# Patient Record
Sex: FEMALE | Race: WHITE | Employment: OTHER | URBAN - METROPOLITAN AREA
[De-identification: names, ages, dates, MRNs, and addresses within clinical notes are randomized per-mention and may not be internally consistent; named-entity substitution may affect disease eponyms.]

---

## 2018-03-13 ENCOUNTER — HOSPITAL ENCOUNTER (OUTPATIENT)
Dept: GENERAL RADIOLOGY | Age: 53
Discharge: HOME | End: 2018-03-15
Payer: COMMERCIAL

## 2018-03-13 ENCOUNTER — HOSPITAL ENCOUNTER (OUTPATIENT)
Age: 53
Discharge: HOME | End: 2018-03-13
Payer: COMMERCIAL

## 2018-03-13 ENCOUNTER — HOSPITAL ENCOUNTER (OUTPATIENT)
Age: 53
Discharge: HOME | End: 2018-03-15
Payer: COMMERCIAL

## 2018-03-13 DIAGNOSIS — E78.2 MIXED HYPERLIPIDEMIA: ICD-10-CM

## 2018-03-13 DIAGNOSIS — E03.9 ACQUIRED HYPOTHYROIDISM: ICD-10-CM

## 2018-03-13 DIAGNOSIS — R05.9 COUGH: ICD-10-CM

## 2018-03-13 DIAGNOSIS — J02.9 PHARYNGITIS, UNSPECIFIED ETIOLOGY: ICD-10-CM

## 2018-03-13 LAB
ALBUMIN SERPL-MCNC: 4 G/DL (ref 3.5–5.2)
ALP BLD-CCNC: 65 U/L (ref 35–104)
ALT SERPL-CCNC: 13 U/L (ref 0–32)
ANION GAP SERPL CALCULATED.3IONS-SCNC: 13 MMOL/L (ref 7–16)
AST SERPL-CCNC: 11 U/L (ref 0–31)
BASOPHILS ABSOLUTE: 0.03 E9/L (ref 0–0.2)
BASOPHILS RELATIVE PERCENT: 0.4 % (ref 0–2)
BILIRUB SERPL-MCNC: 0.3 MG/DL (ref 0–1.2)
BUN BLDV-MCNC: 11 MG/DL (ref 6–20)
CALCIUM SERPL-MCNC: 9.4 MG/DL (ref 8.6–10.2)
CHLORIDE BLD-SCNC: 101 MMOL/L (ref 98–107)
CHOLESTEROL, TOTAL: 271 MG/DL (ref 0–199)
CO2: 27 MMOL/L (ref 22–29)
CREAT SERPL-MCNC: 0.9 MG/DL (ref 0.5–1)
EOSINOPHILS ABSOLUTE: 0.13 E9/L (ref 0.05–0.5)
EOSINOPHILS RELATIVE PERCENT: 1.7 % (ref 0–6)
GFR AFRICAN AMERICAN: >60
GFR NON-AFRICAN AMERICAN: >60 ML/MIN/1.73
GLUCOSE BLD-MCNC: 117 MG/DL (ref 74–109)
HCT VFR BLD CALC: 36.9 % (ref 34–48)
HDLC SERPL-MCNC: 37 MG/DL
HEMOGLOBIN: 12.2 G/DL (ref 11.5–15.5)
IMMATURE GRANULOCYTES #: 0.04 E9/L
IMMATURE GRANULOCYTES %: 0.5 % (ref 0–5)
LDL CHOLESTEROL CALCULATED: 187 MG/DL (ref 0–99)
LYMPHOCYTES ABSOLUTE: 2.36 E9/L (ref 1.5–4)
LYMPHOCYTES RELATIVE PERCENT: 30.7 % (ref 20–42)
MCH RBC QN AUTO: 30.7 PG (ref 26–35)
MCHC RBC AUTO-ENTMCNC: 33.1 % (ref 32–34.5)
MCV RBC AUTO: 92.7 FL (ref 80–99.9)
MONOCYTES ABSOLUTE: 0.33 E9/L (ref 0.1–0.95)
MONOCYTES RELATIVE PERCENT: 4.3 % (ref 2–12)
NEUTROPHILS ABSOLUTE: 4.8 E9/L (ref 1.8–7.3)
NEUTROPHILS RELATIVE PERCENT: 62.4 % (ref 43–80)
PDW BLD-RTO: 13 FL (ref 11.5–15)
PLATELET # BLD: 305 E9/L (ref 130–450)
PMV BLD AUTO: 10.9 FL (ref 7–12)
POTASSIUM SERPL-SCNC: 3.8 MMOL/L (ref 3.5–5)
RBC # BLD: 3.98 E12/L (ref 3.5–5.5)
SODIUM BLD-SCNC: 141 MMOL/L (ref 132–146)
T4 FREE: 0.66 NG/DL (ref 0.93–1.7)
TOTAL PROTEIN: 7.3 G/DL (ref 6.4–8.3)
TRIGL SERPL-MCNC: 233 MG/DL (ref 0–149)
TSH SERPL DL<=0.05 MIU/L-ACNC: 7.76 UIU/ML (ref 0.27–4.2)
VLDLC SERPL CALC-MCNC: 47 MG/DL
WBC # BLD: 7.7 E9/L (ref 4.5–11.5)

## 2018-03-13 PROCEDURE — 36415 COLL VENOUS BLD VENIPUNCTURE: CPT

## 2018-03-13 PROCEDURE — 80053 COMPREHEN METABOLIC PANEL: CPT

## 2018-03-13 PROCEDURE — 84443 ASSAY THYROID STIM HORMONE: CPT

## 2018-03-13 PROCEDURE — 80061 LIPID PANEL: CPT

## 2018-03-13 PROCEDURE — 71046 X-RAY EXAM CHEST 2 VIEWS: CPT

## 2018-03-13 PROCEDURE — 84439 ASSAY OF FREE THYROXINE: CPT

## 2018-03-13 PROCEDURE — 85025 COMPLETE CBC W/AUTO DIFF WBC: CPT

## 2018-03-16 ENCOUNTER — OFFICE VISIT (OUTPATIENT)
Dept: INTERNAL MEDICINE CLINIC | Age: 53
End: 2018-03-16
Payer: COMMERCIAL

## 2018-03-16 VITALS
TEMPERATURE: 98 F | DIASTOLIC BLOOD PRESSURE: 67 MMHG | OXYGEN SATURATION: 100 % | HEART RATE: 62 BPM | SYSTOLIC BLOOD PRESSURE: 136 MMHG

## 2018-03-16 DIAGNOSIS — R73.01 IMPAIRED FASTING GLUCOSE: ICD-10-CM

## 2018-03-16 DIAGNOSIS — G93.31 POST VIRAL SYNDROME: ICD-10-CM

## 2018-03-16 DIAGNOSIS — E78.2 MIXED HYPERLIPIDEMIA: Primary | ICD-10-CM

## 2018-03-16 DIAGNOSIS — E03.9 ACQUIRED HYPOTHYROIDISM: ICD-10-CM

## 2018-03-16 DIAGNOSIS — I10 ESSENTIAL HYPERTENSION: ICD-10-CM

## 2018-03-16 DIAGNOSIS — N39.46 MIXED STRESS AND URGE URINARY INCONTINENCE: ICD-10-CM

## 2018-03-16 PROCEDURE — G8427 DOCREV CUR MEDS BY ELIG CLIN: HCPCS | Performed by: FAMILY MEDICINE

## 2018-03-16 PROCEDURE — 99212 OFFICE O/P EST SF 10 MIN: CPT | Performed by: FAMILY MEDICINE

## 2018-03-16 PROCEDURE — 3017F COLORECTAL CA SCREEN DOC REV: CPT | Performed by: FAMILY MEDICINE

## 2018-03-16 PROCEDURE — 3014F SCREEN MAMMO DOC REV: CPT | Performed by: FAMILY MEDICINE

## 2018-03-16 PROCEDURE — G8417 CALC BMI ABV UP PARAM F/U: HCPCS | Performed by: FAMILY MEDICINE

## 2018-03-16 PROCEDURE — 99213 OFFICE O/P EST LOW 20 MIN: CPT | Performed by: FAMILY MEDICINE

## 2018-03-16 PROCEDURE — 1036F TOBACCO NON-USER: CPT | Performed by: FAMILY MEDICINE

## 2018-03-16 PROCEDURE — G8484 FLU IMMUNIZE NO ADMIN: HCPCS | Performed by: FAMILY MEDICINE

## 2018-03-16 RX ORDER — LEVOTHYROXINE SODIUM 137 UG/1
137 TABLET ORAL DAILY
Qty: 42 TABLET | Refills: 0 | Status: SHIPPED | OUTPATIENT
Start: 2018-03-16 | End: 2018-04-27 | Stop reason: SDUPTHER

## 2018-03-16 RX ORDER — OXYBUTYNIN CHLORIDE 5 MG/1
5 TABLET, EXTENDED RELEASE ORAL DAILY
Qty: 30 TABLET | Refills: 1 | Status: SHIPPED | OUTPATIENT
Start: 2018-03-16 | End: 2018-04-27 | Stop reason: SDUPTHER

## 2018-03-16 ASSESSMENT — ENCOUNTER SYMPTOMS
NAUSEA: 0
DOUBLE VISION: 0
SHORTNESS OF BREATH: 0
BLURRED VISION: 0
VOMITING: 0
DIARRHEA: 0
BLOOD IN STOOL: 0
COUGH: 1
WHEEZING: 0
SORE THROAT: 0
CONSTIPATION: 0

## 2018-03-16 NOTE — PROGRESS NOTES
Musculoskeletal: Positive for joint pain. Negative for falls and myalgias. Skin: Positive for itching (difuse). Negative for rash. Neurological: Negative for dizziness, sensory change, focal weakness, weakness and headaches. Endo/Heme/Allergies: Negative for polydipsia. Does not bruise/bleed easily. Objective:  Vitals:    03/16/18 1357   BP: 136/67   Pulse: 62   Temp: 98 °F (36.7 °C)   SpO2: 100%     Physical Exam   Constitutional: She is oriented to person, place, and time and well-developed, well-nourished, and in no distress. No distress. HENT:   Head: Normocephalic and atraumatic. Mouth/Throat: No oropharyngeal exudate. Halitosis    Eyes: Conjunctivae and EOM are normal. Pupils are equal, round, and reactive to light. No scleral icterus. Neck: Normal range of motion. Neck supple. Carotid bruit is not present. No thyromegaly present. Cardiovascular: Normal rate, regular rhythm and intact distal pulses. Murmur heard. Systolic murmur is present with a grade of 2/6   Pulmonary/Chest: Effort normal and breath sounds normal. She has no wheezes. She has no rales. Abdominal: Soft. Bowel sounds are normal. She exhibits no distension. There is no tenderness. There is no guarding. Musculoskeletal: She exhibits no edema. Lymphadenopathy:     She has no cervical adenopathy. Neurological: She is alert and oriented to person, place, and time. No cranial nerve deficit. Gait abnormal.   Reflex Scores:       Bicep reflexes are 1+ on the right side and 1+ on the left side. Brachioradialis reflexes are 1+ on the right side and 1+ on the left side. Speech slow however understand and communicates appropriately. Skin: Skin is warm and dry. She is not diaphoretic. No erythema. Psychiatric: Mood, memory, affect and judgment normal.   Nursing note and vitals reviewed. Osteopathic exam:  Patient evaluated in the seated position. Normal thoracic kyphosis and lumbar lordosis.   No acute

## 2018-04-24 ENCOUNTER — HOSPITAL ENCOUNTER (OUTPATIENT)
Age: 53
Discharge: HOME | End: 2018-04-24
Payer: COMMERCIAL

## 2018-04-24 DIAGNOSIS — E78.2 MIXED HYPERLIPIDEMIA: ICD-10-CM

## 2018-04-24 DIAGNOSIS — I10 ESSENTIAL HYPERTENSION: ICD-10-CM

## 2018-04-24 DIAGNOSIS — R73.01 IMPAIRED FASTING GLUCOSE: ICD-10-CM

## 2018-04-24 DIAGNOSIS — E03.9 ACQUIRED HYPOTHYROIDISM: ICD-10-CM

## 2018-04-24 LAB
HBA1C MFR BLD: 5.7 % (ref 4.8–5.9)
T4 FREE: 0.56 NG/DL (ref 0.93–1.7)
TSH SERPL DL<=0.05 MIU/L-ACNC: 10.74 UIU/ML (ref 0.27–4.2)

## 2018-04-24 PROCEDURE — 83036 HEMOGLOBIN GLYCOSYLATED A1C: CPT

## 2018-04-24 PROCEDURE — 36415 COLL VENOUS BLD VENIPUNCTURE: CPT

## 2018-04-24 PROCEDURE — 84439 ASSAY OF FREE THYROXINE: CPT

## 2018-04-24 PROCEDURE — 84443 ASSAY THYROID STIM HORMONE: CPT

## 2018-04-27 ENCOUNTER — TELEPHONE (OUTPATIENT)
Dept: INTERNAL MEDICINE CLINIC | Age: 53
End: 2018-04-27

## 2018-04-27 ENCOUNTER — OFFICE VISIT (OUTPATIENT)
Dept: INTERNAL MEDICINE CLINIC | Age: 53
End: 2018-04-27
Payer: COMMERCIAL

## 2018-04-27 VITALS
WEIGHT: 152 LBS | HEIGHT: 59 IN | HEART RATE: 53 BPM | RESPIRATION RATE: 16 BRPM | DIASTOLIC BLOOD PRESSURE: 69 MMHG | SYSTOLIC BLOOD PRESSURE: 132 MMHG | TEMPERATURE: 98 F | BODY MASS INDEX: 30.64 KG/M2

## 2018-04-27 DIAGNOSIS — M25.572 ACUTE LEFT ANKLE PAIN: Primary | ICD-10-CM

## 2018-04-27 DIAGNOSIS — E03.9 ACQUIRED HYPOTHYROIDISM: ICD-10-CM

## 2018-04-27 DIAGNOSIS — R19.6 HALITOSIS: ICD-10-CM

## 2018-04-27 DIAGNOSIS — N39.46 MIXED STRESS AND URGE URINARY INCONTINENCE: ICD-10-CM

## 2018-04-27 DIAGNOSIS — H53.9 VISION CHANGES: Primary | ICD-10-CM

## 2018-04-27 PROCEDURE — 3017F COLORECTAL CA SCREEN DOC REV: CPT | Performed by: FAMILY MEDICINE

## 2018-04-27 PROCEDURE — 99212 OFFICE O/P EST SF 10 MIN: CPT | Performed by: FAMILY MEDICINE

## 2018-04-27 PROCEDURE — 1036F TOBACCO NON-USER: CPT | Performed by: FAMILY MEDICINE

## 2018-04-27 PROCEDURE — 99213 OFFICE O/P EST LOW 20 MIN: CPT | Performed by: FAMILY MEDICINE

## 2018-04-27 PROCEDURE — G8417 CALC BMI ABV UP PARAM F/U: HCPCS | Performed by: FAMILY MEDICINE

## 2018-04-27 PROCEDURE — G8427 DOCREV CUR MEDS BY ELIG CLIN: HCPCS | Performed by: FAMILY MEDICINE

## 2018-04-27 RX ORDER — LEVOTHYROXINE SODIUM 137 UG/1
137 TABLET ORAL DAILY
Qty: 42 TABLET | Refills: 0 | Status: SHIPPED | OUTPATIENT
Start: 2018-04-27 | End: 2018-06-08 | Stop reason: SDUPTHER

## 2018-04-27 RX ORDER — OXYBUTYNIN CHLORIDE 5 MG/1
5 TABLET, EXTENDED RELEASE ORAL DAILY
Qty: 30 TABLET | Refills: 3 | Status: SHIPPED | OUTPATIENT
Start: 2018-04-27 | End: 2018-06-08 | Stop reason: SDUPTHER

## 2018-04-27 ASSESSMENT — ENCOUNTER SYMPTOMS
SHORTNESS OF BREATH: 0
NAUSEA: 0
DOUBLE VISION: 0
DIARRHEA: 0
BLOOD IN STOOL: 0
CONSTIPATION: 0
BLURRED VISION: 0
COUGH: 0
WHEEZING: 0
SORE THROAT: 0
VOMITING: 0

## 2018-05-30 ENCOUNTER — HOSPITAL ENCOUNTER (OUTPATIENT)
Age: 53
Discharge: HOME | End: 2018-05-30
Payer: COMMERCIAL

## 2018-05-30 DIAGNOSIS — E03.9 ACQUIRED HYPOTHYROIDISM: ICD-10-CM

## 2018-05-30 LAB
T4 FREE: 0.59 NG/DL (ref 0.93–1.7)
TSH SERPL DL<=0.05 MIU/L-ACNC: 12.38 UIU/ML (ref 0.27–4.2)

## 2018-05-30 PROCEDURE — 36415 COLL VENOUS BLD VENIPUNCTURE: CPT

## 2018-05-30 PROCEDURE — 84439 ASSAY OF FREE THYROXINE: CPT

## 2018-05-30 PROCEDURE — 84443 ASSAY THYROID STIM HORMONE: CPT

## 2018-06-08 ENCOUNTER — OFFICE VISIT (OUTPATIENT)
Dept: INTERNAL MEDICINE CLINIC | Age: 53
End: 2018-06-08
Payer: COMMERCIAL

## 2018-06-08 VITALS
WEIGHT: 154.6 LBS | DIASTOLIC BLOOD PRESSURE: 72 MMHG | HEART RATE: 62 BPM | OXYGEN SATURATION: 98 % | HEIGHT: 59 IN | BODY MASS INDEX: 31.17 KG/M2 | SYSTOLIC BLOOD PRESSURE: 123 MMHG

## 2018-06-08 DIAGNOSIS — J30.2 CHRONIC SEASONAL ALLERGIC RHINITIS, UNSPECIFIED TRIGGER: ICD-10-CM

## 2018-06-08 DIAGNOSIS — N39.46 MIXED STRESS AND URGE URINARY INCONTINENCE: ICD-10-CM

## 2018-06-08 DIAGNOSIS — Z23 NEED FOR TETANUS BOOSTER: Primary | ICD-10-CM

## 2018-06-08 DIAGNOSIS — K21.9 GASTROESOPHAGEAL REFLUX DISEASE, ESOPHAGITIS PRESENCE NOT SPECIFIED: ICD-10-CM

## 2018-06-08 DIAGNOSIS — E78.5 DYSLIPIDEMIA: ICD-10-CM

## 2018-06-08 DIAGNOSIS — E03.9 ACQUIRED HYPOTHYROIDISM: ICD-10-CM

## 2018-06-08 PROCEDURE — G8417 CALC BMI ABV UP PARAM F/U: HCPCS | Performed by: FAMILY MEDICINE

## 2018-06-08 PROCEDURE — G8427 DOCREV CUR MEDS BY ELIG CLIN: HCPCS | Performed by: FAMILY MEDICINE

## 2018-06-08 PROCEDURE — 99213 OFFICE O/P EST LOW 20 MIN: CPT | Performed by: FAMILY MEDICINE

## 2018-06-08 PROCEDURE — G0008 ADMIN INFLUENZA VIRUS VAC: HCPCS | Performed by: FAMILY MEDICINE

## 2018-06-08 PROCEDURE — 90715 TDAP VACCINE 7 YRS/> IM: CPT

## 2018-06-08 PROCEDURE — 1036F TOBACCO NON-USER: CPT | Performed by: FAMILY MEDICINE

## 2018-06-08 PROCEDURE — 3017F COLORECTAL CA SCREEN DOC REV: CPT | Performed by: FAMILY MEDICINE

## 2018-06-08 RX ORDER — TRIAMCINOLONE ACETONIDE 1 MG/G
CREAM TOPICAL
Qty: 45 G | Refills: 1 | Status: CANCELLED | OUTPATIENT
Start: 2018-06-08

## 2018-06-08 RX ORDER — OMEPRAZOLE 20 MG/1
20 CAPSULE, DELAYED RELEASE ORAL DAILY
Qty: 30 CAPSULE | Refills: 2 | Status: SHIPPED | OUTPATIENT
Start: 2018-06-08 | End: 2019-01-14

## 2018-06-08 RX ORDER — LORATADINE 10 MG/1
10 TABLET ORAL DAILY PRN
Qty: 30 TABLET | Refills: 1 | Status: CANCELLED | OUTPATIENT
Start: 2018-06-08

## 2018-06-08 RX ORDER — FENOFIBRATE 145 MG/1
145 TABLET, COATED ORAL DAILY
Qty: 30 TABLET | Refills: 2 | Status: SHIPPED | OUTPATIENT
Start: 2018-06-08 | End: 2018-07-25 | Stop reason: ALTCHOICE

## 2018-06-08 RX ORDER — ONDANSETRON 4 MG/1
4 TABLET, FILM COATED ORAL EVERY 8 HOURS PRN
Qty: 20 TABLET | Refills: 0 | Status: CANCELLED | OUTPATIENT
Start: 2018-06-08

## 2018-06-08 RX ORDER — CETIRIZINE HYDROCHLORIDE 10 MG/1
TABLET ORAL
Qty: 30 TABLET | Refills: 3 | Status: SHIPPED | OUTPATIENT
Start: 2018-06-08 | End: 2018-09-12 | Stop reason: SDUPTHER

## 2018-06-08 RX ORDER — FLUTICASONE PROPIONATE 50 MCG
2 SPRAY, SUSPENSION (ML) NASAL DAILY
Qty: 1 BOTTLE | Refills: 2 | Status: SHIPPED | OUTPATIENT
Start: 2018-06-08 | End: 2018-09-12 | Stop reason: SDUPTHER

## 2018-06-08 RX ORDER — GUAIFENESIN 600 MG/1
600 TABLET, EXTENDED RELEASE ORAL 2 TIMES DAILY
Qty: 20 TABLET | Refills: 0 | Status: CANCELLED | OUTPATIENT
Start: 2018-06-08

## 2018-06-08 RX ORDER — LEVOTHYROXINE SODIUM 137 UG/1
137 TABLET ORAL DAILY
Qty: 42 TABLET | Refills: 0 | Status: SHIPPED | OUTPATIENT
Start: 2018-06-08 | End: 2018-07-25 | Stop reason: SDUPTHER

## 2018-06-08 RX ORDER — OXYBUTYNIN CHLORIDE 5 MG/1
5 TABLET, EXTENDED RELEASE ORAL DAILY
Qty: 30 TABLET | Refills: 3 | Status: SHIPPED | OUTPATIENT
Start: 2018-06-08 | End: 2018-09-12 | Stop reason: SDUPTHER

## 2018-06-08 RX ORDER — CLOTRIMAZOLE AND BETAMETHASONE DIPROPIONATE 10; .64 MG/G; MG/G
CREAM TOPICAL
Qty: 30 G | Refills: 0 | Status: CANCELLED | OUTPATIENT
Start: 2018-06-08

## 2018-06-08 ASSESSMENT — ENCOUNTER SYMPTOMS
DOUBLE VISION: 0
WHEEZING: 0
COUGH: 0
BLURRED VISION: 0
VOMITING: 0
NAUSEA: 0
BLOOD IN STOOL: 0
SHORTNESS OF BREATH: 0
DIARRHEA: 0
CONSTIPATION: 0
SORE THROAT: 0

## 2018-07-19 ENCOUNTER — HOSPITAL ENCOUNTER (OUTPATIENT)
Age: 53
Discharge: HOME | End: 2018-07-19
Payer: COMMERCIAL

## 2018-07-19 LAB
ALBUMIN SERPL-MCNC: 4.2 G/DL (ref 3.5–5.2)
ALP BLD-CCNC: 66 U/L (ref 35–104)
ALT SERPL-CCNC: 14 U/L (ref 0–32)
ANION GAP SERPL CALCULATED.3IONS-SCNC: 10 MMOL/L (ref 7–16)
AST SERPL-CCNC: 14 U/L (ref 0–31)
BASOPHILS ABSOLUTE: 0.03 E9/L (ref 0–0.2)
BASOPHILS RELATIVE PERCENT: 0.4 % (ref 0–2)
BILIRUB SERPL-MCNC: 0.3 MG/DL (ref 0–1.2)
BUN BLDV-MCNC: 13 MG/DL (ref 6–20)
CALCIUM SERPL-MCNC: 9.8 MG/DL (ref 8.6–10.2)
CHLORIDE BLD-SCNC: 102 MMOL/L (ref 98–107)
CHOLESTEROL, TOTAL: 264 MG/DL (ref 0–199)
CO2: 28 MMOL/L (ref 22–29)
CREAT SERPL-MCNC: 0.9 MG/DL (ref 0.5–1)
EOSINOPHILS ABSOLUTE: 0.07 E9/L (ref 0.05–0.5)
EOSINOPHILS RELATIVE PERCENT: 0.9 % (ref 0–6)
GFR AFRICAN AMERICAN: >60
GFR NON-AFRICAN AMERICAN: >60 ML/MIN/1.73
GLUCOSE BLD-MCNC: 108 MG/DL (ref 74–109)
HCT VFR BLD CALC: 39.1 % (ref 34–48)
HDLC SERPL-MCNC: 41 MG/DL
HEMOGLOBIN: 12.8 G/DL (ref 11.5–15.5)
IMMATURE GRANULOCYTES #: 0.04 E9/L
IMMATURE GRANULOCYTES %: 0.5 % (ref 0–5)
LDL CHOLESTEROL CALCULATED: 192 MG/DL (ref 0–99)
LYMPHOCYTES ABSOLUTE: 2.03 E9/L (ref 1.5–4)
LYMPHOCYTES RELATIVE PERCENT: 26.7 % (ref 20–42)
MCH RBC QN AUTO: 30.4 PG (ref 26–35)
MCHC RBC AUTO-ENTMCNC: 32.7 % (ref 32–34.5)
MCV RBC AUTO: 92.9 FL (ref 80–99.9)
MONOCYTES ABSOLUTE: 0.3 E9/L (ref 0.1–0.95)
MONOCYTES RELATIVE PERCENT: 3.9 % (ref 2–12)
NEUTROPHILS ABSOLUTE: 5.14 E9/L (ref 1.8–7.3)
NEUTROPHILS RELATIVE PERCENT: 67.6 % (ref 43–80)
PDW BLD-RTO: 13.2 FL (ref 11.5–15)
PLATELET # BLD: 265 E9/L (ref 130–450)
PMV BLD AUTO: 11.2 FL (ref 7–12)
POTASSIUM SERPL-SCNC: 3.9 MMOL/L (ref 3.5–5)
RBC # BLD: 4.21 E12/L (ref 3.5–5.5)
SODIUM BLD-SCNC: 140 MMOL/L (ref 132–146)
T4 FREE: 0.5 NG/DL (ref 0.93–1.7)
TOTAL PROTEIN: 7.4 G/DL (ref 6.4–8.3)
TRIGL SERPL-MCNC: 157 MG/DL (ref 0–149)
TSH SERPL DL<=0.05 MIU/L-ACNC: 14.48 UIU/ML (ref 0.27–4.2)
VLDLC SERPL CALC-MCNC: 31 MG/DL
WBC # BLD: 7.6 E9/L (ref 4.5–11.5)

## 2018-07-19 PROCEDURE — 85025 COMPLETE CBC W/AUTO DIFF WBC: CPT

## 2018-07-19 PROCEDURE — 80053 COMPREHEN METABOLIC PANEL: CPT

## 2018-07-19 PROCEDURE — 80061 LIPID PANEL: CPT

## 2018-07-19 PROCEDURE — 84443 ASSAY THYROID STIM HORMONE: CPT

## 2018-07-19 PROCEDURE — 36415 COLL VENOUS BLD VENIPUNCTURE: CPT

## 2018-07-19 PROCEDURE — 84439 ASSAY OF FREE THYROXINE: CPT

## 2018-07-25 ENCOUNTER — OFFICE VISIT (OUTPATIENT)
Dept: INTERNAL MEDICINE CLINIC | Age: 53
End: 2018-07-25
Payer: COMMERCIAL

## 2018-07-25 VITALS
HEIGHT: 59 IN | HEART RATE: 58 BPM | OXYGEN SATURATION: 97 % | BODY MASS INDEX: 31.65 KG/M2 | SYSTOLIC BLOOD PRESSURE: 120 MMHG | WEIGHT: 157 LBS | TEMPERATURE: 98.5 F | DIASTOLIC BLOOD PRESSURE: 67 MMHG

## 2018-07-25 DIAGNOSIS — Z91.199 NONCOMPLIANCE: ICD-10-CM

## 2018-07-25 DIAGNOSIS — R60.9 PERIPHERAL EDEMA: ICD-10-CM

## 2018-07-25 DIAGNOSIS — E55.9 VITAMIN D DEFICIENCY: ICD-10-CM

## 2018-07-25 DIAGNOSIS — E03.9 ACQUIRED HYPOTHYROIDISM: ICD-10-CM

## 2018-07-25 DIAGNOSIS — E78.2 MIXED HYPERLIPIDEMIA: ICD-10-CM

## 2018-07-25 DIAGNOSIS — F43.21 FEELING GRIEF: Primary | ICD-10-CM

## 2018-07-25 PROCEDURE — 3017F COLORECTAL CA SCREEN DOC REV: CPT | Performed by: FAMILY MEDICINE

## 2018-07-25 PROCEDURE — 1036F TOBACCO NON-USER: CPT | Performed by: FAMILY MEDICINE

## 2018-07-25 PROCEDURE — 99213 OFFICE O/P EST LOW 20 MIN: CPT | Performed by: FAMILY MEDICINE

## 2018-07-25 PROCEDURE — G8417 CALC BMI ABV UP PARAM F/U: HCPCS | Performed by: FAMILY MEDICINE

## 2018-07-25 PROCEDURE — G8427 DOCREV CUR MEDS BY ELIG CLIN: HCPCS | Performed by: FAMILY MEDICINE

## 2018-07-25 PROCEDURE — 99212 OFFICE O/P EST SF 10 MIN: CPT | Performed by: FAMILY MEDICINE

## 2018-07-25 RX ORDER — ATORVASTATIN CALCIUM 40 MG/1
40 TABLET, FILM COATED ORAL DAILY
Qty: 30 TABLET | Refills: 3 | Status: SHIPPED | OUTPATIENT
Start: 2018-07-25 | End: 2018-09-12 | Stop reason: SDUPTHER

## 2018-07-25 RX ORDER — LEVOTHYROXINE SODIUM 137 UG/1
TABLET ORAL
Qty: 30 TABLET | Refills: 0 | Status: SHIPPED | OUTPATIENT
Start: 2018-07-25 | End: 2018-09-12 | Stop reason: SDUPTHER

## 2018-07-25 ASSESSMENT — ENCOUNTER SYMPTOMS
WHEEZING: 0
DIARRHEA: 0
ABDOMINAL PAIN: 0
CHEST TIGHTNESS: 0
BLOOD IN STOOL: 0
CONSTIPATION: 0
BACK PAIN: 0
EYE DISCHARGE: 0
TROUBLE SWALLOWING: 0
EYE PAIN: 0
NAUSEA: 0
SORE THROAT: 0
ABDOMINAL DISTENTION: 0
SHORTNESS OF BREATH: 0
PHOTOPHOBIA: 0
VOMITING: 0
RHINORRHEA: 0
CHOKING: 0
COUGH: 0

## 2018-07-25 NOTE — PROGRESS NOTES
Subjective:  45 y.o. female with a PMH of uncontrolled Hypothyroidism, Hyperlipidemia and other co-morbidities who presents in office today to review labs completed on 07/19/2018. Patient was previously under the care of another Resident PCP. Patient stated that her mother passed away on 07/20/2018. Patient is only aware of her mother having thyroid disease and not taking her medication regularly. Patient is tearful but consolable. Patient has a 21 yr old daughter with nonverbal Autism who lives in a group home. Patient relates that she has a brother who lives in the area for support. When offered grief counseling, she relates that she informed her Counselor at [facility]; next lorna't on 07/31/2018. Labs were reviewed during the visit. CMP, CBC and LFTs are WNL. Elevated Hyperlipidemia: Tchol: 264; LDL: 192; Triglycerides: 157   Current meds: Simvastatin & Fenofibrate    TSH trending up ~ 14.48   Current med: Levothyroxine 137 mcg     Patient admits to not taking medications as prescribed without clear explanation. This has been an ongoing topic discussed with the prior Resident. I compassionately reiterated the importance of taking her medications regularly. Patient voiced understanding and agreed to take medications as directed. Review of Systems   Constitutional: Positive for unexpected weight change (weight gain). Negative for activity change, appetite change, chills and fever. HENT: Negative for congestion, ear pain, hearing loss, rhinorrhea, sore throat and trouble swallowing. Eyes: Negative for photophobia, pain, discharge and visual disturbance. Respiratory: Negative for cough, choking, chest tightness, shortness of breath and wheezing. Cardiovascular: Negative for chest pain, palpitations and leg swelling. Gastrointestinal: Negative for abdominal distention, abdominal pain, blood in stool, constipation, diarrhea, nausea and vomiting.    Endocrine: Negative for cold intolerance, heat intolerance, polydipsia and polyuria. Genitourinary: Negative for decreased urine volume, dysuria, flank pain, frequency, hematuria and urgency. Musculoskeletal: Negative for arthralgias, back pain, joint swelling, neck pain and neck stiffness. Skin: Negative for pallor, rash and wound. Neurological: Negative for dizziness, tremors, weakness, light-headedness, numbness and headaches. Hematological: Does not bruise/bleed easily. Psychiatric/Behavioral: Positive for dysphoric mood. Negative for confusion and suicidal ideas. The patient is not nervous/anxious. Objective:  Vitals:    07/25/18 1303   BP: 120/67   Pulse: 58   Temp: 98.5 °F (36.9 °C)   SpO2: 97%       Physical Exam   Constitutional: She is oriented to person, place, and time. She appears well-developed and well-nourished. No distress. HENT:   Head: Normocephalic and atraumatic. Right Ear: External ear normal.   Left Ear: External ear normal.   Nose: Nose normal.   Mouth/Throat: Oropharynx is clear and moist.   Eyes: Conjunctivae and EOM are normal. Pupils are equal, round, and reactive to light. Neck: Normal range of motion. Neck supple. No thyromegaly present. Cardiovascular: Normal rate, regular rhythm and intact distal pulses. Exam reveals no gallop and no friction rub. Murmur heard. Systolic murmur is present with a grade of 2/6   Pulmonary/Chest: Effort normal and breath sounds normal. No respiratory distress. She has no wheezes. Abdominal: Soft. Bowel sounds are normal. She exhibits no distension and no mass. There is no hepatosplenomegaly. There is no tenderness. Musculoskeletal: Normal range of motion. She exhibits no edema or tenderness. Neurological: She is alert and oriented to person, place, and time. She has normal reflexes. Gait (ambulates with walker) abnormal.   Skin: Skin is warm and dry. No rash noted.    Psychiatric: Judgment and thought content normal. Her speech is delayed (responds appropriately). She exhibits a depressed mood (appropriate ). Vitals reviewed. Osteopathic exam:  Patient evaluated in the seated position. Normal thoracic kyphosis and lumbar lordosis. No acute tissue texture changes. No acute somatic dysfunction of the cervical, thoracic, or lumbar spine. Lab Results    Lab Results   Component Value Date    WBC 7.6 07/19/2018    HGB 12.8 07/19/2018    HCT 39.1 07/19/2018     07/19/2018    CHOL 264 (H) 07/19/2018    TRIG 157 (H) 07/19/2018    HDL 41 07/19/2018    ALT 14 07/19/2018    AST 14 07/19/2018     07/19/2018    K 3.9 07/19/2018     07/19/2018    CREATININE 0.9 07/19/2018    BUN 13 07/19/2018    CO2 28 07/19/2018    TSH 14.480 (H) 07/19/2018    LABA1C 5.7 04/24/2018     No results found for: VOL, APPEARANCE, COLORU, LABSPEC, LABPH, LEUKBLD, NITRU, GLUCOSEU, KETUA, UROBILINOGEN, KETUA, UROBILINOGEN, BILIRUBINUR, OCBU  No results found for: PSA, CEA, , [id number],   Assessment and Plan:  Guillermina Barragan was seen today for results, hypothyroidism, hyperlipidemia and health maintenance. Diagnoses and all orders for this visit:    Feeling grief        -     [facility]; lorna't 07/31/2018        -     Has family support    Acquired hypothyroidism  -     TSH without Reflex; Future  -     levothyroxine (SYNTHROID) 137 MCG tablet; Take 1 tablet by mouth every morning 30 minutes before eating. Mixed hyperlipidemia  -     atorvastatin (LIPITOR) 40 MG tablet; Take 1 tablet by mouth daily   -     Discontinue simvastatin and fenofibrate  -     CBC; Future    Peripheral edema  -     Comprehensive Metabolic Panel;  Future  -     Compression Stockings MISC; by Does not apply route Below knee  15 - 30 mm Hg    Vitamin D deficiency  -     Vitamin D 25 Hydrox, D2 & D3; Future    Noncompliance        -     Reiterated with compassion the importance of taking medication as prescribed and patient agreed to change her behavior     Return in about 7 weeks

## 2018-09-05 ENCOUNTER — HOSPITAL ENCOUNTER (OUTPATIENT)
Age: 53
Discharge: HOME | End: 2018-09-05
Payer: COMMERCIAL

## 2018-09-05 DIAGNOSIS — R60.9 PERIPHERAL EDEMA: ICD-10-CM

## 2018-09-05 DIAGNOSIS — E78.2 MIXED HYPERLIPIDEMIA: ICD-10-CM

## 2018-09-05 DIAGNOSIS — E78.5 DYSLIPIDEMIA: ICD-10-CM

## 2018-09-05 DIAGNOSIS — E03.9 ACQUIRED HYPOTHYROIDISM: ICD-10-CM

## 2018-09-05 DIAGNOSIS — E55.9 VITAMIN D DEFICIENCY: ICD-10-CM

## 2018-09-05 LAB
ALBUMIN SERPL-MCNC: 4.6 G/DL (ref 3.5–5.2)
ALP BLD-CCNC: 64 U/L (ref 35–104)
ALT SERPL-CCNC: 12 U/L (ref 0–32)
ANION GAP SERPL CALCULATED.3IONS-SCNC: 14 MMOL/L (ref 7–16)
AST SERPL-CCNC: 13 U/L (ref 0–31)
BILIRUB SERPL-MCNC: 0.2 MG/DL (ref 0–1.2)
BUN BLDV-MCNC: 12 MG/DL (ref 6–20)
CALCIUM SERPL-MCNC: 9.8 MG/DL (ref 8.6–10.2)
CHLORIDE BLD-SCNC: 103 MMOL/L (ref 98–107)
CO2: 28 MMOL/L (ref 22–29)
CREAT SERPL-MCNC: 1.2 MG/DL (ref 0.5–1)
GFR AFRICAN AMERICAN: 56
GFR NON-AFRICAN AMERICAN: 47 ML/MIN/1.73
GLUCOSE BLD-MCNC: 118 MG/DL (ref 74–109)
HCT VFR BLD CALC: 41.5 % (ref 34–48)
HEMOGLOBIN: 13.2 G/DL (ref 11.5–15.5)
MCH RBC QN AUTO: 30.3 PG (ref 26–35)
MCHC RBC AUTO-ENTMCNC: 31.8 % (ref 32–34.5)
MCV RBC AUTO: 95.2 FL (ref 80–99.9)
PDW BLD-RTO: 13.2 FL (ref 11.5–15)
PLATELET # BLD: 301 E9/L (ref 130–450)
PMV BLD AUTO: 11.1 FL (ref 7–12)
POTASSIUM SERPL-SCNC: 4 MMOL/L (ref 3.5–5)
RBC # BLD: 4.36 E12/L (ref 3.5–5.5)
SODIUM BLD-SCNC: 145 MMOL/L (ref 132–146)
TOTAL PROTEIN: 7.8 G/DL (ref 6.4–8.3)
TSH SERPL DL<=0.05 MIU/L-ACNC: 16.13 UIU/ML (ref 0.27–4.2)
VITAMIN D 25-HYDROXY: 10 NG/ML (ref 30–100)
WBC # BLD: 8.1 E9/L (ref 4.5–11.5)

## 2018-09-05 PROCEDURE — 36415 COLL VENOUS BLD VENIPUNCTURE: CPT

## 2018-09-05 PROCEDURE — 85027 COMPLETE CBC AUTOMATED: CPT

## 2018-09-05 PROCEDURE — 84443 ASSAY THYROID STIM HORMONE: CPT

## 2018-09-05 PROCEDURE — 82306 VITAMIN D 25 HYDROXY: CPT

## 2018-09-05 PROCEDURE — 80053 COMPREHEN METABOLIC PANEL: CPT

## 2018-09-12 ENCOUNTER — OFFICE VISIT (OUTPATIENT)
Dept: INTERNAL MEDICINE CLINIC | Age: 53
End: 2018-09-12
Payer: COMMERCIAL

## 2018-09-12 VITALS
HEIGHT: 59 IN | HEART RATE: 68 BPM | BODY MASS INDEX: 31.25 KG/M2 | SYSTOLIC BLOOD PRESSURE: 127 MMHG | TEMPERATURE: 98.6 F | OXYGEN SATURATION: 98 % | DIASTOLIC BLOOD PRESSURE: 74 MMHG | WEIGHT: 155 LBS

## 2018-09-12 DIAGNOSIS — F43.21 GRIEVING: ICD-10-CM

## 2018-09-12 DIAGNOSIS — Z91.199 NONCOMPLIANCE: ICD-10-CM

## 2018-09-12 DIAGNOSIS — J30.2 SEASONAL ALLERGIC RHINITIS, UNSPECIFIED TRIGGER: ICD-10-CM

## 2018-09-12 DIAGNOSIS — N39.46 MIXED STRESS AND URGE URINARY INCONTINENCE: ICD-10-CM

## 2018-09-12 DIAGNOSIS — E55.9 VITAMIN D DEFICIENCY: ICD-10-CM

## 2018-09-12 DIAGNOSIS — E03.9 ACQUIRED HYPOTHYROIDISM: Primary | ICD-10-CM

## 2018-09-12 DIAGNOSIS — E78.2 MIXED HYPERLIPIDEMIA: ICD-10-CM

## 2018-09-12 PROCEDURE — G8427 DOCREV CUR MEDS BY ELIG CLIN: HCPCS | Performed by: FAMILY MEDICINE

## 2018-09-12 PROCEDURE — 99212 OFFICE O/P EST SF 10 MIN: CPT | Performed by: FAMILY MEDICINE

## 2018-09-12 PROCEDURE — 3017F COLORECTAL CA SCREEN DOC REV: CPT | Performed by: FAMILY MEDICINE

## 2018-09-12 PROCEDURE — G8417 CALC BMI ABV UP PARAM F/U: HCPCS | Performed by: FAMILY MEDICINE

## 2018-09-12 PROCEDURE — 99213 OFFICE O/P EST LOW 20 MIN: CPT | Performed by: FAMILY MEDICINE

## 2018-09-12 PROCEDURE — 1036F TOBACCO NON-USER: CPT | Performed by: FAMILY MEDICINE

## 2018-09-12 RX ORDER — ACETAMINOPHEN 500 MG
500 TABLET ORAL EVERY 6 HOURS PRN
Qty: 120 TABLET | Refills: 1 | Status: CANCELLED | OUTPATIENT
Start: 2018-09-12

## 2018-09-12 RX ORDER — CLOTRIMAZOLE AND BETAMETHASONE DIPROPIONATE 10; .64 MG/G; MG/G
CREAM TOPICAL
Qty: 30 G | Refills: 0 | Status: CANCELLED | OUTPATIENT
Start: 2018-09-12

## 2018-09-12 RX ORDER — OXYBUTYNIN CHLORIDE 5 MG/1
5 TABLET, EXTENDED RELEASE ORAL DAILY
Qty: 30 TABLET | Refills: 3 | Status: SHIPPED | OUTPATIENT
Start: 2018-09-12 | End: 2018-09-14 | Stop reason: SDUPTHER

## 2018-09-12 RX ORDER — ATORVASTATIN CALCIUM 40 MG/1
40 TABLET, FILM COATED ORAL DAILY
Qty: 30 TABLET | Refills: 3 | Status: SHIPPED | OUTPATIENT
Start: 2018-09-12 | End: 2018-09-14 | Stop reason: SDUPTHER

## 2018-09-12 RX ORDER — FLUTICASONE PROPIONATE 50 MCG
2 SPRAY, SUSPENSION (ML) NASAL DAILY
Qty: 1 BOTTLE | Refills: 3 | Status: SHIPPED | OUTPATIENT
Start: 2018-09-12 | End: 2018-09-14 | Stop reason: SDUPTHER

## 2018-09-12 RX ORDER — LEVOTHYROXINE SODIUM 137 UG/1
TABLET ORAL
Qty: 30 TABLET | Refills: 0 | Status: SHIPPED | OUTPATIENT
Start: 2018-09-12 | End: 2018-09-14 | Stop reason: SDUPTHER

## 2018-09-12 RX ORDER — OMEPRAZOLE 20 MG/1
20 CAPSULE, DELAYED RELEASE ORAL DAILY
Qty: 30 CAPSULE | Refills: 2 | Status: CANCELLED | OUTPATIENT
Start: 2018-09-12

## 2018-09-12 RX ORDER — CETIRIZINE HYDROCHLORIDE 10 MG/1
TABLET ORAL
Qty: 30 TABLET | Refills: 3 | Status: SHIPPED | OUTPATIENT
Start: 2018-09-12 | End: 2018-09-14 | Stop reason: SDUPTHER

## 2018-09-12 RX ORDER — TRIAMCINOLONE ACETONIDE 1 MG/G
CREAM TOPICAL
Qty: 45 G | Refills: 1 | Status: CANCELLED | OUTPATIENT
Start: 2018-09-12

## 2018-09-12 RX ORDER — ERGOCALCIFEROL 1.25 MG/1
50000 CAPSULE ORAL WEEKLY
Qty: 12 CAPSULE | Refills: 0 | Status: SHIPPED | OUTPATIENT
Start: 2018-09-12 | End: 2018-09-14 | Stop reason: SDUPTHER

## 2018-09-12 ASSESSMENT — ENCOUNTER SYMPTOMS
TROUBLE SWALLOWING: 0
PHOTOPHOBIA: 0
BACK PAIN: 0
ABDOMINAL PAIN: 0
ABDOMINAL DISTENTION: 0
WHEEZING: 0
CONSTIPATION: 0
NAUSEA: 0
SORE THROAT: 0
RHINORRHEA: 0
VOMITING: 0
EYE DISCHARGE: 0
BLOOD IN STOOL: 0
CHOKING: 0
COUGH: 0
CHEST TIGHTNESS: 0
EYE PAIN: 0
DIARRHEA: 0
SHORTNESS OF BREATH: 0

## 2018-09-12 NOTE — PROGRESS NOTES
Subjective:  45 y.o. female who presents in office today for a 2 week f/u on uncontrolled hypothyroidism. She has not taken her synthroid (or any of her medications) at all since the last visit due to grief over the loss of her mother this year and forgetfulness. She has no financial obstacle to get her medications and is unmotivated to set alarms or reminders to take them as prescribed. She describes having chronic hair loss and overall fatigue. She explains feeling depressed over the loss of her mother since her death, sees a counselor every 2 weeks, and has good social support through her brother who lives in town. She has had thoughts of hurting herself without a plan, and explains that her counselor at [facility] counseling has talked her out of it. No thoughts of hurting anyone else. She was interested in starting Prozac, recommended by a friend who had success with it. Once I explained that Prozac has to be taken daily without fail, patient changed her mind. Patient declined offer to referral for , home health nurse, and Psychiatrist. Patient is agreeable to switching to a pharmacy that will send her medications in a pre-packed dispenser box. Review of Systems   Constitutional: Positive for fatigue. Negative for activity change, appetite change, chills, fever and unexpected weight change. HENT: Negative for congestion, ear pain, hearing loss, rhinorrhea, sore throat and trouble swallowing. Eyes: Negative for photophobia, pain, discharge and visual disturbance. Respiratory: Negative for cough, choking, chest tightness, shortness of breath and wheezing. Cardiovascular: Negative for chest pain, palpitations and leg swelling. Gastrointestinal: Negative for abdominal distention, abdominal pain, blood in stool, constipation, diarrhea, nausea and vomiting. Endocrine: Negative for cold intolerance, heat intolerance, polydipsia and polyuria.    Genitourinary: Negative for decreased urine volume, dysuria, flank pain, frequency, hematuria and urgency. Musculoskeletal: Negative for arthralgias, back pain, joint swelling, neck pain and neck stiffness. Skin: Negative for pallor, rash and wound. Neurological: Negative for dizziness, tremors, weakness, light-headedness, numbness and headaches. Hematological: Does not bruise/bleed easily. Psychiatric/Behavioral: Negative for confusion and suicidal ideas. The patient is not nervous/anxious. Objective:  Vitals:    09/12/18 1410   BP: 127/74   Pulse: 68   Temp: 98.6 °F (37 °C)   SpO2: 98%       Physical Exam   Constitutional: She is oriented to person, place, and time. She appears well-developed and well-nourished. No distress. HENT:   Head: Normocephalic and atraumatic. Right Ear: External ear normal.   Left Ear: External ear normal.   Nose: Nose normal.   Mouth/Throat: Oropharynx is clear and moist.   Eyes: Pupils are equal, round, and reactive to light. Conjunctivae and EOM are normal.   Neck: Normal range of motion. Neck supple. No thyromegaly present. Cardiovascular: Normal rate, regular rhythm and intact distal pulses. Exam reveals no gallop and no friction rub. Murmur heard. Systolic murmur is present with a grade of 2/6   Pulmonary/Chest: Effort normal and breath sounds normal. No respiratory distress. She has no wheezes. Abdominal: Soft. Bowel sounds are normal. She exhibits no distension and no mass. There is no hepatosplenomegaly. There is no tenderness. Musculoskeletal: Normal range of motion. She exhibits no edema or tenderness. Neurological: She is alert and oriented to person, place, and time. She has normal reflexes. Gait (ambulates with walker) abnormal.   Skin: Skin is warm and dry. No rash noted. Psychiatric: Judgment and thought content normal. She exhibits a depressed mood. Vitals reviewed. Osteopathic exam:  Patient evaluated in the seated position. Normal thoracic kyphosis and lumbar lordosis.   No acute tissue texture changes. No acute somatic dysfunction of the cervical, thoracic, or lumbar spine. Lab Results    Lab Results   Component Value Date    WBC 8.1 09/05/2018    HGB 13.2 09/05/2018    HCT 41.5 09/05/2018     09/05/2018    CHOL 264 (H) 07/19/2018    TRIG 157 (H) 07/19/2018    HDL 41 07/19/2018    ALT 12 09/05/2018    AST 13 09/05/2018     09/05/2018    K 4.0 09/05/2018     09/05/2018    CREATININE 1.2 (H) 09/05/2018    BUN 12 09/05/2018    CO2 28 09/05/2018    TSH 16.130 (H) 09/05/2018    LABA1C 5.7 04/24/2018     No results found for: VOL, APPEARANCE, COLORU, LABSPEC, LABPH, LEUKBLD, NITRU, GLUCOSEU, KETUA, UROBILINOGEN, KETUA, UROBILINOGEN, BILIRUBINUR, OCBU  No results found for: PSA, CEA, , [id number],   Assessment and Plan:  Cassidy Mata was seen today for follow-up, hypothyroidism, health maintenance and results. Diagnoses and all orders for this visit:    Acquired hypothyroidism  -     levothyroxine (SYNTHROID) 137 MCG tablet; Take 1 tablet by mouth every morning 30 minutes before eating.  -     CBC; Future  -     Comprehensive Metabolic Panel; Future  -     TSH without Reflex; Future    Noncompliance        -    Patient declines referral to , home health nursing or psychiatry. Patient is agreeable to switching to a pharmacy that will send her medications in a pre-packed dispenser box. Mixed hyperlipidemia  -     atorvastatin (LIPITOR) 40 MG tablet; Take 1 tablet by mouth daily  -     CBC; Future    Mixed stress and urge urinary incontinence  -     oxybutynin (DITROPAN-XL) 5 MG extended release tablet; Take 1 tablet by mouth daily    Seasonal allergic rhinitis, unspecified trigger  -     fluticasone (FLONASE) 50 MCG/ACT nasal spray; 2 sprays by Nasal route daily  -     cetirizine (ZYRTEC) 10 MG tablet; TAKE ONE TABLET BY MOUTH IN THE MORNING AS NEEDED FOR ITCH    Vitamin D deficiency  -     vitamin D (ERGOCALCIFEROL) 78728 units CAPS capsule;  Take 1

## 2018-09-13 ENCOUNTER — TELEPHONE (OUTPATIENT)
Dept: INTERNAL MEDICINE CLINIC | Age: 53
End: 2018-09-13

## 2018-09-14 DIAGNOSIS — E03.9 ACQUIRED HYPOTHYROIDISM: ICD-10-CM

## 2018-09-14 DIAGNOSIS — J30.2 SEASONAL ALLERGIC RHINITIS, UNSPECIFIED TRIGGER: ICD-10-CM

## 2018-09-14 DIAGNOSIS — N39.46 MIXED STRESS AND URGE URINARY INCONTINENCE: ICD-10-CM

## 2018-09-14 DIAGNOSIS — E78.2 MIXED HYPERLIPIDEMIA: ICD-10-CM

## 2018-09-14 DIAGNOSIS — E55.9 VITAMIN D DEFICIENCY: ICD-10-CM

## 2018-09-14 NOTE — TELEPHONE ENCOUNTER
[facility] and patient regarding medication refills. Patient was advised to call [facility] to get registered with them so they can fill rx's. Patient refused and stated she just wants her meds to go to [facility] like always.  Please send in

## 2018-09-19 NOTE — TELEPHONE ENCOUNTER
Patient called again very upset this time because her meds aren't at [facility].  She wants refills and doesn't want to switch pharmacies [address]

## 2018-09-27 RX ORDER — LEVOTHYROXINE SODIUM 137 UG/1
TABLET ORAL
Qty: 30 TABLET | Refills: 3 | Status: SHIPPED | OUTPATIENT
Start: 2018-09-27 | End: 2019-01-28 | Stop reason: SDUPTHER

## 2018-09-27 RX ORDER — ERGOCALCIFEROL 1.25 MG/1
50000 CAPSULE ORAL WEEKLY
Qty: 12 CAPSULE | Refills: 0 | Status: SHIPPED | OUTPATIENT
Start: 2018-09-27 | End: 2018-12-10 | Stop reason: SDUPTHER

## 2018-09-27 RX ORDER — CETIRIZINE HYDROCHLORIDE 10 MG/1
TABLET ORAL
Qty: 30 TABLET | Refills: 3 | Status: ON HOLD | OUTPATIENT
Start: 2018-09-27 | End: 2019-01-28 | Stop reason: HOSPADM

## 2018-09-27 RX ORDER — OXYBUTYNIN CHLORIDE 5 MG/1
5 TABLET, EXTENDED RELEASE ORAL DAILY
Qty: 30 TABLET | Refills: 3 | Status: SHIPPED | OUTPATIENT
Start: 2018-09-27 | End: 2019-03-19 | Stop reason: SDUPTHER

## 2018-09-27 RX ORDER — ATORVASTATIN CALCIUM 40 MG/1
40 TABLET, FILM COATED ORAL DAILY
Qty: 30 TABLET | Refills: 3 | Status: SHIPPED | OUTPATIENT
Start: 2018-09-27 | End: 2019-02-26 | Stop reason: SDUPTHER

## 2018-09-27 RX ORDER — FLUTICASONE PROPIONATE 50 MCG
2 SPRAY, SUSPENSION (ML) NASAL DAILY
Qty: 1 BOTTLE | Refills: 3 | Status: SHIPPED | OUTPATIENT
Start: 2018-09-27 | End: 2019-02-26 | Stop reason: SDUPTHER

## 2018-10-18 ENCOUNTER — HOSPITAL ENCOUNTER (OUTPATIENT)
Age: 53
Discharge: HOME | End: 2018-10-18
Payer: COMMERCIAL

## 2018-10-18 DIAGNOSIS — K21.9 GASTROESOPHAGEAL REFLUX DISEASE, ESOPHAGITIS PRESENCE NOT SPECIFIED: ICD-10-CM

## 2018-10-18 LAB
ALBUMIN SERPL-MCNC: 4.7 G/DL (ref 3.5–5.2)
ALP BLD-CCNC: 66 U/L (ref 35–104)
ALT SERPL-CCNC: 13 U/L (ref 0–32)
ANION GAP SERPL CALCULATED.3IONS-SCNC: 13 MMOL/L (ref 7–16)
AST SERPL-CCNC: 17 U/L (ref 0–31)
BASOPHILS ABSOLUTE: 0.04 E9/L (ref 0–0.2)
BASOPHILS RELATIVE PERCENT: 0.5 % (ref 0–2)
BILIRUB SERPL-MCNC: 0.4 MG/DL (ref 0–1.2)
BUN BLDV-MCNC: 17 MG/DL (ref 6–20)
CALCIUM SERPL-MCNC: 10.1 MG/DL (ref 8.6–10.2)
CHLORIDE BLD-SCNC: 101 MMOL/L (ref 98–107)
CO2: 29 MMOL/L (ref 22–29)
CREAT SERPL-MCNC: 1.4 MG/DL (ref 0.5–1)
EOSINOPHILS ABSOLUTE: 0.04 E9/L (ref 0.05–0.5)
EOSINOPHILS RELATIVE PERCENT: 0.5 % (ref 0–6)
GFR AFRICAN AMERICAN: 47
GFR NON-AFRICAN AMERICAN: 39 ML/MIN/1.73
GLUCOSE BLD-MCNC: 101 MG/DL (ref 74–109)
HCT VFR BLD CALC: 42.3 % (ref 34–48)
HEMOGLOBIN: 13.6 G/DL (ref 11.5–15.5)
IMMATURE GRANULOCYTES #: 0.04 E9/L
IMMATURE GRANULOCYTES %: 0.5 % (ref 0–5)
LYMPHOCYTES ABSOLUTE: 1.96 E9/L (ref 1.5–4)
LYMPHOCYTES RELATIVE PERCENT: 23.5 % (ref 20–42)
MCH RBC QN AUTO: 29.8 PG (ref 26–35)
MCHC RBC AUTO-ENTMCNC: 32.2 % (ref 32–34.5)
MCV RBC AUTO: 92.8 FL (ref 80–99.9)
MONOCYTES ABSOLUTE: 0.31 E9/L (ref 0.1–0.95)
MONOCYTES RELATIVE PERCENT: 3.7 % (ref 2–12)
NEUTROPHILS ABSOLUTE: 5.96 E9/L (ref 1.8–7.3)
NEUTROPHILS RELATIVE PERCENT: 71.3 % (ref 43–80)
PDW BLD-RTO: 12.5 FL (ref 11.5–15)
PLATELET # BLD: 323 E9/L (ref 130–450)
PMV BLD AUTO: 11.1 FL (ref 7–12)
POTASSIUM SERPL-SCNC: 4.1 MMOL/L (ref 3.5–5)
RBC # BLD: 4.56 E12/L (ref 3.5–5.5)
SODIUM BLD-SCNC: 143 MMOL/L (ref 132–146)
TOTAL PROTEIN: 8.3 G/DL (ref 6.4–8.3)
TSH SERPL DL<=0.05 MIU/L-ACNC: 17.08 UIU/ML (ref 0.27–4.2)
WBC # BLD: 8.4 E9/L (ref 4.5–11.5)

## 2018-10-18 PROCEDURE — 84443 ASSAY THYROID STIM HORMONE: CPT

## 2018-10-18 PROCEDURE — 85025 COMPLETE CBC W/AUTO DIFF WBC: CPT

## 2018-10-18 PROCEDURE — 36415 COLL VENOUS BLD VENIPUNCTURE: CPT

## 2018-10-18 PROCEDURE — 80053 COMPREHEN METABOLIC PANEL: CPT

## 2018-10-24 ENCOUNTER — OFFICE VISIT (OUTPATIENT)
Dept: INTERNAL MEDICINE CLINIC | Age: 53
End: 2018-10-24
Payer: COMMERCIAL

## 2018-10-24 VITALS
OXYGEN SATURATION: 99 % | TEMPERATURE: 98.4 F | RESPIRATION RATE: 18 BRPM | WEIGHT: 148.8 LBS | SYSTOLIC BLOOD PRESSURE: 137 MMHG | HEART RATE: 62 BPM | BODY MASS INDEX: 30 KG/M2 | HEIGHT: 59 IN | DIASTOLIC BLOOD PRESSURE: 75 MMHG

## 2018-10-24 DIAGNOSIS — J30.2 SEASONAL ALLERGIES: ICD-10-CM

## 2018-10-24 DIAGNOSIS — E03.9 HYPOTHYROIDISM, UNSPECIFIED TYPE: ICD-10-CM

## 2018-10-24 DIAGNOSIS — Z91.14 NON COMPLIANCE W MEDICATION REGIMEN: Primary | ICD-10-CM

## 2018-10-24 DIAGNOSIS — E78.2 MIXED HYPERLIPIDEMIA: ICD-10-CM

## 2018-10-24 DIAGNOSIS — I10 ESSENTIAL HYPERTENSION: ICD-10-CM

## 2018-10-24 DIAGNOSIS — N17.9 ACUTE KIDNEY INJURY (HCC): ICD-10-CM

## 2018-10-24 PROCEDURE — 1036F TOBACCO NON-USER: CPT | Performed by: FAMILY MEDICINE

## 2018-10-24 PROCEDURE — G8427 DOCREV CUR MEDS BY ELIG CLIN: HCPCS | Performed by: FAMILY MEDICINE

## 2018-10-24 PROCEDURE — 3017F COLORECTAL CA SCREEN DOC REV: CPT | Performed by: FAMILY MEDICINE

## 2018-10-24 PROCEDURE — 99212 OFFICE O/P EST SF 10 MIN: CPT | Performed by: FAMILY MEDICINE

## 2018-10-24 PROCEDURE — G8484 FLU IMMUNIZE NO ADMIN: HCPCS | Performed by: FAMILY MEDICINE

## 2018-10-24 PROCEDURE — 99213 OFFICE O/P EST LOW 20 MIN: CPT | Performed by: FAMILY MEDICINE

## 2018-10-24 PROCEDURE — G8417 CALC BMI ABV UP PARAM F/U: HCPCS | Performed by: FAMILY MEDICINE

## 2018-10-24 ASSESSMENT — ENCOUNTER SYMPTOMS
ABDOMINAL PAIN: 0
CHEST TIGHTNESS: 0
EYE PAIN: 0
ABDOMINAL DISTENTION: 0
TROUBLE SWALLOWING: 0
CHOKING: 0
BLOOD IN STOOL: 0
PHOTOPHOBIA: 0
RHINORRHEA: 1
CONSTIPATION: 0
SORE THROAT: 0
COUGH: 1
EYE DISCHARGE: 0
WHEEZING: 0
DIARRHEA: 0
VOMITING: 0
NAUSEA: 0
SHORTNESS OF BREATH: 0
BACK PAIN: 0

## 2018-10-24 ASSESSMENT — PATIENT HEALTH QUESTIONNAIRE - PHQ9
SUM OF ALL RESPONSES TO PHQ QUESTIONS 1-9: 4
SUM OF ALL RESPONSES TO PHQ QUESTIONS 1-9: 4
SUM OF ALL RESPONSES TO PHQ9 QUESTIONS 1 & 2: 4
2. FEELING DOWN, DEPRESSED OR HOPELESS: 3
1. LITTLE INTEREST OR PLEASURE IN DOING THINGS: 1

## 2018-11-14 ENCOUNTER — OFFICE VISIT (OUTPATIENT)
Dept: INTERNAL MEDICINE CLINIC | Age: 53
End: 2018-11-14
Payer: COMMERCIAL

## 2018-11-14 VITALS
HEART RATE: 58 BPM | WEIGHT: 151 LBS | OXYGEN SATURATION: 98 % | DIASTOLIC BLOOD PRESSURE: 86 MMHG | BODY MASS INDEX: 30.44 KG/M2 | HEIGHT: 59 IN | TEMPERATURE: 98.3 F | SYSTOLIC BLOOD PRESSURE: 158 MMHG

## 2018-11-14 DIAGNOSIS — E78.2 MIXED HYPERLIPIDEMIA: ICD-10-CM

## 2018-11-14 DIAGNOSIS — Z01.00 ENCOUNTER FOR VISION SCREENING: ICD-10-CM

## 2018-11-14 DIAGNOSIS — E55.9 VITAMIN D DEFICIENCY: ICD-10-CM

## 2018-11-14 DIAGNOSIS — I10 ESSENTIAL HYPERTENSION: ICD-10-CM

## 2018-11-14 DIAGNOSIS — Z79.899 ENCOUNTER FOR MEDICATION REVIEW: Primary | ICD-10-CM

## 2018-11-14 DIAGNOSIS — H53.9 VISION CHANGES: Primary | ICD-10-CM

## 2018-11-14 DIAGNOSIS — E03.9 ACQUIRED HYPOTHYROIDISM: ICD-10-CM

## 2018-11-14 DIAGNOSIS — H53.9 VISION CHANGES: ICD-10-CM

## 2018-11-14 DIAGNOSIS — Z11.4 ENCOUNTER FOR SCREENING FOR HIV: ICD-10-CM

## 2018-11-14 DIAGNOSIS — Z91.199 NONCOMPLIANCE: ICD-10-CM

## 2018-11-14 DIAGNOSIS — Z11.59 NEED FOR HEPATITIS C SCREENING TEST: ICD-10-CM

## 2018-11-14 PROCEDURE — G8484 FLU IMMUNIZE NO ADMIN: HCPCS | Performed by: FAMILY MEDICINE

## 2018-11-14 PROCEDURE — 99213 OFFICE O/P EST LOW 20 MIN: CPT | Performed by: FAMILY MEDICINE

## 2018-11-14 PROCEDURE — G8427 DOCREV CUR MEDS BY ELIG CLIN: HCPCS | Performed by: FAMILY MEDICINE

## 2018-11-14 PROCEDURE — G8417 CALC BMI ABV UP PARAM F/U: HCPCS | Performed by: FAMILY MEDICINE

## 2018-11-14 PROCEDURE — 1036F TOBACCO NON-USER: CPT | Performed by: FAMILY MEDICINE

## 2018-11-14 PROCEDURE — 3017F COLORECTAL CA SCREEN DOC REV: CPT | Performed by: FAMILY MEDICINE

## 2018-11-14 ASSESSMENT — ENCOUNTER SYMPTOMS
SHORTNESS OF BREATH: 0
COUGH: 0
NAUSEA: 0
BLOOD IN STOOL: 0
CONSTIPATION: 0
TROUBLE SWALLOWING: 0
EYE PAIN: 0
ABDOMINAL DISTENTION: 0
DIARRHEA: 0
VOMITING: 0
CHOKING: 0
EYE DISCHARGE: 0
BACK PAIN: 0
CHEST TIGHTNESS: 0
RHINORRHEA: 0
WHEEZING: 0
PHOTOPHOBIA: 0
ABDOMINAL PAIN: 0
SORE THROAT: 0

## 2018-11-14 NOTE — PATIENT INSTRUCTIONS
Patient Education        Hypothyroidism: Care Instructions  Your Care Instructions    You have hypothyroidism, which means that your body is not making enough thyroid hormone. This hormone helps your body use energy. If your thyroid level is low, you may feel tired, be constipated, have an increase in your blood pressure, or have dry skin or memory problems. You may also get cold easily, even when it is warm. Women with low thyroid levels may have heavy menstrual periods. A blood test to find your thyroid-stimulating hormone (TSH) level is used to check for hypothyroidism. A high TSH level may mean that you have low thyroid. When your body is not making enough thyroid hormone, TSH levels rise in an effort to make the body produce more. The treatment for hypothyroidism is to take thyroid hormone pills. You should start to feel better in 1 to 2 weeks. But it can take several months to see changes in the TSH level. You will need regular visits with your doctor to make sure you have the right dose of medicine. Most people need treatment for the rest of their lives. You will need to see your doctor regularly to have blood tests and to make sure you are doing well. Follow-up care is a key part of your treatment and safety. Be sure to make and go to all appointments, and call your doctor if you are having problems. It's also a good idea to know your test results and keep a list of the medicines you take. How can you care for yourself at home? · Take your thyroid hormone medicine exactly as prescribed. Call your doctor if you think you are having a problem with your medicine. Most people do not have side effects if they take the right amount of medicine regularly. ? Take the medicine 30 minutes before breakfast, and do not take it with calcium, vitamins, or iron. ? Do not take extra doses of your thyroid medicine. It will not help you get better any faster, and it may cause side effects.   ? If you forget to take a

## 2018-12-10 DIAGNOSIS — E55.9 VITAMIN D DEFICIENCY: ICD-10-CM

## 2018-12-12 RX ORDER — ERGOCALCIFEROL 1.25 MG/1
50000 CAPSULE ORAL WEEKLY
Qty: 4 CAPSULE | Refills: 0 | Status: SHIPPED | OUTPATIENT
Start: 2018-12-12 | End: 2019-01-28 | Stop reason: SDUPTHER

## 2018-12-21 ENCOUNTER — TELEPHONE (OUTPATIENT)
Dept: INTERNAL MEDICINE CLINIC | Age: 53
End: 2018-12-21

## 2019-01-02 ENCOUNTER — OFFICE VISIT (OUTPATIENT)
Dept: INTERNAL MEDICINE CLINIC | Age: 54
End: 2019-01-02
Payer: COMMERCIAL

## 2019-01-02 VITALS
OXYGEN SATURATION: 98 % | BODY MASS INDEX: 29.43 KG/M2 | DIASTOLIC BLOOD PRESSURE: 89 MMHG | WEIGHT: 146 LBS | HEART RATE: 70 BPM | HEIGHT: 59 IN | TEMPERATURE: 98.3 F | SYSTOLIC BLOOD PRESSURE: 137 MMHG

## 2019-01-02 DIAGNOSIS — K92.0 HEMATEMESIS WITH NAUSEA: Primary | ICD-10-CM

## 2019-01-02 PROCEDURE — 3017F COLORECTAL CA SCREEN DOC REV: CPT | Performed by: FAMILY MEDICINE

## 2019-01-02 PROCEDURE — 1036F TOBACCO NON-USER: CPT | Performed by: FAMILY MEDICINE

## 2019-01-02 PROCEDURE — G8417 CALC BMI ABV UP PARAM F/U: HCPCS | Performed by: FAMILY MEDICINE

## 2019-01-02 PROCEDURE — G8484 FLU IMMUNIZE NO ADMIN: HCPCS | Performed by: FAMILY MEDICINE

## 2019-01-02 PROCEDURE — G8427 DOCREV CUR MEDS BY ELIG CLIN: HCPCS | Performed by: FAMILY MEDICINE

## 2019-01-02 PROCEDURE — 99212 OFFICE O/P EST SF 10 MIN: CPT | Performed by: FAMILY MEDICINE

## 2019-01-02 PROCEDURE — 99213 OFFICE O/P EST LOW 20 MIN: CPT | Performed by: FAMILY MEDICINE

## 2019-01-02 ASSESSMENT — ENCOUNTER SYMPTOMS
CHOKING: 0
WHEEZING: 0
SHORTNESS OF BREATH: 0
TROUBLE SWALLOWING: 0
BACK PAIN: 0
DIARRHEA: 0
EYE DISCHARGE: 0
CONSTIPATION: 0
BLOOD IN STOOL: 0
ABDOMINAL DISTENTION: 0
EYE PAIN: 0
PHOTOPHOBIA: 0
CHEST TIGHTNESS: 0
RHINORRHEA: 0
COUGH: 0
ABDOMINAL PAIN: 0
SORE THROAT: 0

## 2019-01-04 ASSESSMENT — ENCOUNTER SYMPTOMS
VOMITING: 1
NAUSEA: 1

## 2019-01-14 ENCOUNTER — HOSPITAL ENCOUNTER (EMERGENCY)
Age: 54
Discharge: HOME | DRG: 469 | End: 2019-01-14
Attending: EMERGENCY MEDICINE
Payer: COMMERCIAL

## 2019-01-14 VITALS
HEART RATE: 70 BPM | WEIGHT: 146 LBS | TEMPERATURE: 98 F | SYSTOLIC BLOOD PRESSURE: 163 MMHG | OXYGEN SATURATION: 100 % | RESPIRATION RATE: 16 BRPM | BODY MASS INDEX: 29.49 KG/M2 | DIASTOLIC BLOOD PRESSURE: 93 MMHG

## 2019-01-14 DIAGNOSIS — K21.9 GASTROESOPHAGEAL REFLUX DISEASE, ESOPHAGITIS PRESENCE NOT SPECIFIED: ICD-10-CM

## 2019-01-14 DIAGNOSIS — R11.15 NON-INTRACTABLE CYCLICAL VOMITING WITH NAUSEA: Primary | ICD-10-CM

## 2019-01-14 PROCEDURE — 99283 EMERGENCY DEPT VISIT LOW MDM: CPT

## 2019-01-14 PROCEDURE — 6360000002 HC RX W HCPCS: Performed by: EMERGENCY MEDICINE

## 2019-01-14 RX ORDER — PROMETHAZINE HYDROCHLORIDE 12.5 MG/1
12.5 TABLET ORAL 3 TIMES DAILY PRN
Qty: 12 TABLET | Refills: 0 | Status: ON HOLD | OUTPATIENT
Start: 2019-01-14 | End: 2019-01-28 | Stop reason: HOSPADM

## 2019-01-14 RX ORDER — OMEPRAZOLE 20 MG/1
20 CAPSULE, DELAYED RELEASE ORAL
Qty: 30 CAPSULE | Refills: 0 | Status: SHIPPED | OUTPATIENT
Start: 2019-01-14 | End: 2019-04-09 | Stop reason: SDUPTHER

## 2019-01-14 RX ORDER — ONDANSETRON 4 MG/1
4 TABLET, ORALLY DISINTEGRATING ORAL ONCE
Status: COMPLETED | OUTPATIENT
Start: 2019-01-14 | End: 2019-01-14

## 2019-01-14 RX ORDER — SUCRALFATE 1 G/1
1 TABLET ORAL 3 TIMES DAILY
Qty: 21 TABLET | Refills: 0 | Status: ON HOLD | OUTPATIENT
Start: 2019-01-14 | End: 2019-01-28 | Stop reason: HOSPADM

## 2019-01-14 RX ADMIN — ONDANSETRON 4 MG: 4 TABLET, ORALLY DISINTEGRATING ORAL at 16:23

## 2019-01-17 ENCOUNTER — HOSPITAL ENCOUNTER (INPATIENT)
Age: 54
LOS: 11 days | Discharge: NURSING, REHAB OR LONG-TERM CARE | DRG: 469 | End: 2019-01-28
Attending: EMERGENCY MEDICINE | Admitting: INTERNAL MEDICINE
Payer: COMMERCIAL

## 2019-01-17 DIAGNOSIS — N20.0 KIDNEY STONE: ICD-10-CM

## 2019-01-17 DIAGNOSIS — N17.9 ACUTE RENAL FAILURE, UNSPECIFIED ACUTE RENAL FAILURE TYPE (HCC): Primary | ICD-10-CM

## 2019-01-17 PROBLEM — K92.0 HEMATEMESIS WITH NAUSEA: Status: ACTIVE | Noted: 2019-01-17

## 2019-01-17 LAB
ALBUMIN SERPL-MCNC: 5.2 G/DL (ref 3.5–5.2)
ALP BLD-CCNC: 52 U/L (ref 35–104)
ALT SERPL-CCNC: 7 U/L (ref 0–32)
ANION GAP SERPL CALCULATED.3IONS-SCNC: 31 MMOL/L (ref 7–16)
AST SERPL-CCNC: 9 U/L (ref 0–31)
BASOPHILS ABSOLUTE: 0.05 E9/L (ref 0–0.2)
BASOPHILS RELATIVE PERCENT: 0.5 % (ref 0–2)
BILIRUB SERPL-MCNC: 0.4 MG/DL (ref 0–1.2)
BUN BLDV-MCNC: 162 MG/DL (ref 6–20)
CALCIUM SERPL-MCNC: 9.9 MG/DL (ref 8.6–10.2)
CHLORIDE BLD-SCNC: 95 MMOL/L (ref 98–107)
CO2: 14 MMOL/L (ref 22–29)
CREAT SERPL-MCNC: 14 MG/DL (ref 0.5–1)
EOSINOPHILS ABSOLUTE: 0.03 E9/L (ref 0.05–0.5)
EOSINOPHILS RELATIVE PERCENT: 0.3 % (ref 0–6)
GFR AFRICAN AMERICAN: 3
GFR NON-AFRICAN AMERICAN: 3 ML/MIN/1.73
GLUCOSE BLD-MCNC: 128 MG/DL (ref 74–99)
HCT VFR BLD CALC: 35.5 % (ref 34–48)
HEMOGLOBIN: 11.6 G/DL (ref 11.5–15.5)
IMMATURE GRANULOCYTES #: 0.05 E9/L
IMMATURE GRANULOCYTES %: 0.5 % (ref 0–5)
LACTIC ACID: 1.6 MMOL/L (ref 0.5–2.2)
LIPASE: 69 U/L (ref 13–60)
LYMPHOCYTES ABSOLUTE: 0.87 E9/L (ref 1.5–4)
LYMPHOCYTES RELATIVE PERCENT: 8.6 % (ref 20–42)
MCH RBC QN AUTO: 29.3 PG (ref 26–35)
MCHC RBC AUTO-ENTMCNC: 32.7 % (ref 32–34.5)
MCV RBC AUTO: 89.6 FL (ref 80–99.9)
MONOCYTES ABSOLUTE: 0.46 E9/L (ref 0.1–0.95)
MONOCYTES RELATIVE PERCENT: 4.5 % (ref 2–12)
NEUTROPHILS ABSOLUTE: 8.69 E9/L (ref 1.8–7.3)
NEUTROPHILS RELATIVE PERCENT: 85.6 % (ref 43–80)
PDW BLD-RTO: 12.6 FL (ref 11.5–15)
PLATELET # BLD: 315 E9/L (ref 130–450)
PMV BLD AUTO: 10.9 FL (ref 7–12)
POTASSIUM REFLEX MAGNESIUM: 4.5 MMOL/L (ref 3.5–5)
RBC # BLD: 3.96 E12/L (ref 3.5–5.5)
SODIUM BLD-SCNC: 140 MMOL/L (ref 132–146)
TOTAL PROTEIN: 9 G/DL (ref 6.4–8.3)
WBC # BLD: 10.2 E9/L (ref 4.5–11.5)

## 2019-01-17 PROCEDURE — 80053 COMPREHEN METABOLIC PANEL: CPT

## 2019-01-17 PROCEDURE — 83605 ASSAY OF LACTIC ACID: CPT

## 2019-01-17 PROCEDURE — 6360000002 HC RX W HCPCS: Performed by: EMERGENCY MEDICINE

## 2019-01-17 PROCEDURE — 36415 COLL VENOUS BLD VENIPUNCTURE: CPT

## 2019-01-17 PROCEDURE — 85025 COMPLETE CBC W/AUTO DIFF WBC: CPT

## 2019-01-17 PROCEDURE — 2060000000 HC ICU INTERMEDIATE R&B

## 2019-01-17 PROCEDURE — 6360000002 HC RX W HCPCS: Performed by: INTERNAL MEDICINE

## 2019-01-17 PROCEDURE — 99285 EMERGENCY DEPT VISIT HI MDM: CPT

## 2019-01-17 PROCEDURE — 2580000003 HC RX 258: Performed by: EMERGENCY MEDICINE

## 2019-01-17 PROCEDURE — 83690 ASSAY OF LIPASE: CPT

## 2019-01-17 PROCEDURE — 96374 THER/PROPH/DIAG INJ IV PUSH: CPT

## 2019-01-17 RX ORDER — ONDANSETRON 2 MG/ML
4 INJECTION INTRAMUSCULAR; INTRAVENOUS EVERY 6 HOURS PRN
Status: DISCONTINUED | OUTPATIENT
Start: 2019-01-17 | End: 2019-01-18

## 2019-01-17 RX ORDER — SODIUM CHLORIDE 0.9 % (FLUSH) 0.9 %
10 SYRINGE (ML) INJECTION EVERY 12 HOURS SCHEDULED
Status: DISCONTINUED | OUTPATIENT
Start: 2019-01-17 | End: 2019-01-30 | Stop reason: HOSPADM

## 2019-01-17 RX ORDER — 0.9 % SODIUM CHLORIDE 0.9 %
500 INTRAVENOUS SOLUTION INTRAVENOUS ONCE
Status: COMPLETED | OUTPATIENT
Start: 2019-01-17 | End: 2019-01-17

## 2019-01-17 RX ORDER — ZIPRASIDONE MESYLATE 20 MG/ML
10 INJECTION, POWDER, LYOPHILIZED, FOR SOLUTION INTRAMUSCULAR ONCE
Status: DISCONTINUED | OUTPATIENT
Start: 2019-01-18 | End: 2019-01-30 | Stop reason: HOSPADM

## 2019-01-17 RX ORDER — LEVOTHYROXINE SODIUM 0.12 MG/1
125 TABLET ORAL DAILY
Status: DISCONTINUED | OUTPATIENT
Start: 2019-01-18 | End: 2019-01-30 | Stop reason: HOSPADM

## 2019-01-17 RX ORDER — LORAZEPAM 2 MG/ML
0.5 INJECTION INTRAMUSCULAR ONCE
Status: COMPLETED | OUTPATIENT
Start: 2019-01-17 | End: 2019-01-17

## 2019-01-17 RX ORDER — ATORVASTATIN CALCIUM 40 MG/1
40 TABLET, FILM COATED ORAL DAILY
Status: DISCONTINUED | OUTPATIENT
Start: 2019-01-18 | End: 2019-01-30 | Stop reason: HOSPADM

## 2019-01-17 RX ORDER — 0.9 % SODIUM CHLORIDE 0.9 %
1000 INTRAVENOUS SOLUTION INTRAVENOUS ONCE
Status: COMPLETED | OUTPATIENT
Start: 2019-01-17 | End: 2019-01-17

## 2019-01-17 RX ORDER — PANTOPRAZOLE SODIUM 40 MG/10ML
40 INJECTION, POWDER, LYOPHILIZED, FOR SOLUTION INTRAVENOUS DAILY
Status: DISCONTINUED | OUTPATIENT
Start: 2019-01-18 | End: 2019-01-18

## 2019-01-17 RX ORDER — 0.9 % SODIUM CHLORIDE 0.9 %
1000 INTRAVENOUS SOLUTION INTRAVENOUS ONCE
Status: DISCONTINUED | OUTPATIENT
Start: 2019-01-17 | End: 2019-01-19

## 2019-01-17 RX ORDER — HEPARIN SODIUM 5000 [USP'U]/ML
5000 INJECTION, SOLUTION INTRAVENOUS; SUBCUTANEOUS EVERY 8 HOURS SCHEDULED
Status: DISCONTINUED | OUTPATIENT
Start: 2019-01-17 | End: 2019-01-30 | Stop reason: HOSPADM

## 2019-01-17 RX ORDER — ONDANSETRON 2 MG/ML
4 INJECTION INTRAMUSCULAR; INTRAVENOUS ONCE
Status: COMPLETED | OUTPATIENT
Start: 2019-01-17 | End: 2019-01-17

## 2019-01-17 RX ORDER — FLUTICASONE PROPIONATE 50 MCG
2 SPRAY, SUSPENSION (ML) NASAL DAILY
Status: DISCONTINUED | OUTPATIENT
Start: 2019-01-18 | End: 2019-01-30 | Stop reason: HOSPADM

## 2019-01-17 RX ORDER — SODIUM CHLORIDE 0.9 % (FLUSH) 0.9 %
10 SYRINGE (ML) INJECTION PRN
Status: DISCONTINUED | OUTPATIENT
Start: 2019-01-17 | End: 2019-01-30 | Stop reason: HOSPADM

## 2019-01-17 RX ORDER — MORPHINE SULFATE 2 MG/ML
2 INJECTION, SOLUTION INTRAMUSCULAR; INTRAVENOUS EVERY 4 HOURS PRN
Status: DISCONTINUED | OUTPATIENT
Start: 2019-01-17 | End: 2019-01-18

## 2019-01-17 RX ORDER — OXYBUTYNIN CHLORIDE 5 MG/1
5 TABLET, EXTENDED RELEASE ORAL DAILY
Status: DISCONTINUED | OUTPATIENT
Start: 2019-01-18 | End: 2019-01-18

## 2019-01-17 RX ADMIN — SODIUM CHLORIDE 1000 ML: 900 INJECTION, SOLUTION INTRAVENOUS at 20:10

## 2019-01-17 RX ADMIN — LORAZEPAM 0.5 MG: 2 INJECTION INTRAMUSCULAR; INTRAVENOUS at 23:09

## 2019-01-17 RX ADMIN — SODIUM CHLORIDE 500 ML: 9 INJECTION, SOLUTION INTRAVENOUS at 18:08

## 2019-01-17 RX ADMIN — ONDANSETRON 4 MG: 2 INJECTION INTRAMUSCULAR; INTRAVENOUS at 18:08

## 2019-01-17 ASSESSMENT — ENCOUNTER SYMPTOMS
SORE THROAT: 0
NAUSEA: 1
CHEST TIGHTNESS: 0
BACK PAIN: 0
VOMITING: 1
COUGH: 0
SHORTNESS OF BREATH: 0
ABDOMINAL PAIN: 1
BLOOD IN STOOL: 0
DIARRHEA: 1

## 2019-01-17 ASSESSMENT — PAIN DESCRIPTION - ORIENTATION: ORIENTATION: LEFT;LOWER

## 2019-01-17 ASSESSMENT — PAIN DESCRIPTION - LOCATION: LOCATION: ABDOMEN

## 2019-01-17 ASSESSMENT — PAIN DESCRIPTION - DESCRIPTORS: DESCRIPTORS: CRAMPING

## 2019-01-17 ASSESSMENT — PAIN SCALES - GENERAL: PAINLEVEL_OUTOF10: 9

## 2019-01-18 ENCOUNTER — APPOINTMENT (OUTPATIENT)
Dept: CT IMAGING | Age: 54
DRG: 469 | End: 2019-01-18
Payer: COMMERCIAL

## 2019-01-18 ENCOUNTER — APPOINTMENT (OUTPATIENT)
Dept: GENERAL RADIOLOGY | Age: 54
DRG: 469 | End: 2019-01-18
Payer: COMMERCIAL

## 2019-01-18 ENCOUNTER — APPOINTMENT (OUTPATIENT)
Dept: ULTRASOUND IMAGING | Age: 54
DRG: 469 | End: 2019-01-18
Payer: COMMERCIAL

## 2019-01-18 PROBLEM — N17.9 ACUTE RENAL FAILURE (ARF) (HCC): Status: ACTIVE | Noted: 2019-01-18

## 2019-01-18 LAB
ALBUMIN SERPL-MCNC: 3.7 G/DL (ref 3.5–5.2)
ALBUMIN SERPL-MCNC: 4 G/DL (ref 3.5–5.2)
ALP BLD-CCNC: 39 U/L (ref 35–104)
ALP BLD-CCNC: 40 U/L (ref 35–104)
ALT SERPL-CCNC: 6 U/L (ref 0–32)
ALT SERPL-CCNC: 6 U/L (ref 0–32)
ANION GAP SERPL CALCULATED.3IONS-SCNC: 20 MMOL/L (ref 7–16)
ANION GAP SERPL CALCULATED.3IONS-SCNC: 22 MMOL/L (ref 7–16)
ANION GAP SERPL CALCULATED.3IONS-SCNC: 23 MMOL/L (ref 7–16)
ANION GAP SERPL CALCULATED.3IONS-SCNC: 27 MMOL/L (ref 7–16)
AST SERPL-CCNC: 14 U/L (ref 0–31)
AST SERPL-CCNC: 7 U/L (ref 0–31)
BACTERIA: ABNORMAL /HPF
BILIRUB SERPL-MCNC: 0.4 MG/DL (ref 0–1.2)
BILIRUB SERPL-MCNC: 0.4 MG/DL (ref 0–1.2)
BILIRUBIN URINE: NEGATIVE
BLOOD, URINE: ABNORMAL
BUN BLDV-MCNC: 118 MG/DL (ref 6–20)
BUN BLDV-MCNC: 130 MG/DL (ref 6–20)
BUN BLDV-MCNC: 150 MG/DL (ref 6–20)
BUN BLDV-MCNC: 154 MG/DL (ref 6–20)
CALCIUM SERPL-MCNC: 7.6 MG/DL (ref 8.6–10.2)
CALCIUM SERPL-MCNC: 8.2 MG/DL (ref 8.6–10.2)
CALCIUM SERPL-MCNC: 8.6 MG/DL (ref 8.6–10.2)
CALCIUM SERPL-MCNC: 8.9 MG/DL (ref 8.6–10.2)
CHLORIDE BLD-SCNC: 102 MMOL/L (ref 98–107)
CHLORIDE BLD-SCNC: 108 MMOL/L (ref 98–107)
CHLORIDE BLD-SCNC: 111 MMOL/L (ref 98–107)
CHLORIDE BLD-SCNC: 98 MMOL/L (ref 98–107)
CHLORIDE URINE RANDOM: 36 MMOL/L
CHOLESTEROL, TOTAL: 204 MG/DL (ref 0–199)
CLARITY: CLEAR
CO2: 12 MMOL/L (ref 22–29)
CO2: 15 MMOL/L (ref 22–29)
CO2: 17 MMOL/L (ref 22–29)
CO2: 8 MMOL/L (ref 22–29)
COLOR: YELLOW
CREAT SERPL-MCNC: 10 MG/DL (ref 0.5–1)
CREAT SERPL-MCNC: 10 MG/DL (ref 0.5–1)
CREAT SERPL-MCNC: 10.2 MG/DL (ref 0.5–1)
CREAT SERPL-MCNC: 12.4 MG/DL (ref 0.5–1)
CREATININE URINE: 76 MG/DL (ref 29–226)
EPITHELIAL CELLS, UA: ABNORMAL /HPF
GFR AFRICAN AMERICAN: 4
GFR AFRICAN AMERICAN: 5
GFR NON-AFRICAN AMERICAN: 3 ML/MIN/1.73
GFR NON-AFRICAN AMERICAN: 4 ML/MIN/1.73
GLUCOSE BLD-MCNC: 106 MG/DL (ref 74–99)
GLUCOSE BLD-MCNC: 148 MG/DL (ref 74–99)
GLUCOSE BLD-MCNC: 224 MG/DL (ref 74–99)
GLUCOSE BLD-MCNC: 99 MG/DL (ref 74–99)
GLUCOSE URINE: 100 MG/DL
HBA1C MFR BLD: 6.1 % (ref 4–5.6)
HCT VFR BLD CALC: 27 % (ref 34–48)
HDLC SERPL-MCNC: 31 MG/DL
HEMOGLOBIN: 9 G/DL (ref 11.5–15.5)
KETONES, URINE: NEGATIVE MG/DL
LDL CHOLESTEROL CALCULATED: 143 MG/DL (ref 0–99)
LEUKOCYTE ESTERASE, URINE: NEGATIVE
MAGNESIUM: 1.8 MG/DL (ref 1.6–2.6)
MCH RBC QN AUTO: 29.2 PG (ref 26–35)
MCHC RBC AUTO-ENTMCNC: 33.3 % (ref 32–34.5)
MCV RBC AUTO: 87.7 FL (ref 80–99.9)
NITRITE, URINE: NEGATIVE
OSMOLALITY URINE: 367 MOSM/KG (ref 300–900)
PDW BLD-RTO: 12.6 FL (ref 11.5–15)
PH UA: 5 (ref 5–9)
PHOSPHORUS: 9.1 MG/DL (ref 2.5–4.5)
PLATELET # BLD: 239 E9/L (ref 130–450)
PMV BLD AUTO: 10.7 FL (ref 7–12)
POTASSIUM SERPL-SCNC: 3.2 MMOL/L (ref 3.5–5)
POTASSIUM SERPL-SCNC: 3.5 MMOL/L (ref 3.5–5)
POTASSIUM SERPL-SCNC: 4.7 MMOL/L (ref 3.5–5)
POTASSIUM SERPL-SCNC: 7.6 MMOL/L (ref 3.5–5)
POTASSIUM, UR: 33.3 MMOL/L
PROTEIN UA: 100 MG/DL
RBC # BLD: 3.08 E12/L (ref 3.5–5.5)
RBC UA: ABNORMAL /HPF (ref 0–2)
SODIUM BLD-SCNC: 136 MMOL/L (ref 132–146)
SODIUM BLD-SCNC: 139 MMOL/L (ref 132–146)
SODIUM BLD-SCNC: 142 MMOL/L (ref 132–146)
SODIUM BLD-SCNC: 146 MMOL/L (ref 132–146)
SODIUM URINE: 44 MMOL/L
SODIUM URINE: 45 MMOL/L
SPECIFIC GRAVITY UA: 1.02 (ref 1–1.03)
T4 FREE: 0.3 NG/DL (ref 0.93–1.7)
TOTAL PROTEIN: 6.7 G/DL (ref 6.4–8.3)
TOTAL PROTEIN: 7.1 G/DL (ref 6.4–8.3)
TRIGL SERPL-MCNC: 150 MG/DL (ref 0–149)
TSH SERPL DL<=0.05 MIU/L-ACNC: 26.74 UIU/ML (ref 0.27–4.2)
UROBILINOGEN, URINE: 0.2 E.U./DL
VLDLC SERPL CALC-MCNC: 30 MG/DL
WBC # BLD: 8.5 E9/L (ref 4.5–11.5)
WBC UA: ABNORMAL /HPF (ref 0–5)

## 2019-01-18 PROCEDURE — 84133 ASSAY OF URINE POTASSIUM: CPT

## 2019-01-18 PROCEDURE — 84100 ASSAY OF PHOSPHORUS: CPT

## 2019-01-18 PROCEDURE — 84439 ASSAY OF FREE THYROXINE: CPT

## 2019-01-18 PROCEDURE — 81001 URINALYSIS AUTO W/SCOPE: CPT

## 2019-01-18 PROCEDURE — 84300 ASSAY OF URINE SODIUM: CPT

## 2019-01-18 PROCEDURE — 6370000000 HC RX 637 (ALT 250 FOR IP): Performed by: INTERNAL MEDICINE

## 2019-01-18 PROCEDURE — 80061 LIPID PANEL: CPT

## 2019-01-18 PROCEDURE — 74176 CT ABD & PELVIS W/O CONTRAST: CPT

## 2019-01-18 PROCEDURE — 80053 COMPREHEN METABOLIC PANEL: CPT

## 2019-01-18 PROCEDURE — 84443 ASSAY THYROID STIM HORMONE: CPT

## 2019-01-18 PROCEDURE — 82570 ASSAY OF URINE CREATININE: CPT

## 2019-01-18 PROCEDURE — 80048 BASIC METABOLIC PNL TOTAL CA: CPT

## 2019-01-18 PROCEDURE — 83735 ASSAY OF MAGNESIUM: CPT

## 2019-01-18 PROCEDURE — 2580000003 HC RX 258: Performed by: INTERNAL MEDICINE

## 2019-01-18 PROCEDURE — 71045 X-RAY EXAM CHEST 1 VIEW: CPT

## 2019-01-18 PROCEDURE — 2500000003 HC RX 250 WO HCPCS: Performed by: INTERNAL MEDICINE

## 2019-01-18 PROCEDURE — 85027 COMPLETE CBC AUTOMATED: CPT

## 2019-01-18 PROCEDURE — 83036 HEMOGLOBIN GLYCOSYLATED A1C: CPT

## 2019-01-18 PROCEDURE — 2580000003 HC RX 258: Performed by: FAMILY MEDICINE

## 2019-01-18 PROCEDURE — 2500000003 HC RX 250 WO HCPCS: Performed by: FAMILY MEDICINE

## 2019-01-18 PROCEDURE — 82436 ASSAY OF URINE CHLORIDE: CPT

## 2019-01-18 PROCEDURE — 6370000000 HC RX 637 (ALT 250 FOR IP): Performed by: FAMILY MEDICINE

## 2019-01-18 PROCEDURE — 6360000002 HC RX W HCPCS: Performed by: INTERNAL MEDICINE

## 2019-01-18 PROCEDURE — 2060000000 HC ICU INTERMEDIATE R&B

## 2019-01-18 PROCEDURE — 83935 ASSAY OF URINE OSMOLALITY: CPT

## 2019-01-18 PROCEDURE — 99221 1ST HOSP IP/OBS SF/LOW 40: CPT | Performed by: PSYCHIATRY & NEUROLOGY

## 2019-01-18 PROCEDURE — 87088 URINE BACTERIA CULTURE: CPT

## 2019-01-18 PROCEDURE — 6360000002 HC RX W HCPCS: Performed by: FAMILY MEDICINE

## 2019-01-18 PROCEDURE — 36415 COLL VENOUS BLD VENIPUNCTURE: CPT

## 2019-01-18 PROCEDURE — 76770 US EXAM ABDO BACK WALL COMP: CPT

## 2019-01-18 RX ORDER — RANITIDINE 150 MG/1
150 TABLET ORAL 2 TIMES DAILY
Status: DISCONTINUED | OUTPATIENT
Start: 2019-01-18 | End: 2019-01-19

## 2019-01-18 RX ORDER — 0.9 % SODIUM CHLORIDE 0.9 %
2000 INTRAVENOUS SOLUTION INTRAVENOUS ONCE
Status: COMPLETED | OUTPATIENT
Start: 2019-01-18 | End: 2019-01-18

## 2019-01-18 RX ORDER — LEVOTHYROXINE SODIUM ANHYDROUS 100 UG/5ML
50 INJECTION, POWDER, LYOPHILIZED, FOR SOLUTION INTRAVENOUS ONCE
Status: DISCONTINUED | OUTPATIENT
Start: 2019-01-23 | End: 2019-01-18

## 2019-01-18 RX ORDER — LORAZEPAM 2 MG/ML
0.5 INJECTION INTRAMUSCULAR EVERY 6 HOURS PRN
Status: DISCONTINUED | OUTPATIENT
Start: 2019-01-18 | End: 2019-01-30 | Stop reason: HOSPADM

## 2019-01-18 RX ORDER — ONDANSETRON 2 MG/ML
4 INJECTION INTRAMUSCULAR; INTRAVENOUS EVERY 6 HOURS PRN
Status: DISCONTINUED | OUTPATIENT
Start: 2019-01-18 | End: 2019-01-22

## 2019-01-18 RX ORDER — LEVOTHYROXINE SODIUM ANHYDROUS 100 UG/5ML
50 INJECTION, POWDER, LYOPHILIZED, FOR SOLUTION INTRAVENOUS ONCE
Status: COMPLETED | OUTPATIENT
Start: 2019-01-18 | End: 2019-01-18

## 2019-01-18 RX ORDER — SODIUM CHLORIDE 9 MG/ML
INJECTION, SOLUTION INTRAVENOUS CONTINUOUS
Status: DISCONTINUED | OUTPATIENT
Start: 2019-01-18 | End: 2019-01-18

## 2019-01-18 RX ADMIN — FLUTICASONE PROPIONATE 2 SPRAY: 50 SPRAY, METERED NASAL at 09:43

## 2019-01-18 RX ADMIN — SODIUM CHLORIDE: 9 INJECTION, SOLUTION INTRAVENOUS at 12:19

## 2019-01-18 RX ADMIN — SODIUM BICARBONATE: 84 INJECTION, SOLUTION INTRAVENOUS at 00:19

## 2019-01-18 RX ADMIN — LEVOTHYROXINE SODIUM ANHYDROUS 50 MCG: 100 INJECTION, POWDER, LYOPHILIZED, FOR SOLUTION INTRAVENOUS at 15:36

## 2019-01-18 RX ADMIN — LORAZEPAM 0.5 MG: 2 INJECTION INTRAMUSCULAR; INTRAVENOUS at 22:01

## 2019-01-18 RX ADMIN — ONDANSETRON 4 MG: 2 INJECTION INTRAMUSCULAR; INTRAVENOUS at 15:34

## 2019-01-18 RX ADMIN — CALCIUM ACETATE 1334 MG: 667 CAPSULE ORAL at 12:19

## 2019-01-18 RX ADMIN — SODIUM BICARBONATE: 84 INJECTION, SOLUTION INTRAVENOUS at 18:45

## 2019-01-18 RX ADMIN — CALCIUM ACETATE 1334 MG: 667 CAPSULE ORAL at 17:13

## 2019-01-18 RX ADMIN — RANITIDINE 150 MG: 150 TABLET ORAL at 22:06

## 2019-01-18 RX ADMIN — LEVOTHYROXINE SODIUM 125 MCG: 125 TABLET ORAL at 09:43

## 2019-01-18 RX ADMIN — ATORVASTATIN CALCIUM 40 MG: 40 TABLET, FILM COATED ORAL at 09:43

## 2019-01-18 RX ADMIN — Medication 10 ML: at 09:43

## 2019-01-18 RX ADMIN — HEPARIN SODIUM 5000 UNITS: 5000 INJECTION INTRAVENOUS; SUBCUTANEOUS at 15:36

## 2019-01-18 RX ADMIN — RANITIDINE 150 MG: 150 TABLET ORAL at 09:51

## 2019-01-18 RX ADMIN — Medication 10 ML: at 03:16

## 2019-01-18 RX ADMIN — HEPARIN SODIUM 5000 UNITS: 5000 INJECTION INTRAVENOUS; SUBCUTANEOUS at 22:36

## 2019-01-18 RX ADMIN — SODIUM CHLORIDE 2000 ML: 9 INJECTION, SOLUTION INTRAVENOUS at 09:12

## 2019-01-19 LAB
ALBUMIN SERPL-MCNC: 3.1 G/DL (ref 3.5–5.2)
ALP BLD-CCNC: 33 U/L (ref 35–104)
ALT SERPL-CCNC: 7 U/L (ref 0–32)
ANION GAP SERPL CALCULATED.3IONS-SCNC: 19 MMOL/L (ref 7–16)
ANION GAP SERPL CALCULATED.3IONS-SCNC: 21 MMOL/L (ref 7–16)
AST SERPL-CCNC: 14 U/L (ref 0–31)
BILIRUB SERPL-MCNC: 0.3 MG/DL (ref 0–1.2)
BUN BLDV-MCNC: 114 MG/DL (ref 6–20)
BUN BLDV-MCNC: 116 MG/DL (ref 6–20)
CALCIUM SERPL-MCNC: 7.3 MG/DL (ref 8.6–10.2)
CALCIUM SERPL-MCNC: 7.3 MG/DL (ref 8.6–10.2)
CHLORIDE BLD-SCNC: 100 MMOL/L (ref 98–107)
CHLORIDE BLD-SCNC: 96 MMOL/L (ref 98–107)
CO2: 16 MMOL/L (ref 22–29)
CO2: 20 MMOL/L (ref 22–29)
CREAT SERPL-MCNC: 10.5 MG/DL (ref 0.5–1)
CREAT SERPL-MCNC: 9.9 MG/DL (ref 0.5–1)
GFR AFRICAN AMERICAN: 5
GFR AFRICAN AMERICAN: 5
GFR NON-AFRICAN AMERICAN: 4 ML/MIN/1.73
GFR NON-AFRICAN AMERICAN: 4 ML/MIN/1.73
GLUCOSE BLD-MCNC: 101 MG/DL (ref 74–99)
GLUCOSE BLD-MCNC: 99 MG/DL (ref 74–99)
HCT VFR BLD CALC: 25.6 % (ref 34–48)
HEMOGLOBIN: 8.6 G/DL (ref 11.5–15.5)
MAGNESIUM: 1.4 MG/DL (ref 1.6–2.6)
MCH RBC QN AUTO: 29.6 PG (ref 26–35)
MCHC RBC AUTO-ENTMCNC: 33.6 % (ref 32–34.5)
MCV RBC AUTO: 88 FL (ref 80–99.9)
PDW BLD-RTO: 12.6 FL (ref 11.5–15)
PHOSPHORUS: 6.2 MG/DL (ref 2.5–4.5)
PLATELET # BLD: 213 E9/L (ref 130–450)
PMV BLD AUTO: 11 FL (ref 7–12)
POTASSIUM SERPL-SCNC: 3.5 MMOL/L (ref 3.5–5)
POTASSIUM SERPL-SCNC: 3.6 MMOL/L (ref 3.5–5)
RBC # BLD: 2.91 E12/L (ref 3.5–5.5)
SODIUM BLD-SCNC: 135 MMOL/L (ref 132–146)
SODIUM BLD-SCNC: 137 MMOL/L (ref 132–146)
TOTAL PROTEIN: 5.9 G/DL (ref 6.4–8.3)
WBC # BLD: 7.3 E9/L (ref 4.5–11.5)

## 2019-01-19 PROCEDURE — 2580000003 HC RX 258: Performed by: INTERNAL MEDICINE

## 2019-01-19 PROCEDURE — 6370000000 HC RX 637 (ALT 250 FOR IP): Performed by: FAMILY MEDICINE

## 2019-01-19 PROCEDURE — 6370000000 HC RX 637 (ALT 250 FOR IP): Performed by: INTERNAL MEDICINE

## 2019-01-19 PROCEDURE — 80053 COMPREHEN METABOLIC PANEL: CPT

## 2019-01-19 PROCEDURE — 6360000002 HC RX W HCPCS: Performed by: INTERNAL MEDICINE

## 2019-01-19 PROCEDURE — 84100 ASSAY OF PHOSPHORUS: CPT

## 2019-01-19 PROCEDURE — 6360000002 HC RX W HCPCS: Performed by: FAMILY MEDICINE

## 2019-01-19 PROCEDURE — 2500000003 HC RX 250 WO HCPCS: Performed by: INTERNAL MEDICINE

## 2019-01-19 PROCEDURE — 85027 COMPLETE CBC AUTOMATED: CPT

## 2019-01-19 PROCEDURE — 36415 COLL VENOUS BLD VENIPUNCTURE: CPT

## 2019-01-19 PROCEDURE — 80048 BASIC METABOLIC PNL TOTAL CA: CPT

## 2019-01-19 PROCEDURE — 2060000000 HC ICU INTERMEDIATE R&B

## 2019-01-19 PROCEDURE — 51798 US URINE CAPACITY MEASURE: CPT

## 2019-01-19 PROCEDURE — 83735 ASSAY OF MAGNESIUM: CPT

## 2019-01-19 RX ORDER — POTASSIUM CHLORIDE 20 MEQ/1
20 TABLET, EXTENDED RELEASE ORAL ONCE
Status: COMPLETED | OUTPATIENT
Start: 2019-01-19 | End: 2019-01-19

## 2019-01-19 RX ORDER — MAGNESIUM SULFATE 1 G/100ML
1 INJECTION INTRAVENOUS ONCE
Status: COMPLETED | OUTPATIENT
Start: 2019-01-19 | End: 2019-01-19

## 2019-01-19 RX ORDER — PANTOPRAZOLE SODIUM 40 MG/1
40 TABLET, DELAYED RELEASE ORAL
Status: DISCONTINUED | OUTPATIENT
Start: 2019-01-19 | End: 2019-01-30 | Stop reason: HOSPADM

## 2019-01-19 RX ORDER — LEVOTHYROXINE SODIUM 137 UG/1
TABLET ORAL
Qty: 30 TABLET | Refills: 0 | OUTPATIENT
Start: 2019-01-19

## 2019-01-19 RX ORDER — SODIUM BICARBONATE 650 MG/1
650 TABLET ORAL 4 TIMES DAILY
Status: DISCONTINUED | OUTPATIENT
Start: 2019-01-19 | End: 2019-01-22

## 2019-01-19 RX ADMIN — MAGNESIUM SULFATE HEPTAHYDRATE 1 G: 1 INJECTION, SOLUTION INTRAVENOUS at 12:49

## 2019-01-19 RX ADMIN — SODIUM BICARBONATE 650 MG TABLET 650 MG: at 21:48

## 2019-01-19 RX ADMIN — LORAZEPAM 0.5 MG: 2 INJECTION INTRAMUSCULAR; INTRAVENOUS at 09:13

## 2019-01-19 RX ADMIN — ATORVASTATIN CALCIUM 40 MG: 40 TABLET, FILM COATED ORAL at 09:12

## 2019-01-19 RX ADMIN — ONDANSETRON 4 MG: 2 INJECTION INTRAMUSCULAR; INTRAVENOUS at 20:21

## 2019-01-19 RX ADMIN — ONDANSETRON 4 MG: 2 INJECTION INTRAMUSCULAR; INTRAVENOUS at 06:57

## 2019-01-19 RX ADMIN — Medication 10 ML: at 05:00

## 2019-01-19 RX ADMIN — Medication 10 ML: at 15:35

## 2019-01-19 RX ADMIN — POTASSIUM CHLORIDE 20 MEQ: 20 TABLET, EXTENDED RELEASE ORAL at 01:07

## 2019-01-19 RX ADMIN — LEVOTHYROXINE SODIUM 125 MCG: 125 TABLET ORAL at 06:20

## 2019-01-19 RX ADMIN — SODIUM BICARBONATE 650 MG TABLET 650 MG: at 12:39

## 2019-01-19 RX ADMIN — FLUTICASONE PROPIONATE 2 SPRAY: 50 SPRAY, METERED NASAL at 18:58

## 2019-01-19 RX ADMIN — HEPARIN SODIUM 5000 UNITS: 5000 INJECTION INTRAVENOUS; SUBCUTANEOUS at 15:33

## 2019-01-19 RX ADMIN — CALCIUM ACETATE 1334 MG: 667 CAPSULE ORAL at 09:12

## 2019-01-19 RX ADMIN — SODIUM BICARBONATE: 84 INJECTION, SOLUTION INTRAVENOUS at 06:20

## 2019-01-19 RX ADMIN — POTASSIUM CHLORIDE: 2 INJECTION, SOLUTION, CONCENTRATE INTRAVENOUS at 15:34

## 2019-01-19 RX ADMIN — CALCIUM ACETATE 1334 MG: 667 CAPSULE ORAL at 12:39

## 2019-01-19 RX ADMIN — HEPARIN SODIUM 5000 UNITS: 5000 INJECTION INTRAVENOUS; SUBCUTANEOUS at 21:48

## 2019-01-19 RX ADMIN — RANITIDINE 150 MG: 150 TABLET ORAL at 09:14

## 2019-01-19 RX ADMIN — HEPARIN SODIUM 5000 UNITS: 5000 INJECTION INTRAVENOUS; SUBCUTANEOUS at 06:20

## 2019-01-19 ASSESSMENT — PAIN SCALES - GENERAL
PAINLEVEL_OUTOF10: 0

## 2019-01-20 LAB
ANION GAP SERPL CALCULATED.3IONS-SCNC: 17 MMOL/L (ref 7–16)
BUN BLDV-MCNC: 109 MG/DL (ref 6–20)
CALCIUM SERPL-MCNC: 7.2 MG/DL (ref 8.6–10.2)
CHLORIDE BLD-SCNC: 96 MMOL/L (ref 98–107)
CO2: 21 MMOL/L (ref 22–29)
CREAT SERPL-MCNC: 10.6 MG/DL (ref 0.5–1)
GFR AFRICAN AMERICAN: 5
GFR NON-AFRICAN AMERICAN: 4 ML/MIN/1.73
GLUCOSE BLD-MCNC: 90 MG/DL (ref 74–99)
POTASSIUM SERPL-SCNC: 3.8 MMOL/L (ref 3.5–5)
SODIUM BLD-SCNC: 134 MMOL/L (ref 132–146)

## 2019-01-20 PROCEDURE — 6360000002 HC RX W HCPCS: Performed by: FAMILY MEDICINE

## 2019-01-20 PROCEDURE — 2500000003 HC RX 250 WO HCPCS: Performed by: INTERNAL MEDICINE

## 2019-01-20 PROCEDURE — 6370000000 HC RX 637 (ALT 250 FOR IP): Performed by: INTERNAL MEDICINE

## 2019-01-20 PROCEDURE — 80048 BASIC METABOLIC PNL TOTAL CA: CPT

## 2019-01-20 PROCEDURE — 2060000000 HC ICU INTERMEDIATE R&B

## 2019-01-20 PROCEDURE — 2580000003 HC RX 258: Performed by: INTERNAL MEDICINE

## 2019-01-20 PROCEDURE — 6370000000 HC RX 637 (ALT 250 FOR IP): Performed by: FAMILY MEDICINE

## 2019-01-20 PROCEDURE — 36415 COLL VENOUS BLD VENIPUNCTURE: CPT

## 2019-01-20 PROCEDURE — 6360000002 HC RX W HCPCS: Performed by: INTERNAL MEDICINE

## 2019-01-20 RX ADMIN — CALCIUM ACETATE 1334 MG: 667 CAPSULE ORAL at 09:52

## 2019-01-20 RX ADMIN — ONDANSETRON 4 MG: 2 INJECTION INTRAMUSCULAR; INTRAVENOUS at 09:51

## 2019-01-20 RX ADMIN — SODIUM BICARBONATE 650 MG TABLET 650 MG: at 09:52

## 2019-01-20 RX ADMIN — SODIUM BICARBONATE: 84 INJECTION, SOLUTION INTRAVENOUS at 03:28

## 2019-01-20 RX ADMIN — ATORVASTATIN CALCIUM 40 MG: 40 TABLET, FILM COATED ORAL at 09:51

## 2019-01-20 RX ADMIN — SODIUM BICARBONATE: 84 INJECTION, SOLUTION INTRAVENOUS at 18:47

## 2019-01-20 RX ADMIN — Medication 10 ML: at 09:52

## 2019-01-20 RX ADMIN — FLUTICASONE PROPIONATE 2 SPRAY: 50 SPRAY, METERED NASAL at 09:51

## 2019-01-20 RX ADMIN — POTASSIUM CHLORIDE: 2 INJECTION, SOLUTION, CONCENTRATE INTRAVENOUS at 00:39

## 2019-01-20 ASSESSMENT — PAIN SCALES - GENERAL
PAINLEVEL_OUTOF10: 0

## 2019-01-21 ENCOUNTER — APPOINTMENT (OUTPATIENT)
Dept: GENERAL RADIOLOGY | Age: 54
DRG: 469 | End: 2019-01-21
Payer: COMMERCIAL

## 2019-01-21 ENCOUNTER — ANESTHESIA EVENT (OUTPATIENT)
Dept: OPERATING ROOM | Age: 54
DRG: 469 | End: 2019-01-21
Payer: COMMERCIAL

## 2019-01-21 ENCOUNTER — ANESTHESIA (OUTPATIENT)
Dept: OPERATING ROOM | Age: 54
DRG: 469 | End: 2019-01-21
Payer: COMMERCIAL

## 2019-01-21 VITALS
SYSTOLIC BLOOD PRESSURE: 135 MMHG | RESPIRATION RATE: 15 BRPM | DIASTOLIC BLOOD PRESSURE: 80 MMHG | OXYGEN SATURATION: 100 %

## 2019-01-21 LAB
ANION GAP SERPL CALCULATED.3IONS-SCNC: 18 MMOL/L (ref 7–16)
BUN BLDV-MCNC: 109 MG/DL (ref 6–20)
CALCIUM SERPL-MCNC: 6.9 MG/DL (ref 8.6–10.2)
CHLORIDE BLD-SCNC: 95 MMOL/L (ref 98–107)
CO2: 24 MMOL/L (ref 22–29)
CREAT SERPL-MCNC: 11.8 MG/DL (ref 0.5–1)
GFR AFRICAN AMERICAN: 4
GFR NON-AFRICAN AMERICAN: 3 ML/MIN/1.73
GLUCOSE BLD-MCNC: 86 MG/DL (ref 74–99)
HCT VFR BLD CALC: 26.8 % (ref 34–48)
HEMOGLOBIN: 8.9 G/DL (ref 11.5–15.5)
MAGNESIUM: 1.6 MG/DL (ref 1.6–2.6)
MCH RBC QN AUTO: 29.8 PG (ref 26–35)
MCHC RBC AUTO-ENTMCNC: 33.2 % (ref 32–34.5)
MCV RBC AUTO: 89.6 FL (ref 80–99.9)
PDW BLD-RTO: 12.6 FL (ref 11.5–15)
PHOSPHORUS: 6.1 MG/DL (ref 2.5–4.5)
PLATELET # BLD: 196 E9/L (ref 130–450)
PMV BLD AUTO: 10.9 FL (ref 7–12)
POTASSIUM SERPL-SCNC: 3.8 MMOL/L (ref 3.5–5)
RBC # BLD: 2.99 E12/L (ref 3.5–5.5)
SODIUM BLD-SCNC: 137 MMOL/L (ref 132–146)
URINE CULTURE, ROUTINE: NORMAL
WBC # BLD: 7.1 E9/L (ref 4.5–11.5)

## 2019-01-21 PROCEDURE — 0T788DZ DILATION OF BILATERAL URETERS WITH INTRALUMINAL DEVICE, VIA NATURAL OR ARTIFICIAL OPENING ENDOSCOPIC: ICD-10-PCS | Performed by: UROLOGY

## 2019-01-21 PROCEDURE — 3600000013 HC SURGERY LEVEL 3 ADDTL 15MIN: Performed by: UROLOGY

## 2019-01-21 PROCEDURE — 3600000003 HC SURGERY LEVEL 3 BASE: Performed by: UROLOGY

## 2019-01-21 PROCEDURE — 80048 BASIC METABOLIC PNL TOTAL CA: CPT

## 2019-01-21 PROCEDURE — 84100 ASSAY OF PHOSPHORUS: CPT

## 2019-01-21 PROCEDURE — C2617 STENT, NON-COR, TEM W/O DEL: HCPCS | Performed by: UROLOGY

## 2019-01-21 PROCEDURE — 2060000000 HC ICU INTERMEDIATE R&B

## 2019-01-21 PROCEDURE — 2580000003 HC RX 258: Performed by: NURSE ANESTHETIST, CERTIFIED REGISTERED

## 2019-01-21 PROCEDURE — 2709999900 HC NON-CHARGEABLE SUPPLY: Performed by: UROLOGY

## 2019-01-21 PROCEDURE — 3700000001 HC ADD 15 MINUTES (ANESTHESIA): Performed by: UROLOGY

## 2019-01-21 PROCEDURE — 7100000001 HC PACU RECOVERY - ADDTL 15 MIN: Performed by: UROLOGY

## 2019-01-21 PROCEDURE — 36415 COLL VENOUS BLD VENIPUNCTURE: CPT

## 2019-01-21 PROCEDURE — 83735 ASSAY OF MAGNESIUM: CPT

## 2019-01-21 PROCEDURE — 85027 COMPLETE CBC AUTOMATED: CPT

## 2019-01-21 PROCEDURE — 3700000000 HC ANESTHESIA ATTENDED CARE: Performed by: UROLOGY

## 2019-01-21 PROCEDURE — 6360000004 HC RX CONTRAST MEDICATION: Performed by: UROLOGY

## 2019-01-21 PROCEDURE — 74400 UROGRAPHY IV +-KUB TOMOG: CPT

## 2019-01-21 PROCEDURE — 2580000003 HC RX 258: Performed by: UROLOGY

## 2019-01-21 PROCEDURE — 6370000000 HC RX 637 (ALT 250 FOR IP): Performed by: ANESTHESIOLOGY

## 2019-01-21 PROCEDURE — 7100000000 HC PACU RECOVERY - FIRST 15 MIN: Performed by: UROLOGY

## 2019-01-21 PROCEDURE — 6360000002 HC RX W HCPCS: Performed by: UROLOGY

## 2019-01-21 PROCEDURE — 6360000002 HC RX W HCPCS: Performed by: NURSE ANESTHETIST, CERTIFIED REGISTERED

## 2019-01-21 PROCEDURE — C1769 GUIDE WIRE: HCPCS | Performed by: UROLOGY

## 2019-01-21 DEVICE — URETERAL STENT
Type: IMPLANTABLE DEVICE | Status: FUNCTIONAL
Brand: POLARIS™ ULTRA

## 2019-01-21 RX ORDER — SODIUM CHLORIDE, SODIUM LACTATE, POTASSIUM CHLORIDE, CALCIUM CHLORIDE 600; 310; 30; 20 MG/100ML; MG/100ML; MG/100ML; MG/100ML
INJECTION, SOLUTION INTRAVENOUS CONTINUOUS
Status: DISCONTINUED | OUTPATIENT
Start: 2019-01-21 | End: 2019-01-22

## 2019-01-21 RX ORDER — HYDRALAZINE HYDROCHLORIDE 20 MG/ML
5 INJECTION INTRAMUSCULAR; INTRAVENOUS EVERY 5 MIN PRN
Status: DISCONTINUED | OUTPATIENT
Start: 2019-01-21 | End: 2019-01-22

## 2019-01-21 RX ORDER — NIFEDIPINE 10 MG/1
30 CAPSULE ORAL ONCE
Status: COMPLETED | OUTPATIENT
Start: 2019-01-21 | End: 2019-01-21

## 2019-01-21 RX ORDER — PROPOFOL 10 MG/ML
INJECTION, EMULSION INTRAVENOUS CONTINUOUS PRN
Status: DISCONTINUED | OUTPATIENT
Start: 2019-01-21 | End: 2019-01-21 | Stop reason: SDUPTHER

## 2019-01-21 RX ORDER — SODIUM CHLORIDE 9 MG/ML
INJECTION, SOLUTION INTRAVENOUS CONTINUOUS PRN
Status: DISCONTINUED | OUTPATIENT
Start: 2019-01-21 | End: 2019-01-21 | Stop reason: SDUPTHER

## 2019-01-21 RX ORDER — HYDRALAZINE HYDROCHLORIDE 20 MG/ML
INJECTION INTRAMUSCULAR; INTRAVENOUS
Status: DISPENSED
Start: 2019-01-21 | End: 2019-01-22

## 2019-01-21 RX ORDER — FENTANYL CITRATE 50 UG/ML
INJECTION, SOLUTION INTRAMUSCULAR; INTRAVENOUS PRN
Status: DISCONTINUED | OUTPATIENT
Start: 2019-01-21 | End: 2019-01-21 | Stop reason: SDUPTHER

## 2019-01-21 RX ORDER — MIDAZOLAM HYDROCHLORIDE 1 MG/ML
INJECTION INTRAMUSCULAR; INTRAVENOUS PRN
Status: DISCONTINUED | OUTPATIENT
Start: 2019-01-21 | End: 2019-01-21 | Stop reason: SDUPTHER

## 2019-01-21 RX ADMIN — FLUTICASONE PROPIONATE 2 SPRAY: 50 SPRAY, METERED NASAL at 08:29

## 2019-01-21 RX ADMIN — SODIUM CHLORIDE: 9 INJECTION, SOLUTION INTRAVENOUS at 14:14

## 2019-01-21 RX ADMIN — FENTANYL CITRATE 50 MCG: 50 INJECTION, SOLUTION INTRAMUSCULAR; INTRAVENOUS at 14:16

## 2019-01-21 RX ADMIN — FENTANYL CITRATE 50 MCG: 50 INJECTION, SOLUTION INTRAMUSCULAR; INTRAVENOUS at 14:38

## 2019-01-21 RX ADMIN — NIFEDIPINE 30 MG: 10 CAPSULE, LIQUID FILLED ORAL at 16:38

## 2019-01-21 RX ADMIN — CEFAZOLIN SODIUM 500 MG: 500 INJECTION, POWDER, FOR SOLUTION INTRAMUSCULAR; INTRAVENOUS at 08:29

## 2019-01-21 RX ADMIN — PROPOFOL 80 MCG/KG/MIN: 10 INJECTION, EMULSION INTRAVENOUS at 14:16

## 2019-01-21 RX ADMIN — MIDAZOLAM 2 MG: 1 INJECTION INTRAMUSCULAR; INTRAVENOUS at 14:14

## 2019-01-21 ASSESSMENT — PULMONARY FUNCTION TESTS
PIF_VALUE: 1

## 2019-01-21 ASSESSMENT — PAIN SCALES - GENERAL
PAINLEVEL_OUTOF10: 0

## 2019-01-22 LAB
ANION GAP SERPL CALCULATED.3IONS-SCNC: 18 MMOL/L (ref 7–16)
ANION GAP SERPL CALCULATED.3IONS-SCNC: 19 MMOL/L (ref 7–16)
ANION GAP SERPL CALCULATED.3IONS-SCNC: 22 MMOL/L (ref 7–16)
BUN BLDV-MCNC: 83 MG/DL (ref 6–20)
BUN BLDV-MCNC: 86 MG/DL (ref 6–20)
BUN BLDV-MCNC: 97 MG/DL (ref 6–20)
CALCIUM SERPL-MCNC: 7.8 MG/DL (ref 8.6–10.2)
CALCIUM SERPL-MCNC: 8.1 MG/DL (ref 8.6–10.2)
CALCIUM SERPL-MCNC: 8.2 MG/DL (ref 8.6–10.2)
CHLORIDE BLD-SCNC: 97 MMOL/L (ref 98–107)
CHLORIDE BLD-SCNC: 99 MMOL/L (ref 98–107)
CHLORIDE BLD-SCNC: 99 MMOL/L (ref 98–107)
CO2: 25 MMOL/L (ref 22–29)
CO2: 26 MMOL/L (ref 22–29)
CO2: 27 MMOL/L (ref 22–29)
CREAT SERPL-MCNC: 7.9 MG/DL (ref 0.5–1)
CREAT SERPL-MCNC: 8.7 MG/DL (ref 0.5–1)
CREAT SERPL-MCNC: 9.9 MG/DL (ref 0.5–1)
GFR AFRICAN AMERICAN: 5
GFR AFRICAN AMERICAN: 6
GFR AFRICAN AMERICAN: 6
GFR NON-AFRICAN AMERICAN: 4 ML/MIN/1.73
GFR NON-AFRICAN AMERICAN: 5 ML/MIN/1.73
GFR NON-AFRICAN AMERICAN: 5 ML/MIN/1.73
GLUCOSE BLD-MCNC: 140 MG/DL (ref 74–99)
GLUCOSE BLD-MCNC: 92 MG/DL (ref 74–99)
GLUCOSE BLD-MCNC: 95 MG/DL (ref 74–99)
HCT VFR BLD CALC: 29.8 % (ref 34–48)
HEMOGLOBIN: 9.9 G/DL (ref 11.5–15.5)
MAGNESIUM: 1.6 MG/DL (ref 1.6–2.6)
MCH RBC QN AUTO: 29.9 PG (ref 26–35)
MCHC RBC AUTO-ENTMCNC: 33.2 % (ref 32–34.5)
MCV RBC AUTO: 90 FL (ref 80–99.9)
PDW BLD-RTO: 12.6 FL (ref 11.5–15)
PHOSPHORUS: 6.5 MG/DL (ref 2.5–4.5)
PLATELET # BLD: 218 E9/L (ref 130–450)
PMV BLD AUTO: 10.9 FL (ref 7–12)
POTASSIUM SERPL-SCNC: 3.1 MMOL/L (ref 3.5–5)
POTASSIUM SERPL-SCNC: 3.4 MMOL/L (ref 3.5–5)
POTASSIUM SERPL-SCNC: 3.7 MMOL/L (ref 3.5–5)
RBC # BLD: 3.31 E12/L (ref 3.5–5.5)
SODIUM BLD-SCNC: 142 MMOL/L (ref 132–146)
SODIUM BLD-SCNC: 144 MMOL/L (ref 132–146)
SODIUM BLD-SCNC: 146 MMOL/L (ref 132–146)
WBC # BLD: 7.1 E9/L (ref 4.5–11.5)

## 2019-01-22 PROCEDURE — 85027 COMPLETE CBC AUTOMATED: CPT

## 2019-01-22 PROCEDURE — 83735 ASSAY OF MAGNESIUM: CPT

## 2019-01-22 PROCEDURE — 6360000002 HC RX W HCPCS: Performed by: INTERNAL MEDICINE

## 2019-01-22 PROCEDURE — 80048 BASIC METABOLIC PNL TOTAL CA: CPT

## 2019-01-22 PROCEDURE — 84100 ASSAY OF PHOSPHORUS: CPT

## 2019-01-22 PROCEDURE — 2060000000 HC ICU INTERMEDIATE R&B

## 2019-01-22 PROCEDURE — 36415 COLL VENOUS BLD VENIPUNCTURE: CPT

## 2019-01-22 RX ORDER — PROCHLORPERAZINE 25 MG
25 SUPPOSITORY, RECTAL RECTAL EVERY 12 HOURS PRN
Status: DISCONTINUED | OUTPATIENT
Start: 2019-01-22 | End: 2019-01-30 | Stop reason: HOSPADM

## 2019-01-22 RX ORDER — SODIUM CHLORIDE, SODIUM LACTATE, POTASSIUM CHLORIDE, CALCIUM CHLORIDE 600; 310; 30; 20 MG/100ML; MG/100ML; MG/100ML; MG/100ML
INJECTION, SOLUTION INTRAVENOUS CONTINUOUS
Status: DISCONTINUED | OUTPATIENT
Start: 2019-01-22 | End: 2019-01-24

## 2019-01-22 RX ORDER — HYDRALAZINE HYDROCHLORIDE 20 MG/ML
5 INJECTION INTRAMUSCULAR; INTRAVENOUS EVERY 6 HOURS PRN
Status: DISCONTINUED | OUTPATIENT
Start: 2019-01-22 | End: 2019-01-30 | Stop reason: HOSPADM

## 2019-01-22 RX ORDER — POTASSIUM CHLORIDE 1.5 G/1.77G
20 POWDER, FOR SOLUTION ORAL DAILY
Status: DISCONTINUED | OUTPATIENT
Start: 2019-01-22 | End: 2019-01-24

## 2019-01-22 RX ORDER — ONDANSETRON 4 MG/1
4 TABLET, ORALLY DISINTEGRATING ORAL EVERY 8 HOURS PRN
Status: DISCONTINUED | OUTPATIENT
Start: 2019-01-22 | End: 2019-01-23

## 2019-01-22 RX ADMIN — HEPARIN SODIUM 5000 UNITS: 5000 INJECTION INTRAVENOUS; SUBCUTANEOUS at 13:55

## 2019-01-22 RX ADMIN — HEPARIN SODIUM 5000 UNITS: 5000 INJECTION INTRAVENOUS; SUBCUTANEOUS at 21:56

## 2019-01-22 RX ADMIN — FLUTICASONE PROPIONATE 2 SPRAY: 50 SPRAY, METERED NASAL at 08:22

## 2019-01-22 RX ADMIN — HEPARIN SODIUM 5000 UNITS: 5000 INJECTION INTRAVENOUS; SUBCUTANEOUS at 06:27

## 2019-01-22 ASSESSMENT — PAIN SCALES - GENERAL
PAINLEVEL_OUTOF10: 0
PAINLEVEL_OUTOF10: 0

## 2019-01-23 LAB
ANION GAP SERPL CALCULATED.3IONS-SCNC: 18 MMOL/L (ref 7–16)
ANION GAP SERPL CALCULATED.3IONS-SCNC: 21 MMOL/L (ref 7–16)
ANION GAP SERPL CALCULATED.3IONS-SCNC: 22 MMOL/L (ref 7–16)
ANION GAP SERPL CALCULATED.3IONS-SCNC: 23 MMOL/L (ref 7–16)
BUN BLDV-MCNC: 65 MG/DL (ref 6–20)
BUN BLDV-MCNC: 71 MG/DL (ref 6–20)
BUN BLDV-MCNC: 71 MG/DL (ref 6–20)
BUN BLDV-MCNC: 74 MG/DL (ref 6–20)
CALCIUM SERPL-MCNC: 8 MG/DL (ref 8.6–10.2)
CALCIUM SERPL-MCNC: 8.2 MG/DL (ref 8.6–10.2)
CALCIUM SERPL-MCNC: 8.2 MG/DL (ref 8.6–10.2)
CALCIUM SERPL-MCNC: 8.3 MG/DL (ref 8.6–10.2)
CHLORIDE BLD-SCNC: 100 MMOL/L (ref 98–107)
CHLORIDE BLD-SCNC: 97 MMOL/L (ref 98–107)
CHLORIDE BLD-SCNC: 99 MMOL/L (ref 98–107)
CHLORIDE BLD-SCNC: 99 MMOL/L (ref 98–107)
CO2: 21 MMOL/L (ref 22–29)
CO2: 23 MMOL/L (ref 22–29)
CO2: 25 MMOL/L (ref 22–29)
CO2: 25 MMOL/L (ref 22–29)
CREAT SERPL-MCNC: 5 MG/DL (ref 0.5–1)
CREAT SERPL-MCNC: 5.7 MG/DL (ref 0.5–1)
CREAT SERPL-MCNC: 6.4 MG/DL (ref 0.5–1)
CREAT SERPL-MCNC: 7.1 MG/DL (ref 0.5–1)
EKG ATRIAL RATE: 72 BPM
EKG P AXIS: 42 DEGREES
EKG P-R INTERVAL: 150 MS
EKG Q-T INTERVAL: 388 MS
EKG QRS DURATION: 78 MS
EKG QTC CALCULATION (BAZETT): 424 MS
EKG R AXIS: 29 DEGREES
EKG T AXIS: 28 DEGREES
EKG VENTRICULAR RATE: 72 BPM
GFR AFRICAN AMERICAN: 11
GFR AFRICAN AMERICAN: 7
GFR AFRICAN AMERICAN: 8
GFR AFRICAN AMERICAN: 9
GFR NON-AFRICAN AMERICAN: 6 ML/MIN/1.73
GFR NON-AFRICAN AMERICAN: 7 ML/MIN/1.73
GFR NON-AFRICAN AMERICAN: 8 ML/MIN/1.73
GFR NON-AFRICAN AMERICAN: 9 ML/MIN/1.73
GLUCOSE BLD-MCNC: 104 MG/DL (ref 74–99)
GLUCOSE BLD-MCNC: 129 MG/DL (ref 74–99)
GLUCOSE BLD-MCNC: 164 MG/DL (ref 74–99)
GLUCOSE BLD-MCNC: 98 MG/DL (ref 74–99)
HCT VFR BLD CALC: 33.7 % (ref 34–48)
HEMOGLOBIN: 10.6 G/DL (ref 11.5–15.5)
MAGNESIUM: 1.6 MG/DL (ref 1.6–2.6)
MCH RBC QN AUTO: 29.4 PG (ref 26–35)
MCHC RBC AUTO-ENTMCNC: 31.5 % (ref 32–34.5)
MCV RBC AUTO: 93.6 FL (ref 80–99.9)
METER GLUCOSE: 145 MG/DL (ref 74–99)
PDW BLD-RTO: 13 FL (ref 11.5–15)
PHOSPHORUS: 4.7 MG/DL (ref 2.5–4.5)
PLATELET # BLD: 259 E9/L (ref 130–450)
PMV BLD AUTO: 11.4 FL (ref 7–12)
POTASSIUM SERPL-SCNC: 3.1 MMOL/L (ref 3.5–5)
POTASSIUM SERPL-SCNC: 3.3 MMOL/L (ref 3.5–5)
POTASSIUM SERPL-SCNC: 3.9 MMOL/L (ref 3.5–5)
POTASSIUM SERPL-SCNC: 4.6 MMOL/L (ref 3.5–5)
RBC # BLD: 3.6 E12/L (ref 3.5–5.5)
SODIUM BLD-SCNC: 142 MMOL/L (ref 132–146)
SODIUM BLD-SCNC: 143 MMOL/L (ref 132–146)
SODIUM BLD-SCNC: 143 MMOL/L (ref 132–146)
SODIUM BLD-SCNC: 145 MMOL/L (ref 132–146)
WBC # BLD: 8.4 E9/L (ref 4.5–11.5)

## 2019-01-23 PROCEDURE — 2580000003 HC RX 258: Performed by: INTERNAL MEDICINE

## 2019-01-23 PROCEDURE — 6360000002 HC RX W HCPCS: Performed by: INTERNAL MEDICINE

## 2019-01-23 PROCEDURE — 6370000000 HC RX 637 (ALT 250 FOR IP): Performed by: FAMILY MEDICINE

## 2019-01-23 PROCEDURE — 80048 BASIC METABOLIC PNL TOTAL CA: CPT

## 2019-01-23 PROCEDURE — 84100 ASSAY OF PHOSPHORUS: CPT

## 2019-01-23 PROCEDURE — 6370000000 HC RX 637 (ALT 250 FOR IP): Performed by: INTERNAL MEDICINE

## 2019-01-23 PROCEDURE — 2500000003 HC RX 250 WO HCPCS: Performed by: INTERNAL MEDICINE

## 2019-01-23 PROCEDURE — 83735 ASSAY OF MAGNESIUM: CPT

## 2019-01-23 PROCEDURE — 36415 COLL VENOUS BLD VENIPUNCTURE: CPT

## 2019-01-23 PROCEDURE — 97530 THERAPEUTIC ACTIVITIES: CPT | Performed by: PHYSICAL THERAPIST

## 2019-01-23 PROCEDURE — 97161 PT EVAL LOW COMPLEX 20 MIN: CPT | Performed by: PHYSICAL THERAPIST

## 2019-01-23 PROCEDURE — 85027 COMPLETE CBC AUTOMATED: CPT

## 2019-01-23 PROCEDURE — 97165 OT EVAL LOW COMPLEX 30 MIN: CPT

## 2019-01-23 PROCEDURE — 2060000000 HC ICU INTERMEDIATE R&B

## 2019-01-23 PROCEDURE — 97530 THERAPEUTIC ACTIVITIES: CPT

## 2019-01-23 PROCEDURE — 82962 GLUCOSE BLOOD TEST: CPT

## 2019-01-23 RX ORDER — DOCUSATE SODIUM 100 MG/1
100 CAPSULE, LIQUID FILLED ORAL 2 TIMES DAILY
Status: DISCONTINUED | OUTPATIENT
Start: 2019-01-23 | End: 2019-01-29

## 2019-01-23 RX ORDER — SENNA PLUS 8.6 MG/1
1 TABLET ORAL NIGHTLY
Status: DISCONTINUED | OUTPATIENT
Start: 2019-01-23 | End: 2019-01-29

## 2019-01-23 RX ORDER — ACETAMINOPHEN 325 MG/1
650 TABLET ORAL EVERY 6 HOURS PRN
Status: DISCONTINUED | OUTPATIENT
Start: 2019-01-23 | End: 2019-01-30 | Stop reason: HOSPADM

## 2019-01-23 RX ORDER — ONDANSETRON 2 MG/ML
4 INJECTION INTRAMUSCULAR; INTRAVENOUS EVERY 6 HOURS PRN
Status: DISCONTINUED | OUTPATIENT
Start: 2019-01-23 | End: 2019-01-30 | Stop reason: HOSPADM

## 2019-01-23 RX ADMIN — HEPARIN SODIUM 5000 UNITS: 5000 INJECTION INTRAVENOUS; SUBCUTANEOUS at 13:09

## 2019-01-23 RX ADMIN — ONDANSETRON 4 MG: 2 INJECTION INTRAMUSCULAR; INTRAVENOUS at 16:53

## 2019-01-23 RX ADMIN — ONDANSETRON 4 MG: 2 INJECTION INTRAMUSCULAR; INTRAVENOUS at 11:33

## 2019-01-23 RX ADMIN — HEPARIN SODIUM 5000 UNITS: 5000 INJECTION INTRAVENOUS; SUBCUTANEOUS at 21:53

## 2019-01-23 RX ADMIN — DOCUSATE SODIUM 100 MG: 100 CAPSULE, LIQUID FILLED ORAL at 13:09

## 2019-01-23 RX ADMIN — HEPARIN SODIUM 5000 UNITS: 5000 INJECTION INTRAVENOUS; SUBCUTANEOUS at 05:45

## 2019-01-23 RX ADMIN — ACETAMINOPHEN 650 MG: 325 TABLET, FILM COATED ORAL at 16:53

## 2019-01-23 RX ADMIN — SODIUM BICARBONATE: 84 INJECTION, SOLUTION INTRAVENOUS at 10:33

## 2019-01-23 RX ADMIN — LEVOTHYROXINE SODIUM 125 MCG: 125 TABLET ORAL at 05:45

## 2019-01-23 RX ADMIN — ONDANSETRON 4 MG: 2 INJECTION INTRAMUSCULAR; INTRAVENOUS at 23:59

## 2019-01-23 RX ADMIN — Medication 10 ML: at 21:53

## 2019-01-23 ASSESSMENT — PAIN SCALES - GENERAL
PAINLEVEL_OUTOF10: 0

## 2019-01-24 ENCOUNTER — APPOINTMENT (OUTPATIENT)
Dept: ULTRASOUND IMAGING | Age: 54
DRG: 469 | End: 2019-01-24
Payer: COMMERCIAL

## 2019-01-24 LAB
ALBUMIN SERPL-MCNC: 3.7 G/DL (ref 3.5–5.2)
ALP BLD-CCNC: 31 U/L (ref 35–104)
ALT SERPL-CCNC: 6 U/L (ref 0–32)
ANION GAP SERPL CALCULATED.3IONS-SCNC: 16 MMOL/L (ref 7–16)
ANION GAP SERPL CALCULATED.3IONS-SCNC: 17 MMOL/L (ref 7–16)
ANION GAP SERPL CALCULATED.3IONS-SCNC: 18 MMOL/L (ref 7–16)
ANION GAP SERPL CALCULATED.3IONS-SCNC: 19 MMOL/L (ref 7–16)
ANION GAP SERPL CALCULATED.3IONS-SCNC: 19 MMOL/L (ref 7–16)
ANION GAP SERPL CALCULATED.3IONS-SCNC: 20 MMOL/L (ref 7–16)
AST SERPL-CCNC: 18 U/L (ref 0–31)
BILIRUB SERPL-MCNC: 0.3 MG/DL (ref 0–1.2)
BUN BLDV-MCNC: 41 MG/DL (ref 6–20)
BUN BLDV-MCNC: 50 MG/DL (ref 6–20)
BUN BLDV-MCNC: 53 MG/DL (ref 6–20)
BUN BLDV-MCNC: 56 MG/DL (ref 6–20)
CALCIUM SERPL-MCNC: 7 MG/DL (ref 8.6–10.2)
CALCIUM SERPL-MCNC: 7.2 MG/DL (ref 8.6–10.2)
CALCIUM SERPL-MCNC: 7.2 MG/DL (ref 8.6–10.2)
CALCIUM SERPL-MCNC: 7.3 MG/DL (ref 8.6–10.2)
CALCIUM SERPL-MCNC: 7.3 MG/DL (ref 8.6–10.2)
CALCIUM SERPL-MCNC: 7.6 MG/DL (ref 8.6–10.2)
CHLORIDE BLD-SCNC: 94 MMOL/L (ref 98–107)
CHLORIDE BLD-SCNC: 96 MMOL/L (ref 98–107)
CHLORIDE BLD-SCNC: 97 MMOL/L (ref 98–107)
CHLORIDE BLD-SCNC: 97 MMOL/L (ref 98–107)
CHLORIDE BLD-SCNC: 98 MMOL/L (ref 98–107)
CHLORIDE BLD-SCNC: 99 MMOL/L (ref 98–107)
CO2: 21 MMOL/L (ref 22–29)
CO2: 23 MMOL/L (ref 22–29)
CO2: 24 MMOL/L (ref 22–29)
CO2: 24 MMOL/L (ref 22–29)
CO2: 25 MMOL/L (ref 22–29)
CO2: 29 MMOL/L (ref 22–29)
CREAT SERPL-MCNC: 3.8 MG/DL (ref 0.5–1)
CREAT SERPL-MCNC: 3.9 MG/DL (ref 0.5–1)
CREAT SERPL-MCNC: 4.5 MG/DL (ref 0.5–1)
CREAT SERPL-MCNC: 4.6 MG/DL (ref 0.5–1)
GFR AFRICAN AMERICAN: 12
GFR AFRICAN AMERICAN: 12
GFR AFRICAN AMERICAN: 14
GFR AFRICAN AMERICAN: 15
GFR NON-AFRICAN AMERICAN: 10 ML/MIN/1.73
GFR NON-AFRICAN AMERICAN: 10 ML/MIN/1.73
GFR NON-AFRICAN AMERICAN: 12 ML/MIN/1.73
GLUCOSE BLD-MCNC: 101 MG/DL (ref 74–99)
GLUCOSE BLD-MCNC: 121 MG/DL (ref 74–99)
GLUCOSE BLD-MCNC: 128 MG/DL (ref 74–99)
GLUCOSE BLD-MCNC: 84 MG/DL (ref 74–99)
GLUCOSE BLD-MCNC: 85 MG/DL (ref 74–99)
GLUCOSE BLD-MCNC: 94 MG/DL (ref 74–99)
HCT VFR BLD CALC: 29.5 % (ref 34–48)
HEMOGLOBIN: 9.7 G/DL (ref 11.5–15.5)
MAGNESIUM: 1.1 MG/DL (ref 1.6–2.6)
MAGNESIUM: 1.4 MG/DL (ref 1.6–2.6)
MCH RBC QN AUTO: 29.8 PG (ref 26–35)
MCHC RBC AUTO-ENTMCNC: 32.9 % (ref 32–34.5)
MCV RBC AUTO: 90.8 FL (ref 80–99.9)
PDW BLD-RTO: 12.9 FL (ref 11.5–15)
PHOSPHORUS: 2.9 MG/DL (ref 2.5–4.5)
PLATELET # BLD: 173 E9/L (ref 130–450)
PMV BLD AUTO: 12.1 FL (ref 7–12)
POTASSIUM SERPL-SCNC: 2.8 MMOL/L (ref 3.5–5)
POTASSIUM SERPL-SCNC: 2.9 MMOL/L (ref 3.5–5)
POTASSIUM SERPL-SCNC: 3.2 MMOL/L (ref 3.5–5)
POTASSIUM SERPL-SCNC: 3.3 MMOL/L (ref 3.5–5)
POTASSIUM SERPL-SCNC: 3.6 MMOL/L (ref 3.5–5)
POTASSIUM SERPL-SCNC: 4.2 MMOL/L (ref 3.5–5)
POTASSIUM SERPL-SCNC: 5 MMOL/L (ref 3.5–5)
RBC # BLD: 3.25 E12/L (ref 3.5–5.5)
SODIUM BLD-SCNC: 139 MMOL/L (ref 132–146)
SODIUM BLD-SCNC: 140 MMOL/L (ref 132–146)
SODIUM BLD-SCNC: 140 MMOL/L (ref 132–146)
TOTAL PROTEIN: 6.9 G/DL (ref 6.4–8.3)
WBC # BLD: 6.9 E9/L (ref 4.5–11.5)

## 2019-01-24 PROCEDURE — 36415 COLL VENOUS BLD VENIPUNCTURE: CPT

## 2019-01-24 PROCEDURE — 92610 EVALUATE SWALLOWING FUNCTION: CPT | Performed by: SPEECH-LANGUAGE PATHOLOGIST

## 2019-01-24 PROCEDURE — 80048 BASIC METABOLIC PNL TOTAL CA: CPT

## 2019-01-24 PROCEDURE — 92523 SPEECH SOUND LANG COMPREHEN: CPT | Performed by: SPEECH-LANGUAGE PATHOLOGIST

## 2019-01-24 PROCEDURE — 6360000002 HC RX W HCPCS: Performed by: INTERNAL MEDICINE

## 2019-01-24 PROCEDURE — 84132 ASSAY OF SERUM POTASSIUM: CPT

## 2019-01-24 PROCEDURE — 6370000000 HC RX 637 (ALT 250 FOR IP): Performed by: FAMILY MEDICINE

## 2019-01-24 PROCEDURE — 97116 GAIT TRAINING THERAPY: CPT

## 2019-01-24 PROCEDURE — 76705 ECHO EXAM OF ABDOMEN: CPT

## 2019-01-24 PROCEDURE — 80053 COMPREHEN METABOLIC PANEL: CPT

## 2019-01-24 PROCEDURE — 97530 THERAPEUTIC ACTIVITIES: CPT

## 2019-01-24 PROCEDURE — 6370000000 HC RX 637 (ALT 250 FOR IP): Performed by: INTERNAL MEDICINE

## 2019-01-24 PROCEDURE — 1200000000 HC SEMI PRIVATE

## 2019-01-24 PROCEDURE — 85027 COMPLETE CBC AUTOMATED: CPT

## 2019-01-24 PROCEDURE — 2580000003 HC RX 258: Performed by: INTERNAL MEDICINE

## 2019-01-24 PROCEDURE — 6360000002 HC RX W HCPCS: Performed by: FAMILY MEDICINE

## 2019-01-24 PROCEDURE — 84100 ASSAY OF PHOSPHORUS: CPT

## 2019-01-24 PROCEDURE — 2500000003 HC RX 250 WO HCPCS: Performed by: INTERNAL MEDICINE

## 2019-01-24 PROCEDURE — 83735 ASSAY OF MAGNESIUM: CPT

## 2019-01-24 RX ORDER — POTASSIUM CHLORIDE 20 MEQ/1
40 TABLET, EXTENDED RELEASE ORAL ONCE
Status: DISCONTINUED | OUTPATIENT
Start: 2019-01-24 | End: 2019-01-24

## 2019-01-24 RX ORDER — POTASSIUM CHLORIDE 1.5 G/1.77G
40 POWDER, FOR SOLUTION ORAL ONCE
Status: DISCONTINUED | OUTPATIENT
Start: 2019-01-24 | End: 2019-01-25

## 2019-01-24 RX ORDER — SODIUM CHLORIDE AND POTASSIUM CHLORIDE .9; .15 G/100ML; G/100ML
SOLUTION INTRAVENOUS CONTINUOUS
Status: DISCONTINUED | OUTPATIENT
Start: 2019-01-24 | End: 2019-01-25

## 2019-01-24 RX ORDER — POTASSIUM CHLORIDE 20MEQ/15ML
50 LIQUID (ML) ORAL ONCE
Status: DISCONTINUED | OUTPATIENT
Start: 2019-01-24 | End: 2019-01-24

## 2019-01-24 RX ORDER — MAGNESIUM SULFATE 1 G/100ML
1 INJECTION INTRAVENOUS ONCE
Status: COMPLETED | OUTPATIENT
Start: 2019-01-24 | End: 2019-01-24

## 2019-01-24 RX ORDER — POTASSIUM CHLORIDE 1.5 G/1.77G
20 POWDER, FOR SOLUTION ORAL 3 TIMES DAILY
Status: DISCONTINUED | OUTPATIENT
Start: 2019-01-24 | End: 2019-01-25

## 2019-01-24 RX ADMIN — HEPARIN SODIUM 5000 UNITS: 5000 INJECTION INTRAVENOUS; SUBCUTANEOUS at 14:05

## 2019-01-24 RX ADMIN — POTASSIUM CHLORIDE AND SODIUM CHLORIDE: 900; 150 INJECTION, SOLUTION INTRAVENOUS at 10:11

## 2019-01-24 RX ADMIN — SENNA 8.6 MG: 8.6 TABLET, COATED ORAL at 20:55

## 2019-01-24 RX ADMIN — MAGNESIUM SULFATE HEPTAHYDRATE 1 G: 1 INJECTION, SOLUTION INTRAVENOUS at 09:10

## 2019-01-24 RX ADMIN — SODIUM BICARBONATE: 84 INJECTION, SOLUTION INTRAVENOUS at 00:23

## 2019-01-24 RX ADMIN — ATORVASTATIN CALCIUM 40 MG: 40 TABLET, FILM COATED ORAL at 08:11

## 2019-01-24 RX ADMIN — DOCUSATE SODIUM 100 MG: 100 CAPSULE, LIQUID FILLED ORAL at 08:11

## 2019-01-24 RX ADMIN — HEPARIN SODIUM 5000 UNITS: 5000 INJECTION INTRAVENOUS; SUBCUTANEOUS at 20:55

## 2019-01-24 RX ADMIN — PANTOPRAZOLE SODIUM 40 MG: 40 TABLET, DELAYED RELEASE ORAL at 06:04

## 2019-01-24 RX ADMIN — LEVOTHYROXINE SODIUM 125 MCG: 125 TABLET ORAL at 06:04

## 2019-01-24 RX ADMIN — FLUTICASONE PROPIONATE 2 SPRAY: 50 SPRAY, METERED NASAL at 08:14

## 2019-01-24 RX ADMIN — ONDANSETRON 4 MG: 2 INJECTION INTRAMUSCULAR; INTRAVENOUS at 14:05

## 2019-01-24 RX ADMIN — DOCUSATE SODIUM 100 MG: 100 CAPSULE, LIQUID FILLED ORAL at 20:55

## 2019-01-24 RX ADMIN — POTASSIUM CHLORIDE AND SODIUM CHLORIDE: 900; 150 INJECTION, SOLUTION INTRAVENOUS at 14:04

## 2019-01-24 RX ADMIN — HEPARIN SODIUM 5000 UNITS: 5000 INJECTION INTRAVENOUS; SUBCUTANEOUS at 06:05

## 2019-01-24 ASSESSMENT — PAIN SCALES - GENERAL
PAINLEVEL_OUTOF10: 0

## 2019-01-25 LAB
ALBUMIN SERPL-MCNC: 3.3 G/DL (ref 3.5–5.2)
ALP BLD-CCNC: 29 U/L (ref 35–104)
ALT SERPL-CCNC: 5 U/L (ref 0–32)
ANION GAP SERPL CALCULATED.3IONS-SCNC: 14 MMOL/L (ref 7–16)
ANION GAP SERPL CALCULATED.3IONS-SCNC: 15 MMOL/L (ref 7–16)
ANION GAP SERPL CALCULATED.3IONS-SCNC: 17 MMOL/L (ref 7–16)
AST SERPL-CCNC: 12 U/L (ref 0–31)
BILIRUB SERPL-MCNC: 0.2 MG/DL (ref 0–1.2)
BUN BLDV-MCNC: 33 MG/DL (ref 6–20)
BUN BLDV-MCNC: 34 MG/DL (ref 6–20)
BUN BLDV-MCNC: 38 MG/DL (ref 6–20)
CALCIUM SERPL-MCNC: 6.9 MG/DL (ref 8.6–10.2)
CALCIUM SERPL-MCNC: 6.9 MG/DL (ref 8.6–10.2)
CALCIUM SERPL-MCNC: 7.2 MG/DL (ref 8.6–10.2)
CHLORIDE BLD-SCNC: 101 MMOL/L (ref 98–107)
CHLORIDE BLD-SCNC: 104 MMOL/L (ref 98–107)
CHLORIDE BLD-SCNC: 105 MMOL/L (ref 98–107)
CO2: 17 MMOL/L (ref 22–29)
CO2: 21 MMOL/L (ref 22–29)
CO2: 24 MMOL/L (ref 22–29)
CREAT SERPL-MCNC: 3 MG/DL (ref 0.5–1)
CREAT SERPL-MCNC: 3.1 MG/DL (ref 0.5–1)
CREAT SERPL-MCNC: 3.5 MG/DL (ref 0.5–1)
GFR AFRICAN AMERICAN: 16
GFR AFRICAN AMERICAN: 19
GFR AFRICAN AMERICAN: 20
GFR NON-AFRICAN AMERICAN: 14 ML/MIN/1.73
GFR NON-AFRICAN AMERICAN: 16 ML/MIN/1.73
GFR NON-AFRICAN AMERICAN: 16 ML/MIN/1.73
GLUCOSE BLD-MCNC: 147 MG/DL (ref 74–99)
GLUCOSE BLD-MCNC: 89 MG/DL (ref 74–99)
GLUCOSE BLD-MCNC: 97 MG/DL (ref 74–99)
HCT VFR BLD CALC: 26.6 % (ref 34–48)
HEMOGLOBIN: 8.3 G/DL (ref 11.5–15.5)
MAGNESIUM: 1.2 MG/DL (ref 1.6–2.6)
MCH RBC QN AUTO: 29 PG (ref 26–35)
MCHC RBC AUTO-ENTMCNC: 31.2 % (ref 32–34.5)
MCV RBC AUTO: 93 FL (ref 80–99.9)
PDW BLD-RTO: 13.2 FL (ref 11.5–15)
PHOSPHORUS: 2.5 MG/DL (ref 2.5–4.5)
PLATELET # BLD: 208 E9/L (ref 130–450)
PMV BLD AUTO: 11.4 FL (ref 7–12)
POTASSIUM SERPL-SCNC: 3.3 MMOL/L (ref 3.5–5)
POTASSIUM SERPL-SCNC: 3.4 MMOL/L (ref 3.5–5)
POTASSIUM SERPL-SCNC: 3.7 MMOL/L (ref 3.5–5)
RBC # BLD: 2.86 E12/L (ref 3.5–5.5)
SODIUM BLD-SCNC: 139 MMOL/L (ref 132–146)
SODIUM BLD-SCNC: 139 MMOL/L (ref 132–146)
SODIUM BLD-SCNC: 140 MMOL/L (ref 132–146)
TOTAL PROTEIN: 6.1 G/DL (ref 6.4–8.3)
WBC # BLD: 6.3 E9/L (ref 4.5–11.5)

## 2019-01-25 PROCEDURE — 6370000000 HC RX 637 (ALT 250 FOR IP): Performed by: INTERNAL MEDICINE

## 2019-01-25 PROCEDURE — 6360000002 HC RX W HCPCS: Performed by: INTERNAL MEDICINE

## 2019-01-25 PROCEDURE — 83735 ASSAY OF MAGNESIUM: CPT

## 2019-01-25 PROCEDURE — 6360000002 HC RX W HCPCS: Performed by: FAMILY MEDICINE

## 2019-01-25 PROCEDURE — 2580000003 HC RX 258

## 2019-01-25 PROCEDURE — 84100 ASSAY OF PHOSPHORUS: CPT

## 2019-01-25 PROCEDURE — 85027 COMPLETE CBC AUTOMATED: CPT

## 2019-01-25 PROCEDURE — 97530 THERAPEUTIC ACTIVITIES: CPT

## 2019-01-25 PROCEDURE — 6370000000 HC RX 637 (ALT 250 FOR IP): Performed by: FAMILY MEDICINE

## 2019-01-25 PROCEDURE — 92526 ORAL FUNCTION THERAPY: CPT | Performed by: SPEECH-LANGUAGE PATHOLOGIST

## 2019-01-25 PROCEDURE — 92507 TX SP LANG VOICE COMM INDIV: CPT | Performed by: SPEECH-LANGUAGE PATHOLOGIST

## 2019-01-25 PROCEDURE — 36415 COLL VENOUS BLD VENIPUNCTURE: CPT

## 2019-01-25 PROCEDURE — 88112 CYTOPATH CELL ENHANCE TECH: CPT

## 2019-01-25 PROCEDURE — 6370000000 HC RX 637 (ALT 250 FOR IP)

## 2019-01-25 PROCEDURE — 2580000003 HC RX 258: Performed by: INTERNAL MEDICINE

## 2019-01-25 PROCEDURE — 2500000003 HC RX 250 WO HCPCS

## 2019-01-25 PROCEDURE — 97110 THERAPEUTIC EXERCISES: CPT

## 2019-01-25 PROCEDURE — 80053 COMPREHEN METABOLIC PANEL: CPT

## 2019-01-25 PROCEDURE — 6360000002 HC RX W HCPCS

## 2019-01-25 PROCEDURE — 80048 BASIC METABOLIC PNL TOTAL CA: CPT

## 2019-01-25 PROCEDURE — 1200000000 HC SEMI PRIVATE

## 2019-01-25 RX ORDER — POTASSIUM CHLORIDE 7.45 MG/ML
10 INJECTION INTRAVENOUS
Status: COMPLETED | OUTPATIENT
Start: 2019-01-25 | End: 2019-01-25

## 2019-01-25 RX ORDER — POTASSIUM CHLORIDE 1.5 G/1.77G
20 POWDER, FOR SOLUTION ORAL 3 TIMES DAILY
Status: DISCONTINUED | OUTPATIENT
Start: 2019-01-26 | End: 2019-01-30 | Stop reason: HOSPADM

## 2019-01-25 RX ORDER — MAGNESIUM SULFATE IN WATER 40 MG/ML
2 INJECTION, SOLUTION INTRAVENOUS ONCE
Status: COMPLETED | OUTPATIENT
Start: 2019-01-25 | End: 2019-01-25

## 2019-01-25 RX ADMIN — HEPARIN SODIUM 5000 UNITS: 5000 INJECTION INTRAVENOUS; SUBCUTANEOUS at 13:26

## 2019-01-25 RX ADMIN — POTASSIUM CHLORIDE AND SODIUM CHLORIDE: 900; 150 INJECTION, SOLUTION INTRAVENOUS at 10:59

## 2019-01-25 RX ADMIN — DOCUSATE SODIUM 100 MG: 100 CAPSULE, LIQUID FILLED ORAL at 09:24

## 2019-01-25 RX ADMIN — FLUTICASONE PROPIONATE 2 SPRAY: 50 SPRAY, METERED NASAL at 09:23

## 2019-01-25 RX ADMIN — ATORVASTATIN CALCIUM 40 MG: 40 TABLET, FILM COATED ORAL at 09:23

## 2019-01-25 RX ADMIN — POTASSIUM CHLORIDE AND SODIUM CHLORIDE 100 ML/HR: 900; 150 INJECTION, SOLUTION INTRAVENOUS at 00:29

## 2019-01-25 RX ADMIN — CALCIUM GLUCONATE 1 G: 98 INJECTION, SOLUTION INTRAVENOUS at 17:23

## 2019-01-25 RX ADMIN — LEVOTHYROXINE SODIUM 125 MCG: 125 TABLET ORAL at 06:24

## 2019-01-25 RX ADMIN — Medication 400 MG: at 21:58

## 2019-01-25 RX ADMIN — HEPARIN SODIUM 5000 UNITS: 5000 INJECTION INTRAVENOUS; SUBCUTANEOUS at 06:26

## 2019-01-25 RX ADMIN — POTASSIUM CHLORIDE 10 MEQ: 7.46 INJECTION, SOLUTION INTRAVENOUS at 16:27

## 2019-01-25 RX ADMIN — HEPARIN SODIUM 5000 UNITS: 5000 INJECTION INTRAVENOUS; SUBCUTANEOUS at 21:58

## 2019-01-25 RX ADMIN — PANTOPRAZOLE SODIUM 40 MG: 40 TABLET, DELAYED RELEASE ORAL at 06:25

## 2019-01-25 RX ADMIN — POTASSIUM CHLORIDE 10 MEQ: 7.46 INJECTION, SOLUTION INTRAVENOUS at 18:27

## 2019-01-25 RX ADMIN — POTASSIUM CHLORIDE: 2 INJECTION, SOLUTION, CONCENTRATE INTRAVENOUS at 21:58

## 2019-01-25 RX ADMIN — MAGNESIUM SULFATE HEPTAHYDRATE 2 G: 40 INJECTION, SOLUTION INTRAVENOUS at 21:57

## 2019-01-25 ASSESSMENT — ENCOUNTER SYMPTOMS
CONSTIPATION: 0
ABDOMINAL PAIN: 0
COUGH: 0
NAUSEA: 1
EYE DISCHARGE: 0
DIARRHEA: 0
WHEEZING: 0
VOMITING: 0
SORE THROAT: 0
SINUS PRESSURE: 0
SHORTNESS OF BREATH: 0
EYE PAIN: 0
BACK PAIN: 1

## 2019-01-25 ASSESSMENT — PAIN SCALES - GENERAL
PAINLEVEL_OUTOF10: 0

## 2019-01-25 ASSESSMENT — PAIN - FUNCTIONAL ASSESSMENT: PAIN_FUNCTIONAL_ASSESSMENT: ACTIVITIES ARE NOT PREVENTED

## 2019-01-26 LAB
ALBUMIN SERPL-MCNC: 3.4 G/DL (ref 3.5–5.2)
ALP BLD-CCNC: 29 U/L (ref 35–104)
ALT SERPL-CCNC: 6 U/L (ref 0–32)
ANION GAP SERPL CALCULATED.3IONS-SCNC: 13 MMOL/L (ref 7–16)
ANION GAP SERPL CALCULATED.3IONS-SCNC: 13 MMOL/L (ref 7–16)
ANION GAP SERPL CALCULATED.3IONS-SCNC: 16 MMOL/L (ref 7–16)
ANION GAP SERPL CALCULATED.3IONS-SCNC: 18 MMOL/L (ref 7–16)
AST SERPL-CCNC: 11 U/L (ref 0–31)
BILIRUB SERPL-MCNC: 0.2 MG/DL (ref 0–1.2)
BUN BLDV-MCNC: 25 MG/DL (ref 6–20)
BUN BLDV-MCNC: 30 MG/DL (ref 6–20)
BUN BLDV-MCNC: 32 MG/DL (ref 6–20)
BUN BLDV-MCNC: 37 MG/DL (ref 6–20)
CALCIUM SERPL-MCNC: 7.3 MG/DL (ref 8.6–10.2)
CALCIUM SERPL-MCNC: 7.4 MG/DL (ref 8.6–10.2)
CALCIUM SERPL-MCNC: 7.4 MG/DL (ref 8.6–10.2)
CALCIUM SERPL-MCNC: 8 MG/DL (ref 8.6–10.2)
CHLORIDE BLD-SCNC: 104 MMOL/L (ref 98–107)
CHLORIDE BLD-SCNC: 110 MMOL/L (ref 98–107)
CO2: 16 MMOL/L (ref 22–29)
CO2: 21 MMOL/L (ref 22–29)
CO2: 22 MMOL/L (ref 22–29)
CO2: 22 MMOL/L (ref 22–29)
CREAT SERPL-MCNC: 2.5 MG/DL (ref 0.5–1)
CREAT SERPL-MCNC: 2.5 MG/DL (ref 0.5–1)
CREAT SERPL-MCNC: 2.8 MG/DL (ref 0.5–1)
CREAT SERPL-MCNC: 2.8 MG/DL (ref 0.5–1)
GFR AFRICAN AMERICAN: 21
GFR AFRICAN AMERICAN: 21
GFR AFRICAN AMERICAN: 24
GFR AFRICAN AMERICAN: 24
GFR NON-AFRICAN AMERICAN: 18 ML/MIN/1.73
GFR NON-AFRICAN AMERICAN: 18 ML/MIN/1.73
GFR NON-AFRICAN AMERICAN: 20 ML/MIN/1.73
GFR NON-AFRICAN AMERICAN: 20 ML/MIN/1.73
GLUCOSE BLD-MCNC: 123 MG/DL (ref 74–99)
GLUCOSE BLD-MCNC: 130 MG/DL (ref 74–99)
GLUCOSE BLD-MCNC: 132 MG/DL (ref 74–99)
GLUCOSE BLD-MCNC: 142 MG/DL (ref 74–99)
HCT VFR BLD CALC: 26.6 % (ref 34–48)
HEMOGLOBIN: 8.4 G/DL (ref 11.5–15.5)
MAGNESIUM: 1.9 MG/DL (ref 1.6–2.6)
MCH RBC QN AUTO: 29.2 PG (ref 26–35)
MCHC RBC AUTO-ENTMCNC: 31.6 % (ref 32–34.5)
MCV RBC AUTO: 92.4 FL (ref 80–99.9)
PDW BLD-RTO: 13.2 FL (ref 11.5–15)
PHOSPHORUS: 1.9 MG/DL (ref 2.5–4.5)
PLATELET # BLD: 211 E9/L (ref 130–450)
PMV BLD AUTO: 11.6 FL (ref 7–12)
POTASSIUM SERPL-SCNC: 3.5 MMOL/L (ref 3.5–5)
POTASSIUM SERPL-SCNC: 3.6 MMOL/L (ref 3.5–5)
POTASSIUM SERPL-SCNC: 3.7 MMOL/L (ref 3.5–5)
POTASSIUM SERPL-SCNC: 6.3 MMOL/L (ref 3.5–5)
RBC # BLD: 2.88 E12/L (ref 3.5–5.5)
SODIUM BLD-SCNC: 139 MMOL/L (ref 132–146)
SODIUM BLD-SCNC: 139 MMOL/L (ref 132–146)
SODIUM BLD-SCNC: 141 MMOL/L (ref 132–146)
SODIUM BLD-SCNC: 144 MMOL/L (ref 132–146)
TOTAL PROTEIN: 6.2 G/DL (ref 6.4–8.3)
WBC # BLD: 7.5 E9/L (ref 4.5–11.5)

## 2019-01-26 PROCEDURE — 6370000000 HC RX 637 (ALT 250 FOR IP): Performed by: INTERNAL MEDICINE

## 2019-01-26 PROCEDURE — 6360000002 HC RX W HCPCS: Performed by: INTERNAL MEDICINE

## 2019-01-26 PROCEDURE — 6370000000 HC RX 637 (ALT 250 FOR IP): Performed by: FAMILY MEDICINE

## 2019-01-26 PROCEDURE — 80053 COMPREHEN METABOLIC PANEL: CPT

## 2019-01-26 PROCEDURE — 2500000003 HC RX 250 WO HCPCS

## 2019-01-26 PROCEDURE — 84100 ASSAY OF PHOSPHORUS: CPT

## 2019-01-26 PROCEDURE — 80048 BASIC METABOLIC PNL TOTAL CA: CPT

## 2019-01-26 PROCEDURE — 1200000000 HC SEMI PRIVATE

## 2019-01-26 PROCEDURE — 6360000002 HC RX W HCPCS

## 2019-01-26 PROCEDURE — 6370000000 HC RX 637 (ALT 250 FOR IP)

## 2019-01-26 PROCEDURE — 2580000003 HC RX 258

## 2019-01-26 PROCEDURE — 36415 COLL VENOUS BLD VENIPUNCTURE: CPT

## 2019-01-26 PROCEDURE — 83735 ASSAY OF MAGNESIUM: CPT

## 2019-01-26 PROCEDURE — 85027 COMPLETE CBC AUTOMATED: CPT

## 2019-01-26 RX ADMIN — FLUTICASONE PROPIONATE 2 SPRAY: 50 SPRAY, METERED NASAL at 09:21

## 2019-01-26 RX ADMIN — DOCUSATE SODIUM 100 MG: 100 CAPSULE, LIQUID FILLED ORAL at 21:36

## 2019-01-26 RX ADMIN — POTASSIUM CHLORIDE: 2 INJECTION, SOLUTION, CONCENTRATE INTRAVENOUS at 09:21

## 2019-01-26 RX ADMIN — DOCUSATE SODIUM 100 MG: 100 CAPSULE, LIQUID FILLED ORAL at 09:15

## 2019-01-26 RX ADMIN — HEPARIN SODIUM 5000 UNITS: 5000 INJECTION INTRAVENOUS; SUBCUTANEOUS at 06:01

## 2019-01-26 RX ADMIN — SENNA 8.6 MG: 8.6 TABLET, COATED ORAL at 21:36

## 2019-01-26 RX ADMIN — ATORVASTATIN CALCIUM 40 MG: 40 TABLET, FILM COATED ORAL at 09:15

## 2019-01-26 RX ADMIN — PANTOPRAZOLE SODIUM 40 MG: 40 TABLET, DELAYED RELEASE ORAL at 06:05

## 2019-01-26 RX ADMIN — HEPARIN SODIUM 5000 UNITS: 5000 INJECTION INTRAVENOUS; SUBCUTANEOUS at 14:03

## 2019-01-26 RX ADMIN — Medication 400 MG: at 09:15

## 2019-01-26 RX ADMIN — LEVOTHYROXINE SODIUM 125 MCG: 125 TABLET ORAL at 06:05

## 2019-01-26 RX ADMIN — Medication 400 MG: at 21:36

## 2019-01-26 RX ADMIN — HEPARIN SODIUM 5000 UNITS: 5000 INJECTION INTRAVENOUS; SUBCUTANEOUS at 21:36

## 2019-01-26 ASSESSMENT — ENCOUNTER SYMPTOMS
ABDOMINAL PAIN: 0
EYE PAIN: 0
SHORTNESS OF BREATH: 0
COUGH: 0
BACK PAIN: 1
SINUS PRESSURE: 0
WHEEZING: 0
SORE THROAT: 0
EYE DISCHARGE: 0
DIARRHEA: 0
NAUSEA: 1
VOMITING: 0
CONSTIPATION: 0

## 2019-01-26 ASSESSMENT — PAIN SCALES - GENERAL
PAINLEVEL_OUTOF10: 0

## 2019-01-27 LAB
ANION GAP SERPL CALCULATED.3IONS-SCNC: 13 MMOL/L (ref 7–16)
ANION GAP SERPL CALCULATED.3IONS-SCNC: 14 MMOL/L (ref 7–16)
ANION GAP SERPL CALCULATED.3IONS-SCNC: 15 MMOL/L (ref 7–16)
ANION GAP SERPL CALCULATED.3IONS-SCNC: 16 MMOL/L (ref 7–16)
BUN BLDV-MCNC: 23 MG/DL (ref 6–20)
BUN BLDV-MCNC: 25 MG/DL (ref 6–20)
BUN BLDV-MCNC: 26 MG/DL (ref 6–20)
BUN BLDV-MCNC: 27 MG/DL (ref 6–20)
CALCIUM SERPL-MCNC: 7.5 MG/DL (ref 8.6–10.2)
CALCIUM SERPL-MCNC: 7.6 MG/DL (ref 8.6–10.2)
CALCIUM SERPL-MCNC: 7.6 MG/DL (ref 8.6–10.2)
CALCIUM SERPL-MCNC: 7.7 MG/DL (ref 8.6–10.2)
CHLORIDE BLD-SCNC: 102 MMOL/L (ref 98–107)
CHLORIDE BLD-SCNC: 99 MMOL/L (ref 98–107)
CO2: 20 MMOL/L (ref 22–29)
CO2: 21 MMOL/L (ref 22–29)
CO2: 22 MMOL/L (ref 22–29)
CO2: 23 MMOL/L (ref 22–29)
CREAT SERPL-MCNC: 2.3 MG/DL (ref 0.5–1)
CREAT SERPL-MCNC: 2.4 MG/DL (ref 0.5–1)
CREAT SERPL-MCNC: 2.4 MG/DL (ref 0.5–1)
CREAT SERPL-MCNC: 2.5 MG/DL (ref 0.5–1)
GFR AFRICAN AMERICAN: 24
GFR AFRICAN AMERICAN: 25
GFR AFRICAN AMERICAN: 25
GFR AFRICAN AMERICAN: 27
GFR NON-AFRICAN AMERICAN: 20 ML/MIN/1.73
GFR NON-AFRICAN AMERICAN: 21 ML/MIN/1.73
GFR NON-AFRICAN AMERICAN: 21 ML/MIN/1.73
GFR NON-AFRICAN AMERICAN: 22 ML/MIN/1.73
GLUCOSE BLD-MCNC: 113 MG/DL (ref 74–99)
GLUCOSE BLD-MCNC: 132 MG/DL (ref 74–99)
GLUCOSE BLD-MCNC: 162 MG/DL (ref 74–99)
GLUCOSE BLD-MCNC: 195 MG/DL (ref 74–99)
HCT VFR BLD CALC: 25.2 % (ref 34–48)
HEMOGLOBIN: 8 G/DL (ref 11.5–15.5)
MAGNESIUM: 1.4 MG/DL (ref 1.6–2.6)
MCH RBC QN AUTO: 29.6 PG (ref 26–35)
MCHC RBC AUTO-ENTMCNC: 31.7 % (ref 32–34.5)
MCV RBC AUTO: 93.3 FL (ref 80–99.9)
PDW BLD-RTO: 13.4 FL (ref 11.5–15)
PHOSPHORUS: 2.1 MG/DL (ref 2.5–4.5)
PLATELET # BLD: 233 E9/L (ref 130–450)
PMV BLD AUTO: 11.5 FL (ref 7–12)
POTASSIUM SERPL-SCNC: 3.5 MMOL/L (ref 3.5–5)
POTASSIUM SERPL-SCNC: 3.7 MMOL/L (ref 3.5–5)
POTASSIUM SERPL-SCNC: 3.9 MMOL/L (ref 3.5–5)
POTASSIUM SERPL-SCNC: 4 MMOL/L (ref 3.5–5)
RBC # BLD: 2.7 E12/L (ref 3.5–5.5)
SODIUM BLD-SCNC: 135 MMOL/L (ref 132–146)
SODIUM BLD-SCNC: 138 MMOL/L (ref 132–146)
WBC # BLD: 9 E9/L (ref 4.5–11.5)

## 2019-01-27 PROCEDURE — 2580000003 HC RX 258

## 2019-01-27 PROCEDURE — 1200000000 HC SEMI PRIVATE

## 2019-01-27 PROCEDURE — 97530 THERAPEUTIC ACTIVITIES: CPT

## 2019-01-27 PROCEDURE — 6360000002 HC RX W HCPCS: Performed by: INTERNAL MEDICINE

## 2019-01-27 PROCEDURE — 97116 GAIT TRAINING THERAPY: CPT

## 2019-01-27 PROCEDURE — 84100 ASSAY OF PHOSPHORUS: CPT

## 2019-01-27 PROCEDURE — 2500000003 HC RX 250 WO HCPCS

## 2019-01-27 PROCEDURE — 80048 BASIC METABOLIC PNL TOTAL CA: CPT

## 2019-01-27 PROCEDURE — 6370000000 HC RX 637 (ALT 250 FOR IP): Performed by: INTERNAL MEDICINE

## 2019-01-27 PROCEDURE — 83735 ASSAY OF MAGNESIUM: CPT

## 2019-01-27 PROCEDURE — 6370000000 HC RX 637 (ALT 250 FOR IP)

## 2019-01-27 PROCEDURE — 85027 COMPLETE CBC AUTOMATED: CPT

## 2019-01-27 PROCEDURE — 36415 COLL VENOUS BLD VENIPUNCTURE: CPT

## 2019-01-27 PROCEDURE — 6360000002 HC RX W HCPCS

## 2019-01-27 PROCEDURE — 6370000000 HC RX 637 (ALT 250 FOR IP): Performed by: FAMILY MEDICINE

## 2019-01-27 RX ADMIN — DOCUSATE SODIUM 100 MG: 100 CAPSULE, LIQUID FILLED ORAL at 21:19

## 2019-01-27 RX ADMIN — HEPARIN SODIUM 5000 UNITS: 5000 INJECTION INTRAVENOUS; SUBCUTANEOUS at 06:30

## 2019-01-27 RX ADMIN — HEPARIN SODIUM 5000 UNITS: 5000 INJECTION INTRAVENOUS; SUBCUTANEOUS at 14:08

## 2019-01-27 RX ADMIN — SENNA 8.6 MG: 8.6 TABLET, COATED ORAL at 21:19

## 2019-01-27 RX ADMIN — Medication 400 MG: at 21:19

## 2019-01-27 RX ADMIN — HEPARIN SODIUM 5000 UNITS: 5000 INJECTION INTRAVENOUS; SUBCUTANEOUS at 21:19

## 2019-01-27 RX ADMIN — LEVOTHYROXINE SODIUM 125 MCG: 125 TABLET ORAL at 06:57

## 2019-01-27 RX ADMIN — PANTOPRAZOLE SODIUM 40 MG: 40 TABLET, DELAYED RELEASE ORAL at 06:57

## 2019-01-27 RX ADMIN — Medication 400 MG: at 09:42

## 2019-01-27 RX ADMIN — ATORVASTATIN CALCIUM 40 MG: 40 TABLET, FILM COATED ORAL at 09:42

## 2019-01-27 RX ADMIN — POTASSIUM CHLORIDE: 2 INJECTION, SOLUTION, CONCENTRATE INTRAVENOUS at 06:51

## 2019-01-27 RX ADMIN — FLUTICASONE PROPIONATE 2 SPRAY: 50 SPRAY, METERED NASAL at 09:42

## 2019-01-27 RX ADMIN — DOCUSATE SODIUM 100 MG: 100 CAPSULE, LIQUID FILLED ORAL at 09:42

## 2019-01-27 ASSESSMENT — PAIN SCALES - GENERAL
PAINLEVEL_OUTOF10: 0

## 2019-01-27 ASSESSMENT — ENCOUNTER SYMPTOMS
SORE THROAT: 0
WHEEZING: 0
DIARRHEA: 0
SINUS PRESSURE: 0
ABDOMINAL PAIN: 0
BACK PAIN: 1
VOMITING: 0
NAUSEA: 1
SHORTNESS OF BREATH: 0
EYE DISCHARGE: 0
COUGH: 0
EYE PAIN: 0
CONSTIPATION: 0

## 2019-01-28 DIAGNOSIS — E03.9 ACQUIRED HYPOTHYROIDISM: ICD-10-CM

## 2019-01-28 DIAGNOSIS — E55.9 VITAMIN D DEFICIENCY: ICD-10-CM

## 2019-01-28 LAB
ALBUMIN SERPL-MCNC: 3.4 G/DL (ref 3.5–5.2)
ALP BLD-CCNC: 31 U/L (ref 35–104)
ALT SERPL-CCNC: 16 U/L (ref 0–32)
ANION GAP SERPL CALCULATED.3IONS-SCNC: 13 MMOL/L (ref 7–16)
ANION GAP SERPL CALCULATED.3IONS-SCNC: 14 MMOL/L (ref 7–16)
ANION GAP SERPL CALCULATED.3IONS-SCNC: 15 MMOL/L (ref 7–16)
ANION GAP SERPL CALCULATED.3IONS-SCNC: 19 MMOL/L (ref 7–16)
AST SERPL-CCNC: 20 U/L (ref 0–31)
BILIRUB SERPL-MCNC: 0.2 MG/DL (ref 0–1.2)
BUN BLDV-MCNC: 23 MG/DL (ref 6–20)
BUN BLDV-MCNC: 26 MG/DL (ref 6–20)
BUN BLDV-MCNC: 27 MG/DL (ref 6–20)
BUN BLDV-MCNC: 30 MG/DL (ref 6–20)
CALCIUM SERPL-MCNC: 7.5 MG/DL (ref 8.6–10.2)
CALCIUM SERPL-MCNC: 7.5 MG/DL (ref 8.6–10.2)
CALCIUM SERPL-MCNC: 7.7 MG/DL (ref 8.6–10.2)
CALCIUM SERPL-MCNC: 7.9 MG/DL (ref 8.6–10.2)
CHLORIDE BLD-SCNC: 100 MMOL/L (ref 98–107)
CHLORIDE BLD-SCNC: 97 MMOL/L (ref 98–107)
CHLORIDE BLD-SCNC: 98 MMOL/L (ref 98–107)
CHLORIDE BLD-SCNC: 99 MMOL/L (ref 98–107)
CO2: 18 MMOL/L (ref 22–29)
CO2: 22 MMOL/L (ref 22–29)
CO2: 23 MMOL/L (ref 22–29)
CO2: 24 MMOL/L (ref 22–29)
CREAT SERPL-MCNC: 2.1 MG/DL (ref 0.5–1)
CREAT SERPL-MCNC: 2.3 MG/DL (ref 0.5–1)
GFR AFRICAN AMERICAN: 27
GFR AFRICAN AMERICAN: 30
GFR NON-AFRICAN AMERICAN: 22 ML/MIN/1.73
GFR NON-AFRICAN AMERICAN: 24 ML/MIN/1.73
GLUCOSE BLD-MCNC: 110 MG/DL (ref 74–99)
GLUCOSE BLD-MCNC: 126 MG/DL (ref 74–99)
GLUCOSE BLD-MCNC: 143 MG/DL (ref 74–99)
GLUCOSE BLD-MCNC: 202 MG/DL (ref 74–99)
HCT VFR BLD CALC: 26.1 % (ref 34–48)
HEMOGLOBIN: 8.1 G/DL (ref 11.5–15.5)
MAGNESIUM: 1.3 MG/DL (ref 1.6–2.6)
MCH RBC QN AUTO: 29.1 PG (ref 26–35)
MCHC RBC AUTO-ENTMCNC: 31 % (ref 32–34.5)
MCV RBC AUTO: 93.9 FL (ref 80–99.9)
PDW BLD-RTO: 13.3 FL (ref 11.5–15)
PHOSPHORUS: 2.1 MG/DL (ref 2.5–4.5)
PLATELET # BLD: 259 E9/L (ref 130–450)
PMV BLD AUTO: 11.5 FL (ref 7–12)
POTASSIUM SERPL-SCNC: 3.7 MMOL/L (ref 3.5–5)
POTASSIUM SERPL-SCNC: 4 MMOL/L (ref 3.5–5)
POTASSIUM SERPL-SCNC: 4 MMOL/L (ref 3.5–5)
POTASSIUM SERPL-SCNC: 6.4 MMOL/L (ref 3.5–5)
RBC # BLD: 2.78 E12/L (ref 3.5–5.5)
SODIUM BLD-SCNC: 134 MMOL/L (ref 132–146)
SODIUM BLD-SCNC: 135 MMOL/L (ref 132–146)
SODIUM BLD-SCNC: 136 MMOL/L (ref 132–146)
SODIUM BLD-SCNC: 137 MMOL/L (ref 132–146)
TOTAL PROTEIN: 6.6 G/DL (ref 6.4–8.3)
WBC # BLD: 10.1 E9/L (ref 4.5–11.5)

## 2019-01-28 PROCEDURE — 80048 BASIC METABOLIC PNL TOTAL CA: CPT

## 2019-01-28 PROCEDURE — 85027 COMPLETE CBC AUTOMATED: CPT

## 2019-01-28 PROCEDURE — 6370000000 HC RX 637 (ALT 250 FOR IP)

## 2019-01-28 PROCEDURE — 6370000000 HC RX 637 (ALT 250 FOR IP): Performed by: INTERNAL MEDICINE

## 2019-01-28 PROCEDURE — 84100 ASSAY OF PHOSPHORUS: CPT

## 2019-01-28 PROCEDURE — 80053 COMPREHEN METABOLIC PANEL: CPT

## 2019-01-28 PROCEDURE — 97116 GAIT TRAINING THERAPY: CPT

## 2019-01-28 PROCEDURE — 36415 COLL VENOUS BLD VENIPUNCTURE: CPT

## 2019-01-28 PROCEDURE — 1200000000 HC SEMI PRIVATE

## 2019-01-28 PROCEDURE — 6370000000 HC RX 637 (ALT 250 FOR IP): Performed by: FAMILY MEDICINE

## 2019-01-28 PROCEDURE — 2580000003 HC RX 258

## 2019-01-28 PROCEDURE — 6360000002 HC RX W HCPCS

## 2019-01-28 PROCEDURE — 97530 THERAPEUTIC ACTIVITIES: CPT

## 2019-01-28 PROCEDURE — 97535 SELF CARE MNGMENT TRAINING: CPT

## 2019-01-28 PROCEDURE — 6360000002 HC RX W HCPCS: Performed by: FAMILY MEDICINE

## 2019-01-28 PROCEDURE — 2500000003 HC RX 250 WO HCPCS

## 2019-01-28 PROCEDURE — 83735 ASSAY OF MAGNESIUM: CPT

## 2019-01-28 PROCEDURE — 6360000002 HC RX W HCPCS: Performed by: INTERNAL MEDICINE

## 2019-01-28 RX ORDER — PSEUDOEPHEDRINE HCL 30 MG
100 TABLET ORAL 2 TIMES DAILY
DISCHARGE
Start: 2019-01-28 | End: 2019-01-29 | Stop reason: HOSPADM

## 2019-01-28 RX ADMIN — HEPARIN SODIUM 5000 UNITS: 5000 INJECTION INTRAVENOUS; SUBCUTANEOUS at 06:43

## 2019-01-28 RX ADMIN — SENNA 8.6 MG: 8.6 TABLET, COATED ORAL at 20:04

## 2019-01-28 RX ADMIN — ATORVASTATIN CALCIUM 40 MG: 40 TABLET, FILM COATED ORAL at 09:25

## 2019-01-28 RX ADMIN — FLUTICASONE PROPIONATE 2 SPRAY: 50 SPRAY, METERED NASAL at 09:39

## 2019-01-28 RX ADMIN — LORAZEPAM 0.5 MG: 2 INJECTION INTRAMUSCULAR; INTRAVENOUS at 20:06

## 2019-01-28 RX ADMIN — Medication 400 MG: at 09:25

## 2019-01-28 RX ADMIN — PANTOPRAZOLE SODIUM 40 MG: 40 TABLET, DELAYED RELEASE ORAL at 06:44

## 2019-01-28 RX ADMIN — LEVOTHYROXINE SODIUM 125 MCG: 125 TABLET ORAL at 06:44

## 2019-01-28 RX ADMIN — POTASSIUM CHLORIDE: 2 INJECTION, SOLUTION, CONCENTRATE INTRAVENOUS at 06:44

## 2019-01-28 RX ADMIN — DOCUSATE SODIUM 100 MG: 100 CAPSULE, LIQUID FILLED ORAL at 20:05

## 2019-01-28 RX ADMIN — HEPARIN SODIUM 5000 UNITS: 5000 INJECTION INTRAVENOUS; SUBCUTANEOUS at 20:06

## 2019-01-28 ASSESSMENT — PAIN SCALES - GENERAL
PAINLEVEL_OUTOF10: 0

## 2019-01-29 ENCOUNTER — TELEPHONE (OUTPATIENT)
Dept: ADMINISTRATIVE | Age: 54
End: 2019-01-29

## 2019-01-29 VITALS
RESPIRATION RATE: 16 BRPM | HEIGHT: 59 IN | TEMPERATURE: 99.2 F | OXYGEN SATURATION: 98 % | WEIGHT: 145 LBS | BODY MASS INDEX: 29.23 KG/M2 | HEART RATE: 89 BPM | DIASTOLIC BLOOD PRESSURE: 70 MMHG | SYSTOLIC BLOOD PRESSURE: 122 MMHG

## 2019-01-29 LAB
ANION GAP SERPL CALCULATED.3IONS-SCNC: 17 MMOL/L (ref 7–16)
ANION GAP SERPL CALCULATED.3IONS-SCNC: 19 MMOL/L (ref 7–16)
BUN BLDV-MCNC: 27 MG/DL (ref 6–20)
BUN BLDV-MCNC: 29 MG/DL (ref 6–20)
CALCIUM SERPL-MCNC: 7.7 MG/DL (ref 8.6–10.2)
CALCIUM SERPL-MCNC: 8.4 MG/DL (ref 8.6–10.2)
CHLORIDE BLD-SCNC: 97 MMOL/L (ref 98–107)
CHLORIDE BLD-SCNC: 98 MMOL/L (ref 98–107)
CO2: 18 MMOL/L (ref 22–29)
CO2: 20 MMOL/L (ref 22–29)
CREAT SERPL-MCNC: 2.2 MG/DL (ref 0.5–1)
CREAT SERPL-MCNC: 2.3 MG/DL (ref 0.5–1)
GFR AFRICAN AMERICAN: 27
GFR AFRICAN AMERICAN: 28
GFR NON-AFRICAN AMERICAN: 22 ML/MIN/1.73
GFR NON-AFRICAN AMERICAN: 23 ML/MIN/1.73
GLUCOSE BLD-MCNC: 123 MG/DL (ref 74–99)
GLUCOSE BLD-MCNC: 147 MG/DL (ref 74–99)
HCT VFR BLD CALC: 28.6 % (ref 34–48)
HEMOGLOBIN: 9.2 G/DL (ref 11.5–15.5)
MAGNESIUM: 1.2 MG/DL (ref 1.6–2.6)
MCH RBC QN AUTO: 29.6 PG (ref 26–35)
MCHC RBC AUTO-ENTMCNC: 32.2 % (ref 32–34.5)
MCV RBC AUTO: 92 FL (ref 80–99.9)
PDW BLD-RTO: 13.4 FL (ref 11.5–15)
PHOSPHORUS: 2.4 MG/DL (ref 2.5–4.5)
PLATELET # BLD: 296 E9/L (ref 130–450)
PMV BLD AUTO: 12.3 FL (ref 7–12)
POTASSIUM SERPL-SCNC: 4.4 MMOL/L (ref 3.5–5)
POTASSIUM SERPL-SCNC: 4.6 MMOL/L (ref 3.5–5)
RBC # BLD: 3.11 E12/L (ref 3.5–5.5)
SODIUM BLD-SCNC: 134 MMOL/L (ref 132–146)
SODIUM BLD-SCNC: 135 MMOL/L (ref 132–146)
WBC # BLD: 11.8 E9/L (ref 4.5–11.5)

## 2019-01-29 PROCEDURE — 97116 GAIT TRAINING THERAPY: CPT

## 2019-01-29 PROCEDURE — 80048 BASIC METABOLIC PNL TOTAL CA: CPT

## 2019-01-29 PROCEDURE — 6370000000 HC RX 637 (ALT 250 FOR IP): Performed by: INTERNAL MEDICINE

## 2019-01-29 PROCEDURE — 6360000002 HC RX W HCPCS: Performed by: INTERNAL MEDICINE

## 2019-01-29 PROCEDURE — 84100 ASSAY OF PHOSPHORUS: CPT

## 2019-01-29 PROCEDURE — 6370000000 HC RX 637 (ALT 250 FOR IP)

## 2019-01-29 PROCEDURE — 85027 COMPLETE CBC AUTOMATED: CPT

## 2019-01-29 PROCEDURE — 83735 ASSAY OF MAGNESIUM: CPT

## 2019-01-29 PROCEDURE — 36415 COLL VENOUS BLD VENIPUNCTURE: CPT

## 2019-01-29 RX ORDER — LOPERAMIDE HYDROCHLORIDE 2 MG/1
2 CAPSULE ORAL 4 TIMES DAILY PRN
Status: DISCONTINUED | OUTPATIENT
Start: 2019-01-29 | End: 2019-01-30 | Stop reason: HOSPADM

## 2019-01-29 RX ORDER — SODIUM BICARBONATE 650 MG/1
650 TABLET ORAL 4 TIMES DAILY
Qty: 120 TABLET | Refills: 0 | DISCHARGE
Start: 2019-01-29 | End: 2019-02-28

## 2019-01-29 RX ORDER — SODIUM BICARBONATE 650 MG/1
650 TABLET ORAL 4 TIMES DAILY
Status: DISCONTINUED | OUTPATIENT
Start: 2019-01-29 | End: 2019-01-30 | Stop reason: HOSPADM

## 2019-01-29 RX ORDER — LOPERAMIDE HYDROCHLORIDE 2 MG/1
2 CAPSULE ORAL 4 TIMES DAILY PRN
DISCHARGE
Start: 2019-01-29 | End: 2019-02-08

## 2019-01-29 RX ADMIN — ATORVASTATIN CALCIUM 40 MG: 40 TABLET, FILM COATED ORAL at 10:17

## 2019-01-29 RX ADMIN — HEPARIN SODIUM 5000 UNITS: 5000 INJECTION INTRAVENOUS; SUBCUTANEOUS at 06:30

## 2019-01-29 RX ADMIN — SODIUM BICARBONATE 650 MG TABLET 650 MG: at 10:17

## 2019-01-29 RX ADMIN — HEPARIN SODIUM 5000 UNITS: 5000 INJECTION INTRAVENOUS; SUBCUTANEOUS at 14:12

## 2019-01-29 RX ADMIN — SODIUM BICARBONATE 650 MG TABLET 650 MG: at 14:16

## 2019-01-29 RX ADMIN — Medication 400 MG: at 10:17

## 2019-01-29 RX ADMIN — FLUTICASONE PROPIONATE 2 SPRAY: 50 SPRAY, METERED NASAL at 10:17

## 2019-01-29 RX ADMIN — SODIUM BICARBONATE 650 MG TABLET 650 MG: at 16:11

## 2019-01-29 RX ADMIN — LEVOTHYROXINE SODIUM 125 MCG: 125 TABLET ORAL at 06:30

## 2019-01-29 RX ADMIN — PANTOPRAZOLE SODIUM 40 MG: 40 TABLET, DELAYED RELEASE ORAL at 06:30

## 2019-01-29 NOTE — TELEPHONE ENCOUNTER
Pt missed appt 1/28/19 due to being hospitalized, now she will be going to [facility], they will call to reschedule appt.

## 2019-02-05 RX ORDER — ERGOCALCIFEROL 1.25 MG/1
50000 CAPSULE ORAL WEEKLY
Qty: 4 CAPSULE | Refills: 0 | Status: SHIPPED | OUTPATIENT
Start: 2019-02-05 | End: 2019-02-26 | Stop reason: SDUPTHER

## 2019-02-05 RX ORDER — LEVOTHYROXINE SODIUM 137 UG/1
TABLET ORAL
Qty: 30 TABLET | Refills: 0 | Status: SHIPPED | OUTPATIENT
Start: 2019-02-05 | End: 2019-03-19 | Stop reason: SDUPTHER

## 2019-02-21 DIAGNOSIS — J30.2 SEASONAL ALLERGIC RHINITIS, UNSPECIFIED TRIGGER: ICD-10-CM

## 2019-02-21 DIAGNOSIS — E03.9 ACQUIRED HYPOTHYROIDISM: ICD-10-CM

## 2019-02-21 DIAGNOSIS — E55.9 VITAMIN D DEFICIENCY: ICD-10-CM

## 2019-02-26 ENCOUNTER — OFFICE VISIT (OUTPATIENT)
Dept: FAMILY MEDICINE CLINIC | Age: 54
End: 2019-02-26
Payer: COMMERCIAL

## 2019-02-26 VITALS
OXYGEN SATURATION: 98 % | HEART RATE: 64 BPM | BODY MASS INDEX: 27.01 KG/M2 | WEIGHT: 134 LBS | HEIGHT: 59 IN | DIASTOLIC BLOOD PRESSURE: 64 MMHG | SYSTOLIC BLOOD PRESSURE: 112 MMHG

## 2019-02-26 DIAGNOSIS — E03.9 HYPOTHYROIDISM, UNSPECIFIED TYPE: ICD-10-CM

## 2019-02-26 DIAGNOSIS — Z23 NEED FOR INFLUENZA VACCINATION: ICD-10-CM

## 2019-02-26 DIAGNOSIS — Z96.0 S/P URETERAL STENT PLACEMENT: ICD-10-CM

## 2019-02-26 DIAGNOSIS — F41.9 ANXIETY: ICD-10-CM

## 2019-02-26 DIAGNOSIS — E78.2 MIXED HYPERLIPIDEMIA: ICD-10-CM

## 2019-02-26 DIAGNOSIS — F79 INTELLECTUAL DISABILITY: ICD-10-CM

## 2019-02-26 DIAGNOSIS — Z23 NEEDS FLU SHOT: ICD-10-CM

## 2019-02-26 DIAGNOSIS — Z23 NEED FOR PNEUMOCOCCAL VACCINATION: ICD-10-CM

## 2019-02-26 DIAGNOSIS — N17.9 ACUTE KIDNEY INJURY SUPERIMPOSED ON CHRONIC KIDNEY DISEASE (HCC): ICD-10-CM

## 2019-02-26 DIAGNOSIS — N13.9 OBSTRUCTIVE UROPATHY: ICD-10-CM

## 2019-02-26 DIAGNOSIS — Z00.00 PREVENTATIVE HEALTH CARE: ICD-10-CM

## 2019-02-26 DIAGNOSIS — Z76.89 ENCOUNTER TO ESTABLISH CARE: Primary | ICD-10-CM

## 2019-02-26 DIAGNOSIS — E55.9 VITAMIN D DEFICIENCY: ICD-10-CM

## 2019-02-26 DIAGNOSIS — N18.9 ACUTE KIDNEY INJURY SUPERIMPOSED ON CHRONIC KIDNEY DISEASE (HCC): ICD-10-CM

## 2019-02-26 DIAGNOSIS — J30.2 SEASONAL ALLERGIC RHINITIS, UNSPECIFIED TRIGGER: ICD-10-CM

## 2019-02-26 PROCEDURE — 90471 IMMUNIZATION ADMIN: CPT | Performed by: FAMILY MEDICINE

## 2019-02-26 PROCEDURE — 1036F TOBACCO NON-USER: CPT | Performed by: FAMILY MEDICINE

## 2019-02-26 PROCEDURE — G8417 CALC BMI ABV UP PARAM F/U: HCPCS | Performed by: FAMILY MEDICINE

## 2019-02-26 PROCEDURE — G8427 DOCREV CUR MEDS BY ELIG CLIN: HCPCS | Performed by: FAMILY MEDICINE

## 2019-02-26 PROCEDURE — G8482 FLU IMMUNIZE ORDER/ADMIN: HCPCS | Performed by: FAMILY MEDICINE

## 2019-02-26 PROCEDURE — 90472 IMMUNIZATION ADMIN EACH ADD: CPT | Performed by: FAMILY MEDICINE

## 2019-02-26 PROCEDURE — 90756 CCIIV4 VACC ABX FREE IM: CPT | Performed by: FAMILY MEDICINE

## 2019-02-26 PROCEDURE — 3017F COLORECTAL CA SCREEN DOC REV: CPT | Performed by: FAMILY MEDICINE

## 2019-02-26 PROCEDURE — 90732 PPSV23 VACC 2 YRS+ SUBQ/IM: CPT | Performed by: FAMILY MEDICINE

## 2019-02-26 PROCEDURE — 99214 OFFICE O/P EST MOD 30 MIN: CPT | Performed by: FAMILY MEDICINE

## 2019-02-26 RX ORDER — ERGOCALCIFEROL 1.25 MG/1
50000 CAPSULE ORAL WEEKLY
Qty: 3 CAPSULE | OUTPATIENT
Start: 2019-02-26

## 2019-02-26 RX ORDER — ERGOCALCIFEROL 1.25 MG/1
50000 CAPSULE ORAL WEEKLY
Qty: 4 CAPSULE | Refills: 1 | Status: SHIPPED | OUTPATIENT
Start: 2019-02-26 | End: 2019-03-19 | Stop reason: SDUPTHER

## 2019-02-26 RX ORDER — ATORVASTATIN CALCIUM 40 MG/1
40 TABLET, FILM COATED ORAL DAILY
Qty: 30 TABLET | Refills: 3 | Status: SHIPPED | OUTPATIENT
Start: 2019-02-26 | End: 2019-03-19 | Stop reason: SDUPTHER

## 2019-02-26 RX ORDER — FLUTICASONE PROPIONATE 50 MCG
2 SPRAY, SUSPENSION (ML) NASAL DAILY
Qty: 1 BOTTLE | Refills: 3 | Status: SHIPPED | OUTPATIENT
Start: 2019-02-26 | End: 2019-04-09 | Stop reason: SDUPTHER

## 2019-02-26 RX ORDER — LEVOTHYROXINE SODIUM 137 UG/1
TABLET ORAL
Qty: 21 TABLET | OUTPATIENT
Start: 2019-02-26

## 2019-02-26 RX ORDER — FLUTICASONE PROPIONATE 50 MCG
SPRAY, SUSPENSION (ML) NASAL
Qty: 16 G | Refills: 0 | OUTPATIENT
Start: 2019-02-26

## 2019-02-28 PROBLEM — F41.9 ANXIETY: Status: ACTIVE | Noted: 2019-02-28

## 2019-02-28 PROBLEM — N13.9 OBSTRUCTIVE UROPATHY: Status: ACTIVE | Noted: 2019-02-28

## 2019-02-28 PROBLEM — Z23 NEED FOR INFLUENZA VACCINATION: Status: RESOLVED | Noted: 2019-02-26 | Resolved: 2019-02-28

## 2019-02-28 PROBLEM — F79 INTELLECTUAL DISABILITY: Status: ACTIVE | Noted: 2019-02-28

## 2019-02-28 PROBLEM — K92.0 HEMATEMESIS WITH NAUSEA: Status: RESOLVED | Noted: 2019-01-17 | Resolved: 2019-02-28

## 2019-02-28 ASSESSMENT — ENCOUNTER SYMPTOMS
ABDOMINAL PAIN: 1
VOMITING: 0
NAUSEA: 0
SHORTNESS OF BREATH: 0
COUGH: 0

## 2019-03-19 DIAGNOSIS — E78.2 MIXED HYPERLIPIDEMIA: ICD-10-CM

## 2019-03-19 DIAGNOSIS — E03.9 ACQUIRED HYPOTHYROIDISM: ICD-10-CM

## 2019-03-19 DIAGNOSIS — N39.46 MIXED STRESS AND URGE URINARY INCONTINENCE: ICD-10-CM

## 2019-03-19 DIAGNOSIS — E55.9 VITAMIN D DEFICIENCY: ICD-10-CM

## 2019-03-19 RX ORDER — ERGOCALCIFEROL 1.25 MG/1
50000 CAPSULE ORAL WEEKLY
Qty: 4 CAPSULE | Refills: 5 | Status: SHIPPED | OUTPATIENT
Start: 2019-03-19 | End: 2019-04-09 | Stop reason: SDUPTHER

## 2019-03-19 RX ORDER — OXYBUTYNIN CHLORIDE 5 MG/1
5 TABLET, EXTENDED RELEASE ORAL DAILY
Qty: 30 TABLET | Refills: 0 | Status: SHIPPED | OUTPATIENT
Start: 2019-03-19 | End: 2019-04-09 | Stop reason: SDUPTHER

## 2019-03-19 RX ORDER — ATORVASTATIN CALCIUM 40 MG/1
40 TABLET, FILM COATED ORAL DAILY
Qty: 30 TABLET | Refills: 5 | Status: SHIPPED | OUTPATIENT
Start: 2019-03-19 | End: 2019-04-09 | Stop reason: SDUPTHER

## 2019-03-19 RX ORDER — LEVOTHYROXINE SODIUM 137 UG/1
TABLET ORAL
Qty: 30 TABLET | Refills: 0 | Status: SHIPPED | OUTPATIENT
Start: 2019-03-19 | End: 2019-04-10 | Stop reason: DRUGHIGH

## 2019-03-19 RX ORDER — LEVOTHYROXINE SODIUM 112 UG/1
TABLET ORAL
Refills: 0 | COMMUNITY
Start: 2019-02-15 | End: 2019-03-19

## 2019-03-19 RX ORDER — LEVOTHYROXINE SODIUM 0.03 MG/1
TABLET ORAL
Refills: 0 | COMMUNITY
Start: 2019-02-15 | End: 2019-03-19

## 2019-03-19 RX ORDER — PANTOPRAZOLE SODIUM 40 MG/1
TABLET, DELAYED RELEASE ORAL
Refills: 0 | COMMUNITY
Start: 2019-02-15 | End: 2019-04-09 | Stop reason: SDUPTHER

## 2019-04-09 ENCOUNTER — HOSPITAL ENCOUNTER (OUTPATIENT)
Age: 54
Discharge: HOME | End: 2019-04-11
Payer: COMMERCIAL

## 2019-04-09 ENCOUNTER — OFFICE VISIT (OUTPATIENT)
Dept: FAMILY MEDICINE CLINIC | Age: 54
End: 2019-04-09
Payer: COMMERCIAL

## 2019-04-09 VITALS
RESPIRATION RATE: 18 BRPM | TEMPERATURE: 98.6 F | OXYGEN SATURATION: 98 % | WEIGHT: 128 LBS | HEIGHT: 59 IN | DIASTOLIC BLOOD PRESSURE: 74 MMHG | BODY MASS INDEX: 25.8 KG/M2 | HEART RATE: 83 BPM | SYSTOLIC BLOOD PRESSURE: 128 MMHG

## 2019-04-09 DIAGNOSIS — E78.2 MIXED HYPERLIPIDEMIA: ICD-10-CM

## 2019-04-09 DIAGNOSIS — N39.46 MIXED STRESS AND URGE URINARY INCONTINENCE: ICD-10-CM

## 2019-04-09 DIAGNOSIS — Z11.4 ENCOUNTER FOR SCREENING FOR HIV: ICD-10-CM

## 2019-04-09 DIAGNOSIS — Z11.59 NEED FOR HEPATITIS C SCREENING TEST: ICD-10-CM

## 2019-04-09 DIAGNOSIS — E03.9 HYPOTHYROIDISM, UNSPECIFIED TYPE: ICD-10-CM

## 2019-04-09 DIAGNOSIS — E55.9 VITAMIN D DEFICIENCY: ICD-10-CM

## 2019-04-09 DIAGNOSIS — Z00.00 PREVENTATIVE HEALTH CARE: ICD-10-CM

## 2019-04-09 DIAGNOSIS — F79 INTELLECTUAL DISABILITY: ICD-10-CM

## 2019-04-09 DIAGNOSIS — J30.2 SEASONAL ALLERGIC RHINITIS, UNSPECIFIED TRIGGER: ICD-10-CM

## 2019-04-09 DIAGNOSIS — E03.9 ACQUIRED HYPOTHYROIDISM: Primary | ICD-10-CM

## 2019-04-09 DIAGNOSIS — N18.9 ACUTE KIDNEY INJURY SUPERIMPOSED ON CHRONIC KIDNEY DISEASE (HCC): ICD-10-CM

## 2019-04-09 DIAGNOSIS — N17.9 ACUTE KIDNEY INJURY SUPERIMPOSED ON CHRONIC KIDNEY DISEASE (HCC): ICD-10-CM

## 2019-04-09 DIAGNOSIS — Z91.199 NON-COMPLIANCE: ICD-10-CM

## 2019-04-09 LAB
FOLATE: 5.4 NG/ML (ref 4.8–24.2)
TSH SERPL DL<=0.05 MIU/L-ACNC: 0.02 UIU/ML (ref 0.27–4.2)
VITAMIN B-12: 243 PG/ML (ref 211–946)
VITAMIN D 25-HYDROXY: 16 NG/ML (ref 30–100)

## 2019-04-09 PROCEDURE — 82607 VITAMIN B-12: CPT

## 2019-04-09 PROCEDURE — 82746 ASSAY OF FOLIC ACID SERUM: CPT

## 2019-04-09 PROCEDURE — 86803 HEPATITIS C AB TEST: CPT

## 2019-04-09 PROCEDURE — 80074 ACUTE HEPATITIS PANEL: CPT

## 2019-04-09 PROCEDURE — 3017F COLORECTAL CA SCREEN DOC REV: CPT | Performed by: FAMILY MEDICINE

## 2019-04-09 PROCEDURE — G8417 CALC BMI ABV UP PARAM F/U: HCPCS | Performed by: FAMILY MEDICINE

## 2019-04-09 PROCEDURE — 36415 COLL VENOUS BLD VENIPUNCTURE: CPT

## 2019-04-09 PROCEDURE — 99214 OFFICE O/P EST MOD 30 MIN: CPT | Performed by: FAMILY MEDICINE

## 2019-04-09 PROCEDURE — 86703 HIV-1/HIV-2 1 RESULT ANTBDY: CPT

## 2019-04-09 PROCEDURE — 82306 VITAMIN D 25 HYDROXY: CPT

## 2019-04-09 PROCEDURE — 84443 ASSAY THYROID STIM HORMONE: CPT

## 2019-04-09 PROCEDURE — G8427 DOCREV CUR MEDS BY ELIG CLIN: HCPCS | Performed by: FAMILY MEDICINE

## 2019-04-09 PROCEDURE — 1036F TOBACCO NON-USER: CPT | Performed by: FAMILY MEDICINE

## 2019-04-09 RX ORDER — FLUTICASONE PROPIONATE 50 MCG
2 SPRAY, SUSPENSION (ML) NASAL DAILY
Qty: 1 BOTTLE | Refills: 3 | Status: SHIPPED | OUTPATIENT
Start: 2019-04-09 | End: 2019-09-13

## 2019-04-09 RX ORDER — LEVOTHYROXINE SODIUM 137 UG/1
TABLET ORAL
Qty: 30 TABLET | Refills: 0 | Status: CANCELLED | OUTPATIENT
Start: 2019-04-09

## 2019-04-09 RX ORDER — SODIUM BICARBONATE 650 MG/1
650 TABLET ORAL 4 TIMES DAILY
COMMUNITY
End: 2019-04-09 | Stop reason: SDUPTHER

## 2019-04-09 RX ORDER — ERGOCALCIFEROL 1.25 MG/1
50000 CAPSULE ORAL WEEKLY
Qty: 4 CAPSULE | Refills: 5 | Status: SHIPPED | OUTPATIENT
Start: 2019-04-09 | End: 2019-12-13 | Stop reason: ALTCHOICE

## 2019-04-09 RX ORDER — PANTOPRAZOLE SODIUM 40 MG/1
TABLET, DELAYED RELEASE ORAL
Qty: 30 TABLET | Refills: 3 | Status: SHIPPED | OUTPATIENT
Start: 2019-04-09 | End: 2019-12-13 | Stop reason: ALTCHOICE

## 2019-04-09 RX ORDER — SODIUM BICARBONATE 650 MG/1
650 TABLET ORAL 4 TIMES DAILY
Qty: 120 TABLET | Refills: 1 | Status: ON HOLD | OUTPATIENT
Start: 2019-04-09 | End: 2019-04-23 | Stop reason: HOSPADM

## 2019-04-09 RX ORDER — OXYBUTYNIN CHLORIDE 5 MG/1
5 TABLET, EXTENDED RELEASE ORAL DAILY
Qty: 30 TABLET | Refills: 3 | Status: SHIPPED | OUTPATIENT
Start: 2019-04-09

## 2019-04-09 RX ORDER — ATORVASTATIN CALCIUM 40 MG/1
40 TABLET, FILM COATED ORAL DAILY
Qty: 30 TABLET | Refills: 3 | Status: ON HOLD | OUTPATIENT
Start: 2019-04-09 | End: 2019-05-31

## 2019-04-09 RX ORDER — OMEPRAZOLE 20 MG/1
20 CAPSULE, DELAYED RELEASE ORAL
Qty: 30 CAPSULE | Refills: 3 | Status: SHIPPED | OUTPATIENT
Start: 2019-04-09 | End: 2019-04-10 | Stop reason: CLARIF

## 2019-04-09 ASSESSMENT — ENCOUNTER SYMPTOMS: NAUSEA: 1

## 2019-04-09 NOTE — PROGRESS NOTES
2019     Rosanna Pozo (:  1965) is a 52 y.o. female, with a:  Past Medical History:   Diagnosis Date    Anxiety     Depression     Hyperlipidemia     Hypothyroidism     Leg pain, bilateral     Noncompliance with medications     Noncompliance with treatment     Osteoarthritis        Here for evaluation of the following medical concerns:  Chief Complaint   Patient presents with    Medication Refill     Patient presents by herself. She continues to be a poor historian. Hypothyroidism  - last TSH 26.7; did not get repeat labs drawn  - admits to non-compliance since discharge from rehab facility weeks ago     HLD  - on statin     GERD   - on prilosec    JIAN on CKD - patient has not yet followed up with Nephrology or Urology. Patient is very unclear about upcoming appointments    Anxiety/depression/ intellectual disability  - follows with [facility]    The 10-year ASCVD risk score (Cinthia Byrne, et al., 2013) is: 3.5%    Values used to calculate the score:      Age: 52 years      Sex: Female      Is Non- : No      Diabetic: No      Tobacco smoker: No      Systolic Blood Pressure: 884 mmHg      Is BP treated: No      HDL Cholesterol: 31 mg/dL      Total Cholesterol: 204 mg/dL    Review of Systems   Constitutional: Negative for chills and fever. Gastrointestinal: Positive for nausea. Genitourinary: Positive for difficulty urinating and dysuria. Prior to Visit Medications    Medication Sig Taking?  Authorizing Provider   sodium bicarbonate 650 MG tablet Take 650 mg by mouth 4 times daily Yes Historical Provider, MD   atorvastatin (LIPITOR) 40 MG tablet TAKE 1 TABLET BY MOUTH DAILY Yes Celestine Trujillo DO   vitamin D (ERGOCALCIFEROL) 26767 units CAPS capsule TAKE 1 CAPSULE BY MOUTH ONCE A WEEK Yes Celestine Trujillo DO   oxybutynin (DITROPAN-XL) 5 MG extended release tablet Take 1 tablet by mouth daily Yes Celestine Trujillo DO   levothyroxine (SYNTHROID) 137 MCG tablet TAKE 1 TABLET BY MOUTH EVERY MORNING 30 MINUTES BEFORE EATING. Yes Celestine Trujillo, DO   pantoprazole (PROTONIX) 40 MG tablet TAKE ONE TABLET BY MOUTH DAILY Yes Historical Provider, MD   fluticasone (FLONASE) 50 MCG/ACT nasal spray 2 sprays by Nasal route daily Yes Celestine Trujillo, DO   magnesium oxide (MAG-OX) 400 (241.3 Mg) MG TABS tablet Take 1 tablet by mouth 2 times daily Yes Jessie Khan, DO   omeprazole (PRILOSEC) 20 MG delayed release capsule Take 1 capsule by mouth every morning (before breakfast) Yes Ebonie Garcia,    Compression Stockings MISC by Does not apply route Below knee  15 - 30 mm Hg Yes Papa Ross, DO   acetaminophen (APAP EXTRA STRENGTH) 500 MG tablet Take 1 tablet by mouth every 6 hours as needed for Pain Yes Darling Rivera, DO        Social History     Tobacco Use    Smoking status: Former Smoker     Packs/day: 1.00     Years: 5.00     Pack years: 5.00     Last attempt to quit: 1989     Years since quittin.2    Smokeless tobacco: Never Used   Substance Use Topics    Alcohol use: No        Past Surgical History:   Procedure Laterality Date    CYSTOSCOPY Left 2019    CYSTOSCOPY, BILATERAL RETROGRADE PYELOGRAMS, BILATERAL STENT INSERTION performed by Halley Chen MD at [address] [place] [address] Drive:    19 1401   BP: 128/74   Pulse: 83   Resp: 18   Temp: 98.6 °F (37 °C)   TempSrc: Oral   SpO2: 98%   Weight: 128 lb (58.1 kg)   Height: 4' 11\" (1.499 m)     Estimated body mass index is 25.85 kg/m² as calculated from the following:    Height as of this encounter: 4' 11\" (1.499 m). Weight as of this encounter: 128 lb (58.1 kg). Physical Exam   Constitutional: She appears well-developed and well-nourished. No distress. Poor hygiene   HENT:   Head: Normocephalic and atraumatic. Right Ear: External ear normal.   Left Ear: External ear normal.   Poor dentition   Eyes: Conjunctivae and EOM are normal. Right eye exhibits no discharge.  Left eye exhibits no discharge. Neck: Normal range of motion. No JVD present. No thyromegaly present. Cardiovascular: Normal rate and regular rhythm. Pulmonary/Chest: Effort normal and breath sounds normal. No respiratory distress. Abdominal: Soft. She exhibits no distension. There is no tenderness. Musculoskeletal: She exhibits no edema or deformity. Neurological: She is alert. Skin: Skin is warm and dry. Psychiatric: Her affect is labile. Her speech is tangential. Cognition and memory are impaired. She expresses inappropriate judgment. ASSESSMENT/PLAN:   Diagnosis Orders   1. Acquired hypothyroidism     2. Mixed stress and urge urinary incontinence  oxybutynin (DITROPAN-XL) 5 MG extended release tablet    Shower chair   3. Vitamin D deficiency  vitamin D (ERGOCALCIFEROL) 92077 units CAPS capsule   4. Seasonal allergic rhinitis, unspecified trigger  fluticasone (FLONASE) 50 MCG/ACT nasal spray   5. Mixed hyperlipidemia  atorvastatin (LIPITOR) 40 MG tablet   6. Intellectual disability  Greg Nett ()    Shower chair   7. Non-compliance  Greg Nett ()       Additional plan and future considerations:   As above. To have previously ordered labs drawn. To follow up with Nephrology and Urology - will have office staff call and verify appointments. SW consult. RTO in 2-3 months or sooner if needed.       Future Appointments   Date Time Provider Nick Ray   6/10/2019  2:00 PM DO Last [facility]         --DO Monica on 4/9/2019 at 2:18 PM

## 2019-04-10 ENCOUNTER — TELEPHONE (OUTPATIENT)
Dept: FAMILY MEDICINE CLINIC | Age: 54
End: 2019-04-10

## 2019-04-10 DIAGNOSIS — E03.9 ACQUIRED HYPOTHYROIDISM: ICD-10-CM

## 2019-04-10 LAB — HIV-1 AND HIV-2 ANTIBODIES: NORMAL

## 2019-04-10 RX ORDER — LEVOTHYROXINE SODIUM 0.1 MG/1
TABLET ORAL
Qty: 30 TABLET | Refills: 2 | Status: SHIPPED | OUTPATIENT
Start: 2019-04-10 | End: 2019-12-13 | Stop reason: ALTCHOICE

## 2019-04-10 NOTE — TELEPHONE ENCOUNTER
Pharmacist from [facility] called asking if pt is to be taking both the pantoprazole and omeprazole. I did see that her discharge summary from [facility] on 2.27.19 says to d/c the omeprazole. Please advise.

## 2019-04-10 NOTE — TELEPHONE ENCOUNTER
Patient should not be taking both, please discontinue omeprazole. Also please notify pharmacy that I have sent a decreased dose of synthroid based off labs she finally had done yesterday - patient has a long standing history of non-compliance - and it appears since she started taking the synthroid as prescribed it is too high of a dose.  Thanks

## 2019-04-12 PROBLEM — N39.46 MIXED STRESS AND URGE URINARY INCONTINENCE: Status: ACTIVE | Noted: 2019-04-12

## 2019-04-12 PROBLEM — E55.9 VITAMIN D DEFICIENCY: Status: ACTIVE | Noted: 2019-04-12

## 2019-04-12 PROBLEM — J30.2 SEASONAL ALLERGIC RHINITIS: Status: ACTIVE | Noted: 2019-04-12

## 2019-04-12 LAB
HAV IGM SER IA-ACNC: NORMAL
HEPATITIS B CORE IGM ANTIBODY: NORMAL
HEPATITIS B SURFACE ANTIGEN INTERPRETATION: NORMAL
HEPATITIS C ANTIBODY INTERPRETATION: NORMAL

## 2019-04-18 ENCOUNTER — HOSPITAL ENCOUNTER (INPATIENT)
Age: 54
LOS: 5 days | Discharge: NURSING, REHAB OR LONG-TERM CARE | DRG: 469 | End: 2019-04-23
Attending: EMERGENCY MEDICINE | Admitting: INTERNAL MEDICINE
Payer: COMMERCIAL

## 2019-04-18 ENCOUNTER — APPOINTMENT (OUTPATIENT)
Dept: CT IMAGING | Age: 54
DRG: 469 | End: 2019-04-18
Payer: COMMERCIAL

## 2019-04-18 DIAGNOSIS — N17.9 ACUTE RENAL FAILURE SUPERIMPOSED ON CHRONIC KIDNEY DISEASE, UNSPECIFIED CKD STAGE, UNSPECIFIED ACUTE RENAL FAILURE TYPE (HCC): Primary | ICD-10-CM

## 2019-04-18 DIAGNOSIS — N18.9 ACUTE RENAL FAILURE SUPERIMPOSED ON CHRONIC KIDNEY DISEASE, UNSPECIFIED CKD STAGE, UNSPECIFIED ACUTE RENAL FAILURE TYPE (HCC): Primary | ICD-10-CM

## 2019-04-18 DIAGNOSIS — N30.01 ACUTE CYSTITIS WITH HEMATURIA: ICD-10-CM

## 2019-04-18 DIAGNOSIS — E03.9 ACQUIRED HYPOTHYROIDISM: ICD-10-CM

## 2019-04-18 PROBLEM — N28.9 ACUTE ON CHRONIC RENAL INSUFFICIENCY: Status: ACTIVE | Noted: 2019-04-18

## 2019-04-18 LAB
ALBUMIN SERPL-MCNC: 3.3 G/DL (ref 3.5–5.2)
ALP BLD-CCNC: 63 U/L (ref 35–104)
ALT SERPL-CCNC: <5 U/L (ref 0–32)
ANION GAP SERPL CALCULATED.3IONS-SCNC: 15 MMOL/L (ref 7–16)
ANION GAP SERPL CALCULATED.3IONS-SCNC: 15 MMOL/L (ref 7–16)
AST SERPL-CCNC: 7 U/L (ref 0–31)
BACTERIA: ABNORMAL /HPF
BASOPHILS ABSOLUTE: 0.03 E9/L (ref 0–0.2)
BASOPHILS RELATIVE PERCENT: 0.2 % (ref 0–2)
BILIRUB SERPL-MCNC: 0.3 MG/DL (ref 0–1.2)
BILIRUBIN URINE: NEGATIVE
BLOOD, URINE: ABNORMAL
BUN BLDV-MCNC: 39 MG/DL (ref 6–20)
BUN BLDV-MCNC: 42 MG/DL (ref 6–20)
CALCIUM SERPL-MCNC: 10 MG/DL (ref 8.6–10.2)
CALCIUM SERPL-MCNC: 9.6 MG/DL (ref 8.6–10.2)
CHLORIDE BLD-SCNC: 105 MMOL/L (ref 98–107)
CHLORIDE BLD-SCNC: 109 MMOL/L (ref 98–107)
CLARITY: ABNORMAL
CO2: 18 MMOL/L (ref 22–29)
CO2: 22 MMOL/L (ref 22–29)
CO2: 23 MMOL/L (ref 22–29)
COLOR: YELLOW
CREAT SERPL-MCNC: 5.2 MG/DL (ref 0.5–1)
CREAT SERPL-MCNC: 5.8 MG/DL (ref 0.5–1)
CREATININE URINE: 89 MG/DL (ref 29–226)
EOSINOPHILS ABSOLUTE: 0.04 E9/L (ref 0.05–0.5)
EOSINOPHILS RELATIVE PERCENT: 0.3 % (ref 0–6)
GFR AFRICAN AMERICAN: 10
GFR AFRICAN AMERICAN: 10
GFR AFRICAN AMERICAN: 9
GFR NON-AFRICAN AMERICAN: 8 ML/MIN/1.73
GFR NON-AFRICAN AMERICAN: 8 ML/MIN/1.73
GFR NON-AFRICAN AMERICAN: 9 ML/MIN/1.73
GLUCOSE BLD-MCNC: 128 MG/DL (ref 74–99)
GLUCOSE BLD-MCNC: 131 MG/DL (ref 74–99)
GLUCOSE BLD-MCNC: 134 MG/DL (ref 74–99)
GLUCOSE URINE: NEGATIVE MG/DL
HCT VFR BLD CALC: 30 % (ref 34–48)
HEMOGLOBIN: 9.5 G/DL (ref 11.5–15.5)
IMMATURE GRANULOCYTES #: 0.07 E9/L
IMMATURE GRANULOCYTES %: 0.6 % (ref 0–5)
KETONES, URINE: NEGATIVE MG/DL
LACTIC ACID: 1.1 MMOL/L (ref 0.5–2.2)
LEUKOCYTE ESTERASE, URINE: ABNORMAL
LIPASE: 12 U/L (ref 13–60)
LYMPHOCYTES ABSOLUTE: 1.23 E9/L (ref 1.5–4)
LYMPHOCYTES RELATIVE PERCENT: 10 % (ref 20–42)
MCH RBC QN AUTO: 29.3 PG (ref 26–35)
MCHC RBC AUTO-ENTMCNC: 31.7 % (ref 32–34.5)
MCV RBC AUTO: 92.6 FL (ref 80–99.9)
MONOCYTES ABSOLUTE: 0.78 E9/L (ref 0.1–0.95)
MONOCYTES RELATIVE PERCENT: 6.3 % (ref 2–12)
NEUTROPHILS ABSOLUTE: 10.14 E9/L (ref 1.8–7.3)
NEUTROPHILS RELATIVE PERCENT: 82.6 % (ref 43–80)
NITRITE, URINE: NEGATIVE
OSMOLALITY URINE: 299 MOSM/KG (ref 300–900)
PDW BLD-RTO: 12.6 FL (ref 11.5–15)
PH UA: 6 (ref 5–9)
PLATELET # BLD: 317 E9/L (ref 130–450)
PMV BLD AUTO: 11.4 FL (ref 7–12)
POC ANION GAP: 11 MMOL/L (ref 7–16)
POC BUN: 34 MG/DL (ref 8–23)
POC CHLORIDE: 106 MMOL/L (ref 100–108)
POC CREATININE: 5.4 MG/DL (ref 0.5–1)
POC POTASSIUM: 3.7 MMOL/L (ref 3.5–5)
POC SODIUM: 140 MMOL/L (ref 132–146)
POTASSIUM SERPL-SCNC: 3.8 MMOL/L (ref 3.5–5)
POTASSIUM SERPL-SCNC: 4.1 MMOL/L (ref 3.5–5)
PROTEIN UA: 100 MG/DL
RBC # BLD: 3.24 E12/L (ref 3.5–5.5)
RBC UA: >20 /HPF (ref 0–2)
SODIUM BLD-SCNC: 142 MMOL/L (ref 132–146)
SODIUM BLD-SCNC: 142 MMOL/L (ref 132–146)
SODIUM URINE: 98 MMOL/L
SPECIFIC GRAVITY UA: 1.01 (ref 1–1.03)
TOTAL PROTEIN: 6.7 G/DL (ref 6.4–8.3)
UROBILINOGEN, URINE: 0.2 E.U./DL
WBC # BLD: 12.3 E9/L (ref 4.5–11.5)
WBC UA: >20 /HPF (ref 0–5)

## 2019-04-18 PROCEDURE — 80051 ELECTROLYTE PANEL: CPT

## 2019-04-18 PROCEDURE — 96376 TX/PRO/DX INJ SAME DRUG ADON: CPT

## 2019-04-18 PROCEDURE — 74176 CT ABD & PELVIS W/O CONTRAST: CPT

## 2019-04-18 PROCEDURE — 80053 COMPREHEN METABOLIC PANEL: CPT

## 2019-04-18 PROCEDURE — 85025 COMPLETE CBC W/AUTO DIFF WBC: CPT

## 2019-04-18 PROCEDURE — 87088 URINE BACTERIA CULTURE: CPT

## 2019-04-18 PROCEDURE — 36415 COLL VENOUS BLD VENIPUNCTURE: CPT

## 2019-04-18 PROCEDURE — 96374 THER/PROPH/DIAG INJ IV PUSH: CPT

## 2019-04-18 PROCEDURE — 82565 ASSAY OF CREATININE: CPT

## 2019-04-18 PROCEDURE — 83935 ASSAY OF URINE OSMOLALITY: CPT

## 2019-04-18 PROCEDURE — 83690 ASSAY OF LIPASE: CPT

## 2019-04-18 PROCEDURE — 96375 TX/PRO/DX INJ NEW DRUG ADDON: CPT

## 2019-04-18 PROCEDURE — 84520 ASSAY OF UREA NITROGEN: CPT

## 2019-04-18 PROCEDURE — 2580000003 HC RX 258: Performed by: PHYSICIAN ASSISTANT

## 2019-04-18 PROCEDURE — 82947 ASSAY GLUCOSE BLOOD QUANT: CPT

## 2019-04-18 PROCEDURE — 84300 ASSAY OF URINE SODIUM: CPT

## 2019-04-18 PROCEDURE — 83605 ASSAY OF LACTIC ACID: CPT

## 2019-04-18 PROCEDURE — 99285 EMERGENCY DEPT VISIT HI MDM: CPT

## 2019-04-18 PROCEDURE — 80048 BASIC METABOLIC PNL TOTAL CA: CPT

## 2019-04-18 PROCEDURE — 82570 ASSAY OF URINE CREATININE: CPT

## 2019-04-18 PROCEDURE — 81001 URINALYSIS AUTO W/SCOPE: CPT

## 2019-04-18 PROCEDURE — 2580000003 HC RX 258: Performed by: INTERNAL MEDICINE

## 2019-04-18 PROCEDURE — 1200000000 HC SEMI PRIVATE

## 2019-04-18 PROCEDURE — 6360000002 HC RX W HCPCS: Performed by: PHYSICIAN ASSISTANT

## 2019-04-18 RX ORDER — SODIUM CHLORIDE 0.9 % (FLUSH) 0.9 %
10 SYRINGE (ML) INJECTION EVERY 12 HOURS SCHEDULED
Status: DISCONTINUED | OUTPATIENT
Start: 2019-04-18 | End: 2019-04-23 | Stop reason: HOSPADM

## 2019-04-18 RX ORDER — SODIUM CHLORIDE 9 MG/ML
INJECTION, SOLUTION INTRAVENOUS CONTINUOUS
Status: DISCONTINUED | OUTPATIENT
Start: 2019-04-18 | End: 2019-04-20

## 2019-04-18 RX ORDER — SODIUM CHLORIDE 0.9 % (FLUSH) 0.9 %
10 SYRINGE (ML) INJECTION PRN
Status: DISCONTINUED | OUTPATIENT
Start: 2019-04-18 | End: 2019-04-23 | Stop reason: HOSPADM

## 2019-04-18 RX ORDER — ACETAMINOPHEN 325 MG/1
650 TABLET ORAL EVERY 4 HOURS PRN
Status: DISCONTINUED | OUTPATIENT
Start: 2019-04-18 | End: 2019-04-23 | Stop reason: HOSPADM

## 2019-04-18 RX ORDER — MORPHINE SULFATE 10 MG/ML
6 INJECTION, SOLUTION INTRAMUSCULAR; INTRAVENOUS ONCE
Status: COMPLETED | OUTPATIENT
Start: 2019-04-18 | End: 2019-04-18

## 2019-04-18 RX ORDER — 0.9 % SODIUM CHLORIDE 0.9 %
1000 INTRAVENOUS SOLUTION INTRAVENOUS ONCE
Status: COMPLETED | OUTPATIENT
Start: 2019-04-18 | End: 2019-04-18

## 2019-04-18 RX ORDER — ONDANSETRON 2 MG/ML
4 INJECTION INTRAMUSCULAR; INTRAVENOUS ONCE
Status: COMPLETED | OUTPATIENT
Start: 2019-04-18 | End: 2019-04-18

## 2019-04-18 RX ORDER — LEVOTHYROXINE SODIUM 0.1 MG/1
100 TABLET ORAL DAILY
Status: DISCONTINUED | OUTPATIENT
Start: 2019-04-19 | End: 2019-04-23 | Stop reason: HOSPADM

## 2019-04-18 RX ADMIN — SODIUM CHLORIDE: 9 INJECTION, SOLUTION INTRAVENOUS at 23:05

## 2019-04-18 RX ADMIN — MORPHINE SULFATE 6 MG: 10 INJECTION INTRAVENOUS at 11:34

## 2019-04-18 RX ADMIN — SODIUM CHLORIDE 1000 ML: 9 INJECTION, SOLUTION INTRAVENOUS at 11:33

## 2019-04-18 RX ADMIN — ONDANSETRON 4 MG: 2 INJECTION INTRAMUSCULAR; INTRAVENOUS at 11:34

## 2019-04-18 RX ADMIN — MORPHINE SULFATE 6 MG: 10 INJECTION INTRAVENOUS at 14:31

## 2019-04-18 RX ADMIN — WATER 1 G: 1 INJECTION INTRAMUSCULAR; INTRAVENOUS; SUBCUTANEOUS at 14:21

## 2019-04-18 ASSESSMENT — PAIN SCALES - GENERAL
PAINLEVEL_OUTOF10: 10

## 2019-04-18 ASSESSMENT — PAIN DESCRIPTION - LOCATION
LOCATION: ABDOMEN
LOCATION: FLANK
LOCATION: FLANK

## 2019-04-18 ASSESSMENT — PAIN DESCRIPTION - FREQUENCY
FREQUENCY: CONTINUOUS

## 2019-04-18 ASSESSMENT — PAIN DESCRIPTION - DESCRIPTORS
DESCRIPTORS: ACHING;DISCOMFORT;SORE;TENDER
DESCRIPTORS: SHARP;SHOOTING;STABBING
DESCRIPTORS: SHARP;STABBING;CONSTANT

## 2019-04-18 ASSESSMENT — PAIN - FUNCTIONAL ASSESSMENT
PAIN_FUNCTIONAL_ASSESSMENT: PREVENTS OR INTERFERES WITH MANY ACTIVE NOT PASSIVE ACTIVITIES
PAIN_FUNCTIONAL_ASSESSMENT: PREVENTS OR INTERFERES SOME ACTIVE ACTIVITIES AND ADLS

## 2019-04-18 ASSESSMENT — PAIN DESCRIPTION - ONSET
ONSET: GRADUAL
ONSET: ON-GOING
ONSET: ON-GOING

## 2019-04-18 ASSESSMENT — PAIN DESCRIPTION - PAIN TYPE
TYPE: ACUTE PAIN
TYPE: CHRONIC PAIN
TYPE: ACUTE PAIN

## 2019-04-18 ASSESSMENT — PAIN DESCRIPTION - DIRECTION: RADIATING_TOWARDS: ABDOMEN

## 2019-04-18 ASSESSMENT — PAIN DESCRIPTION - ORIENTATION
ORIENTATION: LEFT
ORIENTATION: RIGHT;LEFT

## 2019-04-18 ASSESSMENT — PAIN DESCRIPTION - PROGRESSION
CLINICAL_PROGRESSION: GRADUALLY WORSENING
CLINICAL_PROGRESSION: GRADUALLY WORSENING

## 2019-04-18 NOTE — CARE COORDINATION
Social Work/Discharge Planning    Pt presents to the ED with flank pain. Per pharmacy staff, pt reported that she has not been taking her medication as prescribed and lacks support of friends/family. SW met with pt in room to discuss transition of care and provide supportive counseling. Pt was alert/oriented stated that she lives with her boyfriend \"but would be better off without him\". Pt stated \"my boyfriend isn't supportive and he doesn't even look at me anymore\". SW listened and provided supportive counseling. She stated that she feels safe at home, he \"just doesn't help around the house like he should\". Pt needs assistance with all ADL's, does not have a caregiver. Pt admitted to not taking her medications because she \"hasn't picked them up from the pharmacy\". Stated that she uses a [employer] to ambulate. Pt stated that she was discharged from [facility] in the beginning of March and hasn't showered since because \"there's no one to help and no clean clothes\". Pt stated that she needs help with daily ADLs and IADLs. SW discussed [facility] on [address]. Pt stated that she has never received services from the waiver program and requested that a referral is made. SW completed referral form and faxed it to [facility]. SW discussed discharge plan with pt. Pt stated that she \"might be agreeable\" to rehab at discharge but is unsure at this time. Pt stated that her brothers Abhi Sheth and Chuck Neil are supportive and are coming to the hospital today. Pt would benefit from PT/OT evaluations to assist with discharge plan. Assigned SW to follow.     Electronically signed by EBONI Montiel LSW on 4/18/2019 at 12:59 PM

## 2019-04-18 NOTE — ED PROVIDER NOTES
[facility]    Department of Emergency Medicine   ED  Provider Note  Admit Date/RoomTime: 4/18/2019 10:03 AM  ED Room: 08/08  MRN: [id number]  Chief Complaint       Flank Pain (bilateral flank pain, hx of recent ureteral stent placement) and Hematuria    History of Present Illness   Source of history provided by:  patient. History/Exam Limitations: none. Brittney Hudson is a 52 y.o. old female who has a past medical history of:   Past Medical History:   Diagnosis Date    Anxiety     Depression     Hyperlipidemia     Hypothyroidism     Leg pain, bilateral     Noncompliance with medications     Noncompliance with treatment     Osteoarthritis       presents to the emergency department by ambulance where the patient received see Ambulance Run Sheet prior to arrival., for complaints of gradual onset, still present, constant aching, cramping, stabbing pain in the left flank without radiation which began 3 day(s) prior to arrival.   There has been similar episodes in the past from an unknown issue with her kidneys and she states that she recently had a procedure (aaron states end of February) as she handed me discharge paperwork about a ureteral stent. Since onset the symptoms have been persistent and worsening. The pain is associated with hematuria since March 16th. .  The pain is aggravated by movement and palpation on her abdomen and relieved by nothing. She confirms associated sweats \"I get hot when I push to pee. \" There has been NO chills, cloudy urine, constipation, diarrhea, dysuria, headache, urinary frequency, urinary incontinence, urinary urgency, vaginal discharge, vaginal itching or vomiting. ROS   Pertinent positives and negatives are stated within HPI, all other systems reviewed and are negative.     Past Surgical History:   Procedure Laterality Date    CYSTOSCOPY Left 1/21/2019    CYSTOSCOPY, BILATERAL RETROGRADE PYELOGRAMS, BILATERAL STENT INSERTION performed by Denise Montano 106 100 - 108 mmol/L    CO2 23 22 - 29 mmol/L    POC Anion Gap 11 7 - 16 mmol/L    POC Glucose 128 (H) 74 - 99 mg/dl    POC BUN 34 (H) 8 - 23 mg/dL    POC Creatinine 5.4 (H) 0.5 - 1.0 mg/dL    GFR Non-African American 8 >=60 mL/min/1.73    GFR  10      Imaging: All Radiology results interpreted by Radiologist unless otherwise noted. CT ABDOMEN PELVIS WO CONTRAST Additional Contrast? None   Final Result   1. Bilateral hydroureteronephrosis and distention of the urinary   bladder. In review with the fluoroscopy images obtained during the   retrograde urography performed on 1/21/2019, the degree of   hydronephrosis is unchanged. 2. Satisfactory position of the bilateral ureteral stents. 3. No ureteral calculus is noted. 4. Small hiatal hernia. ED Course / Medical Decision Making     Medications   0.9 % sodium chloride bolus (0 mLs Intravenous Stopped 4/18/19 1311)   ondansetron (ZOFRAN) injection 4 mg (4 mg Intravenous Given 4/18/19 1134)   morphine injection 6 mg (6 mg Intravenous Given 4/18/19 1134)   cefTRIAXone (ROCEPHIN) 1 g in sterile water 10 mL IV syringe (0 g Intravenous Stopped 4/18/19 1425)   morphine injection 6 mg (6 mg Intravenous Given 4/18/19 1431)        Re-examination:  4/18/19       Time: 1415    Patients symptoms were improving but her pain is returning. Results discussed. Time: 4055   Patient has been informed of her upcoming admission. She was resting comfortably when I entered the room. The nurse who previously catheterized the patient was unable to be found and I was unable to find out how much output they received. Consults:   IP CONSULT TO NEPHROLOGY  IP CONSULT TO SOCIAL WORK  IP CONSULT TO UROLOGY  IP CONSULT TO HOSPITALIST I spoke with Dr. Salome Kang who will see the patient after she is admitted. I spoke with Dr. Rohit Peralta who would like the patient admitted to med/surg.      Procedures:   none    MDM:   Patient presents emergency department for left flank pain.  patient was noted to have acute on chronic renal failure. CT was significant for bilateral hydronephrosis. We were unable to reach urology during the patient's emergency department stay. She will be admitted for further evaluation and treatment. Counseling: The emergency provider has spoken with the patient and discussed todays results, in addition to providing specific details for the plan of care and counseling regarding the diagnosis and prognosis. Questions are answered at this time and they are agreeable with the plan. Assessment      1. Acute renal failure superimposed on chronic kidney disease, unspecified CKD stage, unspecified acute renal failure type ([facility] [address]    2. Acute cystitis with hematuria       Plan   Discharge to home  Patient condition is good    New Medications     New Prescriptions    No medications on file     Electronically signed by Ellyn Neal PA-C   DD: 4/18/19  **This report was transcribed using voice recognition software. Every effort was made to ensure accuracy; however, inadvertent computerized transcription errors may be present.   END OF ED PROVIDER NOTE      Ellyn Neal PA-C  04/18/19 3217

## 2019-04-19 LAB
ALBUMIN SERPL-MCNC: 2.6 G/DL (ref 3.5–5.2)
ALP BLD-CCNC: 45 U/L (ref 35–104)
ALT SERPL-CCNC: <5 U/L (ref 0–32)
ANION GAP SERPL CALCULATED.3IONS-SCNC: 13 MMOL/L (ref 7–16)
ANION GAP SERPL CALCULATED.3IONS-SCNC: 13 MMOL/L (ref 7–16)
AST SERPL-CCNC: <5 U/L (ref 0–31)
BASOPHILS ABSOLUTE: 0.03 E9/L (ref 0–0.2)
BASOPHILS RELATIVE PERCENT: 0.3 % (ref 0–2)
BILIRUB SERPL-MCNC: <0.2 MG/DL (ref 0–1.2)
BUN BLDV-MCNC: 39 MG/DL (ref 6–20)
BUN BLDV-MCNC: 44 MG/DL (ref 6–20)
CALCIUM SERPL-MCNC: 8.2 MG/DL (ref 8.6–10.2)
CALCIUM SERPL-MCNC: 8.5 MG/DL (ref 8.6–10.2)
CHLORIDE BLD-SCNC: 107 MMOL/L (ref 98–107)
CHLORIDE BLD-SCNC: 111 MMOL/L (ref 98–107)
CHOLESTEROL, TOTAL: 177 MG/DL (ref 0–199)
CO2: 19 MMOL/L (ref 22–29)
CO2: 20 MMOL/L (ref 22–29)
CREAT SERPL-MCNC: 4.5 MG/DL (ref 0.5–1)
CREAT SERPL-MCNC: 5.3 MG/DL (ref 0.5–1)
EOSINOPHILS ABSOLUTE: 0.12 E9/L (ref 0.05–0.5)
EOSINOPHILS RELATIVE PERCENT: 1.1 % (ref 0–6)
FERRITIN: 301 NG/ML
FOLATE: 5.4 NG/ML (ref 4.8–24.2)
GFR AFRICAN AMERICAN: 10
GFR AFRICAN AMERICAN: 12
GFR NON-AFRICAN AMERICAN: 10 ML/MIN/1.73
GFR NON-AFRICAN AMERICAN: 8 ML/MIN/1.73
GLUCOSE BLD-MCNC: 105 MG/DL (ref 74–99)
GLUCOSE BLD-MCNC: 136 MG/DL (ref 74–99)
HBA1C MFR BLD: 5.1 % (ref 4–5.6)
HCT VFR BLD CALC: 26.8 % (ref 34–48)
HDLC SERPL-MCNC: 38 MG/DL
HEMOGLOBIN: 8.3 G/DL (ref 11.5–15.5)
IMMATURE GRANULOCYTES #: 0.05 E9/L
IMMATURE GRANULOCYTES %: 0.5 % (ref 0–5)
IMMATURE RETIC FRACT: 9.7 % (ref 3–15.9)
IRON SATURATION: 14 % (ref 15–50)
IRON: 24 MCG/DL (ref 37–145)
LDL CHOLESTEROL CALCULATED: 104 MG/DL (ref 0–99)
LYMPHOCYTES ABSOLUTE: 1.63 E9/L (ref 1.5–4)
LYMPHOCYTES RELATIVE PERCENT: 15.5 % (ref 20–42)
MAGNESIUM: 1.8 MG/DL (ref 1.6–2.6)
MCH RBC QN AUTO: 29.3 PG (ref 26–35)
MCHC RBC AUTO-ENTMCNC: 31 % (ref 32–34.5)
MCV RBC AUTO: 94.7 FL (ref 80–99.9)
MONOCYTES ABSOLUTE: 0.67 E9/L (ref 0.1–0.95)
MONOCYTES RELATIVE PERCENT: 6.4 % (ref 2–12)
NEUTROPHILS ABSOLUTE: 7.99 E9/L (ref 1.8–7.3)
NEUTROPHILS RELATIVE PERCENT: 76.2 % (ref 43–80)
PDW BLD-RTO: 12.7 FL (ref 11.5–15)
PHOSPHORUS: 5.6 MG/DL (ref 2.5–4.5)
PLATELET # BLD: 267 E9/L (ref 130–450)
PMV BLD AUTO: 11.4 FL (ref 7–12)
POTASSIUM SERPL-SCNC: 3.3 MMOL/L (ref 3.5–5)
POTASSIUM SERPL-SCNC: 3.5 MMOL/L (ref 3.5–5)
RBC # BLD: 2.83 E12/L (ref 3.5–5.5)
RETIC HGB EQUIVALENT: 29.5 PG (ref 28.2–36.6)
RETICULOCYTE ABSOLUTE COUNT: 0.03 E12/L
RETICULOCYTE COUNT PCT: 1 % (ref 0.4–1.9)
SODIUM BLD-SCNC: 140 MMOL/L (ref 132–146)
SODIUM BLD-SCNC: 143 MMOL/L (ref 132–146)
TOTAL IRON BINDING CAPACITY: 174 MCG/DL (ref 250–450)
TOTAL PROTEIN: 5.6 G/DL (ref 6.4–8.3)
TRANSFERRIN: 123 MG/DL (ref 200–360)
TRIGL SERPL-MCNC: 177 MG/DL (ref 0–149)
TSH SERPL DL<=0.05 MIU/L-ACNC: 0.02 UIU/ML (ref 0.27–4.2)
VITAMIN B-12: 301 PG/ML (ref 211–946)
VLDLC SERPL CALC-MCNC: 35 MG/DL
WBC # BLD: 10.5 E9/L (ref 4.5–11.5)

## 2019-04-19 PROCEDURE — 6370000000 HC RX 637 (ALT 250 FOR IP): Performed by: INTERNAL MEDICINE

## 2019-04-19 PROCEDURE — 2580000003 HC RX 258: Performed by: INTERNAL MEDICINE

## 2019-04-19 PROCEDURE — 36415 COLL VENOUS BLD VENIPUNCTURE: CPT

## 2019-04-19 PROCEDURE — 84443 ASSAY THYROID STIM HORMONE: CPT

## 2019-04-19 PROCEDURE — 82607 VITAMIN B-12: CPT

## 2019-04-19 PROCEDURE — 83550 IRON BINDING TEST: CPT

## 2019-04-19 PROCEDURE — 85045 AUTOMATED RETICULOCYTE COUNT: CPT

## 2019-04-19 PROCEDURE — 80053 COMPREHEN METABOLIC PANEL: CPT

## 2019-04-19 PROCEDURE — 84466 ASSAY OF TRANSFERRIN: CPT

## 2019-04-19 PROCEDURE — 83735 ASSAY OF MAGNESIUM: CPT

## 2019-04-19 PROCEDURE — 82746 ASSAY OF FOLIC ACID SERUM: CPT

## 2019-04-19 PROCEDURE — 83540 ASSAY OF IRON: CPT

## 2019-04-19 PROCEDURE — 80048 BASIC METABOLIC PNL TOTAL CA: CPT

## 2019-04-19 PROCEDURE — 83036 HEMOGLOBIN GLYCOSYLATED A1C: CPT

## 2019-04-19 PROCEDURE — 82728 ASSAY OF FERRITIN: CPT

## 2019-04-19 PROCEDURE — 85025 COMPLETE CBC W/AUTO DIFF WBC: CPT

## 2019-04-19 PROCEDURE — 84100 ASSAY OF PHOSPHORUS: CPT

## 2019-04-19 PROCEDURE — 1200000000 HC SEMI PRIVATE

## 2019-04-19 PROCEDURE — 80061 LIPID PANEL: CPT

## 2019-04-19 RX ADMIN — LEVOTHYROXINE SODIUM 100 MCG: 100 TABLET ORAL at 06:24

## 2019-04-19 RX ADMIN — SODIUM CHLORIDE: 9 INJECTION, SOLUTION INTRAVENOUS at 06:24

## 2019-04-19 RX ADMIN — SODIUM CHLORIDE: 9 INJECTION, SOLUTION INTRAVENOUS at 14:05

## 2019-04-19 ASSESSMENT — PAIN - FUNCTIONAL ASSESSMENT: PAIN_FUNCTIONAL_ASSESSMENT: ACTIVITIES ARE NOT PREVENTED

## 2019-04-19 ASSESSMENT — PAIN SCALES - GENERAL: PAINLEVEL_OUTOF10: 0

## 2019-04-19 NOTE — CARE COORDINATION
SS NOTE: SW met with pt today. She relates to being fearful to return home with no one there to assist her. Pt will consider SNF. She is requesting that this SW contact her brother Dipti Abel [phone] assist with making the choice of SNF. SW did try to contact Trinity Cuevas today and left him a detailed VM. SW will continue efforts to reach him. Francisco Javier Long. 4/19/2019.1:48 PM.
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
## 2019-04-19 NOTE — PLAN OF CARE
Problem: Pain:  Goal: Pain level will decrease  Description  Pain level will decrease  4/19/2019 1125 by Robby Lundy RN  Outcome: Met This Shift     Problem: Pain:  Goal: Control of acute pain  Description  Control of acute pain  4/19/2019 1125 by Robby Lundy RN  Outcome: Met This Shift     Problem: Falls - Risk of:  Goal: Will remain free from falls  Description  Will remain free from falls  4/19/2019 1125 by Robby Lundy RN  Outcome: Met This Shift     Problem: Falls - Risk of:  Goal: Absence of physical injury  Description  Absence of physical injury  4/19/2019 1125 by Robby Lundy RN  Outcome: Met This Shift     Problem: Risk for Impaired Skin Integrity  Goal: Tissue integrity - skin and mucous membranes  Description  Structural intactness and normal physiological function of skin and  mucous membranes.   4/19/2019 1125 by Robby Lundy RN  Outcome: Met This Shift     Problem: Urinary Elimination:  Goal: Complications related to the disease process, condition or treatment will be avoided or minimized  Description  Complications related to the disease process, condition or treatment will be avoided or minimized  4/19/2019 1125 by Robby Lundy RN  Outcome: Met This Shift     Problem: Urinary Elimination:  Goal: Signs and symptoms of infection will decrease  Description  Signs and symptoms of infection will decrease  4/19/2019 1125 by Robby Lundy RN  Outcome: Ongoing urine yellow and cloudy monitor urine attributes every shift     Problem: Urinary Elimination:  Goal: Ability to reestablish a normal urinary elimination pattern will improve - after catheter removal  Description  Ability to reestablish a normal urinary elimination pattern will improve  4/19/2019 1125 by Robby Lundy RN  Outcome: Not Met This Shift     Problem: Urinary Elimination:  Goal: Ability to reestablish a normal urinary elimination pattern will improve - after catheter removal  Description  Ability to reestablish a normal

## 2019-04-19 NOTE — PROGRESS NOTES
Nutrition Assessment    Type and Reason for Visit: Initial, Positive Nutrition Screen    Nutrition Recommendations: Continue current diet, Start ONS(Nepro 1 x daily)    Nutrition Assessment: pt is at risk for malnutrition AEB pt report of poor intake/appetite for several days d/t nausea and vomiting. Pt requested Ensure, discussed that Nepro would be more appropriate for her d/t her Phosphorus being elevated. Pt is amenable to Nepro 1 x daily to provide: 425 kcal and 19 gm protein    Malnutrition Assessment:  · Malnutrition Status: At risk for malnutrition  · Context: Acute illness or injury  · Findings of the 6 clinical characteristics of malnutrition (Minimum of 2 out of 6 clinical characteristics is required to make the diagnosis of moderate or severe Protein Calorie Malnutrition based on AND/ASPEN Guidelines):  1. Energy Intake-Less than or equal to 75% of estimated energy requirement(per pt), Greater than or equal to 5 days    2. Weight Loss-No significant weight loss, in 3 months  3. Fat Loss-No significant subcutaneous fat loss,    4. Muscle Loss-No significant muscle mass loss,    5. Fluid Accumulation-No significant fluid accumulation,    6.  Strength-Not measured    Nutrition Risk Level:  Moderate    Nutrient Needs:  · Estimated Daily Total Kcal: 2457-8040(22-23 kcal/kg)  · Estimated Daily Protein (g): 45-55(1.0-1.2 gm/kg IBW)  · Estimated Daily Total Fluid (ml/day): 4251-3101(1 mL/kcal)    Nutrition Diagnosis:   · Problem: Inadequate oral intake  · Etiology: related to Renal dysfunction     Signs and symptoms:  as evidenced by Patient report of, Diet history of poor intake    Objective Information:  · Nutrition-Focused Physical Findings: pt a/o x4, abd WDL, +BS, no edema, -1L I/O, hyperglycemia, Phos= 5.6H, renal labs elevated, pt admit w/ acute on chronic renal insufficiency   · Wound Type: None  · Current Nutrition Therapies:  · Oral Diet Orders: Renal   · Oral Diet intake: 51-75%  · Oral

## 2019-04-19 NOTE — CONSULTS
[facility] MD Lucia Navarro MD Celso Nancy, MD Aram Rosella, DO, [address] [place] [address] Phil GLEZ .  Consultation  Patient's Name: Lebron Nunez  12:52 PM  4/19/2019    Nephrologist: Niki Skinner MD    Reason for Consult:  Acute kidney injury /chronic kidney diseas e    Requesting Physician:  Loretta Escamilla DO    Chief Complaint:  L flank pain     History Obtained From:  Patient , records . History of Present Ilness:      51 y/o WF CKD , b/l hydronephrosis , presenting with cc of L flank pain . She was found to have an JIAN   Ct scan with b/l hydronephrosis and distended bladder   Did sustain an JIAN back in 1/2019 where cr was as high as 10 before settling down aroudn 2.1   Now she is presenting with cr of 5.8   A crane placed in ER . . UO ok . Cr down this am to 5.2   Poor historian in general report no diarrhea , she said she had vomiting on and off   She is on RA . BP stable     Past Medical History:   Diagnosis Date    Anxiety     Depression     Hyperlipidemia     Hypertension     Hypothyroidism     Kidney damage     Leg pain, bilateral     Noncompliance with medications     Noncompliance with treatment     Osteoarthritis        Past Surgical History:   Procedure Laterality Date    CYSTOSCOPY Left 1/21/2019    CYSTOSCOPY, BILATERAL RETROGRADE PYELOGRAMS, BILATERAL STENT INSERTION performed by Samira Fields MD at [address]       Family History   Problem Relation Age of Onset    Diabetes Brother     Thyroid Disease Mother     Other Daughter         Autism        reports that she quit smoking about 30 years ago. She has a 5.00 pack-year smoking history. She has never used smokeless tobacco. She reports that she does not drink alcohol or use drugs. Allergies:  Patient has no known allergies.     Current Medications:      sodium chloride flush 0.9 % injection 10 mL 2 times per day   sodium chloride flush 0.9 % injection 10 mL PRN   acetaminophen (TYLENOL) tablet 650 mg Q4H PRN   0.9 % sodium chloride infusion Continuous   levothyroxine (SYNTHROID) tablet 100 mcg Daily       Review of Systems:   Constitutional: no fevers , no chills , feels ok   Eyes: no eye pain , no itching , no drainage  Ears, nose, mouth, throat, and face: no ear ,nose pain , hearing is ok ,no nasal drainage   Respiratory: no sob ,no cough ,no wheezing . Cardiovascular: no chest pain , no palpitation ,no sob . Gastrointestinal: no nausea, vomiting , constipation , no abdominal pain . Genitourinary:no urinary retention , no burning , dysuria . No polyuria   Hematologic/lymphatic: no bleeding , no cougulation issues . Musculoskeletal:no joint pain , no swelling . Neurological: no headaches ,no weakness , no numbness . Endocrine: no thirst , no weight issues . Physical exam:   Vital signs /60   Pulse 65   Temp 98.6 °F (37 °C) (Oral)   Resp 16   Ht 4' 11\" (1.499 m)   Wt 128 lb (58.1 kg)   LMP 05/21/2015 (Approximate)   SpO2 92%   BMI 25.85 kg/m²   Gen : NAD , appropriate for stated age . Head : at , nc   Neck : supple , no thyromegaly noted . Eyes : EOMI , PERRLA   CV : RRR , No M/R/G . Lungs: CTAB , no wheezing , good flow heard b/l   Abd : soft , NT , BS + , No Organomegaly appreciated . Skin : soft, dry . Neuro : CN  II-XII grossly intact , no focal neurologic deficit . Psych : cooperative .      Data:   Labs:  CBC with Differential:  Lab Results   Component Value Date    WBC 10.5 04/19/2019    RBC 2.83 04/19/2019    HGB 8.3 04/19/2019    HCT 26.8 04/19/2019     04/19/2019    MCV 94.7 04/19/2019    MCH 29.3 04/19/2019    MCHC 31.0 04/19/2019    RDW 12.7 04/19/2019    SEGSPCT 66 10/23/2013    LYMPHOPCT 15.5 04/19/2019    MONOPCT 6.4 04/19/2019    BASOPCT 0.3 04/19/2019    MONOSABS 0.67 04/19/2019    LYMPHSABS 1.63 04/19/2019    EOSABS 0.12 04/19/2019    BASOSABS 0.03 04/19/2019     CMP:  Lab Results   Component Value Date     04/19/2019 K 3.5 04/19/2019    K 4.5 01/17/2019     04/19/2019    CO2 19 04/19/2019    BUN 44 04/19/2019    CREATININE 5.3 04/19/2019    GFRAA 10 04/19/2019    LABGLOM 8 04/19/2019    GLUCOSE 105 04/19/2019    GLUCOSE 97 12/20/2011    PROT 5.6 04/19/2019    LABALBU 2.6 04/19/2019    LABALBU 4.4 12/20/2011    CALCIUM 8.5 04/19/2019    BILITOT <0.2 04/19/2019    ALKPHOS 45 04/19/2019    AST <5 04/19/2019    ALT <5 04/19/2019     Ionized Calcium:  No results found for: IONCA  Magnesium:    Lab Results   Component Value Date    MG 1.8 04/19/2019     Phosphorus:    Lab Results   Component Value Date    PHOS 5.6 04/19/2019     U/A:  Lab Results   Component Value Date    COLORU Yellow 04/18/2019    PHUR 6.0 04/18/2019    WBCUA >20 04/18/2019    RBCUA >20 04/18/2019    BACTERIA MANY 04/18/2019    CLARITYU CLOUDY 04/18/2019    SPECGRAV 1.015 04/18/2019    LEUKOCYTESUR MODERATE 04/18/2019    UROBILINOGEN 0.2 04/18/2019    BILIRUBINUR Negative 04/18/2019    BLOODU LARGE 04/18/2019    GLUCOSEU Negative 04/18/2019     Microalbumen/Creatinine ratio:  No components found for: RUCREAT  Iron Saturation:  No components found for: PERCENTFE  TIBC:  No results found for: TIBC  FERRITIN:  No results found for: FERRITIN     Imaging:  CT ABDOMEN PELVIS WO CONTRAST Additional Contrast? None   Final Result   1. Bilateral hydroureteronephrosis and distention of the urinary   bladder. In review with the fluoroscopy images obtained during the   retrograde urography performed on 1/21/2019, the degree of   hydronephrosis is unchanged. 2. Satisfactory position of the bilateral ureteral stents. 3. No ureteral calculus is noted. 4. Small hiatal hernia. Assessment    Acute kidney injury   Chronic kidney disease with new baseline cr seems to be around 2.1-2.3 . Anaemia of chronic disease   Moderate to severe malnutrition   Sever deconditioning .        Acute kidney likely 2/2 obstructive with b/l hydronephrosis and distended bladder

## 2019-04-19 NOTE — PLAN OF CARE
Problem: Pain:  Goal: Pain level will decrease  Description  Pain level will decrease  4/19/2019 1636 by Karyle Brandt, RN  Outcome: Met This Shift     Problem: Pain:  Goal: Control of acute pain  Description  Control of acute pain  4/19/2019 1636 by Karyle Brandt, RN  Outcome: Met This Shift     Problem: Pain:  Goal: Control of chronic pain  Description  Control of chronic pain  4/19/2019 1636 by Karyle Brandt, RN  Outcome: Met This Shift     Problem: Falls - Risk of:  Goal: Will remain free from falls  Description  Will remain free from falls  4/19/2019 1636 by Karyle Brandt, RN  Outcome: Met This Shift     Problem: Falls - Risk of:  Goal: Absence of physical injury  Description  Absence of physical injury  4/19/2019 1636 by Karyle Brandt, RN  Outcome: Met This Shift     Problem: Risk for Impaired Skin Integrity  Goal: Tissue integrity - skin and mucous membranes  Description  Structural intactness and normal physiological function of skin and  mucous membranes.   4/19/2019 1636 by Karyle Brandt, RN  Outcome: Met This Shift     Problem: Urinary Elimination:  Goal: Signs and symptoms of infection will decrease  Description  Signs and symptoms of infection will decrease  4/19/2019 1636 by Karyle Brandt, RN  Outcome: Ongoing urine yellow and cloudy, encouraged 8 oz water x 8 a day, crane care given, urine yellow and cloudy     Problem: Urinary Elimination:  Goal: Ability to reestablish a normal urinary elimination pattern will improve - after catheter removal  Description  Ability to reestablish a normal urinary elimination pattern will improve  4/19/2019 1636 by Karyle Brandt, RN  Outcome: Not Met This Shift cath still in

## 2019-04-19 NOTE — PROGRESS NOTES
Internal Medicine Progress Note    Bia Ye. Skyla Wu., & [address] Skyla Wu., F.A.C.O.I. Renae Cazares D.O., VIVIAN.JASMIN.C.O.I. Primary Care Physician: Jessica Rizvi DO   Admitting Physician:  Brunilda Chaudhry DO  Admission date and time: 4/18/2019 10:03 AM    Room:  [address] [place] [address]  Admitting diagnosis: Acute on chronic renal insufficiency [N28.9, N18.9]    Patient Name: Vee Rivera  MRN: [id number]    Date of Service: 4/19/2019     Subjective:    Libra Marie is a 52 y. o.  female who was seen and examined today,4/19/2019, at the bedside. Admits to abdominal pain. States pain is better with crane catheter in. No family present during my examination. I reviewed the patient's past medical, surgical history and medication. I reviewed the  course of events since last visit. Review of System:  Constitutional:   Negative for fever and chills. HEENT:   Negative for ear pain, sore throat, rhinorrhea and sinus pressure. Negative for eye pain, discharge and redness. Psch:   Denies depression or anxiety. Cardiovascular:   Denies any chest pain, irregular heartbeats, or palpitations. Respiratory:   Denies shortness of breath, coughing, sputum production, hemoptysis, or wheezing. Gastrointestinal:   Denies nausea, vomiting, diarrhea, or constipation. Denies any abdominal pain. Extremities:   Denies any lower extremity swelling or edema. Neurology:    Denies any headache or focal neurological deficits. No weakness or paresthesia. Derm:   Denies any rashes, ulcers, or excoriations. Denies bruising. Genitourinary:    Denies any urgency, frequency, hematuria.   Voiding without difficulty with crane catheter  Musculoskeletal:  Negative for myalgias, back pain and arthralgias      Allergy:  No Known Allergies     Medication:  Scheduled Meds:   sodium chloride flush  10 mL Intravenous 2 times per day    levothyroxine  100 mcg Oral Daily     Continuous Infusions:   sodium chloride 125 mL/hr at 04/19/19 0624       Objective Data:  CBC:   Recent Labs     04/18/19  1112 04/19/19  0458   WBC 12.3* 10.5   HGB 9.5* 8.3*    267     BMP:    Recent Labs     04/18/19  1112 04/18/19  1121 04/18/19  2246 04/19/19  0458     --  142 143   K 4.1  --  3.8 3.5     --  109* 111*   CO2 22 23 18* 19*   BUN 39*  --  42* 44*   CREATININE 5.2* 5.4* 5.8* 5.3*   GLUCOSE 131*  --  134* 105*     CMP:    Lab Results   Component Value Date     04/19/2019    K 3.5 04/19/2019    K 4.5 01/17/2019     04/19/2019    CO2 19 04/19/2019    BUN 44 04/19/2019    CREATININE 5.3 04/19/2019    GFRAA 10 04/19/2019    LABGLOM 8 04/19/2019    GLUCOSE 105 04/19/2019    GLUCOSE 97 12/20/2011    PROT 5.6 04/19/2019    LABALBU 2.6 04/19/2019    LABALBU 4.4 12/20/2011    CALCIUM 8.5 04/19/2019    BILITOT <0.2 04/19/2019    ALKPHOS 45 04/19/2019    AST <5 04/19/2019    ALT <5 04/19/2019     Hepatic:   Recent Labs     04/18/19  1112 04/19/19  0458   AST 7 <5   ALT <5 <5   BILITOT 0.3 <0.2   ALKPHOS 63 45     Troponin: No results for input(s): TROPONINI in the last 72 hours. BNP: No results for input(s): BNP in the last 72 hours. Lipids:   Recent Labs     04/19/19  0458   CHOL 177   HDL 38     ABGs: No results found for: PHART, PO2ART, DBT7OOJ  INR: No results for input(s): INR, PROTIME in the last 72 hours. RAD: Ct Abdomen Pelvis Wo Contrast Additional Contrast? None    Result Date: 4/18/2019  Reading location:  200 HISTORY: 25-year-old female with bilateral flank pain, recent bilateral ureteral stent placement. TECHNIQUE: Serial axial images of the abdomen and pelvis were obtained without the use of IV or oral contrast. Reformatted sagittal and coronal images were obtained. Correlation is made with the patient's previous bilateral retrograde urography performed on 1/21/2019. One or more of the following dose reduction techniques were used: Automated dose exposure.  Adjustment of the mA and/or kV according to patient size Use of iterative reconstruction techniques FINDINGS: Images through the lung bases demonstrate the lung bases to be clear. The liver, spleen, pancreas and adrenal glands are unremarkable. Bilateral hydronephrosis is noted. The urinary bladder is also distended. There are bilateral ureteral stents seen in satisfactory position. No calculi are noted within the urinary bladder or course of the ureters. The gallbladder and stomach are normally distended. There is no large or small bowel obstruction. There is no pelvic mass, fluid collection or free air. The abdominal aorta is normal in caliber. There is no retroperitoneal lymphadenopathy. 1. Bilateral hydroureteronephrosis and distention of the urinary bladder. In review with the fluoroscopy images obtained during the retrograde urography performed on 1/21/2019, the degree of hydronephrosis is unchanged. 2. Satisfactory position of the bilateral ureteral stents. 3. No ureteral calculus is noted. 4. Small hiatal hernia. Physical Exam:  I/O this shift: In: 480 [P.O.:480]  Out: 950 [Urine:950]    Intake/Output Summary (Last 24 hours) at 4/19/2019 1350  Last data filed at 4/19/2019 1348  Gross per 24 hour   Intake 480 ml   Output 1900 ml   Net -1420 ml   I/O last 3 completed shifts:  In: -   Out: 950 [Urine:950]  Patient Vitals for the past 96 hrs (Last 3 readings):   Weight   04/18/19 1011 128 lb (58.1 kg)       Vital Signs:   Blood pressure 102/60, pulse 65, temperature 98.6 °F (37 °C), temperature source Oral, resp. rate 16, height 4' 11\" (1.499 m), weight 128 lb (58.1 kg), last menstrual period 05/21/2015, SpO2 92 %, not currently breastfeeding. General appearance:   Markus Bell is a 52 y. o.  female who is alert, responsive, oriented to person, place, and time. Psych:   Behavior is normal. Mood appears normal. Speech is not rapid and/or pressured. HEENT:   PERRLA. EOMI. Sclera clear.   Buccal mucosa moist.    Neck: Supple. Trachea midline. No thyromegaly. No JVD. No bruits. Heart:    Rhythm regular, rate controlled. No murmurs. Lungs:  Symmetrical. Clear to auscultation bilaterally. No wheezes. No rhonchi. No rales. Abdomen:   Soft. Non-distended. Bowel sounds positive. No organomegaly or masses. Mild pain on palpation in the suprapubic area. .    Extremities:    Peripheral pulses present. No peripheral edema. No ulcers. Neurologic:    Alert x 3. No focal deficit. Cranial nerves grossly intact. Skin:    No rashes or lesions    Wound Documentation:        Chronic Hospital Medical Problems:  Past Medical History:   Diagnosis Date    Anxiety     Depression     Hyperlipidemia     Hypertension     Hypothyroidism     Kidney damage     Leg pain, bilateral     Noncompliance with medications     Noncompliance with treatment     Osteoarthritis      Active Problems:    Acute on chronic renal insufficiency  Resolved Problems:    * No resolved hospital problems. *      Assessment:  1. Acute renal failure on CKD stage 4 Baseline creatinine 2.2-2.5 likely due to B/L hydronephrosis and likely neurogenic bladder. 2. Normocytic anemia  3. Recurrent azotemia  4. Hypothyroidism  5. Chronic mental disability  6. Hyperlipidemia    Plan:     Nephrology and urology have been consulted and evaluated the patient. Recommendations have been reviewed. Continue crane catheter. Continue IV fluids for gentle hydration. As per urology the patient may need to undergo stent exchange. Monitor urinary out put and intake closely. Pt/ot to evaluate and treat. Activity as tolerated. Patient's medications were reviewed/continued/adjusted. Labs as ordered. Please see orders for further plan of care. More than 50% of my  time was spent counseling and/or coordination of care with the patient and/or family with face to face contact.      Time was spent reviewing notes and laboratory data, instructing and counseling the patient regarding my findings and recommendations and reviewing and answer questions. Jc Stevens DO, F.A.C.O.I.   On 4/19/2019  1:50 PM

## 2019-04-19 NOTE — PLAN OF CARE
Problem: Inadequate oral food/beverage intake (NI-2.1)  Goal: Food and/or Nutrient Delivery  Description; Diet and ONS ordered  Individualized approach for food/nutrient provision.   Outcome: Met This Shift

## 2019-04-19 NOTE — PLAN OF CARE
Problem: Pain:  Goal: Pain level will decrease  Description  Pain level will decrease  Outcome: Met This Shift  Goal: Control of acute pain  Description  Control of acute pain  Outcome: Met This Shift  Goal: Control of chronic pain  Description  Control of chronic pain  Outcome: Met This Shift     Problem: Falls - Risk of:  Goal: Will remain free from falls  Description  Will remain free from falls  Outcome: Met This Shift  Goal: Absence of physical injury  Description  Absence of physical injury  Outcome: Met This Shift     Problem: Risk for Impaired Skin Integrity  Goal: Tissue integrity - skin and mucous membranes  Description  Structural intactness and normal physiological function of skin and  mucous membranes.   Outcome: Met This Shift     Problem: Urinary Elimination:  Goal: Complications related to the disease process, condition or treatment will be avoided or minimized  Description  Complications related to the disease process, condition or treatment will be avoided or minimized  Outcome: Met This Shift

## 2019-04-19 NOTE — CONSULTS
4/19/2019 11:38 AM  Service: Urology  Group: [facility] ([facility])    Karthik Oven  [id number]     Chief Complaint:  Flank pain and hematuria    History of Present Illness: The patient is a 52 y.o. female patient who presents with flank pain and hematuria. Bilateral stents inserted by Dr Anthony Sparks on 1/21/2019. Pt most likely with neurogenic bladder. Pt is a reformed smoker. She had azotemia resolved with hydration and stents. Plan was for ureteroscopy at some point for sea for ureteral calculi though most likely hydro from incomplete bladder emptying. The patient had not been eating well prior to admission. She notes that she's been straining to void. The Braden catheter didn't have been placed which was intended for long-term had been removed at some point    Past Medical History:   Diagnosis Date    Anxiety     Depression     Hyperlipidemia     Hypertension     Hypothyroidism     Kidney damage     Leg pain, bilateral     Noncompliance with medications     Noncompliance with treatment     Osteoarthritis          Past Surgical History:   Procedure Laterality Date    CYSTOSCOPY Left 1/21/2019    CYSTOSCOPY, BILATERAL RETROGRADE PYELOGRAMS, BILATERAL STENT INSERTION performed by Niall Beckwith MD at [address]       Medications Prior to Admission:    Medications Prior to Admission: levothyroxine (SYNTHROID) 100 MCG tablet, TAKE 1 TABLET BY MOUTH EVERY MORNING 30 MINUTES BEFORE EATING.   vitamin D (ERGOCALCIFEROL) 18345 units CAPS capsule, Take 1 capsule by mouth once a week  oxybutynin (DITROPAN-XL) 5 MG extended release tablet, Take 1 tablet by mouth daily  magnesium oxide (MAG-OX) 400 (241.3 Mg) MG TABS tablet, Take 1 tablet by mouth 2 times daily  fluticasone (FLONASE) 50 MCG/ACT nasal spray, 2 sprays by Nasal route daily  atorvastatin (LIPITOR) 40 MG tablet, Take 1 tablet by mouth daily  pantoprazole (PROTONIX) 40 MG tablet, TAKE ONE TABLET BY MOUTH DAILY  sodium bicarbonate 650 MG tablet, Take 1 tablet by mouth 4 times daily  acetaminophen (APAP EXTRA STRENGTH) 500 MG tablet, Take 1 tablet by mouth every 6 hours as needed for Pain  Compression Stockings MISC, by Does not apply route Below knee 15 - 30 mm Hg    Allergies:    Patient has no known allergies. Social History:    reports that she quit smoking about 30 years ago. She has a 5.00 pack-year smoking history. She has never used smokeless tobacco. She reports that she does not drink alcohol or use drugs. Family History:   Non-contributory to this Urological problem  family history includes Diabetes in her brother; Other in her daughter; Thyroid Disease in her mother. Review of Systems:  Respiratory: negative for cough and hemoptysis  Cardiovascular: negative for chest pain and dyspnea  Gastrointestinal: negative for abdominal pain, diarrhea, nausea and vomiting  Derm: negative for rash and skin lesion(s)  Neurological: negative for seizures and tremors/depression/hyperlipidemiaEndocrine: hypothyroidism/: As above in the HPI, otherwise negative  All other reviews are negative    Physical Exam:     Vitals:  /60   Pulse 65   Temp 98.6 °F (37 °C) (Oral)   Resp 16   Ht 4' 11\" (1.499 m)   Wt 128 lb (58.1 kg)   LMP 05/21/2015 (Approximate)   SpO2 92%   BMI 25.85 kg/m²     General:  Awake, alert, oriented X 3. Well developed, well nourished, well groomed. No apparent distress. HEENT:  Normocephalic, atraumatic. Pupils equal, round. No scleral icterus. No conjunctival injection. Normal lips, teeth, and gums. No nasal discharge. Neck:  Supple, no masses. Heart:  RRR  Lungs:  No audible wheezing. Respirations symmetric and non-labored. Abdomen:  soft, nontender, no masses, no organomegaly, no peritoneal signs  Extremities:  No clubbing, cyanosis, or edema  Skin:  Warm and dry, no open lesions or rashes  Neuro:  There are no motor or sensory deficits in the 4 quadrant extremities   Rectal: deferred  Genitalia: The catheter is well-positioned in draining clear urine    Labs:     Recent Labs     04/18/19  1112 04/19/19  0458   WBC 12.3* 10.5   RBC 3.24* 2.83*   HGB 9.5* 8.3*   HCT 30.0* 26.8*   MCV 92.6 94.7   MCH 29.3 29.3   MCHC 31.7* 31.0*   RDW 12.6 12.7    267   MPV 11.4 11.4         Recent Labs     04/18/19  1121 04/18/19  2246 04/19/19  0458   CREATININE 5.4* 5.8* 5.3*         Assessment:  Madi Dugan 52 y.o. female     Recurrent azotemia referable to incomplete bladder emptying  Continue the Braden catheter and the hydration  At some point we may want to exchange the stents but presently I think the problem is that of dehydration and urinary retention not malfunctioning stents  Plan:    As above    Electronically signed by Remberto Delong MD on 4/19/2019 at 11:38 AM

## 2019-04-19 NOTE — PROGRESS NOTES
Date:2019  Patient Name: Yariel Montyoa  MRN: [id number]  : 1965  ROOM #: [id number]    20 minutes spent encouraging pt to participate in therapy. Pt adamantly refused to sit edge of bed or participate in out of bed activites. Pt repeatedly stated \"NO! \" John Willard.,  presented to the room to encourage pt without success. Will attempt OT evaluation at a later time. Thank you.    Marcelle Aly OTR/L #[id number]

## 2019-04-19 NOTE — PROGRESS NOTES
Room #:   0712/0167-16  Date: 2019       Patient Name: Susy Lopez  : 1965      MRN: [id number]     Patient unavailable for physical therapy eval due to adamant refusal. 20 min spent encouraging patient to participate. AYO Jarvis came to room to encourage pt however pt repeatedly stating \"No!\"   Will attempt PT evaluation at a later time. Thank you.        Elizabeth Carty, PT

## 2019-04-20 LAB
ALBUMIN SERPL-MCNC: 2.5 G/DL (ref 3.5–5.2)
ALP BLD-CCNC: 53 U/L (ref 35–104)
ALT SERPL-CCNC: <5 U/L (ref 0–32)
ANION GAP SERPL CALCULATED.3IONS-SCNC: 10 MMOL/L (ref 7–16)
AST SERPL-CCNC: 5 U/L (ref 0–31)
BASOPHILS ABSOLUTE: 0.02 E9/L (ref 0–0.2)
BASOPHILS RELATIVE PERCENT: 0.2 % (ref 0–2)
BILIRUB SERPL-MCNC: <0.2 MG/DL (ref 0–1.2)
BUN BLDV-MCNC: 32 MG/DL (ref 6–20)
CALCIUM SERPL-MCNC: 8.3 MG/DL (ref 8.6–10.2)
CHLORIDE BLD-SCNC: 112 MMOL/L (ref 98–107)
CO2: 21 MMOL/L (ref 22–29)
CREAT SERPL-MCNC: 3.6 MG/DL (ref 0.5–1)
EOSINOPHILS ABSOLUTE: 0.31 E9/L (ref 0.05–0.5)
EOSINOPHILS RELATIVE PERCENT: 3 % (ref 0–6)
GFR AFRICAN AMERICAN: 16
GFR NON-AFRICAN AMERICAN: 13 ML/MIN/1.73
GLUCOSE BLD-MCNC: 100 MG/DL (ref 74–99)
HCT VFR BLD CALC: 28.2 % (ref 34–48)
HEMOGLOBIN: 8.8 G/DL (ref 11.5–15.5)
IMMATURE GRANULOCYTES #: 0.05 E9/L
IMMATURE GRANULOCYTES %: 0.5 % (ref 0–5)
LYMPHOCYTES ABSOLUTE: 1.93 E9/L (ref 1.5–4)
LYMPHOCYTES RELATIVE PERCENT: 18.7 % (ref 20–42)
MCH RBC QN AUTO: 29.2 PG (ref 26–35)
MCHC RBC AUTO-ENTMCNC: 31.2 % (ref 32–34.5)
MCV RBC AUTO: 93.7 FL (ref 80–99.9)
MONOCYTES ABSOLUTE: 0.57 E9/L (ref 0.1–0.95)
MONOCYTES RELATIVE PERCENT: 5.5 % (ref 2–12)
NEUTROPHILS ABSOLUTE: 7.44 E9/L (ref 1.8–7.3)
NEUTROPHILS RELATIVE PERCENT: 72.1 % (ref 43–80)
PDW BLD-RTO: 12.7 FL (ref 11.5–15)
PLATELET # BLD: 265 E9/L (ref 130–450)
PMV BLD AUTO: 11.4 FL (ref 7–12)
POTASSIUM SERPL-SCNC: 3.2 MMOL/L (ref 3.5–5)
RBC # BLD: 3.01 E12/L (ref 3.5–5.5)
SODIUM BLD-SCNC: 143 MMOL/L (ref 132–146)
TOTAL PROTEIN: 5.6 G/DL (ref 6.4–8.3)
URINE CULTURE, ROUTINE: NORMAL
WBC # BLD: 10.3 E9/L (ref 4.5–11.5)

## 2019-04-20 PROCEDURE — 6370000000 HC RX 637 (ALT 250 FOR IP): Performed by: INTERNAL MEDICINE

## 2019-04-20 PROCEDURE — 1200000000 HC SEMI PRIVATE

## 2019-04-20 PROCEDURE — 6360000002 HC RX W HCPCS: Performed by: INTERNAL MEDICINE

## 2019-04-20 PROCEDURE — 36415 COLL VENOUS BLD VENIPUNCTURE: CPT

## 2019-04-20 PROCEDURE — 85025 COMPLETE CBC W/AUTO DIFF WBC: CPT

## 2019-04-20 PROCEDURE — 80053 COMPREHEN METABOLIC PANEL: CPT

## 2019-04-20 PROCEDURE — 2580000003 HC RX 258: Performed by: INTERNAL MEDICINE

## 2019-04-20 RX ORDER — SODIUM CHLORIDE AND POTASSIUM CHLORIDE .9; .15 G/100ML; G/100ML
SOLUTION INTRAVENOUS
Status: DISPENSED
Start: 2019-04-20 | End: 2019-04-21

## 2019-04-20 RX ORDER — POTASSIUM BICARBONATE 25 MEQ/1
50 TABLET, EFFERVESCENT ORAL ONCE
Status: DISCONTINUED | OUTPATIENT
Start: 2019-04-20 | End: 2019-04-20 | Stop reason: CLARIF

## 2019-04-20 RX ORDER — ATROPA BELLADONNA AND OPIUM 16.2; 6 MG/1; MG/1
60 SUPPOSITORY RECTAL EVERY 8 HOURS PRN
Status: DISCONTINUED | OUTPATIENT
Start: 2019-04-20 | End: 2019-04-23 | Stop reason: HOSPADM

## 2019-04-20 RX ORDER — SODIUM CHLORIDE AND POTASSIUM CHLORIDE .9; .15 G/100ML; G/100ML
SOLUTION INTRAVENOUS CONTINUOUS
Status: DISCONTINUED | OUTPATIENT
Start: 2019-04-20 | End: 2019-04-20

## 2019-04-20 RX ORDER — POTASSIUM CHLORIDE AND SODIUM CHLORIDE 900; 300 MG/100ML; MG/100ML
INJECTION, SOLUTION INTRAVENOUS CONTINUOUS
Status: DISCONTINUED | OUTPATIENT
Start: 2019-04-20 | End: 2019-04-23 | Stop reason: HOSPADM

## 2019-04-20 RX ADMIN — LEVOTHYROXINE SODIUM 100 MCG: 100 TABLET ORAL at 06:22

## 2019-04-20 RX ADMIN — POTASSIUM CHLORIDE AND SODIUM CHLORIDE: 900; 300 INJECTION, SOLUTION INTRAVENOUS at 21:39

## 2019-04-20 RX ADMIN — POTASSIUM CHLORIDE AND SODIUM CHLORIDE: 900; 300 INJECTION, SOLUTION INTRAVENOUS at 14:21

## 2019-04-20 RX ADMIN — SODIUM CHLORIDE: 9 INJECTION, SOLUTION INTRAVENOUS at 06:22

## 2019-04-20 RX ADMIN — POTASSIUM CHLORIDE AND SODIUM CHLORIDE: 900; 150 INJECTION, SOLUTION INTRAVENOUS at 13:30

## 2019-04-20 ASSESSMENT — PAIN SCALES - GENERAL
PAINLEVEL_OUTOF10: 0

## 2019-04-20 NOTE — PROGRESS NOTES
 Alcohol use: No    Drug use: No    Sexual activity: None   Lifestyle    Physical activity:     Days per week: None     Minutes per session: None    Stress: None   Relationships    Social connections:     Talks on phone: None     Gets together: None     Attends Orthodoxy service: None     Active member of club or organization: None     Attends meetings of clubs or organizations: None     Relationship status: None    Intimate partner violence:     Fear of current or ex partner: None     Emotionally abused: None     Physically abused: None     Forced sexual activity: None   Other Topics Concern    None   Social History Narrative    None       Scheduled Meds:   sodium chloride flush  10 mL Intravenous 2 times per day    levothyroxine  100 mcg Oral Daily     Continuous Infusions:   sodium chloride 125 mL/hr at 04/20/19 0622     PRN Meds:.sodium chloride flush, acetaminophen    BP (!) 109/56   Pulse 61   Temp 99.3 °F (37.4 °C) (Oral)   Resp 14   Ht 4' 11\" (1.499 m)   Wt 140 lb 11.2 oz (63.8 kg)   LMP 05/21/2015 (Approximate)   SpO2 94%   BMI 28.42 kg/m²     Lab Results   Component Value Date    WBC 10.3 04/20/2019    HGB 8.8 (L) 04/20/2019    HCT 28.2 (L) 04/20/2019    MCV 93.7 04/20/2019     04/20/2019       Lab Results   Component Value Date    CREATININE 3.6 (H) 04/20/2019       No results found for: PSA    Recent Labs     04/18/19  1245   LABURIN <10,000 CFU/mL  Mixed gram positive organisms         PHYSICAL EXAMINATION:  Skin dry, without rashes  Respirations non-labored, intact  Abdomen soft, tender, non-distended  Alert and oriented x3  Braden draining clear urine      ASSESSMENT AND PLAN:    1. Neurogenic bladder with bilateral hydronephrosis and chronic indwelling bilateral ureteral stents. The patient was in urinary retention and a Braden catheter was inserted for large postvoid residual.  Since inserting this catheter her creatinine has improved to a level of 3.6 currently.   I discussed options with her and there is no simple solution to manage her bladder. She will either require a chronic Braden catheter, suprapubic catheter, further workup with urodynamics, or ileal conduit urinary diversion. I believe that the larger issue with her is the emotional aspect of dealing with her chronic medical conditions. She sees a counselor for this and is very emotional today. 2.  Abdominal pain of uncertain etiology. I'm not convinced that this is due to her bladder. However opium and belladonna alkaloid suppositories will be written for in the event that spasm of her bladder is causing this pain.     Zaira Naik M.D.  4/20/2019  9:37 AM

## 2019-04-20 NOTE — PROGRESS NOTES
PT made 3 different attempts to complete PT evaluation. Each time patient offered a different excuse to refuse. Will check back at next available opportunity.

## 2019-04-20 NOTE — PROGRESS NOTES
Nephrology Progress Note   Weekend coverage for [facility] MD Martin Valentino MD Hartley Meeter, DO, L[facility]fil[facility]   4/20/2019 1:11 PM  Subjective:   Admit Date: 4/18/2019  PCP: Wiley Samuel DO    Interval History: Complaining of slight groin discomfort no chest pain and no shortness of breath able to eat some on oxygen and has a Braden catheter placed for of urine tolerating IV fluids    Diet: DIET RENAL;  Dietary Nutrition Supplements: Renal Oral Supplement    Data:     Scheduled Meds:   sodium chloride flush  10 mL Intravenous 2 times per day    levothyroxine  100 mcg Oral Daily     Continuous Infusions:   sodium chloride 125 mL/hr at 04/20/19 0622     PRN Meds:opium-belladonna, sodium chloride flush, acetaminophen  I/O last 3 completed shifts: In: 2400 [P.O.:600; I.V.:1800]  Out: 3600 [Urine:3600]  No intake/output data recorded.     Intake/Output Summary (Last 24 hours) at 4/20/2019 1311  Last data filed at 4/20/2019 0644  Gross per 24 hour   Intake 2160 ml   Output 3600 ml   Net -1440 ml     CBC:   Recent Labs     04/18/19  1112 04/19/19  0458 04/20/19  0428   WBC 12.3* 10.5 10.3   HGB 9.5* 8.3* 8.8*    267 265     BMP:    Recent Labs     04/19/19  0458 04/19/19  1547 04/20/19  0428    140 143   K 3.5 3.3* 3.2*   * 107 112*   CO2 19* 20* 21*   BUN 44* 39* 32*   CREATININE 5.3* 4.5* 3.6*   GLUCOSE 105* 136* 100*     Hepatic:   Recent Labs     04/18/19  1112 04/19/19  0458 04/20/19  0428   AST 7 <5 5   ALT <5 <5 <5   BILITOT 0.3 <0.2 <0.2   ALKPHOS 63 45 53     Protein/ Albumin:    Lab Results   Component Value Date    LABALBU 2.5 (L) 04/20/2019      Imaging Results         Procedure Component Value Ref Range Date/Time     CT ABDOMEN PELVIS WO CONTRAST Additional Contrast? None [[id number]] Resulted: 04/18/19 1441     Order Status: Completed Updated: 04/18/19 1443     Narrative:       Reading location:  200    HISTORY: 68-year-old female with Good air movement  Heart: No S3, No rub  Abdomen: Lax, soft No tenderness  L. Extremities: No edema    Assessment & Plan:     Acute kidney injury on top of chronic kidney disease secondary to obstructive uropathy: Creatinine is improving. Patient has postobstructive diuresis. Continue IV fluids. Watch for hypotension. Continue to monitor electrolytes. Continue to monitor intake and output. Urology input noted and appreciated    Supplement hypokalemia, and potassium to IV fluids and check magnesium in a.m. Improving metabolic acidosis with normal anion reflecting acute kidney injury improvement     urine culture revealed mixed gram-positive organism < 10,000 CFU    Anemia: Particularly of chronic kidney disease. Transfuse as needed    This note was created using voice recognition software.     Electronically signed by Yoly Ray MD on 4/20/2019 at 1:11 PM

## 2019-04-20 NOTE — PROGRESS NOTES
This nurse went to give the patient her oral potassium supplement and she told me that it makes her vomit. I suggested a few ways to take it or drinks to dissolve it in and she said it still makes her sick and she doesn't want to take it. I explained to her that her new IV fluids have potassium too and she was agreeable to that.

## 2019-04-20 NOTE — PROGRESS NOTES
Internal Medicine Progress Note    Tami Romero. Tere Resendiz., & [address] Tere Resendiz., F.A.C.O.I. Wesley Gates D.O., F.A.C.O.I. Primary Care Physician: Gery Heimlich, DO   Admitting Physician:  Hector Tatum DO  Admission date and time: 4/18/2019 10:03 AM    Room:  [address] [place] [address]  Admitting diagnosis: Acute on chronic renal insufficiency [N28.9, N18.9]    Patient Name: Dominic Madrigal  MRN: [id number]    Date of Service: 4/20/2019     Subjective:    Gabriella Goins is a 52 y. o.  female who was seen and examined today,4/20/2019, at the bedside. States she continues to have intermittent low abdominal pain. States last evening she was nauseated. States she is able to eat despite nausea. Staff reports patient refusing to take oral potassium supplementation. No family present during my examination. I reviewed the patient's past medical, surgical history and medication. I reviewed the  course of events since last visit. Review of System:  Constitutional:   Negative for fever and chills. HEENT:   Negative for ear pain, sore throat, rhinorrhea and sinus pressure. Negative for eye pain, discharge and redness. Psch:   Denies depression or anxiety. Cardiovascular:   Denies any chest pain, irregular heartbeats, or palpitations. Respiratory:   Denies shortness of breath, coughing, sputum production, hemoptysis, or wheezing. Gastrointestinal:   Denies nausea, vomiting, diarrhea, or constipation. Admits to pain in the lower abdomen suprapubic area. Extremities:   Denies any lower extremity swelling or edema. Neurology:    Denies any headache or focal neurological deficits. No weakness or paresthesia. Derm:   Denies any rashes, ulcers, or excoriations. Denies bruising. Genitourinary:    Denies any urgency, frequency, hematuria.   Voiding without difficulty with crane catheter  Musculoskeletal:  Negative for myalgias, back pain and arthralgias      Allergy:  No Known Reformatted sagittal and coronal images were obtained. Correlation is made with the patient's previous bilateral retrograde urography performed on 1/21/2019. One or more of the following dose reduction techniques were used: Automated dose exposure. Adjustment of the mA and/or kV according to patient size Use of iterative reconstruction techniques FINDINGS: Images through the lung bases demonstrate the lung bases to be clear. The liver, spleen, pancreas and adrenal glands are unremarkable. Bilateral hydronephrosis is noted. The urinary bladder is also distended. There are bilateral ureteral stents seen in satisfactory position. No calculi are noted within the urinary bladder or course of the ureters. The gallbladder and stomach are normally distended. There is no large or small bowel obstruction. There is no pelvic mass, fluid collection or free air. The abdominal aorta is normal in caliber. There is no retroperitoneal lymphadenopathy. 1. Bilateral hydroureteronephrosis and distention of the urinary bladder. In review with the fluoroscopy images obtained during the retrograde urography performed on 1/21/2019, the degree of hydronephrosis is unchanged. 2. Satisfactory position of the bilateral ureteral stents. 3. No ureteral calculus is noted. 4. Small hiatal hernia. Physical Exam:  No intake/output data recorded. Intake/Output Summary (Last 24 hours) at 4/20/2019 1413  Last data filed at 4/20/2019 0644  Gross per 24 hour   Intake 1120 ml   Output 2650 ml   Net -1530 ml   I/O last 3 completed shifts: In: 2400 [P.O.:600; I.V.:1800]  Out: 3600 [Urine:3600]  Patient Vitals for the past 96 hrs (Last 3 readings):   Weight   04/19/19 1500 140 lb 11.2 oz (63.8 kg)   04/18/19 1011 128 lb (58.1 kg)       Vital Signs:   Blood pressure (!) 109/56, pulse 61, temperature 99.3 °F (37.4 °C), temperature source Oral, resp.  rate 14, height 4' 11\" (1.499 m), weight 140 lb 11.2 oz (63.8 kg), last menstrual period 05/21/2015, SpO2 94 %, not currently breastfeeding. General appearance:   Stalin Green is a 52 y. o.  female who is alert, responsive, oriented to person, place, and time. Psych:   Behavior is normal. Mood appears normal. Speech is not rapid and/or pressured. HEENT:   PERRLA. EOMI. Sclera clear. Buccal mucosa moist.    Neck:    Supple. Trachea midline. No thyromegaly. No JVD. No bruits. Heart:    Rhythm regular, rate controlled. No murmurs. Lungs:  Symmetrical. Clear to auscultation bilaterally. No wheezes. No rhonchi. No rales. Abdomen:   Soft. Non-distended. Bowel sounds positive. No organomegaly or masses. Mild pain on palpation in the suprapubic area. .    Extremities:    Peripheral pulses present. No peripheral edema. No ulcers. Neurologic:    Alert x 3. No focal deficit. Cranial nerves grossly intact. Skin:    No rashes or lesions    Wound Documentation:        Chronic Hospital Medical Problems:  Past Medical History:   Diagnosis Date    Anxiety     Depression     Hyperlipidemia     Hypertension     Hypothyroidism     Kidney damage     Leg pain, bilateral     Noncompliance with medications     Noncompliance with treatment     Osteoarthritis      Active Problems:    Acute on chronic renal insufficiency  Resolved Problems:    * No resolved hospital problems. *      Assessment:  1. Acute renal failure on CKD stage 4 Baseline creatinine 2.2-2.5 likely due to B/L hydronephrosis and likely neurogenic bladder. 2. Normocytic anemia  3. Recurrent azotemia  4. Hypothyroidism  5. Chronic mental disability  6. Hyperlipidemia    Plan:   Kidney function continues to improve. Creatinine down to 3.6 from 4.5 yesterday. We'll maintain Braden catheter for close monitoring of urinary output in light of acute kidney injury. Nephrology and urology following the patient and recommendations have been reviewed.   We'll adjust the potassium in the IV fluids as patient is refusing oral

## 2019-04-21 LAB
ALBUMIN SERPL-MCNC: 2.5 G/DL (ref 3.5–5.2)
ALP BLD-CCNC: 51 U/L (ref 35–104)
ALT SERPL-CCNC: 21 U/L (ref 0–32)
ANION GAP SERPL CALCULATED.3IONS-SCNC: 10 MMOL/L (ref 7–16)
AST SERPL-CCNC: 20 U/L (ref 0–31)
BASOPHILS ABSOLUTE: 0.02 E9/L (ref 0–0.2)
BASOPHILS RELATIVE PERCENT: 0.2 % (ref 0–2)
BILIRUB SERPL-MCNC: <0.2 MG/DL (ref 0–1.2)
BUN BLDV-MCNC: 19 MG/DL (ref 6–20)
CALCIUM SERPL-MCNC: 7.7 MG/DL (ref 8.6–10.2)
CHLORIDE BLD-SCNC: 111 MMOL/L (ref 98–107)
CO2: 20 MMOL/L (ref 22–29)
CREAT SERPL-MCNC: 2.2 MG/DL (ref 0.5–1)
EOSINOPHILS ABSOLUTE: 0.36 E9/L (ref 0.05–0.5)
EOSINOPHILS RELATIVE PERCENT: 3.3 % (ref 0–6)
GFR AFRICAN AMERICAN: 28
GFR NON-AFRICAN AMERICAN: 23 ML/MIN/1.73
GLUCOSE BLD-MCNC: 109 MG/DL (ref 74–99)
HCT VFR BLD CALC: 28.4 % (ref 34–48)
HEMOGLOBIN: 8.9 G/DL (ref 11.5–15.5)
IMMATURE GRANULOCYTES #: 0.07 E9/L
IMMATURE GRANULOCYTES %: 0.6 % (ref 0–5)
LYMPHOCYTES ABSOLUTE: 2.05 E9/L (ref 1.5–4)
LYMPHOCYTES RELATIVE PERCENT: 18.9 % (ref 20–42)
MAGNESIUM: 1.2 MG/DL (ref 1.6–2.6)
MCH RBC QN AUTO: 29.4 PG (ref 26–35)
MCHC RBC AUTO-ENTMCNC: 31.3 % (ref 32–34.5)
MCV RBC AUTO: 93.7 FL (ref 80–99.9)
MONOCYTES ABSOLUTE: 0.6 E9/L (ref 0.1–0.95)
MONOCYTES RELATIVE PERCENT: 5.5 % (ref 2–12)
NEUTROPHILS ABSOLUTE: 7.74 E9/L (ref 1.8–7.3)
NEUTROPHILS RELATIVE PERCENT: 71.5 % (ref 43–80)
PDW BLD-RTO: 12.4 FL (ref 11.5–15)
PHOSPHORUS: 2.4 MG/DL (ref 2.5–4.5)
PLATELET # BLD: 262 E9/L (ref 130–450)
PMV BLD AUTO: 11.4 FL (ref 7–12)
POTASSIUM SERPL-SCNC: 3.8 MMOL/L (ref 3.5–5)
RBC # BLD: 3.03 E12/L (ref 3.5–5.5)
SODIUM BLD-SCNC: 141 MMOL/L (ref 132–146)
TOTAL PROTEIN: 5.8 G/DL (ref 6.4–8.3)
WBC # BLD: 10.8 E9/L (ref 4.5–11.5)

## 2019-04-21 PROCEDURE — 2580000003 HC RX 258: Performed by: INTERNAL MEDICINE

## 2019-04-21 PROCEDURE — 1200000000 HC SEMI PRIVATE

## 2019-04-21 PROCEDURE — 83735 ASSAY OF MAGNESIUM: CPT

## 2019-04-21 PROCEDURE — 6370000000 HC RX 637 (ALT 250 FOR IP): Performed by: INTERNAL MEDICINE

## 2019-04-21 PROCEDURE — 85025 COMPLETE CBC W/AUTO DIFF WBC: CPT

## 2019-04-21 PROCEDURE — 97161 PT EVAL LOW COMPLEX 20 MIN: CPT | Performed by: PHYSICAL THERAPIST

## 2019-04-21 PROCEDURE — 84100 ASSAY OF PHOSPHORUS: CPT

## 2019-04-21 PROCEDURE — 6360000002 HC RX W HCPCS: Performed by: INTERNAL MEDICINE

## 2019-04-21 PROCEDURE — 80053 COMPREHEN METABOLIC PANEL: CPT

## 2019-04-21 PROCEDURE — 36415 COLL VENOUS BLD VENIPUNCTURE: CPT

## 2019-04-21 PROCEDURE — 97530 THERAPEUTIC ACTIVITIES: CPT | Performed by: PHYSICAL THERAPIST

## 2019-04-21 RX ORDER — ONDANSETRON 4 MG/1
4 TABLET, ORALLY DISINTEGRATING ORAL EVERY 8 HOURS PRN
Status: DISCONTINUED | OUTPATIENT
Start: 2019-04-21 | End: 2019-04-23 | Stop reason: HOSPADM

## 2019-04-21 RX ORDER — MAGNESIUM SULFATE IN WATER 40 MG/ML
2 INJECTION, SOLUTION INTRAVENOUS ONCE
Status: COMPLETED | OUTPATIENT
Start: 2019-04-21 | End: 2019-04-21

## 2019-04-21 RX ADMIN — Medication 10 ML: at 22:08

## 2019-04-21 RX ADMIN — LEVOTHYROXINE SODIUM 100 MCG: 100 TABLET ORAL at 06:39

## 2019-04-21 RX ADMIN — POTASSIUM CHLORIDE AND SODIUM CHLORIDE: 900; 300 INJECTION, SOLUTION INTRAVENOUS at 05:41

## 2019-04-21 RX ADMIN — ACETAMINOPHEN 650 MG: 325 TABLET ORAL at 08:05

## 2019-04-21 RX ADMIN — MAGNESIUM SULFATE HEPTAHYDRATE 2 G: 40 INJECTION, SOLUTION INTRAVENOUS at 13:02

## 2019-04-21 ASSESSMENT — PAIN DESCRIPTION - PAIN TYPE: TYPE: ACUTE PAIN

## 2019-04-21 ASSESSMENT — PAIN DESCRIPTION - DESCRIPTORS: DESCRIPTORS: SHARP;SHOOTING;STABBING

## 2019-04-21 ASSESSMENT — PAIN SCALES - GENERAL
PAINLEVEL_OUTOF10: 10
PAINLEVEL_OUTOF10: 0

## 2019-04-21 ASSESSMENT — ENCOUNTER SYMPTOMS
NAUSEA: 1
DIARRHEA: 0
CONSTIPATION: 0
CHEST TIGHTNESS: 0
SHORTNESS OF BREATH: 0
COUGH: 0
VOMITING: 0

## 2019-04-21 ASSESSMENT — PAIN DESCRIPTION - FREQUENCY: FREQUENCY: CONTINUOUS

## 2019-04-21 ASSESSMENT — PAIN DESCRIPTION - ORIENTATION: ORIENTATION: LEFT

## 2019-04-21 ASSESSMENT — PAIN DESCRIPTION - LOCATION: LOCATION: FLANK

## 2019-04-21 ASSESSMENT — PAIN DESCRIPTION - PROGRESSION: CLINICAL_PROGRESSION: NOT CHANGED

## 2019-04-21 ASSESSMENT — PAIN DESCRIPTION - ONSET: ONSET: ON-GOING

## 2019-04-21 ASSESSMENT — PAIN DESCRIPTION - DIRECTION: RADIATING_TOWARDS: ABDOMEN

## 2019-04-21 NOTE — PROGRESS NOTES
bilateral     Noncompliance with medications     Noncompliance with treatment     Osteoarthritis    ;      Procedure Laterality Date    CYSTOSCOPY Left 1/21/2019    CYSTOSCOPY, BILATERAL RETROGRADE PYELOGRAMS, BILATERAL STENT INSERTION performed by Bam Yen MD at [address]       The admitting diagnosis and active problem list as listed above have been reviewed prior to the initiation of this evaluation. Last time out of bed: prior to admit    Precautions: falls, alarm and behavior ,   Social history: Patient lives with boyfriend  in a basement apartment, 1 story, 8 steps to enter with rail, tub shower. Prior Level of Function: Patient ambulated with wheeled walker    Equipment owned: [employer],       Mentation: alert, cooperative, oriented x 3  and follows directions,      ROM: wfl    STRENGTH:  3+/5  PAIN: (measured on a visual analog scale with 0=no pain and 10=excruciating pain) no c/o. FUNCTIONAL ASSESSMENT   Bed Mobility- Supine to sit- Minimal assist          Scooting- Minimal assist       Sit to supine- Minimal assist      Transfers-Sit to stand- Minimal assist     Gait:  Patient ambulated 4 side steps to hob  using hand held assist with Minimal assist     Steps:  Not assessed      Balance: sit-good         stand fair       Edema: no  Endurance: fair       Treatment:  Therapist educated and facilitated patient on techniques to increase safety and independence with bed mobility, balance, functional transfers, and functional mobility. Sat EOB x 15 minutes to increase dynamic sitting balance and activity tolerance. Patient stood to look out window x 3 min, side stepped to hob, declined further ambulation or up to chair. returned to bed with breakfast tray. . Patient demonstrating fair - understanding of education/techniques, requiring additional training/education.   At end of session, patient in bed with  Alarm, call light and phone within reach,   all lines and tubes intact, nursing notified. Pt would benefit from continued skilled PT to increase functional independence and quality of life. Rehab Potential: good     Patients Goal: home      ASSESSMENT  Patient exhibits decreased strength, balance, coordination impairing functional mobility. GOALS to be met in 3 days. Bed mobility-  Independent        Transfers-Sit to stand-Supervision      Gait:  Patient to ambulate 50 feet using wheeled walker with Supervision      Steps: Patient to go up and down 8  step(s) using 1 rail(s) with  Minimal assist    Increase strength in affected mm groups by 1/3 grade  Increase balance to allow for improvement towards functional goals. Increase endurance to allow for improvement towards functional goals.         Jerica Pappas, PT

## 2019-04-21 NOTE — PROGRESS NOTES
[facility]  PROGRESS NOTE  Subjective:  Feels nauseated   Poor appetite  Denies vomiting   Denies fevers or chills       Review of Systems   Constitutional: Positive for activity change and appetite change. Negative for chills and fever. Respiratory: Negative for cough, chest tightness and shortness of breath. Cardiovascular: Negative for chest pain and palpitations. Gastrointestinal: Positive for nausea. Negative for constipation, diarrhea and vomiting. Genitourinary: Negative for flank pain and hematuria. Skin: Negative for rash and wound. Neurological: Negative for dizziness and light-headedness. Psychiatric/Behavioral: Positive for decreased concentration. The patient is nervous/anxious. Objective:        Vitals:    04/21/19 0758   BP: (!) 117/58   Pulse: 70   Resp: 18   Temp: 100.3 °F (37.9 °C)   SpO2: 98%       Allergies: Patient has no known allergies.     PAST MEDICAL HISTORY:   Past Medical History:   Diagnosis Date    Anxiety     Depression     Hyperlipidemia     Hypertension     Hypothyroidism     Kidney damage     Leg pain, bilateral     Noncompliance with medications     Noncompliance with treatment     Osteoarthritis        PAST SURGICAL HISTORY:   Past Surgical History:   Procedure Laterality Date    CYSTOSCOPY Left 1/21/2019    CYSTOSCOPY, BILATERAL RETROGRADE PYELOGRAMS, BILATERAL STENT INSERTION performed by Bruce Conde MD at [address] [place] [address]:    Family History   Problem Relation Age of Onset    Diabetes Brother     Thyroid Disease Mother     Other Daughter         Autism       PAST SOCIAL HISTORY:    Social History     Socioeconomic History    Marital status: Single     Spouse name: None    Number of children: 1    Years of education: None    Highest education level: None   Occupational History    Occupation: unemployed     Employer: not employed   Social Needs    Financial resource strain: None    Food insecurity:     Worry: None     Inability: None    Transportation needs:     Medical: None     Non-medical: None   Tobacco Use    Smoking status: Former Smoker     Packs/day: 1.00     Years: 5.00     Pack years: 5.00     Last attempt to quit: 1989     Years since quittin.3    Smokeless tobacco: Never Used   Substance and Sexual Activity    Alcohol use: No    Drug use: No    Sexual activity: None   Lifestyle    Physical activity:     Days per week: None     Minutes per session: None    Stress: None   Relationships    Social connections:     Talks on phone: None     Gets together: None     Attends Sabianism service: None     Active member of club or organization: None     Attends meetings of clubs or organizations: None     Relationship status: None    Intimate partner violence:     Fear of current or ex partner: None     Emotionally abused: None     Physically abused: None     Forced sexual activity: None   Other Topics Concern    None   Social History Narrative    None       IV:    0.9% NaCl with KCl 40 mEq 125 mL/hr at 19 0541       PRN: opium-belladonna, sodium chloride flush, acetaminophen    Scheduled:    potassium bicarb-citric acid  40 mEq Oral Once    sodium chloride flush  10 mL Intravenous 2 times per day    levothyroxine  100 mcg Oral Daily       Lab Results   Component Value Date     2019    K 3.8 2019    K 4.5 2019    BUN 19 2019    CREATININE 2.2 2019        Lab Results   Component Value Date    HGB 8.9 2019    HCT 28.4 2019       Physical Exam   Skin dry, without rashes  Respirations non-labored, intact  Abdomen soft, non-tender, non-distended. No flank pain. Active bowel sounds.   Alert and oriented x3  Braden draining clear, yellow  urine      Assessment and Plan:  Neurogenic Bladder   Bilateral hydronephrosis    Chronic bilateral ureteral stents  CKD, stage 3  History of medical noncompliance  History of renal calculi   Post Obstructive Diuresis Electrolyte imbalance   Put out 1950 ml of urine overnight  IVF continued  Electrolyte imbalance     T max 100.3 this morning   IVF continued at rate of 125/hr  Nephrology following   Urine culture shows no growth     She was to have repeat cysto, ureteroscopy, stone extraction following her recovery from JIAN  (peak creatinine 14) secondary to severe obstructive uropathy in January. Baseline creatinine 1.4 per nephrology 1/29/19. She was discharge to subacute rehab. She had not followed up with us. Creatinine this admission continues to improve. Crane catheter remains in place. She will need outpatient follow up for repeat cysto, ureteroscopy, possible stone extraction and stent exchange. She will likely need crane long term. We will plan for urodynamics to assess the need for this.      TOSHIA Lambert  4/21/2019  9:32 AM

## 2019-04-21 NOTE — PROGRESS NOTES
Nephrology Progress Note   Weekend coverage for [facility] MD Marianna Orona MD Adra Fudge, DO, AL[facility] PATIENT’S [facility]   4/21/2019 11:15 AM  Subjective:   Admit Date: 4/18/2019  PCP: Antonio Tsang DO    Interval History: Same as yesterday. Complaining of  The same slight groin discomfort, no chest pain and no shortness of breath, able to eat some,not on oxygen, tolerating IV fluids    Diet: DIET RENAL;  Dietary Nutrition Supplements: Renal Oral Supplement    Data:     Scheduled Meds:   potassium bicarb-citric acid  40 mEq Oral Once    sodium chloride flush  10 mL Intravenous 2 times per day    levothyroxine  100 mcg Oral Daily     Continuous Infusions:   0.9% NaCl with KCl 40 mEq 125 mL/hr at 04/21/19 0541     PRN Meds:opium-belladonna, sodium chloride flush, acetaminophen  I/O last 3 completed shifts:   In: 600 [P.O.:600]  Out: 3250 [Urine:3250]  I/O this shift:  In: 240 [P.O.:240]  Out: -     Intake/Output Summary (Last 24 hours) at 4/21/2019 1115  Last data filed at 4/21/2019 0858  Gross per 24 hour   Intake 660 ml   Output 2350 ml   Net -1690 ml     CBC:   Recent Labs     04/19/19  0458 04/20/19  0428 04/21/19  0411   WBC 10.5 10.3 10.8   HGB 8.3* 8.8* 8.9*    265 262     BMP:    Recent Labs     04/19/19  1547 04/20/19  0428 04/21/19  0411    143 141   K 3.3* 3.2* 3.8    112* 111*   CO2 20* 21* 20*   BUN 39* 32* 19   CREATININE 4.5* 3.6* 2.2*   GLUCOSE 136* 100* 109*     Hepatic:   Recent Labs     04/19/19  0458 04/20/19  0428 04/21/19  0411   AST <5 5 20   ALT <5 <5 21   BILITOT <0.2 <0.2 <0.2   ALKPHOS 45 53 51     Protein/ Albumin:    Lab Results   Component Value Date    LABALBU 2.5 (L) 04/21/2019      Imaging Results         Procedure Component Value Ref Range Date/Time     CT ABDOMEN PELVIS WO CONTRAST Additional Contrast? None [[id number]] Resulted: 04/18/19 1441     Order Status: Completed Updated: 04/18/19 1443     Narrative:       Reading location:  200    HISTORY: 49-year-old female with bilateral flank pain, recent  bilateral ureteral stent placement. TECHNIQUE:    Serial axial images of the abdomen and pelvis were obtained without  the use of IV or oral contrast. Reformatted sagittal and coronal  images were obtained. Correlation is made with the patient's previous  bilateral retrograde urography performed on 1/21/2019. One or more of the following dose reduction techniques were used: Automated dose exposure. Adjustment of the mA and/or kV according to patient size  Use of iterative reconstruction techniques    FINDINGS:    Images through the lung bases demonstrate the lung bases to be clear. The liver, spleen, pancreas and adrenal glands are unremarkable. Bilateral hydronephrosis is noted. The urinary bladder is also  distended. There are bilateral ureteral stents seen in satisfactory  position. No calculi are noted within the urinary bladder or course of  the ureters. The gallbladder and stomach are normally distended. There is no large  or small bowel obstruction. There is no pelvic mass, fluid collection or free air. The abdominal aorta is normal in caliber. There is no retroperitoneal  lymphadenopathy.     Impression:       1. Bilateral hydroureteronephrosis and distention of the urinary  bladder. In review with the fluoroscopy images obtained during the  retrograde urography performed on 1/21/2019, the degree of  hydronephrosis is unchanged. 2. Satisfactory position of the bilateral ureteral stents. 3. No ureteral calculus is noted.   4. Small hiatal hernia.     CT ABDOMEN PELVIS W WO CONTRAST Additional Contrast? None [[id number]]      Order Status: Canceled              Objective:     Vitals: BP (!) 117/58   Pulse 70   Temp 100.3 °F (37.9 °C)   Resp 18   Ht 4' 11\" (1.499 m)   Wt 140 lb 11.2 oz (63.8 kg)   LMP 05/21/2015 (Approximate)   SpO2 98%   BMI 28.42 kg/m²   General appearance: Awake, and alert and oriented not in distress not on oxygen  Lungs: Good air movement  Heart: No S3, No rub  Abdomen: Lax, soft No tenderness  L. Extremities: No edema    Assessment & Plan:     Acute kidney injury on top of chronic kidney disease secondary to obstructive uropathy: Creatinine is improving daily (5.2 ---> 2.2). Patient has postobstructive diuresis. Continue IV fluids. Watch for hypotension. Continue to monitor electrolytes. Continue to monitor intake and output. Urology input noted and appreciated    Supplement hypomagnesemia and hypophosphatemia    Hypocalcemia in the context of hypoalbuminemia. Check ionized calcium    Metabolic acidosis with normal anion reflecting acute kidney. May improve further with improving creatinine. If worse, we will add sodium bicarbonate supplementation    Anemia: Particularly of chronic kidney disease. Transfuse as needed    This note was created using voice recognition software.     Electronically signed by Richy Faustin MD on 4/21/2019 at 11:15 AM

## 2019-04-21 NOTE — PLAN OF CARE
Problem: Pain:  Goal: Pain level will decrease  Description  Pain level will decrease  4/21/2019 0047 by Francisco Ryan RN  Outcome: Met This Shift     Problem: Pain:  Goal: Control of acute pain  Description  Control of acute pain  4/21/2019 0047 by Francisco Ryan RN  Outcome: Met This Shift     Problem: Pain:  Goal: Control of chronic pain  Description  Control of chronic pain  4/21/2019 0047 by Francisco Ryan RN  Outcome: Met This Shift     Problem: Falls - Risk of:  Goal: Will remain free from falls  Description  Will remain free from falls  Outcome: Met This Shift     Problem: Falls - Risk of:  Goal: Absence of physical injury  Description  Absence of physical injury  Outcome: Met This Shift     Problem: Risk for Impaired Skin Integrity  Goal: Tissue integrity - skin and mucous membranes  Description  Structural intactness and normal physiological function of skin and  mucous membranes.   Outcome: Met This Shift     Problem: Urinary Elimination:  Goal: Signs and symptoms of infection will decrease  Description  Signs and symptoms of infection will decrease  Outcome: Met This Shift     Problem: Urinary Elimination:  Goal: Ability to reestablish a normal urinary elimination pattern will improve - after catheter removal  Description  Ability to reestablish a normal urinary elimination pattern will improve  Outcome: Met This Shift     Problem: Urinary Elimination:  Goal: Complications related to the disease process, condition or treatment will be avoided or minimized  Description  Complications related to the disease process, condition or treatment will be avoided or minimized  Outcome: Met This Shift     Problem: ABCDS Injury Assessment  Goal: Patient exhibits improvement of current disease  Outcome: Met This Shift

## 2019-04-21 NOTE — PROGRESS NOTES
Meds:   magnesium sulfate  2 g Intravenous Once    potassium & sodium phosphates  1 packet Oral BID    potassium bicarb-citric acid  40 mEq Oral Once    sodium chloride flush  10 mL Intravenous 2 times per day    levothyroxine  100 mcg Oral Daily     Continuous Infusions:   0.9% NaCl with KCl 40 mEq 125 mL/hr at 04/21/19 0541       Objective Data:  CBC:   Recent Labs     04/19/19  0458 04/20/19  0428 04/21/19  0411   WBC 10.5 10.3 10.8   HGB 8.3* 8.8* 8.9*    265 262     BMP:    Recent Labs     04/19/19  1547 04/20/19  0428 04/21/19  0411    143 141   K 3.3* 3.2* 3.8    112* 111*   CO2 20* 21* 20*   BUN 39* 32* 19   CREATININE 4.5* 3.6* 2.2*   GLUCOSE 136* 100* 109*     CMP:    Lab Results   Component Value Date     04/21/2019    K 3.8 04/21/2019    K 4.5 01/17/2019     04/21/2019    CO2 20 04/21/2019    BUN 19 04/21/2019    CREATININE 2.2 04/21/2019    GFRAA 28 04/21/2019    LABGLOM 23 04/21/2019    GLUCOSE 109 04/21/2019    GLUCOSE 97 12/20/2011    PROT 5.8 04/21/2019    LABALBU 2.5 04/21/2019    LABALBU 4.4 12/20/2011    CALCIUM 7.7 04/21/2019    BILITOT <0.2 04/21/2019    ALKPHOS 51 04/21/2019    AST 20 04/21/2019    ALT 21 04/21/2019     Hepatic:   Recent Labs     04/19/19  0458 04/20/19  0428 04/21/19  0411   AST <5 5 20   ALT <5 <5 21   BILITOT <0.2 <0.2 <0.2   ALKPHOS 45 53 51     Troponin: No results for input(s): TROPONINI in the last 72 hours. BNP: No results for input(s): BNP in the last 72 hours. Lipids:   Recent Labs     04/19/19 0458   CHOL 177   HDL 38     ABGs: No results found for: PHART, PO2ART, LHU2XPF  INR: No results for input(s): INR, PROTIME in the last 72 hours. RAD: Ct Abdomen Pelvis Wo Contrast Additional Contrast? None    Result Date: 4/18/2019  Reading location:  200 HISTORY: 61-year-old female with bilateral flank pain, recent bilateral ureteral stent placement.  TECHNIQUE: Serial axial images of the abdomen and pelvis were obtained without the 05/21/2015, SpO2 98 %, not currently breastfeeding. General appearance:   Frances Horowitz is a 52 y. o.  female who is alert, responsive, oriented to person, place, and time. Psych:   Behavior is normal. Mood appears normal. Speech is not rapid and/or pressured. HEENT:   PERRLA. EOMI. Sclera clear. Buccal mucosa moist.    Neck:    Supple. Trachea midline. No thyromegaly. No JVD. No bruits. Heart:    Rhythm regular, rate controlled. No murmurs. Lungs:  Symmetrical. Clear to auscultation bilaterally. No wheezes. No rhonchi. No rales. Abdomen:   Soft. Non-distended. Bowel sounds positive. No organomegaly or masses. Mild pain on palpation in the suprapubic area. .    Extremities:    Peripheral pulses present. No peripheral edema. No ulcers. Neurologic:    Alert x 3. No focal deficit. Cranial nerves grossly intact. Skin:    No rashes or lesions    Wound Documentation:        Chronic Hospital Medical Problems:  Past Medical History:   Diagnosis Date    Anxiety     Depression     Hyperlipidemia     Hypertension     Hypothyroidism     Kidney damage     Leg pain, bilateral     Noncompliance with medications     Noncompliance with treatment     Osteoarthritis      Active Problems:    Acute on chronic renal insufficiency  Resolved Problems:    * No resolved hospital problems. *      Assessment:  1. Acute renal failure on CKD stage 4 Baseline creatinine 2.2-2.5 likely due to B/L hydronephrosis and likely neurogenic bladder. 2. Normocytic anemia  3. Recurrent azotemia  4. Hypothyroidism  5. Chronic mental disability  6. Hyperlipidemia  7. Febrile illness    Plan:   Patient has been febrile. Urine culture revealed less than 10,000 mixed gram-positive organisms. Kidney function continues to improve. Creatinine down to 2.2 from 3.6 yesterday. We'll maintain Braden catheter for close monitoring of urinary output in light of acute kidney injury.  Nephrology and urology following the patient and recommendations have been reviewed. As per urology the patient may require chronic Braden catheter, suprapubic catheter for further workup with urodynamics or ileal conduit urinary diversion. We'll await their further recommendations. Potassium level WNL after adjusting potassium in IV fluids as [place] refused to take oral potassium supplementation. Monitor urinary out put and intake closely. Pt/ot to evaluate and treat. Activity as tolerated. Patient's medications were reviewed/continued/adjusted. Labs as ordered. Please see orders for further plan of care. More than 50% of my  time was spent counseling and/or coordination of care with the patient and/or family with face to face contact. Time was spent reviewing notes and laboratory data, instructing and counseling the patient  regarding my findings and recommendations and reviewing and answer questions. Jc Stevens DO, F.A.C.O.I.   On 4/21/2019  11:24 AM

## 2019-04-21 NOTE — PROGRESS NOTES
Date:2019  Patient Name: Kavitha Akbar  MRN: [id number]  : 1965  ROOM #: [id number]    Occupational Therapy order received, chart reviewed and evaluation attempted this date. Patient is unavailable for OT evaluation due to being up in bed eating breakfast.    Will attempt OT evaluation at a later time. Thank you.      Corbin Sarmiento OTR/NIA [id number]

## 2019-04-22 ENCOUNTER — APPOINTMENT (OUTPATIENT)
Dept: GENERAL RADIOLOGY | Age: 54
DRG: 469 | End: 2019-04-22
Payer: COMMERCIAL

## 2019-04-22 LAB
ALBUMIN SERPL-MCNC: 2.5 G/DL (ref 3.5–5.2)
ALP BLD-CCNC: 50 U/L (ref 35–104)
ALT SERPL-CCNC: 20 U/L (ref 0–32)
ANION GAP SERPL CALCULATED.3IONS-SCNC: 8 MMOL/L (ref 7–16)
AST SERPL-CCNC: 17 U/L (ref 0–31)
BASOPHILS ABSOLUTE: 0.04 E9/L (ref 0–0.2)
BASOPHILS RELATIVE PERCENT: 0.4 % (ref 0–2)
BILIRUB SERPL-MCNC: <0.2 MG/DL (ref 0–1.2)
BUN BLDV-MCNC: 16 MG/DL (ref 6–20)
CALCIUM IONIZED: 1.27 MMOL/L (ref 1.15–1.33)
CALCIUM SERPL-MCNC: 7.9 MG/DL (ref 8.6–10.2)
CHLORIDE BLD-SCNC: 112 MMOL/L (ref 98–107)
CO2: 20 MMOL/L (ref 22–29)
CREAT SERPL-MCNC: 1.5 MG/DL (ref 0.5–1)
EOSINOPHILS ABSOLUTE: 0.42 E9/L (ref 0.05–0.5)
EOSINOPHILS RELATIVE PERCENT: 4.1 % (ref 0–6)
FILM ARRAY ADENOVIRUS: NORMAL
FILM ARRAY BORDETELLA PERTUSSIS: NORMAL
FILM ARRAY CHLAMYDOPHILIA PNEUMONIAE: NORMAL
FILM ARRAY CORONAVIRUS 229E: NORMAL
FILM ARRAY CORONAVIRUS HKU1: NORMAL
FILM ARRAY CORONAVIRUS NL63: NORMAL
FILM ARRAY CORONAVIRUS OC43: NORMAL
FILM ARRAY INFLUENZA A VIRUS 09H1: NORMAL
FILM ARRAY INFLUENZA A VIRUS H1: NORMAL
FILM ARRAY INFLUENZA A VIRUS H3: NORMAL
FILM ARRAY INFLUENZA A VIRUS: NORMAL
FILM ARRAY INFLUENZA B: NORMAL
FILM ARRAY METAPNEUMOVIRUS: NORMAL
FILM ARRAY MYCOPLASMA PNEUMONIAE: NORMAL
FILM ARRAY PARAINFLUENZA VIRUS 1: NORMAL
FILM ARRAY PARAINFLUENZA VIRUS 2: NORMAL
FILM ARRAY PARAINFLUENZA VIRUS 3: NORMAL
FILM ARRAY PARAINFLUENZA VIRUS 4: NORMAL
FILM ARRAY RESPIRATORY SYNCITIAL VIRUS: NORMAL
FILM ARRAY RHINOVIRUS/ENTEROVIRUS: NORMAL
GFR AFRICAN AMERICAN: 44
GFR NON-AFRICAN AMERICAN: 36 ML/MIN/1.73
GLUCOSE BLD-MCNC: 95 MG/DL (ref 74–99)
HCT VFR BLD CALC: 27.6 % (ref 34–48)
HEMOGLOBIN: 8.4 G/DL (ref 11.5–15.5)
IMMATURE GRANULOCYTES #: 0.09 E9/L
IMMATURE GRANULOCYTES %: 0.9 % (ref 0–5)
LIPASE: 29 U/L (ref 13–60)
LYMPHOCYTES ABSOLUTE: 2.53 E9/L (ref 1.5–4)
LYMPHOCYTES RELATIVE PERCENT: 24.4 % (ref 20–42)
MAGNESIUM: 1.6 MG/DL (ref 1.6–2.6)
MCH RBC QN AUTO: 28.9 PG (ref 26–35)
MCHC RBC AUTO-ENTMCNC: 30.4 % (ref 32–34.5)
MCV RBC AUTO: 94.8 FL (ref 80–99.9)
MONOCYTES ABSOLUTE: 0.44 E9/L (ref 0.1–0.95)
MONOCYTES RELATIVE PERCENT: 4.2 % (ref 2–12)
NEUTROPHILS ABSOLUTE: 6.85 E9/L (ref 1.8–7.3)
NEUTROPHILS RELATIVE PERCENT: 66 % (ref 43–80)
PDW BLD-RTO: 12.5 FL (ref 11.5–15)
PHOSPHORUS: 2.6 MG/DL (ref 2.5–4.5)
PLATELET # BLD: 281 E9/L (ref 130–450)
PMV BLD AUTO: 11.1 FL (ref 7–12)
POTASSIUM SERPL-SCNC: 4.7 MMOL/L (ref 3.5–5)
PROCALCITONIN: 0.1 NG/ML (ref 0–0.08)
RBC # BLD: 2.91 E12/L (ref 3.5–5.5)
SODIUM BLD-SCNC: 140 MMOL/L (ref 132–146)
TOTAL PROTEIN: 5.7 G/DL (ref 6.4–8.3)
WBC # BLD: 10.4 E9/L (ref 4.5–11.5)

## 2019-04-22 PROCEDURE — 87486 CHLMYD PNEUM DNA AMP PROBE: CPT

## 2019-04-22 PROCEDURE — 6370000000 HC RX 637 (ALT 250 FOR IP): Performed by: INTERNAL MEDICINE

## 2019-04-22 PROCEDURE — 2580000003 HC RX 258: Performed by: INTERNAL MEDICINE

## 2019-04-22 PROCEDURE — 84145 PROCALCITONIN (PCT): CPT

## 2019-04-22 PROCEDURE — 87450 HC DIRECT STREP B ANTIGEN: CPT

## 2019-04-22 PROCEDURE — 85025 COMPLETE CBC W/AUTO DIFF WBC: CPT

## 2019-04-22 PROCEDURE — 87633 RESP VIRUS 12-25 TARGETS: CPT

## 2019-04-22 PROCEDURE — 97116 GAIT TRAINING THERAPY: CPT

## 2019-04-22 PROCEDURE — 36415 COLL VENOUS BLD VENIPUNCTURE: CPT

## 2019-04-22 PROCEDURE — 87798 DETECT AGENT NOS DNA AMP: CPT

## 2019-04-22 PROCEDURE — 80053 COMPREHEN METABOLIC PANEL: CPT

## 2019-04-22 PROCEDURE — 6360000002 HC RX W HCPCS: Performed by: INTERNAL MEDICINE

## 2019-04-22 PROCEDURE — 83735 ASSAY OF MAGNESIUM: CPT

## 2019-04-22 PROCEDURE — 97165 OT EVAL LOW COMPLEX 30 MIN: CPT

## 2019-04-22 PROCEDURE — 1200000000 HC SEMI PRIVATE

## 2019-04-22 PROCEDURE — 97110 THERAPEUTIC EXERCISES: CPT

## 2019-04-22 PROCEDURE — 87088 URINE BACTERIA CULTURE: CPT

## 2019-04-22 PROCEDURE — 97530 THERAPEUTIC ACTIVITIES: CPT

## 2019-04-22 PROCEDURE — 84100 ASSAY OF PHOSPHORUS: CPT

## 2019-04-22 PROCEDURE — 87581 M.PNEUMON DNA AMP PROBE: CPT

## 2019-04-22 PROCEDURE — 74018 RADEX ABDOMEN 1 VIEW: CPT

## 2019-04-22 PROCEDURE — 71045 X-RAY EXAM CHEST 1 VIEW: CPT

## 2019-04-22 PROCEDURE — 97535 SELF CARE MNGMENT TRAINING: CPT

## 2019-04-22 PROCEDURE — 82330 ASSAY OF CALCIUM: CPT

## 2019-04-22 PROCEDURE — 83690 ASSAY OF LIPASE: CPT

## 2019-04-22 RX ORDER — DOCUSATE SODIUM 100 MG/1
100 CAPSULE, LIQUID FILLED ORAL 2 TIMES DAILY
Status: DISCONTINUED | OUTPATIENT
Start: 2019-04-22 | End: 2019-04-23 | Stop reason: HOSPADM

## 2019-04-22 RX ORDER — AZITHROMYCIN 250 MG/1
500 TABLET, FILM COATED ORAL DAILY
Status: DISCONTINUED | OUTPATIENT
Start: 2019-04-22 | End: 2019-04-23 | Stop reason: HOSPADM

## 2019-04-22 RX ORDER — FERROUS SULFATE 325(65) MG
325 TABLET ORAL 2 TIMES DAILY WITH MEALS
Status: DISCONTINUED | OUTPATIENT
Start: 2019-04-22 | End: 2019-04-23 | Stop reason: HOSPADM

## 2019-04-22 RX ADMIN — POTASSIUM CHLORIDE AND SODIUM CHLORIDE: 900; 300 INJECTION, SOLUTION INTRAVENOUS at 00:39

## 2019-04-22 RX ADMIN — POTASSIUM CHLORIDE AND SODIUM CHLORIDE: 900; 300 INJECTION, SOLUTION INTRAVENOUS at 09:58

## 2019-04-22 RX ADMIN — ACETAMINOPHEN 650 MG: 325 TABLET ORAL at 01:30

## 2019-04-22 RX ADMIN — LEVOTHYROXINE SODIUM 100 MCG: 100 TABLET ORAL at 05:55

## 2019-04-22 RX ADMIN — FERROUS SULFATE TAB 325 MG (65 MG ELEMENTAL FE) 325 MG: 325 (65 FE) TAB at 09:33

## 2019-04-22 RX ADMIN — Medication 400 MG: at 17:17

## 2019-04-22 RX ADMIN — WATER 1 G: 1 INJECTION INTRAMUSCULAR; INTRAVENOUS; SUBCUTANEOUS at 09:33

## 2019-04-22 RX ADMIN — AZITHROMYCIN 500 MG: 250 TABLET, FILM COATED ORAL at 11:33

## 2019-04-22 RX ADMIN — FERROUS SULFATE TAB 325 MG (65 MG ELEMENTAL FE) 325 MG: 325 (65 FE) TAB at 17:18

## 2019-04-22 ASSESSMENT — PAIN SCALES - GENERAL
PAINLEVEL_OUTOF10: 3
PAINLEVEL_OUTOF10: 0
PAINLEVEL_OUTOF10: 0

## 2019-04-22 NOTE — PROGRESS NOTES
HPI:  Lefty Son seems relatively comfortable during my examination today. She admits to significant sweating with fever. We discussed the ongoing downward trend in her creatinine. She admits to suprapubic discomfort and cramping. No family members were present during my examination. Patient voiced no new complaints since hospital admission. Questions, answers, and tests reviewed. ROS:  Cardiovascular:   Denies any chest pain, irregular heartbeats, or palpitations. Respiratory:   Admits to mild shortness of breath. No significant coughing or sputum production. Gastrointestinal:   Admits to the suprapubic abdominal pain. She had previous nausea that seems to have resolved. No vomiting. She is eating breakfast during my examination. Extremities:   Denies any lower extremity swelling or edema. Neurology:    Denies any headache or focal neurological deficits. Admits to ongoing weakness and deconditioning. Derm:    Denies any rashes, ulcers, or excoriations. Denies bruising. Genitourinary:    Denies any urgency, frequency, hematuria. Voiding with the use of a Braden catheter. Physical Exam:    Vitals:    04/21/19 1957   BP: (!) 101/49   Pulse: 68   Resp: 19   Temp: 99.7 °F (37.6 °C)   SpO2: 97%       HEENT:  PERRLA. EOMI. Sclera clear. Buccal mucosa moist.    Neck:  Supple. Trachea midline. No thyromegaly. No JVD. No bruits. Heart:  Rhythm regular, rate controlled. No murmurs. Lungs:  Symmetrical. Clear to auscultation bilaterally. No wheezes. No rhonchi. No rales. Abdomen: Soft. Mildly tender to palpation diffusely with some localization to the lower quadrants. Extremities:  Peripheral pulses present. No peripheral edema. No ulcers. Neurologic:  Alert x 3. No focal deficit. Cranial nerves grossly intact. Mild mental retardation is identified. Skin:  No petechia. No hemorrhage. No wounds.     CBC with Differential:    Lab Results   Component Value Date    WBC 10.4 04/22/2019    RBC 2.91 04/22/2019    HGB 8.4 04/22/2019    HCT 27.6 04/22/2019     04/22/2019    MCV 94.8 04/22/2019    MCH 28.9 04/22/2019    MCHC 30.4 04/22/2019    RDW 12.5 04/22/2019    SEGSPCT 66 10/23/2013    LYMPHOPCT 24.4 04/22/2019    MONOPCT 4.2 04/22/2019    BASOPCT 0.4 04/22/2019    MONOSABS 0.44 04/22/2019    LYMPHSABS 2.53 04/22/2019    EOSABS 0.42 04/22/2019    BASOSABS 0.04 04/22/2019     CMP:    Lab Results   Component Value Date     04/22/2019    K 4.7 04/22/2019    K 4.5 01/17/2019     04/22/2019    CO2 20 04/22/2019    BUN 16 04/22/2019    CREATININE 1.5 04/22/2019    GFRAA 44 04/22/2019    LABGLOM 36 04/22/2019    GLUCOSE 95 04/22/2019    GLUCOSE 97 12/20/2011    PROT 5.7 04/22/2019    LABALBU 2.5 04/22/2019    LABALBU 4.4 12/20/2011    CALCIUM 7.9 04/22/2019    BILITOT <0.2 04/22/2019    ALKPHOS 50 04/22/2019    AST 17 04/22/2019    ALT 20 04/22/2019       Other Data:      Intake/Output Summary (Last 24 hours) at 4/22/2019 0853  Last data filed at 4/22/2019 0659  Gross per 24 hour   Intake 2460 ml   Output 3425 ml   Net -965 ml         Scheduled Medications:   azithromycin  500 mg Oral Daily    cefTRIAXone (ROCEPHIN) IV  1 g Intravenous Q24H    potassium & sodium phosphates  1 packet Oral BID    potassium bicarb-citric acid  40 mEq Oral Once    sodium chloride flush  10 mL Intravenous 2 times per day    levothyroxine  100 mcg Oral Daily         Infusion Medications:   0.9% NaCl with KCl 40 mEq 125 mL/hr at 04/22/19 0039       Assessment:   1. Acute on chronic kidney disease stage IV likely compatible with a neurogenic bladder with chronic b/l ureteral stents  2. Normocytic anemia of chronic disease  3. Hypothyroidism  4. Chronic mental disability  5. Hyperlipidemia    Plan:   The patient's creatinine has trended back to baseline. Braden catheter will remain in place upon discharge. Unfortunately she remains febrile at this point and cultures have been negative.  We will panculture the patient once again and institute empiric antibiotics. Chest x-ray will also be assessed. She will work with the therapy teams that she will ultimately require skilled nursing facility placement upon discharge. No family members were present during my examination. Continue current therapy. See orders for further plan of care.     Regina Soriano D.O.  8:53 AM  4/22/2019

## 2019-04-22 NOTE — PLAN OF CARE
Problem: Pain:  Goal: Pain level will decrease  Description  Pain level will decrease  4/22/2019 0108 by Kraig Archuleta RN  Outcome: Met This Shift  4/21/2019 1144 by Karla Lock RN  Outcome: Met This Shift  Goal: Control of acute pain  Description  Control of acute pain  4/22/2019 0108 by Kraig Archuleta RN  Outcome: Met This Shift  4/21/2019 1144 by Karla Lock RN  Outcome: Met This Shift  Goal: Control of chronic pain  Description  Control of chronic pain  Outcome: Met This Shift     Problem: Falls - Risk of:  Goal: Will remain free from falls  Description  Will remain free from falls  4/22/2019 0108 by Kraig Archuleta RN  Outcome: Met This Shift  4/21/2019 1144 by Karla Lock RN  Outcome: Met This Shift  Goal: Absence of physical injury  Description  Absence of physical injury  4/22/2019 0108 by Kraig Archuleta RN  Outcome: Met This Shift  4/21/2019 1144 by Karla Lock RN  Outcome: Met This Shift     Problem: Risk for Impaired Skin Integrity  Goal: Tissue integrity - skin and mucous membranes  Description  Structural intactness and normal physiological function of skin and  mucous membranes.   Outcome: Met This Shift     Problem: Urinary Elimination:  Goal: Signs and symptoms of infection will decrease  Description  Signs and symptoms of infection will decrease  Outcome: Met This Shift  Goal: Ability to reestablish a normal urinary elimination pattern will improve - after catheter removal  Description  Ability to reestablish a normal urinary elimination pattern will improve  Outcome: Met This Shift  Goal: Complications related to the disease process, condition or treatment will be avoided or minimized  Description  Complications related to the disease process, condition or treatment will be avoided or minimized  Outcome: Met This Shift     Problem: ABCDS Injury Assessment  Goal: Patient exhibits improvement of current disease  Outcome: Met This Shift
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
## 2019-04-22 NOTE — PROGRESS NOTES
[facility] MD Elliott Santacruz MD Marga Deforest, MD. Marco Negrete MD   Progress Note    4/22/2019    SUBJECTIVE:   On RA   Cr down to 1.5 . PROBLEM LIST:    Principal Problem:    Acute renal failure (ARF) (HCC)  Active Problems:    Acute on chronic renal insufficiency  Resolved Problems:    * No resolved hospital problems. *       DIET:    DIET RENAL;  Dietary Nutrition Supplements: Renal Oral Supplement       Allergies : Patient has no known allergies. Past Medical History:   Diagnosis Date    Anxiety     Depression     Hyperlipidemia     Hypertension     Hypothyroidism     Kidney damage     Leg pain, bilateral     Noncompliance with medications     Noncompliance with treatment     Osteoarthritis        Past Surgical History:   Procedure Laterality Date    CYSTOSCOPY Left 1/21/2019    CYSTOSCOPY, BILATERAL RETROGRADE PYELOGRAMS, BILATERAL STENT INSERTION performed by Josh Jennings MD at [address]       Family History   Problem Relation Age of Onset    Diabetes Brother     Thyroid Disease Mother     Other Daughter         Autism        reports that she quit smoking about 30 years ago. She has a 5.00 pack-year smoking history. She has never used smokeless tobacco. She reports that she does not drink alcohol or use drugs. Review of Systems:   Constitutional: no fevers , no chills , feels ok   Eyes: no eye pain , no itching , no drainage  Ears, nose, mouth, throat, and face: no ear ,nose pain , hearing is ok ,no nasal drainage   Respiratory: no sob ,no cough ,no wheezing . Cardiovascular: no chest pain , no palpitation ,no sob . Gastrointestinal: no nausea, vomiting , constipation , no abdominal pain . Genitourinary:no urinary retention , no burning , dysuria . No polyuria   Hematologic/lymphatic: no bleeding , no cougulation issues . Musculoskeletal:no joint pain , no swelling . Neurological: no headaches ,no weakness , no numbness . Endocrine: no thirst , no weight issues .      MEDS (scheduled):    azithromycin  500 mg Oral Daily    cefTRIAXone (ROCEPHIN) IV  1 g Intravenous Q24H    ferrous sulfate  325 mg Oral BID WC    docusate sodium  100 mg Oral BID    potassium & sodium phosphates  1 packet Oral BID    potassium bicarb-citric acid  40 mEq Oral Once    sodium chloride flush  10 mL Intravenous 2 times per day    levothyroxine  100 mcg Oral Daily       MEDS (infusions):   0.9% NaCl with KCl 40 mEq 125 mL/hr at 04/22/19 0958       MEDS (prn):  ondansetron, opium-belladonna, sodium chloride flush, acetaminophen    PHYSICAL EXAM:     Patient Vitals for the past 24 hrs:   BP Temp Temp src Pulse Resp SpO2   04/22/19 0800 100/60 98.4 °F (36.9 °C) Oral 59 18 96 %   04/21/19 1957 (!) 101/49 99.7 °F (37.6 °C) Oral 68 19 97 %   @      Intake/Output Summary (Last 24 hours) at 4/22/2019 1226  Last data filed at 4/22/2019 0659  Gross per 24 hour   Intake 2220 ml   Output 3425 ml   Net -1205 ml         Wt Readings from Last 3 Encounters:   04/19/19 140 lb 11.2 oz (63.8 kg)   04/09/19 128 lb (58.1 kg)   02/26/19 134 lb (60.8 kg)       Constitutional:  in no acute distress  Oral: mucus membranes moist  Neck: no JVD  Cardiovascular: S1, S2 regular rhythm, no murmur,or rub  Respiratory:  No crackles, no wheeze  Gastrointestinal:  Soft, nontender, nondistended, NABS  Ext: no edema, feet warm  Skin: dry, no rash  Neuro: awake, alert, interactive      DATA:    Recent Labs     04/20/19  0428 04/21/19  0411 04/22/19  0415   WBC 10.3 10.8 10.4   HGB 8.8* 8.9* 8.4*   HCT 28.2* 28.4* 27.6*   MCV 93.7 93.7 94.8    262 281     Recent Labs     04/20/19  0428 04/21/19  0411 04/22/19  0415    141 140   K 3.2* 3.8 4.7   * 111* 112*   CO2 21* 20* 20*   MG  --  1.2* 1.6   PHOS  --  2.4* 2.6   BUN 32* 19 16   CREATININE 3.6* 2.2* 1.5*   ALT <5 21 20   AST 5 20 17   BILITOT <0.2 <0.2 <0.2   ALKPHOS 53 51 50       No results found for: LABPROT    ASSESSMENT / RECOMMENDATIONS:      -Acute kidney injury : 2/2 obstructive with b/l hydronephrosis and distended bladder   Also a component of volume depletion is contributing as she reports poor po intake and few episodes of vomiting .     -Chronic kidney disease with new baseline cr seems to be around 2.1-2.3 .  -Anaemia of chronic disease   -Moderate to severe malnutrition   -Sever deconditioning .         Plan ; JIAN resolving with the crane in .  cr down to 1.5  Will need long term crane   High UO 2/2 post obstructive diuresis .    On Phosp and Magnesium replacement .                   Electronically signed by Mikayla Adam MD on 4/22/2019 at 12:26 PM

## 2019-04-22 NOTE — PROGRESS NOTES
Sequencing  Communication: intact   Visual perceptual skills: intact     Glasses: no   Edema: no     Sensation: intact   Hand Dominance:  Left     X  Right     Left Right Comment   Passive range of motion [facility]     Active range of motion [facility]     Muscle Grade 4/5 4/5    /pinch Strength Intact  Intact     [] Malnutrition indicators have been identified and nursing has been notified to ensure a dietitian consult is ordered. Function Assessment    Status  Goal Comment   Feeding:  Independent   Independent      Grooming: Moderate Assist  Independent      UE dressing: Moderate Assist  Independent  To doff and don hospital gown   LE dressing:  Maximal Assist  Independent     Bathing: Moderate Assist  Independent     Bed Mobility:     Moderate Assist  for supine to sit,   Moderate Assist  for scooting,  sit to supine: n/t as pt was seated EOB at end of evaluation with PT Independent     Functional Transfers: Moderate Assist x 2 for sit to stand transfer from EOB to wheeled walker and to and from bedside chair to wheeled walker Independent  Safety: fair    Functional Mobility: 4 feet + 50 feet x 2 with wheeled walker and  Minimal Assist, slow pace  Modified [place]       Pt/family participated in establishment of goals. Balance:   Sitting: good   Standing: fair with wheeled walker    Endurance: fair       Assessment of Current Deficits:   [x]Functional mobility  []ROM  [x]Strength   [x]Cognition   [x]Safety Awareness    [x]ADLs [x]IADLs   [x]Endurance  [x]Balance    []Vision  []Sensation     []Gross Motor Coordination       []Fine Motor Coordination     · Low Complexity  · History: Brief history including review of medical records relating to the problem  · Exam: 1-3 performance Deficits  · Assistance/Modification: No assistance or modifications required to perform tasks. No comorbities affecting occupational performance.     Patients Goal: return home   Treatment: OT eval, bed mobility, sitting balance at EOB for 2 minutes with supervision, transfer training with verbal cues for hand placement, static standing balance with wheeled walker, functional mobility with to bedside chair while bed linens were changed, pt up in bedside chair for gown change as pt is sweaty and running a fever, assisted in washing the patients back, transfer training with verbal cues for hand placement from bedside chair, ambulated in hallway with wheeled walker, verbal cues for walker sequence and safety, pt returned to room and was seated EOB with PT at end of eval. All needs within reach. Rehab Potential: good   Plan of care: Patient will be seen by OT 1-3 times a week for therapeutic activity, ADL re-training, bed mobility, functional transfers, functional mobility, safety and fall prevention, balance and endurance activities, instruction in energy conservation principles, and patient/family education. Patient and/or family understands diagnosis, prognosis, education and plan of care. Pt/family verbalized understanding.      Time Code treatment minutes: [address] OTR/L #[id number]

## 2019-04-22 NOTE — PLAN OF CARE
Problem: Pain:  Goal: Pain level will decrease  Description  Pain level will decrease  4/22/2019 1424 by Gabriella Babb RN  Outcome: Met This Shift  4/22/2019 0108 by Nereida Nichole RN  Outcome: Met This Shift  Goal: Control of acute pain  Description  Control of acute pain  4/22/2019 1424 by Gabriella Babb RN  Outcome: Met This Shift  4/22/2019 0108 by Nereida Nichole RN  Outcome: Met This Shift  Goal: Control of chronic pain  Description  Control of chronic pain  4/22/2019 1424 by Gabriella Babb RN  Outcome: Met This Shift  4/22/2019 0108 by Nereida Nichole RN  Outcome: Met This Shift     Problem: Falls - Risk of:  Goal: Will remain free from falls  Description  Will remain free from falls  4/22/2019 1424 by Gabriella Babb RN  Outcome: Met This Shift  4/22/2019 0108 by Nereida Nichole RN  Outcome: Met This Shift  Goal: Absence of physical injury  Description  Absence of physical injury  4/22/2019 1424 by Gabriella Babb RN  Outcome: Met This Shift  4/22/2019 0108 by Nereida Nichole RN  Outcome: Met This Shift     Problem: Risk for Impaired Skin Integrity  Goal: Tissue integrity - skin and mucous membranes  Description  Structural intactness and normal physiological function of skin and  mucous membranes.   4/22/2019 1424 by Gabriella Babb RN  Outcome: Met This Shift  4/22/2019 0108 by Nereida Nichole RN  Outcome: Met This Shift     Problem: Urinary Elimination:  Goal: Signs and symptoms of infection will decrease  Description  Signs and symptoms of infection will decrease  4/22/2019 1424 by Gabriella Babb RN  Outcome: Met This Shift  4/22/2019 0108 by Nereida Nichole RN  Outcome: Met This Shift  Goal: Ability to reestablish a normal urinary elimination pattern will improve - after catheter removal  Description  Ability to reestablish a normal urinary elimination pattern will improve  4/22/2019 1424 by Gabriella Babb RN  Outcome: Met This Shift  4/22/2019 0108 by Nereida Nichole RN  Outcome: Met This Shift  Goal: Complications related to the disease process, condition or treatment will be avoided or minimized  Description  Complications related to the disease process, condition or treatment will be avoided or minimized  4/22/2019 1424 by Jailene Arnold RN  Outcome: Met This Shift  4/22/2019 0108 by Suresh Alejo RN  Outcome: Met This Shift     Problem: ABCDS Injury Assessment  Goal: Patient exhibits improvement of current disease  4/22/2019 1424 by Jailene Arnold RN  Outcome: Met This Shift  4/22/2019 0108 by Suresh Alejo RN  Outcome: Met This Shift

## 2019-04-22 NOTE — CARE COORDINATION
SS Note/Discharge plan:  Notified by [facility] admissions liaison for [address] [place] [address] that she did meet with pt for onsite visit and pt was agreeable to placement at their facility, they have accepted pt medically and will submit for PRECERT today 4/57, pt's brother, Lesli Jade and nursing notified, ROMINA initiated for physicians signature, HENS exemption completed, referral cancelled with liaison for [facility] will follow to confirm transfer arrangements.  Electronically signed by AYO Marie on 4/22/2019 at 1:25 PM

## 2019-04-22 NOTE — PROGRESS NOTES
[facility]                                                            PROGRESS NOTE                                                                       2019          SUBJECTIVE:  Temp to `100.3  Creatinine 1.5 tday  Wbc 10,000       OBJECTIVE:    BP (!) 101/49   Pulse 68   Temp 99.7 °F (37.6 °C) (Oral)   Resp 19   Ht 4' 11\" (1.499 m)   Wt 140 lb 11.2 oz (63.8 kg)   LMP 2015 (Approximate)   SpO2 97%   BMI 28.42 kg/m²     PHYSICAL EXAMINATION:  Skin: dry, without rashes  Respirations: non-labored, intact  Abdomen: soft, non-tender, non-distended      Lab Results   Component Value Date    WBC 10.4 2019    HGB 8.4 (L) 2019    HCT 27.6 (L) 2019    MCV 94.8 2019     2019       Lab Results   Component Value Date    CREATININE 1.5 (H) 2019       No results found for: PSA    REVIEW OF SYSTEMS:    CONSTITUTIONAL: negative  HEENT: negative  HEMATOLOGIC: negative  ENDOCRINE: negative  RESPIRATORY: negative  CV: negative  GI: negative  NEURO: negative  ORTHOPEDICS: negative  PSYCHIATRIC: negative  : as above    PAST FAMILY HISTORY:    Family History   Problem Relation Age of Onset    Diabetes Brother     Thyroid Disease Mother     Other Daughter         Autism     PAST SOCIAL HISTORY:    Social History     Socioeconomic History    Marital status: Single     Spouse name: None    Number of children: 1    Years of education: None    Highest education level: None   Occupational History    Occupation: unemployed     Employer: not employed   Social Needs    Financial resource strain: None    Food insecurity:     Worry: None     Inability: None    Transportation needs:     Medical: None     Non-medical: None   Tobacco Use    Smoking status: Former Smoker     Packs/day: 1.00     Years: 5.00     Pack years: 5.00     Last attempt to quit: 1989     Years since quittin.3    Smokeless tobacco: Never Used   Substance and Sexual Activity    Alcohol use: No    Drug use: No    Sexual activity: None   Lifestyle    Physical activity:     Days per week: None     Minutes per session: None    Stress: None   Relationships    Social connections:     Talks on phone: None     Gets together: None     Attends Worship service: None     Active member of club or organization: None     Attends meetings of clubs or organizations: None     Relationship status: None    Intimate partner violence:     Fear of current or ex partner: None     Emotionally abused: None     Physically abused: None     Forced sexual activity: None   Other Topics Concern    None   Social History Narrative    None       Scheduled Meds:   azithromycin  500 mg Oral Daily    cefTRIAXone (ROCEPHIN) IV  1 g Intravenous Q24H    potassium & sodium phosphates  1 packet Oral BID    potassium bicarb-citric acid  40 mEq Oral Once    sodium chloride flush  10 mL Intravenous 2 times per day    levothyroxine  100 mcg Oral Daily     Continuous Infusions:   0.9% NaCl with KCl 40 mEq 125 mL/hr at 04/22/19 0039     PRN Meds:.ondansetron, opium-belladonna, sodium chloride flush, acetaminophen    ASSESSMENT:    Patient Active Problem List   Diagnosis    Hyperlipidemia    Noncompliance    Knee pain    Depression    Halitosis    Dysmenorrhea    Bradycardia    Acquired hypothyroidism    Mild intermittent asthma without complication    Acute renal failure (ARF) (HCC)    Anxiety    Intellectual disability    Obstructive uropathy    Mixed stress and urge urinary incontinence    Vitamin D deficiency    Seasonal allergic rhinitis    Acute on chronic renal insufficiency       PLAN:  Resolving azotemia with crane  Pt will need long term crane or s/p doubt if pt will be compliant with ICP    Naye Ortiz M.D.  4/22/2019  9:06 AM

## 2019-04-22 NOTE — CARE COORDINATION
SS Note/Discharge plan:  Notified by liaisons for both [facility] [employer] [facility] that pt was denied because of her age and being under 61yo, notified pt's brother, Nay Maria who prefers she be placed in [address] reviewed snf provider list, he declined [facility], request sw check with [address] [place] [address], referrals made to both admissions liaisons, faxed pt info and awaiting onsite visit and  acceptance,sw to follow. Electronically signed by AYO Ramirez on 4/22/2019 at 12:14 PM

## 2019-04-22 NOTE — PROGRESS NOTES
PHYSICAL THERAPY TREATMENT NOTE    4/22/2019  [id number]                      Mike Encarnacion   [id number]                              1965    Patient Active Problem List   Diagnosis    Hyperlipidemia    Noncompliance    Knee pain    Depression    Halitosis    Dysmenorrhea    Bradycardia    Acquired hypothyroidism    Mild intermittent asthma without complication    Acute renal failure (ARF) (HCC)    Anxiety    Intellectual disability    Obstructive uropathy    Mixed stress and urge urinary incontinence    Vitamin D deficiency    Seasonal allergic rhinitis    Acute on chronic renal insufficiency       Recommendation for discharge: subacute  Equipment prescriptions needed: to be determined     [facility]   How much difficulty turning over in bed?: A Little  How much difficulty sitting down on / standing up from a chair with arms?: A Lot  How much difficulty moving from lying on back to sitting on side of bed?: A Lot  How much help from another person moving to and from a bed to a chair?: A Little  How much help from another person needed to walk in hospital room?: A Little  How much help from another person for climbing 3-5 steps with a railing?: A Lot  AM-PAC Inpatient Mobility Raw Score : 15  AM-PAC Inpatient T-Scale Score : 39.45  Mobility Inpatient CMS 0-100% Score: 57.7  Mobility Inpatient CMS G-Code Modifier : CK      Precautions: falls, alarm, behavior    S: Patient cleared by nursing for treatment. Patient is agreeable to treatment. Pt c/o pain in back and hips. Pt was medicated for pain prior to treatment. Pain status: (measured on a visual analog scale with 0=no pain and 10=excruciating pain) 10/10.    O: Pt was instructed in and performed the following:   Bed Mobility- Supine to sit- Moderate assistance     Scooting- Moderate assistance    Sit to supine- Moderate assistance    Transfers-sit to stand- Minimal/Moderate assistance with cues needed for correct hand placement    Gait: Patient ambulated 2 feet and 50 feet x 2 using wheeled walker with Minimal assistance. Comments: V/C were needed for safety, upright posture, walker management, and obstacle negotiation. Steps: NA  Treatment: Pt was educated and facilitated on techniques to increase safety and independence with bed mobility, balance, functional transfers, and functional mobility. Pt transferred to side of bed and sat up x 2 minutes to increase dynamic sitting balance and activity tolerance. Pt stood and took steps to bedside chair. Pt sat in chair x 10 minutes and performed seated BLE ankle pumps, LAQ, hip abd/add, and marching x 10-15 reps. V/C were needed for correct technique. Pt stood from chair and ambulated in hallway. Pt returned to bedside. Comment: Call light left by patient. Pt was returned to bed at end of treatment, alarm was activated. A: Pt was able to progress ambulation distance with no LOB noted. Pt required increased time to complete all activities d/t pain and weakness. Pt was motivated to participate but needed cueing for safety and correct technique's. P: Continue with physical therapy       Saima Iqbal PTA    GOALS to be met in 3 days. Bed mobility- Independent                                         Transfers-Sit to stand-Supervision                 Gait: Patient to ambulate 50 feet using wheeled walker with Supervision                 Steps: Patient to go up and down 8 step(s) using 1 rail(s) with Minimal assist    Increase strength in affected mm groups by 1/3 grade  Increase balance to allow for improvement towards functional goals. Increase endurance to allow for improvement towards functional goals.

## 2019-04-22 NOTE — DISCHARGE INSTR - COC
Hyperlipidemia E78.5    Noncompliance Z91.19    Knee pain M25.569    Depression F32.9    Halitosis R19.6    Dysmenorrhea N94.6    Bradycardia R00.1    Acquired hypothyroidism E03.9    Mild intermittent asthma without complication W31.51    Acute renal failure (ARF) ([facility]) N17.9    Anxiety F41.9    Intellectual disability F79    Obstructive uropathy N13.9    Mixed stress and urge urinary incontinence N39.46    Vitamin D deficiency E55.9    Seasonal allergic rhinitis J30.2    Acute on chronic renal insufficiency N28.9, N18.9       Isolation/Infection:   Isolation          No Isolation            Nurse Assessment:  Last Vital Signs: /60   Pulse 59   Temp 98.4 °F (36.9 °C) (Oral)   Resp 18   Ht 4' 11\" (1.499 m)   Wt 140 lb 11.2 oz (63.8 kg)   LMP 05/21/2015 (Approximate)   SpO2 96%   BMI 28.42 kg/m²     Last documented pain score (0-10 scale): Pain Level: 0  Last Weight:   Wt Readings from Last 1 Encounters:   04/19/19 140 lb 11.2 oz (63.8 kg)     Mental Status:  oriented and alert    IV Access:  - None    Nursing Mobility/ADLs:  Walking   Assisted  Transfer  Assisted  Bathing  Assisted  Dressing  Assisted  Toileting  Assisted  Feeding  Independent  Med Admin  Assisted  Med Delivery   whole    Wound Care Documentation and Therapy:        Elimination:  Continence:   · Bowel: Yes  · Bladder: No  Urinary Catheter: Indication for Use of Catheter: Urology/Urologist seeing this patient or inserted indwelling catheter and Acute urinary retention/obstruction   Colostomy/Ileostomy/Ileal Conduit: No       Date of Last BM: ***    Intake/Output Summary (Last 24 hours) at 4/22/2019 1554  Last data filed at 4/22/2019 1446  Gross per 24 hour   Intake 3180 ml   Output 3500 ml   Net -320 ml     I/O last 3 completed shifts: In: 3180 [P.O.:780; I.V.:2400]  Out: 3500 [Urine:3500]    Safety Concerns:      At Risk for Falls    Impairments/Disabilities:      [facility] Impairments/Disabilities:[id number]}    Nutrition Therapy:  Current Nutrition Therapy:   - Oral Diet:  Renal    Routes of Feeding: Oral  Liquids: Thin Liquids  Daily Fluid Restriction: no  Last Modified Barium Swallow with Video (Video Swallowing Test): not done    Treatments at the Time of Hospital Discharge:   Respiratory Treatments: ***  Oxygen Therapy:  is not on home oxygen therapy. Ventilator:    - No ventilator support    Rehab Therapies: Physical Therapy and Occupational Therapy  Weight Bearing Status/Restrictions: No weight bearing restirctions  Other Medical Equipment (for information only, NOT a DME order):  {EQUIPMENT:[id number]}  Other Treatments: ***    Patient's personal belongings (please select all that are sent with patient):  None    RN SIGNATURE:  Electronically signed by Marcelle Summers RN on 4/23/19 at 9:28 AM    CASE MANAGEMENT/SOCIAL WORK SECTION    Inpatient Status Date: 04/18/2019    Readmission Risk Assessment Score:  Readmission Risk              Risk of Unplanned Readmission:        20           Discharging to Facility/ Agency   · Name: [facility]  · Address:  · Phone: [phone]  · Fax: [phone]    Dialysis Facility (if applicable)   · Name: NA  · Address:  · Dialysis Schedule:  · Phone:  · Fax:    / signature: Electronically signed by AYO Mann on 4/22/19 at 3:54 PM    PHYSICIAN SECTION    Prognosis: Good    Condition at Discharge: Stable    Rehab Potential (if transferring to Rehab): Fair    Recommended Labs or Other Treatments After Discharge: ***    Physician Certification: I certify the above information and transfer of Brittney Hudson  is necessary for the continuing treatment of the diagnosis listed and that she requires [facility] for less 30 days.      Update Admission H&P: No change in H&P    PHYSICIAN SIGNATURE:  Electronically signed by Ralph Araujo DO on 4/23/19 at 8:22 AM

## 2019-04-23 VITALS
DIASTOLIC BLOOD PRESSURE: 53 MMHG | TEMPERATURE: 98.4 F | SYSTOLIC BLOOD PRESSURE: 105 MMHG | WEIGHT: 140.7 LBS | BODY MASS INDEX: 28.36 KG/M2 | HEART RATE: 68 BPM | HEIGHT: 59 IN | RESPIRATION RATE: 16 BRPM | OXYGEN SATURATION: 99 %

## 2019-04-23 LAB
ALBUMIN SERPL-MCNC: 2.4 G/DL (ref 3.5–5.2)
ALP BLD-CCNC: 57 U/L (ref 35–104)
ALT SERPL-CCNC: 43 U/L (ref 0–32)
ANION GAP SERPL CALCULATED.3IONS-SCNC: 9 MMOL/L (ref 7–16)
AST SERPL-CCNC: 55 U/L (ref 0–31)
BASOPHILS ABSOLUTE: 0.03 E9/L (ref 0–0.2)
BASOPHILS RELATIVE PERCENT: 0.3 % (ref 0–2)
BILIRUB SERPL-MCNC: <0.2 MG/DL (ref 0–1.2)
BUN BLDV-MCNC: 18 MG/DL (ref 6–20)
CALCIUM SERPL-MCNC: 8.8 MG/DL (ref 8.6–10.2)
CHLORIDE BLD-SCNC: 104 MMOL/L (ref 98–107)
CO2: 22 MMOL/L (ref 22–29)
CREAT SERPL-MCNC: 1.4 MG/DL (ref 0.5–1)
EOSINOPHILS ABSOLUTE: 0.5 E9/L (ref 0.05–0.5)
EOSINOPHILS RELATIVE PERCENT: 4.7 % (ref 0–6)
GFR AFRICAN AMERICAN: 47
GFR NON-AFRICAN AMERICAN: 39 ML/MIN/1.73
GLUCOSE BLD-MCNC: 113 MG/DL (ref 74–99)
HCT VFR BLD CALC: 29 % (ref 34–48)
HEMOGLOBIN: 9 G/DL (ref 11.5–15.5)
IMMATURE GRANULOCYTES #: 0.1 E9/L
IMMATURE GRANULOCYTES %: 0.9 % (ref 0–5)
L. PNEUMOPHILA SEROGP 1 UR AG: NORMAL
LYMPHOCYTES ABSOLUTE: 2.94 E9/L (ref 1.5–4)
LYMPHOCYTES RELATIVE PERCENT: 27.9 % (ref 20–42)
MCH RBC QN AUTO: 28.9 PG (ref 26–35)
MCHC RBC AUTO-ENTMCNC: 31 % (ref 32–34.5)
MCV RBC AUTO: 93.2 FL (ref 80–99.9)
MONOCYTES ABSOLUTE: 0.46 E9/L (ref 0.1–0.95)
MONOCYTES RELATIVE PERCENT: 4.4 % (ref 2–12)
NEUTROPHILS ABSOLUTE: 6.52 E9/L (ref 1.8–7.3)
NEUTROPHILS RELATIVE PERCENT: 61.8 % (ref 43–80)
PDW BLD-RTO: 12.5 FL (ref 11.5–15)
PLATELET # BLD: 299 E9/L (ref 130–450)
PMV BLD AUTO: 11.2 FL (ref 7–12)
POTASSIUM SERPL-SCNC: 4.3 MMOL/L (ref 3.5–5)
RBC # BLD: 3.11 E12/L (ref 3.5–5.5)
SODIUM BLD-SCNC: 135 MMOL/L (ref 132–146)
STREP PNEUMONIAE ANTIGEN, URINE: NORMAL
TOTAL PROTEIN: 5.6 G/DL (ref 6.4–8.3)
WBC # BLD: 10.6 E9/L (ref 4.5–11.5)

## 2019-04-23 PROCEDURE — 6370000000 HC RX 637 (ALT 250 FOR IP): Performed by: INTERNAL MEDICINE

## 2019-04-23 PROCEDURE — 97530 THERAPEUTIC ACTIVITIES: CPT

## 2019-04-23 PROCEDURE — 85025 COMPLETE CBC W/AUTO DIFF WBC: CPT

## 2019-04-23 PROCEDURE — 36415 COLL VENOUS BLD VENIPUNCTURE: CPT

## 2019-04-23 PROCEDURE — 97110 THERAPEUTIC EXERCISES: CPT

## 2019-04-23 PROCEDURE — 80053 COMPREHEN METABOLIC PANEL: CPT

## 2019-04-23 RX ORDER — ONDANSETRON 4 MG/1
4 TABLET, ORALLY DISINTEGRATING ORAL EVERY 8 HOURS PRN
DISCHARGE
Start: 2019-04-23 | End: 2019-12-13 | Stop reason: ALTCHOICE

## 2019-04-23 RX ORDER — PSEUDOEPHEDRINE HCL 30 MG
100 TABLET ORAL 2 TIMES DAILY
Qty: 60 CAPSULE | Refills: 0 | Status: SHIPPED | OUTPATIENT
Start: 2019-04-23

## 2019-04-23 RX ORDER — CEFDINIR 300 MG/1
300 CAPSULE ORAL 2 TIMES DAILY
Qty: 10 CAPSULE | Refills: 0 | Status: SHIPPED | OUTPATIENT
Start: 2019-04-23 | End: 2019-04-28

## 2019-04-23 RX ORDER — FERROUS SULFATE 325(65) MG
325 TABLET ORAL 2 TIMES DAILY WITH MEALS
Qty: 30 TABLET | Refills: 3 | Status: SHIPPED | OUTPATIENT
Start: 2019-04-23

## 2019-04-23 RX ADMIN — FERROUS SULFATE TAB 325 MG (65 MG ELEMENTAL FE) 325 MG: 325 (65 FE) TAB at 08:19

## 2019-04-23 RX ADMIN — DOCUSATE SODIUM 100 MG: 100 CAPSULE, LIQUID FILLED ORAL at 08:19

## 2019-04-23 RX ADMIN — LEVOTHYROXINE SODIUM 100 MCG: 100 TABLET ORAL at 05:31

## 2019-04-23 RX ADMIN — Medication 400 MG: at 08:19

## 2019-04-23 RX ADMIN — AZITHROMYCIN 500 MG: 250 TABLET, FILM COATED ORAL at 08:19

## 2019-04-23 RX ADMIN — ACETAMINOPHEN 650 MG: 325 TABLET ORAL at 00:04

## 2019-04-23 ASSESSMENT — PAIN DESCRIPTION - ONSET: ONSET: ON-GOING

## 2019-04-23 ASSESSMENT — PAIN - FUNCTIONAL ASSESSMENT: PAIN_FUNCTIONAL_ASSESSMENT: ACTIVITIES ARE NOT PREVENTED

## 2019-04-23 ASSESSMENT — PAIN SCALES - GENERAL
PAINLEVEL_OUTOF10: 0
PAINLEVEL_OUTOF10: 10

## 2019-04-23 ASSESSMENT — PAIN DESCRIPTION - FREQUENCY: FREQUENCY: CONTINUOUS

## 2019-04-23 ASSESSMENT — PAIN DESCRIPTION - DESCRIPTORS: DESCRIPTORS: SHARP;SHOOTING;STABBING

## 2019-04-23 ASSESSMENT — PAIN DESCRIPTION - DIRECTION: RADIATING_TOWARDS: ABDOMEN

## 2019-04-23 ASSESSMENT — PAIN DESCRIPTION - PAIN TYPE: TYPE: ACUTE PAIN

## 2019-04-23 ASSESSMENT — PAIN DESCRIPTION - LOCATION: LOCATION: ABDOMEN;FOOT

## 2019-04-23 ASSESSMENT — PAIN DESCRIPTION - PROGRESSION: CLINICAL_PROGRESSION: NOT CHANGED

## 2019-04-23 ASSESSMENT — PAIN DESCRIPTION - ORIENTATION: ORIENTATION: LEFT

## 2019-04-23 NOTE — PROGRESS NOTES
[facility] Quality Flow/Interdisciplinary Rounds Progress Note        Quality Flow Rounds held on April 23, 2019    Disciplines Attending:  Bedside Nurse, ,  and Nursing Unit Leadership    Grayson Bliss was admitted on 4/18/2019 10:03 AM    Anticipated Discharge Date:  Expected Discharge Date: 04/21/19    Disposition:    Ramon Score:  Ramon Scale Score: 20    Readmission Risk              Risk of Unplanned Readmission:        18           Discussed patient goal for the day, patient clinical progression, and barriers to discharge.   The following Goal(s) of the Day/Commitment(s) have been identified:  DC pending precert to [facility]  April 23, 2019

## 2019-04-23 NOTE — PROGRESS NOTES
[facility] MD Kim Parra MD Freddrick Coke, MD. Mary Ann Muñoz MD   Progress Note    4/23/2019    SUBJECTIVE:   On RA   Cr down to 1.4 . PROBLEM LIST:    Principal Problem:    Acute renal failure (ARF) (HCC)  Active Problems:    Acute on chronic renal insufficiency  Resolved Problems:    * No resolved hospital problems. *       DIET:    DIET RENAL;  Dietary Nutrition Supplements: Renal Oral Supplement       Allergies : Patient has no known allergies. Past Medical History:   Diagnosis Date    Anxiety     Depression     Hyperlipidemia     Hypertension     Hypothyroidism     Kidney damage     Leg pain, bilateral     Noncompliance with medications     Noncompliance with treatment     Osteoarthritis        Past Surgical History:   Procedure Laterality Date    CYSTOSCOPY Left 1/21/2019    CYSTOSCOPY, BILATERAL RETROGRADE PYELOGRAMS, BILATERAL STENT INSERTION performed by Emery Larose MD at [facility]       Family History   Problem Relation Age of Onset    Diabetes Brother     Thyroid Disease Mother     Other Daughter         Autism        reports that she quit smoking about 30 years ago. She has a 5.00 pack-year smoking history. She has never used smokeless tobacco. She reports that she does not drink alcohol or use drugs. Review of Systems:   Constitutional: no fevers , no chills , feels ok   Eyes: no eye pain , no itching , no drainage  Ears, nose, mouth, throat, and face: no ear ,nose pain , hearing is ok ,no nasal drainage   Respiratory: no sob ,no cough ,no wheezing . Cardiovascular: no chest pain , no palpitation ,no sob . Gastrointestinal: no nausea, vomiting , constipation , no abdominal pain . Genitourinary:no urinary retention , no burning , dysuria . No polyuria   Hematologic/lymphatic: no bleeding , no cougulation issues . Musculoskeletal:no joint pain , no swelling . Neurological: no headaches ,no weakness , no numbness . Endocrine: no thirst , no weight issues .      MEDS (scheduled):    azithromycin  500 mg Oral Daily    cefTRIAXone (ROCEPHIN) IV  1 g Intravenous Q24H    ferrous sulfate  325 mg Oral BID WC    docusate sodium  100 mg Oral BID    magnesium oxide  400 mg Oral Daily    potassium & sodium phosphates  1 packet Oral BID    potassium bicarb-citric acid  40 mEq Oral Once    sodium chloride flush  10 mL Intravenous 2 times per day    levothyroxine  100 mcg Oral Daily       MEDS (infusions):   0.9% NaCl with KCl 40 mEq 125 mL/hr at 04/22/19 0958       MEDS (prn):  ondansetron, opium-belladonna, sodium chloride flush, acetaminophen    PHYSICAL EXAM:     Patient Vitals for the past 24 hrs:   BP Temp Temp src Pulse Resp SpO2   04/23/19 0800 (!) 105/53 98.4 °F (36.9 °C) -- 68 16 99 %   04/23/19 0034 -- 98.3 °F (36.8 °C) Oral -- -- --   04/22/19 2000 (!) 112/55 99.2 °F (37.3 °C) Oral 69 14 97 %   @      Intake/Output Summary (Last 24 hours) at 4/23/2019 1055  Last data filed at 4/23/2019 0835  Gross per 24 hour   Intake 1680 ml   Output 3025 ml   Net -1345 ml         Wt Readings from Last 3 Encounters:   04/19/19 140 lb 11.2 oz (63.8 kg)   04/09/19 128 lb (58.1 kg)   02/26/19 134 lb (60.8 kg)       Constitutional:  in no acute distress  Oral: mucus membranes moist  Neck: no JVD  Cardiovascular: S1, S2 regular rhythm, no murmur,or rub  Respiratory:  No crackles, no wheeze  Gastrointestinal:  Soft, nontender, nondistended, NABS  Ext: no edema, feet warm  Skin: dry, no rash  Neuro: awake, alert, interactive      DATA:    Recent Labs     04/21/19  0411 04/22/19  0415 04/23/19  0344   WBC 10.8 10.4 10.6   HGB 8.9* 8.4* 9.0*   HCT 28.4* 27.6* 29.0*   MCV 93.7 94.8 93.2    281 299     Recent Labs     04/21/19  0411 04/22/19  0415 04/23/19  0344    140 135   K 3.8 4.7 4.3   * 112* 104   CO2 20* 20* 22   MG 1.2* 1.6  --    PHOS 2.4* 2.6  --    BUN 19 16 18   CREATININE 2.2* 1.5* 1.4*   ALT 21 20 43*   AST 20 16 55*   BILITOT <0.2 <0.2 <0.2   ALKPHOS 51 50 57       No results found for: LABPROT    ASSESSMENT / RECOMMENDATIONS:      -Acute kidney injury : 2/2 obstructive with b/l hydronephrosis and distended bladder   Also a component of volume depletion is contributing as she reports poor po intake and few episodes of vomiting .     -Chronic kidney disease with new baseline cr seems to be around 2.1-2.3 .  -Anaemia of chronic disease   -Moderate to severe malnutrition   -Sever deconditioning .         Plan ;      JIAN resolving with the crane in .  cr down to 1.4  Will need long term crane   Keep an eye on BP   Bmp , phosp , mg in 3-5 days .                   Electronically signed by Leeann Guillory MD on 4/23/2019 at 10:55 AM

## 2019-04-23 NOTE — PROGRESS NOTES
Occupational Therapy  O.T. Bedside Treatment Note    Date:2019  Patient Name: Inge Verduzco  MRN: [id number]  : 1965  ROOM #: [id number]    Problem list / Diagnosis:   Patient Active Problem List   Diagnosis    Hyperlipidemia    Noncompliance    Knee pain    Depression    Halitosis    Dysmenorrhea    Bradycardia    Acquired hypothyroidism    Mild intermittent asthma without complication    Acute renal failure (ARF) (HCC)    Anxiety    Intellectual disability    Obstructive uropathy    Mixed stress and urge urinary incontinence    Vitamin D deficiency    Seasonal allergic rhinitis    Acute on chronic renal insufficiency     Past Medical History:   Past Medical History:   Diagnosis Date    Anxiety     Depression     Hyperlipidemia     Hypertension     Hypothyroidism     Kidney damage     Leg pain, bilateral     Noncompliance with medications     Noncompliance with treatment     Osteoarthritis      Onset/Medical history: medical history reviewed  Past medical history: reviewed    The admitting diagnosis and active problem list as listed above have been reviewed prior to treatment. Nursing cleared the patient for treatment. Patient is agreeable to treatment.     Discharge recommendations: [facility]  Equipment prescriptions needed: TBD at [facility] Daily Activity Inpatient      AM-PAC Daily Activity Inpatient   How much help for putting on and taking off regular lower body clothing?: A Lot  How much help for Bathing?: A Lot  How much help for Toileting?: A Lot  How much help for putting on and taking off regular upper body clothing?: A Lot  How much help for taking care of personal grooming?: A Lot  How much help for eating meals?: None  AM-[facility] Inpatient Daily Activity Raw Score: 14  AM-PAC Inpatient ADL T-Scale Score : 33.39  ADL Inpatient CMS 0-100% Score: 59.67  ADL Inpatient CMS G-Code Modifier : CK      Precautions: Falls, catheter, behavior    S:  - Pt cleared for treatment strength exercises, and over all endurance building.     P:  - Plan of care: Patient will be seen by OT 1-3x/wk for therapeutic activity, ADL re-training, bed mobility,functional transfers, functional mobility, safety and fall prevention, balance and endurance activities, instruction and training in energy conservation principles, and   patient and family education.   - Patient and/or family understands diagnosis, prognosis and plan of care: yes   - ADL retraining, bathroom safety, DME    Treatment minutes: Lewis ANTOINE [id number], [address]

## 2019-04-23 NOTE — PLAN OF CARE
Problem: Pain:  Goal: Pain level will decrease  Description  Pain level will decrease  4/23/2019 1241 by Trey Mason RN  Outcome: Met This Shift     Problem: Pain:  Goal: Control of acute pain  Description  Control of acute pain  4/23/2019 1241 by Trey Mason RN  Outcome: Met This Shift     Problem: Falls - Risk of:  Goal: Will remain free from falls  Description  Will remain free from falls  4/23/2019 1241 by Trey Mason RN  Outcome: Met This Shift     Problem: Falls - Risk of:  Goal: Absence of physical injury  Description  Absence of physical injury  4/23/2019 1241 by Trey Mason RN  Outcome: Met This Shift     Problem: Risk for Impaired Skin Integrity  Goal: Tissue integrity - skin and mucous membranes  Description  Structural intactness and normal physiological function of skin and  mucous membranes.   4/23/2019 1241 by Trey Mason RN  Outcome: Met This Shift     Problem: Urinary Elimination:  Goal: Signs and symptoms of infection will decrease  Description  Signs and symptoms of infection will decrease  Outcome: Met This Shift     Problem: Urinary Elimination:  Goal: Complications related to the disease process, condition or treatment will be avoided or minimized  Description  Complications related to the disease process, condition or treatment will be avoided or minimized  Outcome: Met This Shift     Problem: ABCDS Injury Assessment  Goal: Patient exhibits improvement of current disease  Outcome: Met This Shift

## 2019-04-23 NOTE — PLAN OF CARE
minimized  Description  Complications related to the disease process, condition or treatment will be avoided or minimized  4/22/2019 2141 by Josephine Han, RN  Outcome: Met This Shift     Problem: ABCDS Injury Assessment  Goal: Patient exhibits improvement of current disease  4/22/2019 2141 by Josephine Han RN  Outcome: Met This Shift

## 2019-04-23 NOTE — CARE COORDINATION
SS Note/Discharge plan:  Notified by [facility] admissions liaison for [address] [place] [address] that they received insurance precert and will send their w/c van to pick pt up at 2:15pm today, vm message left for pt's brother Eilene Gibbs regarding pt's transfer arrangements, pt and nursing informed. Electronically signed by AYO Galdamez on 4/23/2019 at 9:37 AM
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
## 2019-04-23 NOTE — PROGRESS NOTES
PHYSICAL THERAPY TREATMENT NOTE    4/23/2019  [id number]                      Vlad Jules   [id number]                              1965    Patient Active Problem List   Diagnosis    Hyperlipidemia    Noncompliance    Knee pain    Depression    Halitosis    Dysmenorrhea    Bradycardia    Acquired hypothyroidism    Mild intermittent asthma without complication    Acute renal failure (ARF) ([facility] [address]    Anxiety    Intellectual disability    Obstructive uropathy    Mixed stress and urge urinary incontinence    Vitamin D deficiency    Seasonal allergic rhinitis    Acute on chronic renal insufficiency       Recommendation for discharge: subacute  Equipment prescriptions needed: to be determined     AM-[facility] Mobility Inpatient   How much difficulty turning over in bed?: A Little  How much difficulty sitting down on / standing up from a chair with arms?: A Lot  How much difficulty moving from lying on back to sitting on side of bed?: A Lot  How much help from another person moving to and from a bed to a chair?: A Little  How much help from another person needed to walk in hospital room?: A Little  How much help from another person for climbing 3-5 steps with a railing?: A Lot  AM-PAC Inpatient Mobility Raw Score : 15  AM-PAC Inpatient T-Scale Score : 39.45  Mobility Inpatient CMS 0-100% Score: 57.7  Mobility Inpatient CMS G-Code Modifier : CK      Precautions: falls, alarm, behavior    S: Patient cleared by nursing for treatment. Patient is agreeable to treatment. Pt states she just returned from [facility] with OT. Agrees to sit at side of bed and ex only   Pain status: (measured on a visual analog scale with 0=no pain and 10=excruciating pain) 10/10.    O: Pt was instructed in and performed the following:   Bed Mobility- Supine to sit- min assist      Scooting- min assist     Sit to supine- min assist    Transfers-sit to stand- not assessed   Gait: not assessed   Steps: NA  Treatment: as above, pt performed ap, laq, hip flex, hip abd/add, bicep curls, x 15-20 reps   Comment: Call light left by patient. Pt was returned to bed at end of treatment, alarm was activated. Nursing present  A: Pt with limited activity as above, decreased strength and endurance limiting function. P: Continue with physical therapy       Brigid Jasmine, AARON    GOALS to be met in 3 days. Bed mobility- Independent                                         Transfers-Sit to stand-Supervision                 Gait: Patient to ambulate 50 feet using wheeled walker with Supervision                 Steps: Patient to go up and down 8 step(s) using 1 rail(s) with Minimal assist    Increase strength in affected mm groups by 1/3 grade  Increase balance to allow for improvement towards functional goals. Increase endurance to allow for improvement towards functional goals.

## 2019-04-23 NOTE — PLAN OF CARE
Problem: Pain:  Goal: Pain level will decrease  Description  Pain level will decrease  4/22/2019 2141 by Zulema Gutiérrez RN  Outcome: Met This Shift     Problem: Pain:  Goal: Control of acute pain  Description  Control of acute pain  4/22/2019 2141 by Zulema Gutiérrez RN  Outcome: Met This Shift     Problem: Pain:  Goal: Control of chronic pain  Description  Control of chronic pain  4/22/2019 2141 by Zulema Gutiérrez RN  Outcome: Met This Shift     Problem: Falls - Risk of:  Goal: Will remain free from falls  Description  Will remain free from falls  4/22/2019 2141 by Zulema Gutiérrez RN  Outcome: Met This Shift     Problem: Falls - Risk of:  Goal: Absence of physical injury  Description  Absence of physical injury  4/22/2019 2141 by Zulema Gutiérrez RN  Outcome: Met This Shift     Problem: Risk for Impaired Skin Integrity  Goal: Tissue integrity - skin and mucous membranes  Description  Structural intactness and normal physiological function of skin and  mucous membranes.   4/22/2019 2141 by Zulema Gutiérrez RN  Outcome: Met This Shift     Problem: Urinary Elimination:  Goal: Signs and symptoms of infection will decrease  Description  Signs and symptoms of infection will decrease  4/22/2019 2141 by Zulema Gutiérrez RN  Outcome: Met This Shift     Problem: Urinary Elimination:  Goal: Ability to reestablish a normal urinary elimination pattern will improve - after catheter removal  Description  Ability to reestablish a normal urinary elimination pattern will improve  4/22/2019 2141 by Zulema Gutiérrez RN  Outcome: Met This Shift     Problem: Urinary Elimination:  Goal: Complications related to the disease process, condition or treatment will be avoided or minimized  Description  Complications related to the disease process, condition or treatment will be avoided or minimized  4/22/2019 2141 by Zulema Gutiérrez RN  Outcome: Met This Shift     Problem: ABCDS Injury Assessment  Goal: Patient exhibits improvement of current disease  4/22/2019 2141 by Jose De Jesus Varela RN  Outcome: Met This Shift

## 2019-04-24 LAB — URINE CULTURE, ROUTINE: NORMAL

## 2019-05-06 ENCOUNTER — TELEPHONE (OUTPATIENT)
Dept: ADMINISTRATIVE | Age: 54
End: 2019-05-06

## 2019-05-08 ENCOUNTER — TELEPHONE (OUTPATIENT)
Dept: FAMILY MEDICINE CLINIC | Age: 54
End: 2019-05-08

## 2019-05-08 NOTE — TELEPHONE ENCOUNTER
Stalin Green left Rehab 2v days ago and [facility] has been trying to reach her. Calls are going unreturned for her phone and her brothers number.   They did eventully get a hold of the brother and was told that her dropped her off at home and it was no longer his problem

## 2019-05-14 ENCOUNTER — TELEPHONE (OUTPATIENT)
Dept: FAMILY MEDICINE CLINIC | Age: 54
End: 2019-05-14

## 2019-05-14 DIAGNOSIS — J30.2 SEASONAL ALLERGIC RHINITIS, UNSPECIFIED TRIGGER: ICD-10-CM

## 2019-05-14 DIAGNOSIS — N39.46 MIXED STRESS AND URGE URINARY INCONTINENCE: ICD-10-CM

## 2019-05-14 DIAGNOSIS — E03.9 ACQUIRED HYPOTHYROIDISM: ICD-10-CM

## 2019-05-14 RX ORDER — FLUTICASONE PROPIONATE 50 MCG
2 SPRAY, SUSPENSION (ML) NASAL DAILY
Qty: 1 BOTTLE | Refills: 3 | OUTPATIENT
Start: 2019-05-14

## 2019-05-14 RX ORDER — LEVOTHYROXINE SODIUM 0.1 MG/1
TABLET ORAL
Qty: 30 TABLET | Refills: 2 | OUTPATIENT
Start: 2019-05-14

## 2019-05-14 RX ORDER — OXYBUTYNIN CHLORIDE 5 MG/1
5 TABLET, EXTENDED RELEASE ORAL DAILY
Qty: 30 TABLET | Refills: 3 | OUTPATIENT
Start: 2019-05-14
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
## 2019-05-15 ENCOUNTER — OFFICE VISIT (OUTPATIENT)
Dept: FAMILY MEDICINE CLINIC | Age: 54
End: 2019-05-15
Payer: COMMERCIAL

## 2019-05-15 VITALS
WEIGHT: 140.65 LBS | DIASTOLIC BLOOD PRESSURE: 64 MMHG | BODY MASS INDEX: 28.36 KG/M2 | OXYGEN SATURATION: 99 % | HEART RATE: 61 BPM | HEIGHT: 59 IN | SYSTOLIC BLOOD PRESSURE: 124 MMHG

## 2019-05-15 DIAGNOSIS — N18.9 ACUTE ON CHRONIC RENAL INSUFFICIENCY: ICD-10-CM

## 2019-05-15 DIAGNOSIS — E03.9 ACQUIRED HYPOTHYROIDISM: ICD-10-CM

## 2019-05-15 DIAGNOSIS — Z09 HOSPITAL DISCHARGE FOLLOW-UP: Primary | ICD-10-CM

## 2019-05-15 DIAGNOSIS — Z91.199 NONCOMPLIANCE: ICD-10-CM

## 2019-05-15 DIAGNOSIS — F79 INTELLECTUAL DISABILITY: ICD-10-CM

## 2019-05-15 DIAGNOSIS — N13.9 OBSTRUCTIVE UROPATHY: ICD-10-CM

## 2019-05-15 DIAGNOSIS — N28.9 ACUTE ON CHRONIC RENAL INSUFFICIENCY: ICD-10-CM

## 2019-05-15 DIAGNOSIS — F32.A DEPRESSION, UNSPECIFIED DEPRESSION TYPE: ICD-10-CM

## 2019-05-15 PROCEDURE — 1036F TOBACCO NON-USER: CPT | Performed by: FAMILY MEDICINE

## 2019-05-15 PROCEDURE — 3017F COLORECTAL CA SCREEN DOC REV: CPT | Performed by: FAMILY MEDICINE

## 2019-05-15 PROCEDURE — 99214 OFFICE O/P EST MOD 30 MIN: CPT | Performed by: FAMILY MEDICINE

## 2019-05-15 PROCEDURE — 1111F DSCHRG MED/CURRENT MED MERGE: CPT | Performed by: FAMILY MEDICINE

## 2019-05-15 PROCEDURE — G8417 CALC BMI ABV UP PARAM F/U: HCPCS | Performed by: FAMILY MEDICINE

## 2019-05-15 PROCEDURE — G8427 DOCREV CUR MEDS BY ELIG CLIN: HCPCS | Performed by: FAMILY MEDICINE

## 2019-05-15 RX ORDER — CEFDINIR 300 MG/1
300 CAPSULE ORAL 2 TIMES DAILY
COMMUNITY
End: 2019-05-22 | Stop reason: ALTCHOICE

## 2019-05-15 NOTE — PROGRESS NOTES
5/15/2019     Mike Encarnacion (:  1965) is a 52 y.o. female, with a:  Past Medical History:   Diagnosis Date    Anxiety     Depression     Hyperlipidemia     Hypertension     Hypothyroidism     Intellectual disability     Leg pain, bilateral     Noncompliance with medications     Noncompliance with treatment     Osteoarthritis        Here for evaluation of the following medical concerns:  Chief Complaint   Patient presents with    Follow-Up from Hospital     pt seen at hospital for [address] has catheter in place was told to follow with urology; appt 19       Admitted to [facility] from  -  for Acute on chronic kidney disease stage IV likely compatible with a neurogenic bladder with chronic b/l ureteral stents    Per discharge summary below:    HOSPITAL COURSE:   Cassie Aguayo was evaluated by the nephrology and urology teams throughout the hospital stay. Braden catheter was placed and she had complete resolution of her renal failure and azotemia. Braden catheter will remain in place upon discharge. She was placed on empiric antibiotic therapy for an underlying urinary tract infection in the setting of chronic ureteral stents. Her symptomatology improved. She began tolerating a diet and worked with the therapy teams. Skilled nursing facility was recommended and the patient is acceptable for discharge there today. She will complete a course of antibiotics. She will follow-up with the urologic team as an outpatient for consideration of suprapubic catheter placement. Her brother has been updated accordingly and the patient is acceptable for discharge today    She is scheduled for follow up with Urology and Nephrology    She is unaccompanied again today    Hypothyroidism - long standing history of intermittent compliance. Last TSH noted to be 0.017. She continues to be a very difficult historian and is very unclear as to what doses of Synthroid she has been taking.     ID / Depression / Anxiety - she continues to follow with counseling. She currently denies any SI or HI. Review of Systems   Constitutional: Negative for chills and fever. Respiratory: Negative for cough and shortness of breath. Cardiovascular: Negative for chest pain. Gastrointestinal: Negative for constipation, diarrhea, nausea and vomiting. Psychiatric/Behavioral: Positive for agitation. Negative for self-injury and suicidal ideas. Prior to Visit Medications    Medication Sig Taking? Authorizing Provider   ferrous sulfate 325 (65 Fe) MG tablet Take 1 tablet by mouth 2 times daily (with meals) Yes Regina Soriano, DO   docusate sodium (COLACE, DULCOLAX) 100 MG CAPS Take 100 mg by mouth 2 times daily Yes Regina Soriano, DO   oxybutynin (DITROPAN-XL) 5 MG extended release tablet Take 1 tablet by mouth daily Yes Celestine Trujillo DO   fluticasone (FLONASE) 50 MCG/ACT nasal spray 2 sprays by Nasal route daily Yes Celestine Trujillo DO   atorvastatin (LIPITOR) 40 MG tablet Take 1 tablet by mouth daily Yes Celestine Trujillo DO   Compression Stockings MISC by Does not apply route Below knee  15 - 30 mm Hg Yes Marylene Hermann,    cefdinir (OMNICEF) 300 MG capsule Take 300 mg by mouth 2 times daily  Historical Provider, MD   ondansetron (ZOFRAN-ODT) 4 MG disintegrating tablet Take 1 tablet by mouth every 8 hours as needed for Nausea or Vomiting  Regina Soriano DO   levothyroxine (SYNTHROID) 100 MCG tablet TAKE 1 TABLET BY MOUTH EVERY MORNING 30 MINUTES BEFORE EATING.   Celestine Trujillo DO   vitamin D (ERGOCALCIFEROL) 18668 units CAPS capsule Take 1 capsule by mouth once a week  Celestine Trujillo DO   magnesium oxide (MAG-OX) 400 (241.3 Mg) MG TABS tablet Take 1 tablet by mouth 2 times daily  Celestine Trujillo DO   pantoprazole (PROTONIX) 40 MG tablet TAKE ONE TABLET BY MOUTH DAILY  Dewayne Cole, DO        Social History     Tobacco Use    Smoking status: Former Smoker     Packs/day: 1.00     Years: 5.00     Pack years: 5.00     Last attempt to quit: 1989     Years since quittin.3    Smokeless tobacco: Never Used   Substance Use Topics    Alcohol use: No        Past Surgical History:   Procedure Laterality Date    CYSTOSCOPY Left 2019    CYSTOSCOPY, BILATERAL RETROGRADE PYELOGRAMS, BILATERAL STENT INSERTION performed by Griselda Matas, MD at [address] [place] [address] Drive:    05/15/19 5451   BP: 124/64   Site: Right Upper Arm   Position: Sitting   Cuff Size: Medium Adult   Pulse: 61   SpO2: 99%   Weight: 140 lb 10.5 oz (63.8 kg)   Height: 4' 11.02\" (1.499 m)     Estimated body mass index is 28.39 kg/m² as calculated from the following:    Height as of this encounter: 4' 11.02\" (1.499 m). Weight as of this encounter: 140 lb 10.5 oz (63.8 kg). Physical Exam   Constitutional: She appears well-nourished. No distress. HENT:   Head: Normocephalic and atraumatic. Eyes: Conjunctivae and EOM are normal.   Cardiovascular: Normal rate and regular rhythm. Pulmonary/Chest: Effort normal and breath sounds normal. No respiratory distress. Abdominal: Soft. She exhibits no distension. There is no tenderness. Genitourinary:   Genitourinary Comments: Catheter in place   Musculoskeletal: She exhibits no edema. Neurological: She is alert. Skin: Skin is warm and dry. Psychiatric: Her mood appears anxious. Her affect is angry and labile. Her speech is tangential. Cognition and memory are impaired. She expresses impulsivity. ASSESSMENT/PLAN:   Diagnosis Orders   1. Hospital discharge follow-up     2. Acquired hypothyroidism  Basic Metabolic Panel    CBC    TSH   3. Acute on chronic renal insufficiency  Basic Metabolic Panel    CBC    TSH   4. Intellectual disability     5. Noncompliance     6. Obstructive uropathy     7. Depression, unspecified depression type         Additional plan and future considerations:   As above. Patient advised on importance of follow-up with nephrology and urology. Continue follow-up with psych.   Return to office in 4 weeks for hypothyroidism follow-up or sooner if needed.     Future Appointments   Date Time Provider Nick Ray   6/10/2019  2:00 PM Lorena Cisneros DO [place] [facility]         --Lorena Cisneros DO on 5/15/2019 at 2:06 PM

## 2019-05-17 ASSESSMENT — ENCOUNTER SYMPTOMS
COUGH: 0
DIARRHEA: 0
NAUSEA: 0
CONSTIPATION: 0
VOMITING: 0
SHORTNESS OF BREATH: 0

## 2019-05-21 ENCOUNTER — HOSPITAL ENCOUNTER (OUTPATIENT)
Age: 54
Discharge: HOME | End: 2019-05-23
Payer: COMMERCIAL

## 2019-05-21 LAB
ANION GAP SERPL CALCULATED.3IONS-SCNC: 18 MMOL/L (ref 7–16)
BUN BLDV-MCNC: 22 MG/DL (ref 6–20)
CALCIUM SERPL-MCNC: 10.2 MG/DL (ref 8.6–10.2)
CHLORIDE BLD-SCNC: 101 MMOL/L (ref 98–107)
CO2: 24 MMOL/L (ref 22–29)
CREAT SERPL-MCNC: 1.3 MG/DL (ref 0.5–1)
GFR AFRICAN AMERICAN: 51
GFR NON-AFRICAN AMERICAN: 42 ML/MIN/1.73
GLUCOSE BLD-MCNC: 107 MG/DL (ref 74–99)
HCT VFR BLD CALC: 37 % (ref 34–48)
HEMOGLOBIN: 11.1 G/DL (ref 11.5–15.5)
MCH RBC QN AUTO: 28.8 PG (ref 26–35)
MCHC RBC AUTO-ENTMCNC: 30 % (ref 32–34.5)
MCV RBC AUTO: 96.1 FL (ref 80–99.9)
PDW BLD-RTO: 14 FL (ref 11.5–15)
PLATELET # BLD: 374 E9/L (ref 130–450)
PMV BLD AUTO: 11.2 FL (ref 7–12)
POTASSIUM SERPL-SCNC: 4 MMOL/L (ref 3.5–5)
RBC # BLD: 3.85 E12/L (ref 3.5–5.5)
SODIUM BLD-SCNC: 143 MMOL/L (ref 132–146)
WBC # BLD: 10.3 E9/L (ref 4.5–11.5)

## 2019-05-21 PROCEDURE — 80048 BASIC METABOLIC PNL TOTAL CA: CPT

## 2019-05-21 PROCEDURE — 85027 COMPLETE CBC AUTOMATED: CPT

## 2019-05-22 ENCOUNTER — APPOINTMENT (OUTPATIENT)
Dept: CT IMAGING | Age: 54
DRG: 720 | End: 2019-05-22
Payer: COMMERCIAL

## 2019-05-22 ENCOUNTER — HOSPITAL ENCOUNTER (INPATIENT)
Age: 54
LOS: 7 days | Discharge: NURSING, REHAB OR LONG-TERM CARE | DRG: 720 | End: 2019-05-29
Attending: EMERGENCY MEDICINE | Admitting: INTERNAL MEDICINE
Payer: COMMERCIAL

## 2019-05-22 DIAGNOSIS — N13.9 OBSTRUCTIVE UROPATHY: ICD-10-CM

## 2019-05-22 DIAGNOSIS — E87.20 LACTIC ACIDOSIS: ICD-10-CM

## 2019-05-22 DIAGNOSIS — N18.9 ACUTE RENAL FAILURE SUPERIMPOSED ON CHRONIC KIDNEY DISEASE, UNSPECIFIED CKD STAGE, UNSPECIFIED ACUTE RENAL FAILURE TYPE (HCC): ICD-10-CM

## 2019-05-22 DIAGNOSIS — N17.9 ACUTE RENAL FAILURE SUPERIMPOSED ON CHRONIC KIDNEY DISEASE, UNSPECIFIED CKD STAGE, UNSPECIFIED ACUTE RENAL FAILURE TYPE (HCC): ICD-10-CM

## 2019-05-22 DIAGNOSIS — N30.01 ACUTE CYSTITIS WITH HEMATURIA: Primary | ICD-10-CM

## 2019-05-22 PROBLEM — Z99.2 ACUTE RENAL FAILURE SUPERIMPOSED ON CHRONIC KIDNEY DISEASE, ON CHRONIC DIALYSIS (HCC): Status: ACTIVE | Noted: 2019-05-22

## 2019-05-22 LAB
ALBUMIN SERPL-MCNC: 3.6 G/DL (ref 3.5–5.2)
ALP BLD-CCNC: 70 U/L (ref 35–104)
ALT SERPL-CCNC: 8 U/L (ref 0–32)
ANION GAP SERPL CALCULATED.3IONS-SCNC: 12 MMOL/L (ref 7–16)
AST SERPL-CCNC: 9 U/L (ref 0–31)
BACTERIA: ABNORMAL /HPF
BILIRUB SERPL-MCNC: 0.3 MG/DL (ref 0–1.2)
BILIRUBIN URINE: NEGATIVE
BLOOD, URINE: ABNORMAL
BUN BLDV-MCNC: 30 MG/DL (ref 6–20)
CALCIUM SERPL-MCNC: 9.8 MG/DL (ref 8.6–10.2)
CHLORIDE BLD-SCNC: 103 MMOL/L (ref 98–107)
CLARITY: ABNORMAL
CO2: 25 MMOL/L (ref 22–29)
COLOR: YELLOW
CREAT SERPL-MCNC: 2.6 MG/DL (ref 0.5–1)
CREATININE URINE: 28 MG/DL (ref 29–226)
GFR AFRICAN AMERICAN: 23
GFR NON-AFRICAN AMERICAN: 19 ML/MIN/1.73
GLUCOSE BLD-MCNC: 130 MG/DL (ref 74–99)
GLUCOSE URINE: NEGATIVE MG/DL
HCT VFR BLD CALC: 35.8 % (ref 34–48)
HEMOGLOBIN: 11.2 G/DL (ref 11.5–15.5)
KETONES, URINE: NEGATIVE MG/DL
LACTIC ACID: 2.6 MMOL/L (ref 0.5–2.2)
LEUKOCYTE ESTERASE, URINE: ABNORMAL
LIPASE: 25 U/L (ref 13–60)
MCH RBC QN AUTO: 28.6 PG (ref 26–35)
MCHC RBC AUTO-ENTMCNC: 31.3 % (ref 32–34.5)
MCV RBC AUTO: 91.3 FL (ref 80–99.9)
NITRITE, URINE: NEGATIVE
OSMOLALITY URINE: 303 MOSM/KG (ref 300–900)
PDW BLD-RTO: 14.1 FL (ref 11.5–15)
PH UA: 7 (ref 5–9)
PLATELET # BLD: 343 E9/L (ref 130–450)
PMV BLD AUTO: 11.2 FL (ref 7–12)
POTASSIUM REFLEX MAGNESIUM: 4.9 MMOL/L (ref 3.5–5)
PROTEIN UA: >=300 MG/DL
RBC # BLD: 3.92 E12/L (ref 3.5–5.5)
RBC UA: ABNORMAL /HPF (ref 0–2)
SODIUM BLD-SCNC: 140 MMOL/L (ref 132–146)
SODIUM URINE: 111 MMOL/L
SPECIFIC GRAVITY UA: 1.02 (ref 1–1.03)
TOTAL PROTEIN: 6.8 G/DL (ref 6.4–8.3)
UROBILINOGEN, URINE: 0.2 E.U./DL
WBC # BLD: 14.3 E9/L (ref 4.5–11.5)
WBC UA: >20 /HPF (ref 0–5)

## 2019-05-22 PROCEDURE — 80053 COMPREHEN METABOLIC PANEL: CPT

## 2019-05-22 PROCEDURE — 2060000000 HC ICU INTERMEDIATE R&B

## 2019-05-22 PROCEDURE — 6360000002 HC RX W HCPCS: Performed by: STUDENT IN AN ORGANIZED HEALTH CARE EDUCATION/TRAINING PROGRAM

## 2019-05-22 PROCEDURE — 84300 ASSAY OF URINE SODIUM: CPT

## 2019-05-22 PROCEDURE — 83935 ASSAY OF URINE OSMOLALITY: CPT

## 2019-05-22 PROCEDURE — 6370000000 HC RX 637 (ALT 250 FOR IP): Performed by: INTERNAL MEDICINE

## 2019-05-22 PROCEDURE — 83605 ASSAY OF LACTIC ACID: CPT

## 2019-05-22 PROCEDURE — 87088 URINE BACTERIA CULTURE: CPT

## 2019-05-22 PROCEDURE — 81001 URINALYSIS AUTO W/SCOPE: CPT

## 2019-05-22 PROCEDURE — 2580000003 HC RX 258: Performed by: INTERNAL MEDICINE

## 2019-05-22 PROCEDURE — 96374 THER/PROPH/DIAG INJ IV PUSH: CPT

## 2019-05-22 PROCEDURE — 99285 EMERGENCY DEPT VISIT HI MDM: CPT

## 2019-05-22 PROCEDURE — 74176 CT ABD & PELVIS W/O CONTRAST: CPT

## 2019-05-22 PROCEDURE — 36415 COLL VENOUS BLD VENIPUNCTURE: CPT

## 2019-05-22 PROCEDURE — 83690 ASSAY OF LIPASE: CPT

## 2019-05-22 PROCEDURE — 85027 COMPLETE CBC AUTOMATED: CPT

## 2019-05-22 PROCEDURE — 82570 ASSAY OF URINE CREATININE: CPT

## 2019-05-22 PROCEDURE — 6360000002 HC RX W HCPCS: Performed by: INTERNAL MEDICINE

## 2019-05-22 PROCEDURE — 2580000003 HC RX 258: Performed by: STUDENT IN AN ORGANIZED HEALTH CARE EDUCATION/TRAINING PROGRAM

## 2019-05-22 PROCEDURE — 96375 TX/PRO/DX INJ NEW DRUG ADDON: CPT

## 2019-05-22 RX ORDER — SODIUM CHLORIDE 9 MG/ML
INJECTION, SOLUTION INTRAVENOUS CONTINUOUS
Status: DISCONTINUED | OUTPATIENT
Start: 2019-05-22 | End: 2019-05-26

## 2019-05-22 RX ORDER — FERROUS SULFATE 325(65) MG
325 TABLET ORAL 2 TIMES DAILY WITH MEALS
Status: DISCONTINUED | OUTPATIENT
Start: 2019-05-22 | End: 2019-05-29 | Stop reason: HOSPADM

## 2019-05-22 RX ORDER — PANTOPRAZOLE SODIUM 40 MG/1
40 TABLET, DELAYED RELEASE ORAL DAILY
Status: DISCONTINUED | OUTPATIENT
Start: 2019-05-22 | End: 2019-05-29 | Stop reason: HOSPADM

## 2019-05-22 RX ORDER — ACETAMINOPHEN 325 MG/1
650 TABLET ORAL EVERY 4 HOURS PRN
Status: DISCONTINUED | OUTPATIENT
Start: 2019-05-22 | End: 2019-05-29 | Stop reason: HOSPADM

## 2019-05-22 RX ORDER — OXYBUTYNIN CHLORIDE 5 MG/1
5 TABLET, EXTENDED RELEASE ORAL DAILY
Status: DISCONTINUED | OUTPATIENT
Start: 2019-05-22 | End: 2019-05-29 | Stop reason: HOSPADM

## 2019-05-22 RX ORDER — ATORVASTATIN CALCIUM 40 MG/1
40 TABLET, FILM COATED ORAL DAILY
Status: DISCONTINUED | OUTPATIENT
Start: 2019-05-22 | End: 2019-05-29 | Stop reason: HOSPADM

## 2019-05-22 RX ORDER — LEVOTHYROXINE SODIUM 0.1 MG/1
100 TABLET ORAL DAILY
Status: DISCONTINUED | OUTPATIENT
Start: 2019-05-22 | End: 2019-05-29 | Stop reason: HOSPADM

## 2019-05-22 RX ORDER — ONDANSETRON 2 MG/ML
4 INJECTION INTRAMUSCULAR; INTRAVENOUS ONCE
Status: COMPLETED | OUTPATIENT
Start: 2019-05-22 | End: 2019-05-22

## 2019-05-22 RX ORDER — DOCUSATE SODIUM 100 MG/1
100 CAPSULE, LIQUID FILLED ORAL 2 TIMES DAILY
Status: DISCONTINUED | OUTPATIENT
Start: 2019-05-22 | End: 2019-05-29 | Stop reason: HOSPADM

## 2019-05-22 RX ORDER — 0.9 % SODIUM CHLORIDE 0.9 %
1000 INTRAVENOUS SOLUTION INTRAVENOUS ONCE
Status: COMPLETED | OUTPATIENT
Start: 2019-05-22 | End: 2019-05-22

## 2019-05-22 RX ORDER — HEPARIN SODIUM 5000 [USP'U]/ML
5000 INJECTION, SOLUTION INTRAVENOUS; SUBCUTANEOUS EVERY 8 HOURS SCHEDULED
Status: DISCONTINUED | OUTPATIENT
Start: 2019-05-22 | End: 2019-05-29 | Stop reason: HOSPADM

## 2019-05-22 RX ORDER — KETOROLAC TROMETHAMINE 15 MG/ML
15 INJECTION, SOLUTION INTRAMUSCULAR; INTRAVENOUS ONCE
Status: COMPLETED | OUTPATIENT
Start: 2019-05-22 | End: 2019-05-22

## 2019-05-22 RX ORDER — FLUTICASONE PROPIONATE 50 MCG
2 SPRAY, SUSPENSION (ML) NASAL DAILY
Status: DISCONTINUED | OUTPATIENT
Start: 2019-05-22 | End: 2019-05-29 | Stop reason: HOSPADM

## 2019-05-22 RX ADMIN — PANTOPRAZOLE SODIUM 40 MG: 40 TABLET, DELAYED RELEASE ORAL at 17:24

## 2019-05-22 RX ADMIN — FLUTICASONE PROPIONATE 2 SPRAY: 50 SPRAY, METERED NASAL at 17:25

## 2019-05-22 RX ADMIN — FERROUS SULFATE TAB 325 MG (65 MG ELEMENTAL FE) 325 MG: 325 (65 FE) TAB at 17:25

## 2019-05-22 RX ADMIN — CEFEPIME HYDROCHLORIDE 1 G: 1 INJECTION, POWDER, FOR SOLUTION INTRAMUSCULAR; INTRAVENOUS at 23:04

## 2019-05-22 RX ADMIN — ACETAMINOPHEN 650 MG: 325 TABLET, FILM COATED ORAL at 22:37

## 2019-05-22 RX ADMIN — HEPARIN SODIUM 5000 UNITS: 5000 INJECTION, SOLUTION INTRAVENOUS; SUBCUTANEOUS at 22:38

## 2019-05-22 RX ADMIN — OXYBUTYNIN CHLORIDE 5 MG: 5 TABLET, EXTENDED RELEASE ORAL at 17:24

## 2019-05-22 RX ADMIN — KETOROLAC TROMETHAMINE 15 MG: 15 INJECTION, SOLUTION INTRAMUSCULAR; INTRAVENOUS at 11:49

## 2019-05-22 RX ADMIN — DOCUSATE SODIUM 100 MG: 100 CAPSULE, LIQUID FILLED ORAL at 22:37

## 2019-05-22 RX ADMIN — SODIUM CHLORIDE 1000 ML: 9 INJECTION, SOLUTION INTRAVENOUS at 11:47

## 2019-05-22 RX ADMIN — LEVOTHYROXINE SODIUM 100 MCG: 100 TABLET ORAL at 17:25

## 2019-05-22 RX ADMIN — SODIUM CHLORIDE: 9 INJECTION, SOLUTION INTRAVENOUS at 17:27

## 2019-05-22 RX ADMIN — ATORVASTATIN CALCIUM 40 MG: 40 TABLET, FILM COATED ORAL at 17:24

## 2019-05-22 RX ADMIN — ONDANSETRON 4 MG: 2 INJECTION INTRAMUSCULAR; INTRAVENOUS at 11:47

## 2019-05-22 RX ADMIN — WATER 2 G: 1 INJECTION INTRAMUSCULAR; INTRAVENOUS; SUBCUTANEOUS at 13:53

## 2019-05-22 RX ADMIN — SODIUM CHLORIDE 1000 ML: 9 INJECTION, SOLUTION INTRAVENOUS at 13:52

## 2019-05-22 ASSESSMENT — PAIN SCALES - GENERAL
PAINLEVEL_OUTOF10: 8
PAINLEVEL_OUTOF10: 0
PAINLEVEL_OUTOF10: 10
PAINLEVEL_OUTOF10: 0
PAINLEVEL_OUTOF10: 8

## 2019-05-22 ASSESSMENT — ENCOUNTER SYMPTOMS
TROUBLE SWALLOWING: 0
VOMITING: 0
HEMATEMESIS: 0
SORE THROAT: 0
EYE REDNESS: 0
NAUSEA: 1
SHORTNESS OF BREATH: 0
DIARRHEA: 1
ABDOMINAL PAIN: 1
COUGH: 0
HEMATOCHEZIA: 0
BACK PAIN: 0

## 2019-05-22 ASSESSMENT — PAIN DESCRIPTION - DESCRIPTORS: DESCRIPTORS: ACHING

## 2019-05-22 ASSESSMENT — PAIN DESCRIPTION - LOCATION: LOCATION: ABDOMEN

## 2019-05-22 ASSESSMENT — PAIN DESCRIPTION - ORIENTATION: ORIENTATION: MID

## 2019-05-22 NOTE — ED NOTES
Pt. C/o abdominal pain and nausea since yesterday. Pt. States the pain started after they saw the urologist yesterday.      Kacey Reeves RN  05/22/19 0948

## 2019-05-22 NOTE — ED PROVIDER NOTES
The patient is a 25-year-old female who presents to the Emergency Department complaining of abdominal pain. Patient states that her symptoms started yesterday. She was at her urologist office, Dr. Orly Nicholas, and had her Crane catheter removed for a trial without it. She states since the crane catheter was removed she developed significant pain in the suprapubic region of her abdomen. Patient also complains of nausea, dysuria, increased urinary frequency, and chills. She also had an episode of diarrhea this morning. She denies any fever, vomiting, hematemesis, hematochezia, melena, hematuria, recent illness, or other acute symptoms or concerns. She was hospitalized approximately one month ago for similar symptoms. The history is provided by the patient. Abdominal Pain   Pain location:  Suprapubic and periumbilical  Pain quality: aching    Pain radiates to:  Does not radiate  Pain severity:  Mild  Onset quality:  Sudden  Duration:  1 day  Timing:  Constant  Progression:  Worsening  Chronicity:  Recurrent  Context: previous surgery    Context: not eating, not recent illness, not sick contacts, not suspicious food intake and not trauma    Relieved by:  None tried  Worsened by:  Nothing  Ineffective treatments:  None tried  Associated symptoms: chills, diarrhea, dysuria and nausea    Associated symptoms: no chest pain, no cough, no fatigue, no fever, no hematemesis, no hematochezia, no hematuria, no melena, no shortness of breath, no sore throat and no vomiting    Risk factors: recent hospitalization    Risk factors: has not had multiple surgeries and not obese        Review of Systems   Constitutional: Positive for chills. Negative for fatigue and fever. HENT: Negative for sore throat and trouble swallowing. Eyes: Negative for redness and visual disturbance. Respiratory: Negative for cough and shortness of breath. Cardiovascular: Negative for chest pain and leg swelling.    Gastrointestinal: Positive for abdominal pain, diarrhea and nausea. Negative for hematemesis, hematochezia, melena and vomiting. Genitourinary: Positive for dysuria, frequency and pelvic pain. Negative for flank pain, hematuria and urgency. Musculoskeletal: Negative for back pain. Skin: Negative for rash and wound. Neurological: Negative for dizziness, syncope, light-headedness, numbness and headaches. All other systems reviewed and are negative. Physical Exam   Constitutional: She is oriented to person, place, and time. She appears well-developed and well-nourished. Non-toxic appearance. She does not have a sickly appearance. She appears ill. She appears distressed (anxious, tearful, and mildly distress). HENT:   Head: Normocephalic and atraumatic. Mouth/Throat: Mucous membranes are dry. Eyes: Conjunctivae, EOM and lids are normal.   Neck: Normal range of motion. Neck supple. Cardiovascular: Normal rate, regular rhythm and normal heart sounds. No murmur heard. Pulmonary/Chest: Effort normal and breath sounds normal. No stridor. No respiratory distress. She has no decreased breath sounds. She has no wheezes. She has no rhonchi. She has no rales. Abdominal: Soft. Bowel sounds are normal. She exhibits no distension. There is tenderness in the periumbilical area and suprapubic area. There is no rigidity, no rebound, no guarding and no CVA tenderness. Musculoskeletal: She exhibits no edema. Neurological: She is alert and oriented to person, place, and time. She displays no atrophy. She exhibits normal muscle tone. Coordination normal.   Skin: Skin is warm and dry. Nursing note and vitals reviewed. Procedures    MDM  Number of Diagnoses or Management Options  Acute cystitis with hematuria:   Acute renal failure superimposed on chronic kidney disease, unspecified CKD stage, unspecified acute renal failure type [facility]):   Lactic acidosis:   Diagnosis management comments:  The patient is a 52year-old-female who presents to the ED complaining of abdominal pain. She has tenderness on palpation of the periumbilical and suprapubic region. She is hemodynamically stable. Treated with toradol, IVF, and zofran. Labs indicate JIAN with creatinine of 2.6, UTI, lactic acidosis of 2.6, and leukocytosis of 14.3. Will plan on admission for acute on chronic renal failure and UTI. Treated with a dose of Rocephin in the ED. ED Course as of May 22 1403   Wed May 22, 2019   1124 ATTENDING PROVIDER ATTESTATION:     I have personally performed and/or participated in the history, exam, medical decision making, and procedures and agree with all pertinent clinical information unless otherwise noted. I have also reviewed and agree with the past medical, family and social history unless otherwise noted. I have discussed this patient in detail with the resident, and provided the instruction and education regarding patient here complaining of nausea, vomiting, diarrhea with urinary frequency and general abdominal pain after having a Braden catheter removed, still has bilateral ureteral stents in place. .  My findings/plan: Patient in no acute distress but does appear somewhat uncomfortable. Abdomen soft with diffuse moderate tenderness, patient starts to cry during the exam. Minimal suprapubic fullness with no gross distention otherwise. No jaundice or icterus. Heart rate regular, lungs are clear and equal.          [NC]   1329 Reassessed patient and confirmed she has not been on antibiotics since April for UTI's. She is feeling much better at this time and in no acute distress. [KG]   0 Consulted with Dr. Luna, he agreed to admit the patient.      [KG]      ED Course User Index  [KG] Maria Guadalupe Almeida DO  [NC] Guadalupe Pitts DO          ED Course as of May 22 1403   Wed May 22, 2019   1124 ATTENDING PROVIDER ATTESTATION:     I have personally performed and/or participated in the history, exam, medical decision making, and The patients home medications have been reviewed. Allergies: Patient has no known allergies.     -------------------------------------------------- RESULTS -------------------------------------------------    LABS:  Results for orders placed or performed during the hospital encounter of 05/22/19   CBC   Result Value Ref Range    WBC 14.3 (H) 4.5 - 11.5 E9/L    RBC 3.92 3.50 - 5.50 E12/L    Hemoglobin 11.2 (L) 11.5 - 15.5 g/dL    Hematocrit 35.8 34.0 - 48.0 %    MCV 91.3 80.0 - 99.9 fL    MCH 28.6 26.0 - 35.0 pg    MCHC 31.3 (L) 32.0 - 34.5 %    RDW 14.1 11.5 - 15.0 fL    Platelets 893 374 - 168 E9/L    MPV 11.2 7.0 - 12.0 fL   Comprehensive Metabolic Panel w/ Reflex to MG   Result Value Ref Range    Sodium 140 132 - 146 mmol/L    Potassium reflex Magnesium 4.9 3.5 - 5.0 mmol/L    Chloride 103 98 - 107 mmol/L    CO2 25 22 - 29 mmol/L    Anion Gap 12 7 - 16 mmol/L    Glucose 130 (H) 74 - 99 mg/dL    BUN 30 (H) 6 - 20 mg/dL    CREATININE 2.6 (H) 0.5 - 1.0 mg/dL    GFR Non-African American 19 >=60 mL/min/1.73    GFR African American 23     Calcium 9.8 8.6 - 10.2 mg/dL    Total Protein 6.8 6.4 - 8.3 g/dL    Alb 3.6 3.5 - 5.2 g/dL    Total Bilirubin 0.3 0.0 - 1.2 mg/dL    Alkaline Phosphatase 70 35 - 104 U/L    ALT 8 0 - 32 U/L    AST 9 0 - 31 U/L   Lipase   Result Value Ref Range    Lipase 25 13 - 60 U/L   Lactic Acid, Plasma   Result Value Ref Range    Lactic Acid 2.6 (H) 0.5 - 2.2 mmol/L   Urinalysis   Result Value Ref Range    Color, UA Yellow Straw/Yellow    Clarity, UA CLOUDY (A) Clear    Glucose, Ur Negative Negative mg/dL    Bilirubin Urine Negative Negative    Ketones, Urine Negative Negative mg/dL    Specific Gravity, UA 1.020 1.005 - 1.030    Blood, Urine LARGE (A) Negative    pH, UA 7.0 5.0 - 9.0    Protein, UA >=300 (A) Negative mg/dL    Urobilinogen, Urine 0.2 <2.0 E.U./dL    Nitrite, Urine Negative Negative    Leukocyte Esterase, Urine LARGE (A) Negative   Microscopic Urinalysis   Result Value Ref Range    WBC, UA >20 0 - 5 /HPF    RBC, UA 5-10 (A) 0 - 2 /HPF    Bacteria, UA FEW (A) /HPF       RADIOLOGY:  CT ABDOMEN PELVIS WO CONTRAST   Final Result   1. Findings suggesting cystitis. 2. Persistent severe right hydroureteronephrosis with stable position   of the ureteral stents            ------------------------- NURSING NOTES AND VITALS REVIEWED ---------------------------  Date / Time Roomed:  5/22/2019 10:45 AM  ED Bed Assignment:  23/23    The nursing notes within the ED encounter and vital signs as below have been reviewed. Patient Vitals for the past 24 hrs:   BP Temp Temp src Pulse Resp SpO2 Height Weight   05/22/19 1359 133/72 98.5 °F (36.9 °C) -- 70 14 100 % -- --   05/22/19 1106 (!) 165/94 -- Oral -- -- -- -- --   05/22/19 1104 -- 99.3 °F (37.4 °C) -- 82 16 100 % 4' 11\" (1.499 m) 165 lb (74.8 kg)       Oxygen Saturation Interpretation: Normal    ------------------------------------------ PROGRESS NOTES ------------------------------------------  Re-evaluation(s):  Time: 1330  Patients symptoms are improving  Repeat physical examination is not changed    Counseling:  I have spoken with the patient and discussed todays results, in addition to providing specific details for the plan of care and counseling regarding the diagnosis and prognosis. Their questions are answered at this time and they are agreeable with the plan of admission.    --------------------------------- ADDITIONAL PROVIDER NOTES ---------------------------------  Consultations:  Time: 1400. Spoke with Dr. Art Dee. Discussed case. They will admit the patient. This patient's ED course included: a personal history and physicial examination, re-evaluation prior to disposition, multiple bedside re-evaluations, IV medications, cardiac monitoring, continuous pulse oximetry and complex medical decision making and emergency management    This patient has remained hemodynamically stable during their ED course. Diagnosis:  1.  Acute cystitis with hematuria    2. Acute renal failure superimposed on chronic kidney disease, unspecified CKD stage, unspecified acute renal failure type ([facility] [address]    3. Lactic acidosis        Disposition:  Patient's disposition: Admit to med/surg floor  Patient's condition is stable.          French Holcomb, DO  Resident  05/22/19 Delvin Boothe, DO  Resident  05/22/19 5139
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
## 2019-05-23 PROBLEM — E44.1 MILD PROTEIN-CALORIE MALNUTRITION (HCC): Status: ACTIVE | Noted: 2019-05-23

## 2019-05-23 LAB — URINE CULTURE, ROUTINE: NORMAL

## 2019-05-23 PROCEDURE — 2580000003 HC RX 258: Performed by: UROLOGY

## 2019-05-23 PROCEDURE — 6370000000 HC RX 637 (ALT 250 FOR IP): Performed by: INTERNAL MEDICINE

## 2019-05-23 PROCEDURE — 6360000002 HC RX W HCPCS: Performed by: INTERNAL MEDICINE

## 2019-05-23 PROCEDURE — 2580000003 HC RX 258: Performed by: INTERNAL MEDICINE

## 2019-05-23 PROCEDURE — 2060000000 HC ICU INTERMEDIATE R&B

## 2019-05-23 PROCEDURE — 87088 URINE BACTERIA CULTURE: CPT

## 2019-05-23 RX ORDER — 0.9 % SODIUM CHLORIDE 0.9 %
500 INTRAVENOUS SOLUTION INTRAVENOUS ONCE
Status: COMPLETED | OUTPATIENT
Start: 2019-05-23 | End: 2019-05-23

## 2019-05-23 RX ADMIN — SODIUM CHLORIDE: 9 INJECTION, SOLUTION INTRAVENOUS at 20:51

## 2019-05-23 RX ADMIN — FERROUS SULFATE TAB 325 MG (65 MG ELEMENTAL FE) 325 MG: 325 (65 FE) TAB at 09:18

## 2019-05-23 RX ADMIN — LEVOTHYROXINE SODIUM 100 MCG: 100 TABLET ORAL at 06:36

## 2019-05-23 RX ADMIN — HEPARIN SODIUM 5000 UNITS: 5000 INJECTION, SOLUTION INTRAVENOUS; SUBCUTANEOUS at 22:02

## 2019-05-23 RX ADMIN — OXYBUTYNIN CHLORIDE 5 MG: 5 TABLET, EXTENDED RELEASE ORAL at 09:18

## 2019-05-23 RX ADMIN — FLUTICASONE PROPIONATE 2 SPRAY: 50 SPRAY, METERED NASAL at 09:18

## 2019-05-23 RX ADMIN — PANTOPRAZOLE SODIUM 40 MG: 40 TABLET, DELAYED RELEASE ORAL at 09:18

## 2019-05-23 RX ADMIN — SODIUM CHLORIDE: 9 INJECTION, SOLUTION INTRAVENOUS at 06:35

## 2019-05-23 RX ADMIN — ATORVASTATIN CALCIUM 40 MG: 40 TABLET, FILM COATED ORAL at 09:18

## 2019-05-23 RX ADMIN — FERROUS SULFATE TAB 325 MG (65 MG ELEMENTAL FE) 325 MG: 325 (65 FE) TAB at 17:03

## 2019-05-23 RX ADMIN — ACETAMINOPHEN 650 MG: 325 TABLET, FILM COATED ORAL at 09:17

## 2019-05-23 RX ADMIN — DOCUSATE SODIUM 100 MG: 100 CAPSULE, LIQUID FILLED ORAL at 09:17

## 2019-05-23 RX ADMIN — CEFEPIME HYDROCHLORIDE 1 G: 1 INJECTION, POWDER, FOR SOLUTION INTRAMUSCULAR; INTRAVENOUS at 22:46

## 2019-05-23 RX ADMIN — VANCOMYCIN HYDROCHLORIDE 1000 MG: 1 INJECTION, POWDER, LYOPHILIZED, FOR SOLUTION INTRAVENOUS at 03:46

## 2019-05-23 RX ADMIN — ACETAMINOPHEN 650 MG: 325 TABLET, FILM COATED ORAL at 22:02

## 2019-05-23 RX ADMIN — SODIUM CHLORIDE 500 ML: 9 INJECTION, SOLUTION INTRAVENOUS at 01:50

## 2019-05-23 RX ADMIN — HEPARIN SODIUM 5000 UNITS: 5000 INJECTION, SOLUTION INTRAVENOUS; SUBCUTANEOUS at 06:36

## 2019-05-23 RX ADMIN — HEPARIN SODIUM 5000 UNITS: 5000 INJECTION, SOLUTION INTRAVENOUS; SUBCUTANEOUS at 14:41

## 2019-05-23 RX ADMIN — DOCUSATE SODIUM 100 MG: 100 CAPSULE, LIQUID FILLED ORAL at 22:03

## 2019-05-23 ASSESSMENT — PAIN SCALES - GENERAL
PAINLEVEL_OUTOF10: 0
PAINLEVEL_OUTOF10: 3

## 2019-05-23 ASSESSMENT — PAIN DESCRIPTION - PAIN TYPE: TYPE: ACUTE PAIN

## 2019-05-23 ASSESSMENT — PAIN DESCRIPTION - LOCATION: LOCATION: GENERALIZED

## 2019-05-23 ASSESSMENT — PAIN DESCRIPTION - DESCRIPTORS: DESCRIPTORS: ACHING

## 2019-05-23 NOTE — PROGRESS NOTES
Internal Medicine Resident Progress Note    Admission date: 5/22/2019  Primary care physician: Christal Menendez DO  Chief complaint: Abdominal Pain  Reason for Admission: ARF  Consultants:Urology    Subjective  Frances Horowitz was seen and examined at bedside today. All patient questions were answered and all tests were reviewed. No family present during my examination. Frances Horowitz states that her abdominal pain is better. Otherwise she states that there are no new complaints at this time including no chest pain, shortness of breath, abdominal pain, nausea, vomiting, diarrhea, constipation, headaches, fevers, chills, lightheadedness, dizziness, calf pain. Hospital Medications    atorvastatin (LIPITOR) tablet 40 mg Daily   docusate sodium (COLACE) capsule 100 mg BID   ferrous sulfate tablet 325 mg BID WC   fluticasone (FLONASE) 50 MCG/ACT nasal spray 2 spray Daily   levothyroxine (SYNTHROID) tablet 100 mcg Daily   oxybutynin (DITROPAN-XL) extended release tablet 5 mg Daily   pantoprazole (PROTONIX) tablet 40 mg Daily   0.9 % sodium chloride infusion Continuous   heparin (porcine) injection 5,000 Units 3 times per day   acetaminophen (TYLENOL) tablet 650 mg Q4H PRN   cefepime (MAXIPIME) 1 g IVPB extended (mini-bag) Q24H       PRN Medications  acetaminophen      Review of Systems  Please see HPI above. All bolded are positive. All un-bolded are negative.   Gen: fever, chills, fatigue, weakness, diaphoresis, increase in thirst, unintentional weight change, loss of appetite  Head: headache, vision change, hearing loss  Chest: chest pain, chest heaviness, palpitations  Lungs: shortness of breath, wheezing, coughing  Abdomen: abdominal pain, nausea, vomiting, diarrhea, constipation, melena, hematochezia, hematemesis  Extremities: lower extremity edema, myalgias, arthralgias  Urinary: dysuria, hematuria, or increase in frequency  Neurologic: lightheadedness, dizziness, confusion, syncope  Psychiatric: depression, suicidal ideation, or anxiety      Objective  Most Recent Recorded Vitals  Vitals:    05/23/19 0642   BP: 100/60   Pulse: 76   Resp: 18   Temp: 100.4 °F (38 °C)   SpO2: 96%     I/O last 3 completed shifts:  In: -   Out: 1025 [Urine:1025]  No intake/output data recorded. Physical Exam:  General: AAO to person, place, time, and purpose, NAD, no labored breathing  Eyes: Conjunctivae/corneas clear, Sclera non icteric. Skin: Color, texture, and turgor normal. No rashes or lesions. Lungs: Clear to auscultation bilaterally. No retractions or use of accessory muscles. No vocal fremitus. No rhonchi, No crackle No rale. Heart: Regular rate and regular rhythm, no murmur  Abdomen: Soft, non-tender; bowel sounds normal; no masses,  no organomegaly  Extremities: Atraumatic, no cyanosis, no edema  Neurologic: Cranial nerves 2-12 grossly intact, no slurred speech. Osteopathic/Structural Exam: Examined in seated and supine position. Normal thoracic kyphosis. Normal lumbar lordosis. No acute changes. Most Recent Labs  Lab Results   Component Value Date    WBC 14.3 (H) 05/22/2019    HGB 11.2 (L) 05/22/2019    HCT 35.8 05/22/2019     05/22/2019     05/22/2019    K 4.9 05/22/2019     05/22/2019    CREATININE 2.6 (H) 05/22/2019    BUN 30 (H) 05/22/2019    CO2 25 05/22/2019    GLUCOSE 130 (H) 05/22/2019    ALT 8 05/22/2019    AST 9 05/22/2019    TSH 0.017 (L) 04/19/2019    LABA1C 5.1 04/19/2019     *All labs in electric medical record were reviewed. Please see electronic chart for a more comprehensive set of labs    Ct Abdomen Pelvis Wo Contrast    Result Date: 5/22/2019  LOCATION:200 EXAM: CT ABDOMEN PELVIS WO CONTRAST COMPARISON: None HISTORY: Bilateral flank pain TECHNIQUE:Noncontrast helical abdomen and pelvis CT was performed. Coronal and sagittal reconstructions also obtained. Automated dose control was used for this exam. CONTRAST: No IV contrast administered.  FINDINGS: SUPPORT DEVICES: None LOWER THORAX: Limited evaluation of the lower chest demonstrates clear lung bases bilaterally. SOLID ORGANS: The liver, spleen, pancreas, and adrenal glands are normal. BILIARY SYSTEM: No evidence of biliary ductal dilatation. GENITOURINARY: Bilateral ureteral stents are in place and in satisfactory position. There is persistent severe right hydroureteronephrosis. Findings appear similar to the previous CT. The left kidney is not significantly dilated. No stones are appreciated. Prominent perivesical fat stranding and fluid seen in the lower abdomen and pelvis. Pelvic structures are unremarkable. GASTROINTESTINAL: The stomach, small and large bowel are normal in caliber without evidence of focal wall thickening. There is no evidence of bowel obstruction. APPENDIX: Normal. FLUID COLLECTIONS: None. LYMPH NODES: No adenopathy by size criteria. VASCULAR STRUCTURES: Visualized vascular structures appear normal. ABDOMINAL WALL: Normal with no herniations. OSSEOUS AND SOFT TISSUE STRUCTURES: Bone windows demonstrate no suspicious osteolytic or osteoblastic lesions. No fracture identified. 1. Findings suggesting cystitis. 2. Persistent severe right hydroureteronephrosis with stable position of the ureteral stents      Microbiology   Blood culture #1-GROWTH NOT PRESENT-INCUBATION CONTINUES. Blood culture #2-GROWTH NOT PRESENT-INCUBATION CONTINUES. Urine culture- pending    Respiratory culture- sent  Strep pneumo urine ag- pending  Legionella urine ag- pending  DFA panel- pending    Assessment  1. Acute on chronic kidney disease stage IV likely compatible with a neurogenic bladder with chronic b/l ureteral stents  2. Sepis POA likely secondary to UTI  3. Normocytic anemia of chronic disease  4. Hypothyroidism  5. Chronic mental disability  6. Hyperlipidemia      Plan  Pt lab work is pending this morning. Will continue with IV fluid hydration for now. Urology consulted for recommendations concerning the crane and neurogenic bladder.

## 2019-05-23 NOTE — CONSULTS
[address]                                  CONSULTATION    PATIENT NAME: Norma Miller                     :        1965  MED REC NO:   [id number]                            ROOM:       6200  ACCOUNT NO:   [de-identified]                           ADMIT DATE: 2019  PROVIDER:     Edy Mckinney MD    CONSULT DATE:  2019    LOCATION:  She is currently in [facility], bed 1. CHIEF COMPLAINT:  Abdominal pain. HISTORY OF PRESENT ILLNESS:  This 57-year-old female who has been known  to our office since 2090 was found to have distended urinary bladder,  bilateral hydronephrosis and azotemia. The patient was brought to the  operating room on 2019, underwent bilateral stent placement and  she was found to have distended large volume bladder which was felt to  be neurogenic bladder. The patient has been advised. Her serum  creatinine at that time was in the range of 5, however with stents and  catheter drainage her serum creatinine returned to 1.8. The patient  reappeared at the hospital in April, at some point her catheter had been  removed, again she was azotemic and this resolved with catheter  drainage. The patient was discharged from the hospital with Braden  catheter in place. However, she was seen in our office on 2019  and demanded that the Braden catheter be removed which it was and on the  2019 she presented with severe abdominal pain and claimed that she  was voiding; however, she was found to have a large volume of urine in  her bladder with serum creatinine now in the range of 3. PAST MEDICAL HISTORY:  Her medical status is complicated by  hyperlipidemia and hypothyroidism. ALLERGIES:  None known to medication. PAST SURGICAL HISTORY:  The patient has had the above-mentioned  cystoscopy.     MEDICATIONS:  Her hospital medications at this time include Lipitor,  Colace, Flonase, Synthroid, Ditropan, Protonix, Tylenol, Maxipime. PHYSICAL EXAMINATION:  GENERAL:  Patient is alert. She appears to be oriented. She does have  speech impediment, although she appears to be of normal cerebral  function. ABDOMEN:  Soft. There are no palpable organs or masses. She does have  a Braden catheter in place. IMPRESSION:  The patient certainly appears to have a dysfunctional  neurogenic bladder and does require Braden catheter drainage or some type  of drainage of her bladder. She does not appear to be amenable to  leaving a long-term Braden and I certainly do not believe that she would  follow a regimen of intermittent self-catheterization. The only other  option would be to consider a suprapubic catheter. We will discuss this  with her and we will also consider changing her stents once her azotemia  has resolved.         Adwoa Anderson MD    D: 05/23/2019 12:38:14       T: 05/23/2019 12:46:32     [id number]  Job#: [id number]     Doc#: [id number]    CC:

## 2019-05-23 NOTE — CONSULTS
[address] [place] [address] [facility]  Consult Note    [facility] [address]  [place], [address]  Phone [phone]   [phone]      Admit Date: 2019 10:45 AM  Pt Name: Nina Seth  MRN: [id number]  : 1965  Reason for Consult:    Chief Complaint   Patient presents with    Abdominal Pain     Starting last night    Nausea     Requesting Physician:  Cash Miller DO  PCP: Rodríguez Wilson DO  History Obtained From:  patient  ID consulted for uti on hospital day  57       Chief Complaint   Patient presents with    Abdominal Pain     Starting last night    Nausea     HISTORYOF PRESENT ILLNESS      Nina Seth is a 52 y.o. female who presents with significant past medical history of  has a past medical history of Anxiety, Depression, Hyperlipidemia, Hypertension, Hypothyroidism, Intellectual disability, Leg pain, bilateral, Noncompliance with medications, Noncompliance with treatment, and Osteoarthritis. who presents with   Chief Complaint   Patient presents with    Abdominal Pain     Starting last night    Nausea     ED TRIAGEVITALS  BP: 104/64, Temp: 100.3 °F (37.9 °C), Pulse: 85, Resp: 18, SpO2: 96 %  HPI pt with Neurogenic bladder, urinary retention, bilateral hydronephrosis with indwelling stents 2019. Pt from home with abd pain bilateral and back pain. SHe had diarrhea as well. Her pain progressed and she almost fell therefore she called EMS. She has abd pain that radiated to her side. She denes fevers but has chills. She has dysuria no hematuria. Pt was on an atbx now off. Has sweats. Has supapubic pain.   REVIEW OF SYSTEMS    (2-9 systems for level 4, 10 or more for level 5)     REVIEW OFSYSTEMS:    CONSTITUTIONAL:   No fever, chills, weight loss  ALLERGIES:    No urticaria, hay fever,    EYES:     No blurry vision, loss of vision,eye pain  ENT:      No hearing loss, sore throat  CARDIOVASCULAR:  No chest pain or palpitations  RESPIRATORY:   No cough, sob  ENDOCRINE:    No increase thirst, urination   HEME-LYMPH:   No easy bruising or bleeding  GI:     No nausea, vomiting or diarrhea  :     No urinary complaints  NEURO:    No seizures, stroke, HA  MUSCULOSKELETAL:  No muscle aches or pain, no jointpain  SKIN:     No rash or itch  PSYCH:    No depression or anxiety    CURRENT MEDICATIONS     Current Facility-Administered Medications:     atorvastatin (LIPITOR) tablet 40 mg, 40 mg, Oral, Daily, Veronica Quiet, DO, 40 mg at 05/23/19 9364    docusate sodium (COLACE) capsule 100 mg, 100 mg, Oral, BID, Veronica Quiet, DO, 100 mg at 05/23/19 2770    ferrous sulfate tablet 325 mg, 325 mg, Oral, BID WC, Veronica Quiet, DO, 325 mg at 05/23/19 0918    fluticasone (FLONASE) 50 MCG/ACT nasal spray 2 spray, 2 spray, Nasal, Daily, Veronica Quiet, DO, 2 spray at 05/23/19 8863    levothyroxine (SYNTHROID) tablet 100 mcg, 100 mcg, Oral, Daily, Veronica Quiet, DO, 100 mcg at 05/23/19 0636    oxybutynin (DITROPAN-XL) extended release tablet 5 mg, 5 mg, Oral, Daily, Veronica Quiet, DO, 5 mg at 05/23/19 0918    pantoprazole (PROTONIX) tablet 40 mg, 40 mg, Oral, Daily, Veronica Quiet, DO, 40 mg at 05/23/19 0918    0.9 % sodium chloride infusion, , Intravenous, Continuous, Nikole Mishra MD, Last Rate: 150 mL/hr at 05/23/19 8755    heparin (porcine) injection 5,000 Units, 5,000 Units, Subcutaneous, 3 times per day, Veronica Quiet, DO, 5,000 Units at 05/23/19 0636    acetaminophen (TYLENOL) tablet 650 mg, 650 mg, Oral, Q4H PRN, Vera Ko DO, 650 mg at 05/23/19 8505    cefepime (MAXIPIME) 1 g IVPB extended (mini-bag), 1 g, Intravenous, Q24H, Verfitz Cuevas DO, Stopped at 05/23/19 0304  ALLERGIES     Patient has no known allergies.   Immunization History   Administered Date(s) Administered    Influenza, MDCK Quadv, with preserv IM (Flucelvax 4 yrs and older) 02/26/2019    Influenza, Sangita Tony, 3 Years and older, IM (Fluzone 3 yrs and older or Afluria 5 yrs and older) 2016    Pneumococcal Polysaccharide ([id number]) 2019    Tdap (Boostrix, Adacel) 2018     PAST MEDICAL HISTORY     Past Medical History:   Diagnosis Date    Anxiety     Depression     Hyperlipidemia     Hypertension     Hypothyroidism     Intellectual disability     Leg pain, bilateral     Noncompliance with medications     Noncompliance with treatment     Osteoarthritis      SURGICAL HISTORY       Past Surgical History:   Procedure Laterality Date    CYSTOSCOPY Left 2019    CYSTOSCOPY, BILATERAL RETROGRADE PYELOGRAMS, BILATERAL STENT INSERTION performed by Yuri Martell MD at [address] [place] [address] HISTORY       Family History   Problem Relation Age of Onset    Diabetes Brother     Thyroid Disease Mother     Other Daughter         Autism     SOCIAL HISTORY       Social History     Socioeconomic History    Marital status: Single     Spouse name: None    Number of children: 1    Years of education: None    Highest education level: None   Occupational History    Occupation: unemployed     Employer: not employed   Social Needs    Financial resource strain: None    Food insecurity:     Worry: None     Inability: None    Transportation needs:     Medical: None     Non-medical: None   Tobacco Use    Smoking status: Former Smoker     Packs/day: 1.00     Years: 5.00     Pack years: 5.00     Last attempt to quit: 1989     Years since quittin.4    Smokeless tobacco: Never Used   Substance and Sexual Activity    Alcohol use: No    Drug use: No    Sexual activity: None   Lifestyle    Physical activity:     Days per week: None     Minutes per session: None    Stress: None   Relationships    Social connections:     Talks on phone: None     Gets together: None     Attends Buddhist service: None     Active member of club or organization: None     Attends meetings of clubs or organizations: None Relationship status: None    Intimate partner violence:     Fear of current or ex partner: None     Emotionally abused: None     Physically abused: None     Forced sexual activity: None   Other Topics Concern    None   Social History Narrative    None     · Lives with boyfriend  · No pets  · Not working    PHYSICAL EXAM    (up to 7 for level 4, 8 or more forlevel 5)     ED Triage Vitals   BP Temp Temp Source Pulse Resp SpO2 Height Weight   05/22/19 1106 05/22/19 1104 05/22/19 1106 05/22/19 1104 05/22/19 1104 05/22/19 1104 05/22/19 1104 05/22/19 1104   (!) 165/94 99.3 °F (37.4 °C) Oral 82 16 100 % 4' 11\" (1.499 m) 165 lb (74.8 kg)     Vitals:    Vitals:    05/23/19 0253 05/23/19 0642 05/23/19 0915 05/23/19 1015   BP: (!) 90/54 100/60 104/64    Pulse: 72 76 85    Resp: 18 18 18    Temp: 98.7 °F (37.1 °C) 100.4 °F (38 °C) 101.4 °F (38.6 °C) 100.3 °F (37.9 °C)   TempSrc: Oral Oral Oral Oral   SpO2: 96% 96% 96%    Weight:       Height:         Physical Exam   Constitutional/General: Alert and oriented, NAD sweat  Head: NC/AT  Eyes: PERRL, EOMI  Mouth: Normal mucosa, no thrush   Neck: Supple, full ROM,    Pulmonary: Lungs clear to auscultation bilaterally. Not in respiratory distress  Cardiovascular:  Regular rate and rhythm, no murmurs, gallops, or rubs. Abdomen: Soft, + BS. No distension. Nontender. Extremities: Moves all extremities x 4. Warm and well perfused  Pulses:  Distal pulses intact  Skin: Warm and dry without rash  Neurologic:    No focal deficits  Psych: Normal Affect  [place] yellow clear     DIAGNOSTICRESULTS   RADIOLOGY:   Ct Abdomen Pelvis Wo Contrast    Result Date: 5/22/2019  LOCATION:200 EXAM: CT ABDOMEN PELVIS WO CONTRAST COMPARISON: None HISTORY: Bilateral flank pain TECHNIQUE:Noncontrast helical abdomen and pelvis CT was performed. Coronal and sagittal reconstructions also obtained. Automated dose control was used for this exam. CONTRAST: No IV contrast administered.  FINDINGS: SUPPORT Hemoglobin 11.2 (L) 11.5 - 15.5 g/dL    Hematocrit 35.8 34.0 - 48.0 %    MCV 91.3 80.0 - 99.9 fL    MCH 28.6 26.0 - 35.0 pg    MCHC 31.3 (L) 32.0 - 34.5 %    RDW 14.1 11.5 - 15.0 fL    Platelets 024 193 - 851 E9/L    MPV 11.2 7.0 - 12.0 fL   Comprehensive Metabolic Panel w/ Reflex to MG   Result Value Ref Range    Sodium 140 132 - 146 mmol/L    Potassium reflex Magnesium 4.9 3.5 - 5.0 mmol/L    Chloride 103 98 - 107 mmol/L    CO2 25 22 - 29 mmol/L    Anion Gap 12 7 - 16 mmol/L    Glucose 130 (H) 74 - 99 mg/dL    BUN 30 (H) 6 - 20 mg/dL    CREATININE 2.6 (H) 0.5 - 1.0 mg/dL    GFR Non-African American 19 >=60 mL/min/1.73    GFR African American 23     Calcium 9.8 8.6 - 10.2 mg/dL    Total Protein 6.8 6.4 - 8.3 g/dL    Alb 3.6 3.5 - 5.2 g/dL    Total Bilirubin 0.3 0.0 - 1.2 mg/dL    Alkaline Phosphatase 70 35 - 104 U/L    ALT 8 0 - 32 U/L    AST 9 0 - 31 U/L   Lipase   Result Value Ref Range    Lipase 25 13 - 60 U/L   Lactic Acid, Plasma   Result Value Ref Range    Lactic Acid 2.6 (H) 0.5 - 2.2 mmol/L   Urinalysis   Result Value Ref Range    Color, UA Yellow Straw/Yellow    Clarity, UA CLOUDY (A) Clear    Glucose, Ur Negative Negative mg/dL    Bilirubin Urine Negative Negative    Ketones, Urine Negative Negative mg/dL    Specific Gravity, UA 1.020 1.005 - 1.030    Blood, Urine LARGE (A) Negative    pH, UA 7.0 5.0 - 9.0    Protein, UA >=300 (A) Negative mg/dL    Urobilinogen, Urine 0.2 <2.0 E.U./dL    Nitrite, Urine Negative Negative    Leukocyte Esterase, Urine LARGE (A) Negative   Microscopic Urinalysis   Result Value Ref Range    WBC, UA >20 0 - 5 /HPF    RBC, UA 5-10 (A) 0 - 2 /HPF    Bacteria, UA FEW (A) /HPF   Sodium, urine, random   Result Value Ref Range    Sodium, Ur 111 Not Established mmol/L   Osmolality, Urine   Result Value Ref Range    Osmolality, Ur 303 300 - 900 mOsm/kg   Creatinine, Random Urine   Result Value Ref Range    Creatinine, Ur 28 (L) 29 - 226 mg/dL     MICROBIOLOGY:     Urine Culture, Routine   Date Value Ref Range Status   05/22/2019   Final    ,000 CFU/mL  Mixed abdi isolated. Further workup and sensitivity testing  is not routinely indicated and will not be performed. Mixed abdi isolated includes:  Mixed gram positive organisms     04/22/2019 Growth not present  Final   04/18/2019 <10,000 CFU/mL  Mixed gram positive organisms    Final     No results found for: [facility]    No results for input(s): LP1UAG in the last 72 hours. No results for input(s): STREPNEUMAGU in the last 72 hours. Patient is a 52 y.o. female who presented with   Chief Complaint   Patient presents with    Abdominal Pain     Starting last night    Nausea        FINAL IMPRESSION    Fevers/sepsis  H/o Neurogenic bladder, urinary retention, bilateral hydronephrosis with indwelling stents  H/o B stents on 1/21/2019     1. Acute cystitis with hematuria    2. Acute renal failure superimposed on chronic kidney disease, unspecified CKD stage, unspecified acute renal failure type ([facility] [address]    3. Lactic acidosis      Check ua/urine cx h/o GPO may be VRE  Check blood cx  Add linezolid if temp cont   consider  to see stents may need change         Imaging and labs were reviewed per medical records and any ID pertinent labs were addressed with the patient. The patient/FAMILY  was educated about the diagnosis, prognosis, indications, risks and benefits of treatment. The patient/FAMILY is in agreement with the proposed medical treatment plan. An opportunity to ask questions was given to the patient/FAMILY and questions were answered. Thank you for the consult. We will follow with you.        Electronically signed by Justo Perez MD on 5/23/2019 at 11:03 AM

## 2019-05-23 NOTE — PROGRESS NOTES
Dr. Woodrow Gold notified of patient's BP 90/54 and asymptomatic, and remaining vital signs. See new orders. Continuing to monitor patient closely.

## 2019-05-23 NOTE — PROGRESS NOTES
Dr. Brandi Lopez notified of patient's oral temperature 103F, all vitals, and sepsis best practice advisory high score 5. PRN tylenol given. Dr. Brandi Lopez would like Urology notified if patient's vitals do not improve. See new orders.

## 2019-05-23 NOTE — PROGRESS NOTES
Dr. Elmer Holm notified of patient's BP 80/44 and that patient is asymptomatic, vitals, and BUN and creatinine. See new orders. Continuing to monitor patient closely.

## 2019-05-23 NOTE — PLAN OF CARE
Problem: Falls - Risk of:  Goal: Will remain free from falls  Description  Will remain free from falls  5/23/2019 0956 by Uriel Dugan RN  Outcome: Met This Shift  5/23/2019 0850 by Sahara Rosado RN  Outcome: Met This Shift  Goal: Absence of physical injury  Description  Absence of physical injury  5/23/2019 0956 by Uriel Dugan RN  Outcome: Met This Shift  5/23/2019 0850 by Sahara Rosado RN  Outcome: Met This Shift

## 2019-05-23 NOTE — PLAN OF CARE
Problem: Falls - Risk of:  Goal: Will remain free from falls  Description  Will remain free from falls  5/23/2019 1657 by Maria De Jesus Hutchison RN  Outcome: Met This Shift  5/23/2019 0956 by Maria De Jesus Hutchison RN  Outcome: Met This Shift  5/23/2019 0850 by Demetrius Sanders RN  Outcome: Met This Shift  Goal: Absence of physical injury  Description  Absence of physical injury  5/23/2019 1657 by Maria De Jesus Hutchison RN  Outcome: Met This Shift  5/23/2019 0956 by Maria De Jesus Hutchison RN  Outcome: Met This Shift  5/23/2019 0850 by Demetrius Sanders RN  Outcome: Met This Shift     Problem: Inadequate energy intake (NI-1.4)  Goal: Food and/or Nutrient Delivery  Description  Individualized approach for food/nutrient provision.   5/23/2019 1440 by Kandice Mendoza MS, RD, LD  Outcome: Met This Shift

## 2019-05-23 NOTE — PLAN OF CARE
Problem: Inadequate energy intake (NI-1.4)  Goal: Food and/or Nutrient Delivery  Description  Individualized approach for food/nutrient provision.   Outcome: Met This Shift

## 2019-05-24 LAB — VANCOMYCIN RANDOM: 8.8 MCG/ML (ref 5–40)

## 2019-05-24 PROCEDURE — 80202 ASSAY OF VANCOMYCIN: CPT

## 2019-05-24 PROCEDURE — 6370000000 HC RX 637 (ALT 250 FOR IP): Performed by: SPECIALIST

## 2019-05-24 PROCEDURE — 2580000003 HC RX 258: Performed by: INTERNAL MEDICINE

## 2019-05-24 PROCEDURE — 87040 BLOOD CULTURE FOR BACTERIA: CPT

## 2019-05-24 PROCEDURE — 36415 COLL VENOUS BLD VENIPUNCTURE: CPT

## 2019-05-24 PROCEDURE — 6360000002 HC RX W HCPCS: Performed by: INTERNAL MEDICINE

## 2019-05-24 PROCEDURE — 6370000000 HC RX 637 (ALT 250 FOR IP): Performed by: PSYCHIATRY & NEUROLOGY

## 2019-05-24 PROCEDURE — 2580000003 HC RX 258: Performed by: UROLOGY

## 2019-05-24 PROCEDURE — 6370000000 HC RX 637 (ALT 250 FOR IP): Performed by: INTERNAL MEDICINE

## 2019-05-24 PROCEDURE — 99221 1ST HOSP IP/OBS SF/LOW 40: CPT | Performed by: PSYCHIATRY & NEUROLOGY

## 2019-05-24 PROCEDURE — 2060000000 HC ICU INTERMEDIATE R&B

## 2019-05-24 RX ORDER — MIRTAZAPINE 15 MG/1
15 TABLET, FILM COATED ORAL NIGHTLY
Qty: 30 TABLET | Refills: 0 | Status: SHIPPED | OUTPATIENT
Start: 2019-05-24

## 2019-05-24 RX ORDER — ESCITALOPRAM OXALATE 10 MG/1
10 TABLET ORAL DAILY
Status: DISCONTINUED | OUTPATIENT
Start: 2019-05-24 | End: 2019-05-24

## 2019-05-24 RX ORDER — LINEZOLID 600 MG/1
600 TABLET, FILM COATED ORAL EVERY 12 HOURS SCHEDULED
Status: DISCONTINUED | OUTPATIENT
Start: 2019-05-24 | End: 2019-05-26

## 2019-05-24 RX ORDER — MIRTAZAPINE 15 MG/1
15 TABLET, FILM COATED ORAL NIGHTLY
Status: DISCONTINUED | OUTPATIENT
Start: 2019-05-24 | End: 2019-05-29 | Stop reason: HOSPADM

## 2019-05-24 RX ADMIN — ACETAMINOPHEN 650 MG: 325 TABLET, FILM COATED ORAL at 20:33

## 2019-05-24 RX ADMIN — DOCUSATE SODIUM 100 MG: 100 CAPSULE, LIQUID FILLED ORAL at 09:42

## 2019-05-24 RX ADMIN — DOCUSATE SODIUM 100 MG: 100 CAPSULE, LIQUID FILLED ORAL at 20:33

## 2019-05-24 RX ADMIN — ATORVASTATIN CALCIUM 40 MG: 40 TABLET, FILM COATED ORAL at 09:42

## 2019-05-24 RX ADMIN — CEFEPIME HYDROCHLORIDE 1 G: 1 INJECTION, POWDER, FOR SOLUTION INTRAMUSCULAR; INTRAVENOUS at 22:13

## 2019-05-24 RX ADMIN — HEPARIN SODIUM 5000 UNITS: 5000 INJECTION, SOLUTION INTRAVENOUS; SUBCUTANEOUS at 20:33

## 2019-05-24 RX ADMIN — FERROUS SULFATE TAB 325 MG (65 MG ELEMENTAL FE) 325 MG: 325 (65 FE) TAB at 09:42

## 2019-05-24 RX ADMIN — LINEZOLID 600 MG: 600 TABLET, FILM COATED ORAL at 20:32

## 2019-05-24 RX ADMIN — LEVOTHYROXINE SODIUM 100 MCG: 100 TABLET ORAL at 06:17

## 2019-05-24 RX ADMIN — FERROUS SULFATE TAB 325 MG (65 MG ELEMENTAL FE) 325 MG: 325 (65 FE) TAB at 16:58

## 2019-05-24 RX ADMIN — PANTOPRAZOLE SODIUM 40 MG: 40 TABLET, DELAYED RELEASE ORAL at 09:42

## 2019-05-24 RX ADMIN — ACETAMINOPHEN 650 MG: 325 TABLET, FILM COATED ORAL at 07:19

## 2019-05-24 RX ADMIN — FLUTICASONE PROPIONATE 2 SPRAY: 50 SPRAY, METERED NASAL at 09:42

## 2019-05-24 RX ADMIN — SODIUM CHLORIDE: 9 INJECTION, SOLUTION INTRAVENOUS at 03:10

## 2019-05-24 RX ADMIN — SODIUM CHLORIDE: 9 INJECTION, SOLUTION INTRAVENOUS at 20:33

## 2019-05-24 RX ADMIN — OXYBUTYNIN CHLORIDE 5 MG: 5 TABLET, EXTENDED RELEASE ORAL at 09:42

## 2019-05-24 RX ADMIN — HEPARIN SODIUM 5000 UNITS: 5000 INJECTION, SOLUTION INTRAVENOUS; SUBCUTANEOUS at 06:17

## 2019-05-24 RX ADMIN — HEPARIN SODIUM 5000 UNITS: 5000 INJECTION, SOLUTION INTRAVENOUS; SUBCUTANEOUS at 13:58

## 2019-05-24 RX ADMIN — ESCITALOPRAM OXALATE 10 MG: 10 TABLET ORAL at 09:42

## 2019-05-24 RX ADMIN — MIRTAZAPINE 15 MG: 15 TABLET, FILM COATED ORAL at 20:32

## 2019-05-24 ASSESSMENT — PAIN SCALES - GENERAL
PAINLEVEL_OUTOF10: 0
PAINLEVEL_OUTOF10: 0
PAINLEVEL_OUTOF10: 3
PAINLEVEL_OUTOF10: 0

## 2019-05-24 NOTE — PLAN OF CARE
Problem: Falls - Risk of:  Goal: Will remain free from falls  Description  Will remain free from falls  5/24/2019 1748 by Nas Aviles RN  Outcome: Met This Shift  5/24/2019 1050 by Nas Aviles RN  Outcome: Met This Shift  5/24/2019 0417 by Annika Gonzalez RN  Outcome: Met This Shift  Note:   No falls  Goal: Absence of physical injury  Description  Absence of physical injury  5/24/2019 1748 by Nas Aviles RN  Outcome: Met This Shift  5/24/2019 1050 by Nas Aviles RN  Outcome: Met This Shift  5/24/2019 0417 by Annika Gonzalez RN  Outcome: Met This Shift  Note:   No injury     Problem: Pain:  Goal: Pain level will decrease  Description  Pain level will decrease  Outcome: Met This Shift  Goal: Control of acute pain  Description  Control of acute pain  Outcome: Met This Shift

## 2019-05-24 NOTE — PLAN OF CARE
Problem: Falls - Risk of:  Goal: Will remain free from falls  Description  Will remain free from falls  5/24/2019 1050 by Shraddha Lan RN  Outcome: Met This Shift  5/24/2019 0417 by David Weems RN  Outcome: Met This Shift  Note:   No falls  Goal: Absence of physical injury  Description  Absence of physical injury  5/24/2019 1050 by Shraddha Lan RN  Outcome: Met This Shift  5/24/2019 0417 by David Weems RN  Outcome: Met This Shift  Note:   No injury

## 2019-05-24 NOTE — PLAN OF CARE
Problem: Falls - Risk of:  Goal: Will remain free from falls  Description  Will remain free from falls  5/24/2019 0417 by Mode Clifton RN  Outcome: Met This Shift  Note:   No falls    Goal: Absence of physical injury  Description  Absence of physical injury  5/24/2019 0417 by Mode Clifton RN  Outcome: Met This Shift  Note:   No injury

## 2019-05-24 NOTE — CONSULTS
% sodium chloride infusion, , Intravenous, Continuous  heparin (porcine) injection 5,000 Units, 5,000 Units, Subcutaneous, 3 times per day  acetaminophen (TYLENOL) tablet 650 mg, 650 mg, Oral, Q4H PRN  cefepime (MAXIPIME) 1 g IVPB extended (mini-bag), 1 g, Intravenous, Q24H    ALLERGIES:  Patient has no known allergies. FAMILY PSYCHIATRIC HISTORY: Unclear. SOCIAL HISTORY: Patient lives alone in an apartment and family checks in on her periodically. Patient reports she is generally independent with ADL's. She is on disability and is her own payee. Patient reports she also has a boyfriend who stays with her intermittently and they have a 21year-old autistic daughter who lives in a group home. SUBSTANCE ABUSE HISTORY:  reports that she quit smoking about 30 years ago. She has a 5.00 pack-year smoking history. She has never used smokeless tobacco. She reports that she does not drink alcohol or use drugs. VITALS:   Vitals:    05/24/19 0800   BP: 102/64   Pulse: 80   Resp: 16   Temp: 98.1 °F (36.7 °C)   SpO2: 95%     MENTAL STATUS EXAMINATION  White female appears age. Pleasant, cooperative, forthcoming. Decreased psychomotor activity, Gait not assessed. Strength and tone normal. Fair eye contact. Mood dysphoric. Affect flexible. Speech clear. Thought process generally organized without loosening. Thoughts are concrete. Content void of SI, HI, paranoia, delusions or hallucinations. No fluctuating LOC or internal stimulation. Orientation, concentration, recent and remote memory grossly intact. Intelligence below average. Fund of knowledge limited to fair. Language use limited. Insight and judgment limited to fair. ASSESSMENT:  Depressive Disorder     Intellectual Disability, mild to moderate    PLAN & RECOMMENDATIONS: Start Remeron for depression, sleep and appetite. No indication for psychiatric admission. Patient to follow-up with primary care, outpatient counselor and psychiatry as needed.  Script for Remeron in soft chart OK to discharge from my standpoint.     Norma Orourke M.D. 5/24/2019 12:25 PM

## 2019-05-24 NOTE — PROGRESS NOTES
Internal Medicine Resident Progress Note    Admission date: 5/22/2019  Primary care physician: Epifanio Bautista DO  Chief complaint: Abdominal Pain  Reason for Admission: ARF  Consultants:Urology    Subjective  Riley Lopez was seen and examined at bedside today. All patient questions were answered and all tests were reviewed. No family present during my examination. Riley Lopez states that her abdominal pain is better. Otherwise she states that there are no new complaints at this time including no chest pain, shortness of breath, abdominal pain, nausea, vomiting, diarrhea, constipation, headaches, fevers, chills, lightheadedness, dizziness, calf pain. Hospital Medications    atorvastatin (LIPITOR) tablet 40 mg Daily   docusate sodium (COLACE) capsule 100 mg BID   ferrous sulfate tablet 325 mg BID WC   fluticasone (FLONASE) 50 MCG/ACT nasal spray 2 spray Daily   levothyroxine (SYNTHROID) tablet 100 mcg Daily   oxybutynin (DITROPAN-XL) extended release tablet 5 mg Daily   pantoprazole (PROTONIX) tablet 40 mg Daily   0.9 % sodium chloride infusion Continuous   heparin (porcine) injection 5,000 Units 3 times per day   acetaminophen (TYLENOL) tablet 650 mg Q4H PRN   cefepime (MAXIPIME) 1 g IVPB extended (mini-bag) Q24H       PRN Medications  acetaminophen      Review of Systems  Please see HPI above. All bolded are positive. All un-bolded are negative.   Gen: fever, chills, fatigue, weakness, diaphoresis, increase in thirst, unintentional weight change, loss of appetite  Head: headache, vision change, hearing loss  Chest: chest pain, chest heaviness, palpitations  Lungs: shortness of breath, wheezing, coughing  Abdomen: abdominal pain, nausea, vomiting, diarrhea, constipation, melena, hematochezia, hematemesis  Extremities: lower extremity edema, myalgias, arthralgias  Urinary: dysuria, hematuria, or increase in frequency  Neurologic: lightheadedness, dizziness, confusion, syncope  Psychiatric: depression, suicidal ideation, or anxiety      Objective  Most Recent Recorded Vitals  Vitals:    05/24/19 0141   BP: (!) 96/58   Pulse: 54   Resp: 14   Temp: 97.7 °F (36.5 °C)   SpO2: 96%     I/O last 3 completed shifts: In: 2000 [P.O.:800; I.V.:1200]  Out: 1575 [Urine:1575]  No intake/output data recorded. Physical Exam:  General: AAO to person, place, time, and purpose, NAD, no labored breathing  Eyes: Conjunctivae/corneas clear, Sclera non icteric. Skin: Color, texture, and turgor normal. No rashes or lesions. Lungs: Clear to auscultation bilaterally. No retractions or use of accessory muscles. No vocal fremitus. No rhonchi, No crackle No rale. Heart: Regular rate and regular rhythm, no murmur  Abdomen: Soft, non-tender; bowel sounds normal; no masses,  no organomegaly  Extremities: Atraumatic, no cyanosis, no edema  Neurologic: Cranial nerves 2-12 grossly intact, no slurred speech. Osteopathic/Structural Exam: Examined in seated and supine position. Normal thoracic kyphosis. Normal lumbar lordosis. No acute changes. Most Recent Labs  Lab Results   Component Value Date    WBC 14.3 (H) 05/22/2019    HGB 11.2 (L) 05/22/2019    HCT 35.8 05/22/2019     05/22/2019     05/22/2019    K 4.9 05/22/2019     05/22/2019    CREATININE 2.6 (H) 05/22/2019    BUN 30 (H) 05/22/2019    CO2 25 05/22/2019    GLUCOSE 130 (H) 05/22/2019    ALT 8 05/22/2019    AST 9 05/22/2019    TSH 0.017 (L) 04/19/2019    LABA1C 5.1 04/19/2019     *All labs in electric medical record were reviewed. Please see electronic chart for a more comprehensive set of labs    Ct Abdomen Pelvis Wo Contrast    Result Date: 5/22/2019  LOCATION:200 EXAM: CT ABDOMEN PELVIS WO CONTRAST COMPARISON: None HISTORY: Bilateral flank pain TECHNIQUE:Noncontrast helical abdomen and pelvis CT was performed. Coronal and sagittal reconstructions also obtained. Automated dose control was used for this exam. CONTRAST: No IV contrast administered.  FINDINGS: SUPPORT DEVICES: None LOWER THORAX: Limited evaluation of the lower chest demonstrates clear lung bases bilaterally. SOLID ORGANS: The liver, spleen, pancreas, and adrenal glands are normal. BILIARY SYSTEM: No evidence of biliary ductal dilatation. GENITOURINARY: Bilateral ureteral stents are in place and in satisfactory position. There is persistent severe right hydroureteronephrosis. Findings appear similar to the previous CT. The left kidney is not significantly dilated. No stones are appreciated. Prominent perivesical fat stranding and fluid seen in the lower abdomen and pelvis. Pelvic structures are unremarkable. GASTROINTESTINAL: The stomach, small and large bowel are normal in caliber without evidence of focal wall thickening. There is no evidence of bowel obstruction. APPENDIX: Normal. FLUID COLLECTIONS: None. LYMPH NODES: No adenopathy by size criteria. VASCULAR STRUCTURES: Visualized vascular structures appear normal. ABDOMINAL WALL: Normal with no herniations. OSSEOUS AND SOFT TISSUE STRUCTURES: Bone windows demonstrate no suspicious osteolytic or osteoblastic lesions. No fracture identified. 1. Findings suggesting cystitis. 2. Persistent severe right hydroureteronephrosis with stable position of the ureteral stents      Microbiology   MICROBIOLOGY:          BLOOD CX #1  No results for input(s): BC in the last 72 hours. BLOOD CX #2  No results for input(s): Naina Haste in the last 72 hours. TIP CULTURE  No results for input(s): CXCATHTIP in the last 72 hours. CULTURE, RESPIRATORY   No results found for: CULTRESP        BODY FLUID CULTURE  No results for input(s): BFCS in the last 72 hours. WOUND ABSCESS  No results for input(s): WNDABS in the last 72 hours. Anaerobic cx  No results for input(s): LABANAE in the last 72 hours. CULTURE SURGICAL  No results for input(s): CXSURG in the last 72 hours.     No results found for: ORG  No results found for: [address]     05/22/19  1210

## 2019-05-24 NOTE — PROGRESS NOTES
5/24/2019 9:02 AM  Service: Urology  Group: [facility] (Tad/Hannah/Aisha)    Mateo Miller  [id number]    Chief urologic compliant: Urinary retention  HPI:  Patient is doing ok  She did have a CT done    Review of Systems:  Respiratory: negative for cough and hemoptysis  Cardiovascular: negative for chest pain and dyspnea  Gastrointestinal: negative for abdominal pain, diarrhea, nausea and vomiting  Derm: negative for rash and skin lesion(s)  Neurological: negative for seizures and tremors  Endocrine: negative for diabetic symptoms including polydipsia and polyuria  : As above in the HPI, otherwise negative  All other reviews are negative     Allergies: Patient has no known allergies. Objective:   Vitals:    05/24/19 0800   BP: 102/64   Pulse: 80   Resp: 16   Temp: 98.1 °F (36.7 °C)   SpO2: 95%       Neuro: A/A/O x3  Respiratory: non labored breathing  ABD: soft non-tender, non-distended  : crane clear  Ext: no clubbing, cyanosis, edema    Labs:   Recent Labs     05/22/19  1136   WBC 14.3*   RBC 3.92   HGB 11.2*   HCT 35.8   MCV 91.3   MCH 28.6   MCHC 31.3*   RDW 14.1      MPV 11.2       Recent Labs     05/22/19  1136   CREATININE 2.6*     1. Findings suggesting cystitis.    2. Persistent severe right hydroureteronephrosis with stable position   of the ureteral stents               Assessment: Mateo Miller 52 y.o. female     NGB  Urinary retenion  Bilateral hydronephrosis  CKD  UTI    Plan:    CT was reviewed  Stents with good position  Cont the crane  Cont the stent  Will need a stent exchange in the future   She may need a SPT vs CIC  RBA of the surgery discussed  Cont the IVF  Cot the antibiotic  Will need an outpatient eval  Call with questions  Home with rosa maria Tesfaye Tad, DO   [facility]

## 2019-05-24 NOTE — PROGRESS NOTES
[facility]  PROGRESS NOTE  Admit Date:  2019   NAME:  Brando Arroyo  MRN:  [id number]  :  1965  Age:   52 y.o. PCP:   Pablito Kang DO, Wilmer Jackson DO  Hospital Day: Hospital Day: 3    Subjective:   Pt was seen and examined in a face to face encounter for UTI  with CC of ABD PAIN   Chief Complaint   Patient presents with    Abdominal Pain     Starting last night    Nausea     HPI: fevers last pm  Has nausea no appettie  DISCUSSED SPT WITH PT    ROS:  CONSTITUTIONAL:   No fever, chills, weight loss, loss of appetite  ALLERGIES:    No urticaria, hay fever,    EYES:     No blurry vision, loss of vision,eye pain  ENT:      No hearing loss, sore throat  CARDIOVASCULAR:  No chest pain, palpitations  RESPIRATORY:    No cough, sob  HEME-LYMPH:   No easy bruising, bleeding  GI:     No nausea, vomiting or diarrhea  :     No urinary complaints  NEURO:    No  lightheadedness, dizziness, confusion,   MUSCULOSKELETAL:  No muscle aches, pain   SKIN:     No rash or itch  PSYCH:    No depression or anxiety      Scheduled Meds:   escitalopram  10 mg Oral Daily    atorvastatin  40 mg Oral Daily    docusate sodium  100 mg Oral BID    ferrous sulfate  325 mg Oral BID WC    fluticasone  2 spray Nasal Daily    levothyroxine  100 mcg Oral Daily    oxybutynin  5 mg Oral Daily    pantoprazole  40 mg Oral Daily    heparin (porcine)  5,000 Units Subcutaneous 3 times per day    cefepime  1 g Intravenous Q24H     Continuous Infusions:   sodium chloride 75 mL/hr at 19 0942     PRN Meds:acetaminophen    ALLERGIES: Patient has no known allergies. Objective:   Vitals reviewed.   Vitals:    19 0021 19 0101 19 0141 19 0800   BP:  (!) 102/58 (!) 96/58 102/64   Pulse:  60 54 80   Resp:  18 14 16   Temp: 98.3 °F (36.8 °C) 98 °F (36.7 °C) 97.7 °F (36.5 °C) 98.1 °F (36.7 °C)   TempSrc:  Oral Oral    SpO2:  97% 96% 95%   Weight:       Height: Physical Exam  Constitutional: The patient is awake, alert, and oriented. Skin: Warm and dry. No rashes were noted. HEENT: Eyes show round, and reactive pupils. No jaundice. No petechia  Neck: Supple to movements. Chest: No use of accessory muscles to breathe. Symmetrical expansion. Cardiovascular: S1 and S2 are rhythmic and regular. murmurs appreciated. Abdomen: Positive bowel sounds to auscultation. Benign to palpation. Extremities: No clubbing, no cyanosis, no edema. CNS: awake and alert  Psych:  pleasant  Lines: peripheral  Braden yellow     I & O - 24hr:    Intake/Output Summary (Last 24 hours) at 5/24/2019 1015  Last data filed at 5/24/2019 0939  Gross per 24 hour   Intake 3530 ml   Output 1575 ml   Net 1955 ml     I/O this shift:  In: 120 [P.O.:120]  Out: -    Weight change: -17 lb (-7.711 kg)  LABS:    Recent Labs     05/22/19  1136   WBC 14.3*   HGB 11.2*   HCT 35.8   MCV 91.3        Recent Labs     05/22/19  1136      K 4.9      CO2 25   BUN 30*   CREATININE 2.6*   GFRAA 23   LABGLOM 19   GLUCOSE 130*   PROT 6.8   LABALBU 3.6   CALCIUM 9.8   BILITOT 0.3   ALKPHOS 70   AST 9   ALT 8       Recent Labs     05/24/19  0900   VANCORANDOM 8.8       MISC:  Invalid input(s): TEST CODE  No results for input(s): TESTSENT in the last 72 hours. MICROBIOLOGY:      Recent Labs     05/22/19  1210 05/23/19  1610   LABURIN ,000 CFU/mL  Mixed abdi isolated. Further workup and sensitivity testing  is not routinely indicated and will not be performed. Mixed abdi isolated includes:  Mixed gram positive organisms   <10,000 CFU/mL  Mixed gram positive organisms          No results for input(s): STREPNEUMAGU in the last 72 hours. No results for input(s): LEGUR in the last 72 hours. No results for input(s): [address] in the last 72 hours. RADIOLOGY:  CT ABDOMEN PELVIS WO CONTRAST   Final Result   1. Findings suggesting cystitis.    2. Persistent severe right hydroureteronephrosis with stable position   of the ureteral stents        Assessment/Plan:   52 y.o. female presented with   Chief Complaint   Patient presents with    Abdominal Pain     Starting last night    Nausea       Fevers/sepsis  H/o Neurogenic bladder, urinary retention, bilateral hydronephrosis with indwelling stents  H/o B stents on 1/21/2019     1. Acute cystitis with hematuria    2. Acute renal failure superimposed on chronic kidney disease, unspecified CKD stage, unspecified acute renal failure type ([facility] [place] [address]    3. Lactic acidosis       Check ua/urine cx h/o GPO may be VRE  Check blood cx  Add linezolid   WATCH TEMP       Imaging and labs were reviewed per medical records and any ID pertinent labs were addressed with the patient. The patient was educated about the diagnosis, prognosis, indications, risks and benefits of treatment. The patient is in agreement with the proposed medical treatment plan. An opportunity to ask questions was given to the patient and questions were answered.             Electronically signed by Rosaleen Mortimer, MD on 5/24/2019 at 10:15 AM

## 2019-05-25 LAB
LV EF: 48 %
LVEF MODALITY: NORMAL
URINE CULTURE, ROUTINE: NORMAL

## 2019-05-25 PROCEDURE — 2580000003 HC RX 258: Performed by: INTERNAL MEDICINE

## 2019-05-25 PROCEDURE — 6370000000 HC RX 637 (ALT 250 FOR IP): Performed by: PSYCHIATRY & NEUROLOGY

## 2019-05-25 PROCEDURE — 6370000000 HC RX 637 (ALT 250 FOR IP): Performed by: SPECIALIST

## 2019-05-25 PROCEDURE — 2060000000 HC ICU INTERMEDIATE R&B

## 2019-05-25 PROCEDURE — 6370000000 HC RX 637 (ALT 250 FOR IP): Performed by: INTERNAL MEDICINE

## 2019-05-25 PROCEDURE — 93306 TTE W/DOPPLER COMPLETE: CPT

## 2019-05-25 PROCEDURE — 6360000002 HC RX W HCPCS: Performed by: INTERNAL MEDICINE

## 2019-05-25 RX ADMIN — PANTOPRAZOLE SODIUM 40 MG: 40 TABLET, DELAYED RELEASE ORAL at 08:46

## 2019-05-25 RX ADMIN — OXYBUTYNIN CHLORIDE 5 MG: 5 TABLET, EXTENDED RELEASE ORAL at 08:46

## 2019-05-25 RX ADMIN — ATORVASTATIN CALCIUM 40 MG: 40 TABLET, FILM COATED ORAL at 08:46

## 2019-05-25 RX ADMIN — MIRTAZAPINE 15 MG: 15 TABLET, FILM COATED ORAL at 22:52

## 2019-05-25 RX ADMIN — LINEZOLID 600 MG: 600 TABLET, FILM COATED ORAL at 22:52

## 2019-05-25 RX ADMIN — HEPARIN SODIUM 5000 UNITS: 5000 INJECTION, SOLUTION INTRAVENOUS; SUBCUTANEOUS at 22:52

## 2019-05-25 RX ADMIN — LINEZOLID 600 MG: 600 TABLET, FILM COATED ORAL at 08:46

## 2019-05-25 RX ADMIN — FERROUS SULFATE TAB 325 MG (65 MG ELEMENTAL FE) 325 MG: 325 (65 FE) TAB at 16:51

## 2019-05-25 RX ADMIN — DOCUSATE SODIUM 100 MG: 100 CAPSULE, LIQUID FILLED ORAL at 22:52

## 2019-05-25 RX ADMIN — ACETAMINOPHEN 650 MG: 325 TABLET, FILM COATED ORAL at 09:17

## 2019-05-25 RX ADMIN — FERROUS SULFATE TAB 325 MG (65 MG ELEMENTAL FE) 325 MG: 325 (65 FE) TAB at 08:46

## 2019-05-25 RX ADMIN — HEPARIN SODIUM 5000 UNITS: 5000 INJECTION, SOLUTION INTRAVENOUS; SUBCUTANEOUS at 14:03

## 2019-05-25 RX ADMIN — LEVOTHYROXINE SODIUM 100 MCG: 100 TABLET ORAL at 06:11

## 2019-05-25 RX ADMIN — SODIUM CHLORIDE: 9 INJECTION, SOLUTION INTRAVENOUS at 12:38

## 2019-05-25 RX ADMIN — DOCUSATE SODIUM 100 MG: 100 CAPSULE, LIQUID FILLED ORAL at 08:46

## 2019-05-25 RX ADMIN — FLUTICASONE PROPIONATE 2 SPRAY: 50 SPRAY, METERED NASAL at 08:46

## 2019-05-25 RX ADMIN — HEPARIN SODIUM 5000 UNITS: 5000 INJECTION, SOLUTION INTRAVENOUS; SUBCUTANEOUS at 06:11

## 2019-05-25 ASSESSMENT — PAIN SCALES - GENERAL
PAINLEVEL_OUTOF10: 0
PAINLEVEL_OUTOF10: 8
PAINLEVEL_OUTOF10: 4
PAINLEVEL_OUTOF10: 0

## 2019-05-25 ASSESSMENT — PAIN DESCRIPTION - PAIN TYPE
TYPE: ACUTE PAIN
TYPE: ACUTE PAIN

## 2019-05-25 ASSESSMENT — PAIN - FUNCTIONAL ASSESSMENT: PAIN_FUNCTIONAL_ASSESSMENT: ACTIVITIES ARE NOT PREVENTED

## 2019-05-25 ASSESSMENT — PAIN DESCRIPTION - LOCATION
LOCATION: HEAD
LOCATION: HEAD

## 2019-05-25 ASSESSMENT — PAIN DESCRIPTION - FREQUENCY: FREQUENCY: INTERMITTENT

## 2019-05-25 ASSESSMENT — PAIN DESCRIPTION - DESCRIPTORS
DESCRIPTORS: HEADACHE
DESCRIPTORS: HEADACHE

## 2019-05-25 NOTE — PLAN OF CARE
Problem: Falls - Risk of:  Goal: Will remain free from falls  Description  Will remain free from falls  5/25/2019 0438 by Lyle Manzo RN  Outcome: Met This Shift  5/24/2019 1748 by Jaime Edwards RN  Outcome: Met This Shift  Goal: Absence of physical injury  Description  Absence of physical injury  5/25/2019 0438 by Lyle Manzo RN  Outcome: Met This Shift  5/24/2019 1748 by Jaime Edwards RN  Outcome: Met This Shift     Problem: Pain:  Goal: Pain level will decrease  Description  Pain level will decrease  5/24/2019 1748 by Jaime Edwards RN  Outcome: Met This Shift  Goal: Control of acute pain  Description  Control of acute pain  5/25/2019 0438 by Lyle Manzo RN  Outcome: Met This Shift  5/24/2019 1748 by Jaime Edwards RN  Outcome: Met This Shift     Problem: Physical Regulation:  Goal: Ability to maintain a body temperature in the normal range will improve  Description  Ability to maintain a body temperature in the normal range will improve  Outcome: Met This Shift  Goal: Ability to maintain vital signs within normal range will improve  Description  Ability to maintain vital signs within normal range will improve  Outcome: Met This Shift     Problem: Fluid Volume:  Goal: Maintenance of adequate hydration will improve  Description  Maintenance of adequate hydration will improve  Outcome: Met This Shift

## 2019-05-25 NOTE — PROGRESS NOTES
[facility]  PROGRESS NOTE  Admit Date:  2019   NAME:  Binta Yin  MRN:  [id number]  :  1965  Age:   52 y.o. PCP:   Marisol Salazar DO, [address] Day: Hospital Day: 4    Subjective:   Pt was seen and examined in a face to face encounter for UTI  with CC of ABD PAIN   Chief Complaint   Patient presents with    Abdominal Pain     Starting last night    Nausea     HPI: still not feeling well  Up to chair. Has nausea no appettie  Fevers better    ROS:  CONSTITUTIONAL:   No fever, chills, weight loss, loss of appetite  ALLERGIES:    No urticaria, hay fever,    EYES:     No blurry vision, loss of vision,eye pain  ENT:      No hearing loss, sore throat  CARDIOVASCULAR:  No chest pain, palpitations  RESPIRATORY:    No cough, sob  HEME-LYMPH:   No easy bruising, bleeding  GI:     No nausea, vomiting or diarrhea  :     No urinary complaints  NEURO:    No  lightheadedness, dizziness, confusion,   MUSCULOSKELETAL:  No muscle aches, pain   SKIN:     No rash or itch  PSYCH:    No depression or anxiety      Scheduled Meds:   linezolid  600 mg Oral 2 times per day    mirtazapine  15 mg Oral Nightly    atorvastatin  40 mg Oral Daily    docusate sodium  100 mg Oral BID    ferrous sulfate  325 mg Oral BID WC    fluticasone  2 spray Nasal Daily    levothyroxine  100 mcg Oral Daily    oxybutynin  5 mg Oral Daily    pantoprazole  40 mg Oral Daily    heparin (porcine)  5,000 Units Subcutaneous 3 times per day    cefepime  1 g Intravenous Q24H     Continuous Infusions:   sodium chloride 75 mL/hr at 19 1238     PRN Meds:acetaminophen    ALLERGIES: Patient has no known allergies. Objective:   Vitals reviewed.   Vitals:    19 1530 19 2004 19 0000 19 0809   BP: 110/60  134/68 133/63   Pulse: 63 62 69 56   Resp: 16  16 18   Temp: 98.5 °F (36.9 °C)  98.1 °F (36.7 °C) 98.2 °F (36.8 °C)   TempSrc:   Oral Oral   SpO2: 98% 98% 98% 99%   Weight:       Height:         Physical Exam  Constitutional: The patient is awake, alert, and oriented. Up in chair. Skin: Warm and dry. No rashes were noted. HEENT: Eyes show round, and reactive pupils. No jaundice. No petechia  Neck: Supple to movements. Chest: No use of accessory muscles to breathe. Symmetrical expansion. Cardiovascular: S1 and S2 are rhythmic and regular. murmurs appreciated. Abdomen: Positive bowel sounds to auscultation. Benign to palpation. Extremities: No clubbing, no cyanosis, no edema. CNS: awake and alert  Psych:  pleasant  Lines: peripheral  Braden yellow     I & O - 24hr:    Intake/Output Summary (Last 24 hours) at 5/25/2019 1440  Last data filed at 5/25/2019 1403  Gross per 24 hour   Intake 2467 ml   Output 4075 ml   Net -1608 ml     I/O this shift: In: 981 [P.O.:480; I.V.:501]  Out: 1500 [Urine:1500]   Weight change:   LABS:    No results for input(s): WBC, HGB, HCT, MCV, PLT in the last 72 hours. No results for input(s): NA, K, CL, CO2, BUN, CREATININE, GFRAA, AGRATIO, LABGLOM, GLUCOSE, PROT, LABALBU, CALCIUM, BILITOT, ALKPHOS, AST, ALT in the last 72 hours. Recent Labs     05/24/19  0900   VANCORANDOM 8.8     MICROBIOLOGY:      Recent Labs     05/23/19  1610   LABURIN <10,000 CFU/mL  Mixed gram positive organisms       RADIOLOGY:  CT ABDOMEN PELVIS WO CONTRAST   Final Result   1. Findings suggesting cystitis. 2. Persistent severe right hydroureteronephrosis with stable position   of the ureteral stents        Assessment/Plan:   52 y.o. female presented with   Chief Complaint   Patient presents with    Abdominal Pain     Starting last night    Nausea     Fevers/sepsis  H/o Neurogenic bladder, urinary retention, bilateral hydronephrosis with indwelling stents  H/o B stents on 1/21/2019     1. Acute cystitis with hematuria    2.  Acute renal failure superimposed on chronic kidney disease, unspecified CKD stage, unspecified acute renal failure type ([address] 3. Lactic acidosis       Plan:   · Check ua/urine cx h/o GPO may be VRE  · Check blood cx-pending   · Continue linezolid   · Continue Cefepime IV  · Has been afebrile  · Check labs    Imaging and labs were reviewed per medical records and any ID pertinent labs were addressed with the patient. The patient was educated about the diagnosis, prognosis, indications, risks and benefits of treatment. The patient is in agreement with the proposed medical treatment plan. An opportunity to ask questions was given to the patient and questions were answered. Electronically signed by JUSTYN Desouza on 5/25/2019 at 2:40 PM    As above    I have personally performed and/or participated in the history, exam, medical decision making, and agree with all pertinent clinical information by NP. PT HAD HA BETTER HAS ABD PAIN WHEN SHE IS LAYING  FEVERS BETTER  F/U LABS \STOP CEFEPIME   CONT LINEZOLID    The patient was educated about the diagnosis, prognosis, indications, risks and benefits of treatment. The patient is in agreement with the proposed medical treatment plan. An opportunity to ask questions was given to the patient and questions were answered.       Electronically signed by Javier Cortez MD on 5/25/2019 at 2:51 PM    Phone [phone]  Fax [phone]

## 2019-05-25 NOTE — PROGRESS NOTES
HPI:  Cyndy Stevens appears comfortable during my examination but continues to complain of mild lower abdominal pain. No family members were present during my examination. The patient continues to have very poor insight into her underlying condition. We discussed discharging to a rehab facility. She has been refusing lab work and we have been unable to assess her renal function. Plans are for suprapubic catheter placement in the future. Discharge planning is a very difficult situation. Patient voiced no new complaints since hospital admission. Questions, answers, and tests reviewed. ROS:  Cardiovascular:   Denies any chest pain, irregular heartbeats, or palpitations. Respiratory:   Denies shortness of breath, coughing, sputum production, hemoptysis, or wheezing. Gastrointestinal:   Denies nausea, vomiting, diarrhea, or constipation. Mild bilateral lower abdominal discomfort. Extremities:   Denies any lower extremity swelling or edema. Neurology:    Denies any headache or focal neurological deficits. No weakness or paresthesia. Derm:    Denies any rashes, ulcers, or excoriations. Denies bruising. Genitourinary:    Denies any urgency, frequency, hematuria. Voiding with the use of a Braden catheter. Physical Exam:    Vitals:    05/25/19 0809   BP: 133/63   Pulse: 56   Resp: 18   Temp: 98.2 °F (36.8 °C)   SpO2: 99%       HEENT:  PERRLA. EOMI. Sclera clear. Buccal mucosa moist.    Neck:  Supple. Trachea midline. No thyromegaly. No JVD. No bruits. Heart:  Rhythm regular, rate controlled. No murmurs. Lungs:  Symmetrical. Clear to auscultation bilaterally. No wheezes. No rhonchi. No rales. Abdomen: Soft. Non-tender. Non-distended. Bowel sounds positive. No organomegaly or masses. No pain on palpation    Extremities:  Peripheral pulses present. No peripheral edema. No ulcers. Neurologic:  Alert x 3. No focal deficit. Cranial nerves grossly intact. Obvious signs of mental retardation.     Skin: discharge in the social work team is following. Antibiotics are being administered at the discretion of the infectious disease team. The physical therapy and occupational therapy teams will work with the patient. I suspect the patient will remain hospitalized until after the holiday in order to facilitate transfer to a skilled nursing facility versus acute rehabilitation. Continue current therapy. See orders for further plan of care.     Doris Bernard D.O.  11:40 AM  5/25/2019

## 2019-05-26 LAB
ALBUMIN SERPL-MCNC: 3.4 G/DL (ref 3.5–5.2)
ALP BLD-CCNC: 72 U/L (ref 35–104)
ALT SERPL-CCNC: 13 U/L (ref 0–32)
ANION GAP SERPL CALCULATED.3IONS-SCNC: 14 MMOL/L (ref 7–16)
AST SERPL-CCNC: 14 U/L (ref 0–31)
BASOPHILS ABSOLUTE: 0.03 E9/L (ref 0–0.2)
BASOPHILS RELATIVE PERCENT: 0.4 % (ref 0–2)
BILIRUB SERPL-MCNC: <0.2 MG/DL (ref 0–1.2)
BUN BLDV-MCNC: 12 MG/DL (ref 6–20)
CALCIUM SERPL-MCNC: 9.5 MG/DL (ref 8.6–10.2)
CHLORIDE BLD-SCNC: 103 MMOL/L (ref 98–107)
CO2: 22 MMOL/L (ref 22–29)
CREAT SERPL-MCNC: 1.4 MG/DL (ref 0.5–1)
EOSINOPHILS ABSOLUTE: 0.31 E9/L (ref 0.05–0.5)
EOSINOPHILS RELATIVE PERCENT: 4 % (ref 0–6)
GFR AFRICAN AMERICAN: 47
GFR NON-AFRICAN AMERICAN: 39 ML/MIN/1.73
GLUCOSE BLD-MCNC: 86 MG/DL (ref 74–99)
HCT VFR BLD CALC: 36.5 % (ref 34–48)
HEMOGLOBIN: 11.4 G/DL (ref 11.5–15.5)
IMMATURE GRANULOCYTES #: 0.09 E9/L
IMMATURE GRANULOCYTES %: 1.2 % (ref 0–5)
LYMPHOCYTES ABSOLUTE: 1.9 E9/L (ref 1.5–4)
LYMPHOCYTES RELATIVE PERCENT: 24.7 % (ref 20–42)
MCH RBC QN AUTO: 28.4 PG (ref 26–35)
MCHC RBC AUTO-ENTMCNC: 31.2 % (ref 32–34.5)
MCV RBC AUTO: 91 FL (ref 80–99.9)
MONOCYTES ABSOLUTE: 0.34 E9/L (ref 0.1–0.95)
MONOCYTES RELATIVE PERCENT: 4.4 % (ref 2–12)
NEUTROPHILS ABSOLUTE: 5.01 E9/L (ref 1.8–7.3)
NEUTROPHILS RELATIVE PERCENT: 65.3 % (ref 43–80)
PDW BLD-RTO: 14.2 FL (ref 11.5–15)
PLATELET # BLD: 352 E9/L (ref 130–450)
PMV BLD AUTO: 10.9 FL (ref 7–12)
POTASSIUM SERPL-SCNC: 4.8 MMOL/L (ref 3.5–5)
RBC # BLD: 4.01 E12/L (ref 3.5–5.5)
SODIUM BLD-SCNC: 139 MMOL/L (ref 132–146)
TOTAL PROTEIN: 7.8 G/DL (ref 6.4–8.3)
WBC # BLD: 7.7 E9/L (ref 4.5–11.5)

## 2019-05-26 PROCEDURE — 80053 COMPREHEN METABOLIC PANEL: CPT

## 2019-05-26 PROCEDURE — 2580000003 HC RX 258: Performed by: INTERNAL MEDICINE

## 2019-05-26 PROCEDURE — 6360000002 HC RX W HCPCS: Performed by: SPECIALIST

## 2019-05-26 PROCEDURE — 6370000000 HC RX 637 (ALT 250 FOR IP): Performed by: SPECIALIST

## 2019-05-26 PROCEDURE — 6360000002 HC RX W HCPCS: Performed by: INTERNAL MEDICINE

## 2019-05-26 PROCEDURE — 2580000003 HC RX 258: Performed by: SPECIALIST

## 2019-05-26 PROCEDURE — 6370000000 HC RX 637 (ALT 250 FOR IP): Performed by: INTERNAL MEDICINE

## 2019-05-26 PROCEDURE — 6370000000 HC RX 637 (ALT 250 FOR IP): Performed by: PSYCHIATRY & NEUROLOGY

## 2019-05-26 PROCEDURE — 2060000000 HC ICU INTERMEDIATE R&B

## 2019-05-26 PROCEDURE — 36415 COLL VENOUS BLD VENIPUNCTURE: CPT

## 2019-05-26 PROCEDURE — 85025 COMPLETE CBC W/AUTO DIFF WBC: CPT

## 2019-05-26 RX ADMIN — ACETAMINOPHEN 650 MG: 325 TABLET, FILM COATED ORAL at 21:33

## 2019-05-26 RX ADMIN — FERROUS SULFATE TAB 325 MG (65 MG ELEMENTAL FE) 325 MG: 325 (65 FE) TAB at 16:51

## 2019-05-26 RX ADMIN — LINEZOLID 600 MG: 600 TABLET, FILM COATED ORAL at 09:10

## 2019-05-26 RX ADMIN — LEVOTHYROXINE SODIUM 100 MCG: 100 TABLET ORAL at 06:27

## 2019-05-26 RX ADMIN — SODIUM CHLORIDE: 9 INJECTION, SOLUTION INTRAVENOUS at 02:47

## 2019-05-26 RX ADMIN — PANTOPRAZOLE SODIUM 40 MG: 40 TABLET, DELAYED RELEASE ORAL at 09:10

## 2019-05-26 RX ADMIN — HEPARIN SODIUM 5000 UNITS: 5000 INJECTION, SOLUTION INTRAVENOUS; SUBCUTANEOUS at 21:33

## 2019-05-26 RX ADMIN — OXYBUTYNIN CHLORIDE 5 MG: 5 TABLET, EXTENDED RELEASE ORAL at 09:10

## 2019-05-26 RX ADMIN — MIRTAZAPINE 15 MG: 15 TABLET, FILM COATED ORAL at 21:11

## 2019-05-26 RX ADMIN — FLUTICASONE PROPIONATE 2 SPRAY: 50 SPRAY, METERED NASAL at 09:10

## 2019-05-26 RX ADMIN — ATORVASTATIN CALCIUM 40 MG: 40 TABLET, FILM COATED ORAL at 09:10

## 2019-05-26 RX ADMIN — DOCUSATE SODIUM 100 MG: 100 CAPSULE, LIQUID FILLED ORAL at 21:11

## 2019-05-26 RX ADMIN — FERROUS SULFATE TAB 325 MG (65 MG ELEMENTAL FE) 325 MG: 325 (65 FE) TAB at 09:10

## 2019-05-26 RX ADMIN — HEPARIN SODIUM 5000 UNITS: 5000 INJECTION, SOLUTION INTRAVENOUS; SUBCUTANEOUS at 14:25

## 2019-05-26 RX ADMIN — DOCUSATE SODIUM 100 MG: 100 CAPSULE, LIQUID FILLED ORAL at 09:10

## 2019-05-26 RX ADMIN — VANCOMYCIN HYDROCHLORIDE 1000 MG: 1 INJECTION, POWDER, LYOPHILIZED, FOR SOLUTION INTRAVENOUS at 14:29

## 2019-05-26 RX ADMIN — HEPARIN SODIUM 5000 UNITS: 5000 INJECTION, SOLUTION INTRAVENOUS; SUBCUTANEOUS at 06:27

## 2019-05-26 ASSESSMENT — PAIN DESCRIPTION - ONSET: ONSET: GRADUAL

## 2019-05-26 ASSESSMENT — PAIN DESCRIPTION - ORIENTATION
ORIENTATION: UPPER;LEFT;RIGHT
ORIENTATION: MID

## 2019-05-26 ASSESSMENT — PAIN DESCRIPTION - FREQUENCY
FREQUENCY: INTERMITTENT
FREQUENCY: INTERMITTENT

## 2019-05-26 ASSESSMENT — PAIN - FUNCTIONAL ASSESSMENT
PAIN_FUNCTIONAL_ASSESSMENT: ACTIVITIES ARE NOT PREVENTED
PAIN_FUNCTIONAL_ASSESSMENT: ACTIVITIES ARE NOT PREVENTED

## 2019-05-26 ASSESSMENT — PAIN DESCRIPTION - LOCATION
LOCATION: HEAD
LOCATION: ABDOMEN

## 2019-05-26 ASSESSMENT — PAIN DESCRIPTION - PAIN TYPE
TYPE: ACUTE PAIN
TYPE: ACUTE PAIN

## 2019-05-26 ASSESSMENT — PAIN DESCRIPTION - PROGRESSION
CLINICAL_PROGRESSION: NOT CHANGED
CLINICAL_PROGRESSION: GRADUALLY WORSENING

## 2019-05-26 ASSESSMENT — PAIN SCALES - GENERAL
PAINLEVEL_OUTOF10: 0
PAINLEVEL_OUTOF10: 3

## 2019-05-26 ASSESSMENT — PAIN DESCRIPTION - DESCRIPTORS
DESCRIPTORS: ACHING;CRAMPING
DESCRIPTORS: HEADACHE

## 2019-05-26 NOTE — PROGRESS NOTES
HPI:  Dominga is seated at the bedside resting comfortably during my examination today. Antibiotics have been transitioned to oral and she seems to be tolerating this without complication. She is acceptable for discharge once a plan can be identified. Patient voiced no new complaints since hospital admission. Questions, answers, and tests reviewed. ROS:  Cardiovascular:   Denies any chest pain, irregular heartbeats, or palpitations. Respiratory:   Denies shortness of breath, coughing, sputum production, hemoptysis, or wheezing. Gastrointestinal:   Denies nausea, vomiting, diarrhea, or constipation. Mild bilateral lower abdominal discomfort. Extremities:   Denies any lower extremity swelling or edema. Neurology:    Denies any headache or focal neurological deficits. No weakness or paresthesia. Derm:    Denies any rashes, ulcers, or excoriations. Denies bruising. Genitourinary:    Denies any urgency, frequency, hematuria. Voiding with the use of a Braden catheter. Physical Exam:    Vitals:    05/26/19 0754   BP: 136/70   Pulse: 53   Resp: 16   Temp: 98 °F (36.7 °C)   SpO2: 98%       HEENT:  PERRLA. EOMI. Sclera clear. Buccal mucosa moist.    Neck:  Supple. Trachea midline. No thyromegaly. No JVD. No bruits. Heart:  Rhythm regular, rate controlled. No murmurs. Lungs:  Symmetrical. Clear to auscultation bilaterally. No wheezes. No rhonchi. No rales. Abdomen: Soft. Non-tender. Non-distended. Bowel sounds positive. No organomegaly or masses. No pain on palpation    Extremities:  Peripheral pulses present. No peripheral edema. No ulcers. Neurologic:  Alert x 3. No focal deficit. Cranial nerves grossly intact. Obvious signs of mental retardation. Skin:  No petechia. No hemorrhage. No wounds.     CBC with Differential:    Lab Results   Component Value Date    WBC 7.7 05/26/2019    RBC 4.01 05/26/2019    HGB 11.4 05/26/2019    HCT 36.5 05/26/2019     05/26/2019    MCV 91.0 05/26/2019    MCH 28.4 05/26/2019    MCHC 31.2 05/26/2019    RDW 14.2 05/26/2019    SEGSPCT 66 10/23/2013    LYMPHOPCT 24.7 05/26/2019    MONOPCT 4.4 05/26/2019    BASOPCT 0.4 05/26/2019    MONOSABS 0.34 05/26/2019    LYMPHSABS 1.90 05/26/2019    EOSABS 0.31 05/26/2019    BASOSABS 0.03 05/26/2019     BMP:    Lab Results   Component Value Date     05/22/2019    K 4.9 05/22/2019     05/22/2019    CO2 25 05/22/2019    BUN 30 05/22/2019    LABALBU 3.6 05/22/2019    LABALBU 4.4 12/20/2011    CREATININE 2.6 05/22/2019    CALCIUM 9.8 05/22/2019    GFRAA 23 05/22/2019    LABGLOM 19 05/22/2019    GLUCOSE 130 05/22/2019    GLUCOSE 97 12/20/2011       Other Data:      Intake/Output Summary (Last 24 hours) at 5/26/2019 1241  Last data filed at 5/26/2019 4419  Gross per 24 hour   Intake 1848.5 ml   Output 2425 ml   Net -576.5 ml         Scheduled Medications:   linezolid  600 mg Oral 2 times per day    mirtazapine  15 mg Oral Nightly    atorvastatin  40 mg Oral Daily    docusate sodium  100 mg Oral BID    ferrous sulfate  325 mg Oral BID WC    fluticasone  2 spray Nasal Daily    levothyroxine  100 mcg Oral Daily    oxybutynin  5 mg Oral Daily    pantoprazole  40 mg Oral Daily    heparin (porcine)  5,000 Units Subcutaneous 3 times per day         Infusion Medications:   sodium chloride 75 mL/hr at 05/26/19 0800       Assessment:   1. Acute on chronic kidney disease stage IV compatible with a neurogenic bladder with chronic b/l ureteral stents  2. Sepsis secondary to a urinary tract infection  3. Normocytic anemia of chronic disease  4. Hypothyroidism  5. Chronic mental disability  6. Hyperlipidemia      Plan: We are awaiting repeat CMP this morning. I anticipate discontinuation of IV fluids. Antibiotics have been transitioned to oral. The patient is acceptable for discharge once a safe discharge plan has been developed. I am recommending skilled nursing facility placement.  We have been unable to reach the patient's brothers who help aid in decision making. The patient's underlying mental retardation make it very difficult to reason with the patient. Again, once a discharge planning can be made, the patient is acceptable for discharge.     Roel Ramos D.O.  12:41 PM  5/26/2019

## 2019-05-26 NOTE — PROGRESS NOTES
[address] [facility]  Internal Medicine  -Resident Progress Note-    PCP:  Edaurdo Peralta DO  Admitting Physician:  Shelby Hurley DO  Consultants:  Infectious disease  Chief Complaint:    Chief Complaint   Patient presents with    Abdominal Pain     Starting last night    Nausea       History of Present Illness  Ya was seen and examined at bedside today; no family was present for the examination. No acute overnight events were noted. She states that she is very depressed and thinking about death. She states she is in continued pain. Review of Systems  All bolded are positive; please see HPI  General:  Fever, chills, diaphoresis, fatigue, malaise, night sweats, weight loss  Psychological:  Anxiety, disorientation, hallucinations. ENT:  Epistaxis, headaches, vertigo, visual changes. Cardiovascular:  Chest pain, irregular heartbeats, palpitations, paroxysmal nocturnal dyspnea. Respiratory:  Shortness of breath, coughing, sputum production, hemoptysis, wheezing, orthopnea.   Gastrointestinal:  Nausea, vomiting, diarrhea, heartburn, constipation, abdominal pain, hematemesis, hematochezia, melena, acholic stools  Genito-Urinary:  Dysuria, urgency, frequency, hematuria  Musculoskeletal:  Joint pain, joint stiffness, joint swelling, muscle pain  Neurology:  Headache, focal neurological deficits, weakness, numbness, paresthesia  Derm:  Rashes, ulcers, excoriations, bruising  Extremities:  Decreased ROM, peripheral edema, mottling    Physical Examination  Vitals:  /67   Pulse 57   Temp 98.3 °F (36.8 °C) (Oral)   Resp 18   Ht 4' 11\" (1.499 m)   Wt 148 lb (67.1 kg)   LMP 05/21/2015 (Approximate)   SpO2 97%   BMI 29.89 kg/m²   General Appearance:  awake, alert, and oriented to person, place, time, and purpose; appears stated age and cooperative; no apparent distress no labored breathing +crane cath  HEENT:  PERRL; EOMI; sclera clear; buccal mucosa moist  Neck:  supple; trachea midline; no thyromegaly; no JVD; no bruits  Heart:  rhythm regular; rate controlled; no murmurs  Lungs:  symmetrical; clear to auscultation bilaterally; no wheezes; no rhonchi; no rales  Abdomen:  soft, non-tender, non-distended; bowel sounds positive; no organomegaly or masses; no pain on palpation  Extremities:  peripheral pulses present; no peripheral edema; no ulcers  Neurologic:  alert and oriented x 3; no focal deficit; cranial nerves grossly intact Obvious signs of mental diability  Skin:  no petechia; no hemorrhage; no wounds    Medications  Scheduled Meds    mirtazapine  15 mg Oral Nightly    atorvastatin  40 mg Oral Daily    docusate sodium  100 mg Oral BID    ferrous sulfate  325 mg Oral BID WC    fluticasone  2 spray Nasal Daily    levothyroxine  100 mcg Oral Daily    oxybutynin  5 mg Oral Daily    pantoprazole  40 mg Oral Daily    heparin (porcine)  5,000 Units Subcutaneous 3 times per day     Infusion Meds   PRN Meds acetaminophen    Laboratory Data  Recent Results (from the past 24 hour(s))   CBC WITH AUTO DIFFERENTIAL    Collection Time: 05/26/19 11:48 AM   Result Value Ref Range    WBC 7.7 4.5 - 11.5 E9/L    RBC 4.01 3.50 - 5.50 E12/L    Hemoglobin 11.4 (L) 11.5 - 15.5 g/dL    Hematocrit 36.5 34.0 - 48.0 %    MCV 91.0 80.0 - 99.9 fL    MCH 28.4 26.0 - 35.0 pg    MCHC 31.2 (L) 32.0 - 34.5 %    RDW 14.2 11.5 - 15.0 fL    Platelets 566 153 - 314 E9/L    MPV 10.9 7.0 - 12.0 fL    Neutrophils % 65.3 43.0 - 80.0 %    Immature Granulocytes % 1.2 0.0 - 5.0 %    Lymphocytes % 24.7 20.0 - 42.0 %    Monocytes % 4.4 2.0 - 12.0 %    Eosinophils % 4.0 0.0 - 6.0 %    Basophils % 0.4 0.0 - 2.0 %    Neutrophils # 5.01 1.80 - 7.30 E9/L    Immature Granulocytes # 0.09 E9/L    Lymphocytes # 1.90 1.50 - 4.00 E9/L    Monocytes # 0.34 0.10 - 0.95 E9/L    Eosinophils # 0.31 0.05 - 0.50 E9/L    Basophils # 0.03 0.00 - 0.20 E9/L   Comprehensive metabolic panel    Collection Time: 05/26/19 11:48 AM   Result Value Ref Range windows demonstrate no suspicious osteolytic or osteoblastic lesions. No fracture identified. 1. Findings suggesting cystitis. 2. Persistent severe right hydroureteronephrosis with stable position of the ureteral stents      Microbiology  Urine culture [[id number]] Collected: 05/22/19 1210   Order Status: Completed Specimen: Urine, straight catheter Updated: 05/23/19 0901    Urine Culture, Routine --    ,000 CFU/mL   Mixed abdi isolated. Further workup and sensitivity testing   is not routinely indicated and will not be performed. Mixed abdi isolated includes:   Mixed gram positive organisms          Assessment and Plan  Patient is a 52 y.o. female who presented with abdominal pain and nausea. The active problem list is as follows:    · Acute on chronic kidney disease stage IV compatible with a neurogenic bladder with chronic b/l ureteral stents  · Sepsis secondary to a urinary tract infection  · Normocytic anemia of chronic disease  · Hypothyroidism  · Chronic mental disability  · Hyperlipidemia        -Vancomycin restarted yesterday and linezolid was discontinued  -Urology has been consulted, considering possible suprapubic catheter?  -Patient continues to be depressed and wanting to die. Psychiatry is on board.  -The patient is acceptable for discharge once a safe discharge plan has been developed. We are recommending skilled nursing facility placement. We have been unable to reach the patient's brothers who help aid in decision making. The patient's underlying mental retardation make it very difficult to reason with the patient. Again, once a discharge planning can be made, the patient is acceptable for discharge. · Routine labs in AM.  · DVT prophylaxis. heparin  · Please see orders for further management and care. The plan was discussed with Dr. Luis Press    6:34 AM  5/27/2019       I saw and evaluated the patient.  I agree with the findings and the plan of care as documented in the resident's note.     Selvin Caicedo D.O., [facility]  9:09 AM  5/27/2019

## 2019-05-26 NOTE — PROGRESS NOTES
SpO2: 98% 100% 99% 98%   Weight:       Height:         Physical Exam  Constitutional: The patient is awake, alert, and oriented. Up in chair. Skin: Warm and dry. No rashes were noted. HEENT: Eyes show round, and reactive pupils. No jaundice. No petechia  Neck: Supple to movements. Chest: No use of accessory muscles to breathe. Symmetrical expansion. Cardiovascular: S1 and S2 are rhythmic and regular. murmurs appreciated. Abdomen: Positive bowel sounds to auscultation. Benign to palpation. Extremities: No clubbing, no cyanosis, no edema. CNS: awake and alert  Psych:  pleasant  Lines: peripheral  Braden yellow     I & O - 24hr:    Intake/Output Summary (Last 24 hours) at 5/26/2019 1201  Last data filed at 5/26/2019 0625  Gross per 24 hour   Intake 1968.5 ml   Output 2425 ml   Net -456.5 ml     No intake/output data recorded. Weight change:   LABS:    No results for input(s): WBC, HGB, HCT, MCV, PLT in the last 72 hours. No results for input(s): NA, K, CL, CO2, BUN, CREATININE, GFRAA, AGRATIO, LABGLOM, GLUCOSE, PROT, LABALBU, CALCIUM, BILITOT, ALKPHOS, AST, ALT in the last 72 hours. Recent Labs     05/24/19  0900   VANCORANDOM 8.8     MICROBIOLOGY:      Recent Labs     05/23/19  1610   LABURIN <10,000 CFU/mL  Mixed gram positive organisms       RADIOLOGY:  CT ABDOMEN PELVIS WO CONTRAST   Final Result   1. Findings suggesting cystitis. 2. Persistent severe right hydroureteronephrosis with stable position   of the ureteral stents        Assessment/Plan:   52 y.o. female presented with   Chief Complaint   Patient presents with    Abdominal Pain     Starting last night    Nausea     Fevers/sepsis  H/o Neurogenic bladder, urinary retention, bilateral hydronephrosis with indwelling stents  H/o B stents on 1/21/2019     1. Acute cystitis with hematuria    2. Acute renal failure superimposed on chronic kidney disease, unspecified CKD stage, unspecified acute renal failure type ([facility] [address]    3.  Lactic acidosis       Plan:   · Check ua/urine cx h/o GPO may be VRE  · Check blood cx-No growth to date  · Continue linezolid   · Has been afebrile  · Check labs- having blood drawn today    Imaging and labs were reviewed per medical records and any ID pertinent labs were addressed with the patient. The patient was educated about the diagnosis, prognosis, indications, risks and benefits of treatment. The patient is in agreement with the proposed medical treatment plan. An opportunity to ask questions was given to the patient and questions were answered. Electronically signed by JUSTYN Matthews on 5/26/2019 at 12:01 PM    Phone [phone]  Fax [phone]  As above    I have personally performed and/or participated in the history, exam, medical decision making, and agree with all pertinent clinical information by NP. The patient was educated about the diagnosis, prognosis, indications, risks and benefits of treatment. The patient is in agreement with the proposed medical treatment plan. NAD  STILL HAS PAIN HAS HEADACHE ? LINEZOLID/REMERON OFF LEXAPRO  URINE GPO NOT ID  WILL RESTART VANCO   WILL WATCH CR  DAY 4 RX  An opportunity to ask questions was given to the patient and questions were answered.       Electronically signed by Nj Gonzáles MD on 5/26/2019 at 1:11 PM    Phone [phone]  Fax [phone]

## 2019-05-26 NOTE — CARE COORDINATION
Received fax from [facility] request for prescription drug prior authorization for john mack- the request was approved for the following time period 5/24/19-6/23/19 per letter if you have not already done so , you may ask your pharmacist to fill the prescription for linezolid 600 mg tablet.

## 2019-05-26 NOTE — PLAN OF CARE
Problem: Falls - Risk of:  Goal: Will remain free from falls  Description  Will remain free from falls  Outcome: Met This Shift     Problem: Pain:  Goal: Pain level will decrease  Description  Pain level will decrease  Outcome: Met This Shift     Problem: Pain:  Goal: Control of acute pain  Description  Control of acute pain  Outcome: Met This Shift     Problem: Physical Regulation:  Goal: Ability to maintain a body temperature in the normal range will improve  Description  Ability to maintain a body temperature in the normal range will improve  Outcome: Met This Shift     Problem: Fluid Volume:  Goal: Maintenance of adequate hydration will improve  Description  Maintenance of adequate hydration will improve  Outcome: Met This Shift

## 2019-05-27 LAB — VANCOMYCIN RANDOM: 14.1 MCG/ML (ref 5–40)

## 2019-05-27 PROCEDURE — 6360000002 HC RX W HCPCS: Performed by: INTERNAL MEDICINE

## 2019-05-27 PROCEDURE — 6370000000 HC RX 637 (ALT 250 FOR IP): Performed by: INTERNAL MEDICINE

## 2019-05-27 PROCEDURE — 2060000000 HC ICU INTERMEDIATE R&B

## 2019-05-27 PROCEDURE — 36415 COLL VENOUS BLD VENIPUNCTURE: CPT

## 2019-05-27 PROCEDURE — 6370000000 HC RX 637 (ALT 250 FOR IP): Performed by: PSYCHIATRY & NEUROLOGY

## 2019-05-27 PROCEDURE — 97161 PT EVAL LOW COMPLEX 20 MIN: CPT

## 2019-05-27 PROCEDURE — 6360000002 HC RX W HCPCS: Performed by: CLINICAL NURSE SPECIALIST

## 2019-05-27 PROCEDURE — 80202 ASSAY OF VANCOMYCIN: CPT

## 2019-05-27 PROCEDURE — 97530 THERAPEUTIC ACTIVITIES: CPT

## 2019-05-27 PROCEDURE — 2580000003 HC RX 258: Performed by: CLINICAL NURSE SPECIALIST

## 2019-05-27 RX ORDER — TRAMADOL HYDROCHLORIDE 50 MG/1
25 TABLET ORAL EVERY 8 HOURS PRN
Status: DISCONTINUED | OUTPATIENT
Start: 2019-05-27 | End: 2019-05-29 | Stop reason: HOSPADM

## 2019-05-27 RX ADMIN — PANTOPRAZOLE SODIUM 40 MG: 40 TABLET, DELAYED RELEASE ORAL at 08:26

## 2019-05-27 RX ADMIN — MIRTAZAPINE 15 MG: 15 TABLET, FILM COATED ORAL at 21:36

## 2019-05-27 RX ADMIN — HEPARIN SODIUM 5000 UNITS: 5000 INJECTION, SOLUTION INTRAVENOUS; SUBCUTANEOUS at 13:51

## 2019-05-27 RX ADMIN — HEPARIN SODIUM 5000 UNITS: 5000 INJECTION, SOLUTION INTRAVENOUS; SUBCUTANEOUS at 21:36

## 2019-05-27 RX ADMIN — FERROUS SULFATE TAB 325 MG (65 MG ELEMENTAL FE) 325 MG: 325 (65 FE) TAB at 16:48

## 2019-05-27 RX ADMIN — OXYBUTYNIN CHLORIDE 5 MG: 5 TABLET, EXTENDED RELEASE ORAL at 08:26

## 2019-05-27 RX ADMIN — DOCUSATE SODIUM 100 MG: 100 CAPSULE, LIQUID FILLED ORAL at 08:26

## 2019-05-27 RX ADMIN — HEPARIN SODIUM 5000 UNITS: 5000 INJECTION, SOLUTION INTRAVENOUS; SUBCUTANEOUS at 06:10

## 2019-05-27 RX ADMIN — ACETAMINOPHEN 650 MG: 325 TABLET, FILM COATED ORAL at 21:36

## 2019-05-27 RX ADMIN — ATORVASTATIN CALCIUM 40 MG: 40 TABLET, FILM COATED ORAL at 08:26

## 2019-05-27 RX ADMIN — LEVOTHYROXINE SODIUM 100 MCG: 100 TABLET ORAL at 06:10

## 2019-05-27 RX ADMIN — VANCOMYCIN HYDROCHLORIDE 1000 MG: 1 INJECTION, POWDER, LYOPHILIZED, FOR SOLUTION INTRAVENOUS at 11:30

## 2019-05-27 RX ADMIN — FERROUS SULFATE TAB 325 MG (65 MG ELEMENTAL FE) 325 MG: 325 (65 FE) TAB at 08:26

## 2019-05-27 ASSESSMENT — PAIN SCALES - GENERAL
PAINLEVEL_OUTOF10: 0
PAINLEVEL_OUTOF10: 3
PAINLEVEL_OUTOF10: 0

## 2019-05-27 ASSESSMENT — PAIN DESCRIPTION - PROGRESSION: CLINICAL_PROGRESSION: GRADUALLY WORSENING

## 2019-05-27 ASSESSMENT — PAIN DESCRIPTION - LOCATION: LOCATION: GENERALIZED

## 2019-05-27 ASSESSMENT — PAIN DESCRIPTION - DESCRIPTORS: DESCRIPTORS: ACHING

## 2019-05-27 ASSESSMENT — PAIN DESCRIPTION - PAIN TYPE: TYPE: ACUTE PAIN;CHRONIC PAIN

## 2019-05-27 ASSESSMENT — PAIN DESCRIPTION - ONSET: ONSET: ON-GOING

## 2019-05-27 ASSESSMENT — PAIN DESCRIPTION - FREQUENCY: FREQUENCY: INTERMITTENT

## 2019-05-27 ASSESSMENT — PAIN - FUNCTIONAL ASSESSMENT: PAIN_FUNCTIONAL_ASSESSMENT: ACTIVITIES ARE NOT PREVENTED

## 2019-05-27 NOTE — PROGRESS NOTES
[facility]  PROGRESS NOTE  Admit Date:  2019   NAME:  Susy Lopez  MRN:  [id number]  :  1965  Age:   52 y.o. PCP:   Cristo Crowell DO, [facility] [address] [place] [address] Day: Hospital Day: 6    Subjective:   Pt was seen and examined in a face to face encounter for UTI  with CC of ABD PAIN   Chief Complaint   Patient presents with    Abdominal Pain     Starting last night    Nausea     HPI: still not feeling well- no improvements- has pain still   Up to chair. Has nausea no appettie  Fevers better  Patient frustrated with communication she reports. ROS:  CONSTITUTIONAL:   No fever, chills, weight loss, loss of appetite  ALLERGIES:    No urticaria, hay fever,    EYES:     No blurry vision, loss of vision,eye pain  ENT:      No hearing loss, sore throat  CARDIOVASCULAR:  No chest pain, palpitations  RESPIRATORY:    No cough, sob  HEME-LYMPH:   No easy bruising, bleeding  GI:     No nausea, vomiting or diarrhea  :     No urinary complaints  NEURO:    No  lightheadedness, dizziness, confusion,   MUSCULOSKELETAL:  No muscle aches, pain   SKIN:     No rash or itch  PSYCH:    No depression or anxiety      Scheduled Meds:   mirtazapine  15 mg Oral Nightly    atorvastatin  40 mg Oral Daily    docusate sodium  100 mg Oral BID    ferrous sulfate  325 mg Oral BID WC    fluticasone  2 spray Nasal Daily    levothyroxine  100 mcg Oral Daily    oxybutynin  5 mg Oral Daily    pantoprazole  40 mg Oral Daily    heparin (porcine)  5,000 Units Subcutaneous 3 times per day     Continuous Infusions:    PRN Meds:traMADol, acetaminophen    ALLERGIES: Patient has no known allergies. Objective:   Vitals reviewed.   Vitals:    19 1530 19 1945 19 0030 19 0730   BP: 132/61 124/68 124/67 108/65   Pulse: 58 58 57 65   Resp: 18 16 18 17   Temp: 98 °F (36.7 °C)  98.3 °F (36.8 °C) 98.6 °F (37 °C)   TempSrc: Oral  Oral Oral   SpO2: 100% 98% 97% 97%   Weight: Height:         Physical Exam  Constitutional: The patient is awake, alert, and oriented. Up in chair. Still with pain  Skin: Warm and dry. No rashes were noted. HEENT: Eyes show round, and reactive pupils. No jaundice. No petechia  Neck: Supple to movements. Chest: No use of accessory muscles to breathe. Symmetrical expansion. Cardiovascular: S1 and S2 are rhythmic and regular. murmurs appreciated. Abdomen: Positive bowel sounds to auscultation. Benign to palpation. Extremities: No clubbing, no cyanosis, no edema. CNS: awake and alert  Psych:  pleasant  Lines: peripheral  Braden yellow     I & O - 24hr:    Intake/Output Summary (Last 24 hours) at 5/27/2019 0954  Last data filed at 5/27/2019 9835  Gross per 24 hour   Intake 1776 ml   Output 2325 ml   Net -549 ml     No intake/output data recorded. Weight change:   LABS:    Recent Labs     05/26/19  1148   WBC 7.7   HGB 11.4*   HCT 36.5   MCV 91.0        Recent Labs     05/26/19  1148      K 4.8      CO2 22   BUN 12   CREATININE 1.4*   GFRAA 47   LABGLOM 39   GLUCOSE 86   PROT 7.8   LABALBU 3.4*   CALCIUM 9.5   BILITOT <0.2   ALKPHOS 72   AST 14   ALT 13       Recent Labs     05/27/19  0639   VANCORANDOM 14.1     MICROBIOLOGY:    5/22/19- urine cx- ,000 mixed abdi  5/23/19- urine cx- < 10,000 GPO    RADIOLOGY:  CT ABDOMEN PELVIS WO CONTRAST   Final Result   1. Findings suggesting cystitis. 2. Persistent severe right hydroureteronephrosis with stable position   of the ureteral stents        Assessment/Plan:   52 y.o. female presented with   Chief Complaint   Patient presents with    Abdominal Pain     Starting last night    Nausea     Fevers/sepsis  H/o Neurogenic bladder, urinary retention, bilateral hydronephrosis with indwelling stents  H/o B stents on 1/21/2019     1. Acute cystitis with hematuria    2.  Acute renal failure superimposed on chronic kidney disease, unspecified CKD stage, unspecified acute renal failure type ([facility]    3. Lactic acidosis       Plan:   · Had headaches- zyvox- stopped  · Vancomycin level 14. 1- will redose  · Day #5 of antibiotics   · Check blood cx-No growth to date  · Has been afebrile  · Labs reviewed   · Urology to see- to discuss possible Suprapubic cath    Imaging and labs were reviewed per medical records and any ID pertinent labs were addressed with the patient. The patient was educated about the diagnosis, prognosis, indications, risks and benefits of treatment. The patient is in agreement with the proposed medical treatment plan. An opportunity to ask questions was given to the patient and questions were answered. Electronically signed by JUSTYN Valdovinos on 5/27/2019 at 9:54 AM    Phone [phone]  Fax [phone]    As above    I have personally performed and/or participated in the history, exam, medical decision making, and agree with all pertinent clinical information by NP. The patient was educated about the diagnosis, prognosis, indications, risks and benefits of treatment. The patient is in agreement with the proposed medical treatment plan. Pt frustrated  HAS PAIN LEFT SIDE SHE HAS B STENTS  She is considering SPT  CONT RX FOR NOW  HA BETTER OFF LINEZOLID    An opportunity to ask questions was given to the patient and questions were answered.       Electronically signed by Emily Rider MD on 5/27/2019 at 10:31 AM    Phone [phone]  Fax [phone]

## 2019-05-27 NOTE — PROGRESS NOTES
Pt's Brother and his wife at pt. bedside. Brother stated that he is concerned with pt's current living situation and would like to look into [facility] for temp/perm placement. I asked the brother to please call and speak with SW tomorrow and he agreed.

## 2019-05-27 NOTE — PROGRESS NOTES
Pt. upset when I entered the room. Pt. states that she is \"frusturated that none of her dr's communicate with each other\". After further conversation pt stated \" if they are going to do the procedure (I.e. Suprapubic cath placement) then I want it done before I leave. \"  She stated that \" I don't feel safe to go home until this is taken care of\". I assured her that I would communicate her wishes to her dr's.

## 2019-05-27 NOTE — PROGRESS NOTES
Physical Therapy    Room #:  0620/0620-01  Patient Name: Milan Saint    PHYSICAL THERAPY INITIAL EVALUATION    Tentative placement recommendation: subacute vs HHPT if patient meets goals  Equipment recommendation: None      Plan of care: Patient will be seen  daily Monday-Friday, for therapeutic exercise, functional retraining, endurance activities, balance exercises, family and patient education. AM-PAC Basic Mobility   17/24    Order:  EVALUATE AND TREAT   Diagnosis/Problem list:   1. Acute cystitis with hematuria    2. Acute renal failure superimposed on chronic kidney disease, unspecified CKD stage, unspecified acute renal failure type ([address]    3. Lactic acidosis        Patient Active Problem List   Diagnosis    Hyperlipidemia    Noncompliance    Depression    Halitosis    Dysmenorrhea    Bradycardia    Acquired hypothyroidism    Mild intermittent asthma without complication    Acute renal failure (ARF) (HCC)    Anxiety    Intellectual disability    Obstructive uropathy    Mixed stress and urge urinary incontinence    Vitamin D deficiency    Seasonal allergic rhinitis    Acute on chronic renal insufficiency    Acute renal failure superimposed on chronic kidney disease, on chronic dialysis (HCC)    Mild protein-calorie malnutrition (HCC)       Past medical history:       Diagnosis Date    Anxiety     Depression     Hyperlipidemia     Hypertension     Hypothyroidism     Intellectual disability     Leg pain, bilateral     Noncompliance with medications     Noncompliance with treatment     Osteoarthritis    ;      Procedure Laterality Date    CYSTOSCOPY Left 1/21/2019    CYSTOSCOPY, BILATERAL RETROGRADE PYELOGRAMS, BILATERAL STENT INSERTION performed by Franc Loyola MD at [address]       The admitting diagnosis and active problem list as listed above have been reviewed prior to the initiation of this evaluation. Last time out of bed: today found in bedside chair. Precautions: falls ,   Social history: Patient lives boyfriend  in a apartment  Basement level 8 steps to access. Prior Level of Function: Patient ambulated with wheeled walker states she recently had rehab admission to [facility] in 3/19  Equipment owned: Zander Fournier,      Mentation: alert, cooperative, oriented x 3 and follows directions,     ROM: wfl   STRENGTH: wfl   PAIN: (measured on a visual analog scale with 0=no pain and 10=excruciating pain) no pain indicated. FUNCTIONAL ASSESSMENT   Bed Mobility- Supine to sit-  NT        Scooting-  NT     Sit to supine-  NT    Transfers-Sit to stand- Minimal assist to fwwincreased time allowed   Gait:  Patient ambulated 30 feet using wheeled walker with Minimal assist completed x 2 reps very slow rate of ambulation noted with small steps. Steps:  Not assessed     Balance: sit-good       stand good  with fww. Edema: no  Endurance: good     Treatment:  Therapist educated and facilitated patient on techniques to increase safety and independence with bed mobility, balance, functional transfers, and functional mobility. Patient ambulated with fww distance of 25 to 30 feet in room requested to not go into hallway. Compelted x 2 reps with sitting rest. cues need for sequencing to ensure safety. Patient demonstrating good understanding of education/techniques, requiring additional training/education. At end of session, patient in chair with  call light and phone within reach,  all lines and tubes intact, nursing notified. Pt would benefit from continued skilled PT to increase functional independence and quality of life. Rehab Potential: good   Patients Goal: home  When able. ASSESSMENT  Patient exhibits decreased strength, balance, coordination impairing functional mobility. GOALS to be met in 3 days.    Bed mobility-  Independent       Transfers-Sit to stand-Independent    Gait:  Patient to ambulate 100 feet using wheeled walker with Modified Independent    Steps: Patient to go up and down 8  step(s) using 1 rail(s) with  Minimal assist     Increase strength in affected mm groups by 1/3 grade  Increase balance to allow for improvement towards functional goals. Increase endurance to allow for improvement towards functional goals.       Mehgan Abarca, PT

## 2019-05-27 NOTE — PROGRESS NOTES
Talked to Dr Jasmyne Villagomez. Tad, he stated that they will discus the suprapubic catheter with her as an outpatient. They do NOT put them in as inpatients. I advised that the patient says she doesn't want to leave without a suprapubic catheter in place and he stated that it isnt her decision.   She needs to establish herself as a patient then they will put it in

## 2019-05-28 PROBLEM — N30.01 ACUTE CYSTITIS WITH HEMATURIA: Status: ACTIVE | Noted: 2019-05-28

## 2019-05-28 LAB — VANCOMYCIN RANDOM: 16.1 MCG/ML (ref 5–40)

## 2019-05-28 PROCEDURE — 6370000000 HC RX 637 (ALT 250 FOR IP): Performed by: INTERNAL MEDICINE

## 2019-05-28 PROCEDURE — 36415 COLL VENOUS BLD VENIPUNCTURE: CPT

## 2019-05-28 PROCEDURE — 2060000000 HC ICU INTERMEDIATE R&B

## 2019-05-28 PROCEDURE — 99231 SBSQ HOSP IP/OBS SF/LOW 25: CPT | Performed by: PSYCHIATRY & NEUROLOGY

## 2019-05-28 PROCEDURE — 97165 OT EVAL LOW COMPLEX 30 MIN: CPT

## 2019-05-28 PROCEDURE — 97116 GAIT TRAINING THERAPY: CPT

## 2019-05-28 PROCEDURE — 6360000002 HC RX W HCPCS: Performed by: SPECIALIST

## 2019-05-28 PROCEDURE — 80202 ASSAY OF VANCOMYCIN: CPT

## 2019-05-28 PROCEDURE — 2580000003 HC RX 258: Performed by: SPECIALIST

## 2019-05-28 PROCEDURE — 97110 THERAPEUTIC EXERCISES: CPT

## 2019-05-28 PROCEDURE — 6360000002 HC RX W HCPCS: Performed by: INTERNAL MEDICINE

## 2019-05-28 PROCEDURE — 6370000000 HC RX 637 (ALT 250 FOR IP): Performed by: PSYCHIATRY & NEUROLOGY

## 2019-05-28 RX ORDER — METHYLPREDNISOLONE SODIUM SUCCINATE 125 MG/2ML
125 INJECTION, POWDER, LYOPHILIZED, FOR SOLUTION INTRAMUSCULAR; INTRAVENOUS ONCE
Status: CANCELLED | OUTPATIENT
Start: 2019-05-29

## 2019-05-28 RX ORDER — HEPARIN SODIUM (PORCINE) LOCK FLUSH IV SOLN 100 UNIT/ML 100 UNIT/ML
500 SOLUTION INTRAVENOUS PRN
Status: CANCELLED | OUTPATIENT
Start: 2019-05-29

## 2019-05-28 RX ORDER — DIPHENHYDRAMINE HYDROCHLORIDE 50 MG/ML
50 INJECTION INTRAMUSCULAR; INTRAVENOUS ONCE
Status: CANCELLED | OUTPATIENT
Start: 2019-05-29

## 2019-05-28 RX ORDER — SODIUM CHLORIDE 9 MG/ML
INJECTION, SOLUTION INTRAVENOUS CONTINUOUS
Status: CANCELLED | OUTPATIENT
Start: 2019-05-29

## 2019-05-28 RX ORDER — 0.9 % SODIUM CHLORIDE 0.9 %
10 VIAL (ML) INJECTION ONCE
Status: CANCELLED | OUTPATIENT
Start: 2019-05-29

## 2019-05-28 RX ORDER — SODIUM CHLORIDE 0.9 % (FLUSH) 0.9 %
5 SYRINGE (ML) INJECTION PRN
Status: CANCELLED | OUTPATIENT
Start: 2019-05-29

## 2019-05-28 RX ORDER — SODIUM CHLORIDE 0.9 % (FLUSH) 0.9 %
10 SYRINGE (ML) INJECTION PRN
Status: CANCELLED | OUTPATIENT
Start: 2019-05-29

## 2019-05-28 RX ORDER — EPINEPHRINE 1 MG/ML
0.3 INJECTION, SOLUTION, CONCENTRATE INTRAVENOUS PRN
Status: CANCELLED | OUTPATIENT
Start: 2019-05-29

## 2019-05-28 RX ADMIN — HEPARIN SODIUM 5000 UNITS: 5000 INJECTION, SOLUTION INTRAVENOUS; SUBCUTANEOUS at 06:36

## 2019-05-28 RX ADMIN — LEVOTHYROXINE SODIUM 100 MCG: 100 TABLET ORAL at 06:36

## 2019-05-28 RX ADMIN — ATORVASTATIN CALCIUM 40 MG: 40 TABLET, FILM COATED ORAL at 09:24

## 2019-05-28 RX ADMIN — VANCOMYCIN HYDROCHLORIDE 1000 MG: 1 INJECTION, POWDER, LYOPHILIZED, FOR SOLUTION INTRAVENOUS at 14:19

## 2019-05-28 RX ADMIN — FERROUS SULFATE TAB 325 MG (65 MG ELEMENTAL FE) 325 MG: 325 (65 FE) TAB at 09:24

## 2019-05-28 RX ADMIN — ACETAMINOPHEN 650 MG: 325 TABLET, FILM COATED ORAL at 20:55

## 2019-05-28 RX ADMIN — ACETAMINOPHEN 650 MG: 325 TABLET, FILM COATED ORAL at 02:12

## 2019-05-28 RX ADMIN — FERROUS SULFATE TAB 325 MG (65 MG ELEMENTAL FE) 325 MG: 325 (65 FE) TAB at 16:54

## 2019-05-28 RX ADMIN — OXYBUTYNIN CHLORIDE 5 MG: 5 TABLET, EXTENDED RELEASE ORAL at 09:24

## 2019-05-28 RX ADMIN — ACETAMINOPHEN 650 MG: 325 TABLET, FILM COATED ORAL at 11:43

## 2019-05-28 RX ADMIN — HEPARIN SODIUM 5000 UNITS: 5000 INJECTION, SOLUTION INTRAVENOUS; SUBCUTANEOUS at 14:18

## 2019-05-28 RX ADMIN — HEPARIN SODIUM 5000 UNITS: 5000 INJECTION, SOLUTION INTRAVENOUS; SUBCUTANEOUS at 20:58

## 2019-05-28 RX ADMIN — MIRTAZAPINE 15 MG: 15 TABLET, FILM COATED ORAL at 20:55

## 2019-05-28 RX ADMIN — PANTOPRAZOLE SODIUM 40 MG: 40 TABLET, DELAYED RELEASE ORAL at 09:24

## 2019-05-28 RX ADMIN — TRAMADOL HYDROCHLORIDE 25 MG: 50 TABLET, FILM COATED ORAL at 22:37

## 2019-05-28 ASSESSMENT — PAIN SCALES - GENERAL
PAINLEVEL_OUTOF10: 10
PAINLEVEL_OUTOF10: 0
PAINLEVEL_OUTOF10: 4
PAINLEVEL_OUTOF10: 10
PAINLEVEL_OUTOF10: 3
PAINLEVEL_OUTOF10: 2

## 2019-05-28 ASSESSMENT — PAIN DESCRIPTION - DESCRIPTORS
DESCRIPTORS: ACHING;CONSTANT;DISCOMFORT
DESCRIPTORS: ACHING
DESCRIPTORS: ACHING;CONSTANT;DISCOMFORT
DESCRIPTORS: ACHING;CONSTANT;DISCOMFORT

## 2019-05-28 ASSESSMENT — PAIN DESCRIPTION - ONSET: ONSET: ON-GOING

## 2019-05-28 ASSESSMENT — PAIN DESCRIPTION - PAIN TYPE
TYPE: ACUTE PAIN;CHRONIC PAIN

## 2019-05-28 ASSESSMENT — PAIN - FUNCTIONAL ASSESSMENT
PAIN_FUNCTIONAL_ASSESSMENT: ACTIVITIES ARE NOT PREVENTED

## 2019-05-28 ASSESSMENT — PAIN DESCRIPTION - LOCATION
LOCATION: GENERALIZED
LOCATION: GENERALIZED
LOCATION: ABDOMEN;GENERALIZED
LOCATION: ABDOMEN;GENERALIZED

## 2019-05-28 ASSESSMENT — PAIN DESCRIPTION - FREQUENCY: FREQUENCY: INTERMITTENT

## 2019-05-28 NOTE — PLAN OF CARE
Problem: Falls - Risk of:  Goal: Will remain free from falls  Description  Will remain free from falls  5/28/2019 1202 by Sameer Sr RN  Outcome: Met This Shift     Problem: Falls - Risk of:  Goal: Absence of physical injury  Description  Absence of physical injury  5/28/2019 1202 by Sameer Sr RN  Outcome: Met This Shift     Problem: Pain:  Goal: Pain level will decrease  Description  Pain level will decrease  5/28/2019 1202 by Sameer Sr RN  Outcome: Met This Shift     Problem: Pain:  Goal: Control of acute pain  Description  Control of acute pain  5/28/2019 1202 by Sameer Sr RN  Outcome: Met This Shift     Problem: Physical Regulation:  Goal: Ability to maintain a body temperature in the normal range will improve  Description  Ability to maintain a body temperature in the normal range will improve  5/28/2019 1202 by Sameer Sr RN  Outcome: Met This Shift     Problem: Physical Regulation:  Goal: Ability to maintain vital signs within normal range will improve  Description  Ability to maintain vital signs within normal range will improve  5/28/2019 1202 by Sameer Sr RN  Outcome: Met This Shift     Problem: Fluid Volume:  Goal: Maintenance of adequate hydration will improve  Description  Maintenance of adequate hydration will improve  5/28/2019 1202 by Sameer Sr RN  Outcome: Met This Shift     Problem: Musculor/Skeletal Functional Status  Goal: Highest potential functional level  5/28/2019 1202 by Sameer Sr RN  Outcome: Met This Shift     Problem: Musculor/Skeletal Functional Status  Goal: Absence of falls  5/28/2019 1202 by Sameer Sr RN  Outcome: Met This Shift

## 2019-05-28 NOTE — DISCHARGE INSTR - COC
Continuity of Care Form    Patient Name: Mike Encarnacion   :  1965  MRN:  [id number]    Admit date:  2019  Discharge date:  2019    Code Status Order: Full Code   Advance Directives:   Advance Care Flowsheet Documentation     Date/Time Healthcare Directive Type of Healthcare Directive Copy in [address] Agent's Name Healthcare Agent's Phone Number    19 1624  No, patient does not have an advance directive for healthcare treatment -- -- -- -- --          Admitting Physician:  Cuong Martinez DO  PCP: Jalyn Rojas DO    Discharging Nurse: Electronically signed by Bonny Donaldson RN on 2019 at 4:40 PM  [address] Unit/Room#: 0620/0620-01  Discharging Unit Phone Number: [phone]    Emergency Contact:   Extended Emergency Contact Information  Primary Emergency Contact: [facility] [address] [facility] Phone: [phone]  Mobile Phone: [phone]  Relation: Brother/Sister  Hearing or visual needs: None  Other needs: None  Preferred language: English   needed? No  Secondary Emergency Contact: Kristian Britton [facility] [address]  Phone: [phone]  Mobile Phone: [phone]  Relation: Brother/Sister  Hearing or visual needs: None  Other needs: None  Preferred language: English   needed?  No    Past Surgical History:  Past Surgical History:   Procedure Laterality Date    CYSTOSCOPY Left 2019    CYSTOSCOPY, BILATERAL RETROGRADE PYELOGRAMS, BILATERAL STENT INSERTION performed by Ping Patel MD at [address]       Immunization History:   Immunization History   Administered Date(s) Administered    Influenza, MDCK Quadv, with preserv IM (Flucelvax 4 yrs and older) 2019    Influenza, Melanie Pippa, 3 Years and older, IM (Fluzone 3 yrs and older or Afluria 5 yrs and older) 2016    Pneumococcal Polysaccharide ([id number]) 2019    Tdap (Boostrix, Adacel) 2018       Active Problems:  Patient Active Problem List   Diagnosis Code    Hyperlipidemia E78.5    Noncompliance Z91.19    Depressive disorder F32.9    Halitosis R19.6    Dysmenorrhea N94.6    Bradycardia R00.1    Acquired hypothyroidism E03.9    Mild intermittent asthma without complication E13.38    Acute renal failure (ARF) (HCC) N17.9    Anxiety F41.9    Intellectual disability F79    Obstructive uropathy N13.9    Mixed stress and urge urinary incontinence N39.46    Vitamin D deficiency E55.9    Seasonal allergic rhinitis J30.2    Acute on chronic renal insufficiency N28.9, N18.9    Acute renal failure superimposed on chronic kidney disease, on chronic dialysis (HCC) N17.9, N18.9, Z99.2    Mild protein-calorie malnutrition (HCC) E44.1    Acute cystitis with hematuria N30.01       Isolation/Infection:   Isolation          No Isolation            Nurse Assessment:  Last Vital Signs: /61   Pulse 68   Temp 98.7 °F (37.1 °C) (Oral)   Resp 16   Ht 4' 11\" (1.499 m)   Wt 148 lb (67.1 kg)   LMP 05/21/2015 (Approximate)   SpO2 99%   BMI 29.89 kg/m²     Last documented pain score (0-10 scale): Pain Level: 2  Last Weight:   Wt Readings from Last 1 Encounters:   05/23/19 148 lb (67.1 kg)     Mental Status:  oriented    IV Access:  PICC; R. Upper arm. Insertion date: 05/29/2019    Nursing Mobility/ADLs:  Walking   Assisted  Transfer  Assisted  Bathing  Assisted  Dressing  Assisted  Toileting  Independent  Feeding  Independent  Med Admin  Assisted  Med Delivery   none and whole    Wound Care Documentation and Therapy:        Elimination:  Continence:   · Bowel:  Yes  · Bladder: Yes  Urinary Catheter: Removal Date 05/29/2019   Colostomy/Ileostomy/Ileal Conduit: No       Date of Last BM: 05/28/2019      Intake/Output Summary (Last 24 hours) at 5/28/2019 1535  Last data filed at 5/28/2019 1445  Gross per 24 hour   Intake 300 ml   Output 580 ml   Net -280 ml         Safety Concerns:     Fall Risk    Impairments/Disabilities:          Nutrition Therapy:  Current Nutrition Therapy:   - Oral Diet:  General    Routes of Feeding: Oral  Liquids: No Restrictions  Daily Fluid Restriction: no  Last Modified Barium Swallow with Video (Video Swallowing Test): not done    Treatments at the Time of Hospital Discharge:   Respiratory Treatments: N/A  Oxygen Therapy:  is not on home oxygen therapy. Ventilator:    - No ventilator support    Rehab Therapies: Physical Therapy  Weight Bearing Status/Restrictions: No weight bearing restirctions  Other Medical Equipment (for information only, NOT a DME order):  walker  Other Treatments: N/A    Patient's personal belongings (please select all that are sent with patient):  None    RN SIGNATURE:  Electronically signed by Ruben Hsu RN on 5/29/19 at 4:44 PM    CASE MANAGEMENT/SOCIAL WORK SECTION    Inpatient Status Date: 05/22/19    Readmission Risk Assessment Score:  Readmission Risk              Risk of Unplanned Readmission:        23           Discharging to Facility/ Agency   · Name: [facility] [place], [facility], fax [phone],  · Address:  · Phone:  · Fax:    Dialysis Facility (if applicable)   · Name:  · Address:  · Dialysis Schedule:  · Phone:  · Fax:    / signature: Electronically signed by AYO Mcnally on 5/28/19 at 3:35 PM    PHYSICIAN SECTION    Prognosis: Good    Condition at Discharge: Stable    Rehab Potential (if transferring to Rehab): Good    Recommended Labs or Other Treatments After Discharge: N/A    Physician Certification: I certify the above information and transfer of David Babcock  is necessary for the continuing treatment of the diagnosis listed and that she requires [facility] for less 30 days.      Update Admission H&P: No change in H&P    PHYSICIAN SIGNATURE:  Electronically signed by Delisa Webber DO on 5/29/19 at 1:21 PM

## 2019-05-28 NOTE — PLAN OF CARE
Problem: Falls - Risk of:  Goal: Will remain free from falls  Description  Will remain free from falls  5/28/2019 0749 by Elina Crisostomo RN  Outcome: Met This Shift  Note:   No falls  5/28/2019 0503 by Elina Crisostomo RN  Outcome: Met This Shift  Note:   No falls  Goal: Absence of physical injury  Description  Absence of physical injury  5/28/2019 0749 by Elina Crisostomo RN  Outcome: Met This Shift  Note:   No physical injury  5/28/2019 0503 by Elina Crisostomo RN  Outcome: Met This Shift  Note:   No physical injury       Problem: Pain:  Description  Pain management should include both nonpharmacologic and pharmacologic interventions.   Goal: Pain level will decrease  Description  Pain level will decrease  5/28/2019 0749 by Elina Crisostomo RN  Outcome: Met This Shift  Note:   Pain controlled    Goal: Control of acute pain  Description  Control of acute pain  5/28/2019 0749 by Elina Crisostomo RN  Outcome: Met This Shift  Note:   Pain controlled  5/28/2019 0503 by Elina Crisostomo RN  Outcome: Met This Shift     Problem: Physical Regulation:  Goal: Ability to maintain a body temperature in the normal range will improve  Description  Ability to maintain a body temperature in the normal range will improve  Outcome: Met This Shift  Goal: Ability to maintain vital signs within normal range will improve  Description  Ability to maintain vital signs within normal range will improve  Outcome: Met This Shift     Problem: Fluid Volume:  Goal: Maintenance of adequate hydration will improve  Description  Maintenance of adequate hydration will improve  Outcome: Met This Shift     Problem: Musculor/Skeletal Functional Status  Goal: Highest potential functional level  Outcome: Met This Shift  Goal: Absence of falls  Outcome: Met This Shift

## 2019-05-28 NOTE — PROGRESS NOTES
PHYSICAL THERAPY TREATMENT NOTE      5/28/2019  0620/0620-01                      David Babcock   [id number]                              1965    Patient Active Problem List   Diagnosis    Hyperlipidemia    Noncompliance    Depression    Halitosis    Dysmenorrhea    Bradycardia    Acquired hypothyroidism    Mild intermittent asthma without complication    Acute renal failure (ARF) ([facility] [address]    Anxiety    Intellectual disability    Obstructive uropathy    Mixed stress and urge urinary incontinence    Vitamin D deficiency    Seasonal allergic rhinitis    Acute on chronic renal insufficiency    Acute renal failure superimposed on chronic kidney disease, on chronic dialysis (HCC)    Mild protein-calorie malnutrition (HCC)       Recommendation for discharge: subacute vs HHPT if patient meets goals  Equipment prescriptions needed: none    AM-[facility] Mobility Inpatient   How much difficulty turning over in bed?: A Little  How much difficulty sitting down on / standing up from a chair with arms?: A Little  How much difficulty moving from lying on back to sitting on side of bed?: A Little  How much help from another person moving to and from a bed to a chair?: A Little  How much help from another person needed to walk in hospital room?: A Little  How much help from another person for climbing 3-5 steps with a railing?: A Lot  AM-PAC Inpatient Mobility Raw Score : 17  AM-PAC Inpatient T-Scale Score : 42.13  Mobility Inpatient CMS 0-100% Score: 50.57  Mobility Inpatient CMS G-Code Modifier : CK      Precautions: falls,     S: Patient cleared by nursing for treatment. Patient is agreeable to treatment. Pt c/o pain in stomach. Pt was sitting on commode in bathroom at start of treatment; nursing was present. Pain status: (measured on a visual analog scale with 0=no pain and 10=excruciating pain) No number assigned to pain/10.    O: Pt was instructed in and performed the following:   Bed Mobility- Supine to sit-

## 2019-05-28 NOTE — PROGRESS NOTES
[facility]  PROGRESS NOTE  Admit Date:  2019   NAME:  Davis Harper  MRN:  [id number]  :  1965  Age:   52 y.o. PCP:   Selvin Caicedo DO, [address] [address] Day: Hospital Day: 7    Subjective:   Pt was seen and examined in a face to face encounter for UTI  with CC of ABD PAIN   Chief Complaint   Patient presents with    Abdominal Pain     Starting last night    Nausea     HPI: still not feeling well- no improvements per pt- has pain still left side now  Up to chair. ROS:  CONSTITUTIONAL:   No fever, chills, weight loss, loss of appetite  ALLERGIES:    No urticaria, hay fever,    EYES:     No blurry vision, loss of vision,eye pain  ENT:      No hearing loss, sore throat  CARDIOVASCULAR:  No chest pain, palpitations  RESPIRATORY:    No cough, sob  HEME-LYMPH:   No easy bruising, bleeding  GI:     No nausea, vomiting or diarrhea  :     No urinary complaints  NEURO:    No  lightheadedness, dizziness, confusion,   MUSCULOSKELETAL:  No muscle aches, pain   SKIN:     No rash or itch  PSYCH:    No depression or anxiety      Scheduled Meds:   vancomycin  1,000 mg Intravenous Q24H    mirtazapine  15 mg Oral Nightly    atorvastatin  40 mg Oral Daily    docusate sodium  100 mg Oral BID    ferrous sulfate  325 mg Oral BID WC    fluticasone  2 spray Nasal Daily    levothyroxine  100 mcg Oral Daily    oxybutynin  5 mg Oral Daily    pantoprazole  40 mg Oral Daily    heparin (porcine)  5,000 Units Subcutaneous 3 times per day     Continuous Infusions:    PRN Meds:traMADol, acetaminophen    ALLERGIES: Patient has no known allergies. Objective:   Vitals reviewed.   Vitals:    19 1541 19 1957 19 0145 19 0922   BP: 118/63 138/70 (!) 114/59 131/60   Pulse: 60 64 68 70   Resp: 18 18 18 16   Temp: 97.9 °F (36.6 °C) 98.4 °F (36.9 °C) 98.7 °F (37.1 °C) 98.9 °F (37.2 °C)   TempSrc: Oral Oral Oral Oral   SpO2: 97% 98% 95% 96%   Weight:

## 2019-05-28 NOTE — PROGRESS NOTES
Occupational Therapy  Occupational Therapy Initial Assessment      Date:2019  Patient Name: Dominic Madrigal  MRN: [id number]  : 1965  Room: [address] [place] [address]    Evaluating OT: Adiel Higgins OTR/L [id number]    Recommendation for Placement: subacute vs HHOT if patient meets goals  Recommended Adaptive Equipment: TBD   AM-PAC Daily Activity Raw Score: 1624    Diagnosis:   1. Acute cystitis with hematuria    2. Acute renal failure superimposed on chronic kidney disease, unspecified CKD stage, unspecified acute renal failure type ([address]    3.  Lactic acidosis      Pertinent Medical History:   Past Medical History:   Diagnosis Date    Anxiety     Depression     Hyperlipidemia     Hypertension     Hypothyroidism     Intellectual disability     Leg pain, bilateral     Noncompliance with medications     Noncompliance with treatment     Osteoarthritis       Precautions:  Falls     Home Living: Pt lives with boyfriend in 1 story basement apartment but reports that he is \"in and out of the house\", 8 steps to enter with rail  Bathroom Setup: tub shower  Equipment owned: wheeled walker    Prior Level of Function: independent with ADLs , independent with IADLs; ambulated with wheeled walker  Driving: no    Pain Level: pt c/o 10/10 stomach pain this session; nursing aware  Cognition: A&O: 4/4; Follows 2 step directions   Memory:  fair     Sequencing:  fair     Problem solving:  fair     Judgement/safety:  fair       Functional Assessment:   Initial Eval Status  Date: 19 Treatment Status  Date: Short Term Goals  Treatment frequency: PRN 1-3x/week   Feeding Independent   Independent    Grooming Supervision   Independent    UB Dressing Minimal Assist   Independent    LB Dressing Moderate Assist   Independent    Bathing Moderate Assist  Independent    Toileting Moderate Assist   Independent    Bed Mobility  Supine to sit: n/t, Pt up in chair upon arrival   Sit to supine: n/t, Pt up in chair upon arrival   Supine to prevention/treatment  []   Other:  []    Rehab Potential: Good for established goals     Patient / Family Goal: go to rehab    Patient and/or family were instructed diagnosis, prognosis/goals and plan of care. Demonstrated fair understanding.           Vanesa Chau, OTR/L [id number]

## 2019-05-28 NOTE — BH NOTE
PSYCHIATRY ATTENDING NOTE    S: Patient seen in follow-up for depression. Initial consultation completed 5/24/19 and Remeron was started. Was called by nursing staff to re-evaluate patient as she was apparently verbalizing suicidal ideations yesterday. Interim chart reviewed; patient frustrated with not being able to get suprapubic catheter placement during hospitalization. Currently she acknowledges being frustrated with circumstances. She initially continued to report vague suicidal ideations but when asked to elaborate further there is no specific plan or intention. Patient says she is \"just talking\" and is able to contract for safety. She declined voluntary psychiatric admission. VITALS:  /61   Pulse 68   Temp 98.7 °F (37.1 °C) (Oral)   Resp 16   Ht 4' 11\" (1.499 m)   Wt 148 lb (67.1 kg)   LMP 05/21/2015 (Approximate)   SpO2 99%   BMI 29.89 kg/m²     MSE: White female appears age. Pleasant, cooperative, forthcoming. Decreased psychomotor activity, Gait not assessed. Strength and tone normal. Fair eye contact. Mood dysphoric. Affect flexible. Speech clear. Thought process generally organized without loosening. Thoughts are concrete. Content void of SI, HI, paranoia, delusions or hallucinations. No fluctuating LOC or internal stimulation. Orientation, concentration, recent and remote memory grossly intact. Intelligence below average. Fund of knowledge limited to fair. Language use limited. Insight and judgment limited to fair.         MEDICATIONS: Current Facility-Administered Medications: vancomycin 1000 mg IVPB in 250 mL D5W addavial, 1,000 mg, Intravenous, Q24H  traMADol (ULTRAM) tablet 25 mg, 25 mg, Oral, Q8H PRN  mirtazapine (REMERON) tablet 15 mg, 15 mg, Oral, Nightly  atorvastatin (LIPITOR) tablet 40 mg, 40 mg, Oral, Daily  docusate sodium (COLACE) capsule 100 mg, 100 mg, Oral, BID  ferrous sulfate tablet 325 mg, 325 mg, Oral, BID WC  fluticasone (FLONASE) 50 MCG/ACT nasal spray 2 spray, 2 spray, Nasal, Daily  levothyroxine (SYNTHROID) tablet 100 mcg, 100 mcg, Oral, Daily  oxybutynin (DITROPAN-XL) extended release tablet 5 mg, 5 mg, Oral, Daily  pantoprazole (PROTONIX) tablet 40 mg, 40 mg, Oral, Daily  heparin (porcine) injection 5,000 Units, 5,000 Units, Subcutaneous, 3 times per day  acetaminophen (TYLENOL) tablet 650 mg, 650 mg, Oral, Q4H PRN    ASSESSMENT:        Depressive Disorder                                      Intellectual Disability, mild to moderate    PLAN: Support provided. Patient not felt to be an imminent risk of danger to herself. No indication for medication adjustments. Patient declined voluntary psychiatric hospitalization. OK to discharge from my standpoint.                           Electronically signed by James Lobato MD on 5/28/2019 at 3:08 PM

## 2019-05-28 NOTE — PROGRESS NOTES
Internal Medicine Resident Progress Note    Admission date: 5/22/2019  Primary care physician: Antonio Tsang DO  Chief complaint: Abdominal Pain  Reason for Admission: [place]  Consultants:Urology    Subjective  Ellyn Marc was seen and examined at bedside today. All patient questions were answered and all tests were reviewed. No family present during my examination. Ellyn Marc states that she has less abdominal pain. Otherwise she states that there are no new complaints at this time including no chest pain, shortness of breath, abdominal pain, nausea, vomiting, diarrhea, constipation, headaches, fevers, chills, lightheadedness, dizziness, calf pain. Hospital Medications    traMADol (ULTRAM) tablet 25 mg Q8H PRN   mirtazapine (REMERON) tablet 15 mg Nightly   atorvastatin (LIPITOR) tablet 40 mg Daily   docusate sodium (COLACE) capsule 100 mg BID   ferrous sulfate tablet 325 mg BID WC   fluticasone (FLONASE) 50 MCG/ACT nasal spray 2 spray Daily   levothyroxine (SYNTHROID) tablet 100 mcg Daily   oxybutynin (DITROPAN-XL) extended release tablet 5 mg Daily   pantoprazole (PROTONIX) tablet 40 mg Daily   heparin (porcine) injection 5,000 Units 3 times per day   acetaminophen (TYLENOL) tablet 650 mg Q4H PRN       PRN Medications  traMADol, acetaminophen      Review of Systems  Please see HPI above. All bolded are positive. All un-bolded are negative.   Gen: fever, chills, fatigue, weakness, diaphoresis, increase in thirst, unintentional weight change, loss of appetite  Head: headache, vision change, hearing loss  Chest: chest pain, chest heaviness, palpitations  Lungs: shortness of breath, wheezing, coughing  Abdomen: abdominal pain, nausea, vomiting, diarrhea, constipation, melena, hematochezia, hematemesis  Extremities: lower extremity edema, myalgias, arthralgias  Urinary: dysuria, hematuria, or increase in frequency  Neurologic: lightheadedness, dizziness, confusion, syncope  Psychiatric: depression, suicidal ideation, or anxiety      Objective  Most Recent Recorded Vitals  Vitals:    05/28/19 0145   BP: (!) 114/59   Pulse: 68   Resp: 18   Temp: 98.7 °F (37.1 °C)   SpO2: 95%     I/O last 3 completed shifts: In: 910 [P.O.:810; I.V.:100]  Out: 1080 [Urine:1080]  No intake/output data recorded. Physical Exam:  General: AAO to person, place, time, and purpose, NAD, no labored breathing  Eyes: Conjunctivae/corneas clear, Sclera non icteric. Skin: Color, texture, and turgor normal. No rashes or lesions. Lungs: Clear to auscultation bilaterally. No retractions or use of accessory muscles. No vocal fremitus. No rhonchi, No crackle No rale. Heart: Regular rate and regular rhythm, no murmur  Abdomen: Soft, non-tender; bowel sounds normal; no masses,  no organomegaly  Extremities: Atraumatic, no cyanosis, no edema  Neurologic: Cranial nerves 2-12 grossly intact, no slurred speech. Osteopathic/Structural Exam: Examined in seated and supine position. Normal thoracic kyphosis. Normal lumbar lordosis. No acute changes. Most Recent Labs  Lab Results   Component Value Date    WBC 7.7 05/26/2019    HGB 11.4 (L) 05/26/2019    HCT 36.5 05/26/2019     05/26/2019     05/26/2019    K 4.8 05/26/2019     05/26/2019    CREATININE 1.4 (H) 05/26/2019    BUN 12 05/26/2019    CO2 22 05/26/2019    GLUCOSE 86 05/26/2019    ALT 13 05/26/2019    AST 14 05/26/2019    TSH 0.017 (L) 04/19/2019    LABA1C 5.1 04/19/2019     *All labs in electric medical record were reviewed. Please see electronic chart for a more comprehensive set of labs    Ct Abdomen Pelvis Wo Contrast    Result Date: 5/22/2019  LOCATION:200 EXAM: CT ABDOMEN PELVIS WO CONTRAST COMPARISON: None HISTORY: Bilateral flank pain TECHNIQUE:Noncontrast helical abdomen and pelvis CT was performed. Coronal and sagittal reconstructions also obtained. Automated dose control was used for this exam. CONTRAST: No IV contrast administered.  FINDINGS: SUPPORT DEVICES: None LOWER THORAX: Limited evaluation of the lower chest demonstrates clear lung bases bilaterally. SOLID ORGANS: The liver, spleen, pancreas, and adrenal glands are normal. BILIARY SYSTEM: No evidence of biliary ductal dilatation. GENITOURINARY: Bilateral ureteral stents are in place and in satisfactory position. There is persistent severe right hydroureteronephrosis. Findings appear similar to the previous CT. The left kidney is not significantly dilated. No stones are appreciated. Prominent perivesical fat stranding and fluid seen in the lower abdomen and pelvis. Pelvic structures are unremarkable. GASTROINTESTINAL: The stomach, small and large bowel are normal in caliber without evidence of focal wall thickening. There is no evidence of bowel obstruction. APPENDIX: Normal. FLUID COLLECTIONS: None. LYMPH NODES: No adenopathy by size criteria. VASCULAR STRUCTURES: Visualized vascular structures appear normal. ABDOMINAL WALL: Normal with no herniations. OSSEOUS AND SOFT TISSUE STRUCTURES: Bone windows demonstrate no suspicious osteolytic or osteoblastic lesions. No fracture identified. 1. Findings suggesting cystitis. 2. Persistent severe right hydroureteronephrosis with stable position of the ureteral stents      Microbiology   MICROBIOLOGY:          BLOOD CX #1  No results for input(s): BC in the last 72 hours. BLOOD CX #2  No results for input(s): Anh Roam in the last 72 hours. TIP CULTURE  No results for input(s): CXCATHTIP in the last 72 hours. CULTURE, RESPIRATORY   No results found for: CULTRESP        BODY FLUID CULTURE  No results for input(s): BFCS in the last 72 hours. WOUND ABSCESS  No results for input(s): WNDABS in the last 72 hours. Anaerobic cx  No results for input(s): LABANAE in the last 72 hours. CULTURE SURGICAL  No results for input(s): CXSURG in the last 72 hours.     No results found for: ORG  No results found for: WNDABS    URINE CULTURE  No results for input(s): Diana Marion in the last 72 hours. No results for input(s): ORG in the last 72 hours. MISC. SENDOUT  No results for input(s): MREF in the last 72 hours. STREP PNEUMONIA AG URINE  No results for input(s): STREPNEUMAGU in the last 72 hours. LEGIONELLA AG URINE  No results for input(s): LEGUR in the last 72 hours. No results for input(s): [address] in the last 72 hours. Assessment  1. Acute on chronic kidney disease stage IV compatible with a neurogenic bladder with chronic b/l ureteral stents  2. Sepsis secondary to a urinary tract infection  3. Normocytic anemia of chronic disease  4. Hypothyroidism  5. Chronic mental disability  6. Hyperlipidemia     Plan  The patient is acceptable for discharge once a safe discharge plan has been developed. I am recommending skilled nursing facility placement. We have been unable to reach the patient's brothers who help aid in decision making. The patient's underlying mental retardation make it very difficult to reason with the patient. Again, once a discharge planning can be made, the patient is acceptable for discharge. Patient was seen and examined with Dr. Pam Rush. Сергей Dias DO, PGY3  5/28/2019  7:30 AM  NOTE: This report was transcribed using voice recognition software.  Every effort was made to ensure accuracy; however, inadvertent computerized transcription errors may be present

## 2019-05-29 ENCOUNTER — APPOINTMENT (OUTPATIENT)
Dept: INTERVENTIONAL RADIOLOGY/VASCULAR | Age: 54
DRG: 720 | End: 2019-05-29
Payer: COMMERCIAL

## 2019-05-29 VITALS
OXYGEN SATURATION: 97 % | BODY MASS INDEX: 29.84 KG/M2 | DIASTOLIC BLOOD PRESSURE: 72 MMHG | WEIGHT: 148 LBS | HEART RATE: 68 BPM | SYSTOLIC BLOOD PRESSURE: 111 MMHG | TEMPERATURE: 97.1 F | RESPIRATION RATE: 16 BRPM | HEIGHT: 59 IN

## 2019-05-29 LAB
BLOOD CULTURE, ROUTINE: NORMAL
CULTURE, BLOOD 2: NORMAL

## 2019-05-29 PROCEDURE — 6360000002 HC RX W HCPCS: Performed by: INTERNAL MEDICINE

## 2019-05-29 PROCEDURE — 76937 US GUIDE VASCULAR ACCESS: CPT | Performed by: RADIOLOGY

## 2019-05-29 PROCEDURE — 02HV33Z INSERTION OF INFUSION DEVICE INTO SUPERIOR VENA CAVA, PERCUTANEOUS APPROACH: ICD-10-PCS | Performed by: INTERNAL MEDICINE

## 2019-05-29 PROCEDURE — 76000 FLUOROSCOPY <1 HR PHYS/QHP: CPT | Performed by: RADIOLOGY

## 2019-05-29 PROCEDURE — 2580000003 HC RX 258: Performed by: SPECIALIST

## 2019-05-29 PROCEDURE — 6360000002 HC RX W HCPCS: Performed by: SPECIALIST

## 2019-05-29 PROCEDURE — 6370000000 HC RX 637 (ALT 250 FOR IP): Performed by: INTERNAL MEDICINE

## 2019-05-29 PROCEDURE — 2500000003 HC RX 250 WO HCPCS: Performed by: RADIOLOGY

## 2019-05-29 PROCEDURE — C1751 CATH, INF, PER/CENT/MIDLINE: HCPCS

## 2019-05-29 PROCEDURE — 36410 VNPNXR 3YR/> PHY/QHP DX/THER: CPT | Performed by: RADIOLOGY

## 2019-05-29 RX ORDER — LORAZEPAM 2 MG/ML
1 INJECTION INTRAMUSCULAR ONCE
Status: COMPLETED | OUTPATIENT
Start: 2019-05-29 | End: 2019-05-29

## 2019-05-29 RX ORDER — HEPARIN SODIUM (PORCINE) LOCK FLUSH IV SOLN 100 UNIT/ML 100 UNIT/ML
100 SOLUTION INTRAVENOUS PRN
Status: DISCONTINUED | OUTPATIENT
Start: 2019-05-29 | End: 2019-05-29 | Stop reason: HOSPADM

## 2019-05-29 RX ORDER — TRAMADOL HYDROCHLORIDE 50 MG/1
25 TABLET ORAL EVERY 8 HOURS PRN
Qty: 10 TABLET | Refills: 0 | Status: ON HOLD | OUTPATIENT
Start: 2019-05-29 | End: 2019-06-14

## 2019-05-29 RX ORDER — LIDOCAINE HYDROCHLORIDE 10 MG/ML
10 INJECTION, SOLUTION INFILTRATION; PERINEURAL ONCE
Status: COMPLETED | OUTPATIENT
Start: 2019-05-29 | End: 2019-05-29

## 2019-05-29 RX ADMIN — PANTOPRAZOLE SODIUM 40 MG: 40 TABLET, DELAYED RELEASE ORAL at 09:06

## 2019-05-29 RX ADMIN — FERROUS SULFATE TAB 325 MG (65 MG ELEMENTAL FE) 325 MG: 325 (65 FE) TAB at 17:19

## 2019-05-29 RX ADMIN — FLUTICASONE PROPIONATE 2 SPRAY: 50 SPRAY, METERED NASAL at 09:06

## 2019-05-29 RX ADMIN — HEPARIN SODIUM 5000 UNITS: 5000 INJECTION, SOLUTION INTRAVENOUS; SUBCUTANEOUS at 06:09

## 2019-05-29 RX ADMIN — OXYBUTYNIN CHLORIDE 5 MG: 5 TABLET, EXTENDED RELEASE ORAL at 09:06

## 2019-05-29 RX ADMIN — ATORVASTATIN CALCIUM 40 MG: 40 TABLET, FILM COATED ORAL at 09:06

## 2019-05-29 RX ADMIN — LORAZEPAM 1 MG: 2 INJECTION INTRAMUSCULAR; INTRAVENOUS at 13:30

## 2019-05-29 RX ADMIN — FERROUS SULFATE TAB 325 MG (65 MG ELEMENTAL FE) 325 MG: 325 (65 FE) TAB at 09:06

## 2019-05-29 RX ADMIN — LIDOCAINE HYDROCHLORIDE 10 ML: 10 INJECTION, SOLUTION INFILTRATION; PERINEURAL at 15:19

## 2019-05-29 RX ADMIN — HEPARIN SODIUM 5000 UNITS: 5000 INJECTION, SOLUTION INTRAVENOUS; SUBCUTANEOUS at 16:19

## 2019-05-29 RX ADMIN — VANCOMYCIN HYDROCHLORIDE 1000 MG: 1 INJECTION, POWDER, LYOPHILIZED, FOR SOLUTION INTRAVENOUS at 12:35

## 2019-05-29 RX ADMIN — LEVOTHYROXINE SODIUM 100 MCG: 100 TABLET ORAL at 06:09

## 2019-05-29 ASSESSMENT — PAIN SCALES - GENERAL
PAINLEVEL_OUTOF10: 0
PAINLEVEL_OUTOF10: 0
PAINLEVEL_OUTOF10: 6
PAINLEVEL_OUTOF10: 5

## 2019-05-29 ASSESSMENT — PAIN DESCRIPTION - ORIENTATION: ORIENTATION: RIGHT;UPPER;INNER

## 2019-05-29 ASSESSMENT — PAIN DESCRIPTION - LOCATION: LOCATION: ARM

## 2019-05-29 ASSESSMENT — PAIN DESCRIPTION - PAIN TYPE: TYPE: ACUTE PAIN;SURGICAL PAIN
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
## 2019-05-29 NOTE — PROGRESS NOTES
acidosis       Plan:     · Vancomycin level 16.1- cont vanco- every 24 hours  · Day #7 of antibiotics of total of 14  · Check blood cx-No growth to date  · Has been afebrile  · Labs reviewed   · Urology to see- to discuss possible Suprapubic cath- will follow as an outpatient  · Can d/c with picc  · vanco at infusion center med rec    Imaging and labs were reviewed per medical records and any ID pertinent labs were addressed with the patient. The patient was educated about the diagnosis, prognosis, indications, risks and benefits of treatment. The patient is in agreement with the proposed medical treatment plan. An opportunity to ask questions was given to the patient and questions were answered. Electronically signed by JUSTYN Guerrero on 5/29/2019 at 11:05 AM  As above    I have personally performed and/or participated in the history, exam, medical decision making, and agree with all pertinent clinical information by NP. The patient was educated about the diagnosis, prognosis, indications, risks and benefits of treatment. The patient is in agreement with the proposed medical treatment plan. Pt for picc if she agrees can d/c with vanco when set up   Pt med rec  F/u 2 weeks  An opportunity to ask questions was given to the patient and questions were answered.       Electronically signed by Molly Herrera MD on 5/29/2019 at 12:19 PM    Phone [phone]  Fax [phone]

## 2019-05-29 NOTE — PROGRESS NOTES
Patient agreed to PICC line, but very apprehensive about discharge later today; wants to \"stay until tomorrow\". Call placed to brother Cecilia Lackey. Awaiting call back.  Electronically signed by Ellie Bacon RN on 5/29/2019 at 2:02 PM

## 2019-05-29 NOTE — CARE COORDINATION
SOCIAL WORK / DISCHARGE PLANNIN [place] obtained for [facility] at the [facility], [address] , fax [phone],[facility]. If medically cleared for discharge, will arrange transport. Electronic ROMINA in pt's EPIC Chart for physician signature. HENS form completed.                    Electronically signed by AYO Anderson on 2019 at 1:05 PM

## 2019-05-29 NOTE — CARE COORDINATION
SOCIAL WORK / DISCHARGE PLANNING:  Sw left [facility] [phone], left voice message request call unit as pt's is anxious and afraid of dc. Sw inquired if brother could provide transport to [facility] at the [place], [address] [facility], fax [phone],LI not could be arranged via ambulette - either [facility] [phone] or [facility], [phone], both which would be late evening. Addendum: BrotherAshley returned call and will provide transport to [facility]. Pt is aware. Marion Salmeron RN is aware.                Electronically signed by AYO Cota on 5/29/2019 at 4:02 PM

## 2019-05-29 NOTE — PROGRESS NOTES
Nurse to nurse called at [facility] at the [facility] to Julius grissom.  Electronically signed by Magdalene Lackey RN on 5/29/2019 at 7:36 PM

## 2019-05-30 ENCOUNTER — TELEPHONE (OUTPATIENT)
Dept: FAMILY MEDICINE CLINIC | Age: 54
End: 2019-05-30

## 2019-05-30 NOTE — TELEPHONE ENCOUNTER
Patient originally scheduled for 6/4/19 for TCM visit. However, patient was discharged to rehab facility and is unable to come in for TCM visit at this time. Patient will call office for follow up visit once d/c from rehab facility.

## 2019-05-31 ENCOUNTER — HOSPITAL ENCOUNTER (INPATIENT)
Age: 54
LOS: 14 days | Discharge: NURSING, REHAB OR LONG-TERM CARE | DRG: 466 | End: 2019-06-14
Attending: EMERGENCY MEDICINE | Admitting: INTERNAL MEDICINE
Payer: COMMERCIAL

## 2019-05-31 ENCOUNTER — APPOINTMENT (OUTPATIENT)
Dept: ULTRASOUND IMAGING | Age: 54
DRG: 466 | End: 2019-05-31
Payer: COMMERCIAL

## 2019-05-31 DIAGNOSIS — K62.5 RECTAL BLEEDING: ICD-10-CM

## 2019-05-31 DIAGNOSIS — N17.9 ACUTE RENAL FAILURE, UNSPECIFIED ACUTE RENAL FAILURE TYPE (HCC): Primary | ICD-10-CM

## 2019-05-31 DIAGNOSIS — N20.0 CALCULUS OF KIDNEY: ICD-10-CM

## 2019-05-31 DIAGNOSIS — D64.9 ANEMIA, UNSPECIFIED TYPE: ICD-10-CM

## 2019-05-31 DIAGNOSIS — N13.30 HYDRONEPHROSIS, UNSPECIFIED HYDRONEPHROSIS TYPE: ICD-10-CM

## 2019-05-31 DIAGNOSIS — E78.2 MIXED HYPERLIPIDEMIA: ICD-10-CM

## 2019-05-31 DIAGNOSIS — N39.0 URINARY TRACT INFECTION WITHOUT HEMATURIA, SITE UNSPECIFIED: ICD-10-CM

## 2019-05-31 DIAGNOSIS — N13.9 OBSTRUCTIVE UROPATHY: ICD-10-CM

## 2019-05-31 LAB
ALBUMIN SERPL-MCNC: 3.1 G/DL (ref 3.5–5.2)
ALP BLD-CCNC: 56 U/L (ref 35–104)
ALT SERPL-CCNC: 23 U/L (ref 0–32)
ANION GAP SERPL CALCULATED.3IONS-SCNC: 14 MMOL/L (ref 7–16)
AST SERPL-CCNC: 18 U/L (ref 0–31)
BACTERIA: ABNORMAL /HPF
BASOPHILS ABSOLUTE: 0.03 E9/L (ref 0–0.2)
BASOPHILS RELATIVE PERCENT: 0.2 % (ref 0–2)
BILIRUB SERPL-MCNC: <0.2 MG/DL (ref 0–1.2)
BILIRUBIN URINE: NEGATIVE
BLOOD, URINE: ABNORMAL
BUN BLDV-MCNC: 49 MG/DL (ref 6–20)
CALCIUM SERPL-MCNC: 8.5 MG/DL (ref 8.6–10.2)
CHLORIDE BLD-SCNC: 103 MMOL/L (ref 98–107)
CHLORIDE URINE RANDOM: 141 MMOL/L
CLARITY: ABNORMAL
CO2: 22 MMOL/L (ref 22–29)
COLOR: ABNORMAL
CREAT SERPL-MCNC: 6.1 MG/DL (ref 0.5–1)
EKG ATRIAL RATE: 70 BPM
EKG P AXIS: 38 DEGREES
EKG P-R INTERVAL: 158 MS
EKG Q-T INTERVAL: 416 MS
EKG QRS DURATION: 60 MS
EKG QTC CALCULATION (BAZETT): 449 MS
EKG R AXIS: 46 DEGREES
EKG T AXIS: 31 DEGREES
EKG VENTRICULAR RATE: 70 BPM
EOSINOPHILS ABSOLUTE: 0.39 E9/L (ref 0.05–0.5)
EOSINOPHILS RELATIVE PERCENT: 3.2 % (ref 0–6)
EPITHELIAL CELLS, UA: ABNORMAL /HPF
GFR AFRICAN AMERICAN: 9
GFR NON-AFRICAN AMERICAN: 7 ML/MIN/1.73
GLUCOSE BLD-MCNC: 102 MG/DL (ref 74–99)
GLUCOSE URINE: NEGATIVE MG/DL
HCT VFR BLD CALC: 28.4 % (ref 34–48)
HEMOGLOBIN: 8.7 G/DL (ref 11.5–15.5)
IMMATURE GRANULOCYTES #: 0.49 E9/L
IMMATURE GRANULOCYTES %: 4 % (ref 0–5)
KETONES, URINE: NEGATIVE MG/DL
LACTIC ACID: 0.9 MMOL/L (ref 0.5–2.2)
LEUKOCYTE ESTERASE, URINE: ABNORMAL
LIPASE: 21 U/L (ref 13–60)
LYMPHOCYTES ABSOLUTE: 1.62 E9/L (ref 1.5–4)
LYMPHOCYTES RELATIVE PERCENT: 13.3 % (ref 20–42)
MCH RBC QN AUTO: 28.1 PG (ref 26–35)
MCHC RBC AUTO-ENTMCNC: 30.6 % (ref 32–34.5)
MCV RBC AUTO: 91.6 FL (ref 80–99.9)
MONOCYTES ABSOLUTE: 0.58 E9/L (ref 0.1–0.95)
MONOCYTES RELATIVE PERCENT: 4.8 % (ref 2–12)
NEUTROPHILS ABSOLUTE: 9.07 E9/L (ref 1.8–7.3)
NEUTROPHILS RELATIVE PERCENT: 74.5 % (ref 43–80)
NITRITE, URINE: NEGATIVE
PDW BLD-RTO: 14.9 FL (ref 11.5–15)
PH UA: 6.5 (ref 5–9)
PLATELET # BLD: 347 E9/L (ref 130–450)
PMV BLD AUTO: 10.2 FL (ref 7–12)
POTASSIUM REFLEX MAGNESIUM: 5.4 MMOL/L (ref 3.5–5)
POTASSIUM, UR: <3 MMOL/L
PROTEIN UA: >=300 MG/DL
RBC # BLD: 3.1 E12/L (ref 3.5–5.5)
RBC UA: ABNORMAL /HPF (ref 0–2)
SODIUM BLD-SCNC: 139 MMOL/L (ref 132–146)
SODIUM URINE: 155 MMOL/L
SPECIFIC GRAVITY UA: 1.01 (ref 1–1.03)
TOTAL PROTEIN: 6 G/DL (ref 6.4–8.3)
TROPONIN: <0.01 NG/ML (ref 0–0.03)
UROBILINOGEN, URINE: 0.2 E.U./DL
WBC # BLD: 12.2 E9/L (ref 4.5–11.5)
WBC UA: >20 /HPF (ref 0–5)

## 2019-05-31 PROCEDURE — 2580000003 HC RX 258: Performed by: INTERNAL MEDICINE

## 2019-05-31 PROCEDURE — 6360000002 HC RX W HCPCS: Performed by: INTERNAL MEDICINE

## 2019-05-31 PROCEDURE — 99284 EMERGENCY DEPT VISIT MOD MDM: CPT

## 2019-05-31 PROCEDURE — 83690 ASSAY OF LIPASE: CPT

## 2019-05-31 PROCEDURE — 84484 ASSAY OF TROPONIN QUANT: CPT

## 2019-05-31 PROCEDURE — 84133 ASSAY OF URINE POTASSIUM: CPT

## 2019-05-31 PROCEDURE — 85025 COMPLETE CBC W/AUTO DIFF WBC: CPT

## 2019-05-31 PROCEDURE — 81001 URINALYSIS AUTO W/SCOPE: CPT

## 2019-05-31 PROCEDURE — 6370000000 HC RX 637 (ALT 250 FOR IP): Performed by: EMERGENCY MEDICINE

## 2019-05-31 PROCEDURE — 6360000002 HC RX W HCPCS: Performed by: EMERGENCY MEDICINE

## 2019-05-31 PROCEDURE — 82436 ASSAY OF URINE CHLORIDE: CPT

## 2019-05-31 PROCEDURE — 36415 COLL VENOUS BLD VENIPUNCTURE: CPT

## 2019-05-31 PROCEDURE — 84300 ASSAY OF URINE SODIUM: CPT

## 2019-05-31 PROCEDURE — 93010 ELECTROCARDIOGRAM REPORT: CPT | Performed by: INTERNAL MEDICINE

## 2019-05-31 PROCEDURE — 87088 URINE BACTERIA CULTURE: CPT

## 2019-05-31 PROCEDURE — 80053 COMPREHEN METABOLIC PANEL: CPT

## 2019-05-31 PROCEDURE — 87040 BLOOD CULTURE FOR BACTERIA: CPT

## 2019-05-31 PROCEDURE — 83605 ASSAY OF LACTIC ACID: CPT

## 2019-05-31 PROCEDURE — 6370000000 HC RX 637 (ALT 250 FOR IP): Performed by: INTERNAL MEDICINE

## 2019-05-31 PROCEDURE — 1200000000 HC SEMI PRIVATE

## 2019-05-31 PROCEDURE — C9113 INJ PANTOPRAZOLE SODIUM, VIA: HCPCS | Performed by: EMERGENCY MEDICINE

## 2019-05-31 PROCEDURE — 76770 US EXAM ABDO BACK WALL COMP: CPT

## 2019-05-31 PROCEDURE — 2580000003 HC RX 258: Performed by: EMERGENCY MEDICINE

## 2019-05-31 PROCEDURE — 93005 ELECTROCARDIOGRAM TRACING: CPT | Performed by: EMERGENCY MEDICINE

## 2019-05-31 RX ORDER — ATORVASTATIN CALCIUM 40 MG/1
40 TABLET, FILM COATED ORAL DAILY
Status: DISCONTINUED | OUTPATIENT
Start: 2019-06-01 | End: 2019-06-14 | Stop reason: HOSPADM

## 2019-05-31 RX ORDER — TRAMADOL HYDROCHLORIDE 50 MG/1
25 TABLET ORAL EVERY 8 HOURS PRN
Status: DISCONTINUED | OUTPATIENT
Start: 2019-05-31 | End: 2019-06-14 | Stop reason: HOSPADM

## 2019-05-31 RX ORDER — ACETAMINOPHEN 325 MG/1
650 TABLET ORAL EVERY 4 HOURS PRN
COMMUNITY

## 2019-05-31 RX ORDER — ONDANSETRON 2 MG/ML
4 INJECTION INTRAMUSCULAR; INTRAVENOUS EVERY 6 HOURS PRN
Status: DISCONTINUED | OUTPATIENT
Start: 2019-05-31 | End: 2019-06-14 | Stop reason: HOSPADM

## 2019-05-31 RX ORDER — SODIUM CHLORIDE 0.9 % (FLUSH) 0.9 %
10 SYRINGE (ML) INJECTION PRN
Status: DISCONTINUED | OUTPATIENT
Start: 2019-05-31 | End: 2019-06-14 | Stop reason: HOSPADM

## 2019-05-31 RX ORDER — PANTOPRAZOLE SODIUM 40 MG/1
40 TABLET, DELAYED RELEASE ORAL DAILY
Status: DISCONTINUED | OUTPATIENT
Start: 2019-06-01 | End: 2019-06-01

## 2019-05-31 RX ORDER — FLUTICASONE PROPIONATE 50 MCG
2 SPRAY, SUSPENSION (ML) NASAL DAILY
Status: DISCONTINUED | OUTPATIENT
Start: 2019-06-01 | End: 2019-06-14 | Stop reason: HOSPADM

## 2019-05-31 RX ORDER — ACETAMINOPHEN 325 MG/1
650 TABLET ORAL ONCE
Status: COMPLETED | OUTPATIENT
Start: 2019-05-31 | End: 2019-05-31

## 2019-05-31 RX ORDER — PANTOPRAZOLE SODIUM 40 MG/10ML
40 INJECTION, POWDER, LYOPHILIZED, FOR SOLUTION INTRAVENOUS ONCE
Status: COMPLETED | OUTPATIENT
Start: 2019-05-31 | End: 2019-05-31

## 2019-05-31 RX ORDER — MIRTAZAPINE 15 MG/1
15 TABLET, FILM COATED ORAL NIGHTLY
Status: DISCONTINUED | OUTPATIENT
Start: 2019-05-31 | End: 2019-06-14 | Stop reason: HOSPADM

## 2019-05-31 RX ORDER — LEVOTHYROXINE SODIUM 0.1 MG/1
100 TABLET ORAL DAILY
Status: DISCONTINUED | OUTPATIENT
Start: 2019-06-01 | End: 2019-06-14 | Stop reason: HOSPADM

## 2019-05-31 RX ORDER — HEPARIN SODIUM 5000 [USP'U]/ML
5000 INJECTION, SOLUTION INTRAVENOUS; SUBCUTANEOUS EVERY 8 HOURS SCHEDULED
Status: DISCONTINUED | OUTPATIENT
Start: 2019-05-31 | End: 2019-06-14 | Stop reason: HOSPADM

## 2019-05-31 RX ORDER — SODIUM CHLORIDE 9 MG/ML
INJECTION, SOLUTION INTRAVENOUS CONTINUOUS
Status: DISCONTINUED | OUTPATIENT
Start: 2019-05-31 | End: 2019-06-02

## 2019-05-31 RX ORDER — FERROUS SULFATE 325(65) MG
325 TABLET ORAL 2 TIMES DAILY WITH MEALS
Status: DISCONTINUED | OUTPATIENT
Start: 2019-05-31 | End: 2019-06-14 | Stop reason: HOSPADM

## 2019-05-31 RX ORDER — 0.9 % SODIUM CHLORIDE 0.9 %
1000 INTRAVENOUS SOLUTION INTRAVENOUS ONCE
Status: COMPLETED | OUTPATIENT
Start: 2019-05-31 | End: 2019-05-31

## 2019-05-31 RX ORDER — DOCUSATE SODIUM 100 MG/1
100 CAPSULE, LIQUID FILLED ORAL 2 TIMES DAILY
Status: DISCONTINUED | OUTPATIENT
Start: 2019-05-31 | End: 2019-06-14 | Stop reason: HOSPADM

## 2019-05-31 RX ORDER — SODIUM CHLORIDE 0.9 % (FLUSH) 0.9 %
10 SYRINGE (ML) INJECTION EVERY 12 HOURS SCHEDULED
Status: DISCONTINUED | OUTPATIENT
Start: 2019-05-31 | End: 2019-06-14 | Stop reason: HOSPADM

## 2019-05-31 RX ADMIN — MIRTAZAPINE 15 MG: 15 TABLET, FILM COATED ORAL at 21:23

## 2019-05-31 RX ADMIN — SODIUM CHLORIDE: 9 INJECTION, SOLUTION INTRAVENOUS at 19:59

## 2019-05-31 RX ADMIN — HEPARIN SODIUM 5000 UNITS: 5000 INJECTION, SOLUTION INTRAVENOUS; SUBCUTANEOUS at 21:23

## 2019-05-31 RX ADMIN — SODIUM CHLORIDE 1000 ML: 9 INJECTION, SOLUTION INTRAVENOUS at 17:41

## 2019-05-31 RX ADMIN — Medication 325 MG: at 21:23

## 2019-05-31 RX ADMIN — ACETAMINOPHEN 650 MG: 325 TABLET, FILM COATED ORAL at 17:41

## 2019-05-31 RX ADMIN — PANTOPRAZOLE SODIUM 40 MG: 40 INJECTION, POWDER, FOR SOLUTION INTRAVENOUS at 17:41

## 2019-05-31 ASSESSMENT — ENCOUNTER SYMPTOMS
SINUS PAIN: 0
COUGH: 0
NAUSEA: 0
CHEST TIGHTNESS: 0
SHORTNESS OF BREATH: 0
ABDOMINAL PAIN: 1
DIARRHEA: 0
VOMITING: 0
BACK PAIN: 0
SORE THROAT: 0

## 2019-05-31 ASSESSMENT — PAIN SCALES - GENERAL
PAINLEVEL_OUTOF10: 5
PAINLEVEL_OUTOF10: 10

## 2019-05-31 NOTE — H&P
Internal Medicine Resident History & Physical     Chief Complaint: ARF  Reason for Admission: ARF  Primary Care Physician: Nikolas Hoang DO  Consultants:Uro ID  Code status:Full     History of Present Illness  Patient is [address] [facility] emergency room with main complaint of abnormal lab work. Patient had laboratory done at the nursing facility. Patient with creatinine of 6. History is once again limited to the patient's underlying mental retardation. Patients that she has not been eating as much as normal. Patient unsure if 4 was removed and replaced. In the ER patient was found to have acute renal failure creatinine 7. Patient will ultrasound showed hydronephrosis. Patient to be admitted to the 3rd floor for treatment of acute renal failure. Last hospital admission:  1. Acute on chronic kidney disease stage IV compatible with a neurogenic bladder with chronic b/l ureteral stents  2. Sepsis secondary to a urinary tract infection  3. Normocytic anemia of chronic disease  4. Hypothyroidism  5. Chronic mental disability  6. Hyperlipidemia    Last 2D echo:5/22/2019   Left ventricular size is grossly normal.   No evidence of left ventricular mass or thrombus noted.   Normal left ventricular wall thickness.   No regional wall motion abnormalities seen.   Ejection fraction is visually estimated at 45-50%.   Normal sized left atrium.   No evidence of thrombus within left atrium.   Physiologic and/or trace mitral regurgitation is present.   No evidence of mitral valve stenosis.   Aortic valve opens well.   The aortic valve is trileaflet.   No hemodynamically significant aortic stenosis is present.   Mild aortic regurgitation is noted.   Physiologic and/or trace tricuspid regurgitation.  RVSP is 24.63 mmHg.   Regular rhythm. Emergency Department Course  On presentation to the emergency department, the patient had laboratory work as well as imaging studies performed.     Vitals in ED-   ED TRIAGE VITALS  BP: (!) 147/79, Temp: 99 °F (37.2 °C), Pulse: 72, Resp: 18, SpO2: 98 %  Labs-   Lab Results   Component Value Date    WBC 12.2 (H) 05/31/2019    HGB 8.7 (L) 05/31/2019    HCT 28.4 (L) 05/31/2019     05/31/2019     05/31/2019    K 5.4 (H) 05/31/2019     05/31/2019    CREATININE 6.1 (H) 05/31/2019    BUN 49 (H) 05/31/2019    CO2 22 05/31/2019    GLUCOSE 102 (H) 05/31/2019    ALT 23 05/31/2019    AST 18 05/31/2019     Lab Results   Component Value Date    TROPONINI <0.01 05/31/2019     No results for input(s): BNP in the last 72 hours. Radiology-  Ct Abdomen Pelvis Wo Contrast    Result Date: 5/22/2019  LOCATION:200 EXAM: CT ABDOMEN PELVIS WO CONTRAST COMPARISON: None HISTORY: Bilateral flank pain TECHNIQUE:Noncontrast helical abdomen and pelvis CT was performed. Coronal and sagittal reconstructions also obtained. Automated dose control was used for this exam. CONTRAST: No IV contrast administered. FINDINGS: SUPPORT DEVICES: None LOWER THORAX: Limited evaluation of the lower chest demonstrates clear lung bases bilaterally. SOLID ORGANS: The liver, spleen, pancreas, and adrenal glands are normal. BILIARY SYSTEM: No evidence of biliary ductal dilatation. GENITOURINARY: Bilateral ureteral stents are in place and in satisfactory position. There is persistent severe right hydroureteronephrosis. Findings appear similar to the previous CT. The left kidney is not significantly dilated. No stones are appreciated. Prominent perivesical fat stranding and fluid seen in the lower abdomen and pelvis. Pelvic structures are unremarkable. GASTROINTESTINAL: The stomach, small and large bowel are normal in caliber without evidence of focal wall thickening. There is no evidence of bowel obstruction. APPENDIX: Normal. FLUID COLLECTIONS: None. LYMPH NODES: No adenopathy by size criteria. VASCULAR STRUCTURES: Visualized vascular structures appear normal. ABDOMINAL WALL: Normal with no herniations. renal cyst measures 13 mm and upper pole cyst 25.5 mm. No focal abnormality right renal collecting system. Nondistended urinary bladder. Moderate bilateral hydronephrosis, somewhat more severe on the right than the left. Ir Ultrasound Guidance Vascular Access    Result Date: 5/29/2019  Location:200 Exam: IR ULTRASOUND GUIDANCE VASCULAR ACCESS History:  PICC line placement Ultrasound evaluation of potential access was performed. After successfully identifying a patent right brachial vein, and following the administration of lidocaine, real-time ultrasound guidance was used to puncture the right brachial   vein. A permanent recording was created for the patient's record. Ultrasound guidance was provided during placement of a PICC line. EKG-   Sinus rhythm  Therapy in ED-   Medications   0.9 % sodium chloride bolus (1,000 mLs Intravenous New Bag 5/31/19 1741)   acetaminophen (TYLENOL) tablet 650 mg (650 mg Oral Given 5/31/19 1741)   pantoprazole (PROTONIX) injection 40 mg (40 mg Intravenous Given 5/31/19 1741)         Past Medical History:   Diagnosis Date    Anxiety     Depression     Hyperlipidemia     Hypertension     Hypothyroidism     Intellectual disability     Leg pain, bilateral     Noncompliance with medications     Noncompliance with treatment     Osteoarthritis        Past Surgical History:   Procedure Laterality Date    CYSTOSCOPY Left 1/21/2019    CYSTOSCOPY, BILATERAL RETROGRADE PYELOGRAMS, BILATERAL STENT INSERTION performed by Yuri Martell MD at [place] [address]. History  Family History   Problem Relation Age of Onset    Diabetes Brother     Thyroid Disease Mother     Other Daughter         Autism       Social History  Patient lives at nursing home . Employment: no  Illicit drug use- no  TOBACCO:   reports that she quit smoking about 30 years ago. She has a 5.00 pack-year smoking history.  She has never used smokeless tobacco.  ETOH:   reports that she does not drink alcohol. Home Medications  No current facility-administered medications on file prior to encounter. Current Outpatient Medications on File Prior to Encounter   Medication Sig Dispense Refill    traMADol (ULTRAM) 50 MG tablet Take 0.5 tablets by mouth every 8 hours as needed for Pain for up to 3 days. 10 tablet 0    mirtazapine (REMERON) 15 MG tablet Take 1 tablet by mouth nightly 30 tablet 0    ondansetron (ZOFRAN-ODT) 4 MG disintegrating tablet Take 1 tablet by mouth every 8 hours as needed for Nausea or Vomiting      ferrous sulfate 325 (65 Fe) MG tablet Take 1 tablet by mouth 2 times daily (with meals) 30 tablet 3    docusate sodium (COLACE, DULCOLAX) 100 MG CAPS Take 100 mg by mouth 2 times daily 60 capsule 0    levothyroxine (SYNTHROID) 100 MCG tablet TAKE 1 TABLET BY MOUTH EVERY MORNING 30 MINUTES BEFORE EATING. 30 tablet 2    vitamin D (ERGOCALCIFEROL) 47912 units CAPS capsule Take 1 capsule by mouth once a week 4 capsule 5    oxybutynin (DITROPAN-XL) 5 MG extended release tablet Take 1 tablet by mouth daily 30 tablet 3    magnesium oxide (MAG-OX) 400 (241.3 Mg) MG TABS tablet Take 1 tablet by mouth 2 times daily 60 tablet 3    fluticasone (FLONASE) 50 MCG/ACT nasal spray 2 sprays by Nasal route daily 1 Bottle 3    atorvastatin (LIPITOR) 40 MG tablet Take 1 tablet by mouth daily 30 tablet 3    pantoprazole (PROTONIX) 40 MG tablet TAKE ONE TABLET BY MOUTH DAILY 30 tablet 3    Compression Stockings MISC by Does not apply route Below knee  15 - 30 mm Hg 2 each 0       Allergies  No Known Allergies    Review of Systems  Please see HPI above. All bolded are positive. All un-bolded are negative.   Gen: fever, chills, fatigue, weakness, diaphoresis, increase in thirst, unintentional weight change, loss of appetite  Head: headache, vision change, hearing loss  Chest: chest pain, chest heaviness, palpitations  Lungs: shortness of breath, wheezing, coughing  Abdomen: abdominal pain, nausea, vomiting, diarrhea, constipation, melena, hematochezia, hematemesis  Extremities: lower extremity edema, myalgias, arthralgias  Urinary: dysuria, hematuria, or increase in frequency  Neurologic: lightheadedness, dizziness, confusion, syncope  Psychiatric: depression, suicidal ideation, or anxiety    Objective  Vitals:    05/31/19 1656   BP: (!) 147/79   Pulse: 72   Resp: 18   Temp: 99 °F (37.2 °C)   SpO2: 98%       Physical Exam:  General: Awake, alert, oriented to person, place, time, and purpose, appears stated age, cooperative, no acute distress,   Head: Normocephalic, atraumatic  Eyes: Conjunctivae/corneas clear, Sclera non icteric  Mouth: Mucous membranes moist  Neck: No JVD, no adenopathy, no carotid bruit, neck is supple, trachea is midline  Back: ROM normal, No CVA tenderness. Lungs: Clear to auscultation bilaterally. No retractions or use of accessory muscles. No vocal fremitus. No rhonchi, crackle or rale. Heart: Regular rate and regular rhythm, no murmur, normal S1, S2  Abdomen: Soft, non-tender; bowel sounds normal; no masses, no organomegaly  Extremities:No lower extremity edema, extremities atraumatic, no cyanosis, no clubbing, 2+ pedal pulses palpated  Skin: Normal color, normal texture, normal turgor, no rashes, no lesions  Neurologic:5/5 muscle strength throughout, Normal muscle tone throughout, PERRLA, EOMI, face symmetric, equal facial muscle strength bilaterally, hearing intact, uvula midline, tongue midline, Speech appropriate without slurring. Osteopathic/Structural Exam: Examined in seated and supine position. Normal thoracic kyphosis. Normal lumbar lordosis. No acute changes. Mircobiology  Urine  cultures have been sent. Assessment  7. Acute on chronic kidney disease stage IV compatible with a neurogenic bladder with chronic b/l ureteral stents  8. Sepsis secondary to a urinary tract infection  9. Normocytic anemia of chronic disease  10. Hypothyroidism  11.  Chronic mental disability  12. Hyperlipidemia      Plan  Patient we once again admitted to the hospital for acute renal failure. With the patient once again on IV fluid hydration. We'll check daily BMPs. Infectious disease and be consult for continuation of IV antibiotics secondary to history of ESBL. Urology was consult for recommendations. · Continue to manage other medical conditions listed above as previously managed. · Routine labs in am  · DVT prophylaxis. · Please see orders for further management and care. · This patient was discussed with Dr. Jose Thomas. Kalyani Quiñonez DO, PGY3  5/31/2019  6:14 PM    NOTE: This report was transcribed using voice recognition software. Every effort was made to ensure accuracy; however, inadvertent computerized transcription errors may be present    I discussed the patient's management with the resident. I reviewed the resident's note and agree with the documented findings and plan of care.     Monisha Arroyo D.O., Elizabeth Barrios  5/31/2019  6:30 PM

## 2019-05-31 NOTE — ED PROVIDER NOTES
This is a 49-year-old female that presented from nursing home for abnormal labs. She has had an elevated creatinine. She was recently hospitalized for acute renal failure and sepsis secondary to urinary tract infection. She was discharged with a Braden catheter as well as a PICC line from which she has been receiving linezolid. Patient states that she was told to come in to the hospital because her labs are worse. She states she lives at home with her boyfriend but she is actually from [facility], [place], operated by [facility]. She has a history of MR and is difficult to obtain an accurate history from. No family or friends at bedside. Patient states that she is having abdominal discomfort, she states that she has had worsening pain ever since she initially showed up to the hospital a month ago. She states she is having normal bowel movements. She denies any chest pain or shortness of breath. No back pain. No fevers. She states that she was offered dialysis but did not want to go through with it and she still does not want to have dialysis done. When asked who her urologist is, she says she isn't sure. Review of Systems   Constitutional: Negative for activity change, chills, fatigue and fever. HENT: Negative for congestion, sinus pain and sore throat. Eyes: Negative for visual disturbance. Respiratory: Negative for cough, chest tightness and shortness of breath. Cardiovascular: Negative for chest pain, palpitations and leg swelling. Gastrointestinal: Positive for abdominal pain. Negative for diarrhea, nausea and vomiting. Genitourinary: Negative for dysuria and urgency. Musculoskeletal: Negative for back pain, neck pain and neck stiffness. Skin: Negative for rash. Neurological: Negative for dizziness, syncope and headaches. Psychiatric/Behavioral: Negative for confusion. Physical Exam   Constitutional: She appears well-developed and well-nourished. No distress. HENT:   Head: Normocephalic and atraumatic.    Nose: Nose normal.   Mouth/Throat: Oropharynx is clear and moist. No oropharyngeal exudate. Eyes: Pupils are equal, round, and reactive to light. Conjunctivae and EOM are normal.   Neck: Normal range of motion. Neck supple. Cardiovascular: Normal rate, regular rhythm, normal heart sounds and intact distal pulses. Pulmonary/Chest: Effort normal and breath sounds normal. No respiratory distress. She exhibits no tenderness. Abdominal: Bowel sounds are normal. She exhibits no distension. There is no tenderness. Firm with mild tenderness to palpation x 4, no focal tenderness, negative murphys and mcburneys   Genitourinary:   Genitourinary Comments: Braden with a small amount of yellow urine in bag   Musculoskeletal: Normal range of motion. She exhibits no edema. PICC RUE   Neurological: She is alert. No cranial nerve deficit or sensory deficit. She exhibits normal muscle tone. Skin: Skin is warm and dry. Capillary refill takes less than 2 seconds. She is not diaphoretic. Psychiatric: Her behavior is normal. Judgment normal.   Patient history of MR, answers some questions that do not make sense to me   Nursing note and vitals reviewed. Procedures    MDM  Number of Diagnoses or Management Options  Acute renal failure, unspecified acute renal failure type [facility]): Anemia, unspecified type:   Rectal bleeding:   Urinary tract infection without hematuria, site unspecified:   Diagnosis management comments: This is a 59-year-old female that presents from nursing home over concerns of abnormal labs. She is resting comfortably in bed in no acute distress. We will obtain labs and reevaluate. Patient's hgb has dropped since her last visit. She refuses rectal exam but states she is having some rectal bleeding. Will order protonix. Patient appears to have UTI despite being on IV linezolid. Will defer treatment to admitting team. Crt 6.1, K+ mildly elevated without ekg changes. US shows hydronephrosis.  I have orders placed or performed during the hospital encounter of 05/31/19   CBC Auto Differential   Result Value Ref Range    WBC 12.2 (H) 4.5 - 11.5 E9/L    RBC 3.10 (L) 3.50 - 5.50 E12/L    Hemoglobin 8.7 (L) 11.5 - 15.5 g/dL    Hematocrit 28.4 (L) 34.0 - 48.0 %    MCV 91.6 80.0 - 99.9 fL    MCH 28.1 26.0 - 35.0 pg    MCHC 30.6 (L) 32.0 - 34.5 %    RDW 14.9 11.5 - 15.0 fL    Platelets 716 202 - 540 E9/L    MPV 10.2 7.0 - 12.0 fL    Neutrophils % 74.5 43.0 - 80.0 %    Immature Granulocytes % 4.0 0.0 - 5.0 %    Lymphocytes % 13.3 (L) 20.0 - 42.0 %    Monocytes % 4.8 2.0 - 12.0 %    Eosinophils % 3.2 0.0 - 6.0 %    Basophils % 0.2 0.0 - 2.0 %    Neutrophils # 9.07 (H) 1.80 - 7.30 E9/L    Immature Granulocytes # 0.49 E9/L    Lymphocytes # 1.62 1.50 - 4.00 E9/L    Monocytes # 0.58 0.10 - 0.95 E9/L    Eosinophils # 0.39 0.05 - 0.50 E9/L    Basophils # 0.03 0.00 - 0.20 E9/L   Comprehensive Metabolic Panel w/ Reflex to MG   Result Value Ref Range    Sodium 139 132 - 146 mmol/L    Potassium reflex Magnesium 5.4 (H) 3.5 - 5.0 mmol/L    Chloride 103 98 - 107 mmol/L    CO2 22 22 - 29 mmol/L    Anion Gap 14 7 - 16 mmol/L    Glucose 102 (H) 74 - 99 mg/dL    BUN 49 (H) 6 - 20 mg/dL    CREATININE 6.1 (H) 0.5 - 1.0 mg/dL    GFR Non-African American 7 >=60 mL/min/1.73    GFR African American 9     Calcium 8.5 (L) 8.6 - 10.2 mg/dL    Total Protein 6.0 (L) 6.4 - 8.3 g/dL    Alb 3.1 (L) 3.5 - 5.2 g/dL    Total Bilirubin <0.2 0.0 - 1.2 mg/dL    Alkaline Phosphatase 56 35 - 104 U/L    ALT 23 0 - 32 U/L    AST 18 0 - 31 U/L   Lipase   Result Value Ref Range    Lipase 21 13 - 60 U/L   Troponin   Result Value Ref Range    Troponin <0.01 0.00 - 0.03 ng/mL   Lactic Acid, Plasma   Result Value Ref Range    Lactic Acid 0.9 0.5 - 2.2 mmol/L   Urinalysis, reflex to microscopic   Result Value Ref Range    Color, UA Straw Straw/Yellow    Clarity, UA SLCLOUDY Clear    Glucose, Ur Negative Negative mg/dL    Bilirubin Urine Negative Negative    Ketones, Urine Negative Negative mg/dL    Specific Gravity, UA 1.015 1.005 - 1.030    Blood, Urine LARGE (A) Negative    pH, UA 6.5 5.0 - 9.0    Protein, UA >=300 (A) Negative mg/dL    Urobilinogen, Urine 0.2 <2.0 E.U./dL    Nitrite, Urine Negative Negative    Leukocyte Esterase, Urine MODERATE (A) Negative   URINE ELECTROLYTES   Result Value Ref Range    Sodium, Ur 155 Not Established mmol/L    Potassium, Ur <3.0 Not Established mmol/L    Chloride 141 Not Established mmol/L   Microscopic Urinalysis   Result Value Ref Range    WBC, UA >20 0 - 5 /HPF    RBC, UA 5-10 (A) 0 - 2 /HPF    Epi Cells RARE /HPF    Bacteria, UA MANY (A) /HPF   EKG 12 Lead   Result Value Ref Range    Ventricular Rate 70 BPM    Atrial Rate 70 BPM    P-R Interval 158 ms    QRS Duration 60 ms    Q-T Interval 416 ms    QTc Calculation (Bazett) 449 ms    P Axis 38 degrees    R Axis 46 degrees    T Axis 31 degrees       RADIOLOGY:    All Radiology results interpreted by Radiologist unless otherwise noted. US RETROPERITONEAL COMPLETE   Final Result   Moderate bilateral hydronephrosis, somewhat more severe on   the right than the left.          ---------------------------- NURSING NOTES AND VITALS REVIEWED -------------------------   The nursing notes within the ED encounter and vital signs as below have been reviewed.    /74   Pulse 74   Temp 98.4 °F (36.9 °C) (Oral)   Resp 18   Ht 4' 11\" (1.499 m)   Wt 145 lb 12.8 oz (66.1 kg)   LMP 05/21/2015 (Approximate)   SpO2 99%   BMI 29.45 kg/m²   Oxygen Saturation Interpretation: Normal      ------------------------------------------PROGRESS NOTES -------------------------------------------    ED COURSE MEDICATIONS:                Medications   atorvastatin (LIPITOR) tablet 40 mg (has no administration in time range)   docusate sodium (COLACE) capsule 100 mg (has no administration in time range)   ferrous sulfate tablet 325 mg (has no administration in time range)   fluticasone (FLONASE) 50 MCG/ACT nasal spray 2 spray (has no administration in time range)   levothyroxine (SYNTHROID) tablet 100 mcg (has no administration in time range)   mirtazapine (REMERON) tablet 15 mg (has no administration in time range)   pantoprazole (PROTONIX) tablet 40 mg (has no administration in time range)   traMADol (ULTRAM) tablet 25 mg (has no administration in time range)   0.9 % sodium chloride infusion (has no administration in time range)   sodium chloride flush 0.9 % injection 10 mL (has no administration in time range)   sodium chloride flush 0.9 % injection 10 mL (has no administration in time range)   magnesium hydroxide (MILK OF MAGNESIA) 400 MG/5ML suspension 30 mL (has no administration in time range)   ondansetron (ZOFRAN) injection 4 mg (has no administration in time range)   heparin (porcine) injection 5,000 Units (has no administration in time range)   acetaminophen (TYLENOL) tablet 650 mg (650 mg Oral Given 5/31/19 1741)   0.9 % sodium chloride bolus (1,000 mLs Intravenous New Bag 5/31/19 1741)   pantoprazole (PROTONIX) injection 40 mg (40 mg Intravenous Given 5/31/19 1741)       CONSULTATIONS:            none. PROCEDURES:            none. COUNSELING:   I have spoken with the patient and discussed todays results, in addition to providing specific details for the plan of care and counseling regarding the diagnosis and prognosis.     --------------------------------------- IMPRESSION & DISPOSITION --------------------------------     IMPRESSION(s):  1. Acute renal failure, unspecified acute renal failure type ([facility] [address]    2. Rectal bleeding    3. Urinary tract infection without hematuria, site unspecified    4. Anemia, unspecified type    5. Hydronephrosis, unspecified hydronephrosis type        This patient's ED course included: a personal history and physicial examination    This patient has remained hemodynamically stable during their ED course. DISPOSITION:  Disposition: Admit to telemetry.   Patient condition is fair. END OF PROVIDER NOTE.             Lanny Mckeon DO  Resident  05/31/19 1946

## 2019-06-01 ENCOUNTER — ANESTHESIA (OUTPATIENT)
Dept: OPERATING ROOM | Age: 54
DRG: 466 | End: 2019-06-01
Payer: COMMERCIAL

## 2019-06-01 ENCOUNTER — APPOINTMENT (OUTPATIENT)
Dept: GENERAL RADIOLOGY | Age: 54
DRG: 466 | End: 2019-06-01
Payer: COMMERCIAL

## 2019-06-01 ENCOUNTER — ANESTHESIA EVENT (OUTPATIENT)
Dept: OPERATING ROOM | Age: 54
DRG: 466 | End: 2019-06-01
Payer: COMMERCIAL

## 2019-06-01 VITALS
SYSTOLIC BLOOD PRESSURE: 105 MMHG | RESPIRATION RATE: 11 BRPM | OXYGEN SATURATION: 100 % | DIASTOLIC BLOOD PRESSURE: 71 MMHG

## 2019-06-01 LAB
ANION GAP SERPL CALCULATED.3IONS-SCNC: 13 MMOL/L (ref 7–16)
ANION GAP SERPL CALCULATED.3IONS-SCNC: 14 MMOL/L (ref 7–16)
ANION GAP SERPL CALCULATED.3IONS-SCNC: 15 MMOL/L (ref 7–16)
ANION GAP SERPL CALCULATED.3IONS-SCNC: 17 MMOL/L (ref 7–16)
BASOPHILS ABSOLUTE: 0.02 E9/L (ref 0–0.2)
BASOPHILS RELATIVE PERCENT: 0.2 % (ref 0–2)
BUN BLDV-MCNC: 48 MG/DL (ref 6–20)
BUN BLDV-MCNC: 49 MG/DL (ref 6–20)
BUN BLDV-MCNC: 52 MG/DL (ref 6–20)
BUN BLDV-MCNC: 52 MG/DL (ref 6–20)
CALCIUM SERPL-MCNC: 7.9 MG/DL (ref 8.6–10.2)
CALCIUM SERPL-MCNC: 8 MG/DL (ref 8.6–10.2)
CALCIUM SERPL-MCNC: 8 MG/DL (ref 8.6–10.2)
CALCIUM SERPL-MCNC: 8.4 MG/DL (ref 8.6–10.2)
CHLORIDE BLD-SCNC: 107 MMOL/L (ref 98–107)
CHLORIDE BLD-SCNC: 108 MMOL/L (ref 98–107)
CHLORIDE BLD-SCNC: 108 MMOL/L (ref 98–107)
CHLORIDE BLD-SCNC: 109 MMOL/L (ref 98–107)
CO2: 19 MMOL/L (ref 22–29)
CO2: 20 MMOL/L (ref 22–29)
CREAT SERPL-MCNC: 6.5 MG/DL (ref 0.5–1)
CREAT SERPL-MCNC: 6.6 MG/DL (ref 0.5–1)
CREAT SERPL-MCNC: 6.7 MG/DL (ref 0.5–1)
CREAT SERPL-MCNC: 6.8 MG/DL (ref 0.5–1)
CREATININE URINE: 16 MG/DL (ref 29–226)
EOSINOPHILS ABSOLUTE: 0.25 E9/L (ref 0.05–0.5)
EOSINOPHILS RELATIVE PERCENT: 2.2 % (ref 0–6)
GFR AFRICAN AMERICAN: 8
GFR NON-AFRICAN AMERICAN: 6 ML/MIN/1.73
GFR NON-AFRICAN AMERICAN: 6 ML/MIN/1.73
GFR NON-AFRICAN AMERICAN: 7 ML/MIN/1.73
GFR NON-AFRICAN AMERICAN: 7 ML/MIN/1.73
GLUCOSE BLD-MCNC: 171 MG/DL (ref 74–99)
GLUCOSE BLD-MCNC: 87 MG/DL (ref 74–99)
GLUCOSE BLD-MCNC: 88 MG/DL (ref 74–99)
GLUCOSE BLD-MCNC: 89 MG/DL (ref 74–99)
HCT VFR BLD CALC: 24.8 % (ref 34–48)
HEMOGLOBIN: 7.8 G/DL (ref 11.5–15.5)
IMMATURE GRANULOCYTES #: 0.25 E9/L
IMMATURE GRANULOCYTES %: 2.2 % (ref 0–5)
LYMPHOCYTES ABSOLUTE: 1.39 E9/L (ref 1.5–4)
LYMPHOCYTES RELATIVE PERCENT: 12.3 % (ref 20–42)
MAGNESIUM: 1.5 MG/DL (ref 1.6–2.6)
MCH RBC QN AUTO: 29 PG (ref 26–35)
MCHC RBC AUTO-ENTMCNC: 31.5 % (ref 32–34.5)
MCV RBC AUTO: 92.2 FL (ref 80–99.9)
METER GLUCOSE: 77 MG/DL (ref 74–99)
MONOCYTES ABSOLUTE: 0.55 E9/L (ref 0.1–0.95)
MONOCYTES RELATIVE PERCENT: 4.9 % (ref 2–12)
NEUTROPHILS ABSOLUTE: 8.82 E9/L (ref 1.8–7.3)
NEUTROPHILS RELATIVE PERCENT: 78.2 % (ref 43–80)
OSMOLALITY URINE: 257 MOSM/KG (ref 300–900)
PDW BLD-RTO: 15 FL (ref 11.5–15)
PHOSPHORUS: 7.1 MG/DL (ref 2.5–4.5)
PLATELET # BLD: 304 E9/L (ref 130–450)
PMV BLD AUTO: 10 FL (ref 7–12)
POTASSIUM SERPL-SCNC: 5.2 MMOL/L (ref 3.5–5)
POTASSIUM SERPL-SCNC: 5.9 MMOL/L (ref 3.5–5)
POTASSIUM SERPL-SCNC: 6.4 MMOL/L (ref 3.5–5)
POTASSIUM SERPL-SCNC: 6.4 MMOL/L (ref 3.5–5)
RBC # BLD: 2.69 E12/L (ref 3.5–5.5)
SODIUM BLD-SCNC: 139 MMOL/L (ref 132–146)
SODIUM BLD-SCNC: 142 MMOL/L (ref 132–146)
SODIUM BLD-SCNC: 142 MMOL/L (ref 132–146)
SODIUM BLD-SCNC: 145 MMOL/L (ref 132–146)
SODIUM URINE: 107 MMOL/L
VANCOMYCIN RANDOM: 42.6 MCG/ML (ref 5–40)
WBC # BLD: 11.3 E9/L (ref 4.5–11.5)

## 2019-06-01 PROCEDURE — 3600000013 HC SURGERY LEVEL 3 ADDTL 15MIN: Performed by: UROLOGY

## 2019-06-01 PROCEDURE — 93005 ELECTROCARDIOGRAM TRACING: CPT | Performed by: INTERNAL MEDICINE

## 2019-06-01 PROCEDURE — 2580000003 HC RX 258: Performed by: SPECIALIST

## 2019-06-01 PROCEDURE — 6360000002 HC RX W HCPCS: Performed by: INTERNAL MEDICINE

## 2019-06-01 PROCEDURE — 1200000000 HC SEMI PRIVATE

## 2019-06-01 PROCEDURE — 6360000002 HC RX W HCPCS: Performed by: ANESTHESIOLOGY

## 2019-06-01 PROCEDURE — 2580000003 HC RX 258: Performed by: INTERNAL MEDICINE

## 2019-06-01 PROCEDURE — 83935 ASSAY OF URINE OSMOLALITY: CPT

## 2019-06-01 PROCEDURE — 80202 ASSAY OF VANCOMYCIN: CPT

## 2019-06-01 PROCEDURE — 3700000000 HC ANESTHESIA ATTENDED CARE: Performed by: UROLOGY

## 2019-06-01 PROCEDURE — 7100000000 HC PACU RECOVERY - FIRST 15 MIN: Performed by: UROLOGY

## 2019-06-01 PROCEDURE — 6370000000 HC RX 637 (ALT 250 FOR IP): Performed by: INTERNAL MEDICINE

## 2019-06-01 PROCEDURE — 0T788DZ DILATION OF BILATERAL URETERS WITH INTRALUMINAL DEVICE, VIA NATURAL OR ARTIFICIAL OPENING ENDOSCOPIC: ICD-10-PCS | Performed by: UROLOGY

## 2019-06-01 PROCEDURE — 2709999900 HC NON-CHARGEABLE SUPPLY: Performed by: UROLOGY

## 2019-06-01 PROCEDURE — 83735 ASSAY OF MAGNESIUM: CPT

## 2019-06-01 PROCEDURE — 82570 ASSAY OF URINE CREATININE: CPT

## 2019-06-01 PROCEDURE — 84300 ASSAY OF URINE SODIUM: CPT

## 2019-06-01 PROCEDURE — 6360000002 HC RX W HCPCS: Performed by: SPECIALIST

## 2019-06-01 PROCEDURE — 7100000001 HC PACU RECOVERY - ADDTL 15 MIN: Performed by: UROLOGY

## 2019-06-01 PROCEDURE — 84100 ASSAY OF PHOSPHORUS: CPT

## 2019-06-01 PROCEDURE — 6360000002 HC RX W HCPCS: Performed by: NURSE ANESTHETIST, CERTIFIED REGISTERED

## 2019-06-01 PROCEDURE — 3600000003 HC SURGERY LEVEL 3 BASE: Performed by: UROLOGY

## 2019-06-01 PROCEDURE — C2617 STENT, NON-COR, TEM W/O DEL: HCPCS | Performed by: UROLOGY

## 2019-06-01 PROCEDURE — 0TP98DZ REMOVAL OF INTRALUMINAL DEVICE FROM URETER, VIA NATURAL OR ARTIFICIAL OPENING ENDOSCOPIC: ICD-10-PCS | Performed by: UROLOGY

## 2019-06-01 PROCEDURE — 74400 UROGRAPHY IV +-KUB TOMOG: CPT

## 2019-06-01 PROCEDURE — C1769 GUIDE WIRE: HCPCS | Performed by: UROLOGY

## 2019-06-01 PROCEDURE — 85025 COMPLETE CBC W/AUTO DIFF WBC: CPT

## 2019-06-01 PROCEDURE — 82962 GLUCOSE BLOOD TEST: CPT

## 2019-06-01 PROCEDURE — 36415 COLL VENOUS BLD VENIPUNCTURE: CPT

## 2019-06-01 PROCEDURE — 3700000001 HC ADD 15 MINUTES (ANESTHESIA): Performed by: UROLOGY

## 2019-06-01 PROCEDURE — 36592 COLLECT BLOOD FROM PICC: CPT

## 2019-06-01 PROCEDURE — 80048 BASIC METABOLIC PNL TOTAL CA: CPT

## 2019-06-01 DEVICE — URETERAL STENT
Type: IMPLANTABLE DEVICE | Site: URETER | Status: FUNCTIONAL
Brand: POLARIS™ ULTRA

## 2019-06-01 RX ORDER — MIDAZOLAM HYDROCHLORIDE 1 MG/ML
INJECTION INTRAMUSCULAR; INTRAVENOUS PRN
Status: DISCONTINUED | OUTPATIENT
Start: 2019-06-01 | End: 2019-06-01 | Stop reason: SDUPTHER

## 2019-06-01 RX ORDER — PANTOPRAZOLE SODIUM 40 MG/1
40 TABLET, DELAYED RELEASE ORAL
Status: DISCONTINUED | OUTPATIENT
Start: 2019-06-02 | End: 2019-06-07

## 2019-06-01 RX ORDER — FLUCONAZOLE 2 MG/ML
400 INJECTION, SOLUTION INTRAVENOUS ONCE
Status: COMPLETED | OUTPATIENT
Start: 2019-06-01 | End: 2019-06-01

## 2019-06-01 RX ORDER — CHLORHEXIDINE GLUCONATE 0.12 MG/ML
15 RINSE ORAL 2 TIMES DAILY
Status: DISCONTINUED | OUTPATIENT
Start: 2019-06-01 | End: 2019-06-14 | Stop reason: HOSPADM

## 2019-06-01 RX ORDER — FENTANYL CITRATE 50 UG/ML
INJECTION, SOLUTION INTRAMUSCULAR; INTRAVENOUS PRN
Status: DISCONTINUED | OUTPATIENT
Start: 2019-06-01 | End: 2019-06-01 | Stop reason: SDUPTHER

## 2019-06-01 RX ORDER — DEXTROSE MONOHYDRATE 25 G/50ML
25 INJECTION, SOLUTION INTRAVENOUS ONCE
Status: COMPLETED | OUTPATIENT
Start: 2019-06-01 | End: 2019-06-01

## 2019-06-01 RX ORDER — NICOTINE POLACRILEX 4 MG
15 LOZENGE BUCCAL PRN
Status: DISCONTINUED | OUTPATIENT
Start: 2019-06-01 | End: 2019-06-14 | Stop reason: HOSPADM

## 2019-06-01 RX ORDER — ONDANSETRON 2 MG/ML
4 INJECTION INTRAMUSCULAR; INTRAVENOUS
Status: DISCONTINUED | OUTPATIENT
Start: 2019-06-01 | End: 2019-06-01 | Stop reason: HOSPADM

## 2019-06-01 RX ORDER — DEXTROSE MONOHYDRATE 50 MG/ML
100 INJECTION, SOLUTION INTRAVENOUS PRN
Status: DISCONTINUED | OUTPATIENT
Start: 2019-06-01 | End: 2019-06-14 | Stop reason: HOSPADM

## 2019-06-01 RX ORDER — PROPOFOL 10 MG/ML
INJECTION, EMULSION INTRAVENOUS CONTINUOUS PRN
Status: DISCONTINUED | OUTPATIENT
Start: 2019-06-01 | End: 2019-06-01 | Stop reason: SDUPTHER

## 2019-06-01 RX ORDER — MEPERIDINE HYDROCHLORIDE 25 MG/ML
12.5 INJECTION INTRAMUSCULAR; INTRAVENOUS; SUBCUTANEOUS EVERY 5 MIN PRN
Status: DISCONTINUED | OUTPATIENT
Start: 2019-06-01 | End: 2019-06-01 | Stop reason: HOSPADM

## 2019-06-01 RX ORDER — DEXTROSE MONOHYDRATE 25 G/50ML
12.5 INJECTION, SOLUTION INTRAVENOUS PRN
Status: DISCONTINUED | OUTPATIENT
Start: 2019-06-01 | End: 2019-06-14 | Stop reason: HOSPADM

## 2019-06-01 RX ORDER — MEPERIDINE HYDROCHLORIDE 25 MG/ML
INJECTION INTRAMUSCULAR; INTRAVENOUS; SUBCUTANEOUS
Status: DISPENSED
Start: 2019-06-01 | End: 2019-06-02

## 2019-06-01 RX ADMIN — DEXTROSE 50 % IN WATER (D50W) INTRAVENOUS SYRINGE 25 G: at 10:38

## 2019-06-01 RX ADMIN — FENTANYL CITRATE 50 MCG: 50 INJECTION, SOLUTION INTRAMUSCULAR; INTRAVENOUS at 16:17

## 2019-06-01 RX ADMIN — HEPARIN SODIUM 5000 UNITS: 5000 INJECTION, SOLUTION INTRAVENOUS; SUBCUTANEOUS at 06:11

## 2019-06-01 RX ADMIN — INSULIN HUMAN 3 UNITS: 100 INJECTION, SOLUTION PARENTERAL at 10:37

## 2019-06-01 RX ADMIN — MEROPENEM 500 MG: 500 INJECTION, POWDER, FOR SOLUTION INTRAVENOUS at 17:59

## 2019-06-01 RX ADMIN — TRAMADOL HYDROCHLORIDE 25 MG: 50 TABLET, FILM COATED ORAL at 02:19

## 2019-06-01 RX ADMIN — DOCUSATE SODIUM 100 MG: 100 CAPSULE, LIQUID FILLED ORAL at 21:13

## 2019-06-01 RX ADMIN — PROPOFOL 75 MCG/KG/MIN: 10 INJECTION, EMULSION INTRAVENOUS at 16:10

## 2019-06-01 RX ADMIN — SODIUM CHLORIDE: 9 INJECTION, SOLUTION INTRAVENOUS at 02:20

## 2019-06-01 RX ADMIN — DEXTROSE MONOHYDRATE 25 G: 25 INJECTION, SOLUTION INTRAVENOUS at 18:10

## 2019-06-01 RX ADMIN — INSULIN HUMAN 5 UNITS: 100 INJECTION, SOLUTION PARENTERAL at 18:12

## 2019-06-01 RX ADMIN — MIDAZOLAM 1 MG: 1 INJECTION INTRAMUSCULAR; INTRAVENOUS at 16:07

## 2019-06-01 RX ADMIN — CALCIUM GLUCONATE 1 G: 98 INJECTION, SOLUTION INTRAVENOUS at 13:13

## 2019-06-01 RX ADMIN — MEPERIDINE HYDROCHLORIDE 12.5 MG: 25 INJECTION INTRAMUSCULAR; INTRAVENOUS; SUBCUTANEOUS at 16:55

## 2019-06-01 RX ADMIN — HEPARIN SODIUM 5000 UNITS: 5000 INJECTION, SOLUTION INTRAVENOUS; SUBCUTANEOUS at 21:13

## 2019-06-01 RX ADMIN — LEVOTHYROXINE SODIUM 100 MCG: 100 TABLET ORAL at 06:11

## 2019-06-01 RX ADMIN — SODIUM CHLORIDE: 9 INJECTION, SOLUTION INTRAVENOUS at 11:16

## 2019-06-01 RX ADMIN — MIDAZOLAM 1 MG: 1 INJECTION INTRAMUSCULAR; INTRAVENOUS at 16:02

## 2019-06-01 RX ADMIN — FENTANYL CITRATE 50 MCG: 50 INJECTION, SOLUTION INTRAMUSCULAR; INTRAVENOUS at 16:07

## 2019-06-01 RX ADMIN — FLUCONAZOLE 400 MG: 2 INJECTION, SOLUTION INTRAVENOUS at 18:49

## 2019-06-01 RX ADMIN — MEPERIDINE HYDROCHLORIDE 12.5 MG: 25 INJECTION INTRAMUSCULAR; INTRAVENOUS; SUBCUTANEOUS at 17:00

## 2019-06-01 RX ADMIN — MIRTAZAPINE 15 MG: 15 TABLET, FILM COATED ORAL at 21:13

## 2019-06-01 RX ADMIN — Medication 325 MG: at 18:21

## 2019-06-01 ASSESSMENT — PULMONARY FUNCTION TESTS
PIF_VALUE: 0
PIF_VALUE: 1
PIF_VALUE: 0
PIF_VALUE: 1
PIF_VALUE: 0
PIF_VALUE: 1
PIF_VALUE: 0
PIF_VALUE: 1
PIF_VALUE: 0
PIF_VALUE: 1

## 2019-06-01 ASSESSMENT — PAIN - FUNCTIONAL ASSESSMENT: PAIN_FUNCTIONAL_ASSESSMENT: PREVENTS OR INTERFERES SOME ACTIVE ACTIVITIES AND ADLS

## 2019-06-01 ASSESSMENT — PAIN DESCRIPTION - DESCRIPTORS: DESCRIPTORS: CRAMPING

## 2019-06-01 ASSESSMENT — ENCOUNTER SYMPTOMS
ABDOMINAL PAIN: 1
SINUS PAIN: 0
CONSTIPATION: 0
SHORTNESS OF BREATH: 0
NAUSEA: 0
PHOTOPHOBIA: 0
CHEST TIGHTNESS: 0
COUGH: 0
VOMITING: 0
DIARRHEA: 0

## 2019-06-01 ASSESSMENT — PAIN DESCRIPTION - FREQUENCY: FREQUENCY: INTERMITTENT

## 2019-06-01 ASSESSMENT — PAIN SCALES - GENERAL
PAINLEVEL_OUTOF10: 0
PAINLEVEL_OUTOF10: 10
PAINLEVEL_OUTOF10: 0
PAINLEVEL_OUTOF10: 10
PAINLEVEL_OUTOF10: 5

## 2019-06-01 ASSESSMENT — PAIN DESCRIPTION - PAIN TYPE: TYPE: ACUTE PAIN

## 2019-06-01 ASSESSMENT — PAIN DESCRIPTION - LOCATION: LOCATION: ABDOMEN

## 2019-06-01 ASSESSMENT — PAIN DESCRIPTION - ORIENTATION: ORIENTATION: MID

## 2019-06-01 NOTE — CONSULTS
Inpatient consult to Urology  Consult performed by: JUSTYN Wiley - CNP  Consult ordered by: German Parker DO         INPATIENT CONSULTATION RECORD FOR  6/1/2019      [facility]  [address] [place], [facility] [facility]  [phone]        REASON FOR CONSULTATION:    Neurogenic Bladder   HISTORY OF PRESENT ILLNESS:      The patient is a 52 y.o. female patient who is known to our practice. She initially presented in January of this year with JIAN, bilateral hydronephrosis, left ureteral calculi and microhematuria. She had cystopanendoscopy 1/21/19 and bilateral ureteral stent insertion. She was discharged with crane to rehab facility. The bilateral hydronephrosis was most likely due to incomplete bladder emptying secondary to neurogenic bladder. Plan was for outpatient follow up and  ureteroscopy to remove potential ureteral stones and stent exchange. She returned back to the ED on 4/19/19 with flank pain and hematuria. She presented without crane. She was not emptying her bladder and had recurrent azotemia. Crane catheter was placed for large post void residual. Her azotemia improved with indwelling crane and hydration. We discussed options for neurogenic bladder at that time with intermittent self catheterization, chronic indwelling urethral crane, suprapubic tube, or ileal conduit urinary diversion. She was discharged after resolution of her azotemia and post obstructive diuresis. She again was to follow up as outpatient for repeat cysto, ureteroscopy, possible stone extraction, and stent exchange. She was discharged with crane. She did follow up in our office on 5/21/19 and demanded catheter be removed and it was. On 5/22/19 she presented with abdominal pain and found to have again a large post void residual.  She had Ct during that admission which showed bilateral stents  in good position. Crane was again placed and she was discharged with crane and PICC.   She was receiving Vancomycin infusions  for complicated UTI. Urine cultures from 5/23/19 mixed abdi 10,000-100,000. Yesterday she presented to the ED from [facility] facility for abnormal labs. Creatinine on admission 6.1 . She reports ongoing abdominal pain for the last month. She reports normal bowel movements. She denies fevers or chills. She has history of mental disability much of HPI obtained from EMR. Past Medical History:   Diagnosis Date    Anxiety     Depression     Hyperlipidemia     Hypertension     Hypothyroidism     Intellectual disability     Leg pain, bilateral     Noncompliance with medications     Noncompliance with treatment     Osteoarthritis          Past Surgical History:   Procedure Laterality Date    CYSTOSCOPY Left 1/21/2019    CYSTOSCOPY, BILATERAL RETROGRADE PYELOGRAMS, BILATERAL STENT INSERTION performed by Franc Loyola MD at [address]       Medications Prior to Admission:    Medications Prior to Admission: acetaminophen (TYLENOL) 325 MG tablet, Take 650 mg by mouth every 4 hours as needed for Pain  dextrose 5 % SOLN 250 mL with vancomycin 1 g SOLR 1,000 mg, Infuse 1,000 mg intravenously every 24 hours  traMADol (ULTRAM) 50 MG tablet, Take 0.5 tablets by mouth every 8 hours as needed for Pain for up to 3 days. (Patient taking differently: Take 50 mg by mouth every 8 hours as needed for Pain. )  mirtazapine (REMERON) 15 MG tablet, Take 1 tablet by mouth nightly  ondansetron (ZOFRAN-ODT) 4 MG disintegrating tablet, Take 1 tablet by mouth every 8 hours as needed for Nausea or Vomiting  ferrous sulfate 325 (65 Fe) MG tablet, Take 1 tablet by mouth 2 times daily (with meals)  docusate sodium (COLACE, DULCOLAX) 100 MG CAPS, Take 100 mg by mouth 2 times daily  levothyroxine (SYNTHROID) 100 MCG tablet, TAKE 1 TABLET BY MOUTH EVERY MORNING 30 MINUTES BEFORE EATING.   vitamin D (ERGOCALCIFEROL) 88409 units CAPS capsule, Take 1 capsule by mouth once a week  oxybutynin Coronal and sagittal reconstructions also obtained. Automated dose   control was used for this exam.       CONTRAST: No IV contrast administered.        FINDINGS:       SUPPORT DEVICES: None       LOWER THORAX: Limited evaluation of the lower chest demonstrates clear   lung bases bilaterally.       SOLID ORGANS: The liver, spleen, pancreas, and adrenal glands are   normal.       BILIARY SYSTEM: No evidence of biliary ductal dilatation.        GENITOURINARY: Bilateral ureteral stents are in place and in   satisfactory position. There is persistent severe right   hydroureteronephrosis. Findings appear similar to the previous CT. The   left kidney is not significantly dilated. No stones are appreciated. Prominent perivesical fat stranding and fluid seen in the lower   abdomen and pelvis. Pelvic structures are unremarkable.       GASTROINTESTINAL: The stomach, small and large bowel are normal in   caliber without evidence of focal wall thickening. There is no   evidence of bowel obstruction.       APPENDIX: Normal.       FLUID COLLECTIONS: None.       LYMPH NODES: No adenopathy by size criteria.       VASCULAR STRUCTURES: Visualized vascular structures appear normal.       ABDOMINAL WALL: Normal with no herniations.       OSSEOUS AND SOFT TISSUE STRUCTURES: Bone windows demonstrate no   suspicious osteolytic or osteoblastic lesions.  No fracture   identified.           Impression   1. Findings suggesting cystitis.    2. Persistent severe right hydroureteronephrosis with stable position   of the ureteral stents           ASSESSMENT / PLAN:    Bilateral hydronephrosis   Acute on Chronic Kidney Disease  Neurogenic Bladder   Chronic Bilateral Ureteral Stents placed on 1/19/19  Mental Disability         Urine and blood cultures in progress  Blood cultures from 5/24 negative  ID consulted for antibiotics management   Needs bilateral stent exchange- scheduled for 2 pm today   Keep NPO   Plan for renal nuclear scan in future Adela Lanes, APRN-CNP  8:04 AM  6/1/2019         The patient was seen and examined. I have reviewed the medical record in detail. I agree with the plan as outlined by Adela Lanes, APRN-CNP.     Camelia Goltz, MD  3:09 PM  6/1/2019

## 2019-06-01 NOTE — BRIEF OP NOTE
Brief Postoperative Note  ______________________________________________________________    Patient: Radha Vang  YOB: 1965  MRN: [id number]  Date of Procedure: 6/1/2019    Pre-Op Diagnosis: Nuerogenic bladder, chronic bilateral ureteral obstruction    Post-Op Diagnosis: Normal pelvic examination       Procedure(s):  CYSTOSCOPY,  BILATERAL URETERAL STENT EXCHANGE, pelvic exam    Anesthesia: Monitor Anesthesia Care    Surgeon(s):  Leslie Mercedes MD    Assistant: None    Estimated Blood Loss (mL): less than 50     Complications: None    Specimens:   * No specimens in log *    Implants:  Implant Name Type Inv. Item Serial No.  Lot No. LRB No. Used   STENT URET 2 POLARIS ULTRA W/O GW [id number] Stent:Urological STENT URET 2 POLARIS ULTRA W/O GW [id number]  [employer] [id number] Left 1   STENT URET 2 POLARIS ULTRA W/O GW [id number] Stent:Urological STENT URET 2 POLARIS ULTRA W/O GW [id number]  [employer] [id number] Right 1         Drains:   Urethral Catheter Straight-tip 18 fr (Active)       [REMOVED] Urethral Catheter 14 fr (Removed)       [REMOVED] Urethral Catheter Straight-tip; Temperature probe (Removed)   Catheter Indications Acute urinary retention/obstruction 5/28/2019  8:00 PM   Urine Color Yellow 5/28/2019  9:55 PM   Urine Appearance Cloudy 5/28/2019  9:55 PM   Output (mL) 200 mL 5/29/2019  2:30 PM       [REMOVED] Urethral Catheter 16 fr (Removed)   Catheter Indications Acute urinary retention/obstruction 6/1/2019  2:20 AM   Urine Color Yellow 6/1/2019  2:20 AM   Urine Appearance Sediment 6/1/2019  2:20 AM   Output (mL) 5 mL 6/1/2019  2:20 AM       Findings: Abnormal, firm, nodular lesions within the vagina and fixated urethra with stenosis suspicious for a gynecologic malignancy    Leslie Mercedes MD  Date: 6/1/2019  Time: 4:45 PM

## 2019-06-01 NOTE — OP NOTE
[facility] ([facility])  Urology Post-operative Note    Apple Acuña  YOB: 1965  [id number]    Pre-operative Diagnosis: Chronic bilateral ureteral obstruction, neurogenic bladder    Post-operative Diagnosis:  Fixated and stenotic urethra with abnormal and nodular vaginal examination suspicious for malignancy    Procedure: Cystourethroscopy, bilateral JJ ureteral stent insertion, pelvic examination    Surgeon: Alejandrina Josue MD    Anesthesia: [facility]    Estimated Blood Loss:  Minimal    Complications: None    Drains: 18-Moroccan Braden catheter to gravity drainage, bilateral 7-Moroccan by 24 cm JJ ureteral stents    Specimen(s): None    Clinical Note:  78-year-old female with neurogenic bladder and chronic urinary retention. She has chronic ureteral stents which were last changed in January 2019. Her creatinine is elevated. She presents for the above listed procedure. There were some benefits of the procedure including but not limited to infection, bleeding, bladder perforation injury, possible need for chronic stents with stent irritation etc. were discussed in detail and she elected to proceed    Operative Description:  She was taken to the operating room and placed on the or table in the supine position. She received IV antibiotics preoperatively. A  film KUB showed bilateral ureteral stents which were well positioned without evidence of stone disease. Her existing Braden was removed. Pelvic exam reveals a very nodular firm and abnormal vagina especially on the anterior vaginal wall. There is nodularity consistent with a gynecologic malignancy. Her urethra is stenotic as well. Her external genitalia and abdomen were prepped and draped sterilely. A 21-Moroccan cystoscope with a 30° lens was passed per urethra and in the bladder with a great degree of difficulty. The urethra was very stenotic and tight and fixated with almost like a lead pipe deformity.  I was able to force the cystoscope and effectively dilate the urethra and in the process. Eventually gained access into the bladder. Panendoscopic evaluation of the bladder showed no clots stones or tumors. Both ureteral orifices were in their usual position on the trigone and there was clear reflux of urine from each. There are bilateral ureteral stents in good position with minimal stone encrustation. The right stent was grasped with the grasping forcep and brought out through the urethra. A sensor wire was passed through this and up into the right renal pelvis using intraoperative fluoroscopy. The stent was removed and a fresh 7-French by 24 cm JJ ureteral stent was then passed over the wire and into the right renal pelvis. The wire was removed. Fluoroscopy confirmed coiling the stent proximally in the right renal pelvis and it was visualized to coil distally in the bladder in good position. The cystoscope was passed per urethra and into the bladder once again. The left stent was then grasped with the grasping forcep and brought out per urethra. Again a sensor wire was passed through the stent and into the left renal pelvis per the stent was removed. A 2nd fresh 7-French by 24 cm JJ ureteral stent was then passed over this wire and into the left renal pelvis. Again the wire was removed and fluoroscopy confirmed coiling of the stent proximally in the left renal pelvis and it was visualized to coil distally in the bladder in good position. The bladder was inspected and was intact. The bladder was kept full and the cystoscope was removed. The bladder was drained with an 18-French Braden which is connected gravity drainage. The urine was light pink in color. The catheter we kept indwelling and irrigated as needed even upon discharge. The on-call gynecologist will be consulted for evaluation for what appears to be a gynecologic malignancy.       Abena Sanchez MD  6/1/2019  4:49 PM

## 2019-06-01 NOTE — CONSULTS
[facility] Roberto A. Sedonia Boas, MD Kayla Rouge, MD Tessie Pickerel, MD Mayra Ishikawa, DO, [address] [place] [address] Phil GLEZ .  Consultation  Patient's Name: Tayler Fried  3:18 PM  6/1/2019    Nephrologist: Mikayla Adam MD    Reason for Consult:  Acute kidney injury/chronic kidney disease    Requesting Physician:  Blossom Yancey DO    Chief Complaint:  Abnormal Blood work     History Obtained From:  Records ,staff     History of Present Ilness:    53 y/o WF CKD , b/l hydronephrosis , presenting with cc of abnormal blood work done at the NH  With cr of 6 . Has extensive urologic history . Hx of bilateral hydronephrosis, left ureteral calculi and microhematuria. She had cystopanendoscopy 1/21/19 and bilateral ureteral stent insertion. She was discharged with crane to rehab facility. The bilateral hydronephrosis was most likely due to incomplete bladder emptying secondary to neurogenic bladder. Plan was for outpatient follow up and  ureteroscopy to remove potential ureteral stones and stent exchange. She returned back to the ED on 4/19/19 with flank pain and hematuria. She presented without crane. She was not emptying her bladder and had recurrent azotemia. Crane catheter was placed for large post void residual. Her azotemia improved with indwelling crane and hydration. We discussed options for neurogenic bladder at that time with intermittent self catheterization, chronic indwelling urethral crane, suprapubic tube, or ileal conduit urinary diversion. She was discharged after resolution of her azotemia and post obstructive diuresis. She again was to follow up as outpatient for repeat cysto, ureteroscopy, possible stone extraction, and stent exchange. She was discharged with crane. She did follow up in our office on 5/21/19 and demanded catheter be removed and it was.   On 5/22/19 she presented with abdominal pain and found to have again a large post void residual.  She had Ct during that admission which showed bilateral stents  in good position. Crane was again placed and she was discharged with crane and PICC. She was receiving Vancomycin infusions  for complicated UTI. Urine cultures from 5/23/19 mixed abdi 10,000-100,000. 5/31  she presented to the ED from [facility] term care facility for abnormal labs. Creatinine on admission 6.1   Nephrology consulted for JIAN management   I saw her in her room . She is on RA . Stable vitals , crane in place. No history could be obtained from her . Past Medical History:   Diagnosis Date    Anxiety     Depression     Hyperlipidemia     Hypertension     Hypothyroidism     Intellectual disability     Leg pain, bilateral     Noncompliance with medications     Noncompliance with treatment     Osteoarthritis        Past Surgical History:   Procedure Laterality Date    CYSTOSCOPY Left 1/21/2019    CYSTOSCOPY, BILATERAL RETROGRADE PYELOGRAMS, BILATERAL STENT INSERTION performed by Josh Jennings MD at [address]       Family History   Problem Relation Age of Onset    Diabetes Brother     Thyroid Disease Mother     Other Daughter         Autism        reports that she quit smoking about 30 years ago. She has a 5.00 pack-year smoking history. She has never used smokeless tobacco. She reports that she does not drink alcohol or use drugs. Allergies:  Patient has no known allergies.     Current Medications:      [START ON 6/2/2019] pantoprazole (PROTONIX) tablet 40 mg QAM AC   ondansetron (ZOFRAN) injection 4 mg Once PRN   meperidine (DEMEROL) injection 12.5 mg Q5 Min PRN   chlorhexidine (PERIDEX) 0.12 % solution 15 mL BID   meropenem (MERREM) 500 mg IVPB minibag Q12H   fluconazole (DIFLUCAN) in 0.9 % sodium chloride IVPB 400 mg Once   atorvastatin (LIPITOR) tablet 40 mg Daily   docusate sodium (COLACE) capsule 100 mg BID   ferrous sulfate tablet 325 mg BID WC   fluticasone (FLONASE) 50 MCG/ACT nasal spray 2 spray Daily   levothyroxine (SYNTHROID) tablet 100 mcg Daily   mirtazapine (REMERON) tablet 15 mg Nightly   traMADol (ULTRAM) tablet 25 mg Q8H PRN   0.9 % sodium chloride infusion Continuous   sodium chloride flush 0.9 % injection 10 mL 2 times per day   sodium chloride flush 0.9 % injection 10 mL PRN   magnesium hydroxide (MILK OF MAGNESIA) 400 MG/5ML suspension 30 mL Daily PRN   ondansetron (ZOFRAN) injection 4 mg Q6H PRN   heparin (porcine) injection 5,000 Units 3 times per day       Review of Systems:   Constitutional: no fevers , no chills , feels ok   Eyes: no eye pain , no itching , no drainage  Ears, nose, mouth, throat, and face: no ear ,nose pain , hearing is ok ,no nasal drainage   Respiratory: no sob ,no cough ,no wheezing . Cardiovascular: no chest pain , no palpitation ,no sob . Gastrointestinal: no nausea, vomiting , constipation , no abdominal pain . Genitourinary:no urinary retention , no burning , dysuria . No polyuria   Hematologic/lymphatic: no bleeding , no cougulation issues . Musculoskeletal:no joint pain , no swelling . Neurological: no headaches ,no weakness , no numbness . Endocrine: no thirst , no weight issues . Physical exam:   Vital signs /61   Pulse 78   Temp 98.2 °F (36.8 °C) (Oral)   Resp 18   Ht 4' 11\" (1.499 m)   Wt 145 lb 12.8 oz (66.1 kg)   LMP 05/21/2015 (Approximate)   SpO2 99%   BMI 29.45 kg/m²   Gen : NAD , appropriate for stated age . Head : at , nc   Neck : supple , n  Eyes :PERRLA   CV : RRR ,   Lungs: CTAB ,   Abd : soft , NT , BS + , No Organomegaly appreciated . Skin : soft, dry .    Neuro : CN  II-XII grossly intact ,    Data:   Labs:  CBC with Differential:  Lab Results   Component Value Date    WBC 11.3 06/01/2019    RBC 2.69 06/01/2019    HGB 7.8 06/01/2019    HCT 24.8 06/01/2019     06/01/2019    MCV 92.2 06/01/2019    MCH 29.0 06/01/2019    MCHC 31.5 06/01/2019    RDW 15.0 06/01/2019    SEGSPCT 66 10/23/2013    LYMPHOPCT 12.3 06/01/2019    MONOPCT 4.9 06/01/2019    BASOPCT 0.2 06/01/2019    MONOSABS 0.55 06/01/2019    LYMPHSABS 1.39 06/01/2019    EOSABS 0.25 06/01/2019    BASOSABS 0.02 06/01/2019     CMP:  Lab Results   Component Value Date     06/01/2019    K 5.9 06/01/2019    K 5.4 05/31/2019     06/01/2019    CO2 20 06/01/2019    BUN 52 06/01/2019    CREATININE 6.8 06/01/2019    GFRAA 8 06/01/2019    LABGLOM 6 06/01/2019    GLUCOSE 89 06/01/2019    GLUCOSE 97 12/20/2011    PROT 6.0 05/31/2019    LABALBU 3.1 05/31/2019    LABALBU 4.4 12/20/2011    CALCIUM 7.9 06/01/2019    BILITOT <0.2 05/31/2019    ALKPHOS 56 05/31/2019    AST 18 05/31/2019    ALT 23 05/31/2019     Ionized Calcium:  No results found for: IONCA  Magnesium:    Lab Results   Component Value Date    MG 1.5 06/01/2019     Phosphorus:    Lab Results   Component Value Date    PHOS 7.1 06/01/2019     U/A:  Lab Results   Component Value Date    COLORU Straw 05/31/2019    PHUR 6.5 05/31/2019    WBCUA >20 05/31/2019    RBCUA 5-10 05/31/2019    BACTERIA MANY 05/31/2019    CLARITYU SLCLOUDY 05/31/2019    SPECGRAV 1.015 05/31/2019    LEUKOCYTESUR MODERATE 05/31/2019    UROBILINOGEN 0.2 05/31/2019    BILIRUBINUR Negative 05/31/2019    BLOODU LARGE 05/31/2019    GLUCOSEU Negative 05/31/2019     Microalbumen/Creatinine ratio:  No components found for: RUCREAT  Iron Saturation:  No components found for: PERCENTFE  TIBC:    Lab Results   Component Value Date    TIBC 174 04/19/2019     FERRITIN:    Lab Results   Component Value Date    FERRITIN 301 04/19/2019        Imaging:  US RETROPERITONEAL COMPLETE   Final Result   Moderate bilateral hydronephrosis, somewhat more severe on   the right than the left.       XR Urogram W WO Kub W WO Tomogram    (Results Pending)       Assessment    -Acute kidney injury 2.2 obstructive uropathy in contest of poorly functioning bladder and neurogenic bladder .   -Chronic kidney disease baseline cr 1.4   -Sepsis secondary to a urinary tract infection  -Anaemia of chronic disease . R/O acute component .  -Chronic mental disability      Plan :   Urology to take pt for cystoscopy today . Braden placed back , UO ok    In the future plan for renal scan   Treat hyper K . Abx for UTI .    Continue conservative iv fluids   Watch for postobstructive diuresis                 Electronically signed by Temo Hollis MD on 6/1/2019 at 3:18 PM

## 2019-06-01 NOTE — ANESTHESIA POSTPROCEDURE EVALUATION
Department of Anesthesiology  Postprocedure Note    Patient: Binta Yin  MRN: [id number]  YOB: 1965  Date of evaluation: 6/1/2019  Time:  6:22 PM     Procedure Summary     Date:  06/01/19 Room / Location:  [address] / [facility] [address]    Anesthesia Start:  4602 Anesthesia Stop:  8901    Procedure:  CYSTOSCOPY, RETROGRADE PYELOGRAMS, BILATERAL URETERAL STENT EXCHANGE (Bilateral ) Diagnosis:  (Nuerogenic bladder)    Surgeon:  Ana Naqvi MD Responsible Provider:  Mitesh Dunn MD    Anesthesia Type:  MAC ASA Status:  4 - Emergent          Anesthesia Type: MAC    Tevin Phase I: Tevin Score: 9    Tevin Phase II:      Last vitals: Reviewed and per EMR flowsheets.        Anesthesia Post Evaluation    Patient location during evaluation: PACU  Patient participation: complete - patient participated  Level of consciousness: awake and alert  Airway patency: patent  Nausea & Vomiting: no vomiting and no nausea  Complications: no  Cardiovascular status: hemodynamically stable  Respiratory status: acceptable  Hydration status: stable

## 2019-06-01 NOTE — PLAN OF CARE
Problem: Pain:  Goal: Pain level will decrease  Description  Pain level will decrease  6/1/2019 1452 by Ana Cano RN  Outcome: Met This Shift  6/1/2019 0749 by Rajiv Ley RN  Outcome: Not Met This Shift  Note:   Patient still experiencing intermittent pain which is treated with ultram 25  mg  Goal: Control of acute pain  Description  Control of acute pain  6/1/2019 1452 by Ana Cano RN  Outcome: Met This Shift  6/1/2019 0749 by Rajiv Ley RN  Outcome: Not Met This Shift  Goal: Control of chronic pain  Description  Control of chronic pain  6/1/2019 1452 by Ana Cano RN  Outcome: Met This Shift  6/1/2019 0749 by Rajiv Ley RN  Outcome: Not Met This Shift     Problem: Falls - Risk of:  Goal: Will remain free from falls  Description  Will remain free from falls  6/1/2019 0749 by Rajiv Ley RN  Outcome: Met This Shift  Goal: Absence of physical injury  Description  Absence of physical injury  6/1/2019 0749 by Rajiv Ley RN  Outcome: Met This Shift

## 2019-06-01 NOTE — PROGRESS NOTES
Internal Medicine Progress Note    Rory López. Patrice Luna., & [address] [facility] [address] Patrice Luna., F.A.C.O.I. Anant Li D.O., F.JASMIN.C.O.I. Primary Care Physician: Sarah Berry DO   Admitting Physician:  Pallavi Mars DO  Admission date and time: 5/31/2019  2:28 PM    Room:  [address] [place] [address]  Admitting diagnosis: Acute renal failure (ARF) [facility]) [N17.9]    Patient Name: Cindy Irizarry  MRN: [id number]    Date of Service: 6/1/2019     Subjective:    Janusz Irizarry is a 52 y. o.  female who was seen and examined today,6/1/2019, at the bedside. Patient electrolytes were reviewed and potassium remained elevated. Signs of acute kidney injury is present superimposed upon chronic kidney disease. The elevated potassium is not reflected by any EKG finding. D50 and insulin will be given and repeat potassium within one hour. Plan for stent placement today    Brother's girlfriend was present during my examination. I reviewed the patient's past medical, surgical history and medication. I reviewed the  course of events since last visit. Review of System:  Janusz Irizarry is a 52 y. o.  female who is alert, responsive, oriented to person, place, and time. Review of Systems:  Bolded are positive  · Constitutional: unanticipated weight loss. There's been no change in energy level, sleep pattern, or activity level. · Eyes: Visual changes. Diplopia. Scleral icterus. · ENT: Headaches. Hearing loss. Vertigo. Mouth sores. Sore throat. · Cardiovascular: Chest pain. Dyspnea on exertion. Palpitations. Loss of consciousness. · Respiratory:  Cough. Wheezing. Sputum production. Hemoptysis  · Gastrointestinal:  Abdominal pain. Appetite loss. Blood in stools. Change in bowel or bladder habits. · Genitourinary:  Dysuria. Trouble voiding. Hematuria. · Musculoskeletal:  Gait disturbance. Weakness. Joint complaints. · Integumentary: Rash. Pruritis. · Neurological:  Headache. Change in muscle strength. Numbness or tingling. Change in gait balance. Change in memory. · Psychiatric: Anxiety. Depression. · Endocrine: Temperature intolerance. Excessive thirst, fluid intake, or urination. Tremor. · Hematologic/Lymphatic: Abnormal bruising or bleeding. Swollen lymph nodes. · Allergic/Immunologic: Nasal congestion. Hives. Physical Exam:  I/O this shift: In: 791.7 [I.V.:791.7]  Out: -     Intake/Output Summary (Last 24 hours) at 6/1/2019 1300  Last data filed at 6/1/2019 0752  Gross per 24 hour   Intake 1485.42 ml   Output 10 ml   Net 1475.42 ml   I/O last 3 completed shifts: In: 693.8 [I.V.:693.8]  Out: 10 [Urine:10]  Patient Vitals for the past 96 hrs (Last 3 readings):   Weight   05/31/19 1815 145 lb 12.8 oz (66.1 kg)   05/31/19 1434 150 lb 8 oz (68.3 kg)       Vital Signs:   Blood pressure 133/61, pulse 78, temperature 98.2 °F (36.8 °C), temperature source Oral, resp. rate 18, height 4' 11\" (1.499 m), weight 145 lb 12.8 oz (66.1 kg), last menstrual period 05/21/2015, SpO2 99 %, not currently breastfeeding. General appearance: Well preserved, mesomorphic body habitus, alert, no distress. Skin: Skin color, texture, turgor normal. No rashes or lesions. No induration or tightening palpated. Head: Normocephalic. No masses, lesions, tenderness or abnormalities  Eyes: conjunctivae/corneas clear. PERRL, EOM's intact. Sclera non icteric. Ears: External ears normal. Canals clear. Nose/Sinuses: Nares normal. Septum midline. Oropharynx: Lips, mucosa, and tongue normal. Oropharynx clear with no exudate seen. Neck: Neck supple, and symmetric. No adenopathy. Thyroid symmetric, normal size, without nodules. Trachea is midline. Carotids brisk in upstroke without bruits, No abnormal JVP noted at 45°. Lungs: Lungs clear to auscultation bilaterally. No retractions or use of accessory muscles. No vocal fremitus. No ronchi, crackle or rale. Heart:  S1 > S2. Regular rate and rhythm.  No gallop,rub, palpable thrill or heave noted. No murmur. PMI 5th intercostal space midclavicular line. Abdomen: Abdomen soft, non-tender. BS normal. No masses, organomegaly. No hernia noted. Extremities: Extremities normal. No deformities, edema, or skin discoloration. No cyanosis or clubbing noted to the nails. Peripheral pulses 4/4. Musculoskeletal: Spine ROM normal. Muscular strength intact. Neuro: Cranial nerves intact. Motor: Strength 5/5 in all extremities. Reflexes 2+ in all extremities. No focal weakness. Sensory: grossly normal to touch. Gait normal. Coordination intact. Psych:  Behavior is normal. Mood appears normal. Speech is not rapid and/or pressured.    Genitalia: Braden reinserted  Breast: Deferred    Allergy:  No Known Allergies     Medication:  Scheduled Meds:   [START ON 6/2/2019] pantoprazole  40 mg Oral QAM AC    calcium gluconate IVPB  1 g Intravenous Once    atorvastatin  40 mg Oral Daily    docusate sodium  100 mg Oral BID    ferrous sulfate  325 mg Oral BID WC    fluticasone  2 spray Nasal Daily    levothyroxine  100 mcg Oral Daily    mirtazapine  15 mg Oral Nightly    sodium chloride flush  10 mL Intravenous 2 times per day    heparin (porcine)  5,000 Units Subcutaneous 3 times per day     Continuous Infusions:   sodium chloride 125 mL/hr at 06/01/19 1116       Objective Data:  CBC:   Recent Labs     05/31/19  1530 06/01/19  0815   WBC 12.2* 11.3   HGB 8.7* 7.8*    304     BMP:    Recent Labs     05/31/19  1530 06/01/19  0815 06/01/19  0955    139 142   K 5.4* 6.4* 6.4*    107 108*   CO2 22 19* 19*   BUN 49* 49* 52*   CREATININE 6.1* 6.6* 6.7*   GLUCOSE 102* 87 88     CMP:    Lab Results   Component Value Date     06/01/2019    K 6.4 06/01/2019    K 5.4 05/31/2019     06/01/2019    CO2 19 06/01/2019    BUN 52 06/01/2019    CREATININE 6.7 06/01/2019    GFRAA 8 06/01/2019    LABGLOM 6 06/01/2019    GLUCOSE 88 06/01/2019    GLUCOSE 97 12/20/2011    PROT 6.0 05/31/2019 LABALBU 3.1 05/31/2019    LABALBU 4.4 12/20/2011    CALCIUM 8.0 06/01/2019    BILITOT <0.2 05/31/2019    ALKPHOS 56 05/31/2019    AST 18 05/31/2019    ALT 23 05/31/2019     Hepatic:   Recent Labs     05/31/19  1530   AST 18   ALT 23   BILITOT <0.2   ALKPHOS 56     Troponin:   Recent Labs     05/31/19  1530   TROPONINI <0.01     BNP: No results for input(s): BNP in the last 72 hours. Lipids: No results for input(s): CHOL, HDL in the last 72 hours. Invalid input(s): LDLCALCU  ABGs: No results found for: PHART, PO2ART, [id number]  INR: No results for input(s): INR, PROTIME in the last 72 hours. RAD: Us Retroperitoneal Complete    Result Date: 5/31/2019  Reading location: [facility] INDICATION: Renal insufficiency FINDINGS: Sonographic evaluation urinary tract compared with ultrasound 1/18/2019 and CT the abdomen and pelvis 5/22/2019. Right kidney 12.1 cm in length and the left 11.2 cm. At least moderate dilatation right renal collecting system. On recent CT, right ureteral stent is coiled in the renal pelvis. Dilatation the left renal collecting system is somewhat less severe than the right. Mid left renal cyst measures 13 mm and upper pole cyst 25.5 mm. No focal abnormality right renal collecting system. Nondistended urinary bladder. Moderate bilateral hydronephrosis, somewhat more severe on the right than the left. Chronic Hospital Medical Problems:  Past Medical History:   Diagnosis Date    Anxiety     Depression     Hyperlipidemia     Hypertension     Hypothyroidism     Intellectual disability     Leg pain, bilateral     Noncompliance with medications     Noncompliance with treatment     Osteoarthritis      Active Problems:    Acute renal failure (ARF) (HCC)  Resolved Problems:    * No resolved hospital problems.  *      Assessment:    · Acute on chronic kidney disease stage IV compatible with a neurogenic bladder with chronic b/l ureteral stents  · Sepsis secondary to a urinary tract infection  · Normocytic anemia of chronic disease  · Hypothyroidism  · Chronic mental disability  · Hyperlipidemia            Plan: Will repeat potassium. EKG showed no finding of hyperkalemia. This will improve after placement of stents and adequate IV hydration. Surgery planned for later today. We'll follow with other subspecialists. Acute kidney failure appeared to have a obstructive component to it    More than 50% of my  time was spent at the bedside counseling and/or coordination of care with the patient and/or family with face to face contact. This time was spent reviewing notes and laboratory data, instructing and counseling the patient. Time I spent with the family or surrogate(s) is included only if the patient was incapable of providing the necessary information or participating in medical decisionsI also discussed the differential diagnosis and all of the proposed management plans with the patient and individuals accompanying the patient. Juliette Gloss requires this high level of physician care and nursing in the IMC/Telemetry due the complexity of decision management and chance of rapid decline or death. Continued cardiac monitoring and higher level of nursing are required. I am ready available for decision making and intervention. I reviewed the relevant imaging studies and available reports. I also discussed the differential diagnosis and all of the proposed management plans with the patient and individuals accompanying the patient to this visit. I reviewed the relevant imaging studies and available reports. Jc Stevens DO, F.A.C.O.I.   On 6/1/2019  1:00 PM

## 2019-06-01 NOTE — ANESTHESIA PRE PROCEDURE
Department of Anesthesiology  Preprocedure Note       Name:  Vicky Manzo   Age:  52 y.o.  :  1965                                          MRN:  [id number]         Date:  2019      Surgeon: Angelica Bailey):  Dre Mims MD    Procedure: CYSTOSCOPY URETERAL STENT INSERTION (Bilateral )    Medications prior to admission:   Prior to Admission medications    Medication Sig Start Date End Date Taking? Authorizing Provider   acetaminophen (TYLENOL) 325 MG tablet Take 650 mg by mouth every 4 hours as needed for Pain    Historical Provider, MD   dextrose 5 % SOLN 250 mL with vancomycin 1 g SOLR 1,000 mg Infuse 1,000 mg intravenously every 24 hours    Historical Provider, MD   traMADol (ULTRAM) 50 MG tablet Take 0.5 tablets by mouth every 8 hours as needed for Pain for up to 3 days. Patient taking differently: Take 50 mg by mouth every 8 hours as needed for Pain.   19  Rizwana Rangel DO   mirtazapine (REMERON) 15 MG tablet Take 1 tablet by mouth nightly 19   Kateryna Frye MD   ondansetron (ZOFRAN-ODT) 4 MG disintegrating tablet Take 1 tablet by mouth every 8 hours as needed for Nausea or Vomiting 19   Rizwana Rangel DO   ferrous sulfate 325 (65 Fe) MG tablet Take 1 tablet by mouth 2 times daily (with meals) 19   Rizwana Rangel DO   docusate sodium (COLACE, DULCOLAX) 100 MG CAPS Take 100 mg by mouth 2 times daily 19   Rizwana Rangel DO   levothyroxine (SYNTHROID) 100 MCG tablet TAKE 1 TABLET BY MOUTH EVERY MORNING 30 MINUTES BEFORE EATING. 4/10/19   Angelia Perry DO   vitamin D (ERGOCALCIFEROL) 79347 units CAPS capsule Take 1 capsule by mouth once a week 19   Angelia Perry DO   oxybutynin (DITROPAN-XL) 5 MG extended release tablet Take 1 tablet by mouth daily 19   Celestine Trujillo DO   magnesium oxide (MAG-OX) 400 (241.3 Mg) MG TABS tablet Take 1 tablet by mouth 2 times daily 19   Celestine Trujillo DO   fluticasone (FLONASE) 50 MCG/ACT nasal spray 2 sprays by Nasal route daily  Patient taking differently: 1 spray by Nasal route daily  4/9/19   Luan Polk,    pantoprazole (PROTONIX) 40 MG tablet TAKE ONE TABLET BY MOUTH DAILY 4/9/19   Luan Polk DO   Compression Stockings MISC by Does not apply route Below knee  15 - 30 mm Hg 7/25/18   Papa Ross DO       Current medications:    No current facility-administered medications for this visit. No current outpatient medications on file.      Facility-Administered Medications Ordered in Other Visits   Medication Dose Route Frequency Provider Last Rate Last Dose    [START ON 6/2/2019] pantoprazole (PROTONIX) tablet 40 mg  40 mg Oral QAM AC Jc Stevens, DO        atorvastatin (LIPITOR) tablet 40 mg  40 mg Oral Daily Vickii Deiters, DO        docusate sodium (COLACE) capsule 100 mg  100 mg Oral BID Vickii Deiters, DO        ferrous sulfate tablet 325 mg  325 mg Oral BID WC Vickii Deiters, DO   325 mg at 05/31/19 2123    fluticasone (FLONASE) 50 MCG/ACT nasal spray 2 spray  2 spray Nasal Daily Vickii Deiters, DO        levothyroxine (SYNTHROID) tablet 100 mcg  100 mcg Oral Daily Vickii Deiters, DO   100 mcg at 06/01/19 4360    mirtazapine (REMERON) tablet 15 mg  15 mg Oral Nightly Vickii Deiters, DO   15 mg at 05/31/19 2123    traMADol (ULTRAM) tablet 25 mg  25 mg Oral Q8H PRN Vickii Deiters, DO   25 mg at 06/01/19 0219    0.9 % sodium chloride infusion   Intravenous Continuous Vickii Deiters,  mL/hr at 06/01/19 0220      sodium chloride flush 0.9 % injection 10 mL  10 mL Intravenous 2 times per day Vickii Deiters, DO        sodium chloride flush 0.9 % injection 10 mL  10 mL Intravenous PRN Vickii Deiters, DO        magnesium hydroxide (MILK OF MAGNESIA) 400 MG/5ML suspension 30 mL  30 mL Oral Daily PRN Vickii Deiters, DO        ondansetron [facility]) injection 4 mg  4 mg Intravenous Q6H PRN Vickii Deiters, DO        heparin (porcine) injection 5,000 Units psychiatric history:depression/anxiety              ROS comment: Learning disability GI/Hepatic/Renal:   (+) renal disease: ARF,           Endo/Other:    (+) hypothyroidism: arthritis: OA., electrolyte abnormalities (Hyperkalemia), . Abdominal:           Vascular: negative vascular ROS. Anesthesia Plan      MAC     ASA 4 - emergent     (Rising K+ of 6.4 and and serum creatine)  Induction: intravenous. Anesthetic plan and risks discussed with patient. Plan discussed with CRNA.                 Adiel Gill MD   6/1/2019

## 2019-06-01 NOTE — CONSULTS
Inpatient consult to Infectious Diseases  Consult performed by: JUSTYN Cates - CNS  Consult ordered by: Makayla Andrade DO        [address] [place] [address] [facility]  Consult Note    [address], [address] [place] [address]  Phone [phone]   [phone]      Admit Date: 2019  2:28 PM  Pt Name: David Babcock  MRN: [id number]  : 1965  Reason for Consult:    Chief Complaint   Patient presents with    Abnormal Lab     PATIENT TO THE ED TODAY PER NH WITH C/O ABNORMAL LABS. PATIENT WITH CREAT OF 5.6 AND BUN 45     Requesting Physician:  Erinn Jules DO  PCP: Sekou Veliz DO  History Obtained From:  patient  ID consulted for sepsis on hospital day  57       Chief Complaint   Patient presents with    Abnormal Lab     PATIENT TO THE ED TODAY PER NH WITH C/O ABNORMAL LABS. PATIENT WITH CREAT OF 5.6 AND BUN 45     HISTORYOF PRESENT ILLNESS      David Babcock is a 52 y.o. female who presents with significant past medical history of  has a past medical history of Anxiety, Depression, Hyperlipidemia, Hypertension, Hypothyroidism, Intellectual disability, Leg pain, bilateral, Noncompliance with medications, Noncompliance with treatment, and Osteoarthritis. who presents with   Chief Complaint   Patient presents with    Abnormal Lab     PATIENT TO THE ED TODAY PER NH WITH C/O ABNORMAL LABS. PATIENT WITH CREAT OF 5.6 AND BUN 45     ED TRIAGEVITALS  BP: 133/61, Temp: 98.2 °F (36.8 °C), Pulse: 78, Resp: 18, SpO2: 99 %  HPI    Patient was just discharged on 19- for acute cystitis and was on vancomycin IV for 7 more days- she was not hydrating enough at nursing home- and was receiving IV vancomycin- her creat climbed to 6.7- she still reports pain to abdominal area/ sides. Her WBC was 12.2 on admission. She had an ultrasound which showed again bilateral hydro- during last admission there was discussion about placing a suprapubic.   Patient is currently scheduled for a stent insertion. ID has been asked to evaluate and manage atbs. REVIEW OF SYSTEMS    (2-9 systems for level 4, 10 or more for level 5)     REVIEW OFSYSTEMS:    CONSTITUTIONAL:   + fever, chills, weight loss  ALLERGIES:    No urticaria, hay fever,    EYES:     No blurry vision, loss of vision,eye pain  ENT:      No hearing loss, sore throat  CARDIOVASCULAR:  No chest pain or palpitations  RESPIRATORY:   No cough, sob  ENDOCRINE:    No increase thirst, urination ,+ thyroid disease.   HEME-LYMPH:   No easy bruising or bleeding  GI:     No nausea, vomiting or diarrhea  :     ++ urinary complaints   NEURO:    No seizures, stroke, HA  MUSCULOSKELETAL:  H/o osteo arthritis   SKIN:     No rash or itch  PSYCH:    ++ depression and anxiety    CURRENT MEDICATIONS     Current Facility-Administered Medications:     [START ON 6/2/2019] pantoprazole (PROTONIX) tablet 40 mg, 40 mg, Oral, QAM AC, Jc Stevens DO    ondansetron (ZOFRAN) injection 4 mg, 4 mg, Intravenous, Once PRN, Gracy Fairbanks MD    meperidine (DEMEROL) injection 12.5 mg, 12.5 mg, Intravenous, Q5 Min PRN, Gracy Fairbanks MD    calcium gluconate 1 g in dextrose 5 % 100 mL IVPB, 1 g, Intravenous, Once, Sary Moore MD    atorvastatin (LIPITOR) tablet 40 mg, 40 mg, Oral, Daily, Laneta Minus, DO    docusate sodium (COLACE) capsule 100 mg, 100 mg, Oral, BID, Laneta Minus, DO    ferrous sulfate tablet 325 mg, 325 mg, Oral, BID WC, Laneta Minus, DO, 325 mg at 05/31/19 2123    fluticasone (FLONASE) 50 MCG/ACT nasal spray 2 spray, 2 spray, Nasal, Daily, Laneta Minus, DO    levothyroxine (SYNTHROID) tablet 100 mcg, 100 mcg, Oral, Daily, Laneta Minus, DO, 100 mcg at 06/01/19 1780    mirtazapine (REMERON) tablet 15 mg, 15 mg, Oral, Nightly, Laneta Minus, DO, 15 mg at 05/31/19 2123    traMADol (ULTRAM) tablet 25 mg, 25 mg, Oral, Q8H PRN, Laneta Minus, DO, 25 mg at 06/01/19 0219    0.9 % sodium chloride infusion, , Intravenous, Continuous, Bryant Masters DO, Last Rate: 125 mL/hr at 06/01/19 1116    sodium chloride flush 0.9 % injection 10 mL, 10 mL, Intravenous, 2 times per day, Bryant Masters DO    sodium chloride flush 0.9 % injection 10 mL, 10 mL, Intravenous, PRN, Bryant Masters DO    magnesium hydroxide (MILK OF MAGNESIA) 400 MG/5ML suspension 30 mL, 30 mL, Oral, Daily PRN, Bryant Masters DO    ondansetron [facility]) injection 4 mg, 4 mg, Intravenous, Q6H PRN, Bryant Masters DO    heparin (porcine) injection 5,000 Units, 5,000 Units, Subcutaneous, 3 times per day, Bryant Masters DO, 5,000 Units at 06/01/19 8403  ALLERGIES     Patient has no known allergies.   Immunization History   Administered Date(s) Administered    Influenza, MDCK Quadv, with preserv IM (Flucelvax 4 yrs and older) 02/26/2019    Influenza, Alonso Bernstein, 3 Years and older, IM (Fluzone 3 yrs and older or Afluria 5 yrs and older) 09/28/2016    Pneumococcal Polysaccharide ([id number]) 02/26/2019    Tdap (Boostrix, Adacel) 06/08/2018     PAST MEDICAL HISTORY     Past Medical History:   Diagnosis Date    Anxiety     Depression     Hyperlipidemia     Hypertension     Hypothyroidism     Intellectual disability     Leg pain, bilateral     Noncompliance with medications     Noncompliance with treatment     Osteoarthritis      SURGICAL HISTORY       Past Surgical History:   Procedure Laterality Date    CYSTOSCOPY Left 1/21/2019    CYSTOSCOPY, BILATERAL RETROGRADE PYELOGRAMS, BILATERAL STENT INSERTION performed by Flavio Gardiner MD at [address] [place] [address]       Family History   Problem Relation Age of Onset    Diabetes Brother     Thyroid Disease Mother     Other Daughter         Autism     SOCIAL HISTORY       Social History     Socioeconomic History    Marital status: Single     Spouse name: None    Number of children: 1    Years of education: None    Highest education level: None Occupational History    Occupation: unemployed     Employer: not employed   Social Needs    Financial resource strain: None    Food insecurity:     Worry: None     Inability: None    Transportation needs:     Medical: None     Non-medical: None   Tobacco Use    Smoking status: Former Smoker     Packs/day: 1.00     Years: 5.00     Pack years: 5.00     Last attempt to quit: 1989     Years since quittin.4    Smokeless tobacco: Never Used   Substance and Sexual Activity    Alcohol use: No    Drug use: No    Sexual activity: None   Lifestyle    Physical activity:     Days per week: None     Minutes per session: None    Stress: None   Relationships    Social connections:     Talks on phone: None     Gets together: None     Attends Taoism service: None     Active member of club or organization: None     Attends meetings of clubs or organizations: None     Relationship status: None    Intimate partner violence:     Fear of current or ex partner: None     Emotionally abused: None     Physically abused: None     Forced sexual activity: None   Other Topics Concern    None   Social History Narrative    None     · Was at nursing facility     PHYSICAL EXAM    (up to 7 for level 4, 8 or more forlevel 5)     ED Triage Vitals   BP Temp Temp Source Pulse Resp SpO2 Height Weight   19 1434 19 1434 19 1434 19 1434 19 1434 19 1434 19 1815 19 1434   (!) 177/88 98.2 °F (36.8 °C) Oral 72 16 100 % 4' 11\" (1.499 m) 150 lb 8 oz (68.3 kg)     Vitals:    Vitals:    19 1741 19 1815 19 0200 19 0730   BP: 138/74  (!) 150/68 133/61   Pulse: 74  70 78   Resp: 18  18 18   Temp: 98.4 °F (36.9 °C)  98.9 °F (37.2 °C) 98.2 °F (36.8 °C)   TempSrc: Oral  Oral Oral   SpO2: 99%  97% 99%   Weight:  145 lb 12.8 oz (66.1 kg)     Height:  4' 11\" (1.499 m)       Physical Exam   Constitutional/General: Alert and oriented, NAD  Head: NC/AT  Eyes: PERRL, EOMI  Mouth: suspicious osteolytic or osteoblastic lesions. No fracture identified. 1. Findings suggesting cystitis. 2. Persistent severe right hydroureteronephrosis with stable position of the ureteral stents    Ir Rodney Lack Cva Device Plmt/replace/removal    Result Date: 5/29/2019  Location:200 Exam: IR FLUORO GUIDED CVA DEVICE PLMT/REPLACE/REMOVAL HISTORY:   Infection FLUOROSCOPY TIME:  0.2 minutes PROCEDURE:  The procedure was explained to the patient and informed consent was obtained. The skin over the  right arm was prepped and draped in a sterile fashion and then anesthetized with Lidocaine. Maximal sterile barrier technique was utilized. Under ultrasound guidance, the brachial vein was localized. Using a micropuncture needle, the vein was entered under direct ultrasound visualization. A 0.018 guidewire was advanced into the central venous circulation. A 5 Irish peel-away sheath was placed. The 5 Irish Power PICC line catheter was trimmed to 32 cm was then advanced through the peel-away sheath and positioned at the right atrial/SVC junction. Fluoroscopic spot film was obtained. FINDINGS:  Ultrasound shows the veins to be patent. Fluoroscopic spot film demonstrates the Power PICC line to be at the right atrial/SVC junction. Successful placement of a 5 Irish double-lumen Power PICC line using ultrasound guidance via the right brachial  vein. Us Retroperitoneal Complete    Result Date: 5/31/2019  Reading location: 200 INDICATION: Renal insufficiency FINDINGS: Sonographic evaluation urinary tract compared with ultrasound 1/18/2019 and CT the abdomen and pelvis 5/22/2019. Right kidney 12.1 cm in length and the left 11.2 cm. At least moderate dilatation right renal collecting system. On recent CT, right ureteral stent is coiled in the renal pelvis. Dilatation the left renal collecting system is somewhat less severe than the right. Mid left renal cyst measures 13 mm and upper pole cyst 25.5 mm.  No focal abnormality right renal collecting system. Nondistended urinary bladder. Moderate bilateral hydronephrosis, somewhat more severe on the right than the left. Ir Ultrasound Guidance Vascular Access    Result Date: 5/29/2019  Location:200 Exam: IR ULTRASOUND GUIDANCE VASCULAR ACCESS History:  PICC line placement Ultrasound evaluation of potential access was performed. After successfully identifying a patent right brachial vein, and following the administration of lidocaine, real-time ultrasound guidance was used to puncture the right brachial   vein. A permanent recording was created for the patient's record. Ultrasound guidance was provided during placement of a PICC line.      LABS:  Lab Results   Component Value Date    HEPAIGM SEE NOTE 04/09/2019    HEPBIGM SEE NOTE 04/09/2019     Results for orders placed or performed during the hospital encounter of 05/31/19   Urine Culture   Result Value Ref Range    Urine Culture, Routine 24 Hours- growth not present; incubation continues    CBC Auto Differential   Result Value Ref Range    WBC 12.2 (H) 4.5 - 11.5 E9/L    RBC 3.10 (L) 3.50 - 5.50 E12/L    Hemoglobin 8.7 (L) 11.5 - 15.5 g/dL    Hematocrit 28.4 (L) 34.0 - 48.0 %    MCV 91.6 80.0 - 99.9 fL    MCH 28.1 26.0 - 35.0 pg    MCHC 30.6 (L) 32.0 - 34.5 %    RDW 14.9 11.5 - 15.0 fL    Platelets 885 738 - 810 E9/L    MPV 10.2 7.0 - 12.0 fL    Neutrophils % 74.5 43.0 - 80.0 %    Immature Granulocytes % 4.0 0.0 - 5.0 %    Lymphocytes % 13.3 (L) 20.0 - 42.0 %    Monocytes % 4.8 2.0 - 12.0 %    Eosinophils % 3.2 0.0 - 6.0 %    Basophils % 0.2 0.0 - 2.0 %    Neutrophils # 9.07 (H) 1.80 - 7.30 E9/L    Immature Granulocytes # 0.49 E9/L    Lymphocytes # 1.62 1.50 - 4.00 E9/L    Monocytes # 0.58 0.10 - 0.95 E9/L    Eosinophils # 0.39 0.05 - 0.50 E9/L    Basophils # 0.03 0.00 - 0.20 E9/L   Comprehensive Metabolic Panel w/ Reflex to MG   Result Value Ref Range    Sodium 139 132 - 146 mmol/L    Potassium reflex Magnesium 5.4 Lab Results   Component Value Date    BC 5 Days- no growth 05/24/2019     Lab Results   Component Value Date    BLOODCULT2 5 Days- no growth 05/24/2019     URINE CULTURE  Urine Culture, Routine   Date Value Ref Range Status   05/31/2019 24 Hours- growth not present; incubation continues  Preliminary   05/23/2019 <10,000 CFU/mL  Mixed gram positive organisms    Final   05/22/2019   Final    ,000 CFU/mL  Mixed abdi isolated. Further workup and sensitivity testing  is not routinely indicated and will not be performed. Mixed abdi isolated includes:  Mixed gram positive organisms       Patient is a 52 y.o. female who presented with   Chief Complaint   Patient presents with    Abnormal Lab     PATIENT TO THE ED TODAY PER NH WITH C/O ABNORMAL LABS. PATIENT WITH CREAT OF 5.6 AND BUN 45        FINAL IMPRESSION      1. Acute renal failure, unspecified acute renal failure type ([address]    2. Rectal bleeding    3. Urinary tract infection without hematuria, site unspecified    4. Anemia, unspecified type    5. Hydronephrosis, unspecified hydronephrosis type    6. Calculus of kidney    bilat hydronephrosis   JIAN     Plan:   · Check vancomycin random  · Check cultures  · Will start atbs- with meropenem and diflucan x1 today  · peridex oral rinse  · for  Stent placement  · Check labs  · Urology following   · Nephrology consulted    Imaging and labs were reviewed per medical records and any ID pertinent labs were addressed with the patient. The patient/FAMILY  was educated about the diagnosis, prognosis, indications, risks and benefits of treatment. The patient/FAMILY is in agreement with the proposed medical treatment plan. An opportunity to ask questions was given to the patient/FAMILY and questions were answered. Thank you for the consult. We will follow with you.      Electronically signed by JUSTYN Sotelo on 6/1/2019 at 12:13 PM    As above    I have personally performed and/or participated in the history, exam, medical decision making, and agree with all pertinent clinical information by NP. The patient was educated about the diagnosis, prognosis, indications, risks and benefits of treatment. The patient is in agreement with the proposed medical treatment plan. Known to ID just d/c  Pt presented with JIAN  Moderate bilateral hydronephrosis, somewhat more severe on  the right than the left. Will provide atbx til cx back   cover for VRE/GN/fungal  For stent exchange   Braden with cloudy puss like sediments  An opportunity to ask questions was given to the patient and questions were answered.       Electronically signed by Bradley Fields MD on 6/1/2019 at 4:46 PM    Phone [phone]  Fax [phone]

## 2019-06-01 NOTE — PLAN OF CARE
Problem: Pain:  Goal: Pain level will decrease  Description  Pain level will decrease  Outcome: Not Met This Shift  Note:   Patient still experiencing intermittent pain which is treated with ultram 25  mg  Goal: Control of acute pain  Description  Control of acute pain  Outcome: Not Met This Shift  Goal: Control of chronic pain  Description  Control of chronic pain  Outcome: Not Met This Shift     Problem: Falls - Risk of:  Goal: Will remain free from falls  Description  Will remain free from falls  Outcome: Met This Shift  Goal: Absence of physical injury  Description  Absence of physical injury  Outcome: Met This Shift

## 2019-06-02 ENCOUNTER — APPOINTMENT (OUTPATIENT)
Dept: CT IMAGING | Age: 54
DRG: 466 | End: 2019-06-02
Payer: COMMERCIAL

## 2019-06-02 LAB
ANION GAP SERPL CALCULATED.3IONS-SCNC: 14 MMOL/L (ref 7–16)
ANION GAP SERPL CALCULATED.3IONS-SCNC: 14 MMOL/L (ref 7–16)
ANION GAP SERPL CALCULATED.3IONS-SCNC: 15 MMOL/L (ref 7–16)
BASOPHILS ABSOLUTE: 0.02 E9/L (ref 0–0.2)
BASOPHILS RELATIVE PERCENT: 0.2 % (ref 0–2)
BUN BLDV-MCNC: 47 MG/DL (ref 6–20)
BUN BLDV-MCNC: 47 MG/DL (ref 6–20)
BUN BLDV-MCNC: 48 MG/DL (ref 6–20)
CALCIUM SERPL-MCNC: 8.2 MG/DL (ref 8.6–10.2)
CALCIUM SERPL-MCNC: 8.6 MG/DL (ref 8.6–10.2)
CALCIUM SERPL-MCNC: 8.7 MG/DL (ref 8.6–10.2)
CHLORIDE BLD-SCNC: 109 MMOL/L (ref 98–107)
CHLORIDE BLD-SCNC: 111 MMOL/L (ref 98–107)
CHLORIDE BLD-SCNC: 112 MMOL/L (ref 98–107)
CO2: 19 MMOL/L (ref 22–29)
CO2: 19 MMOL/L (ref 22–29)
CO2: 20 MMOL/L (ref 22–29)
CREAT SERPL-MCNC: 5.8 MG/DL (ref 0.5–1)
CREAT SERPL-MCNC: 5.9 MG/DL (ref 0.5–1)
CREAT SERPL-MCNC: 6.3 MG/DL (ref 0.5–1)
EKG ATRIAL RATE: 71 BPM
EKG P AXIS: 146 DEGREES
EKG P-R INTERVAL: 152 MS
EKG Q-T INTERVAL: 416 MS
EKG QRS DURATION: 72 MS
EKG QTC CALCULATION (BAZETT): 452 MS
EKG R AXIS: 148 DEGREES
EKG T AXIS: 163 DEGREES
EKG VENTRICULAR RATE: 71 BPM
EOSINOPHILS ABSOLUTE: 0.32 E9/L (ref 0.05–0.5)
EOSINOPHILS RELATIVE PERCENT: 3 % (ref 0–6)
GFR AFRICAN AMERICAN: 8
GFR AFRICAN AMERICAN: 9
GFR AFRICAN AMERICAN: 9
GFR NON-AFRICAN AMERICAN: 7 ML/MIN/1.73
GFR NON-AFRICAN AMERICAN: 7 ML/MIN/1.73
GFR NON-AFRICAN AMERICAN: 8 ML/MIN/1.73
GLUCOSE BLD-MCNC: 102 MG/DL (ref 74–99)
GLUCOSE BLD-MCNC: 102 MG/DL (ref 74–99)
GLUCOSE BLD-MCNC: 107 MG/DL (ref 74–99)
HCT VFR BLD CALC: 26.9 % (ref 34–48)
HEMOGLOBIN: 8.2 G/DL (ref 11.5–15.5)
IMMATURE GRANULOCYTES #: 0.22 E9/L
IMMATURE GRANULOCYTES %: 2.1 % (ref 0–5)
LYMPHOCYTES ABSOLUTE: 1.52 E9/L (ref 1.5–4)
LYMPHOCYTES RELATIVE PERCENT: 14.4 % (ref 20–42)
MCH RBC QN AUTO: 28.7 PG (ref 26–35)
MCHC RBC AUTO-ENTMCNC: 30.5 % (ref 32–34.5)
MCV RBC AUTO: 94.1 FL (ref 80–99.9)
METER GLUCOSE: 98 MG/DL (ref 74–99)
MONOCYTES ABSOLUTE: 0.59 E9/L (ref 0.1–0.95)
MONOCYTES RELATIVE PERCENT: 5.6 % (ref 2–12)
NEUTROPHILS ABSOLUTE: 7.85 E9/L (ref 1.8–7.3)
NEUTROPHILS RELATIVE PERCENT: 74.7 % (ref 43–80)
PDW BLD-RTO: 15.3 FL (ref 11.5–15)
PLATELET # BLD: 344 E9/L (ref 130–450)
PMV BLD AUTO: 10.4 FL (ref 7–12)
POTASSIUM SERPL-SCNC: 5.2 MMOL/L (ref 3.5–5)
POTASSIUM SERPL-SCNC: 5.4 MMOL/L (ref 3.5–5)
POTASSIUM SERPL-SCNC: 5.6 MMOL/L (ref 3.5–5)
RBC # BLD: 2.86 E12/L (ref 3.5–5.5)
SODIUM BLD-SCNC: 143 MMOL/L (ref 132–146)
SODIUM BLD-SCNC: 144 MMOL/L (ref 132–146)
SODIUM BLD-SCNC: 146 MMOL/L (ref 132–146)
URINE CULTURE, ROUTINE: NORMAL
WBC # BLD: 10.5 E9/L (ref 4.5–11.5)

## 2019-06-02 PROCEDURE — 2580000003 HC RX 258: Performed by: SPECIALIST

## 2019-06-02 PROCEDURE — 80048 BASIC METABOLIC PNL TOTAL CA: CPT

## 2019-06-02 PROCEDURE — 6360000002 HC RX W HCPCS: Performed by: SPECIALIST

## 2019-06-02 PROCEDURE — 82962 GLUCOSE BLOOD TEST: CPT

## 2019-06-02 PROCEDURE — 2580000003 HC RX 258: Performed by: INTERNAL MEDICINE

## 2019-06-02 PROCEDURE — 36592 COLLECT BLOOD FROM PICC: CPT

## 2019-06-02 PROCEDURE — 85025 COMPLETE CBC W/AUTO DIFF WBC: CPT

## 2019-06-02 PROCEDURE — 36415 COLL VENOUS BLD VENIPUNCTURE: CPT

## 2019-06-02 PROCEDURE — 6360000004 HC RX CONTRAST MEDICATION: Performed by: RADIOLOGY

## 2019-06-02 PROCEDURE — 74176 CT ABD & PELVIS W/O CONTRAST: CPT

## 2019-06-02 PROCEDURE — 6370000000 HC RX 637 (ALT 250 FOR IP): Performed by: INTERNAL MEDICINE

## 2019-06-02 PROCEDURE — 1200000000 HC SEMI PRIVATE

## 2019-06-02 PROCEDURE — 6360000002 HC RX W HCPCS: Performed by: INTERNAL MEDICINE

## 2019-06-02 RX ORDER — SODIUM CHLORIDE 450 MG/100ML
INJECTION, SOLUTION INTRAVENOUS CONTINUOUS
Status: DISCONTINUED | OUTPATIENT
Start: 2019-06-02 | End: 2019-06-05

## 2019-06-02 RX ADMIN — Medication 325 MG: at 09:27

## 2019-06-02 RX ADMIN — LEVOTHYROXINE SODIUM 100 MCG: 100 TABLET ORAL at 05:58

## 2019-06-02 RX ADMIN — MEROPENEM 500 MG: 500 INJECTION, POWDER, FOR SOLUTION INTRAVENOUS at 14:49

## 2019-06-02 RX ADMIN — SODIUM CHLORIDE: 4.5 INJECTION, SOLUTION INTRAVENOUS at 11:06

## 2019-06-02 RX ADMIN — HEPARIN SODIUM 5000 UNITS: 5000 INJECTION, SOLUTION INTRAVENOUS; SUBCUTANEOUS at 21:07

## 2019-06-02 RX ADMIN — MIRTAZAPINE 15 MG: 15 TABLET, FILM COATED ORAL at 21:07

## 2019-06-02 RX ADMIN — TRAMADOL HYDROCHLORIDE 25 MG: 50 TABLET, FILM COATED ORAL at 17:03

## 2019-06-02 RX ADMIN — HEPARIN SODIUM 5000 UNITS: 5000 INJECTION, SOLUTION INTRAVENOUS; SUBCUTANEOUS at 05:58

## 2019-06-02 RX ADMIN — SODIUM CHLORIDE: 4.5 INJECTION, SOLUTION INTRAVENOUS at 22:29

## 2019-06-02 RX ADMIN — Medication 325 MG: at 17:03

## 2019-06-02 RX ADMIN — ONDANSETRON 4 MG: 2 INJECTION INTRAMUSCULAR; INTRAVENOUS at 15:05

## 2019-06-02 RX ADMIN — Medication 10 ML: at 21:07

## 2019-06-02 RX ADMIN — HEPARIN SODIUM 5000 UNITS: 5000 INJECTION, SOLUTION INTRAVENOUS; SUBCUTANEOUS at 14:56

## 2019-06-02 RX ADMIN — PANTOPRAZOLE SODIUM 40 MG: 40 TABLET, DELAYED RELEASE ORAL at 05:58

## 2019-06-02 RX ADMIN — ATORVASTATIN CALCIUM 40 MG: 40 TABLET, FILM COATED ORAL at 09:27

## 2019-06-02 RX ADMIN — MEROPENEM 500 MG: 500 INJECTION, POWDER, FOR SOLUTION INTRAVENOUS at 02:26

## 2019-06-02 RX ADMIN — IOHEXOL 50 ML: 240 INJECTION, SOLUTION INTRATHECAL; INTRAVASCULAR; INTRAVENOUS; ORAL at 16:29

## 2019-06-02 RX ADMIN — DOCUSATE SODIUM 100 MG: 100 CAPSULE, LIQUID FILLED ORAL at 21:07

## 2019-06-02 ASSESSMENT — ENCOUNTER SYMPTOMS
CHEST TIGHTNESS: 0
VOMITING: 0
SHORTNESS OF BREATH: 0
EYES NEGATIVE: 1
NAUSEA: 1
CONSTIPATION: 1
DIARRHEA: 0

## 2019-06-02 ASSESSMENT — PAIN SCALES - GENERAL: PAINLEVEL_OUTOF10: 8

## 2019-06-02 NOTE — PROGRESS NOTES
chlorhexidine  15 mL Mouth/Throat BID    meropenem  500 mg Intravenous Q12H    atorvastatin  40 mg Oral Daily    docusate sodium  100 mg Oral BID    ferrous sulfate  325 mg Oral BID WC    fluticasone  2 spray Nasal Daily    levothyroxine  100 mcg Oral Daily    mirtazapine  15 mg Oral Nightly    sodium chloride flush  10 mL Intravenous 2 times per day    heparin (porcine)  5,000 Units Subcutaneous 3 times per day       MEDS (infusions):   sodium chloride 100 mL/hr at 06/02/19 1106    dextrose         MEDS (prn):  glucose, dextrose, glucagon (rDNA), dextrose, traMADol, sodium chloride flush, magnesium hydroxide, ondansetron    PHYSICAL EXAM:     Patient Vitals for the past 24 hrs:   BP Temp Temp src Pulse Resp SpO2   06/02/19 0730 (!) 141/66 98.4 °F (36.9 °C) Oral 78 16 98 %   06/01/19 2100 129/65 97.9 °F (36.6 °C) Oral 62 16 100 %   06/01/19 1805 134/62 97.8 °F (36.6 °C) Oral 69 17 --   06/01/19 1719 122/71 -- -- 63 14 94 %   06/01/19 1709 112/81 -- -- 68 22 93 %   06/01/19 1659 (!) 146/74 -- -- 64 12 100 %   06/01/19 1651 (!) 146/74 97.6 °F (36.4 °C) Temporal 63 10 100 %   06/01/19 1648 138/73 -- -- 64 11 100 %   06/01/19 1639 104/68 -- -- 72 11 96 %   06/01/19 1636 104/68 97.9 °F (36.6 °C) Temporal 78 18 94 %   @      Intake/Output Summary (Last 24 hours) at 6/2/2019 1112  Last data filed at 6/2/2019 0730  Gross per 24 hour   Intake 2155 ml   Output 4000 ml   Net -1845 ml         Wt Readings from Last 3 Encounters:   05/31/19 145 lb 12.8 oz (66.1 kg)   05/23/19 148 lb (67.1 kg)   05/15/19 140 lb 10.5 oz (63.8 kg)       Constitutional:  in no acute distress  Oral: mucus membranes moist  Neck: no JVD  Cardiovascular: S1, S2 regular rhythm, no murmur,or rub  Respiratory:  No crackles, no wheeze  Gastrointestinal:  Soft, nontender, nondistended, NABS  Ext: no edema, feet warm  Skin: dry, no rash  Neuro: awake, alert, interactive      DATA:    Recent Labs     05/31/19  1530 06/01/19  0815 06/02/19  0610   WBC

## 2019-06-02 NOTE — PROGRESS NOTES
Internal Medicine Progress Note    Barbi Reyes. Ondina Hayden, & [address] [facility] [address] Ondina Martinez., F.A.C.O.I. Nacho Palumbo D.O., F.A.C.O.I. Primary Care Physician: Clarissa Meredith DO   Admitting Physician:  Cristo Crowell DO  Admission date and time: 5/31/2019  2:28 PM    Room:  [address] [place] [address]  Admitting diagnosis: Acute renal failure (ARF) [facility]) [N17.9]    Patient Name: Susy Lopez  MRN: [id number]    Date of Service: 6/2/2019     Subjective:    Estee Araujo is a 52 y. o.  female who was seen and examined today,6/2/2019, at the bedside. Patient was seen today and she has multiple complaints. She underwent surgery yesterday consisting of a cystoscopy of bilateral urethral stents exchanged and pelvic exam for neurogenic bladder with chronic bilateral urethral obstruction. She is doing better today. Braden catheter was in place with adequate urine output. BUN and creatinine is slowly improving and the hyperkalemia is better today. No one present during my examination. I reviewed the patient's past medical, surgical history and medication. I reviewed the  course of events since last visit. Review of System:  Estee Araujo is a 52 y. o.  female who is alert, responsive, oriented to person, place, and time. Review of Systems:  Bolded are positive  · Constitutional: unanticipated weight loss. There's been no change in energy level, sleep pattern, or activity level. · Eyes: Visual changes. Diplopia. Scleral icterus. · ENT: Headaches. Hearing loss. Vertigo. Mouth sores. Sore throat. · Cardiovascular: Chest pain. Dyspnea on exertion. Palpitations. Loss of consciousness. · Respiratory:  Cough. Wheezing. Sputum production. Hemoptysis  · Gastrointestinal:  Abdominal pain. Appetite loss. Blood in stools. Change in bowel or bladder habits. · Genitourinary:  Dysuria. Trouble voiding. Hematuria. · Musculoskeletal:  Gait disturbance. Weakness.  Joint complaints. · Integumentary: Rash. Pruritis. · Neurological:  Headache. Change in muscle strength. Numbness or tingling. Change in gait balance. Change in memory. · Psychiatric: Anxiety. Depression. · Endocrine: Temperature intolerance. Excessive thirst, fluid intake, or urination. Tremor. · Hematologic/Lymphatic: Abnormal bruising or bleeding. Swollen lymph nodes. · Allergic/Immunologic: Nasal congestion. Hives. Physical Exam:  I/O this shift:  In: 200 [P.O.:200]  Out: -     Intake/Output Summary (Last 24 hours) at 6/2/2019 1046  Last data filed at 6/2/2019 0705  Gross per 24 hour   Intake 1975 ml   Output 4000 ml   Net -2025 ml   I/O last 3 completed shifts: In: 2566.7 [I.V.:2566.7]  Out: 4000 [Urine:4000]  Patient Vitals for the past 96 hrs (Last 3 readings):   Weight   05/31/19 1815 145 lb 12.8 oz (66.1 kg)   05/31/19 1434 150 lb 8 oz (68.3 kg)       Vital Signs:   Blood pressure (!) 141/66, pulse 78, temperature 98.4 °F (36.9 °C), temperature source Oral, resp. rate 16, height 4' 11\" (1.499 m), weight 145 lb 12.8 oz (66.1 kg), last menstrual period 05/21/2015, SpO2 98 %, not currently breastfeeding. General appearance: Well preserved, mesomorphic body habitus, alert, no distress. Skin: Skin color, texture, turgor normal. No rashes or lesions. No induration or tightening palpated. Head: Normocephalic. No masses, lesions, tenderness or abnormalities  Eyes: conjunctivae/corneas clear. PERRL, EOM's intact. Sclera non icteric. Ears: External ears normal. Canals clear. Nose/Sinuses: Nares normal. Septum midline. Oropharynx: Lips, mucosa, and tongue normal. Oropharynx clear with no exudate seen. Neck: Neck supple, and symmetric. No adenopathy. Thyroid symmetric, normal size, without nodules. Trachea is midline. Carotids brisk in upstroke without bruits, No abnormal JVP noted at 45°. Lungs: Lungs clear to auscultation bilaterally. No retractions or use of accessory muscles. No vocal fremitus. No ronchi, crackle or rale. Heart:  S1 > S2. Regular rate and rhythm. No gallop,rub, palpable thrill or heave noted. No murmur. PMI 5th intercostal space midclavicular line. Abdomen: Abdomen soft, non-tender. BS normal. No masses, organomegaly. No hernia noted. Extremities: Extremities normal. No deformities, edema, or skin discoloration. No cyanosis or clubbing noted to the nails. Peripheral pulses 4/4. Musculoskeletal: Spine ROM normal. Muscular strength intact. Neuro: Cranial nerves intact. Motor: Strength 5/5 in all extremities. Reflexes 2+ in all extremities. No focal weakness. Sensory: grossly normal to touch. Gait normal. Coordination intact. Psych:  Behavior is normal. Mood appears normal. Speech is not rapid and/or pressured.    Genitalia: Braden reinserted  Breast: Deferred    Allergy:  No Known Allergies     Medication:  Scheduled Meds:   pantoprazole  40 mg Oral QAM AC    chlorhexidine  15 mL Mouth/Throat BID    meropenem  500 mg Intravenous Q12H    atorvastatin  40 mg Oral Daily    docusate sodium  100 mg Oral BID    ferrous sulfate  325 mg Oral BID WC    fluticasone  2 spray Nasal Daily    levothyroxine  100 mcg Oral Daily    mirtazapine  15 mg Oral Nightly    sodium chloride flush  10 mL Intravenous 2 times per day    heparin (porcine)  5,000 Units Subcutaneous 3 times per day     Continuous Infusions:   sodium chloride      dextrose         Objective Data:  CBC:   Recent Labs     05/31/19  1530 06/01/19  0815 06/02/19  0610   WBC 12.2* 11.3 10.5   HGB 8.7* 7.8* 8.2*    304 344     BMP:    Recent Labs     06/01/19  1910 06/01/19  2350 06/02/19  0610    144 146   K 5.2* 5.2* 5.4*   * 111* 112*   CO2 19* 19* 20*   BUN 48* 48* 47*   CREATININE 6.5* 6.3* 5.9*   GLUCOSE 171* 107* 102*     CMP:    Lab Results   Component Value Date     06/02/2019    K 5.4 06/02/2019    K 5.4 05/31/2019     06/02/2019    CO2 20 06/02/2019    BUN 47 06/02/2019    CREATININE 5.9 06/02/2019    GFRAA 9 06/02/2019    LABGLOM 7 06/02/2019    GLUCOSE 102 06/02/2019    GLUCOSE 97 12/20/2011    PROT 6.0 05/31/2019    LABALBU 3.1 05/31/2019    LABALBU 4.4 12/20/2011    CALCIUM 8.7 06/02/2019    BILITOT <0.2 05/31/2019    ALKPHOS 56 05/31/2019    AST 18 05/31/2019    ALT 23 05/31/2019     Hepatic:   Recent Labs     05/31/19  1530   AST 18   ALT 23   BILITOT <0.2   ALKPHOS 56     Troponin:   Recent Labs     05/31/19  1530   TROPONINI <0.01     BNP: No results for input(s): BNP in the last 72 hours. Lipids: No results for input(s): CHOL, HDL in the last 72 hours. Invalid input(s): LDLCALCU  ABGs: No results found for: PHART, PO2ART, FQY3MLO  INR: No results for input(s): INR, PROTIME in the last 72 hours. RAD: Us Retroperitoneal Complete    Result Date: 5/31/2019  Reading location: 200 INDICATION: Renal insufficiency FINDINGS: Sonographic evaluation urinary tract compared with ultrasound 1/18/2019 and CT the abdomen and pelvis 5/22/2019. Right kidney 12.1 cm in length and the left 11.2 cm. At least moderate dilatation right renal collecting system. On recent CT, right ureteral stent is coiled in the renal pelvis. Dilatation the left renal collecting system is somewhat less severe than the right. Mid left renal cyst measures 13 mm and upper pole cyst 25.5 mm. No focal abnormality right renal collecting system. Nondistended urinary bladder. Moderate bilateral hydronephrosis, somewhat more severe on the right than the left. Chronic Hospital Medical Problems:  Past Medical History:   Diagnosis Date    Anxiety     Depression     Hyperlipidemia     Hypertension     Hypothyroidism     Intellectual disability     Leg pain, bilateral     Noncompliance with medications     Noncompliance with treatment     Osteoarthritis      Active Problems:    Acute renal failure (ARF) (HCC)  Resolved Problems:    * No resolved hospital problems.  *      Assessment:    · Acute on chronic kidney disease stage IV compatible with a neurogenic bladder with chronic b/l ureteral stents--status post cystoscopy with bilateral ureteral stents is changed on June 1  · Abnormal pelvic exam showing a firm abnormal nodule lesion within the vagina with its urethra with cystinosis suspicion for gynecological malignancy  · Sepsis secondary to a urinary tract infection  · Normocytic anemia of chronic disease  · Hypothyroidism  · Chronic mental disability  · Hyperlipidemia            Plan: Braden catheter will be maintained and urine output will be observed. Nephrology is following regarding electrolyte imbalance in the elevated potassium is improving. Continue follow with urology. Continue IV antibiotic therapy. Awaiting gynecological evaluation with possible biopsy. More than 50% of my  time was spent at the bedside counseling and/or coordination of care with the patient and/or family with face to face contact. This time was spent reviewing notes and laboratory data, instructing and counseling the patient. Time I spent with the family or surrogate(s) is included only if the patient was incapable of providing the necessary information or participating in medical decisionsI also discussed the differential diagnosis and all of the proposed management plans with the patient and individuals accompanying the patient. Lefty Son requires this high level of physician care and nursing in the [facility] [place] due the complexity of decision management and chance of rapid decline or death. Continued cardiac monitoring and higher level of nursing are required. I am ready available for decision making and intervention. I reviewed the relevant imaging studies and available reports. I also discussed the differential diagnosis and all of the proposed management plans with the patient and individuals accompanying the patient to this visit. I reviewed the relevant imaging studies and available reports.         Suzanna Lopez DO, SHANNON FRAGOSO   On 6/2/2019  10:46 AM

## 2019-06-02 NOTE — PROGRESS NOTES
[address] [place] [address] [facility]  [facility]  Progress Note      CC: still with pain  ID following for hydronephrosis- atbs management  Face to face encounter     SUBJECTIVE:  Patient is tolerating medications. No reported adverse drug reactions. ROS: No nausea, vomiting, diarrhea. + abdominal pain. No fevers. No rash. Tolerating atbs. Hematuria     Medications:  Scheduled Meds:   pantoprazole  40 mg Oral QAM AC    chlorhexidine  15 mL Mouth/Throat BID    meropenem  500 mg Intravenous Q12H    atorvastatin  40 mg Oral Daily    docusate sodium  100 mg Oral BID    ferrous sulfate  325 mg Oral BID WC    fluticasone  2 spray Nasal Daily    levothyroxine  100 mcg Oral Daily    mirtazapine  15 mg Oral Nightly    sodium chloride flush  10 mL Intravenous 2 times per day    heparin (porcine)  5,000 Units Subcutaneous 3 times per day     Continuous Infusions:   dextrose      sodium chloride 125 mL/hr at 06/01/19 1846     PRN Meds:glucose, dextrose, glucagon (rDNA), dextrose, traMADol, sodium chloride flush, magnesium hydroxide, ondansetron  OBJECTIVE:  Patient Vitals for the past 24 hrs:   BP Temp Temp src Pulse Resp SpO2   06/02/19 0730 (!) 141/66 98.4 °F (36.9 °C) Oral 78 16 98 %   06/01/19 2100 129/65 97.9 °F (36.6 °C) Oral 62 16 100 %   06/01/19 1805 134/62 97.8 °F (36.6 °C) Oral 69 17 --   06/01/19 1719 122/71 -- -- 63 14 94 %   06/01/19 1709 112/81 -- -- 68 22 93 %   06/01/19 1659 (!) 146/74 -- -- 64 12 100 %   06/01/19 1651 (!) 146/74 97.6 °F (36.4 °C) Temporal 63 10 100 %   06/01/19 1648 138/73 -- -- 64 11 100 %   06/01/19 1639 104/68 -- -- 72 11 96 %   06/01/19 1636 104/68 97.9 °F (36.6 °C) Temporal 78 18 94 %     Constitutional: The patient is awake, alert, asking questions, eating breakfast.  Skin: Warm and dry. No rashes were noted. Head: Eyes show round, and reactive pupils. No jaundice. Mouth: Moist mucous membranes, no ulcerations, no thrush. Neck: Supple to movements.  No lymphadenopathy. Chest: No use of accessory muscles to breathe. Symmetrical expansion. Auscultation reveals no wheezing, crackles, or rhonchi. Cardiovascular: S1 and S2 are rhythmic and regular. No murmurs appreciated. Abdomen: Positive bowel sounds to auscultation. Benign to palpation. ++ pain to abdominal/pelvic area  Extremities: No clubbing, no cyanosis, no edema.   Right upper arm with line  Braden- blood-tinged    Laboratory and Tests Review:  Lab Results   Component Value Date    WBC 10.5 06/02/2019    WBC 11.3 06/01/2019    WBC 12.2 (H) 05/31/2019    HGB 8.2 (L) 06/02/2019    HCT 26.9 (L) 06/02/2019    MCV 94.1 06/02/2019     06/02/2019     Lab Results   Component Value Date    NEUTROABS 7.85 (H) 06/02/2019    NEUTROABS 8.82 (H) 06/01/2019    NEUTROABS 9.07 (H) 05/31/2019     Lab Results   Component Value Date    ALT 23 05/31/2019    AST 18 05/31/2019    ALKPHOS 56 05/31/2019    BILITOT <0.2 05/31/2019     Lab Results   Component Value Date     06/02/2019    K 5.4 06/02/2019    K 5.4 05/31/2019     06/02/2019    CO2 20 06/02/2019    BUN 47 06/02/2019    CREATININE 5.9 06/02/2019    GFRAA 9 06/02/2019    LABGLOM 7 06/02/2019    GLUCOSE 102 06/02/2019    GLUCOSE 97 12/20/2011    PROT 6.0 05/31/2019    LABALBU 3.1 05/31/2019    LABALBU 4.4 12/20/2011    CALCIUM 8.7 06/02/2019    BILITOT <0.2 05/31/2019    ALKPHOS 56 05/31/2019    AST 18 05/31/2019    ALT 23 05/31/2019     Radiology:  Reviewed     Microbiology:   5/31/19- urine cx- no growth to date  Blood cx- no growth to date    ASSESSMENT:  · Bilateral hydronephrosis   · Neurogenic bladder  · Leukocytosis- improved   · S/p stent exchange  · UTI  · JIAN    PLAN:  · Continue meropenem for now  · Check cultures- No growth to date  · Vancomycin random level is 42.6  · Nephrology/ urology following  · gynecology to evaluate -Post-operative Diagnosis:  Fixated and stenotic urethra with abnormal and nodular vaginal examination suspicious for

## 2019-06-02 NOTE — PROGRESS NOTES
[facility]  PROGRESS NOTE    Subjective:   Felt chilled overnight  Unsure  if she had fevers  Some nausea without vomiting but ate breakfast this morning  Catheter has had some blood in it  She is not having pain right now     Review of Systems   Constitutional: Positive for activity change, appetite change, chills and fatigue. HENT: Negative. Eyes: Negative. Respiratory: Negative for chest tightness and shortness of breath. Cardiovascular: Negative for chest pain and palpitations. Gastrointestinal: Positive for constipation and nausea. Negative for diarrhea and vomiting. Genitourinary: Positive for flank pain (some right flank pain at times ) and hematuria. Skin: Negative for rash and wound. Neurological: Negative for dizziness and light-headedness. Objective:    Vitals:    06/02/19 0730   BP: (!) 141/66   Pulse: 78   Resp: 16   Temp: 98.4 °F (36.9 °C)   SpO2: 98%       Allergies: Patient has no known allergies.     PAST MEDICAL HISTORY:   Past Medical History:   Diagnosis Date    Anxiety     Depression     Hyperlipidemia     Hypertension     Hypothyroidism     Intellectual disability     Leg pain, bilateral     Noncompliance with medications     Noncompliance with treatment     Osteoarthritis        PAST SURGICAL HISTORY:   Past Surgical History:   Procedure Laterality Date    CYSTOSCOPY Left 1/21/2019    CYSTOSCOPY, BILATERAL RETROGRADE PYELOGRAMS, BILATERAL STENT INSERTION performed by Dru Moreno MD at [address] [place] [address]:    Family History   Problem Relation Age of Onset    Diabetes Brother     Thyroid Disease Mother     Other Daughter         Autism       PAST SOCIAL HISTORY:    Social History     Socioeconomic History    Marital status: Single     Spouse name: None    Number of children: 1    Years of education: None    Highest education level: None   Occupational History    Occupation: unemployed     Employer: not employed   Social HCT 26.9 06/02/2019         Physical Exam  Skin dry, without rashes  Respirations non-labored, intact  Abdomen soft, non-tender, non-distended. Active bowel sounds. Alert and oriented x3  Braden draining light pink urine       Assessment and Plan:  POD # 1  Cystourethroscopy, bilateral JJ ureteral stent insertion, pelvic examination    Chronic Bilateral ureteral Obstruction  Neurogenic Bladder   Acute Kidney Injury superimposed on CKD  Abnormal pelvic exam suspicious for malignancy     Encouraged to take MOM  Colace routine BID  Encourage to increase activity   Advised she has Zofran PRN if she needs it  Slight improvement in renal function   Normal white count   Urine culture negative for growth from May 31  Blood cultures in progress- negative to date  ID following and managing antibiotics   Nephrology following   OB/GYN consult placed       TOSHIA Moura  6/2/2019  9:42 AM       The patient was seen and examined. I have reviewed the medical record in detail. I agree with the plan as outlined by TOSHIA Moura. CT scan has been ordered. GYN evaluation has been initiated and is greatly appreciated. Continue the Braden.  We will follow her during her hospital stay    Pat Corona MD  4:56 PM  6/2/2019

## 2019-06-02 NOTE — CONSULTS
Marjorie Media, DO, 100 mg at 06/01/19 2113    ferrous sulfate tablet 325 mg, 325 mg, Oral, BID WC, Marjorie Media, DO, 325 mg at 06/02/19 2720    fluticasone (FLONASE) 50 MCG/ACT nasal spray 2 spray, 2 spray, Nasal, Daily, Marjorie Media, DO    levothyroxine (SYNTHROID) tablet 100 mcg, 100 mcg, Oral, Daily, Marjorie Media, DO, 100 mcg at 06/02/19 0558    mirtazapine (REMERON) tablet 15 mg, 15 mg, Oral, Nightly, Marjorie Media, DO, 15 mg at 06/01/19 2113    traMADol (ULTRAM) tablet 25 mg, 25 mg, Oral, Q8H PRN, Marjorie Media, DO, 25 mg at 06/01/19 0219    sodium chloride flush 0.9 % injection 10 mL, 10 mL, Intravenous, 2 times per day, Marjorie Media, DO    sodium chloride flush 0.9 % injection 10 mL, 10 mL, Intravenous, PRN, Marjorie Media, DO    magnesium hydroxide (MILK OF MAGNESIA) 400 MG/5ML suspension 30 mL, 30 mL, Oral, Daily PRN, Marjorie Media, DO    ondansetron [facility]) injection 4 mg, 4 mg, Intravenous, Q6H PRN, Marjorie Media, DO    heparin (porcine) injection 5,000 Units, 5,000 Units, Subcutaneous, 3 times per day, Marjorie Media, DO, 5,000 Units at 06/02/19 9164       Allergies:  Patient has no known allergies. Social History:  TOBACCO:   reports that she quit smoking about 30 years ago. She has a 5.00 pack-year smoking history. She has never used smokeless tobacco.  ETOH:   reports that she does not drink alcohol. DRUGS:   reports that she does not use drugs.     Family History:       Problem Relation Age of Onset    Diabetes Brother     Thyroid Disease Mother     Other Daughter         Autism       Review of Systems  Review of Systems -  General ROS: negative for - chills, fatigue or malaise  ENT ROS: negative for - hearing change, nasal congestion or nasal discharge  Allergy and Immunology ROS: negative for - hives, itchy/watery eyes or nasal congestion  Hematological and Lymphatic ROS: negative for - blood clots, blood transfusions, bruising or fatigue  Endocrine ROS: negative for - malaise/lethargy, mood swings, palpitations or polydipsia/polyuria  Breast ROS: negative for - new or changing breast lumps or nipple changes  Respiratory ROS: negative for - sputum changes, stridor, tachypnea or wheezing  Cardiovascular ROS: negative for - irregular heartbeat, loss of consciousness, murmur or orthopnea  Gastrointestinal ROS: negative for - constipation, diarrhea, gas/bloating, heartburn or hematemesis  Genito-Urinary ROS: negative for -  genital discharge, genital ulcers or hematuria  Musculoskeletal ROS: negative for - gait disturbance, muscle pain or muscular weakness        PHYSICAL EXAM:    Vitals:  BP (!) 141/66   Pulse 78   Temp 98.4 °F (36.9 °C) (Oral)   Resp 16   Ht 4' 11\" (1.499 m)   Wt 145 lb 12.8 oz (66.1 kg)   LMP 05/21/2015 (Approximate)   SpO2 98%   BMI 29.45 kg/m²     General appearance:  NAD  Pyscho/social: neg for tremors and hallucinations  Head: NCAT, PERRLA, EOMI, red conjunctiva  Neck: supple, no masses  Lungs: CTAB, equal chest rise bilateral  Heart: Reg rate  Abdomen: soft, moderately tender in RUQ, nondistended  Skin; no lesions  Gu: no cva tenderness  Extremities: extremities normal, atraumatic, no cyanosis or edema  Limited pelvic exam:  External Genitalia: General appearance; normal, Hair distribution; normal, Lesions absent, crane in place    Vagina: Stenotic with feels like nodularity on the anterior vaginal wall. Cannot evaluate uterus or adnaxae    DATA:  Labs:    CBC:   Lab Results   Component Value Date    WBC 10.5 06/02/2019    RBC 2.86 06/02/2019    HGB 8.2 06/02/2019    HCT 26.9 06/02/2019    MCV 94.1 06/02/2019    RDW 15.3 06/02/2019     06/02/2019       IMPRESSION/RECOMMENDATIONS:    Vaginal nodularity    It would have been ideal to have been consulted intraop for better evaluation under anesthesia.  I might need to take her back to surgery for exam under anesthesia and possible biopsies of these nodularities. Will start with pelvic CT.       Mir Herrera  6/2/2019 11:51 AM

## 2019-06-02 NOTE — PLAN OF CARE
Problem: Pain:  Goal: Pain level will decrease  Description  Pain level will decrease  6/2/2019 0425 by Saroj Dumont RN  Outcome: Met This Shift  6/1/2019 1452 by Demar Bartlett RN  Outcome: Met This Shift  Goal: Control of acute pain  Description  Control of acute pain  6/2/2019 0425 by Saroj Dumont RN  Outcome: Met This Shift  6/1/2019 1452 by Demar Bartlett RN  Outcome: Met This Shift  Goal: Control of chronic pain  Description  Control of chronic pain  6/2/2019 0425 by Saroj Dumont RN  Outcome: Met This Shift  6/1/2019 1452 by Demar Bartlett RN  Outcome: Met This Shift     Problem: Falls - Risk of:  Goal: Will remain free from falls  Description  Will remain free from falls  Outcome: Met This Shift  Goal: Absence of physical injury  Description  Absence of physical injury  Outcome: Met This Shift

## 2019-06-03 LAB
ABO/RH: NORMAL
ANION GAP SERPL CALCULATED.3IONS-SCNC: 13 MMOL/L (ref 7–16)
ANION GAP SERPL CALCULATED.3IONS-SCNC: 13 MMOL/L (ref 7–16)
ANION GAP SERPL CALCULATED.3IONS-SCNC: 15 MMOL/L (ref 7–16)
ANTIBODY SCREEN: NORMAL
BASOPHILS ABSOLUTE: 0.03 E9/L (ref 0–0.2)
BASOPHILS RELATIVE PERCENT: 0.3 % (ref 0–2)
BLOOD BANK DISPENSE STATUS: NORMAL
BLOOD BANK PRODUCT CODE: NORMAL
BPU ID: NORMAL
BUN BLDV-MCNC: 46 MG/DL (ref 6–20)
BUN BLDV-MCNC: 48 MG/DL (ref 6–20)
BUN BLDV-MCNC: 50 MG/DL (ref 6–20)
CALCIUM SERPL-MCNC: 8.3 MG/DL (ref 8.6–10.2)
CALCIUM SERPL-MCNC: 8.5 MG/DL (ref 8.6–10.2)
CALCIUM SERPL-MCNC: 8.6 MG/DL (ref 8.6–10.2)
CHLORIDE BLD-SCNC: 105 MMOL/L (ref 98–107)
CHLORIDE BLD-SCNC: 109 MMOL/L (ref 98–107)
CHLORIDE BLD-SCNC: 109 MMOL/L (ref 98–107)
CO2: 18 MMOL/L (ref 22–29)
CO2: 19 MMOL/L (ref 22–29)
CO2: 19 MMOL/L (ref 22–29)
CREAT SERPL-MCNC: 6.1 MG/DL (ref 0.5–1)
CREAT SERPL-MCNC: 6.3 MG/DL (ref 0.5–1)
CREAT SERPL-MCNC: 6.5 MG/DL (ref 0.5–1)
DESCRIPTION BLOOD BANK: NORMAL
EOSINOPHILS ABSOLUTE: 0.29 E9/L (ref 0.05–0.5)
EOSINOPHILS RELATIVE PERCENT: 2.8 % (ref 0–6)
FERRITIN: 231 NG/ML
GFR AFRICAN AMERICAN: 8
GFR AFRICAN AMERICAN: 8
GFR AFRICAN AMERICAN: 9
GFR NON-AFRICAN AMERICAN: 7 ML/MIN/1.73
GLUCOSE BLD-MCNC: 75 MG/DL (ref 74–99)
GLUCOSE BLD-MCNC: 76 MG/DL (ref 74–99)
GLUCOSE BLD-MCNC: 89 MG/DL (ref 74–99)
HCT VFR BLD CALC: 22.2 % (ref 34–48)
HCT VFR BLD CALC: 22.3 % (ref 34–48)
HCT VFR BLD CALC: 23.1 % (ref 34–48)
HEMOGLOBIN: 6.8 G/DL (ref 11.5–15.5)
HEMOGLOBIN: 7.1 G/DL (ref 11.5–15.5)
IMMATURE GRANULOCYTES #: 0.16 E9/L
IMMATURE GRANULOCYTES %: 1.5 % (ref 0–5)
IMMATURE RETIC FRACT: 17.8 % (ref 3–15.9)
IRON SATURATION: 19 % (ref 15–50)
IRON SATURATION: 21 % (ref 15–50)
IRON: 39 MCG/DL (ref 37–145)
IRON: 44 MCG/DL (ref 37–145)
LYMPHOCYTES ABSOLUTE: 1.58 E9/L (ref 1.5–4)
LYMPHOCYTES RELATIVE PERCENT: 15.3 % (ref 20–42)
MCH RBC QN AUTO: 29.2 PG (ref 26–35)
MCHC RBC AUTO-ENTMCNC: 30.6 % (ref 32–34.5)
MCV RBC AUTO: 95.3 FL (ref 80–99.9)
MONOCYTES ABSOLUTE: 0.62 E9/L (ref 0.1–0.95)
MONOCYTES RELATIVE PERCENT: 6 % (ref 2–12)
NEUTROPHILS ABSOLUTE: 7.66 E9/L (ref 1.8–7.3)
NEUTROPHILS RELATIVE PERCENT: 74.1 % (ref 43–80)
PDW BLD-RTO: 15.5 FL (ref 11.5–15)
PLATELET # BLD: 283 E9/L (ref 130–450)
PMV BLD AUTO: 10.4 FL (ref 7–12)
POTASSIUM SERPL-SCNC: 5.3 MMOL/L (ref 3.5–5)
POTASSIUM SERPL-SCNC: 5.5 MMOL/L (ref 3.5–5)
POTASSIUM SERPL-SCNC: 5.7 MMOL/L (ref 3.5–5)
RBC # BLD: 2.33 E12/L (ref 3.5–5.5)
RETIC HGB EQUIVALENT: 27.1 PG (ref 28.2–36.6)
RETICULOCYTE ABSOLUTE COUNT: 0.04 E12/L
RETICULOCYTE COUNT PCT: 1.9 % (ref 0.4–1.9)
SODIUM BLD-SCNC: 138 MMOL/L (ref 132–146)
SODIUM BLD-SCNC: 141 MMOL/L (ref 132–146)
SODIUM BLD-SCNC: 141 MMOL/L (ref 132–146)
TOTAL IRON BINDING CAPACITY: 203 MCG/DL (ref 250–450)
TOTAL IRON BINDING CAPACITY: 214 MCG/DL (ref 250–450)
WBC # BLD: 10.3 E9/L (ref 4.5–11.5)

## 2019-06-03 PROCEDURE — P9016 RBC LEUKOCYTES REDUCED: HCPCS

## 2019-06-03 PROCEDURE — 36415 COLL VENOUS BLD VENIPUNCTURE: CPT

## 2019-06-03 PROCEDURE — 86923 COMPATIBILITY TEST ELECTRIC: CPT

## 2019-06-03 PROCEDURE — 80048 BASIC METABOLIC PNL TOTAL CA: CPT

## 2019-06-03 PROCEDURE — 6370000000 HC RX 637 (ALT 250 FOR IP): Performed by: INTERNAL MEDICINE

## 2019-06-03 PROCEDURE — 36591 DRAW BLOOD OFF VENOUS DEVICE: CPT

## 2019-06-03 PROCEDURE — 6360000002 HC RX W HCPCS: Performed by: INTERNAL MEDICINE

## 2019-06-03 PROCEDURE — 82728 ASSAY OF FERRITIN: CPT

## 2019-06-03 PROCEDURE — 2580000003 HC RX 258: Performed by: INTERNAL MEDICINE

## 2019-06-03 PROCEDURE — 86900 BLOOD TYPING SEROLOGIC ABO: CPT

## 2019-06-03 PROCEDURE — 83540 ASSAY OF IRON: CPT

## 2019-06-03 PROCEDURE — 85045 AUTOMATED RETICULOCYTE COUNT: CPT

## 2019-06-03 PROCEDURE — 6360000002 HC RX W HCPCS: Performed by: SPECIALIST

## 2019-06-03 PROCEDURE — 36430 TRANSFUSION BLD/BLD COMPNT: CPT

## 2019-06-03 PROCEDURE — 86901 BLOOD TYPING SEROLOGIC RH(D): CPT

## 2019-06-03 PROCEDURE — 83550 IRON BINDING TEST: CPT

## 2019-06-03 PROCEDURE — 2580000003 HC RX 258: Performed by: SPECIALIST

## 2019-06-03 PROCEDURE — 85014 HEMATOCRIT: CPT

## 2019-06-03 PROCEDURE — 85025 COMPLETE CBC W/AUTO DIFF WBC: CPT

## 2019-06-03 PROCEDURE — 1200000000 HC SEMI PRIVATE

## 2019-06-03 PROCEDURE — 85018 HEMOGLOBIN: CPT

## 2019-06-03 PROCEDURE — 86850 RBC ANTIBODY SCREEN: CPT

## 2019-06-03 RX ORDER — SODIUM BICARBONATE 650 MG/1
650 TABLET ORAL 2 TIMES DAILY
Status: DISCONTINUED | OUTPATIENT
Start: 2019-06-03 | End: 2019-06-14 | Stop reason: HOSPADM

## 2019-06-03 RX ORDER — SODIUM POLYSTYRENE SULFONATE 15 G/60ML
15 SUSPENSION ORAL; RECTAL ONCE
Status: DISCONTINUED | OUTPATIENT
Start: 2019-06-03 | End: 2019-06-12 | Stop reason: ALTCHOICE

## 2019-06-03 RX ORDER — DEXTROSE MONOHYDRATE 25 G/50ML
25 INJECTION, SOLUTION INTRAVENOUS ONCE
Status: COMPLETED | OUTPATIENT
Start: 2019-06-03 | End: 2019-06-03

## 2019-06-03 RX ORDER — 0.9 % SODIUM CHLORIDE 0.9 %
250 INTRAVENOUS SOLUTION INTRAVENOUS ONCE
Status: DISCONTINUED | OUTPATIENT
Start: 2019-06-03 | End: 2019-06-14 | Stop reason: HOSPADM

## 2019-06-03 RX ADMIN — DEXTROSE 50 % IN WATER (D50W) INTRAVENOUS SYRINGE 25 G: at 17:35

## 2019-06-03 RX ADMIN — ONDANSETRON 4 MG: 2 INJECTION INTRAMUSCULAR; INTRAVENOUS at 00:50

## 2019-06-03 RX ADMIN — MEROPENEM 500 MG: 500 INJECTION, POWDER, FOR SOLUTION INTRAVENOUS at 18:54

## 2019-06-03 RX ADMIN — MIRTAZAPINE 15 MG: 15 TABLET, FILM COATED ORAL at 21:01

## 2019-06-03 RX ADMIN — SODIUM BICARBONATE 650 MG TABLET 650 MG: at 21:01

## 2019-06-03 RX ADMIN — DOCUSATE SODIUM 100 MG: 100 CAPSULE, LIQUID FILLED ORAL at 08:59

## 2019-06-03 RX ADMIN — HEPARIN SODIUM 5000 UNITS: 5000 INJECTION, SOLUTION INTRAVENOUS; SUBCUTANEOUS at 16:13

## 2019-06-03 RX ADMIN — INSULIN HUMAN 6 UNITS: 100 INJECTION, SOLUTION PARENTERAL at 17:35

## 2019-06-03 RX ADMIN — Medication 325 MG: at 08:59

## 2019-06-03 RX ADMIN — ATORVASTATIN CALCIUM 40 MG: 40 TABLET, FILM COATED ORAL at 08:58

## 2019-06-03 RX ADMIN — PANTOPRAZOLE SODIUM 40 MG: 40 TABLET, DELAYED RELEASE ORAL at 06:32

## 2019-06-03 RX ADMIN — Medication 10 ML: at 21:02

## 2019-06-03 RX ADMIN — DOCUSATE SODIUM 100 MG: 100 CAPSULE, LIQUID FILLED ORAL at 21:01

## 2019-06-03 RX ADMIN — HEPARIN SODIUM 5000 UNITS: 5000 INJECTION, SOLUTION INTRAVENOUS; SUBCUTANEOUS at 21:02

## 2019-06-03 RX ADMIN — HEPARIN SODIUM 5000 UNITS: 5000 INJECTION, SOLUTION INTRAVENOUS; SUBCUTANEOUS at 06:30

## 2019-06-03 RX ADMIN — Medication 325 MG: at 17:41

## 2019-06-03 RX ADMIN — TRAMADOL HYDROCHLORIDE 25 MG: 50 TABLET, FILM COATED ORAL at 00:11

## 2019-06-03 RX ADMIN — Medication 10 ML: at 10:24

## 2019-06-03 RX ADMIN — MEROPENEM 500 MG: 500 INJECTION, POWDER, FOR SOLUTION INTRAVENOUS at 01:42

## 2019-06-03 RX ADMIN — FLUTICASONE PROPIONATE 2 SPRAY: 50 SPRAY, METERED NASAL at 08:58

## 2019-06-03 RX ADMIN — SODIUM CHLORIDE: 4.5 INJECTION, SOLUTION INTRAVENOUS at 21:02

## 2019-06-03 RX ADMIN — TRAMADOL HYDROCHLORIDE 25 MG: 50 TABLET, FILM COATED ORAL at 21:01

## 2019-06-03 RX ADMIN — LEVOTHYROXINE SODIUM 100 MCG: 100 TABLET ORAL at 06:32

## 2019-06-03 ASSESSMENT — PAIN DESCRIPTION - DESCRIPTORS: DESCRIPTORS: ACHING;CONSTANT

## 2019-06-03 ASSESSMENT — PAIN DESCRIPTION - PROGRESSION: CLINICAL_PROGRESSION: GRADUALLY WORSENING

## 2019-06-03 ASSESSMENT — PAIN SCALES - GENERAL
PAINLEVEL_OUTOF10: 10
PAINLEVEL_OUTOF10: 10

## 2019-06-03 ASSESSMENT — PAIN DESCRIPTION - PAIN TYPE: TYPE: ACUTE PAIN

## 2019-06-03 ASSESSMENT — PAIN DESCRIPTION - LOCATION: LOCATION: ABDOMEN

## 2019-06-03 ASSESSMENT — PAIN DESCRIPTION - ORIENTATION: ORIENTATION: MID

## 2019-06-03 ASSESSMENT — PAIN DESCRIPTION - ONSET: ONSET: ON-GOING

## 2019-06-03 ASSESSMENT — PAIN - FUNCTIONAL ASSESSMENT: PAIN_FUNCTIONAL_ASSESSMENT: PREVENTS OR INTERFERES WITH MANY ACTIVE NOT PASSIVE ACTIVITIES

## 2019-06-03 ASSESSMENT — PAIN DESCRIPTION - FREQUENCY: FREQUENCY: CONTINUOUS

## 2019-06-03 NOTE — PROGRESS NOTES
Pelvic CT reviewed  Her medical problems noted    Will need Exam under anesthesia if she can be cleared medically    P Will get a pelvic sonogram to see if they can shed some light on the vaginal nodularities

## 2019-06-03 NOTE — PROGRESS NOTES
[facility] PROGRESS NOTE                                                                       6/3/2019        CHIEF UROLOGIC COMPLAINT: Hydronephrosis, NGB, ARF    HISTORY OF PRESENT ILLNESS:  Patient without new complaints. Tolerating stents and catheter. Good UOP yesterday but has declined substantially.       REVIEW OF SYSTEMS:   CONSTITUTIONAL: negative  HEENT: negative  HEMATOLOGIC: negative  ENDOCRINE: negative  RESPIRATORY: negative  CV: negative  GI: negative  NEURO: negative  ORTHOPEDICS: negative  PSYCHIATRIC: negative  : as above    PAST FAMILY HISTORY:    Family History   Problem Relation Age of Onset    Diabetes Brother     Thyroid Disease Mother     Other Daughter         Autism     PAST SOCIAL HISTORY:    Social History     Socioeconomic History    Marital status: Single     Spouse name: None    Number of children: 1    Years of education: None    Highest education level: None   Occupational History    Occupation: unemployed     Employer: not employed   Social Needs    Financial resource strain: None    Food insecurity:     Worry: None     Inability: None    Transportation needs:     Medical: None     Non-medical: None   Tobacco Use    Smoking status: Former Smoker     Packs/day: 1.00     Years: 5.00     Pack years: 5.00     Last attempt to quit: 1989     Years since quittin.4    Smokeless tobacco: Never Used   Substance and Sexual Activity    Alcohol use: No    Drug use: No    Sexual activity: None   Lifestyle    Physical activity:     Days per week: None     Minutes per session: None    Stress: None   Relationships    Social connections:     Talks on phone: None     Gets together: None     Attends [employer] service: None     Active member of club or organization: None     Attends meetings of clubs or organizations: None     Relationship status: None  Intimate partner violence:     Fear of current or ex partner: None     Emotionally abused: None     Physically abused: None     Forced sexual activity: None   Other Topics Concern    None   Social History Narrative    None       Scheduled Meds:   pantoprazole  40 mg Oral QAM AC    chlorhexidine  15 mL Mouth/Throat BID    meropenem  500 mg Intravenous Q12H    atorvastatin  40 mg Oral Daily    docusate sodium  100 mg Oral BID    ferrous sulfate  325 mg Oral BID WC    fluticasone  2 spray Nasal Daily    levothyroxine  100 mcg Oral Daily    mirtazapine  15 mg Oral Nightly    sodium chloride flush  10 mL Intravenous 2 times per day    heparin (porcine)  5,000 Units Subcutaneous 3 times per day     Continuous Infusions:   sodium chloride 100 mL/hr at 06/02/19 2229    dextrose       PRN Meds:.glucose, dextrose, glucagon (rDNA), dextrose, traMADol, sodium chloride flush, magnesium hydroxide, ondansetron    BP (!) 147/81   Pulse 75   Temp 98 °F (36.7 °C) (Oral)   Resp 18   Ht 4' 11\" (1.499 m)   Wt 145 lb 12.8 oz (66.1 kg)   LMP 05/21/2015 (Approximate)   SpO2 98%   BMI 29.45 kg/m²     Lab Results   Component Value Date    WBC 10.5 06/02/2019    HGB 8.2 (L) 06/02/2019    HCT 26.9 (L) 06/02/2019    MCV 94.1 06/02/2019     06/02/2019       Lab Results   Component Value Date    CREATININE 6.1 (H) 06/03/2019       CT ABDOMEN PELVIS WO CONTRAST Additional Contrast? Oral   Status: Final result   Order Providers     Authorizing Billing   MD Whitney Acevedo MD          Signed by     Signed Date/Time  Phone Pager   Charanjit [facility] 6/02/2019 17:19 [phone]    [facility]     Read Date Phone Pager   [address] [place] Jun 2, 2019 [phone]    Routing History     Priority Sent On From To Message Type    6/1/2019  1:11 PM Casper, Mhy Incoming Results From Marcio Murray  [address] Results    6/1/2019  1:11 PM Casper, Mhy Incoming Results From Linda properly positioned stent with   hydronephrosis and hydroureter. No stone seen adjacent to the stent.       Braden catheter in the urinary bladder makes evaluation difficult. The   bladder is contracted and there is air present likely from the Braden. Phleboliths are present in the lower pelvis.           Impression   1. Development of small bilateral pleural effusions right side greater   than left. 2. Retained fluid in the esophagus as described. 3. Bilateral hydronephrosis and hydroureter. Ureteral stents appear to   properly positioned. No stones seen. 4. New presacral edema of the lower pelvis. 5. Suspected duodenal wall thickening of the second portion similar to   the prior examination without duodenal inflammatory changes,   equivocal.           PHYSICAL EXAMINATION:  Skin dry, without rashes  Respirations non-labored, intact  Abdomen soft, non-tender, non-distended  Alert and oriented x3  Braden draining in correct position with flushing confirmed      ASSESSMENT AND PLAN:  NGB with BL hydronephrosis POD #2 from stent change  -GYN consulted for abnormal pelvic exam  -nephrology following for CKD - now oliguric.   Concern this is multifactorial. Cr baseline is 1.4  -ID following for abx  -stents in good position on CT scan and will need to remain in place for now    I had a long discussion with Ya that her catheter Cannot be removed    We will continue to follow    Electronically signed by Harika Villela MD on 6/3/2019 at 6:42 AM

## 2019-06-03 NOTE — PROGRESS NOTES
Spoke with patients brother Manjeet Irby about his being a second signer of consent on her consent form. He stated that she signs her own consents and that he didn't want to be apart of  her medical decisions and that he had enough on his plate already with his children. I brought up that he told me about her mental capacity being delayed and that we would feel more comfortable if he would also consent to the blood transfusion. Tamekaeli Oc stated again that she signs her own consents but he feels it is time for her to have a guardian or a POA but he does not want to take on the role.

## 2019-06-03 NOTE — PROGRESS NOTES
Called Dr. Justin Lockett with patient situation, Spoke with him on patient's ability to make decisions on her own. He stated that when he evaluated her last week that she was competent to make decisions for herself.

## 2019-06-03 NOTE — PROGRESS NOTES
Patient is refusing to take KAYEXALATE. Patient was educated on the importance of taking the medication. Patient is still refusing to take the medication at this time.

## 2019-06-03 NOTE — PROGRESS NOTES
[id number]  Internal Medicine  -Resident Progress Note-    Ramesh Interiano / DEVIN 1965 / MRN [id number] / Zygmunt Cipro 6/3/2019 / Hospital Day: 4    PCP:  Tory Wayne DO  Admitting Physician:  Ashley Alarcon DO  Consultants: Nephrology, OB/GYN, Urology, Infectious disease  Chief Complaint:    Chief Complaint   Patient presents with    Abnormal Lab     PATIENT TO THE ED TODAY PER NH WITH C/O ABNORMAL LABS. PATIENT WITH CREAT OF 5.6 AND BUN 45       Subjective / Overnight Events  Cyndy Stevens was seen and examined at bedside today; no family was present for the examination. Patient's urine output has drastically declined since yesterday. She reports some mild abdominal pain. She denies any vaginal bleeding. Review of Systems  All bolded are positive; please see HPI  General:  Fever, chills, diaphoresis, fatigue, malaise, night sweats, weight loss  Psychological:  Anxiety, disorientation, hallucinations. ENT:  Epistaxis, vertigo, visual changes. Cardiovascular:  Chest pain, irregular heartbeats, palpitations, paroxysmal nocturnal dyspnea. Respiratory:  Shortness of breath, coughing, sputum production, hemoptysis, wheezing, orthopnea.   Gastrointestinal:  Nausea, vomiting, diarrhea, heartburn, constipation, abdominal pain, hematemesis, hematochezia, melena, acholic stools  Genito-Urinary:  Dysuria, urgency, frequency, hematuria, vaginal bleeding  Musculoskeletal:  Joint pain, joint stiffness, joint swelling, muscle pain  Neurology:  Headache, focal neurological deficits, weakness, numbness, paresthesia  Derm:  Rashes, ulcers, excoriations, bruising  Extremities:  Decreased ROM, peripheral edema, mottling    Physical Examination  Vitals:  BP (!) 145/71   Pulse 75   Temp 98.5 °F (36.9 °C) (Oral)   Resp 18   Ht 4' 11\" (1.499 m)   Wt 145 lb 12.8 oz (66.1 kg)   LMP 2015 (Approximate)   SpO2 97%   BMI 29.45 kg/m²   General Appearance:  awake, alert, and oriented to person, place, time, and purpose; appears stated age and cooperative; no apparent distress no labored breathing  HEENT:  PERRL; EOMI; sclera clear; buccal mucosa moist  Neck:  supple; trachea midline; no thyromegaly; no JVD; no bruits  Heart:  rhythm regular; rate controlled; no murmurs  Lungs:  symmetrical; clear to auscultation bilaterally; no wheezes; no rhonchi; no rales  Abdomen:  soft, non-tender, non-distended; bowel sounds positive; no organomegaly or masses  Extremities:  peripheral pulses present; no peripheral edema; no ulcers  Neurologic:  alert and oriented x 3; no focal deficit; cranial nerves grossly intact  Skin:  no petechia; no hemorrhage; no wounds    Medications  Scheduled Meds    sodium chloride  250 mL Intravenous Once    pantoprazole  40 mg Oral QAM AC    chlorhexidine  15 mL Mouth/Throat BID    meropenem  500 mg Intravenous Q12H    atorvastatin  40 mg Oral Daily    docusate sodium  100 mg Oral BID    ferrous sulfate  325 mg Oral BID WC    fluticasone  2 spray Nasal Daily    levothyroxine  100 mcg Oral Daily    mirtazapine  15 mg Oral Nightly    sodium chloride flush  10 mL Intravenous 2 times per day    heparin (porcine)  5,000 Units Subcutaneous 3 times per day     Infusion Meds    sodium chloride 100 mL/hr at 06/02/19 2229    dextrose       PRN Meds glucose, dextrose, glucagon (rDNA), dextrose, traMADol, sodium chloride flush, magnesium hydroxide, ondansetron    · Recent labs, imaging, and micro results are available in the EMR and have been reviewed. Assessment and Plan  Patient is a 52 y.o. female who presented with acute renal failure.   The active problem list is as follows:      · Acute on chronic kidney disease stage IV compatible with a neurogenic bladder with chronic b/l ureteral stents--status post cystoscopy with bilateral ureteral stents is changed on June 1  · Abnormal pelvic exam showing a firm abnormal nodule lesion within the vagina with its urethra with cystinosis suspicion for gynecological malignancy  · Sepsis secondary to a urinary tract infection  · Normocytic anemia of chronic disease  · Hypothyroidism  · Chronic mental disability  · Hyperlipidemia    Patient's urine output has significantly declined since yesterday. She has had no urine output since midnight and was bladder scanned with zero retained urine. Nephrology is follow and case will be discussed with them as patient may be requiring dialysis in the near future. Hemoglobin has dropped to 6.8 this morning, will obtain iron studies and transfuse 1 unit PRBC. Gynecology has seen the patient regarding the vaginal nodularities noted during urology procedure. CT pelvis done showing presacral edema, but no evidence of obvious mass or malignancy. Further evaluation with possible biopsy would need to be done under anesthesia. · Routine labs in AM.  · DVT prophylaxis. · Please see orders for further management and care. The plan was discussed with  [facility].     Giana Johnson DO PGY2  6/3/2019  10:00 AM

## 2019-06-03 NOTE — PROGRESS NOTES
Spoke with Dr. Lea Veliz about patient's mental capacity. He feels that she is able to sign her consent forms for short term decisions, but may need a guardian to make decisions for her for life long decisions.

## 2019-06-03 NOTE — PROGRESS NOTES
[facility] MD Adry Valverde MD Kendra Chow, MD. Otto Mercedes MD   Progress Note    6/3/2019    SUBJECTIVE:   On RA   Braden in place   PROBLEM LIST:    Principal Problem:    Acute renal failure (ARF) ([address]  Resolved Problems:    * No resolved hospital problems. *       DIET:    DIET RENAL;       Allergies : Patient has no known allergies. Past Medical History:   Diagnosis Date    Anxiety     Depression     Hyperlipidemia     Hypertension     Hypothyroidism     Intellectual disability     Leg pain, bilateral     Noncompliance with medications     Noncompliance with treatment     Osteoarthritis        Past Surgical History:   Procedure Laterality Date    CYSTOSCOPY Left 1/21/2019    CYSTOSCOPY, BILATERAL RETROGRADE PYELOGRAMS, BILATERAL STENT INSERTION performed by Kaley Das MD at [address] Bilateral 6/1/2019    CYSTOSCOPY, RETROGRADE PYELOGRAMS, BILATERAL URETERAL STENT EXCHANGE performed by Asya Alves MD at [address]       Family History   Problem Relation Age of Onset    Diabetes Brother     Thyroid Disease Mother     Other Daughter         Autism        reports that she quit smoking about 30 years ago. She has a 5.00 pack-year smoking history. She has never used smokeless tobacco. She reports that she does not drink alcohol or use drugs. Review of Systems:   Constitutional: no fevers , no chills , feels ok   Eyes: no eye pain , no itching , no drainage  Ears, nose, mouth, throat, and face: no ear ,nose pain , hearing is ok ,no nasal drainage   Respiratory: no sob ,no cough ,no wheezing . Cardiovascular: no chest pain , no palpitation ,no sob . Gastrointestinal: no nausea, vomiting , constipation , no abdominal pain . Genitourinary:no urinary retention , no burning , dysuria . No polyuria   Hematologic/lymphatic: no bleeding , no cougulation issues . Musculoskeletal:no joint pain , no swelling .    Neurological: no headaches ,no weakness , no numbness . Endocrine: no thirst , no weight issues .      MEDS (scheduled):    sodium chloride  250 mL Intravenous Once    sodium polystyrene  15 g Oral Once    sodium bicarbonate  650 mg Oral BID    pantoprazole  40 mg Oral QAM AC    chlorhexidine  15 mL Mouth/Throat BID    meropenem  500 mg Intravenous Q12H    atorvastatin  40 mg Oral Daily    docusate sodium  100 mg Oral BID    ferrous sulfate  325 mg Oral BID WC    fluticasone  2 spray Nasal Daily    levothyroxine  100 mcg Oral Daily    mirtazapine  15 mg Oral Nightly    sodium chloride flush  10 mL Intravenous 2 times per day    heparin (porcine)  5,000 Units Subcutaneous 3 times per day       MEDS (infusions):   sodium chloride 100 mL/hr at 06/02/19 2229    dextrose         MEDS (prn):  glucose, dextrose, glucagon (rDNA), dextrose, traMADol, sodium chloride flush, ondansetron    PHYSICAL EXAM:     Patient Vitals for the past 24 hrs:   BP Temp Temp src Pulse Resp SpO2   06/03/19 0745 (!) 145/71 98.5 °F (36.9 °C) Oral 75 18 97 %   06/03/19 0011 -- -- -- -- -- 98 %   06/03/19 0000 -- -- -- 75 -- --   06/02/19 1703 (!) 147/81 98 °F (36.7 °C) Oral 80 18 100 %   @      Intake/Output Summary (Last 24 hours) at 6/3/2019 1435  Last data filed at 6/3/2019 1422  Gross per 24 hour   Intake 1220 ml   Output 2235 ml   Net -1015 ml         Wt Readings from Last 3 Encounters:   05/31/19 145 lb 12.8 oz (66.1 kg)   05/23/19 148 lb (67.1 kg)   05/15/19 140 lb 10.5 oz (63.8 kg)       Constitutional:  in no acute distress  Oral: mucus membranes moist  Neck: no JVD  Cardiovascular: S1, S2 regular rhythm, no murmur,or rub  Respiratory:  No crackles, no wheeze  Gastrointestinal:  Soft, nontender, nondistended, NABS  Ext: no edema, feet warm  Skin: dry, no rash  Neuro: awake, alert, interactive      DATA:    Recent Labs     06/01/19  0815 06/02/19  0610 06/03/19  0640 06/03/19  1000   WBC 11.3 10.5 10.3  --    HGB 7.8* 8.2* 6.8*  -- HCT 24.8* 26.9* 22.2* 22.3*   MCV 92.2 94.1 95.3  --     344 283  --      Recent Labs     05/31/19  1530 06/01/19  0815  06/02/19  1557 06/03/19  0030 06/03/19  0640    139   < > 143 141 141   K 5.4* 6.4*   < > 5.6* 5.3* 5.5*    107   < > 109* 109* 109*   CO2 22 19*   < > 19* 19* 19*   MG  --  1.5*  --   --   --   --    PHOS  --  7.1*  --   --   --   --    BUN 49* 49*   < > 47* 46* 48*   CREATININE 6.1* 6.6*   < > 5.8* 6.1* 6.3*   ALT 23  --   --   --   --   --    AST 18  --   --   --   --   --    BILITOT <0.2  --   --   --   --   --    ALKPHOS 56  --   --   --   --   --     < > = values in this interval not displayed. No results found for: LABPROT    ASSESSMENT / RECOMMENDATIONS:      Acute kidney injury 2.2 obstructive uropathy in contest of poorly functioning bladder and neurogenic bladder .   -Chronic kidney disease baseline cr 1.4   -Sepsis secondary to a urinary tract infection  -Anaemia of chronic disease . R/O acute component .  -Chronic mental disability  -firm abnormal nodule lesion within the vagina with its urethra with cystinosis suspicion for gynecological malignancy              Plan :   S/p Cystourethroscopy, bilateral JJ ureteral stent insertion  Cr trending up , oligo uric  . If same trend continue then will plan on dialysis catheter and initiate RRT   Meanwhile treat hyper K . Flush crane [place] sure it is in good position   Transfuse RBC . Check iron studies .                Electronically signed by Leeann Guillory MD on 6/3/2019 at 2:35 PM

## 2019-06-03 NOTE — PROGRESS NOTES
Spoke with Manjeet Irby about a consent form for his sister. His sister Humble Quintanilla stated that if that the procedure was ok with Dave Renae it was ok with her. Dave Renae consented for the procedure and stated that his sister has the mental capacity of a 15year old, but she still is able to sign her consent.

## 2019-06-03 NOTE — CARE COORDINATION
Ss note:6/3/2019.1:27 PM Pt is from [facility] skilled rehab and can return with a [address] . Consult noted for Guardianship, per nursing pts brother Ajit Cruz no longer wishes to be involved in helping make decisions for the pt, there is not any POA paperwork on file for this pt. Pt may need outpt dialysis set up in the near future. Will await Psych consult to determine if pt will require Guardianship and sw will assist as appropriate.  AYO Blackwell

## 2019-06-04 ENCOUNTER — APPOINTMENT (OUTPATIENT)
Dept: ULTRASOUND IMAGING | Age: 54
DRG: 466 | End: 2019-06-04
Payer: COMMERCIAL

## 2019-06-04 ENCOUNTER — APPOINTMENT (OUTPATIENT)
Dept: INTERVENTIONAL RADIOLOGY/VASCULAR | Age: 54
DRG: 466 | End: 2019-06-04
Payer: COMMERCIAL

## 2019-06-04 LAB
ANION GAP SERPL CALCULATED.3IONS-SCNC: 14 MMOL/L (ref 7–16)
ANION GAP SERPL CALCULATED.3IONS-SCNC: 14 MMOL/L (ref 7–16)
ANION GAP SERPL CALCULATED.3IONS-SCNC: 15 MMOL/L (ref 7–16)
ANION GAP SERPL CALCULATED.3IONS-SCNC: 16 MMOL/L (ref 7–16)
BASOPHILS ABSOLUTE: 0.04 E9/L (ref 0–0.2)
BASOPHILS RELATIVE PERCENT: 0.3 % (ref 0–2)
BUN BLDV-MCNC: 49 MG/DL (ref 6–20)
BUN BLDV-MCNC: 51 MG/DL (ref 6–20)
BUN BLDV-MCNC: 51 MG/DL (ref 6–20)
BUN BLDV-MCNC: 53 MG/DL (ref 6–20)
CALCIUM SERPL-MCNC: 8.2 MG/DL (ref 8.6–10.2)
CALCIUM SERPL-MCNC: 8.2 MG/DL (ref 8.6–10.2)
CALCIUM SERPL-MCNC: 8.5 MG/DL (ref 8.6–10.2)
CALCIUM SERPL-MCNC: 8.6 MG/DL (ref 8.6–10.2)
CHLORIDE BLD-SCNC: 104 MMOL/L (ref 98–107)
CHLORIDE BLD-SCNC: 104 MMOL/L (ref 98–107)
CHLORIDE BLD-SCNC: 105 MMOL/L (ref 98–107)
CHLORIDE BLD-SCNC: 107 MMOL/L (ref 98–107)
CO2: 14 MMOL/L (ref 22–29)
CO2: 16 MMOL/L (ref 22–29)
CO2: 17 MMOL/L (ref 22–29)
CO2: 18 MMOL/L (ref 22–29)
CREAT SERPL-MCNC: 7 MG/DL (ref 0.5–1)
CREAT SERPL-MCNC: 7.1 MG/DL (ref 0.5–1)
CREAT SERPL-MCNC: 7.2 MG/DL (ref 0.5–1)
CREAT SERPL-MCNC: 7.3 MG/DL (ref 0.5–1)
EOSINOPHILS ABSOLUTE: 0.32 E9/L (ref 0.05–0.5)
EOSINOPHILS RELATIVE PERCENT: 2.7 % (ref 0–6)
GFR AFRICAN AMERICAN: 7
GFR NON-AFRICAN AMERICAN: 6 ML/MIN/1.73
GLUCOSE BLD-MCNC: 76 MG/DL (ref 74–99)
GLUCOSE BLD-MCNC: 77 MG/DL (ref 74–99)
GLUCOSE BLD-MCNC: 83 MG/DL (ref 74–99)
GLUCOSE BLD-MCNC: 83 MG/DL (ref 74–99)
HCT VFR BLD CALC: 26.5 % (ref 34–48)
HCT VFR BLD CALC: 27 % (ref 34–48)
HEMOGLOBIN: 8.3 G/DL (ref 11.5–15.5)
HEMOGLOBIN: 8.4 G/DL (ref 11.5–15.5)
IMMATURE GRANULOCYTES #: 0.2 E9/L
IMMATURE GRANULOCYTES %: 1.7 % (ref 0–5)
INR BLD: 1.2
LYMPHOCYTES ABSOLUTE: 1.44 E9/L (ref 1.5–4)
LYMPHOCYTES RELATIVE PERCENT: 12.2 % (ref 20–42)
MCH RBC QN AUTO: 28.8 PG (ref 26–35)
MCHC RBC AUTO-ENTMCNC: 31.1 % (ref 32–34.5)
MCV RBC AUTO: 92.5 FL (ref 80–99.9)
MONOCYTES ABSOLUTE: 0.63 E9/L (ref 0.1–0.95)
MONOCYTES RELATIVE PERCENT: 5.4 % (ref 2–12)
NEUTROPHILS ABSOLUTE: 9.14 E9/L (ref 1.8–7.3)
NEUTROPHILS RELATIVE PERCENT: 77.7 % (ref 43–80)
PDW BLD-RTO: 15.2 FL (ref 11.5–15)
PLATELET # BLD: 300 E9/L (ref 130–450)
PMV BLD AUTO: 10.3 FL (ref 7–12)
POTASSIUM SERPL-SCNC: 5.7 MMOL/L (ref 3.5–5)
POTASSIUM SERPL-SCNC: 5.8 MMOL/L (ref 3.5–5)
POTASSIUM SERPL-SCNC: 5.9 MMOL/L (ref 3.5–5)
POTASSIUM SERPL-SCNC: 6 MMOL/L (ref 3.5–5)
PROTHROMBIN TIME: 13.6 SEC (ref 9.3–12.4)
RBC # BLD: 2.92 E12/L (ref 3.5–5.5)
SODIUM BLD-SCNC: 135 MMOL/L (ref 132–146)
SODIUM BLD-SCNC: 136 MMOL/L (ref 132–146)
SODIUM BLD-SCNC: 136 MMOL/L (ref 132–146)
SODIUM BLD-SCNC: 137 MMOL/L (ref 132–146)
WBC # BLD: 11.8 E9/L (ref 4.5–11.5)

## 2019-06-04 PROCEDURE — 2500000003 HC RX 250 WO HCPCS: Performed by: RADIOLOGY

## 2019-06-04 PROCEDURE — 1200000000 HC SEMI PRIVATE

## 2019-06-04 PROCEDURE — 36591 DRAW BLOOD OFF VENOUS DEVICE: CPT

## 2019-06-04 PROCEDURE — 5A1D70Z PERFORMANCE OF URINARY FILTRATION, INTERMITTENT, LESS THAN 6 HOURS PER DAY: ICD-10-PCS | Performed by: INTERNAL MEDICINE

## 2019-06-04 PROCEDURE — 02HV33Z INSERTION OF INFUSION DEVICE INTO SUPERIOR VENA CAVA, PERCUTANEOUS APPROACH: ICD-10-PCS | Performed by: INTERNAL MEDICINE

## 2019-06-04 PROCEDURE — 80048 BASIC METABOLIC PNL TOTAL CA: CPT

## 2019-06-04 PROCEDURE — C1894 INTRO/SHEATH, NON-LASER: HCPCS

## 2019-06-04 PROCEDURE — 85018 HEMOGLOBIN: CPT

## 2019-06-04 PROCEDURE — 2580000003 HC RX 258: Performed by: INTERNAL MEDICINE

## 2019-06-04 PROCEDURE — 80074 ACUTE HEPATITIS PANEL: CPT

## 2019-06-04 PROCEDURE — 76856 US EXAM PELVIC COMPLETE: CPT

## 2019-06-04 PROCEDURE — 36556 INSERT NON-TUNNEL CV CATH: CPT

## 2019-06-04 PROCEDURE — 6370000000 HC RX 637 (ALT 250 FOR IP): Performed by: SPECIALIST

## 2019-06-04 PROCEDURE — 85014 HEMATOCRIT: CPT

## 2019-06-04 PROCEDURE — 6360000002 HC RX W HCPCS: Performed by: RADIOLOGY

## 2019-06-04 PROCEDURE — 85610 PROTHROMBIN TIME: CPT

## 2019-06-04 PROCEDURE — 85025 COMPLETE CBC W/AUTO DIFF WBC: CPT

## 2019-06-04 PROCEDURE — 90935 HEMODIALYSIS ONE EVALUATION: CPT

## 2019-06-04 PROCEDURE — 36415 COLL VENOUS BLD VENIPUNCTURE: CPT

## 2019-06-04 PROCEDURE — 6370000000 HC RX 637 (ALT 250 FOR IP): Performed by: INTERNAL MEDICINE

## 2019-06-04 PROCEDURE — 77001 FLUOROGUIDE FOR VEIN DEVICE: CPT

## 2019-06-04 PROCEDURE — 2580000003 HC RX 258: Performed by: SPECIALIST

## 2019-06-04 PROCEDURE — 6360000002 HC RX W HCPCS: Performed by: INTERNAL MEDICINE

## 2019-06-04 PROCEDURE — 76937 US GUIDE VASCULAR ACCESS: CPT

## 2019-06-04 PROCEDURE — 6360000002 HC RX W HCPCS: Performed by: SPECIALIST

## 2019-06-04 RX ORDER — LIDOCAINE HYDROCHLORIDE 10 MG/ML
10 INJECTION, SOLUTION INFILTRATION; PERINEURAL ONCE
Status: COMPLETED | OUTPATIENT
Start: 2019-06-04 | End: 2019-06-04

## 2019-06-04 RX ORDER — MIDAZOLAM HYDROCHLORIDE 1 MG/ML
1 INJECTION INTRAMUSCULAR; INTRAVENOUS ONCE
Status: COMPLETED | OUTPATIENT
Start: 2019-06-04 | End: 2019-06-04

## 2019-06-04 RX ORDER — DEXTROSE MONOHYDRATE 25 G/50ML
25 INJECTION, SOLUTION INTRAVENOUS ONCE
Status: DISCONTINUED | OUTPATIENT
Start: 2019-06-04 | End: 2019-06-05

## 2019-06-04 RX ADMIN — SODIUM BICARBONATE 650 MG TABLET 650 MG: at 10:08

## 2019-06-04 RX ADMIN — TRAMADOL HYDROCHLORIDE 25 MG: 50 TABLET, FILM COATED ORAL at 21:20

## 2019-06-04 RX ADMIN — Medication 10 ML: at 23:03

## 2019-06-04 RX ADMIN — HEPARIN SODIUM 5000 UNITS: 5000 INJECTION, SOLUTION INTRAVENOUS; SUBCUTANEOUS at 06:20

## 2019-06-04 RX ADMIN — MIRTAZAPINE 15 MG: 15 TABLET, FILM COATED ORAL at 21:21

## 2019-06-04 RX ADMIN — MEROPENEM 500 MG: 500 INJECTION, POWDER, FOR SOLUTION INTRAVENOUS at 06:19

## 2019-06-04 RX ADMIN — FLUTICASONE PROPIONATE 2 SPRAY: 50 SPRAY, METERED NASAL at 10:21

## 2019-06-04 RX ADMIN — DOCUSATE SODIUM 100 MG: 100 CAPSULE, LIQUID FILLED ORAL at 10:08

## 2019-06-04 RX ADMIN — PANTOPRAZOLE SODIUM 40 MG: 40 TABLET, DELAYED RELEASE ORAL at 06:20

## 2019-06-04 RX ADMIN — LIDOCAINE HYDROCHLORIDE 10 ML: 10 INJECTION, SOLUTION INFILTRATION; PERINEURAL at 15:18

## 2019-06-04 RX ADMIN — HEPARIN SODIUM 5000 UNITS: 5000 INJECTION, SOLUTION INTRAVENOUS; SUBCUTANEOUS at 21:21

## 2019-06-04 RX ADMIN — MIDAZOLAM HYDROCHLORIDE 1 MG: 2 INJECTION, SOLUTION INTRAMUSCULAR; INTRAVENOUS at 14:40

## 2019-06-04 RX ADMIN — ATORVASTATIN CALCIUM 40 MG: 40 TABLET, FILM COATED ORAL at 10:08

## 2019-06-04 RX ADMIN — LEVOTHYROXINE SODIUM 100 MCG: 100 TABLET ORAL at 06:20

## 2019-06-04 RX ADMIN — SODIUM BICARBONATE 650 MG TABLET 650 MG: at 21:21

## 2019-06-04 RX ADMIN — MEROPENEM 500 MG: 500 INJECTION, POWDER, FOR SOLUTION INTRAVENOUS at 21:13

## 2019-06-04 RX ADMIN — Medication 325 MG: at 10:08

## 2019-06-04 RX ADMIN — SODIUM CHLORIDE: 4.5 INJECTION, SOLUTION INTRAVENOUS at 12:05

## 2019-06-04 ASSESSMENT — PAIN SCALES - GENERAL
PAINLEVEL_OUTOF10: 5
PAINLEVEL_OUTOF10: 9
PAINLEVEL_OUTOF10: 10
PAINLEVEL_OUTOF10: 0

## 2019-06-04 NOTE — PROGRESS NOTES
No new gyn complaints  Cr level is increasing    Awaiting medical stabilization and clearnce prior to EUA and vaginal biopsies

## 2019-06-04 NOTE — PROGRESS NOTES
[facility]  [facility]  Progress Note      CC: still with pain  ID following for hydronephrosis- atbs management  Face to face encounter     SUBJECTIVE:  Patient is tolerating medications. No reported adverse drug reactions. ROS: nausea no vomiting, diarrhea. + abdominal pain. No fevers. Had chills No rash. Tolerating atbs. has crane HEMATURIA    Medications:  Scheduled Meds:   sodium chloride  250 mL Intravenous Once    sodium polystyrene  15 g Oral Once    sodium bicarbonate  650 mg Oral BID    pantoprazole  40 mg Oral QAM AC    chlorhexidine  15 mL Mouth/Throat BID    meropenem  500 mg Intravenous Q12H    atorvastatin  40 mg Oral Daily    docusate sodium  100 mg Oral BID    ferrous sulfate  325 mg Oral BID WC    fluticasone  2 spray Nasal Daily    levothyroxine  100 mcg Oral Daily    mirtazapine  15 mg Oral Nightly    sodium chloride flush  10 mL Intravenous 2 times per day    heparin (porcine)  5,000 Units Subcutaneous 3 times per day     Continuous Infusions:   sodium chloride 135 mL/hr at 06/03/19 2142    dextrose       PRN Meds:magnesium hydroxide, glucose, dextrose, glucagon (rDNA), dextrose, traMADol, sodium chloride flush, ondansetron  OBJECTIVE:  Patient Vitals for the past 24 hrs:   BP Temp Temp src Pulse Resp SpO2   06/03/19 1938 129/64 98.3 °F (36.8 °C) Oral 70 16 97 %   06/03/19 1637 (!) 155/69 97.9 °F (36.6 °C) Oral 80 16 96 %   06/03/19 1530 (!) 157/75 96.3 °F (35.7 °C) Oral 78 16 96 %     Constitutional: The patient is awake, alert, asking questions, eating breakfast.  Skin: Warm and dry. No rashes were noted. Head: at/nc   Neck: Supple to movements. Chest: No use of accessory muscles to breathe. Symmetrical expansion. Auscultation reveals no wheezing, crackles, or rhonchi. Cardiovascular: S1 and S2 are rhythmic and regular. No murmurs appreciated. Abdomen: Positive bowel sounds to auscultation.     Extremities: No clubbing, no cyanosis, no edema.  Right upper arm with line picc  Braden HEMATURIA    Laboratory and Tests Review:  Lab Results   Component Value Date    WBC 11.8 (H) 06/04/2019    WBC 10.3 06/03/2019    WBC 10.5 06/02/2019    HGB 8.4 (L) 06/04/2019    HCT 27.0 (L) 06/04/2019    MCV 92.5 06/04/2019     06/04/2019     Lab Results   Component Value Date    NEUTROABS 9.14 (H) 06/04/2019    NEUTROABS 7.66 (H) 06/03/2019    NEUTROABS 7.85 (H) 06/02/2019     Lab Results   Component Value Date    ALT 23 05/31/2019    AST 18 05/31/2019    ALKPHOS 56 05/31/2019    BILITOT <0.2 05/31/2019     Lab Results   Component Value Date     06/04/2019    K 5.9 06/04/2019    K 5.4 05/31/2019     06/04/2019    CO2 17 06/04/2019    BUN 51 06/04/2019    CREATININE 7.1 06/04/2019    GFRAA 7 06/04/2019    LABGLOM 6 06/04/2019    GLUCOSE 83 06/04/2019    GLUCOSE 97 12/20/2011    PROT 6.0 05/31/2019    LABALBU 3.1 05/31/2019    LABALBU 4.4 12/20/2011    CALCIUM 8.2 06/04/2019    BILITOT <0.2 05/31/2019    ALKPHOS 56 05/31/2019    AST 18 05/31/2019    ALT 23 05/31/2019     Radiology:  Reviewed     Microbiology:   5/31/19- urine cx- no growth to date  Blood cx- no growth to date    ASSESSMENT:  · Bilateral hydronephrosis   · Neurogenic bladder  · Leukocytosis- improved   · S/p stent exchange  · UTI f/u cx ngtd  · JIAN NO CHNAGE    PLAN:  · Continue meropenem for now CHECK URINE EOS  · Check cultures- No growth to date WILL REPEAT  · Vancomycin random level is 42.6  · Nephrology/ urology following  · gynecology to evaluate -Post-operative Diagnosis:  Fixated and stenotic urethra with abnormal and nodular vaginal examination suspicious for malignancy  · Monitor labs  PROB STOP ATBX     An opportunity to ask questions was given to the patient and questions were answered.       Electronically signed by Darrick Vogel MD on 6/4/2019 at 8:59 AM    Phone [phone]  Fax [phone]

## 2019-06-04 NOTE — PROGRESS NOTES
[facility] UROLOGY  PROGRESS NOTE    Chief Complaint:  Bilateral hydronephrosis/Left ureteral  calculus/Microhematuria/ARF/Gross hematuria/UTI    HPI:  She is tired and not sleeping well. She still has abdominal discomfort but is feeling a little better. She is to have a pelvic ultrasound today. Her Braden catheter is currently clamped for this procedure. Vitals:    06/03/19 1938   BP: 129/64   Pulse: 70   Resp: 16   Temp: 98.3 °F (36.8 °C)   SpO2: 97%       Allergies: Patient has no known allergies.     PAST MEDICAL HISTORY:   Past Medical History:   Diagnosis Date    Anxiety     Depression     Hyperlipidemia     Hypertension     Hypothyroidism     Intellectual disability     Leg pain, bilateral     Noncompliance with medications     Noncompliance with treatment     Osteoarthritis        PAST SURGICAL HISTORY:   Past Surgical History:   Procedure Laterality Date    CYSTOSCOPY Left 1/21/2019    CYSTOSCOPY, BILATERAL RETROGRADE PYELOGRAMS, BILATERAL STENT INSERTION performed by Subha Obrien MD at [address] [place] [address] Bilateral 6/1/2019    CYSTOSCOPY, RETROGRADE PYELOGRAMS, BILATERAL URETERAL STENT EXCHANGE performed by Joseline Albright MD at [address] [place] [address]:    Family History   Problem Relation Age of Onset    Diabetes Brother     Thyroid Disease Mother     Other Daughter         Autism       PAST SOCIAL HISTORY:    Social History     Socioeconomic History    Marital status: Single     Spouse name: None    Number of children: 1    Years of education: None    Highest education level: None   Occupational History    Occupation: unemployed     Employer: not employed   Social Needs    Financial resource strain: None    Food insecurity:     Worry: None     Inability: None    Transportation needs:     Medical: None     Non-medical: None   Tobacco Use    Smoking status: Former Smoker     Packs/day: 1.00     Years: 5.00     Pack years: 5.00     Last attempt to quit: 1/1/1989 Years since quittin.4    Smokeless tobacco: Never Used   Substance and Sexual Activity    Alcohol use: No    Drug use: No    Sexual activity: None   Lifestyle    Physical activity:     Days per week: None     Minutes per session: None    Stress: None   Relationships    Social connections:     Talks on phone: None     Gets together: None     Attends Yazidi service: None     Active member of club or organization: None     Attends meetings of clubs or organizations: None     Relationship status: None    Intimate partner violence:     Fear of current or ex partner: None     Emotionally abused: None     Physically abused: None     Forced sexual activity: None   Other Topics Concern    None   Social History Narrative    None       IV:    sodium chloride 135 mL/hr at 19 2142    dextrose         PRN: magnesium hydroxide, glucose, dextrose, glucagon (rDNA), dextrose, traMADol, sodium chloride flush, ondansetron    Scheduled:    sodium chloride  250 mL Intravenous Once    sodium polystyrene  15 g Oral Once    sodium bicarbonate  650 mg Oral BID    pantoprazole  40 mg Oral QAM AC    chlorhexidine  15 mL Mouth/Throat BID    meropenem  500 mg Intravenous Q12H    atorvastatin  40 mg Oral Daily    docusate sodium  100 mg Oral BID    ferrous sulfate  325 mg Oral BID WC    fluticasone  2 spray Nasal Daily    levothyroxine  100 mcg Oral Daily    mirtazapine  15 mg Oral Nightly    sodium chloride flush  10 mL Intravenous 2 times per day    heparin (porcine)  5,000 Units Subcutaneous 3 times per day       Lab Results   Component Value Date     2019    K 5.9 2019    K 5.4 2019    BUN 51 2019    CREATININE 7.1 2019        Lab Results   Component Value Date    HGB 8.4 2019    HCT 27.0 2019       Constitutional:  Tired  Eyes: negative  Ears, nose, mouth, throat, and face: negative  Respiratory: negative  Cardiovascular: negative  Gastrointestinal: negative  Genitourinary: Kidney failure  Musculoskeletal: Osteoarthritis  Neurological: negative  Behavioral/Psych: Depression  Endocrine: Low thyroid     Skin dry, without rashes  Respirations non-labored, intact  Abdomen soft, non-tender, non-distended.  Active bowel sounds.  Obese. Alert and oriented x3  Crane draining scant clear,  pink colored urine        Assessment and Plan:  POD#3--S/P Cysto/Bilateral jj ureteral stent exchange  -Urine culture was negative. Antibiotics per infectious disease recommendations  -Urine cytology from 1/25/19 was negative for malignant cells  -She remains oliguric in her creatinine is now 7.1 despite recent stent exchange. Her stents were even increased in size to help remote better renal drainage. She is being followed by nephrology (Dr. Marylen Glen) and is being considered for renal replacement therapy. Her mental status and medical decision making, however, is been questioned at this time  -Hematuria is clearing. Irrigate the crane catheter when necessary  -She has almost a \"lead pipe\" urethral deformity. A gynecologic malignancy is suspected, in my opinion. She is being evaluated by gynecology for this. The crane catheter will not be removed.   -She will require long-term ureteral stents with periodic changes  -We will follow her during her hospital stay       Day Adames MD  6/4/2019  8:20 AM

## 2019-06-04 NOTE — PROGRESS NOTES
Dr. Bert Ortiz was in patient's room and explained the need for a temporary hemodialysis catheter. Patient stated that she was in agreement with the procedure but when she was presented with the consent she stated she didn't want to do it. Explained to patient that need for the dialysis catheter, she stated that she didn't feel any different. Attempted multiple times to explain the procedure but she continued to say \" I don't feel any different\"  I explained to her that she wouldn't feel any different until after she had dialysis but patient exhibited no understanding. Notified Dr. Bert Ortiz of patient's refusal for Dialysis catheter and he stated to wait and see what the Nephrologist suggests.

## 2019-06-04 NOTE — BRIEF OP NOTE
The procedure was explained to the patient and consent was obtained. US guided temporary heodialysis procedure was performed. Patient tolerated the procedure well. Please see the dictatated report for a full description of the procedure.

## 2019-06-04 NOTE — FLOWSHEET NOTE
06/04/19 1836   Vital Signs   BP (!) 164/77   Temp 98.6 °F (37 °C)   Pulse 64   Post-Hemodialysis Assessment   Post-Treatment Procedures Blood returned;Catheter capped, clamped and heparinized x 2 ports   Machine Disinfection Process Acid/Vinegar Clean;Bleach; Exterior Machine Disinfection;Machine Absence of Bleach Machine   Rinseback Volume (ml) 300 ml   Total Liters Processed (l/min) 29.9 l/min   Dialyzer Clearance Lightly streaked   Duration of Treatment (minutes) 150 minutes   Hemodialysis Intake (ml) 500 ml   Hemodialysis Output (ml) 1500 ml   NET Removed (ml) 1000 ml   Tolerated Treatment Good   Patient Response to Treatment tolerated tx well vss no complaints cath closed with heparin. cath works well no acute changes on tx    Bilateral Breath Sounds Clear   Edema Generalized;Right upper extremity; Left upper extremity;Right lower extremity; Left lower extremity   RLE Edema +3;Pitting   LLE Edema Pitting;+3

## 2019-06-04 NOTE — PLAN OF CARE
Problem: Pain:  Goal: Pain level will decrease  Description  Pain level will decrease  Outcome: Met This Shift     Problem: Pain:  Goal: Control of acute pain  Description  Control of acute pain  Outcome: Met This Shift     Problem: Pain:  Goal: Control of chronic pain  Description  Control of chronic pain  Outcome: Met This Shift     Problem: Falls - Risk of:  Goal: Will remain free from falls  Description  Will remain free from falls  6/4/2019 1242 by Amanda Jones RN  Outcome: Met This Shift     Problem: Falls - Risk of:  Goal: Absence of physical injury  Description  Absence of physical injury  6/4/2019 1242 by Amanda Jones RN  Outcome: Met This Shift     Problem: Tissue Perfusion - Renal, Altered:  Goal: Ability to achieve a balanced intake and output will improve  Description  Ability to achieve a balanced intake and output will improve  Outcome: Met This Shift     Problem: Tissue Perfusion - Renal, Altered:  Goal: Electrolytes within specified parameters  Description  Electrolytes within specified parameters  Outcome: Not Met This Shift     Problem: Tissue Perfusion - Renal, Altered:  Goal: Urine creatinine clearance will be within specified parameters  Description  Urine creatinine clearance will be within specified parameters  Outcome: Not Met This Shift

## 2019-06-04 NOTE — CONSULTS
Depression with SI and sporadic voices. Paranoid and delusional  MDD with psychosis    Recommendations and plan of treatment: Patient is actively presenting with severe psychiatric symptoms. Will benefit from inpatient hospitalization. Please transfer to psych when medically clear.

## 2019-06-04 NOTE — CARE COORDINATION
Ss note:6/4/2019.12:39 PM Per jayne discussion with Manager Maria Isabel Chapa, request Psych see pt again for full assessment to determine if pt is capable of making her own decisions or if pt will need a legal guardian. SW notified charge nurse rené to contact Dr. Kenia Guillory. Probable need for HD. Pt is from [facility] skilled rehab. SW will follow.  AYO Seymour

## 2019-06-04 NOTE — PRE SEDATION
Sedation Pre-Procedure Note    Patient Name: Yariel Montoya   YOB: 1965  Room/Bed: 1813/6868-97  Medical Record Number: [id number]  Date: 6/4/2019   Time: 3:06 PM       Indication:  Dialysis cath insertion    Consent: I have discussed with the patient and/or the patient representative the indication, alternatives, and the possible risks and/or complications of the planned procedure and the anesthesia methods. The patient and/or patient representative appear to understand and agree to proceed. Vital Signs:   Vitals:    06/04/19 0830   BP: (!) 141/62   Pulse: 69   Resp: 16   Temp: 98.8 °F (37.1 °C)   SpO2: 98%       Past Medical History:   has a past medical history of Anxiety, Depression, Hyperlipidemia, Hypertension, Hypothyroidism, Intellectual disability, Leg pain, bilateral, Noncompliance with medications, Noncompliance with treatment, and Osteoarthritis. Past Surgical History:   has a past surgical history that includes Cystoscopy (Left, 1/21/2019) and Cystoscopy (Bilateral, 6/1/2019).     Medications:   Scheduled Meds:    dextrose  25 g Intravenous Once    insulin regular  6 Units Intravenous Once    calcium gluconate IVPB  1 g Intravenous Once    sodium chloride  250 mL Intravenous Once    sodium polystyrene  15 g Oral Once    sodium bicarbonate  650 mg Oral BID    pantoprazole  40 mg Oral QAM AC    chlorhexidine  15 mL Mouth/Throat BID    meropenem  500 mg Intravenous Q12H    atorvastatin  40 mg Oral Daily    docusate sodium  100 mg Oral BID    ferrous sulfate  325 mg Oral BID WC    fluticasone  2 spray Nasal Daily    levothyroxine  100 mcg Oral Daily    mirtazapine  15 mg Oral Nightly    sodium chloride flush  10 mL Intravenous 2 times per day    heparin (porcine)  5,000 Units Subcutaneous 3 times per day     Continuous Infusions:    sodium chloride 135 mL/hr at 06/04/19 1205    dextrose       PRN Meds: magnesium hydroxide, glucose, dextrose, glucagon (rDNA), dextrose, traMADol, sodium chloride flush, ondansetron  Home Meds:   Prior to Admission medications    Medication Sig Start Date End Date Taking?  Authorizing Provider   acetaminophen (TYLENOL) 325 MG tablet Take 650 mg by mouth every 4 hours as needed for Pain   Yes Historical Provider, MD   dextrose 5 % SOLN 250 mL with vancomycin 1 g SOLR 1,000 mg Infuse 1,000 mg intravenously every 24 hours   Yes Historical Provider, MD   mirtazapine (REMERON) 15 MG tablet Take 1 tablet by mouth nightly 5/24/19  Yes Elliot Walsh MD   ondansetron (ZOFRAN-ODT) 4 MG disintegrating tablet Take 1 tablet by mouth every 8 hours as needed for Nausea or Vomiting 4/23/19  Yes Abigail Rehman DO   ferrous sulfate 325 (65 Fe) MG tablet Take 1 tablet by mouth 2 times daily (with meals) 4/23/19  Yes Abigail Rehman DO   docusate sodium (COLACE, DULCOLAX) 100 MG CAPS Take 100 mg by mouth 2 times daily 4/23/19  Yes Abigail Rehman DO   levothyroxine (SYNTHROID) 100 MCG tablet TAKE 1 TABLET BY MOUTH EVERY MORNING 30 MINUTES BEFORE EATING. 4/10/19  Yes Loretta Escamilla DO   vitamin D (ERGOCALCIFEROL) 12042 units CAPS capsule Take 1 capsule by mouth once a week 4/9/19  Yes Celestine Trujillo DO   oxybutynin (DITROPAN-XL) 5 MG extended release tablet Take 1 tablet by mouth daily 4/9/19  Yes Celestine Trujillo DO   magnesium oxide (MAG-OX) 400 (241.3 Mg) MG TABS tablet Take 1 tablet by mouth 2 times daily 4/9/19  Yes Celestine Trujillo DO   fluticasone (FLONASE) 50 MCG/ACT nasal spray 2 sprays by Nasal route daily  Patient taking differently: 1 spray by Nasal route daily  4/9/19  Yes Celestine Trujillo DO   pantoprazole (PROTONIX) 40 MG tablet TAKE ONE TABLET BY MOUTH DAILY 4/9/19  Yes Loretta Escamilla DO   Compression Stockings MISC by Does not apply route Below knee  15 - 30 mm Hg 7/25/18   Lexa Alfaroigham,      Coumadin Use Last 7 Days:  no  Antiplatelet drug therapy use last 7 days: no  Other anticoagulant use last 7 days: no  Additional Medication Information: none      Pre-Sedation Documentation and Exam:   Lungs: clear, Cardiovascular: normal.    Mallampati Airway Assessment:  Mallampati Class II - (soft palate, fauces & uvula are visible)    Prior History of Anesthesia Complications:   none    ASA Classification:  Class 3 - A patient with severe systemic disease that limits activity but is not incapacitating    Sedation/ Anesthesia Plan:   intravenous sedation    Medications Planned:   midazolam (Versed) intravenously    Patient is an appropriate candidate for plan of sedation: yes    Electronically signed by Rochelle Dance, MD on 6/4/2019 at 3:06 PM

## 2019-06-05 LAB
ANION GAP SERPL CALCULATED.3IONS-SCNC: 11 MMOL/L (ref 7–16)
ANION GAP SERPL CALCULATED.3IONS-SCNC: 13 MMOL/L (ref 7–16)
ANION GAP SERPL CALCULATED.3IONS-SCNC: 14 MMOL/L (ref 7–16)
ANION GAP SERPL CALCULATED.3IONS-SCNC: 15 MMOL/L (ref 7–16)
BASOPHILS ABSOLUTE: 0.04 E9/L (ref 0–0.2)
BASOPHILS RELATIVE PERCENT: 0.4 % (ref 0–2)
BLOOD CULTURE, ROUTINE: NORMAL
BUN BLDV-MCNC: 17 MG/DL (ref 6–20)
BUN BLDV-MCNC: 34 MG/DL (ref 6–20)
CALCIUM SERPL-MCNC: 8 MG/DL (ref 8.6–10.2)
CALCIUM SERPL-MCNC: 8.5 MG/DL (ref 8.6–10.2)
CALCIUM SERPL-MCNC: 8.6 MG/DL (ref 8.6–10.2)
CALCIUM SERPL-MCNC: 8.7 MG/DL (ref 8.6–10.2)
CHLORIDE BLD-SCNC: 102 MMOL/L (ref 98–107)
CHLORIDE BLD-SCNC: 102 MMOL/L (ref 98–107)
CHLORIDE BLD-SCNC: 103 MMOL/L (ref 98–107)
CHLORIDE BLD-SCNC: 98 MMOL/L (ref 98–107)
CO2: 22 MMOL/L (ref 22–29)
CO2: 22 MMOL/L (ref 22–29)
CO2: 23 MMOL/L (ref 22–29)
CO2: 25 MMOL/L (ref 22–29)
CREAT SERPL-MCNC: 3.4 MG/DL (ref 0.5–1)
CREAT SERPL-MCNC: 5.1 MG/DL (ref 0.5–1)
CREAT SERPL-MCNC: 5.2 MG/DL (ref 0.5–1)
CREAT SERPL-MCNC: 5.4 MG/DL (ref 0.5–1)
CULTURE, BLOOD 2: NORMAL
EOSINOPHILS ABSOLUTE: 0.38 E9/L (ref 0.05–0.5)
EOSINOPHILS RELATIVE PERCENT: 3.7 % (ref 0–6)
GFR AFRICAN AMERICAN: 10
GFR AFRICAN AMERICAN: 10
GFR AFRICAN AMERICAN: 11
GFR AFRICAN AMERICAN: 17
GFR NON-AFRICAN AMERICAN: 14 ML/MIN/1.73
GFR NON-AFRICAN AMERICAN: 8 ML/MIN/1.73
GFR NON-AFRICAN AMERICAN: 9 ML/MIN/1.73
GFR NON-AFRICAN AMERICAN: 9 ML/MIN/1.73
GLUCOSE BLD-MCNC: 68 MG/DL (ref 74–99)
GLUCOSE BLD-MCNC: 72 MG/DL (ref 74–99)
GLUCOSE BLD-MCNC: 77 MG/DL (ref 74–99)
GLUCOSE BLD-MCNC: 90 MG/DL (ref 74–99)
HCT VFR BLD CALC: 24.9 % (ref 34–48)
HEMOGLOBIN: 8.1 G/DL (ref 11.5–15.5)
IMMATURE GRANULOCYTES #: 0.1 E9/L
IMMATURE GRANULOCYTES %: 1 % (ref 0–5)
LYMPHOCYTES ABSOLUTE: 1.35 E9/L (ref 1.5–4)
LYMPHOCYTES RELATIVE PERCENT: 13.3 % (ref 20–42)
MCH RBC QN AUTO: 28.9 PG (ref 26–35)
MCHC RBC AUTO-ENTMCNC: 32.5 % (ref 32–34.5)
MCV RBC AUTO: 88.9 FL (ref 80–99.9)
MONOCYTES ABSOLUTE: 0.59 E9/L (ref 0.1–0.95)
MONOCYTES RELATIVE PERCENT: 5.8 % (ref 2–12)
NEUTROPHILS ABSOLUTE: 7.7 E9/L (ref 1.8–7.3)
NEUTROPHILS RELATIVE PERCENT: 75.8 % (ref 43–80)
PDW BLD-RTO: 14.6 FL (ref 11.5–15)
PLATELET # BLD: 276 E9/L (ref 130–450)
PMV BLD AUTO: 10.2 FL (ref 7–12)
POTASSIUM SERPL-SCNC: 3.8 MMOL/L (ref 3.5–5)
POTASSIUM SERPL-SCNC: 4.5 MMOL/L (ref 3.5–5)
POTASSIUM SERPL-SCNC: 4.6 MMOL/L (ref 3.5–5)
POTASSIUM SERPL-SCNC: 4.7 MMOL/L (ref 3.5–5)
RBC # BLD: 2.8 E12/L (ref 3.5–5.5)
SODIUM BLD-SCNC: 137 MMOL/L (ref 132–146)
SODIUM BLD-SCNC: 137 MMOL/L (ref 132–146)
SODIUM BLD-SCNC: 138 MMOL/L (ref 132–146)
SODIUM BLD-SCNC: 138 MMOL/L (ref 132–146)
WBC # BLD: 10.2 E9/L (ref 4.5–11.5)

## 2019-06-05 PROCEDURE — 6360000002 HC RX W HCPCS: Performed by: INTERNAL MEDICINE

## 2019-06-05 PROCEDURE — 36415 COLL VENOUS BLD VENIPUNCTURE: CPT

## 2019-06-05 PROCEDURE — 6370000000 HC RX 637 (ALT 250 FOR IP): Performed by: INTERNAL MEDICINE

## 2019-06-05 PROCEDURE — 2580000003 HC RX 258: Performed by: SPECIALIST

## 2019-06-05 PROCEDURE — 90935 HEMODIALYSIS ONE EVALUATION: CPT

## 2019-06-05 PROCEDURE — 36591 DRAW BLOOD OFF VENOUS DEVICE: CPT

## 2019-06-05 PROCEDURE — 80048 BASIC METABOLIC PNL TOTAL CA: CPT

## 2019-06-05 PROCEDURE — 1200000000 HC SEMI PRIVATE

## 2019-06-05 PROCEDURE — 85025 COMPLETE CBC W/AUTO DIFF WBC: CPT

## 2019-06-05 PROCEDURE — 2580000003 HC RX 258: Performed by: INTERNAL MEDICINE

## 2019-06-05 PROCEDURE — 6360000002 HC RX W HCPCS: Performed by: SPECIALIST

## 2019-06-05 PROCEDURE — 80074 ACUTE HEPATITIS PANEL: CPT

## 2019-06-05 RX ADMIN — MEROPENEM 500 MG: 500 INJECTION, POWDER, FOR SOLUTION INTRAVENOUS at 18:37

## 2019-06-05 RX ADMIN — TRAMADOL HYDROCHLORIDE 25 MG: 50 TABLET, FILM COATED ORAL at 16:24

## 2019-06-05 RX ADMIN — SODIUM BICARBONATE 650 MG TABLET 650 MG: at 21:32

## 2019-06-05 RX ADMIN — LEVOTHYROXINE SODIUM 100 MCG: 100 TABLET ORAL at 05:09

## 2019-06-05 RX ADMIN — MIRTAZAPINE 15 MG: 15 TABLET, FILM COATED ORAL at 21:32

## 2019-06-05 RX ADMIN — HEPARIN SODIUM 5000 UNITS: 5000 INJECTION, SOLUTION INTRAVENOUS; SUBCUTANEOUS at 05:09

## 2019-06-05 RX ADMIN — ONDANSETRON 4 MG: 2 INJECTION INTRAMUSCULAR; INTRAVENOUS at 18:37

## 2019-06-05 RX ADMIN — MEROPENEM 500 MG: 500 INJECTION, POWDER, FOR SOLUTION INTRAVENOUS at 06:43

## 2019-06-05 RX ADMIN — Medication 10 ML: at 22:52

## 2019-06-05 RX ADMIN — Medication 325 MG: at 16:25

## 2019-06-05 RX ADMIN — PANTOPRAZOLE SODIUM 40 MG: 40 TABLET, DELAYED RELEASE ORAL at 05:10

## 2019-06-05 RX ADMIN — HEPARIN SODIUM 5000 UNITS: 5000 INJECTION, SOLUTION INTRAVENOUS; SUBCUTANEOUS at 21:32

## 2019-06-05 RX ADMIN — HEPARIN SODIUM 5000 UNITS: 5000 INJECTION, SOLUTION INTRAVENOUS; SUBCUTANEOUS at 16:24

## 2019-06-05 RX ADMIN — DOCUSATE SODIUM 100 MG: 100 CAPSULE, LIQUID FILLED ORAL at 21:32

## 2019-06-05 ASSESSMENT — PAIN SCALES - GENERAL
PAINLEVEL_OUTOF10: 0
PAINLEVEL_OUTOF10: 0
PAINLEVEL_OUTOF10: 4
PAINLEVEL_OUTOF10: 4

## 2019-06-05 NOTE — FLOWSHEET NOTE
06/05/19 1348   Vital Signs   BP (!) 146/79   Temp 98.4 °F (36.9 °C)   Pulse 69   Weight 166 lb 7.2 oz (75.5 kg)   Weight Method Bed scale   Percent Weight Change 14.16   Post-Hemodialysis Assessment   Post-Treatment Procedures Blood returned;Catheter capped, clamped and heparinized x 2 ports   Machine Disinfection Process Acid/Vinegar Clean;Bleach; Machine Absence of [employer]; Exterior Machine Disinfection   Rinseback Volume (ml) 300 ml   Total Liters Processed (l/min) 52.3 l/min   Dialyzer Clearance Lightly streaked   Duration of Treatment (minutes) 180 minutes   Hemodialysis Intake (ml) 500 ml   Hemodialysis Output (ml) 2500 ml   NET Removed (ml) 2000 ml   Tolerated Treatment Good   Patient Response to Treatment tolerated tx well no complaints continued to educate pt on dialysis and she seems more comfortable with it today than yesterday no acute changes on tx removed 2000 mls    Bilateral Breath Sounds Diminished   Edema Right lower extremity; Left lower extremity  (ivf's stopped )   RLE Edema +3;Pitting   LLE Edema +3;Pitting

## 2019-06-05 NOTE — PROGRESS NOTES
[facility] PROGRESS NOTE                                                                       2019        CHIEF UROLOGIC COMPLAINT: NGB, BL hydronephrosis    HISTORY OF PRESENT ILLNESS:  Patient without new complaints. She is tearful this AM that family was not with her overnight. Had HD yesterday. Cr at 5.2. No stent or catheter pain.      REVIEW OF SYSTEMS:   CONSTITUTIONAL: negative  HEENT: negative  HEMATOLOGIC: negative  ENDOCRINE: negative  RESPIRATORY: negative  CV: negative  GI: negative  NEURO: negative  ORTHOPEDICS: negative  PSYCHIATRIC: negative  : as above    PAST FAMILY HISTORY:    Family History   Problem Relation Age of Onset    Diabetes Brother     Thyroid Disease Mother     Other Daughter         Autism     PAST SOCIAL HISTORY:    Social History     Socioeconomic History    Marital status: Single     Spouse name: None    Number of children: 1    Years of education: None    Highest education level: None   Occupational History    Occupation: unemployed     Employer: not employed   Social Needs    Financial resource strain: None    Food insecurity:     Worry: None     Inability: None    Transportation needs:     Medical: None     Non-medical: None   Tobacco Use    Smoking status: Former Smoker     Packs/day: 1.00     Years: 5.00     Pack years: 5.00     Last attempt to quit: 1989     Years since quittin.4    Smokeless tobacco: Never Used   Substance and Sexual Activity    Alcohol use: No    Drug use: No    Sexual activity: None   Lifestyle    Physical activity:     Days per week: None     Minutes per session: None    Stress: None   Relationships    Social connections:     Talks on phone: None     Gets together: None     Attends Buddhist service: None     Active member of club or organization: None     Attends meetings of clubs or organizations: None     Relationship status: None    Intimate partner violence:     Fear of current or ex partner: None     Emotionally abused: None     Physically abused: None     Forced sexual activity: None   Other Topics Concern    None   Social History Narrative    None       Scheduled Meds:   dextrose  25 g Intravenous Once    insulin regular  6 Units Intravenous Once    calcium gluconate IVPB  1 g Intravenous Once    sodium chloride  250 mL Intravenous Once    sodium polystyrene  15 g Oral Once    sodium bicarbonate  650 mg Oral BID    pantoprazole  40 mg Oral QAM AC    chlorhexidine  15 mL Mouth/Throat BID    meropenem  500 mg Intravenous Q12H    atorvastatin  40 mg Oral Daily    docusate sodium  100 mg Oral BID    ferrous sulfate  325 mg Oral BID WC    fluticasone  2 spray Nasal Daily    levothyroxine  100 mcg Oral Daily    mirtazapine  15 mg Oral Nightly    sodium chloride flush  10 mL Intravenous 2 times per day    heparin (porcine)  5,000 Units Subcutaneous 3 times per day     Continuous Infusions:   sodium chloride 75 mL/hr at 06/04/19 2112    dextrose       PRN Meds:.magnesium hydroxide, glucose, dextrose, glucagon (rDNA), dextrose, traMADol, sodium chloride flush, ondansetron    BP (!) 142/69   Pulse 71   Temp 98.2 °F (36.8 °C) (Oral)   Resp 16   Ht 4' 11\" (1.499 m)   Wt 145 lb 12.8 oz (66.1 kg)   LMP 05/21/2015 (Approximate)   SpO2 93%   BMI 29.45 kg/m²     Lab Results   Component Value Date    WBC 11.8 (H) 06/04/2019    HGB 8.4 (L) 06/04/2019    HCT 27.0 (L) 06/04/2019    MCV 92.5 06/04/2019     06/04/2019       Lab Results   Component Value Date    CREATININE 5.2 (H) 06/05/2019       PHYSICAL EXAMINATION:  Skin dry, without rashes  Respirations non-labored, intact  Abdomen soft, non-tender, non-distended  Alert and oriented x3 - tearful  Braden draining aury urine      ASSESSMENT AND PLAN:  POD#3--S/P Cysto/Bilateral jj ureteral stent exchange  -Urine culture was negative. Antibiotics per infectious disease   -Urine cytology from 1/25/19 was negative ]  -Good UOP overnight.   Cr down to 5.2 following HD  -followed by Nephrology   -continue catheter for NGB  -GYN following with plan for possible EUA and bx given concerning pelvic exam   -She will require long-term ureteral stents with periodic changes  -We will follow her during her hospital stay             Electronically signed by Kisha Verma MD on 6/5/2019 at 5:54 AM

## 2019-06-05 NOTE — PROGRESS NOTES
Date:2019  Patient Name: Grayson Bliss  MRN: [id number]  : 1965  ROOM #: [id number]/[id number]    Occupational Therapy order received, chart reviewed and evaluation attempted this date. Patient is unavailable for OT evaluation due to refusal secondary to fatigue. Will attempt OT evaluation at a later time. Thank you.      Bill Heaton OTR/L [id number]

## 2019-06-05 NOTE — PLAN OF CARE
Problem: Pain:  Goal: Pain level will decrease  Description  Pain level will decrease  6/4/2019 2340 by Luep Spicer RN  Outcome: Met This Shift  6/4/2019 1242 by Emilee Blank RN  Outcome: Met This Shift  Goal: Control of acute pain  Description  Control of acute pain  6/4/2019 2340 by Lupe Spicer RN  Outcome: Met This Shift  6/4/2019 1242 by Emilee Blank RN  Outcome: Met This Shift  Goal: Control of chronic pain  Description  Control of chronic pain  6/4/2019 2340 by Lupe Spicer RN  Outcome: Met This Shift  6/4/2019 1242 by Emilee Blank RN  Outcome: Met This Shift     Problem: Falls - Risk of:  Goal: Will remain free from falls  Description  Will remain free from falls  6/4/2019 2340 by Lupe Spicer RN  Outcome: Met This Shift  6/4/2019 1242 by Emilee Blank RN  Outcome: Met This Shift  Goal: Absence of physical injury  Description  Absence of physical injury  6/4/2019 1242 by Emilee Blank RN  Outcome: Met This Shift     Problem: Tissue Perfusion - Renal, Altered:  Goal: Ability to achieve a balanced intake and output will improve  Description  Ability to achieve a balanced intake and output will improve  6/4/2019 1242 by Emilee Blank RN  Outcome: Met This Shift

## 2019-06-05 NOTE — PROGRESS NOTES
HPI:  Dominga underwent temporary dialysis catheter placement yesterday and underwent her 1st dialysis treatment. She tolerated this without complication but admits to some pain and irritation associated with the catheter insertion site. We had an extensive conversation regarding the multiple medical interventions ahead. Again, she seems to grasp what we are saying. We also discussed the possibility of LTAC placement and she voiced understanding and agreement. This would be her preferred discharge plan. Patient voiced no new complaints since hospital admission. Questions, answers, and tests reviewed. ROS:  Cardiovascular:   Denies any chest pain, irregular heartbeats, or palpitations. Respiratory:   Denies shortness of breath, coughing, sputum production, hemoptysis, or wheezing. Gastrointestinal:   Denies any significant abdominal pain or discomfort today. Extremities:   Denies any lower extremity swelling or edema. Neurology:    Denies any headache or focal neurological deficits. Admits to generalized weakness and deconditioning. Derm:    Denies any rashes, ulcers, or excoriations. Denies bruising. Admits to pain associated with the dialysis catheter insertion site. Genitourinary:    Denies any urgency, frequency, hematuria. Voiding with the use of a Braden catheter. Physical Exam:    Vitals:    06/05/19 0845   BP: (!) 129/58   Pulse: 76   Resp: 16   Temp: 98.3 °F (36.8 °C)   SpO2: 94%       HEENT:  PERRLA. EOMI. Sclera clear. Buccal mucosa moist.    Neck:  Supple. Trachea midline. No thyromegaly. No JVD. No bruits. Heart:  Rhythm regular, rate controlled. No murmurs. Lungs:  Symmetrical. Clear to auscultation bilaterally. No wheezes. No rhonchi. No rales. Abdomen: Soft. Non-tender. Non-distended. Bowel sounds positive. No organomegaly or masses. No pain on palpation    Extremities:  Peripheral pulses present. No peripheral edema. No ulcers. PICC line is in place.     Neurologic: Alert x 3. No focal deficit. Cranial nerves grossly intact. Skin:  No petechia. No hemorrhage. No wounds. CBC with Differential:    Lab Results   Component Value Date    WBC 10.2 06/05/2019    RBC 2.80 06/05/2019    HGB 8.1 06/05/2019    HCT 24.9 06/05/2019     06/05/2019    MCV 88.9 06/05/2019    MCH 28.9 06/05/2019    MCHC 32.5 06/05/2019    RDW 14.6 06/05/2019    SEGSPCT 66 10/23/2013    LYMPHOPCT 13.3 06/05/2019    MONOPCT 5.8 06/05/2019    BASOPCT 0.4 06/05/2019    MONOSABS 0.59 06/05/2019    LYMPHSABS 1.35 06/05/2019    EOSABS 0.38 06/05/2019    BASOSABS 0.04 06/05/2019     BMP:    Lab Results   Component Value Date     06/05/2019    K 4.7 06/05/2019    K 5.4 05/31/2019     06/05/2019    CO2 22 06/05/2019    BUN 34 06/05/2019    LABALBU 3.1 05/31/2019    LABALBU 4.4 12/20/2011    CREATININE 5.4 06/05/2019    CALCIUM 8.6 06/05/2019    GFRAA 10 06/05/2019    LABGLOM 8 06/05/2019    GLUCOSE 68 06/05/2019    GLUCOSE 97 12/20/2011       Other Data:      Intake/Output Summary (Last 24 hours) at 6/5/2019 1015  Last data filed at 6/5/2019 0840  Gross per 24 hour   Intake 3830.25 ml   Output 3450 ml   Net 380.25 ml         Scheduled Medications:   sodium chloride  250 mL Intravenous Once    sodium polystyrene  15 g Oral Once    sodium bicarbonate  650 mg Oral BID    pantoprazole  40 mg Oral QAM AC    chlorhexidine  15 mL Mouth/Throat BID    meropenem  500 mg Intravenous Q12H    atorvastatin  40 mg Oral Daily    docusate sodium  100 mg Oral BID    ferrous sulfate  325 mg Oral BID WC    fluticasone  2 spray Nasal Daily    levothyroxine  100 mcg Oral Daily    mirtazapine  15 mg Oral Nightly    sodium chloride flush  10 mL Intravenous 2 times per day    heparin (porcine)  5,000 Units Subcutaneous 3 times per day         Infusion Medications:   sodium chloride 75 mL/hr at 06/04/19 2112    dextrose         Assessment:   1.  Acute on chronic kidney disease stage IV compatible with a neurogenic bladder with chronic b/l ureteral stents and recent institution of hemodialysis  2. Abnormal pelvic exam showing a firm abnormal lesion within the vagina with suspicion for gynecological malignancy  3. Sepsis secondary to a urinary tract infection secondary to an indwelling Braden catheter with chronic stents  4. Normocytic anemia of chronic disease  5. Hypothyroidism  6. Chronic mental disability  7. Hyperlipidemia    Plan:   Hemodialysis catheter was placed yesterday and she tolerated dialysis without complication. She now has increasing urinary output and we have hope that she will have some return of her renal function. We then discussed the abnormal vaginal exam and need for examination under anesthesia. I explained this procedure at length and she seems to understand and voiced agreement. She seems competent to make that decision. She is also being evaluated by the infectious disease team and antibiotics are being employed for the urinary tract infection. The urologic team also continues to follow. Secondary to the recent institution of hemodialysis, multiple hospitalizations, multi drug-resistant urinary tract infection and need for long-term antibiotics, and need for extensive therapy we are recommending LTAC placement. We will await acceptance and precertification. I would want all surgical interventions and procedures to be performed prior to transfer. Continue current therapy. See orders for further plan of care.     Bladimir Ramos D.O.  10:15 AM  6/5/2019

## 2019-06-05 NOTE — CARE COORDINATION
Ss note:6/5/2019.11:19 AM jayne notified that pts niviasojuliano # [id number] has termed 6-1-19. SW left vm message for Gita in [place] for assistance to re instate pt caresource medicaid. Referral made to Shanta Kidd at [facility], however will need insurance issue resolved.  AYO Clarke

## 2019-06-05 NOTE — CONSULTS
Patient is intellectually Limited and Competency is to be determined by court. Decision making capacity is very specific and depends on her values and if she can appreciate risks and benefits. Usually done by the physician requesting consent.      Psychosis  Depression    Recommendation:    1- Patient presents with significant psych sx that warrant admission  2- Contact social service for guidance on guardianship  3- Obtain a MOCA for further guidance on her cognitive function

## 2019-06-05 NOTE — PLAN OF CARE
Problem: Pain:  Goal: Pain level will decrease  Description  Pain level will decrease  6/5/2019 1216 by Amanda Jones RN  Outcome: Met This Shift     Problem: Pain:  Goal: Control of acute pain  Description  Control of acute pain  6/5/2019 1216 by Amanda Jones RN  Outcome: Met This Shift     Problem: Pain:  Goal: Control of chronic pain  Description  Control of chronic pain  6/5/2019 1216 by Amanda Jones RN  Outcome: Met This Shift     Problem: Falls - Risk of:  Goal: Will remain free from falls  Description  Will remain free from falls  6/5/2019 1216 by Amanda Jones RN  Outcome: Met This Shift     Problem: Falls - Risk of:  Goal: Absence of physical injury  Description  Absence of physical injury  6/5/2019 1216 by Amanda Jones RN  Outcome: Met This Shift     Problem: Tissue Perfusion - Renal, Altered:  Goal: Urine creatinine clearance will be within specified parameters  Description  Urine creatinine clearance will be within specified parameters  Outcome: Met This Shift     Problem: Tissue Perfusion - Renal, Altered:  Goal: Ability to achieve a balanced intake and output will improve  Description  Ability to achieve a balanced intake and output will improve  Outcome: Met This Shift

## 2019-06-05 NOTE — CARE COORDINATION
Ss note:6/5/2019.10:24 AM consult noted for LTAC referral, per Physician documentation pt is agreeable to referral. SW contacted liaison Denver Hammock at [facility], will await chart review and acceptance, will need to verify pts insurance.  AYO Elmore

## 2019-06-06 ENCOUNTER — ANESTHESIA EVENT (OUTPATIENT)
Dept: OPERATING ROOM | Age: 54
DRG: 466 | End: 2019-06-06
Payer: COMMERCIAL

## 2019-06-06 ENCOUNTER — ANESTHESIA (OUTPATIENT)
Dept: OPERATING ROOM | Age: 54
DRG: 466 | End: 2019-06-06
Payer: COMMERCIAL

## 2019-06-06 VITALS
DIASTOLIC BLOOD PRESSURE: 79 MMHG | RESPIRATION RATE: 10 BRPM | OXYGEN SATURATION: 100 % | SYSTOLIC BLOOD PRESSURE: 140 MMHG

## 2019-06-06 LAB
ANION GAP SERPL CALCULATED.3IONS-SCNC: 10 MMOL/L (ref 7–16)
ANION GAP SERPL CALCULATED.3IONS-SCNC: 11 MMOL/L (ref 7–16)
ANION GAP SERPL CALCULATED.3IONS-SCNC: 13 MMOL/L (ref 7–16)
BASOPHILS ABSOLUTE: 0.05 E9/L (ref 0–0.2)
BASOPHILS RELATIVE PERCENT: 0.5 % (ref 0–2)
BUN BLDV-MCNC: 19 MG/DL (ref 6–20)
BUN BLDV-MCNC: 21 MG/DL (ref 6–20)
BUN BLDV-MCNC: 25 MG/DL (ref 6–20)
CALCIUM SERPL-MCNC: 8.3 MG/DL (ref 8.6–10.2)
CALCIUM SERPL-MCNC: 8.3 MG/DL (ref 8.6–10.2)
CALCIUM SERPL-MCNC: 9 MG/DL (ref 8.6–10.2)
CHLORIDE BLD-SCNC: 100 MMOL/L (ref 98–107)
CHLORIDE BLD-SCNC: 101 MMOL/L (ref 98–107)
CHLORIDE BLD-SCNC: 101 MMOL/L (ref 98–107)
CO2: 25 MMOL/L (ref 22–29)
CO2: 26 MMOL/L (ref 22–29)
CO2: 27 MMOL/L (ref 22–29)
CREAT SERPL-MCNC: 3.9 MG/DL (ref 0.5–1)
CREAT SERPL-MCNC: 4.3 MG/DL (ref 0.5–1)
CREAT SERPL-MCNC: 5.2 MG/DL (ref 0.5–1)
EOSINOPHILS ABSOLUTE: 0.36 E9/L (ref 0.05–0.5)
EOSINOPHILS RELATIVE PERCENT: 3.8 % (ref 0–6)
GFR AFRICAN AMERICAN: 10
GFR AFRICAN AMERICAN: 13
GFR AFRICAN AMERICAN: 14
GFR NON-AFRICAN AMERICAN: 11 ML/MIN/1.73
GFR NON-AFRICAN AMERICAN: 12 ML/MIN/1.73
GFR NON-AFRICAN AMERICAN: 9 ML/MIN/1.73
GLUCOSE BLD-MCNC: 73 MG/DL (ref 74–99)
GLUCOSE BLD-MCNC: 77 MG/DL (ref 74–99)
GLUCOSE BLD-MCNC: 78 MG/DL (ref 74–99)
HAV IGM SER IA-ACNC: NORMAL
HAV IGM SER IA-ACNC: NORMAL
HCT VFR BLD CALC: 24.1 % (ref 34–48)
HEMOGLOBIN: 7.9 G/DL (ref 11.5–15.5)
HEPATITIS B CORE IGM ANTIBODY: NORMAL
HEPATITIS B CORE IGM ANTIBODY: NORMAL
HEPATITIS B SURFACE ANTIGEN INTERPRETATION: NORMAL
HEPATITIS B SURFACE ANTIGEN INTERPRETATION: NORMAL
HEPATITIS C ANTIBODY INTERPRETATION: NORMAL
HEPATITIS C ANTIBODY INTERPRETATION: NORMAL
IMMATURE GRANULOCYTES #: 0.09 E9/L
IMMATURE GRANULOCYTES %: 1 % (ref 0–5)
LYMPHOCYTES ABSOLUTE: 1.79 E9/L (ref 1.5–4)
LYMPHOCYTES RELATIVE PERCENT: 19 % (ref 20–42)
MCH RBC QN AUTO: 29.4 PG (ref 26–35)
MCHC RBC AUTO-ENTMCNC: 32.8 % (ref 32–34.5)
MCV RBC AUTO: 89.6 FL (ref 80–99.9)
MONOCYTES ABSOLUTE: 0.72 E9/L (ref 0.1–0.95)
MONOCYTES RELATIVE PERCENT: 7.7 % (ref 2–12)
NEUTROPHILS ABSOLUTE: 6.39 E9/L (ref 1.8–7.3)
NEUTROPHILS RELATIVE PERCENT: 68 % (ref 43–80)
PDW BLD-RTO: 14.8 FL (ref 11.5–15)
PLATELET # BLD: 278 E9/L (ref 130–450)
PMV BLD AUTO: 10.2 FL (ref 7–12)
POTASSIUM SERPL-SCNC: 4 MMOL/L (ref 3.5–5)
POTASSIUM SERPL-SCNC: 4 MMOL/L (ref 3.5–5)
POTASSIUM SERPL-SCNC: 4.4 MMOL/L (ref 3.5–5)
RBC # BLD: 2.69 E12/L (ref 3.5–5.5)
SODIUM BLD-SCNC: 138 MMOL/L (ref 132–146)
WBC # BLD: 9.4 E9/L (ref 4.5–11.5)

## 2019-06-06 PROCEDURE — 7100000001 HC PACU RECOVERY - ADDTL 15 MIN: Performed by: OBSTETRICS & GYNECOLOGY

## 2019-06-06 PROCEDURE — 36592 COLLECT BLOOD FROM PICC: CPT

## 2019-06-06 PROCEDURE — 6370000000 HC RX 637 (ALT 250 FOR IP): Performed by: INTERNAL MEDICINE

## 2019-06-06 PROCEDURE — 36415 COLL VENOUS BLD VENIPUNCTURE: CPT

## 2019-06-06 PROCEDURE — 6360000002 HC RX W HCPCS: Performed by: NURSE ANESTHETIST, CERTIFIED REGISTERED

## 2019-06-06 PROCEDURE — 80048 BASIC METABOLIC PNL TOTAL CA: CPT

## 2019-06-06 PROCEDURE — 97110 THERAPEUTIC EXERCISES: CPT | Performed by: PHYSICAL THERAPIST

## 2019-06-06 PROCEDURE — 0UBC7ZX EXCISION OF CERVIX, VIA NATURAL OR ARTIFICIAL OPENING, DIAGNOSTIC: ICD-10-PCS | Performed by: OBSTETRICS & GYNECOLOGY

## 2019-06-06 PROCEDURE — 2580000003 HC RX 258: Performed by: INTERNAL MEDICINE

## 2019-06-06 PROCEDURE — 97165 OT EVAL LOW COMPLEX 30 MIN: CPT

## 2019-06-06 PROCEDURE — 7100000000 HC PACU RECOVERY - FIRST 15 MIN: Performed by: OBSTETRICS & GYNECOLOGY

## 2019-06-06 PROCEDURE — 97530 THERAPEUTIC ACTIVITIES: CPT

## 2019-06-06 PROCEDURE — 51702 INSERT TEMP BLADDER CATH: CPT

## 2019-06-06 PROCEDURE — 2580000003 HC RX 258: Performed by: SPECIALIST

## 2019-06-06 PROCEDURE — 3600000002 HC SURGERY LEVEL 2 BASE: Performed by: OBSTETRICS & GYNECOLOGY

## 2019-06-06 PROCEDURE — 97530 THERAPEUTIC ACTIVITIES: CPT | Performed by: PHYSICAL THERAPIST

## 2019-06-06 PROCEDURE — 3700000000 HC ANESTHESIA ATTENDED CARE: Performed by: OBSTETRICS & GYNECOLOGY

## 2019-06-06 PROCEDURE — 1200000000 HC SEMI PRIVATE

## 2019-06-06 PROCEDURE — 6360000002 HC RX W HCPCS: Performed by: INTERNAL MEDICINE

## 2019-06-06 PROCEDURE — 2580000003 HC RX 258: Performed by: NURSE ANESTHETIST, CERTIFIED REGISTERED

## 2019-06-06 PROCEDURE — 6360000002 HC RX W HCPCS: Performed by: SPECIALIST

## 2019-06-06 PROCEDURE — 3600000012 HC SURGERY LEVEL 2 ADDTL 15MIN: Performed by: OBSTETRICS & GYNECOLOGY

## 2019-06-06 PROCEDURE — 88305 TISSUE EXAM BY PATHOLOGIST: CPT

## 2019-06-06 PROCEDURE — 36591 DRAW BLOOD OFF VENOUS DEVICE: CPT

## 2019-06-06 PROCEDURE — 97110 THERAPEUTIC EXERCISES: CPT

## 2019-06-06 PROCEDURE — 2709999900 HC NON-CHARGEABLE SUPPLY: Performed by: OBSTETRICS & GYNECOLOGY

## 2019-06-06 PROCEDURE — 3700000001 HC ADD 15 MINUTES (ANESTHESIA): Performed by: OBSTETRICS & GYNECOLOGY

## 2019-06-06 PROCEDURE — 97161 PT EVAL LOW COMPLEX 20 MIN: CPT | Performed by: PHYSICAL THERAPIST

## 2019-06-06 PROCEDURE — 88342 IMHCHEM/IMCYTCHM 1ST ANTB: CPT

## 2019-06-06 PROCEDURE — 88341 IMHCHEM/IMCYTCHM EA ADD ANTB: CPT

## 2019-06-06 PROCEDURE — 85025 COMPLETE CBC W/AUTO DIFF WBC: CPT

## 2019-06-06 RX ORDER — MEPERIDINE HYDROCHLORIDE 25 MG/ML
12.5 INJECTION INTRAMUSCULAR; INTRAVENOUS; SUBCUTANEOUS EVERY 5 MIN PRN
Status: DISCONTINUED | OUTPATIENT
Start: 2019-06-06 | End: 2019-06-06 | Stop reason: HOSPADM

## 2019-06-06 RX ORDER — SODIUM CHLORIDE 0.9 % (FLUSH) 0.9 %
10 SYRINGE (ML) INJECTION PRN
Status: DISCONTINUED | OUTPATIENT
Start: 2019-06-06 | End: 2019-06-06 | Stop reason: HOSPADM

## 2019-06-06 RX ORDER — PROPOFOL 10 MG/ML
INJECTION, EMULSION INTRAVENOUS CONTINUOUS PRN
Status: DISCONTINUED | OUTPATIENT
Start: 2019-06-06 | End: 2019-06-06 | Stop reason: SDUPTHER

## 2019-06-06 RX ORDER — DIPHENHYDRAMINE HYDROCHLORIDE 50 MG/ML
12.5 INJECTION INTRAMUSCULAR; INTRAVENOUS
Status: DISCONTINUED | OUTPATIENT
Start: 2019-06-06 | End: 2019-06-06 | Stop reason: HOSPADM

## 2019-06-06 RX ORDER — MORPHINE SULFATE 2 MG/ML
1 INJECTION, SOLUTION INTRAMUSCULAR; INTRAVENOUS EVERY 5 MIN PRN
Status: DISCONTINUED | OUTPATIENT
Start: 2019-06-06 | End: 2019-06-06 | Stop reason: HOSPADM

## 2019-06-06 RX ORDER — FENTANYL CITRATE 50 UG/ML
INJECTION, SOLUTION INTRAMUSCULAR; INTRAVENOUS PRN
Status: DISCONTINUED | OUTPATIENT
Start: 2019-06-06 | End: 2019-06-06 | Stop reason: SDUPTHER

## 2019-06-06 RX ORDER — SODIUM CHLORIDE 0.9 % (FLUSH) 0.9 %
10 SYRINGE (ML) INJECTION EVERY 12 HOURS SCHEDULED
Status: DISCONTINUED | OUTPATIENT
Start: 2019-06-06 | End: 2019-06-06 | Stop reason: HOSPADM

## 2019-06-06 RX ORDER — ONDANSETRON 2 MG/ML
4 INJECTION INTRAMUSCULAR; INTRAVENOUS EVERY 6 HOURS PRN
Status: DISCONTINUED | OUTPATIENT
Start: 2019-06-06 | End: 2019-06-06 | Stop reason: HOSPADM

## 2019-06-06 RX ORDER — SODIUM CHLORIDE 9 MG/ML
INJECTION, SOLUTION INTRAVENOUS CONTINUOUS PRN
Status: DISCONTINUED | OUTPATIENT
Start: 2019-06-06 | End: 2019-06-06 | Stop reason: SDUPTHER

## 2019-06-06 RX ORDER — FENTANYL CITRATE 50 UG/ML
25 INJECTION, SOLUTION INTRAMUSCULAR; INTRAVENOUS EVERY 5 MIN PRN
Status: DISCONTINUED | OUTPATIENT
Start: 2019-06-06 | End: 2019-06-06 | Stop reason: HOSPADM

## 2019-06-06 RX ORDER — HEPARIN SODIUM (PORCINE) LOCK FLUSH IV SOLN 100 UNIT/ML 100 UNIT/ML
300 SOLUTION INTRAVENOUS PRN
Status: DISCONTINUED | OUTPATIENT
Start: 2019-06-06 | End: 2019-06-14 | Stop reason: HOSPADM

## 2019-06-06 RX ORDER — ONDANSETRON 2 MG/ML
4 INJECTION INTRAMUSCULAR; INTRAVENOUS
Status: DISCONTINUED | OUTPATIENT
Start: 2019-06-06 | End: 2019-06-06 | Stop reason: HOSPADM

## 2019-06-06 RX ORDER — MIDAZOLAM HYDROCHLORIDE 1 MG/ML
INJECTION INTRAMUSCULAR; INTRAVENOUS PRN
Status: DISCONTINUED | OUTPATIENT
Start: 2019-06-06 | End: 2019-06-06 | Stop reason: SDUPTHER

## 2019-06-06 RX ADMIN — PROPOFOL 75 MCG/KG/MIN: 10 INJECTION, EMULSION INTRAVENOUS at 17:32

## 2019-06-06 RX ADMIN — Medication 10 ML: at 06:44

## 2019-06-06 RX ADMIN — MEROPENEM 500 MG: 500 INJECTION, POWDER, FOR SOLUTION INTRAVENOUS at 06:44

## 2019-06-06 RX ADMIN — MIDAZOLAM 2 MG: 1 INJECTION INTRAMUSCULAR; INTRAVENOUS at 17:28

## 2019-06-06 RX ADMIN — HEPARIN SODIUM 5000 UNITS: 5000 INJECTION, SOLUTION INTRAVENOUS; SUBCUTANEOUS at 22:14

## 2019-06-06 RX ADMIN — Medication 10 ML: at 00:26

## 2019-06-06 RX ADMIN — Medication 10 ML: at 22:05

## 2019-06-06 RX ADMIN — Medication 325 MG: at 21:46

## 2019-06-06 RX ADMIN — FLUTICASONE PROPIONATE 2 SPRAY: 50 SPRAY, METERED NASAL at 12:06

## 2019-06-06 RX ADMIN — FENTANYL CITRATE 50 MCG: 50 INJECTION, SOLUTION INTRAMUSCULAR; INTRAVENOUS at 17:49

## 2019-06-06 RX ADMIN — Medication 300 UNITS: at 07:22

## 2019-06-06 RX ADMIN — MIRTAZAPINE 15 MG: 15 TABLET, FILM COATED ORAL at 21:47

## 2019-06-06 RX ADMIN — SODIUM BICARBONATE 650 MG TABLET 650 MG: at 21:46

## 2019-06-06 RX ADMIN — DOCUSATE SODIUM 100 MG: 100 CAPSULE, LIQUID FILLED ORAL at 21:47

## 2019-06-06 RX ADMIN — SODIUM CHLORIDE: 9 INJECTION, SOLUTION INTRAVENOUS at 17:28

## 2019-06-06 RX ADMIN — TRAMADOL HYDROCHLORIDE 25 MG: 50 TABLET, FILM COATED ORAL at 00:26

## 2019-06-06 RX ADMIN — TRAMADOL HYDROCHLORIDE 25 MG: 50 TABLET, FILM COATED ORAL at 22:04

## 2019-06-06 RX ADMIN — FENTANYL CITRATE 50 MCG: 50 INJECTION, SOLUTION INTRAMUSCULAR; INTRAVENOUS at 17:32

## 2019-06-06 RX ADMIN — Medication 10 ML: at 07:22

## 2019-06-06 RX ADMIN — Medication 10 ML: at 06:13

## 2019-06-06 RX ADMIN — Medication 300 UNITS: at 00:31

## 2019-06-06 RX ADMIN — Medication 300 UNITS: at 06:14

## 2019-06-06 RX ADMIN — Medication 10 ML: at 12:07

## 2019-06-06 RX ADMIN — MEROPENEM 500 MG: 500 INJECTION, POWDER, FOR SOLUTION INTRAVENOUS at 21:50

## 2019-06-06 ASSESSMENT — PAIN SCALES - GENERAL
PAINLEVEL_OUTOF10: 0
PAINLEVEL_OUTOF10: 10
PAINLEVEL_OUTOF10: 0
PAINLEVEL_OUTOF10: 10
PAINLEVEL_OUTOF10: 0

## 2019-06-06 ASSESSMENT — ENCOUNTER SYMPTOMS
SHORTNESS OF BREATH: 0
ABDOMINAL PAIN: 0
NAUSEA: 0
CHEST TIGHTNESS: 0
CONSTIPATION: 0

## 2019-06-06 ASSESSMENT — PULMONARY FUNCTION TESTS
PIF_VALUE: 1
PIF_VALUE: 0
PIF_VALUE: 1
PIF_VALUE: 0
PIF_VALUE: 1
PIF_VALUE: 0
PIF_VALUE: 1
PIF_VALUE: 0
PIF_VALUE: 1

## 2019-06-06 ASSESSMENT — PAIN DESCRIPTION - ONSET: ONSET: ON-GOING

## 2019-06-06 ASSESSMENT — PAIN DESCRIPTION - LOCATION: LOCATION: ABDOMEN

## 2019-06-06 ASSESSMENT — PAIN DESCRIPTION - ORIENTATION: ORIENTATION: LOWER

## 2019-06-06 ASSESSMENT — PAIN DESCRIPTION - DESCRIPTORS: DESCRIPTORS: ACHING;CONSTANT;SHARP

## 2019-06-06 ASSESSMENT — PAIN DESCRIPTION - FREQUENCY: FREQUENCY: CONTINUOUS

## 2019-06-06 ASSESSMENT — PAIN DESCRIPTION - PROGRESSION: CLINICAL_PROGRESSION: GRADUALLY WORSENING

## 2019-06-06 ASSESSMENT — PAIN DESCRIPTION - PAIN TYPE: TYPE: ACUTE PAIN

## 2019-06-06 ASSESSMENT — PAIN - FUNCTIONAL ASSESSMENT: PAIN_FUNCTIONAL_ASSESSMENT: INTOLERABLE, UNABLE TO DO ANY ACTIVE OR PASSIVE ACTIVITIES

## 2019-06-06 NOTE — OP NOTE
PATIENT NAME:    Kavitha Akbar    DATE OF PROCEDURE:                      6/6/2019  SURGEON:                                            Jose Haro M.D.   ASSISTANT:   PREOPERATIVE DIAGNOSIS:              Vaginal nodularity  POSTOPERATIVE DIAGNOSIS:            Same + anterior cervical lesion  OPERATION:                                          EUA and multiple cervical biopsies   ANESTHESIA:                                         General.   ESTIMATED BLOOD LOSS:                Minimal.   COMPLICATIONS:                                  None. PROCEDURE NOTE: With the patient in the supine position, general anesthesia was administered without any complications. The patient was then placed in dorsal lithotomy position using cane stirrups. The perineum was scrubbed and draped in the usual fashion. A crane catheter is already in place. EUA was done and there was complete obliteration of the anterior vaginal fornix and the anterior lip of the cervix was very hard and nodular. The vagina itself was stenotic. Adnexal exam was unsatisfactory. A small narrow speculum was introduced and the above finding were confirmed. Multiple punch biopsies were taken from the anterior cervical lip and the posterior cervical lip. An endocervical brush was then introduced and the tip was sent to pathology. Inspection showed some bleeding from the biopsy sites and the bovie was used and all bleeding was stopped. Inspection revealed excellent hemostasis. The procedure was then terminated. All instruments were removed. The patient was placed in supine position, awakened from anesthesia and transferred to recovery room in good stable condition.        Active Hospital Problems    Diagnosis Date Noted    [facility]) ([facility] [N17.9] 01/18/2019         Mir Herrera  6/6/2019  6:09 PM

## 2019-06-06 NOTE — PROGRESS NOTES
[address] [place] [address] [facility]  [facility]  Progress Note      CC: resting comfortable today  Had HD yesterday   ID following for hydronephrosis- atbs management  Face to face encounter     SUBJECTIVE:  Patient is tolerating medications. No reported adverse drug reactions. ROS: nausea no vomiting, diarrhea. + abdominal pain. No fevers. Had chills No rash. Tolerating atbs. Feeling tired-     Medications:  Scheduled Meds:   sodium chloride  250 mL Intravenous Once    sodium polystyrene  15 g Oral Once    sodium bicarbonate  650 mg Oral BID    pantoprazole  40 mg Oral QAM AC    chlorhexidine  15 mL Mouth/Throat BID    meropenem  500 mg Intravenous Q12H    atorvastatin  40 mg Oral Daily    docusate sodium  100 mg Oral BID    ferrous sulfate  325 mg Oral BID WC    fluticasone  2 spray Nasal Daily    levothyroxine  100 mcg Oral Daily    mirtazapine  15 mg Oral Nightly    sodium chloride flush  10 mL Intravenous 2 times per day    heparin (porcine)  5,000 Units Subcutaneous 3 times per day     Continuous Infusions:   dextrose       PRN Meds:heparin flush, magnesium hydroxide, glucose, dextrose, glucagon (rDNA), dextrose, traMADol, sodium chloride flush, ondansetron  OBJECTIVE:  Patient Vitals for the past 24 hrs:   BP Temp Temp src Pulse Resp SpO2 Weight   06/06/19 0745 139/75 97.6 °F (36.4 °C) Oral 73 16 95 % --   06/06/19 0704 -- -- -- -- -- -- 172 lb 14.4 oz (78.4 kg)   06/06/19 0350 (!) 147/68 98.5 °F (36.9 °C) Oral 78 16 -- --   06/06/19 0030 (!) 145/73 99 °F (37.2 °C) Oral 74 16 94 % --   06/05/19 1945 (!) 166/78 98.8 °F (37.1 °C) Oral 80 16 96 % --   06/05/19 1348 (!) 146/79 98.4 °F (36.9 °C) -- 69 -- -- 166 lb 7.2 oz (75.5 kg)   06/05/19 1330 137/75 -- -- 69 -- -- --   06/05/19 1300 (!) 154/61 -- -- 71 -- -- --   06/05/19 1230 136/78 -- -- 74 -- -- --   06/05/19 1200 (!) 154/80 -- -- 68 -- -- --     Constitutional: The patient is resting- tired today  Skin: Warm and dry.  No rashes were noted. Head: at/nc   Neck: Supple to movements. Chest: No use of accessory muscles to breathe. Symmetrical expansion. Auscultation reveals no wheezing, crackles, or rhonchi. Cardiovascular: S1 and S2 are rhythmic and regular. No murmurs appreciated. Abdomen: Positive bowel sounds to auscultation. Extremities: No clubbing, no cyanosis, no edema. Right upper arm with line picc- no phlebitis   Braden yellow  RIJ -HD-    Laboratory and Tests Review:  Lab Results   Component Value Date    WBC 9.4 06/06/2019    WBC 10.2 06/05/2019    WBC 11.8 (H) 06/04/2019    HGB 7.9 (L) 06/06/2019    HCT 24.1 (L) 06/06/2019    MCV 89.6 06/06/2019     06/06/2019     Lab Results   Component Value Date    NEUTROABS 6.39 06/06/2019    NEUTROABS 7.70 (H) 06/05/2019    NEUTROABS 9.14 (H) 06/04/2019     Lab Results   Component Value Date    ALT 23 05/31/2019    AST 18 05/31/2019    ALKPHOS 56 05/31/2019    BILITOT <0.2 05/31/2019     Lab Results   Component Value Date     06/06/2019    K 4.0 06/06/2019    K 5.4 05/31/2019     06/06/2019    CO2 27 06/06/2019    BUN 21 06/06/2019    CREATININE 4.3 06/06/2019    GFRAA 13 06/06/2019    LABGLOM 11 06/06/2019    GLUCOSE 73 06/06/2019    GLUCOSE 97 12/20/2011    PROT 6.0 05/31/2019    LABALBU 3.1 05/31/2019    LABALBU 4.4 12/20/2011    CALCIUM 8.3 06/06/2019    BILITOT <0.2 05/31/2019    ALKPHOS 56 05/31/2019    AST 18 05/31/2019    ALT 23 05/31/2019     Radiology:  Reviewed     Microbiology:   5/31/19- urine cx- no growth to date  Blood cx- no growth to date    ASSESSMENT:  · Bilateral hydronephrosis   · Neurogenic bladder  · Leukocytosis- improved   · S/p stent exchange  · UTI f/u cx ngtd  · JIAN- on HD    PLAN:  · Continue meropenem for now   · ?  CHECK URINE EOS  · Check cultures- No growth to date  · Nephrology/ urology following  · gynecology to evaluate -Post-operative Diagnosis:  Fixated and stenotic urethra with abnormal and nodular vaginal examination suspicious for malignancy  · For vaginal biopsy today  · Monitor labs- creat- 4.3   WBC- 9.4  · Will probably stop atbs soon     Electronically signed by JUSTYN Guerrero on 6/6/2019 at 11:42 AM    Phone [phone]  Fax [phone]    I have discussed the case, including pertinent history and physical  exam findings . I have seen and examined the patient and the key elements of the encounter have been performed by me. I agree with the assessment, plan and orders as documented.       Treatment plan as per my recommendation     Mickey Guillen MD, FACP  6/6/2019  11:21 PM

## 2019-06-06 NOTE — PROGRESS NOTES
[facility] MD Nakia Pollard MD Meriel Akin, MD. Etta Bullock MD   Progress Note    6/6/2019    SUBJECTIVE:   On RA   Braden in place   Poor UO yesterday      PROBLEM LIST:    Principal Problem:    Acute renal failure (ARF) ([address]  Resolved Problems:    * No resolved hospital problems. *       DIET:    Diet NPO Effective Now       Allergies : Patient has no known allergies. Past Medical History:   Diagnosis Date    Anxiety     Depression     Hyperlipidemia     Hypertension     Hypothyroidism     Intellectual disability     Leg pain, bilateral     Noncompliance with medications     Noncompliance with treatment     Osteoarthritis        Past Surgical History:   Procedure Laterality Date    CYSTOSCOPY Left 1/21/2019    CYSTOSCOPY, BILATERAL RETROGRADE PYELOGRAMS, BILATERAL STENT INSERTION performed by Kevin Pop MD at [address] Bilateral 6/1/2019    CYSTOSCOPY, RETROGRADE PYELOGRAMS, BILATERAL URETERAL STENT EXCHANGE performed by Leslie Mercedes MD at [address]       Family History   Problem Relation Age of Onset    Diabetes Brother     Thyroid Disease Mother     Other Daughter         Autism        reports that she quit smoking about 30 years ago. She has a 5.00 pack-year smoking history. She has never used smokeless tobacco. She reports that she does not drink alcohol or use drugs. Review of Systems:   Constitutional: no fevers , no chills , feels ok   Eyes: no eye pain , no itching , no drainage  Ears, nose, mouth, throat, and face: no ear ,nose pain , hearing is ok ,no nasal drainage   Respiratory: no sob ,no cough ,no wheezing . Cardiovascular: no chest pain , no palpitation ,no sob . Gastrointestinal: no nausea, vomiting , constipation , no abdominal pain . Genitourinary:no urinary retention , no burning , dysuria . No polyuria   Hematologic/lymphatic: no bleeding , no cougulation issues .    Musculoskeletal:no joint pain , no swelling . Neurological: no headaches ,no weakness , no numbness . Endocrine: no thirst , no weight issues .      MEDS (scheduled):    sodium chloride  250 mL Intravenous Once    sodium polystyrene  15 g Oral Once    sodium bicarbonate  650 mg Oral BID    pantoprazole  40 mg Oral QAM AC    chlorhexidine  15 mL Mouth/Throat BID    meropenem  500 mg Intravenous Q12H    atorvastatin  40 mg Oral Daily    docusate sodium  100 mg Oral BID    ferrous sulfate  325 mg Oral BID WC    fluticasone  2 spray Nasal Daily    levothyroxine  100 mcg Oral Daily    mirtazapine  15 mg Oral Nightly    sodium chloride flush  10 mL Intravenous 2 times per day    heparin (porcine)  5,000 Units Subcutaneous 3 times per day       MEDS (infusions):   dextrose         MEDS (prn):  heparin flush, magnesium hydroxide, glucose, dextrose, glucagon (rDNA), dextrose, traMADol, sodium chloride flush, ondansetron    PHYSICAL EXAM:     Patient Vitals for the past 24 hrs:   BP Temp Temp src Pulse Resp SpO2 Weight   06/06/19 0745 139/75 97.6 °F (36.4 °C) Oral 73 16 95 % --   06/06/19 0704 -- -- -- -- -- -- 172 lb 14.4 oz (78.4 kg)   06/06/19 0350 (!) 147/68 98.5 °F (36.9 °C) Oral 78 16 -- --   06/06/19 0030 (!) 145/73 99 °F (37.2 °C) Oral 74 16 94 % --   06/05/19 1945 (!) 166/78 98.8 °F (37.1 °C) Oral 80 16 96 % --   06/05/19 1348 (!) 146/79 98.4 °F (36.9 °C) -- 69 -- -- 166 lb 7.2 oz (75.5 kg)   06/05/19 1330 137/75 -- -- 69 -- -- --   06/05/19 1300 (!) 154/61 -- -- 71 -- -- --   06/05/19 1230 136/78 -- -- 74 -- -- --   06/05/19 1200 (!) 154/80 -- -- 68 -- -- --   06/05/19 1130 (!) 157/76 -- -- 66 -- -- --   @      Intake/Output Summary (Last 24 hours) at 6/6/2019 1128  Last data filed at 6/6/2019 0830  Gross per 24 hour   Intake 610 ml   Output 2800 ml   Net -2190 ml         Wt Readings from Last 3 Encounters:   06/06/19 172 lb 14.4 oz (78.4 kg)   05/23/19 148 lb (67.1 kg)   05/15/19 140 lb 10.5 oz (63.8 kg) Constitutional:  in no acute distress  Oral: mucus membranes moist  Neck: no JVD  Cardiovascular: S1, S2 regular rhythm, no murmur,or rub  Respiratory:  No crackles, no wheeze  Gastrointestinal:  Soft, nontender, nondistended, NABS  Ext: no edema, feet warm  Skin: dry, no rash  Neuro: awake, alert, interactive      DATA:    Recent Labs     06/04/19  0525 06/05/19  0520 06/06/19  0615   WBC 11.8* 10.2 9.4   HGB 8.4* 8.1* 7.9*   HCT 27.0* 24.9* 24.1*   MCV 92.5 88.9 89.6    276 278     Recent Labs     06/05/19  1745 06/06/19  0025 06/06/19  0615    138 138   K 3.8 4.0 4.0   CL 98 101 101   CO2 25 26 27   BUN 17 19 21*   CREATININE 3.4* 3.9* 4.3*       No results found for: LABPROT    ASSESSMENT / RECOMMENDATIONS:      Acute kidney injury 2.2 obstructive uropathy in contest of poorly functioning bladder and neurogenic bladder .   -Chronic kidney disease baseline cr 1.4   -Sepsis secondary to a urinary tract infection  -Anaemia of chronic disease . R/O acute component .  -Chronic mental disability  -firm abnormal nodule lesion within the vagina with its urethra with cystinosis suspicion for gynecological malignancy              Plan :   S/p Cystourethroscopy, bilateral JJ ureteral stent insertion  UO on lower side yesterday . For some reason the day before UO picked up  . I will hold HD today .  Flush the crane and make sure it is in good position   If continue to have low UO with cr trending up then will need HD for some time and will plan on Tesio per IR               Electronically signed by Marie Schmitz MD on 6/6/2019 at 11:28 AM

## 2019-06-06 NOTE — PROGRESS NOTES
Looking much better this morning post dialysis  Cleared for vaginal biopsies    Discussed with patient what need to be done, seems to comprehend  Procedure explained    A Vaginal nodularities    P EUA, vaginal biopsies

## 2019-06-06 NOTE — ANESTHESIA PRE PROCEDURE
Department of Anesthesiology  Preprocedure Note       Name:  Nina Seth   Age:  52 y.o.  :  1965                                          MRN:  [id number]         Date:  2019      Surgeon: Meryl Guzman):  Deanna Zavala MD    Procedure: Malva Duane ANESTHESIA VAGINAL BIOPSIES (N/A )    Medications prior to admission:   Prior to Admission medications    Medication Sig Start Date End Date Taking?  Authorizing Provider   acetaminophen (TYLENOL) 325 MG tablet Take 650 mg by mouth every 4 hours as needed for Pain   Yes Historical Provider, MD   dextrose 5 % SOLN 250 mL with vancomycin 1 g SOLR 1,000 mg Infuse 1,000 mg intravenously every 24 hours   Yes Historical Provider, MD   mirtazapine (REMERON) 15 MG tablet Take 1 tablet by mouth nightly 19  Yes Kirstie Cuello MD   ondansetron (ZOFRAN-ODT) 4 MG disintegrating tablet Take 1 tablet by mouth every 8 hours as needed for Nausea or Vomiting 19  Yes Swapna Aguilera, DO   ferrous sulfate 325 (65 Fe) MG tablet Take 1 tablet by mouth 2 times daily (with meals) 19  Yes Swapna Aguilera, DO   docusate sodium (COLACE, DULCOLAX) 100 MG CAPS Take 100 mg by mouth 2 times daily 19  Yes Swapna Aguilera, DO   levothyroxine (SYNTHROID) 100 MCG tablet TAKE 1 TABLET BY MOUTH EVERY MORNING 30 MINUTES BEFORE EATING. 4/10/19  Yes Rodríguez Wilson,    vitamin D (ERGOCALCIFEROL) 63120 units CAPS capsule Take 1 capsule by mouth once a week 19  Yes Celestine Trujillo DO   oxybutynin (DITROPAN-XL) 5 MG extended release tablet Take 1 tablet by mouth daily 19  Yes Celestine Trujillo DO   magnesium oxide (MAG-OX) 400 (241.3 Mg) MG TABS tablet Take 1 tablet by mouth 2 times daily 19  Yes Celestine Trujillo DO   fluticasone (FLONASE) 50 MCG/ACT nasal spray 2 sprays by Nasal route daily  Patient taking differently: 1 spray by Nasal route daily  19  Yes Celestine Trujillo DO   pantoprazole (PROTONIX) 40 MG tablet TAKE ONE TABLET BY MOUTH DAILY 19  Yes Rodríguez Wilson, DO Compression Stockings MISC by Does not apply route Below knee  15 - 30 mm Hg 7/25/18   Jose Alfredo Randall DO       Current medications:    Current Facility-Administered Medications   Medication Dose Route Frequency Provider Last Rate Last Dose    heparin flush 100 UNIT/ML injection 300 Units  300 Units Intracatheter PRN Damien Stevens DO   300 Units at 06/06/19 0722    sodium chloride flush 0.9 % injection 10 mL  10 mL Intravenous 2 times per day Mir Herrera MD        sodium chloride flush 0.9 % injection 10 mL  10 mL Intravenous PRN Mir Herrera MD        ondansetron (ZOFRAN) injection 4 mg  4 mg Intravenous Q6H PRN Mir Herrera MD        0.9 % sodium chloride bolus  250 mL Intravenous Once Daria Gtz DO        sodium polystyrene (KAYEXALATE) 15 GM/60ML suspension 15 g  15 g Oral Once Jeffrey Lr MD        sodium bicarbonate tablet 650 mg  650 mg Oral BID Jeffrey Lr MD   650 mg at 06/05/19 2132    magnesium hydroxide (MILK OF MAGNESIA) 400 MG/5ML suspension 30 mL  30 mL Oral Daily PRN Jeffrey Lr MD        pantoprazole (PROTONIX) tablet 40 mg  40 mg Oral QAM AC Jc Stevens DO   40 mg at 06/05/19 0510    chlorhexidine (PERIDEX) 0.12 % solution 15 mL  15 mL Mouth/Throat BID Kristie Reno MD   Stopped at 06/06/19 1001    meropenem (MERREM) 500 mg IVPB minibag  500 mg Intravenous Q12H Kristie Reno MD   Stopped at 06/06/19 0714    glucose (GLUTOSE) 40 % oral gel 15 g  15 g Oral PRN Jeffrey Lr MD        dextrose 50 % solution 12.5 g  12.5 g Intravenous PRN Jeffrey Lr MD        glucagon (rDNA) injection 1 mg  1 mg Intramuscular PRN Jeffrey Lr MD        dextrose 5 % solution  100 mL/hr Intravenous PRN Jeffrey Lr MD        atorvastatin (LIPITOR) tablet 40 mg  40 mg Oral Daily Сергей Dias DO   40 mg at 06/04/19 1008    docusate sodium (COLACE) capsule 100 mg  100 mg Oral BID Сергей Dias DO   100 mg at 06/05/19 2132    ferrous sulfate tablet 325 mg  325 mg Oral BID WC Emi Leopard, DO   325 mg at 06/05/19 1625    fluticasone (FLONASE) 50 MCG/ACT nasal spray 2 spray  2 spray Nasal Daily Emi Leopard, DO   2 spray at 06/06/19 1206    levothyroxine (SYNTHROID) tablet 100 mcg  100 mcg Oral Daily Emi Leopard, DO   100 mcg at 06/05/19 0509    mirtazapine (REMERON) tablet 15 mg  15 mg Oral Nightly Emi Leopard, DO   15 mg at 06/05/19 2132    traMADol (ULTRAM) tablet 25 mg  25 mg Oral Q8H PRN Emi Leopard, DO   25 mg at 06/06/19 0026    sodium chloride flush 0.9 % injection 10 mL  10 mL Intravenous 2 times per day Emi Leopard, DO   10 mL at 06/06/19 1207    sodium chloride flush 0.9 % injection 10 mL  10 mL Intravenous PRN Emi Leopard, DO   10 mL at 06/06/19 8354    ondansetron (ZOFRAN) injection 4 mg  4 mg Intravenous Q6H PRN Emi Leopard, DO   4 mg at 06/05/19 1837    heparin (porcine) injection 5,000 Units  5,000 Units Subcutaneous 3 times per day Emi Leopard, DO   Stopped at 06/06/19 1222       Allergies:  No Known Allergies    Problem List:    Patient Active Problem List   Diagnosis Code    Hyperlipidemia E78.5    Noncompliance Z91.19    Depressive disorder F32.9    Halitosis R19.6    Dysmenorrhea N94.6    Bradycardia R00.1    Acquired hypothyroidism E03.9    Mild intermittent asthma without complication T58.84    Acute renal failure (ARF) (HCC) N17.9    Anxiety F41.9    Intellectual disability F79    Obstructive uropathy N13.9    Mixed stress and urge urinary incontinence N39.46    Vitamin D deficiency E55.9    Seasonal allergic rhinitis J30.2    Acute on chronic renal insufficiency N28.9, N18.9    Acute renal failure superimposed on chronic kidney disease, on chronic dialysis (HCC) N17.9, N18.9, Z99.2    Mild protein-calorie malnutrition (HCC) E44.1    Acute cystitis with hematuria N30.01       Past Medical History:        Diagnosis Date    Anxiety     Depression     Hyperlipidemia     Hypertension     Hypothyroidism     Intellectual disability     Leg pain, bilateral     Noncompliance with medications     Noncompliance with treatment     Osteoarthritis        Past Surgical History:        Procedure Laterality Date    CYSTOSCOPY Left 2019    CYSTOSCOPY, BILATERAL RETROGRADE PYELOGRAMS, BILATERAL STENT INSERTION performed by Meghan Melgoza MD at [facility] Bilateral 2019    CYSTOSCOPY, RETROGRADE PYELOGRAMS, BILATERAL URETERAL STENT EXCHANGE performed by Uriel Banerjee MD at [address] [place] [address] History:    Social History     Tobacco Use    Smoking status: Former Smoker     Packs/day: 1.00     Years: 5.00     Pack years: 5.00     Last attempt to quit: 1989     Years since quittin.4    Smokeless tobacco: Never Used   Substance Use Topics    Alcohol use: No                                Counseling given: Not Answered      Vital Signs (Current):   Vitals:    19 0030 19 0350 19 0704 19 0745   BP: (!) 145/73 (!) 147/68  139/75   Pulse: 74 78  73   Resp: 16 16  16   Temp: 99 °F (37.2 °C) 98.5 °F (36.9 °C)  97.6 °F (36.4 °C)   TempSrc: Oral Oral  Oral   SpO2: 94%   95%   Weight:   172 lb 14.4 oz (78.4 kg)    Height:                                                  BP Readings from Last 3 Encounters:   19 139/75   19 105/71   19 111/72       NPO Status:                                                                                 BMI:   Wt Readings from Last 3 Encounters:   19 172 lb 14.4 oz (78.4 kg)   19 148 lb (67.1 kg)   05/15/19 140 lb 10.5 oz (63.8 kg)     Body mass index is 34.92 kg/m².     CBC:   Lab Results   Component Value Date    WBC 9.4 2019    RBC 2.69 2019    HGB 7.9 2019    HCT 24.1 2019    MCV 89.6 2019    RDW 14.8 2019     2019       CMP:   Lab Results   Component Value Date     2019    K 4.0 2019

## 2019-06-06 NOTE — PROGRESS NOTES
[facility]  Internal Medicine  -Resident Progress Note-    Alejandrina Underwood / DEVIN 1965 / MRN [id number] / Analia White 2019 / Hospital Day: 7    PCP:  Wiley Samuel DO  Admitting Physician:  Wendell Primrose, DO  Consultants: Nephrology, OB/GYN, Urology, Infectious disease  Chief Complaint:    Chief Complaint   Patient presents with    Abnormal Lab     PATIENT TO THE ED TODAY PER NH WITH C/O ABNORMAL LABS. PATIENT WITH CREAT OF 5.6 AND BUN 45       Subjective / Overnight Events  Collin Gonzalez was seen and examined at bedside today; no family was present for the examination. Patient is going for biopsy of the vaginal nodules in the OR today. She is very anxious for the procedure. Review of Systems  All bolded are positive; please see HPI  General:  Fever, chills, diaphoresis, fatigue, malaise, night sweats, weight loss  Psychological:  Anxiety, disorientation, hallucinations. ENT:  Epistaxis, vertigo, visual changes. Cardiovascular:  Chest pain, irregular heartbeats, palpitations, paroxysmal nocturnal dyspnea. Respiratory:  Shortness of breath, coughing, sputum production, hemoptysis, wheezing, orthopnea.   Gastrointestinal:  Nausea, vomiting, diarrhea, heartburn, constipation, abdominal pain, hematemesis, hematochezia, melena, acholic stools  Genito-Urinary:  Dysuria, urgency, frequency, hematuria, vaginal bleeding  Musculoskeletal:  Joint pain, joint stiffness, joint swelling, muscle pain  Neurology:  Headache, focal neurological deficits, weakness, numbness, paresthesia  Derm:  Rashes, ulcers, excoriations, bruising  Extremities:  Decreased ROM, peripheral edema, mottling    Physical Examination  Vitals:  /75   Pulse 73   Temp 97.6 °F (36.4 °C) (Oral)   Resp 16   Ht 4' 11\" (1.499 m)   Wt 172 lb 14.4 oz (78.4 kg)   LMP 2015 (Approximate)   SpO2 95%   BMI 34.92 kg/m²   General Appearance:  awake, alert, and oriented to person, place, time, and purpose; appears stated age and changed on June 1  · Abnormal pelvic exam showing a firm abnormal nodule lesion within the vagina with its urethra with cystinosis suspicion for gynecological malignancy  · Sepsis secondary to a urinary tract infection  · Normocytic anemia of chronic disease  · Hypothyroidism  · Chronic mental disability  · Hyperlipidemia    Patient to go for biopsy of the vaginal nodules today in the OR. Patient was very anxious for the procedure but seems to understand the importance of having it done. No hemodialysis today. Urine output has decreased     · Routine labs in AM.  · DVT prophylaxis. · Please see orders for further management and care. The plan was discussed with Dr. Chalo Hayes  . Pura Haas,  PGY2  6/6/2019  5:03 PM         I saw and evaluated the patient. I agree with the findings and the plan of care as documented in the resident's note.     Aneesh Clements D.O., [facility]  6:10 PM  6/6/2019

## 2019-06-06 NOTE — PROGRESS NOTES
Irrigated patient's crane catheter with 120ml of NS, returned with light yellow urine with sediment. Patient tolerated fairly well.

## 2019-06-06 NOTE — PLAN OF CARE
Problem: Pain:  Goal: Pain level will decrease  Description  Pain level will decrease  Outcome: Met This Shift     Problem: Pain:  Goal: Control of acute pain  Description  Control of acute pain  Outcome: Met This Shift     Problem: Pain:  Goal: Control of chronic pain  Description  Control of chronic pain  Outcome: Met This Shift     Problem: Falls - Risk of:  Goal: Will remain free from falls  Description  Will remain free from falls  Outcome: Met This Shift     Problem: Falls - Risk of:  Goal: Absence of physical injury  Description  Absence of physical injury  Outcome: Met This Shift     Problem: Tissue Perfusion - Renal, Altered:  Goal: Electrolytes within specified parameters  Description  Electrolytes within specified parameters  Outcome: Met This Shift     Problem: Tissue Perfusion - Renal, Altered:  Goal: Urine creatinine clearance will be within specified parameters  Description  Urine creatinine clearance will be within specified parameters  Outcome: Met This Shift     Problem: Tissue Perfusion - Renal, Altered:  Goal: Ability to achieve a balanced intake and output will improve  Description  Ability to achieve a balanced intake and output will improve  Outcome: Met This Shift

## 2019-06-06 NOTE — PROGRESS NOTES
[facility]  PROGRESS NOTE    Subjective:  Resting comfortable  Nervous regarding procedure today   No problems with catheter   No plans for HD today that she knows of  Denies pain  Denies fevers     Review of Systems   Constitutional: Positive for fatigue. Negative for chills and fever. Respiratory: Negative for chest tightness and shortness of breath. Cardiovascular: Negative for chest pain and palpitations. Gastrointestinal: Negative for abdominal pain, constipation and nausea. Genitourinary: Negative for flank pain. Braden   Neurological: Negative for dizziness and light-headedness. Objective:  Vitals:    06/06/19 0745   BP: 139/75   Pulse: 73   Resp: 16   Temp: 97.6 °F (36.4 °C)   SpO2: 95%       Allergies: Patient has no known allergies.     PAST MEDICAL HISTORY:   Past Medical History:   Diagnosis Date    Anxiety     Depression     Hyperlipidemia     Hypertension     Hypothyroidism     Intellectual disability     Leg pain, bilateral     Noncompliance with medications     Noncompliance with treatment     Osteoarthritis        PAST SURGICAL HISTORY:   Past Surgical History:   Procedure Laterality Date    CYSTOSCOPY Left 1/21/2019    CYSTOSCOPY, BILATERAL RETROGRADE PYELOGRAMS, BILATERAL STENT INSERTION performed by Bam Yen MD at [facility] Bilateral 6/1/2019    CYSTOSCOPY, RETROGRADE PYELOGRAMS, BILATERAL URETERAL STENT EXCHANGE performed by Onur Muñoz MD at [address] [place] [address]:    Family History   Problem Relation Age of Onset    Diabetes Brother     Thyroid Disease Mother     Other Daughter         Autism       PAST SOCIAL HISTORY:    Social History     Socioeconomic History    Marital status: Single     Spouse name: None    Number of children: 1    Years of education: None    Highest education level: None   Occupational History    Occupation: unemployed     Employer: not employed   Social Needs    Financial resource strain: None    Food insecurity:     Worry: None     Inability: None    Transportation needs:     Medical: None     Non-medical: None   Tobacco Use    Smoking status: Former Smoker     Packs/day: 1.00     Years: 5.00     Pack years: 5.00     Last attempt to quit: 1989     Years since quittin.4    Smokeless tobacco: Never Used   Substance and Sexual Activity    Alcohol use: No    Drug use: No    Sexual activity: None   Lifestyle    Physical activity:     Days per week: None     Minutes per session: None    Stress: None   Relationships    Social connections:     Talks on phone: None     Gets together: None     Attends [employer] service: None     Active member of club or organization: None     Attends meetings of clubs or organizations: None     Relationship status: None    Intimate partner violence:     Fear of current or ex partner: None     Emotionally abused: None     Physically abused: None     Forced sexual activity: None   Other Topics Concern    None   Social History Narrative    None       IV:    dextrose         PRN: heparin flush, magnesium hydroxide, glucose, dextrose, glucagon (rDNA), dextrose, traMADol, sodium chloride flush, ondansetron    Scheduled:    sodium chloride  250 mL Intravenous Once    sodium polystyrene  15 g Oral Once    sodium bicarbonate  650 mg Oral BID    pantoprazole  40 mg Oral QAM AC    chlorhexidine  15 mL Mouth/Throat BID    meropenem  500 mg Intravenous Q12H    atorvastatin  40 mg Oral Daily    docusate sodium  100 mg Oral BID    ferrous sulfate  325 mg Oral BID WC    fluticasone  2 spray Nasal Daily    levothyroxine  100 mcg Oral Daily    mirtazapine  15 mg Oral Nightly    sodium chloride flush  10 mL Intravenous 2 times per day    heparin (porcine)  5,000 Units Subcutaneous 3 times per day       Lab Results   Component Value Date     2019    K 4.0 2019    K 5.4 2019    BUN 21 2019    CREATININE 4.3 2019

## 2019-06-06 NOTE — PROGRESS NOTES
Occupational Therapy  Occupational Therapy Initial Assessment    Date:2019  Patient Name: Milan Saint  MRN: [id number]  : 1965  ROOM #: [id number]    Placement Recommendation: Subacute vs. LTAC   Equipment Prescriptions Needed: TBD at rehab    [facility]   AM-PAC Daily Activity Inpatient   How much help for putting on and taking off regular lower body clothing?: A Lot  How much help for Bathing?: A Lot  How much help for Toileting?: A Lot  How much help for putting on and taking off regular upper body clothing?: A Little  How much help for taking care of personal grooming?: A Little  How much help for eating meals?: None  AM-PAC Inpatient Daily Activity Raw Score: 16  AM-PAC Inpatient ADL T-Scale Score : 35.96  ADL Inpatient CMS 0-100% Score: 53.32  ADL Inpatient CMS G-Code Modifier : CK    Diagnosis:   1. Acute renal failure, unspecified acute renal failure type ([address]    2. Rectal bleeding    3. Urinary tract infection without hematuria, site unspecified    4. Anemia, unspecified type    5. Hydronephrosis, unspecified hydronephrosis type    6. Calculus of kidney      Past Medical History:   Past Medical History:   Diagnosis Date    Anxiety     Depression     Hyperlipidemia     Hypertension     Hypothyroidism     Intellectual disability     Leg pain, bilateral     Noncompliance with medications     Noncompliance with treatment     Osteoarthritis          The admitting diagnosis and active problem list as listed above have been reviewed prior to the initiation of this evaluation. Nursing cleared the patient for evaluation. Patient is agreeable to evaluation. Precautions: falls, hemodialysis, alarm  Pain Scale: Numeric Rate: 10/10 pain all over, especially  in neck; Nursing notified. Social history: alone       Drive: no    Occupation: not working      [address]: TV  Home architecture: Basement apartment, 8 steps with rail, tub shower.    PLOF: independent with BADL and IADL, ambulated with wheeled walker   Equipment owned: wheeled walker  Cognition: oriented x 3; follows 2 step directions. fair  Problem solving skills   fair  Memory    fair  Sequencing  Communication: intact   Visual perceptual skills: intact     Glasses: no   Edema: no     Sensation: intact   Hand Dominance:  Left     X  Right     Left Right Comment   Passive range of motion [facility]     Active range of motion [facility]     Muscle Grade 4/5 4/5    /pinch Strength Intact  Intact     [] Malnutrition indicators have been identified and nursing has been notified to ensure a dietitian consult is ordered. Function Assessment    Status  Goal Comment   Feeding:  Independent   Independent      Grooming:  Minimal Assist  Independent      UE dressing:  Minimal Assist  Independent     LE dressing: Moderate Assist  Independent     Bathing: Moderate Assist  Independent     Bed Mobility:     Moderate Assist  for supine to sit,   Moderate Assist  for scooting,  Supervision  for sit to supine  Independent     Functional Transfers:    Minimal Assist x 2 for sit to stand transfer from EOB to wheeled walker  Minimal assist for transfer to and from hallway chair Independent  Safety: fair    Functional Mobility: 25 feet x 2 with wheeled walker and  Minimal Assist   Modified [place]       Pt/family participated in establishment of goals. Balance:   Sitting: good   Standing: fair with wheeled walker    Endurance: fair       Assessment of Current Deficits:   [x]Functional mobility  []ROM  [x]Strength   []Cognition   []Safety Awareness    [x]ADLs [x]IADLs   [x]Endurance  [x]Balance    []Vision  []Sensation     []Gross Motor Coordination       []Fine Motor Coordination     · Low Complexity  · History: Brief history including review of medical records relating to the problem  · Exam: 1-3 performance Deficits  · Assistance/Modification: No assistance or modifications required to perform tasks.  No comorbities affecting occupational performance. Patients Goal: return home   Treatment: OT eval, bed mobility, sitting balance at EOB for 5 minutes with supervision, transfer training with verbal cues for hand placement, static standing balance with wheeled walker, functional mobility with wheeled walker, verbal cues for walker sequence and safety, slow place, verbal cues for hand placement for transfer to and from hallway chair, seated rest break for 10 minutes, pt returned to room and politely declined sitting up in chair, pt returned to EOB for B UE therapeutic exercises in all planes x 10 reps, pt returned self to bed at end of eval. All needs within reach. Rehab Potential: good   Plan of care: Patient will be seen by OT 1-3 times a week for therapeutic activity, ADL re-training, bed mobility, functional transfers, functional mobility, safety and fall prevention, balance and endurance activities, instruction in energy conservation principles, and patient/family education. Patient and/or family understands diagnosis, prognosis, education and plan of care. Pt/family verbalized understanding.      Time Code treatment minutes: [id number] OTR/L #[id number]

## 2019-06-06 NOTE — PROGRESS NOTES
disease, on chronic dialysis ([facility]    Mild protein-calorie malnutrition ([facility] [facility]    Acute cystitis with hematuria       Past medical history:       Diagnosis Date    Anxiety     Depression     Hyperlipidemia     Hypertension     Hypothyroidism     Intellectual disability     Leg pain, bilateral     Noncompliance with medications     Noncompliance with treatment     Osteoarthritis    ;      Procedure Laterality Date    CYSTOSCOPY Left 1/21/2019    CYSTOSCOPY, BILATERAL RETROGRADE PYELOGRAMS, BILATERAL STENT INSERTION performed by Ping Patel MD at [facility] Bilateral 6/1/2019    CYSTOSCOPY, RETROGRADE PYELOGRAMS, BILATERAL URETERAL STENT EXCHANGE performed by Ricardo Viera MD at [address] [place] [address]       The admitting diagnosis and active problem list as listed above have been reviewed prior to the initiation of this evaluation. Last time out of bed: prior to admit    Precautions: falls and alarm ,   Social history: Patient lives alone  in a basement apartment  with 8 steps to enter with Rail    Prior Level of Function: Patient ambulated with wheeled walker   Equipment owned: [employer],    Mentation: alert x 3  and follows 2 step directions    ROM: wfl   STRENGTH: bilateral lower extremity 4/5  PAIN: (measured on a visual analog scale with 0=no pain and 10=excruciating pain) 10/10. Chest and neck. FUNCTIONAL ASSESSMENT    Bed Mobility- Supine to sit- Moderate assist         Scooting- Moderate assist      Sit to supine- Supervision      Transfers-Sit to stand- Minimal assist x 2 and Minimal assist for transfer to and from hallway chair with wheeled walker   Gait:  Patient ambulated 25 feet x 2 using wheeled walker with Minimal assist. Patient need Mod A for turns and had a slight loss of balance when turning in room. Verbal cues were needed for proper gait sequence, and not overreaching walker when ambulating.    Steps:  Not assessed     Balance: sit-good       stand fair  with wheelwalker    Edema: yes - Bilateral feet  Endurance: fair     Treatment:   Therapist educated and facilitated patient on techniques to increase safety and independence with bed mobility, balance, functional transfers, and functional mobility. Sat EOB x 15 minutes to increase dynamic sitting balance and activity tolerance. Seated Exercises 1x10 bilateral, marches, leg extensions, and ankle pumps. Patient demonstrating fair understanding of education/techniques, requiring additional training/education. .  At end of session, patient in bed with bed alarm on, call light and phone within reach,  all lines and tubes intact, nursing notified. Pt would benefit from continued skilled PT to increase functional independence and quality of life. Rehab Potential: good  for baseline  Patients Goal: rehab    ASSESSMENT  Patient exhibits decreased strength, balance, coordination impairing functional mobility. GOALS to be met in 2 days. Bed mobility-  Independent       Transfers-Sit to stand-Independent    Gait:  Patient to ambulate 100 feet using wheeled walker with Modified Independent    Steps: Patient to go up and down 8  step(s) using 1 rail(s) with  Independent     Increase strength in affected mm groups by 1/3 grade  Increase balance to allow for improvement towards functional goals. Increase endurance to allow for improvement towards functional goals.      20 min eval  Gait Training  Therapeutic excerchao Spicer SPT

## 2019-06-07 PROBLEM — E44.1 MILD PROTEIN-CALORIE MALNUTRITION (HCC): Status: ACTIVE | Noted: 2019-06-07

## 2019-06-07 LAB
ANION GAP SERPL CALCULATED.3IONS-SCNC: 11 MMOL/L (ref 7–16)
ANION GAP SERPL CALCULATED.3IONS-SCNC: 11 MMOL/L (ref 7–16)
ANION GAP SERPL CALCULATED.3IONS-SCNC: 12 MMOL/L (ref 7–16)
ANION GAP SERPL CALCULATED.3IONS-SCNC: 13 MMOL/L (ref 7–16)
ANION GAP SERPL CALCULATED.3IONS-SCNC: 15 MMOL/L (ref 7–16)
BASOPHILS ABSOLUTE: 0.05 E9/L (ref 0–0.2)
BASOPHILS RELATIVE PERCENT: 0.5 % (ref 0–2)
BUN BLDV-MCNC: 25 MG/DL (ref 6–20)
BUN BLDV-MCNC: 25 MG/DL (ref 6–20)
BUN BLDV-MCNC: 28 MG/DL (ref 6–20)
BUN BLDV-MCNC: 30 MG/DL (ref 6–20)
BUN BLDV-MCNC: 30 MG/DL (ref 6–20)
CALCIUM SERPL-MCNC: 8.5 MG/DL (ref 8.6–10.2)
CALCIUM SERPL-MCNC: 8.8 MG/DL (ref 8.6–10.2)
CALCIUM SERPL-MCNC: 8.9 MG/DL (ref 8.6–10.2)
CHLORIDE BLD-SCNC: 101 MMOL/L (ref 98–107)
CHLORIDE BLD-SCNC: 101 MMOL/L (ref 98–107)
CHLORIDE BLD-SCNC: 102 MMOL/L (ref 98–107)
CHLORIDE BLD-SCNC: 102 MMOL/L (ref 98–107)
CHLORIDE BLD-SCNC: 103 MMOL/L (ref 98–107)
CO2: 24 MMOL/L (ref 22–29)
CO2: 25 MMOL/L (ref 22–29)
CO2: 26 MMOL/L (ref 22–29)
CREAT SERPL-MCNC: 5.2 MG/DL (ref 0.5–1)
CREAT SERPL-MCNC: 5.3 MG/DL (ref 0.5–1)
CREAT SERPL-MCNC: 5.6 MG/DL (ref 0.5–1)
CREAT SERPL-MCNC: 5.7 MG/DL (ref 0.5–1)
CREAT SERPL-MCNC: 5.8 MG/DL (ref 0.5–1)
EOSINOPHIL, URINE: 2 % (ref 0–1)
EOSINOPHILS ABSOLUTE: 0.42 E9/L (ref 0.05–0.5)
EOSINOPHILS RELATIVE PERCENT: 4.5 % (ref 0–6)
GFR AFRICAN AMERICAN: 10
GFR AFRICAN AMERICAN: 9
GFR AFRICAN AMERICAN: 9
GFR NON-AFRICAN AMERICAN: 8 ML/MIN/1.73
GFR NON-AFRICAN AMERICAN: 9 ML/MIN/1.73
GLUCOSE BLD-MCNC: 110 MG/DL (ref 74–99)
GLUCOSE BLD-MCNC: 112 MG/DL (ref 74–99)
GLUCOSE BLD-MCNC: 123 MG/DL (ref 74–99)
GLUCOSE BLD-MCNC: 67 MG/DL (ref 74–99)
GLUCOSE BLD-MCNC: 69 MG/DL (ref 74–99)
HCT VFR BLD CALC: 24.8 % (ref 34–48)
HEMOGLOBIN: 8 G/DL (ref 11.5–15.5)
IMMATURE GRANULOCYTES #: 0.07 E9/L
IMMATURE GRANULOCYTES %: 0.7 % (ref 0–5)
LYMPHOCYTES ABSOLUTE: 1.68 E9/L (ref 1.5–4)
LYMPHOCYTES RELATIVE PERCENT: 17.8 % (ref 20–42)
MCH RBC QN AUTO: 29 PG (ref 26–35)
MCHC RBC AUTO-ENTMCNC: 32.3 % (ref 32–34.5)
MCV RBC AUTO: 89.9 FL (ref 80–99.9)
METER GLUCOSE: 72 MG/DL (ref 74–99)
MONOCYTES ABSOLUTE: 0.58 E9/L (ref 0.1–0.95)
MONOCYTES RELATIVE PERCENT: 6.2 % (ref 2–12)
NEUTROPHILS ABSOLUTE: 6.62 E9/L (ref 1.8–7.3)
NEUTROPHILS RELATIVE PERCENT: 70.3 % (ref 43–80)
PDW BLD-RTO: 14.5 FL (ref 11.5–15)
PLATELET # BLD: 302 E9/L (ref 130–450)
PMV BLD AUTO: 9.9 FL (ref 7–12)
POTASSIUM SERPL-SCNC: 3.9 MMOL/L (ref 3.5–5)
POTASSIUM SERPL-SCNC: 4 MMOL/L (ref 3.5–5)
POTASSIUM SERPL-SCNC: 4 MMOL/L (ref 3.5–5)
POTASSIUM SERPL-SCNC: 4.1 MMOL/L (ref 3.5–5)
POTASSIUM SERPL-SCNC: 4.3 MMOL/L (ref 3.5–5)
RBC # BLD: 2.76 E12/L (ref 3.5–5.5)
SODIUM BLD-SCNC: 138 MMOL/L (ref 132–146)
SODIUM BLD-SCNC: 139 MMOL/L (ref 132–146)
SODIUM BLD-SCNC: 140 MMOL/L (ref 132–146)
SODIUM BLD-SCNC: 140 MMOL/L (ref 132–146)
SODIUM BLD-SCNC: 141 MMOL/L (ref 132–146)
WBC # BLD: 9.4 E9/L (ref 4.5–11.5)

## 2019-06-07 PROCEDURE — 6360000002 HC RX W HCPCS: Performed by: INTERNAL MEDICINE

## 2019-06-07 PROCEDURE — 36592 COLLECT BLOOD FROM PICC: CPT

## 2019-06-07 PROCEDURE — 87088 URINE BACTERIA CULTURE: CPT

## 2019-06-07 PROCEDURE — 6370000000 HC RX 637 (ALT 250 FOR IP): Performed by: INTERNAL MEDICINE

## 2019-06-07 PROCEDURE — 36415 COLL VENOUS BLD VENIPUNCTURE: CPT

## 2019-06-07 PROCEDURE — 6360000002 HC RX W HCPCS: Performed by: SPECIALIST

## 2019-06-07 PROCEDURE — 87205 SMEAR GRAM STAIN: CPT

## 2019-06-07 PROCEDURE — 80048 BASIC METABOLIC PNL TOTAL CA: CPT

## 2019-06-07 PROCEDURE — 85025 COMPLETE CBC W/AUTO DIFF WBC: CPT

## 2019-06-07 PROCEDURE — 2580000003 HC RX 258: Performed by: SPECIALIST

## 2019-06-07 PROCEDURE — 97530 THERAPEUTIC ACTIVITIES: CPT

## 2019-06-07 PROCEDURE — 36591 DRAW BLOOD OFF VENOUS DEVICE: CPT

## 2019-06-07 PROCEDURE — 6370000000 HC RX 637 (ALT 250 FOR IP): Performed by: SPECIALIST

## 2019-06-07 PROCEDURE — 2580000003 HC RX 258: Performed by: INTERNAL MEDICINE

## 2019-06-07 PROCEDURE — 82962 GLUCOSE BLOOD TEST: CPT

## 2019-06-07 PROCEDURE — 1200000000 HC SEMI PRIVATE

## 2019-06-07 PROCEDURE — 97116 GAIT TRAINING THERAPY: CPT

## 2019-06-07 RX ORDER — FAMOTIDINE 20 MG/1
20 TABLET, FILM COATED ORAL 2 TIMES DAILY
Status: DISCONTINUED | OUTPATIENT
Start: 2019-06-07 | End: 2019-06-14 | Stop reason: HOSPADM

## 2019-06-07 RX ADMIN — DOCUSATE SODIUM 100 MG: 100 CAPSULE, LIQUID FILLED ORAL at 21:29

## 2019-06-07 RX ADMIN — Medication 300 UNITS: at 21:30

## 2019-06-07 RX ADMIN — HEPARIN SODIUM 5000 UNITS: 5000 INJECTION, SOLUTION INTRAVENOUS; SUBCUTANEOUS at 21:31

## 2019-06-07 RX ADMIN — FAMOTIDINE 20 MG: 20 TABLET ORAL at 16:36

## 2019-06-07 RX ADMIN — ATORVASTATIN CALCIUM 40 MG: 40 TABLET, FILM COATED ORAL at 09:01

## 2019-06-07 RX ADMIN — Medication 325 MG: at 16:36

## 2019-06-07 RX ADMIN — Medication 325 MG: at 09:01

## 2019-06-07 RX ADMIN — Medication 300 UNITS: at 00:07

## 2019-06-07 RX ADMIN — DOCUSATE SODIUM 100 MG: 100 CAPSULE, LIQUID FILLED ORAL at 09:01

## 2019-06-07 RX ADMIN — SODIUM BICARBONATE 650 MG TABLET 650 MG: at 21:29

## 2019-06-07 RX ADMIN — PANTOPRAZOLE SODIUM 40 MG: 40 TABLET, DELAYED RELEASE ORAL at 05:08

## 2019-06-07 RX ADMIN — Medication 10 ML: at 05:12

## 2019-06-07 RX ADMIN — FLUTICASONE PROPIONATE 2 SPRAY: 50 SPRAY, METERED NASAL at 09:02

## 2019-06-07 RX ADMIN — TRAMADOL HYDROCHLORIDE 25 MG: 50 TABLET, FILM COATED ORAL at 21:41

## 2019-06-07 RX ADMIN — Medication 10 ML: at 21:30

## 2019-06-07 RX ADMIN — HEPARIN SODIUM 5000 UNITS: 5000 INJECTION, SOLUTION INTRAVENOUS; SUBCUTANEOUS at 14:28

## 2019-06-07 RX ADMIN — LEVOTHYROXINE SODIUM 100 MCG: 100 TABLET ORAL at 05:08

## 2019-06-07 RX ADMIN — MEROPENEM 500 MG: 500 INJECTION, POWDER, FOR SOLUTION INTRAVENOUS at 09:01

## 2019-06-07 RX ADMIN — FAMOTIDINE 20 MG: 20 TABLET ORAL at 21:29

## 2019-06-07 RX ADMIN — Medication 10 ML: at 09:01

## 2019-06-07 RX ADMIN — MIRTAZAPINE 15 MG: 15 TABLET, FILM COATED ORAL at 21:29

## 2019-06-07 RX ADMIN — HEPARIN SODIUM 5000 UNITS: 5000 INJECTION, SOLUTION INTRAVENOUS; SUBCUTANEOUS at 05:08

## 2019-06-07 RX ADMIN — Medication 300 UNITS: at 23:00

## 2019-06-07 RX ADMIN — SODIUM BICARBONATE 650 MG TABLET 650 MG: at 09:01

## 2019-06-07 ASSESSMENT — PAIN DESCRIPTION - DESCRIPTORS
DESCRIPTORS: ACHING;CONSTANT;DISCOMFORT
DESCRIPTORS: ACHING;CONSTANT;DISCOMFORT

## 2019-06-07 ASSESSMENT — PAIN DESCRIPTION - PAIN TYPE
TYPE: CHRONIC PAIN
TYPE: CHRONIC PAIN

## 2019-06-07 ASSESSMENT — PAIN SCALES - GENERAL
PAINLEVEL_OUTOF10: 3
PAINLEVEL_OUTOF10: 0
PAINLEVEL_OUTOF10: 7
PAINLEVEL_OUTOF10: 0

## 2019-06-07 ASSESSMENT — PAIN DESCRIPTION - FREQUENCY
FREQUENCY: CONTINUOUS
FREQUENCY: CONTINUOUS

## 2019-06-07 ASSESSMENT — PAIN DESCRIPTION - LOCATION
LOCATION: GENERALIZED
LOCATION: GENERALIZED

## 2019-06-07 NOTE — PLAN OF CARE
Problem: Pain:  Goal: Pain level will decrease  Description  Pain level will decrease  6/6/2019 2316 by Luiz Young RN  Outcome: Met This Shift  Goal: Control of acute pain  Description  Control of acute pain  6/6/2019 2316 by Johnathon Miguel RN  Outcome: Met This Shift  Goal: Control of chronic pain  Description  Control of chronic pain  6/6/2019 2316 by Johnathon Miguel RN  Outcome: Met This Shift     Problem: Falls - Risk of:  Goal: Will remain free from falls  Description  Will remain free from falls  6/7/2019 0601 by Jeffrey Hansen RN  Outcome: Met This Shift  6/6/2019 2316 by Johnathon Miguel RN  Outcome: Met This Shift  Goal: Absence of physical injury  Description  Absence of physical injury  6/6/2019 2316 by Johnathon Miguel RN  Outcome: Met This Shift     Problem: Tissue Perfusion - Renal, Altered:  Goal: Electrolytes within specified parameters  Description  Electrolytes within specified parameters  6/6/2019 2316 by Johnathon Miguel RN  Outcome: Met This Shift  Goal: Urine creatinine clearance will be within specified parameters  Description  Urine creatinine clearance will be within specified parameters  6/6/2019 2316 by Johnathon Miguel RN  Outcome: Met This Shift  Goal: Ability to achieve a balanced intake and output will improve  Description  Ability to achieve a balanced intake and output will improve  6/7/2019 0601 by Jeffrey Hansen RN  Outcome: Met This Shift  6/6/2019 2316 by Johnathon Miguel RN  Outcome: Met This Shift

## 2019-06-07 NOTE — ANESTHESIA POSTPROCEDURE EVALUATION
Department of Anesthesiology  Postprocedure Note    Patient: Elliot Queen  MRN: [id number]  YOB: 1965  Date of evaluation: 6/7/2019  Time:  6:57 AM     Procedure Summary     Date:  06/06/19 Room / Location:  [facility] 06 / [address]    Anesthesia Start:  1728 Anesthesia Stop:  1812    Procedure:  Roopa Nikki ANESTHESIA VAGINAL BIOPSIES (N/A ) Diagnosis:  (N/A)    Surgeon:  Suzy Estrella MD Responsible Provider:  Bam Beckett MD    Anesthesia Type:  MAC ASA Status:  3          Anesthesia Type: MAC    Tevin Phase I: Tevin Score: 8    Tevin Phase II:      Last vitals: Reviewed and per EMR flowsheets.        Anesthesia Post Evaluation    Patient location during evaluation: PACU  Patient participation: complete - patient participated  Level of consciousness: lethargic  Pain score: 2  Airway patency: patent  Nausea & Vomiting: no nausea  Complications: no  Cardiovascular status: blood pressure returned to baseline  Respiratory status: acceptable  Hydration status: euvolemic

## 2019-06-07 NOTE — PROGRESS NOTES
[facility] MD Fransisco Lord MD Victorino Ned, MD. Jose Carlos Coronado MD   Progress Note    6/7/2019    SUBJECTIVE:   On RA   Braden in place   Poor UO yesterday      PROBLEM LIST:    Principal Problem:    Acute renal failure (ARF) (HCC)  Active Problems:    Mild protein-calorie malnutrition ([address]  Resolved Problems:    * No resolved hospital problems. *       DIET:    DIET RENAL;       Allergies : Patient has no known allergies. Past Medical History:   Diagnosis Date    Anxiety     Depression     Hyperlipidemia     Hypertension     Hypothyroidism     Intellectual disability     Leg pain, bilateral     Noncompliance with medications     Noncompliance with treatment     Osteoarthritis        Past Surgical History:   Procedure Laterality Date    CYSTOSCOPY Left 1/21/2019    CYSTOSCOPY, BILATERAL RETROGRADE PYELOGRAMS, BILATERAL STENT INSERTION performed by Anastasiia Smalls MD at [place] [address] Bilateral 6/1/2019    CYSTOSCOPY, RETROGRADE PYELOGRAMS, BILATERAL URETERAL STENT EXCHANGE performed by Ana Naqvi MD at [address] [place] [address] N/A 6/6/2019    EXAM UNDEDR ANESTHESIA VAGINAL BIOPSIES performed by Farrah Overton MD at [facility]. History   Problem Relation Age of Onset    Diabetes Brother     Thyroid Disease Mother     Other Daughter         Autism        reports that she quit smoking about 30 years ago. She has a 5.00 pack-year smoking history. She has never used smokeless tobacco. She reports that she does not drink alcohol or use drugs. Review of Systems:   Constitutional: no fevers , no chills , feels ok   Eyes: no eye pain , no itching , no drainage  Ears, nose, mouth, throat, and face: no ear ,nose pain , hearing is ok ,no nasal drainage   Respiratory: no sob ,no cough ,no wheezing . Cardiovascular: no chest pain , no palpitation ,no sob .    Gastrointestinal: no nausea, vomiting , constipation , no abdominal pain Constitutional:  in no acute distress  Oral: mucus membranes moist  Neck: no JVD  Cardiovascular: S1, S2 regular rhythm, no murmur,or rub  Respiratory:  No crackles, no wheeze  Gastrointestinal:  Soft, nontender, nondistended, NABS  Ext: no edema, feet warm  Skin: dry, no rash  Neuro: awake, alert, interactive      DATA:    Recent Labs     06/05/19  0520 06/06/19  0615 06/07/19  0515   WBC 10.2 9.4 9.4   HGB 8.1* 7.9* 8.0*   HCT 24.9* 24.1* 24.8*   MCV 88.9 89.6 89.9    278 302     Recent Labs     06/07/19  0007 06/07/19  0515 06/07/19  1235    140 139   K 4.1 4.3 4.0    103 102   CO2 25 26 26   BUN 25* 25* 28*   CREATININE 5.2* 5.3* 5.6*       No results found for: LABPROT    ASSESSMENT / RECOMMENDATIONS:      Acute kidney injury 2.2 obstructive uropathy in contest of poorly functioning bladder and neurogenic bladder .   -Chronic kidney disease baseline cr 1.4   -Sepsis secondary to a urinary tract infection  -Anaemia of chronic disease . R/O acute component .  -Chronic mental disability  -firm abnormal nodule lesion within the vagina with its urethra with cystinosis suspicion for gynecological malignancy              Plan :   S/p Cystourethroscopy, bilateral JJ ureteral stent insertion   Hold dialysis again today if cr continue to trend up will plan on HD in am and likely placement of Tesio on Monday . ? ? of accuracy of urine eosinophills  With UTI /crane and pyuria . Consider switching to pepcid off PPI . Abx per ID .                Electronically signed by Aaliyah Conde MD on 6/7/2019 at 2:05 PM

## 2019-06-07 NOTE — PROGRESS NOTES
Physical Therapy  Physical Therapy  Daily Treatment Note  6/7/2019  [id number]                      Vee Rivera   [id number]                              1965    Patient Active Problem List   Diagnosis    Hyperlipidemia    Noncompliance    Depressive disorder    Halitosis    Dysmenorrhea    Bradycardia    Acquired hypothyroidism    Mild intermittent asthma without complication    [facility] failure (ARF) ([facility] [address]    Anxiety    Intellectual disability    Obstructive uropathy    Mixed stress and urge urinary incontinence    Vitamin D deficiency    Seasonal allergic rhinitis    Acute on chronic renal insufficiency    Acute renal failure superimposed on chronic kidney disease, on chronic dialysis (HCC)    Mild protein-calorie malnutrition (HCC)    Acute cystitis with hematuria       Recommendation for discharge: Subacute vs. LTAC  Equipment prescriptions needed:to be determined    AM-[facility] Mobility Inpatient   How much difficulty turning over in bed?: A Little  How much difficulty sitting down on / standing up from a chair with arms?: A Little  How much difficulty moving from lying on back to sitting on side of bed?: A Little  How much help from another person moving to and from a bed to a chair?: A Little  How much help from another person needed to walk in hospital room?: A Little  How much help from another person for climbing 3-5 steps with a railing?: A Lot  AM-PAC Inpatient Mobility Raw Score : 17  AM-PAC Inpatient T-Scale Score : 42.13  Mobility Inpatient CMS 0-100% Score: 50.57  Mobility Inpatient CMS G-Code Modifier : CK      Precautions: falls, alarm    S: Patient cleared by nursing for treatment. Patient is agreeable to treatment. Pt c/o pain with her low back. Pain status: (measured on a visual analog scale with 0=no pain and 10=excruciating pain) no number given/10.    O: Pt was instructed in and performed the following:   Bed Mobility- Supine to sit-Minimal assists x 1     Scooting- Minimal Assist x 1    Sit to supine-Minimal assists x 1   Transfers-sit to stand- Minimal assists x 1     Gait:  Patient ambulated 20 feet to the restroom; 40 feet x 2 using Wheeled Walker with Minimal assists x 1. Comments: V/C for upright posture, increased WILMAN/step length, and safety. Steps: NA  Treatment: Pt transferred to edge of bed, sat for 2 minutes, ambulated to the restroom, assisted pt on/off toilet, ambulated in the hallway and back to bed. Comment: Call light left by patient. RTB at end of tx session with bed alarm on. A: Pt needing minimal assist with walker management. Pt able to progress with increased distance with no LOB but displays kyphotic posture, narrow WILMAN, small step lengths, and slow libby. P: Continue with physical therapy   MARISELA BRIGHT, PTA   GOALS to be met in 2 days. Bed mobility-  Independent                                         Transfers-Sit to stand-Independent               Gait:  Patient to ambulate 100 feet using wheeled walker with Modified Independent               Steps: Patient to go up and down 8  step(s) using 1 rail(s) with  Independent      Increase strength in affected mm groups by 1/3 grade  Increase balance to allow for improvement towards functional goals.   Increase endurance to allow for improvement towards functional goals.

## 2019-06-07 NOTE — PROGRESS NOTES
Nutrition Assessment    Type and Reason for Visit: Initial(LOS)    Nutrition Recommendations: Continue current diet (Pt declined all ONS)    Nutrition Assessment: Pt meets criteria for mild malnutrition AEB fat wasting and diet history of poor intake. Pt declined all ONS, encouraged small frequent meals. Will continue to follow. Malnutrition Assessment:  · Malnutrition Status: Mild Malnutrition  · Context: Acute illness or injury  · Findings of the 6 clinical characteristics of malnutrition (Minimum of 2 out of 6 clinical characteristics is required to make the diagnosis of moderate or severe Protein Calorie Malnutrition based on AND/ASPEN Guidelines):  1. Energy Intake-Less than or equal to 50% of estimated energy requirement, Greater than or equal to 7 days    2. Weight Loss-No significant weight loss, (2 months)  3. Fat Loss-Mild subcutaneous fat loss, Orbital  4. Muscle Loss-No significant muscle mass loss,    5. Fluid Accumulation-No significant fluid accumulation,    6.  Strength-Not measured    Nutrition Risk Level:  Moderate    Nutrient Needs:  · Estimated Daily Total Kcal: 6685-5863(16-17 kcal/kg CBW)  · Estimated Daily Protein (g): 55-65(1.2-1.5 gm/kg IBW)  · Estimated Daily Total Fluid (ml/day): 9941-3063(1 ml/kcal)    Nutrition Diagnosis:   · Problem: Inadequate oral intake  · Etiology: related to Early satiety     Signs and symptoms:  as evidenced by Intake 0-25%, Mild loss of subcutaneous fat    Objective Information:  · Nutrition-Focused Physical Findings: abd WDL, +1 BLE edema, -2.0L I/O, BLE weakness  · Wound Type: Surgical Wound  · Current Nutrition Therapies:  · Oral Diet Orders: Renal   · Oral Diet intake: 1-25%  · Oral Nutrition Supplement (ONS) Orders: None  · Anthropometric Measures:  · Ht: 4' 11\" (149.9 cm)   · Current Body Wt: 164 lb 8 oz (74.6 kg)(6/7/19 bed scale)  · Admission Body Wt: 150 lb 8 oz (68.3 kg)(5/31 no method)  · Usual Body Wt: 140 lb 11.2 oz (63.8 kg)(4/18/19 bed scale per EMR)  · % Weight Change:  ,  17% wt gain in 2 months likely compounded by fluid status  · Ideal Body Wt: 95 lb (43.1 kg), % Ideal Body 173%  · BMI Classification: BMI 30.0 - 34.9 Obese Class I    Nutrition Interventions:   Continue current diet(Pt declined all ONS)  Continued Inpatient Monitoring, Coordination of Care, Education Initiated(Discussed small frequent meals)    Nutrition Evaluation:   · Evaluation: Goals set   · Goals: Pt is to consume >50% of most meals    · Monitoring: Meal Intake, Diet Tolerance, Skin Integrity, I&O, Weight, Pertinent Labs, Monitor Bowel Function      Electronically signed by Carlos Manuel Renteria RD, LD on 6/7/19 at 1:27 PM    Contact Number: [phone]

## 2019-06-07 NOTE — CARE COORDINATION
Ss note:6/7/2019.10:15 AM jayne notified by  Lianolan Pelayo in Public Benefits that she was unable to do a re determination of pt [employer]. Gita had to submit a new medicaid application to [address] [place] [address], therefore pt is MEDICAID PENDING billing # [id number]. Jayne notified by Cheri Zapata that [address] [facility] accept medicaid pending. Pt is from [facility] skilled rehab.  jayne informed karthik Moreno of pending medicaid status. Will await return call from karthik Moreno to confirm that pt can return to [facility] pending medicaid at discharge. JAYNE will continue to follow.  AYO Montanez

## 2019-06-07 NOTE — PROGRESS NOTES
PRN glucoscan obtained per direction of charge nurse, due to serum glucose level of  67. Fingerstick result was 72, offered patient food/drink and patient declined. Patient is alert and oriented x 4 without complaints. States she doesn't check her blood sugar at home.  Dari Larson RN

## 2019-06-07 NOTE — PROGRESS NOTES
movements. Chest: No use of accessory muscles to breathe. Symmetrical expansion. Auscultation reveals no wheezing, crackles, or rhonchi. Cardiovascular: S1 and S2 are rhythmic and regular. Abdomen: Positive bowel sounds to auscultation. Extremities: No clubbing, no cyanosis, no edema.   Right upper arm with line picc- no phlebitis   Braden yellow  RIJ -HD-    Laboratory and Tests Review:  Lab Results   Component Value Date    WBC 9.4 06/07/2019    WBC 9.4 06/06/2019    WBC 10.2 06/05/2019    HGB 8.0 (L) 06/07/2019    HCT 24.8 (L) 06/07/2019    MCV 89.9 06/07/2019     06/07/2019     Lab Results   Component Value Date    NEUTROABS 6.62 06/07/2019    NEUTROABS 6.39 06/06/2019    NEUTROABS 7.70 (H) 06/05/2019     Lab Results   Component Value Date    ALT 23 05/31/2019    AST 18 05/31/2019    ALKPHOS 56 05/31/2019    BILITOT <0.2 05/31/2019     Lab Results   Component Value Date     06/07/2019    K 4.0 06/07/2019    K 5.4 05/31/2019     06/07/2019    CO2 26 06/07/2019    BUN 28 06/07/2019    CREATININE 5.6 06/07/2019    GFRAA 10 06/07/2019    LABGLOM 8 06/07/2019    GLUCOSE 112 06/07/2019    GLUCOSE 97 12/20/2011    PROT 6.0 05/31/2019    LABALBU 3.1 05/31/2019    LABALBU 4.4 12/20/2011    CALCIUM 8.5 06/07/2019    BILITOT <0.2 05/31/2019    ALKPHOS 56 05/31/2019    AST 18 05/31/2019    ALT 23 05/31/2019     Radiology:  Reviewed     Microbiology:   5/31/19- urine cx- no growth to date  Blood cx- no growth to date    ASSESSMENT:  · Bilateral hydronephrosis   · Neurogenic bladder  · Leukocytosis- improved   · S/p stent exchange  · UTI f/u cx ngtd  · JIAN- on HD    PLAN:  · Will stop meropenem   · Urine EOS-2   · Check cultures- No growth to date  · Nephrology/ urology following  · gynecology to evaluate -Post-operative Diagnosis:  Fixated and stenotic urethra with abnormal and nodular vaginal examination suspicious for malignancy  · S/p vaginal biopsy- pending   · Monitor labs- creat- 5.6  WBC- 9.4     Electronically signed by JUSTYN Valdovinos on 6/7/2019 at 3:28 PM    Phone [phone]  Fax [phone]    I have discussed the case, including pertinent history and physical  exam findings . I have seen and examined the patient and the key elements of the encounter have been performed by me. I agree with the assessment, plan and orders as documented.       Treatment plan as per my recommendation     Nichole Martins MD, FACP  6/7/2019  5:13 PM

## 2019-06-07 NOTE — PLAN OF CARE
Problem: Pain:  Goal: Pain level will decrease  Description  Pain level will decrease  Outcome: Met This Shift     Problem: Pain:  Goal: Control of acute pain  Description  Control of acute pain  Outcome: Met This Shift     Problem: Pain:  Goal: Control of chronic pain  Description  Control of chronic pain  Outcome: Met This Shift     Problem: Falls - Risk of:  Goal: Will remain free from falls  Description  Will remain free from falls  6/7/2019 1906 by Karen Bartlett RN  Outcome: Met This Shift     Problem: Falls - Risk of:  Goal: Absence of physical injury  Description  Absence of physical injury  Outcome: Met This Shift     Problem: Tissue Perfusion - Renal, Altered:  Goal: Electrolytes within specified parameters  Description  Electrolytes within specified parameters  Outcome: Met This Shift     Problem: Tissue Perfusion - Renal, Altered:  Goal: Electrolytes within specified parameters  Description  Electrolytes within specified parameters  Outcome: Met This Shift     Problem: Tissue Perfusion - Renal, Altered:  Goal: Urine creatinine clearance will be within specified parameters  Description  Urine creatinine clearance will be within specified parameters  Outcome: Met This Shift     Problem: Tissue Perfusion - Renal, Altered:  Goal: Ability to achieve a balanced intake and output will improve  Description  Ability to achieve a balanced intake and output will improve  6/7/2019 1906 by Karen Bartlett RN  Outcome: Met This Shift

## 2019-06-07 NOTE — PROGRESS NOTES
[id number]  Internal Medicine  -Resident Progress Note-    Milan Saint / DEVIN 1965 / MRN [id number] / Melissatiesha Zaynab 2019 / Hospital Day: 8    PCP:  Nikolas Hoang DO  Admitting Physician:  Jimena Mckoy DO  Consultants: Nephrology, OB/GYN, Urology, Infectious disease  Chief Complaint:    Chief Complaint   Patient presents with    Abnormal Lab     PATIENT TO THE ED TODAY PER NH WITH C/O ABNORMAL LABS. PATIENT WITH CREAT OF 5.6 AND BUN 45       Subjective / Overnight Events  Bradley Juares was seen and examined at bedside today; no family was present for the examination. Patient is resting comfortably with no complaints today. She remains anxious about biopsy results and her medicaid application. Patient reassured about both and that we would let her know as soon as any further information is available. Review of Systems  All bolded are positive; please see HPI  General:  Fever, chills, diaphoresis, fatigue, malaise, night sweats, weight loss  Psychological:  Anxiety, disorientation, hallucinations. ENT:  Epistaxis, vertigo, visual changes. Cardiovascular:  Chest pain, irregular heartbeats, palpitations, paroxysmal nocturnal dyspnea. Respiratory:  Shortness of breath, coughing, sputum production, hemoptysis, wheezing, orthopnea.   Gastrointestinal:  Nausea, vomiting, diarrhea, heartburn, constipation, abdominal pain, hematemesis, hematochezia, melena, acholic stools  Genito-Urinary:  Dysuria, urgency, frequency, hematuria, vaginal bleeding  Musculoskeletal:  Joint pain, joint stiffness, joint swelling, muscle pain  Neurology:  Headache, focal neurological deficits, weakness, numbness, paresthesia  Derm:  Rashes, ulcers, excoriations, bruising  Extremities:  Decreased ROM, peripheral edema, mottling    Physical Examination  Vitals:  BP (!) 176/86   Pulse 78   Temp 99 °F (37.2 °C) (Oral)   Resp 16   Ht 4' 11\" (1.499 m)   Wt 164 lb 8 oz (74.6 kg)   LMP 2015 (Approximate)   SpO2 94%   BMI 33.22 kg/m²   General Appearance:  awake, alert, and oriented to person, place, time, and purpose; appears stated age and cooperative; no apparent distress no labored breathing  HEENT:  PERRL; EOMI; sclera clear; buccal mucosa moist  Neck:  supple; trachea midline; no thyromegaly; no JVD; no bruits  Heart:  rhythm regular; rate controlled; no murmurs  Lungs:  symmetrical; clear to auscultation bilaterally; no wheezes; no rhonchi; no rales  Abdomen:  soft, non-tender, non-distended; bowel sounds positive; no organomegaly or masses  Extremities:  peripheral pulses present; no peripheral edema; no ulcers  Neurologic:  alert and oriented x 3; no focal deficit; cranial nerves grossly intact  Skin:  no petechia; no hemorrhage; no wounds    Medications  Scheduled Meds    famotidine  20 mg Oral BID    sodium chloride  250 mL Intravenous Once    sodium polystyrene  15 g Oral Once    sodium bicarbonate  650 mg Oral BID    chlorhexidine  15 mL Mouth/Throat BID    meropenem  500 mg Intravenous Q12H    atorvastatin  40 mg Oral Daily    docusate sodium  100 mg Oral BID    ferrous sulfate  325 mg Oral BID WC    fluticasone  2 spray Nasal Daily    levothyroxine  100 mcg Oral Daily    mirtazapine  15 mg Oral Nightly    sodium chloride flush  10 mL Intravenous 2 times per day    heparin (porcine)  5,000 Units Subcutaneous 3 times per day     Infusion Meds    dextrose       PRN Meds heparin flush, magnesium hydroxide, glucose, dextrose, glucagon (rDNA), dextrose, traMADol, sodium chloride flush, ondansetron    · Recent labs, imaging, and micro results are available in the EMR and have been reviewed. Assessment and Plan  Patient is a 52 y.o. female who presented with acute renal failure.   The active problem list is as follows:      · Acute on chronic kidney disease stage IV compatible with a neurogenic bladder with chronic b/l ureteral stents--status post cystoscopy with bilateral ureteral stents is changed on June

## 2019-06-07 NOTE — PLAN OF CARE
Problem: Pain:  Goal: Pain level will decrease  Outcome: Met This Shift     Problem: Pain:  Goal: Control of acute pain  Outcome: Met This Shift     Problem: Pain:  Goal: Control of chronic pain  Outcome: Met This Shift     Problem: Falls - Risk of:  Goal: Will remain free from falls  Outcome: Met This Shift     Problem: Falls - Risk of:  Goal: Absence of physical injury  Outcome: Met This Shift

## 2019-06-08 LAB
ANION GAP SERPL CALCULATED.3IONS-SCNC: 12 MMOL/L (ref 7–16)
ANION GAP SERPL CALCULATED.3IONS-SCNC: 13 MMOL/L (ref 7–16)
ANION GAP SERPL CALCULATED.3IONS-SCNC: 15 MMOL/L (ref 7–16)
BASOPHILS ABSOLUTE: 0.05 E9/L (ref 0–0.2)
BASOPHILS RELATIVE PERCENT: 0.6 % (ref 0–2)
BUN BLDV-MCNC: 32 MG/DL (ref 6–20)
BUN BLDV-MCNC: 34 MG/DL (ref 6–20)
BUN BLDV-MCNC: 34 MG/DL (ref 6–20)
CALCIUM SERPL-MCNC: 8.4 MG/DL (ref 8.6–10.2)
CALCIUM SERPL-MCNC: 8.5 MG/DL (ref 8.6–10.2)
CALCIUM SERPL-MCNC: 8.9 MG/DL (ref 8.6–10.2)
CHLORIDE BLD-SCNC: 105 MMOL/L (ref 98–107)
CHLORIDE BLD-SCNC: 107 MMOL/L (ref 98–107)
CHLORIDE BLD-SCNC: 108 MMOL/L (ref 98–107)
CO2: 24 MMOL/L (ref 22–29)
CO2: 25 MMOL/L (ref 22–29)
CO2: 25 MMOL/L (ref 22–29)
CREAT SERPL-MCNC: 5.7 MG/DL (ref 0.5–1)
CREAT SERPL-MCNC: 5.9 MG/DL (ref 0.5–1)
CREAT SERPL-MCNC: 5.9 MG/DL (ref 0.5–1)
EOSINOPHILS ABSOLUTE: 0.46 E9/L (ref 0.05–0.5)
EOSINOPHILS RELATIVE PERCENT: 5.1 % (ref 0–6)
GFR AFRICAN AMERICAN: 9
GFR NON-AFRICAN AMERICAN: 7 ML/MIN/1.73
GFR NON-AFRICAN AMERICAN: 7 ML/MIN/1.73
GFR NON-AFRICAN AMERICAN: 8 ML/MIN/1.73
GLUCOSE BLD-MCNC: 106 MG/DL (ref 74–99)
GLUCOSE BLD-MCNC: 92 MG/DL (ref 74–99)
GLUCOSE BLD-MCNC: 94 MG/DL (ref 74–99)
HCT VFR BLD CALC: 25.1 % (ref 34–48)
HEMOGLOBIN: 7.9 G/DL (ref 11.5–15.5)
IMMATURE GRANULOCYTES #: 0.08 E9/L
IMMATURE GRANULOCYTES %: 0.9 % (ref 0–5)
LYMPHOCYTES ABSOLUTE: 1.64 E9/L (ref 1.5–4)
LYMPHOCYTES RELATIVE PERCENT: 18.3 % (ref 20–42)
MCH RBC QN AUTO: 28.7 PG (ref 26–35)
MCHC RBC AUTO-ENTMCNC: 31.5 % (ref 32–34.5)
MCV RBC AUTO: 91.3 FL (ref 80–99.9)
MONOCYTES ABSOLUTE: 0.6 E9/L (ref 0.1–0.95)
MONOCYTES RELATIVE PERCENT: 6.7 % (ref 2–12)
NEUTROPHILS ABSOLUTE: 6.13 E9/L (ref 1.8–7.3)
NEUTROPHILS RELATIVE PERCENT: 68.4 % (ref 43–80)
PDW BLD-RTO: 14.9 FL (ref 11.5–15)
PLATELET # BLD: 321 E9/L (ref 130–450)
PMV BLD AUTO: 10.1 FL (ref 7–12)
POTASSIUM SERPL-SCNC: 3.8 MMOL/L (ref 3.5–5)
POTASSIUM SERPL-SCNC: 4 MMOL/L (ref 3.5–5)
POTASSIUM SERPL-SCNC: 4.1 MMOL/L (ref 3.5–5)
RBC # BLD: 2.75 E12/L (ref 3.5–5.5)
SODIUM BLD-SCNC: 144 MMOL/L (ref 132–146)
SODIUM BLD-SCNC: 144 MMOL/L (ref 132–146)
SODIUM BLD-SCNC: 146 MMOL/L (ref 132–146)
WBC # BLD: 9 E9/L (ref 4.5–11.5)

## 2019-06-08 PROCEDURE — 6360000002 HC RX W HCPCS: Performed by: INTERNAL MEDICINE

## 2019-06-08 PROCEDURE — 36415 COLL VENOUS BLD VENIPUNCTURE: CPT

## 2019-06-08 PROCEDURE — 80048 BASIC METABOLIC PNL TOTAL CA: CPT

## 2019-06-08 PROCEDURE — 85025 COMPLETE CBC W/AUTO DIFF WBC: CPT

## 2019-06-08 PROCEDURE — 36591 DRAW BLOOD OFF VENOUS DEVICE: CPT

## 2019-06-08 PROCEDURE — 6370000000 HC RX 637 (ALT 250 FOR IP): Performed by: INTERNAL MEDICINE

## 2019-06-08 PROCEDURE — 2580000003 HC RX 258: Performed by: INTERNAL MEDICINE

## 2019-06-08 PROCEDURE — 6370000000 HC RX 637 (ALT 250 FOR IP): Performed by: FAMILY MEDICINE

## 2019-06-08 PROCEDURE — 1200000000 HC SEMI PRIVATE

## 2019-06-08 RX ORDER — ACETAMINOPHEN 325 MG/1
650 TABLET ORAL EVERY 4 HOURS PRN
Status: DISCONTINUED | OUTPATIENT
Start: 2019-06-08 | End: 2019-06-14 | Stop reason: HOSPADM

## 2019-06-08 RX ADMIN — SODIUM BICARBONATE 650 MG TABLET 650 MG: at 20:57

## 2019-06-08 RX ADMIN — DOCUSATE SODIUM 100 MG: 100 CAPSULE, LIQUID FILLED ORAL at 09:42

## 2019-06-08 RX ADMIN — FAMOTIDINE 20 MG: 20 TABLET ORAL at 20:57

## 2019-06-08 RX ADMIN — Medication 300 UNITS: at 20:57

## 2019-06-08 RX ADMIN — ACETAMINOPHEN 650 MG: 325 TABLET, FILM COATED ORAL at 02:33

## 2019-06-08 RX ADMIN — Medication 325 MG: at 16:54

## 2019-06-08 RX ADMIN — HEPARIN SODIUM 5000 UNITS: 5000 INJECTION, SOLUTION INTRAVENOUS; SUBCUTANEOUS at 05:17

## 2019-06-08 RX ADMIN — DOCUSATE SODIUM 100 MG: 100 CAPSULE, LIQUID FILLED ORAL at 20:56

## 2019-06-08 RX ADMIN — Medication 10 ML: at 20:57

## 2019-06-08 RX ADMIN — SODIUM BICARBONATE 650 MG TABLET 650 MG: at 09:42

## 2019-06-08 RX ADMIN — Medication 325 MG: at 09:43

## 2019-06-08 RX ADMIN — Medication 300 UNITS: at 04:49

## 2019-06-08 RX ADMIN — FLUTICASONE PROPIONATE 2 SPRAY: 50 SPRAY, METERED NASAL at 09:43

## 2019-06-08 RX ADMIN — FAMOTIDINE 20 MG: 20 TABLET ORAL at 09:42

## 2019-06-08 RX ADMIN — ATORVASTATIN CALCIUM 40 MG: 40 TABLET, FILM COATED ORAL at 09:42

## 2019-06-08 RX ADMIN — LEVOTHYROXINE SODIUM 100 MCG: 100 TABLET ORAL at 05:16

## 2019-06-08 RX ADMIN — HEPARIN SODIUM 5000 UNITS: 5000 INJECTION, SOLUTION INTRAVENOUS; SUBCUTANEOUS at 21:00

## 2019-06-08 RX ADMIN — TRAMADOL HYDROCHLORIDE 25 MG: 50 TABLET, FILM COATED ORAL at 21:00

## 2019-06-08 RX ADMIN — ACETAMINOPHEN 650 MG: 325 TABLET, FILM COATED ORAL at 23:51

## 2019-06-08 RX ADMIN — MIRTAZAPINE 15 MG: 15 TABLET, FILM COATED ORAL at 20:57

## 2019-06-08 RX ADMIN — ACETAMINOPHEN 650 MG: 325 TABLET, FILM COATED ORAL at 15:29

## 2019-06-08 ASSESSMENT — PAIN DESCRIPTION - DESCRIPTORS
DESCRIPTORS: ACHING;CRAMPING;DISCOMFORT
DESCRIPTORS: ACHING;CONSTANT;DISCOMFORT
DESCRIPTORS: ACHING;CONSTANT;DISCOMFORT
DESCRIPTORS: CRAMPING;DISCOMFORT;CONSTANT
DESCRIPTORS: ACHING;CRAMPING;DISCOMFORT
DESCRIPTORS: ACHING;DISCOMFORT;SORE

## 2019-06-08 ASSESSMENT — PAIN DESCRIPTION - PAIN TYPE
TYPE: ACUTE PAIN;SURGICAL PAIN
TYPE: ACUTE PAIN;SURGICAL PAIN
TYPE: ACUTE PAIN

## 2019-06-08 ASSESSMENT — PAIN SCALES - GENERAL
PAINLEVEL_OUTOF10: 4
PAINLEVEL_OUTOF10: 10
PAINLEVEL_OUTOF10: 3
PAINLEVEL_OUTOF10: 0
PAINLEVEL_OUTOF10: 7
PAINLEVEL_OUTOF10: 0
PAINLEVEL_OUTOF10: 1
PAINLEVEL_OUTOF10: 10
PAINLEVEL_OUTOF10: 6

## 2019-06-08 ASSESSMENT — PAIN DESCRIPTION - FREQUENCY
FREQUENCY: CONTINUOUS

## 2019-06-08 ASSESSMENT — PAIN DESCRIPTION - LOCATION
LOCATION: GENERALIZED
LOCATION: ABDOMEN;PELVIS
LOCATION: GENERALIZED
LOCATION: ABDOMEN
LOCATION: GENERALIZED
LOCATION: ABDOMEN;PELVIS

## 2019-06-08 ASSESSMENT — PAIN - FUNCTIONAL ASSESSMENT: PAIN_FUNCTIONAL_ASSESSMENT: PREVENTS OR INTERFERES SOME ACTIVE ACTIVITIES AND ADLS

## 2019-06-08 NOTE — PROGRESS NOTES
[facility] PROGRESS NOTE                                                                       2019        CHIEF UROLOGIC COMPLAINT: NGB, renal failure, retention, hydronephrosis     HISTORY OF PRESENT ILLNESS:  Patient without new complaints. She does have some pain is requesting Tylenol. Her pain is mainly at the insertion of the temporary hemodialysis catheter. She is yet to undergo dialysis. She has an indwelling Braden catheter draining clear, yellow urine. She has no bladder spasm. She has no flank pain.     REVIEW OF SYSTEMS:   CONSTITUTIONAL: Weakness  HEENT: negative  HEMATOLOGIC: negative  ENDOCRINE: negative  RESPIRATORY: negative  CV: negative  GI: negative  NEURO: negative  ORTHOPEDICS: negative  PSYCHIATRIC: Depressed mood  : as above    PAST FAMILY HISTORY:    Family History   Problem Relation Age of Onset    Diabetes Brother     Thyroid Disease Mother     Other Daughter         Autism     PAST SOCIAL HISTORY:    Social History     Socioeconomic History    Marital status: Single     Spouse name: None    Number of children: 1    Years of education: None    Highest education level: None   Occupational History    Occupation: unemployed     Employer: not employed   Social Needs    Financial resource strain: None    Food insecurity:     Worry: None     Inability: None    Transportation needs:     Medical: None     Non-medical: None   Tobacco Use    Smoking status: Former Smoker     Packs/day: 1.00     Years: 5.00     Pack years: 5.00     Last attempt to quit: 1989     Years since quittin.4    Smokeless tobacco: Never Used   Substance and Sexual Activity    Alcohol use: No    Drug use: No    Sexual activity: None   Lifestyle    Physical activity:     Days per week: None     Minutes per session: None    Stress: None   Relationships    Social connections: Talks on phone: None     Gets together: None     Attends Congregational service: None     Active member of club or organization: None     Attends meetings of clubs or organizations: None     Relationship status: None    Intimate partner violence:     Fear of current or ex partner: None     Emotionally abused: None     Physically abused: None     Forced sexual activity: None   Other Topics Concern    None   Social History Narrative    None       Scheduled Meds:   famotidine  20 mg Oral BID    sodium chloride  250 mL Intravenous Once    sodium polystyrene  15 g Oral Once    sodium bicarbonate  650 mg Oral BID    chlorhexidine  15 mL Mouth/Throat BID    atorvastatin  40 mg Oral Daily    docusate sodium  100 mg Oral BID    ferrous sulfate  325 mg Oral BID WC    fluticasone  2 spray Nasal Daily    levothyroxine  100 mcg Oral Daily    mirtazapine  15 mg Oral Nightly    sodium chloride flush  10 mL Intravenous 2 times per day    heparin (porcine)  5,000 Units Subcutaneous 3 times per day     Continuous Infusions:   dextrose       PRN Meds:.acetaminophen, heparin flush, magnesium hydroxide, glucose, dextrose, glucagon (rDNA), dextrose, traMADol, sodium chloride flush, ondansetron    BP (!) 148/70   Pulse 70   Temp 98.1 °F (36.7 °C) (Oral)   Resp 20   Ht 4' 11\" (1.499 m)   Wt 164 lb 8 oz (74.6 kg)   LMP 05/21/2015 (Approximate)   SpO2 92%   BMI 33.22 kg/m²     Lab Results   Component Value Date    WBC 9.0 06/08/2019    HGB 7.9 (L) 06/08/2019    HCT 25.1 (L) 06/08/2019    MCV 91.3 06/08/2019     06/08/2019       Lab Results   Component Value Date    CREATININE 5.9 (H) 06/08/2019       PHYSICAL EXAMINATION:  Skin dry, without rashes  Temporary hemodialysis catheter right side of the neck  Respirations non-labored, intact  Abdomen soft, non-tender, non-distended  Alert and oriented x3  Braden draining clear, yellow urine      ASSESSMENT AND PLAN:  1.   POD#5--S/P Cysto/Bilateral jj ureteral stent exchange  -Antibiotics per infectious disease   -Acute on chronic kidney disease stage IV  -She will likely need longterm crane cathter for NGB  -She will require long-term ureteral stents with periodic changes for hydronephrosis  -No acute urological issues at this time. Follow-up as outpatient for stent exchange.         Bard Anastasia M.D.  6/8/2019  11:21 AM

## 2019-06-08 NOTE — PLAN OF CARE
Problem: Pain:  Goal: Pain level will decrease  Description  Pain level will decrease  6/8/2019 0908 by Grayson Foy RN  Outcome: Met This Shift     Problem: Pain:  Goal: Control of acute pain  Description  Control of acute pain  6/8/2019 0908 by Grayson Foy RN  Outcome: Met This Shift     Problem: Pain:  Goal: Control of chronic pain  Description  Control of chronic pain  6/8/2019 0908 by Grayson Foy RN  Outcome: Met This Shift     Problem: Falls - Risk of:  Goal: Will remain free from falls  Description  Will remain free from falls  6/8/2019 0908 by Grayson Foy RN  Outcome: Met This Shift     Problem: Falls - Risk of:  Goal: Absence of physical injury  Description  Absence of physical injury  6/8/2019 0908 by Grayson Foy RN  Outcome: Met This Shift

## 2019-06-08 NOTE — PLAN OF CARE
Problem: Pain:  Goal: Pain level will decrease  Description  Pain level will decrease  6/8/2019 1843 by Gumaro Lopez RN  Outcome: Met This Shift  Goal: Control of acute pain  Description  Control of acute pain  6/8/2019 1843 by Gumaro Lopez RN  Outcome: Met This Shift  Goal: Control of chronic pain  Description  Control of chronic pain  6/8/2019 1843 by Gumaro Lopez RN  Outcome: Met This Shift     Problem: Falls - Risk of:  Goal: Will remain free from falls  Description  Will remain free from falls  6/8/2019 1843 by Gumaro Lopez RN  Outcome: Met This Shift  Goal: Absence of physical injury  Description  Absence of physical injury  6/8/2019 1843 by Gumaro Lopez RN  Outcome: Met This Shift     Problem: Tissue Perfusion - Renal, Altered:  Goal: Electrolytes within specified parameters  Description  Electrolytes within specified parameters  6/8/2019 1843 by Gumaro Lopez RN  Outcome: Met This Shift  Goal: Urine creatinine clearance will be within specified parameters  Description  Urine creatinine clearance will be within specified parameters  6/8/2019 1843 by Gumaro Lopez RN  Outcome: Met This Shift  Goal: Ability to achieve a balanced intake and output will improve  Description  Ability to achieve a balanced intake and output will improve  6/8/2019 1843 by Gumaro Lopez RN  Outcome: Met This Shift

## 2019-06-08 NOTE — PROGRESS NOTES
HPI:  Mathew Muller seems to be resting comfortably during my examination today. Lab work and vital signs are stable. Nursing reports no issues overnight. No family members were present during my examination. She has less abdominal pain today. Patient voiced no new complaints since hospital admission. Questions, answers, and tests reviewed. ROS:  Cardiovascular:   Denies any chest pain, irregular heartbeats, or palpitations. Respiratory:   Denies shortness of breath, coughing, sputum production, hemoptysis, or wheezing. Gastrointestinal:   Denies any significant abdominal pain or discomfort today. Extremities:   Denies any lower extremity swelling or edema. Neurology:    Denies any headache or focal neurological deficits. Admits to generalized weakness and deconditioning. Derm:    Denies any rashes, ulcers, or excoriations. Denies bruising. Admits to pain associated with the dialysis catheter insertion site. Genitourinary:    Denies any urgency, frequency, hematuria. Voiding with the use of a Braden catheter. Physical Exam:    Vitals:    06/08/19 0830   BP: (!) 148/70   Pulse: 70   Resp:    Temp: 98.1 °F (36.7 °C)   SpO2: 92%       HEENT:  PERRLA. EOMI. Sclera clear. Buccal mucosa moist.    Neck:  Supple. Trachea midline. No thyromegaly. No JVD. No bruits. Heart:  Rhythm regular, rate controlled. No murmurs. Lungs:  Symmetrical. Clear to auscultation bilaterally. No wheezes. No rhonchi. No rales. Abdomen: Soft. Non-tender. Non-distended. Bowel sounds positive. No organomegaly or masses. No pain on palpation    Extremities:  Peripheral pulses present. No peripheral edema. No ulcers. PICC line is in place. Neurologic:  Alert x 3. No focal deficit. Cranial nerves grossly intact. Skin:  No petechia. No hemorrhage. No wounds.     CBC with Differential:    Lab Results   Component Value Date    WBC 9.0 06/08/2019    RBC 2.75 06/08/2019    HGB 7.9 06/08/2019    HCT 25.1 06/08/2019     06/08/2019    MCV 91.3 06/08/2019    MCH 28.7 06/08/2019    MCHC 31.5 06/08/2019    RDW 14.9 06/08/2019    SEGSPCT 66 10/23/2013    LYMPHOPCT 18.3 06/08/2019    MONOPCT 6.7 06/08/2019    BASOPCT 0.6 06/08/2019    MONOSABS 0.60 06/08/2019    LYMPHSABS 1.64 06/08/2019    EOSABS 0.46 06/08/2019    BASOSABS 0.05 06/08/2019     BMP:    Lab Results   Component Value Date     06/08/2019    K 4.1 06/08/2019    K 5.4 05/31/2019     06/08/2019    CO2 25 06/08/2019    BUN 34 06/08/2019    LABALBU 3.1 05/31/2019    LABALBU 4.4 12/20/2011    CREATININE 5.9 06/08/2019    CALCIUM 8.4 06/08/2019    GFRAA 9 06/08/2019    LABGLOM 7 06/08/2019    GLUCOSE 106 06/08/2019    GLUCOSE 97 12/20/2011       Other Data:      Intake/Output Summary (Last 24 hours) at 6/8/2019 1056  Last data filed at 6/8/2019 0931  Gross per 24 hour   Intake 365 ml   Output 2675 ml   Net -2310 ml         Scheduled Medications:   famotidine  20 mg Oral BID    sodium chloride  250 mL Intravenous Once    sodium polystyrene  15 g Oral Once    sodium bicarbonate  650 mg Oral BID    chlorhexidine  15 mL Mouth/Throat BID    atorvastatin  40 mg Oral Daily    docusate sodium  100 mg Oral BID    ferrous sulfate  325 mg Oral BID     fluticasone  2 spray Nasal Daily    levothyroxine  100 mcg Oral Daily    mirtazapine  15 mg Oral Nightly    sodium chloride flush  10 mL Intravenous 2 times per day    heparin (porcine)  5,000 Units Subcutaneous 3 times per day         Infusion Medications:   dextrose         Assessment:   1. Acute on chronic kidney disease stage IV compatible with a neurogenic bladder with chronic b/l ureteral stents and recent institution of hemodialysis  2. Abnormal pelvic exam showing a firm abnormal lesion within the vagina with suspicion for gynecological malignancy status post exam under anesthesia with biopsies taken  3.  Sepsis secondary to a urinary tract infection secondary to an indwelling Braden catheter with chronic

## 2019-06-08 NOTE — PROGRESS NOTES
[address] [place] [address] [facility]  Progress Note      CC: resting comfortable today    ID following for hydronephrosis- atbs management  Face to face encounter     SUBJECTIVE:  Patient is tolerating medications. No reported adverse drug reactions. ROS: nausea no vomiting, diarrhea. + abdominal pain. No fevers. Had chills No rash. Tolerating atbs. Awake- still with pain- that comes and goes. Slept well overnight. Medications:  Scheduled Meds:   famotidine  20 mg Oral BID    sodium chloride  250 mL Intravenous Once    sodium polystyrene  15 g Oral Once    sodium bicarbonate  650 mg Oral BID    chlorhexidine  15 mL Mouth/Throat BID    atorvastatin  40 mg Oral Daily    docusate sodium  100 mg Oral BID    ferrous sulfate  325 mg Oral BID WC    fluticasone  2 spray Nasal Daily    levothyroxine  100 mcg Oral Daily    mirtazapine  15 mg Oral Nightly    sodium chloride flush  10 mL Intravenous 2 times per day    heparin (porcine)  5,000 Units Subcutaneous 3 times per day     Continuous Infusions:   dextrose       PRN Meds:acetaminophen, heparin flush, magnesium hydroxide, glucose, dextrose, glucagon (rDNA), dextrose, traMADol, sodium chloride flush, ondansetron  OBJECTIVE:  Patient Vitals for the past 24 hrs:   BP Temp Temp src Pulse Resp SpO2 Height Weight   06/08/19 0600 (!) 145/70 -- -- 70 -- -- -- --   06/08/19 0400 (!) 177/81 98.2 °F (36.8 °C) Oral 73 20 94 % -- --   06/08/19 0000 (!) 159/76 98.7 °F (37.1 °C) Oral 76 13 95 % -- --   06/07/19 2000 138/77 99.2 °F (37.3 °C) Oral 81 17 96 % -- --   06/07/19 1600 133/76 99.5 °F (37.5 °C) Oral -- 16 96 % -- --   06/07/19 1300 -- -- -- -- -- -- 4' 11\" (1.499 m) 164 lb 8 oz (74.6 kg)     Constitutional: The patient is awake today- feeling a little better. Skin: Warm and dry. No rashes were noted. Head: at/nc   Neck: Supple to movements. Chest: No use of accessory muscles to breathe. Symmetrical expansion.  Auscultation reveals no wheezing, crackles, or rhonchi. Cardiovascular: S1 and S2 are rhythmic and regular. Abdomen: Positive bowel sounds to auscultation. Extremities: No clubbing, no cyanosis, no edema.   Right upper arm with line picc- no phlebitis   Braden yellow  RIJ -HD-    Laboratory and Tests Review:  Lab Results   Component Value Date    WBC 9.0 06/08/2019    WBC 9.4 06/07/2019    WBC 9.4 06/06/2019    HGB 7.9 (L) 06/08/2019    HCT 25.1 (L) 06/08/2019    MCV 91.3 06/08/2019     06/08/2019     Lab Results   Component Value Date    NEUTROABS 6.13 06/08/2019    NEUTROABS 6.62 06/07/2019    NEUTROABS 6.39 06/06/2019     Lab Results   Component Value Date    ALT 23 05/31/2019    AST 18 05/31/2019    ALKPHOS 56 05/31/2019    BILITOT <0.2 05/31/2019     Lab Results   Component Value Date     06/08/2019    K 4.1 06/08/2019    K 5.4 05/31/2019     06/08/2019    CO2 25 06/08/2019    BUN 34 06/08/2019    CREATININE 5.9 06/08/2019    GFRAA 9 06/08/2019    LABGLOM 7 06/08/2019    GLUCOSE 106 06/08/2019    GLUCOSE 97 12/20/2011    PROT 6.0 05/31/2019    LABALBU 3.1 05/31/2019    LABALBU 4.4 12/20/2011    CALCIUM 8.4 06/08/2019    BILITOT <0.2 05/31/2019    ALKPHOS 56 05/31/2019    AST 18 05/31/2019    ALT 23 05/31/2019     Radiology:  Reviewed     Microbiology:   5/31/19- urine cx- no growth to date  Blood cx- no growth to date    ASSESSMENT:  · Bilateral hydronephrosis   · Neurogenic bladder  · Leukocytosis- improved   · S/p stent exchange  · UTI f/u cx ngtd  · JIAN- on HD    PLAN:  · Watch off antibiotics   · Urine EOS-2   · Check cultures- No growth to date  · Nephrology/ urology following  · gynecology to evaluate -Post-operative Diagnosis:  Fixated and stenotic urethra with abnormal and nodular vaginal examination suspicious for malignancy  · S/p vaginal biopsy- pending   · Monitor labs- creat- 5.9  WBC- 9.0     Electronically signed by JUSTYN Booth on 6/8/2019 at 7:27 AM  Phone [phone]  Fax [phone]    I have discussed the case, including pertinent history and physical  exam findings . I have seen and examined the patient and the key elements of the encounter have been performed by me. I agree with the assessment, plan and orders as documented.       Treatment plan as per my recommendation     Darrius Figueroa MD, FACP  6/8/2019  11:32 AM

## 2019-06-09 LAB
ANION GAP SERPL CALCULATED.3IONS-SCNC: 12 MMOL/L (ref 7–16)
BASOPHILS ABSOLUTE: 0.04 E9/L (ref 0–0.2)
BASOPHILS RELATIVE PERCENT: 0.6 % (ref 0–2)
BUN BLDV-MCNC: 32 MG/DL (ref 6–20)
CALCIUM SERPL-MCNC: 8.7 MG/DL (ref 8.6–10.2)
CHLORIDE BLD-SCNC: 108 MMOL/L (ref 98–107)
CO2: 26 MMOL/L (ref 22–29)
CREAT SERPL-MCNC: 5.7 MG/DL (ref 0.5–1)
EOSINOPHILS ABSOLUTE: 0.4 E9/L (ref 0.05–0.5)
EOSINOPHILS RELATIVE PERCENT: 5.8 % (ref 0–6)
GFR AFRICAN AMERICAN: 9
GFR NON-AFRICAN AMERICAN: 8 ML/MIN/1.73
GLUCOSE BLD-MCNC: 76 MG/DL (ref 74–99)
HCT VFR BLD CALC: 24.5 % (ref 34–48)
HEMOGLOBIN: 7.6 G/DL (ref 11.5–15.5)
IMMATURE GRANULOCYTES #: 0.04 E9/L
IMMATURE GRANULOCYTES %: 0.6 % (ref 0–5)
LYMPHOCYTES ABSOLUTE: 2.1 E9/L (ref 1.5–4)
LYMPHOCYTES RELATIVE PERCENT: 30.3 % (ref 20–42)
MAGNESIUM: 1.8 MG/DL (ref 1.6–2.6)
MCH RBC QN AUTO: 28.7 PG (ref 26–35)
MCHC RBC AUTO-ENTMCNC: 31 % (ref 32–34.5)
MCV RBC AUTO: 92.5 FL (ref 80–99.9)
MONOCYTES ABSOLUTE: 0.52 E9/L (ref 0.1–0.95)
MONOCYTES RELATIVE PERCENT: 7.5 % (ref 2–12)
NEUTROPHILS ABSOLUTE: 3.82 E9/L (ref 1.8–7.3)
NEUTROPHILS RELATIVE PERCENT: 55.2 % (ref 43–80)
PDW BLD-RTO: 14.6 FL (ref 11.5–15)
PHOSPHORUS: 5.4 MG/DL (ref 2.5–4.5)
PLATELET # BLD: 299 E9/L (ref 130–450)
PMV BLD AUTO: 9.8 FL (ref 7–12)
POTASSIUM SERPL-SCNC: 3.7 MMOL/L (ref 3.5–5)
RBC # BLD: 2.65 E12/L (ref 3.5–5.5)
SODIUM BLD-SCNC: 146 MMOL/L (ref 132–146)
URINE CULTURE, ROUTINE: NORMAL
WBC # BLD: 6.9 E9/L (ref 4.5–11.5)

## 2019-06-09 PROCEDURE — 36591 DRAW BLOOD OFF VENOUS DEVICE: CPT

## 2019-06-09 PROCEDURE — 6370000000 HC RX 637 (ALT 250 FOR IP): Performed by: FAMILY MEDICINE

## 2019-06-09 PROCEDURE — 2580000003 HC RX 258: Performed by: INTERNAL MEDICINE

## 2019-06-09 PROCEDURE — 6370000000 HC RX 637 (ALT 250 FOR IP): Performed by: INTERNAL MEDICINE

## 2019-06-09 PROCEDURE — 80048 BASIC METABOLIC PNL TOTAL CA: CPT

## 2019-06-09 PROCEDURE — 85025 COMPLETE CBC W/AUTO DIFF WBC: CPT

## 2019-06-09 PROCEDURE — 83735 ASSAY OF MAGNESIUM: CPT

## 2019-06-09 PROCEDURE — 1200000000 HC SEMI PRIVATE

## 2019-06-09 PROCEDURE — 84100 ASSAY OF PHOSPHORUS: CPT

## 2019-06-09 PROCEDURE — 6360000002 HC RX W HCPCS: Performed by: INTERNAL MEDICINE

## 2019-06-09 PROCEDURE — 36415 COLL VENOUS BLD VENIPUNCTURE: CPT

## 2019-06-09 RX ORDER — DEXTROSE AND SODIUM CHLORIDE 5; .45 G/100ML; G/100ML
INJECTION, SOLUTION INTRAVENOUS CONTINUOUS
Status: DISCONTINUED | OUTPATIENT
Start: 2019-06-09 | End: 2019-06-10

## 2019-06-09 RX ADMIN — MIRTAZAPINE 15 MG: 15 TABLET, FILM COATED ORAL at 21:39

## 2019-06-09 RX ADMIN — DOCUSATE SODIUM 100 MG: 100 CAPSULE, LIQUID FILLED ORAL at 21:39

## 2019-06-09 RX ADMIN — DEXTROSE AND SODIUM CHLORIDE: 5; 450 INJECTION, SOLUTION INTRAVENOUS at 21:43

## 2019-06-09 RX ADMIN — FAMOTIDINE 20 MG: 20 TABLET ORAL at 21:39

## 2019-06-09 RX ADMIN — DEXTROSE AND SODIUM CHLORIDE: 5; 450 INJECTION, SOLUTION INTRAVENOUS at 15:05

## 2019-06-09 RX ADMIN — HEPARIN SODIUM 5000 UNITS: 5000 INJECTION, SOLUTION INTRAVENOUS; SUBCUTANEOUS at 21:46

## 2019-06-09 RX ADMIN — DOCUSATE SODIUM 100 MG: 100 CAPSULE, LIQUID FILLED ORAL at 09:21

## 2019-06-09 RX ADMIN — FLUTICASONE PROPIONATE 2 SPRAY: 50 SPRAY, METERED NASAL at 09:20

## 2019-06-09 RX ADMIN — ACETAMINOPHEN 650 MG: 325 TABLET, FILM COATED ORAL at 21:42

## 2019-06-09 RX ADMIN — TRAMADOL HYDROCHLORIDE 25 MG: 50 TABLET, FILM COATED ORAL at 19:01

## 2019-06-09 RX ADMIN — FAMOTIDINE 20 MG: 20 TABLET ORAL at 09:21

## 2019-06-09 RX ADMIN — Medication 300 UNITS: at 05:10

## 2019-06-09 RX ADMIN — HEPARIN SODIUM 5000 UNITS: 5000 INJECTION, SOLUTION INTRAVENOUS; SUBCUTANEOUS at 05:10

## 2019-06-09 RX ADMIN — SODIUM BICARBONATE 650 MG TABLET 650 MG: at 09:21

## 2019-06-09 RX ADMIN — ATORVASTATIN CALCIUM 40 MG: 40 TABLET, FILM COATED ORAL at 09:21

## 2019-06-09 RX ADMIN — HEPARIN SODIUM 5000 UNITS: 5000 INJECTION, SOLUTION INTRAVENOUS; SUBCUTANEOUS at 15:27

## 2019-06-09 RX ADMIN — TRAMADOL HYDROCHLORIDE: 50 TABLET, FILM COATED ORAL at 09:20

## 2019-06-09 RX ADMIN — SODIUM BICARBONATE 650 MG TABLET 650 MG: at 21:42

## 2019-06-09 RX ADMIN — Medication 325 MG: at 19:00

## 2019-06-09 RX ADMIN — LEVOTHYROXINE SODIUM 100 MCG: 100 TABLET ORAL at 05:10

## 2019-06-09 RX ADMIN — Medication 325 MG: at 09:21

## 2019-06-09 ASSESSMENT — PAIN DESCRIPTION - DESCRIPTORS
DESCRIPTORS: ACHING;CRAMPING;DISCOMFORT

## 2019-06-09 ASSESSMENT — PAIN DESCRIPTION - FREQUENCY
FREQUENCY: CONTINUOUS

## 2019-06-09 ASSESSMENT — PAIN SCALES - GENERAL
PAINLEVEL_OUTOF10: 2
PAINLEVEL_OUTOF10: 1
PAINLEVEL_OUTOF10: 3
PAINLEVEL_OUTOF10: 9
PAINLEVEL_OUTOF10: 4
PAINLEVEL_OUTOF10: 0

## 2019-06-09 ASSESSMENT — PAIN DESCRIPTION - LOCATION
LOCATION: ABDOMEN

## 2019-06-09 ASSESSMENT — PAIN DESCRIPTION - PAIN TYPE
TYPE: ACUTE PAIN

## 2019-06-09 NOTE — PROGRESS NOTES
[address] [place] [address] [facility]  [facility]  Progress Note      CC: reports abdominal pain/ cramping this morning     ID following for hydronephrosis- atbs management  Face to face encounter     SUBJECTIVE:  Patient is tolerating medications. No reported adverse drug reactions. ROS: nausea no vomiting, diarrhea. + abdominal pain. No fevers. Had chills No rash. Tolerating atbs. Awake- still with pain- that comes and goes. Reports cramping and pain this am.      Medications:  Scheduled Meds:   famotidine  20 mg Oral BID    sodium chloride  250 mL Intravenous Once    sodium polystyrene  15 g Oral Once    sodium bicarbonate  650 mg Oral BID    chlorhexidine  15 mL Mouth/Throat BID    atorvastatin  40 mg Oral Daily    docusate sodium  100 mg Oral BID    ferrous sulfate  325 mg Oral BID WC    fluticasone  2 spray Nasal Daily    levothyroxine  100 mcg Oral Daily    mirtazapine  15 mg Oral Nightly    sodium chloride flush  10 mL Intravenous 2 times per day    heparin (porcine)  5,000 Units Subcutaneous 3 times per day     Continuous Infusions:   dextrose       PRN Meds:acetaminophen, heparin flush, magnesium hydroxide, glucose, dextrose, glucagon (rDNA), dextrose, traMADol, sodium chloride flush, ondansetron  OBJECTIVE:  Patient Vitals for the past 24 hrs:   BP Temp Temp src Pulse Resp SpO2   06/09/19 0400 (!) 153/82 98.3 °F (36.8 °C) Oral 67 14 97 %   06/09/19 0000 (!) 179/98 98.2 °F (36.8 °C) Oral 75 18 94 %   06/08/19 2000 (!) 167/79 98.7 °F (37.1 °C) Oral 74 12 97 %   06/08/19 1730 (!) 163/80 98.9 °F (37.2 °C) Oral 72 16 96 %     Constitutional: The patient is awake today- abdominal pain. Skin: Warm and dry. No rashes were noted. Head: at/nc   Neck: Supple to movements. Chest: No use of accessory muscles to breathe. Symmetrical expansion. Auscultation reveals no wheezing, crackles, or rhonchi. Cardiovascular: S1 and S2 are rhythmic and regular.   Abdomen: Positive bowel sounds to auscultation. Extremities: No clubbing, no cyanosis, no edema.   Right upper arm with line picc- no phlebitis   Braden yellow  RIJ -HD-    Laboratory and Tests Review:  Lab Results   Component Value Date    WBC 6.9 06/09/2019    WBC 9.0 06/08/2019    WBC 9.4 06/07/2019    HGB 7.6 (L) 06/09/2019    HCT 24.5 (L) 06/09/2019    MCV 92.5 06/09/2019     06/09/2019     Lab Results   Component Value Date    NEUTROABS 3.82 06/09/2019    NEUTROABS 6.13 06/08/2019    NEUTROABS 6.62 06/07/2019     Lab Results   Component Value Date    ALT 23 05/31/2019    AST 18 05/31/2019    ALKPHOS 56 05/31/2019    BILITOT <0.2 05/31/2019     Lab Results   Component Value Date     06/09/2019    K 3.7 06/09/2019    K 5.4 05/31/2019     06/09/2019    CO2 26 06/09/2019    BUN 32 06/09/2019    CREATININE 5.7 06/09/2019    GFRAA 9 06/09/2019    LABGLOM 8 06/09/2019    GLUCOSE 76 06/09/2019    GLUCOSE 97 12/20/2011    PROT 6.0 05/31/2019    LABALBU 3.1 05/31/2019    LABALBU 4.4 12/20/2011    CALCIUM 8.7 06/09/2019    BILITOT <0.2 05/31/2019    ALKPHOS 56 05/31/2019    AST 18 05/31/2019    ALT 23 05/31/2019     Radiology:  Reviewed     Microbiology:   5/31/19- urine cx- no growth to date  Blood cx- no growth to date    ASSESSMENT:  · Bilateral hydronephrosis   · Neurogenic bladder  · Leukocytosis- improved   · S/p stent exchange  · UTI f/u cx ngtd  · JIAN- on HD    PLAN:  · No atbs  · Urine EOS-2   · Urine cultures- No growth to date  · Nephrology/ urology following  · gynecology to evaluate -Post-operative Diagnosis:  Fixated and stenotic urethra with abnormal and nodular vaginal examination suspicious for malignancy  · S/p vaginal biopsy- pending   · Monitor labs- creat- 5.7  WBC- 6.9     Electronically signed by JUSTYN Menezes - CNS on 6/9/2019 at 11:21 AM  Phone [phone]  Fax [phone]    Still awaiting the biopsy report, mechanically doing better, no fever no chills  We will continue to follow the patient off antibiotics   patient with no active infection    I have discussed the case, including pertinent history and physical  exam findings . I have seen and examined the patient and the key elements of the encounter have been performed by me. I agree with the assessment, plan and orders as documented.       Treatment plan as per my recommendation     Sary Garcia MD, FACP  6/9/2019  1:14 PM

## 2019-06-09 NOTE — PROGRESS NOTES
[facility] MD Lucia Navarro MD Celso Nancy, MD. Daniela Devlin MD   Progress Note    6/9/2019    SUBJECTIVE:   6/7: On RA Braden in place Poor UO yesterday    6/9: Abdominal cramping. Ongoing anorexia, nausea, poor intake. No dyspnea at rest on room air. No chest pain or palpitations. No swelling. No pruritus Depressed as regards recent medical problems. PROBLEM LIST:    Principal Problem:    Acute renal failure (ARF) (HCC)  Active Problems:    Mild protein-calorie malnutrition ([address]  Resolved Problems:    * No resolved hospital problems. *       DIET:    DIET RENAL;       Allergies : Patient has no known allergies. Past Medical History:   Diagnosis Date    Anxiety     Depression     Hyperlipidemia     Hypertension     Hypothyroidism     Intellectual disability     Leg pain, bilateral     Noncompliance with medications     Noncompliance with treatment     Osteoarthritis        Past Surgical History:   Procedure Laterality Date    CYSTOSCOPY Left 1/21/2019    CYSTOSCOPY, BILATERAL RETROGRADE PYELOGRAMS, BILATERAL STENT INSERTION performed by Samira Fields MD at [place] [id number] Bilateral 6/1/2019    CYSTOSCOPY, RETROGRADE PYELOGRAMS, BILATERAL URETERAL STENT EXCHANGE performed by Coleman Samuel MD at [address] [place] [address] N/A 6/6/2019    EXAM UNDEDR ANESTHESIA VAGINAL BIOPSIES performed by Emil Simon MD at [place] [address]. History   Problem Relation Age of Onset    Diabetes Brother     Thyroid Disease Mother     Other Daughter         Autism        reports that she quit smoking about 30 years ago. She has a 5.00 pack-year smoking history. She has never used smokeless tobacco. She reports that she does not drink alcohol or use drugs.     Review of Systems:   Constitutional: no fevers , no chills , feels ok   Eyes: no eye pain , no itching , no drainage  Ears, nose, mouth, throat, and face: no ear ,nose pain , hearing is ok ,no nasal drainage   Respiratory: no sob ,no cough ,no wheezing . Cardiovascular: no chest pain , no palpitation ,no sob . Gastrointestinal: no nausea, vomiting , constipation , no abdominal pain . Genitourinary:no urinary retention , no burning , dysuria . No polyuria   Hematologic/lymphatic: no bleeding , no cougulation issues . Musculoskeletal:no joint pain , no swelling . Neurological: no headaches ,no weakness , no numbness . Endocrine: no thirst , no weight issues .      MEDS (scheduled):    [START ON 6/10/2019] darbepoetin lucio-polysorbate  40 mcg Subcutaneous Weekly - Monday    famotidine  20 mg Oral BID    sodium chloride  250 mL Intravenous Once    sodium polystyrene  15 g Oral Once    sodium bicarbonate  650 mg Oral BID    chlorhexidine  15 mL Mouth/Throat BID    atorvastatin  40 mg Oral Daily    docusate sodium  100 mg Oral BID    ferrous sulfate  325 mg Oral BID WC    fluticasone  2 spray Nasal Daily    levothyroxine  100 mcg Oral Daily    mirtazapine  15 mg Oral Nightly    sodium chloride flush  10 mL Intravenous 2 times per day    heparin (porcine)  5,000 Units Subcutaneous 3 times per day       MEDS (infusions):   dextrose         MEDS (prn):  acetaminophen, heparin flush, magnesium hydroxide, glucose, dextrose, glucagon (rDNA), dextrose, traMADol, sodium chloride flush, ondansetron    PHYSICAL EXAM:     Patient Vitals for the past 24 hrs:   BP Temp Temp src Pulse Resp SpO2   06/09/19 0400 (!) 153/82 98.3 °F (36.8 °C) Oral 67 14 97 %   06/09/19 0000 (!) 179/98 98.2 °F (36.8 °C) Oral 75 18 94 %   06/08/19 2000 (!) 167/79 98.7 °F (37.1 °C) Oral 74 12 97 %   06/08/19 1730 (!) 163/80 98.9 °F (37.2 °C) Oral 72 16 96 %   @      Intake/Output Summary (Last 24 hours) at 6/9/2019 1333  Last data filed at 6/9/2019 1115  Gross per 24 hour   Intake 290 ml   Output 3900 ml   Net -3610 ml         Wt Readings from Last 3 Encounters:   06/07/19 164 lb 8 oz (74.6 kg)   05/23/19 148 lb (67.1 kg)   05/15/19 140 lb 10.5 oz (63.8 kg)       Constitutional:  in no acute distress  Oral: mucus membranes moist  Neck: no JVD  Cardiovascular: S1, S2 regular rhythm, no murmur,or rub  Respiratory:  No crackles, no wheeze  Gastrointestinal:  Soft, nontender, nondistended, NABS  Ext: no edema, feet warm  Skin: dry, no rash  Neuro: awake, alert, interactive      DATA:    Recent Labs     06/07/19  0515 06/08/19  0455 06/09/19  0515   WBC 9.4 9.0 6.9   HGB 8.0* 7.9* 7.6*   HCT 24.8* 25.1* 24.5*   MCV 89.9 91.3 92.5    321 299     Recent Labs     06/08/19  1445 06/08/19  1950 06/09/19  0515    144 146   K 4.0 3.8 3.7   * 105 108*   CO2 25 24 26   MG  --   --  1.8   PHOS  --   --  5.4*   BUN 34* 32* 32*   CREATININE 5.9* 5.7* 5.7*       No results found for: LABPROT    ASSESSMENT:      - Acute kidney injury 2.2 obstructive uropathy in contest of poorly functioning bladder and neurogenic bladder .     - Chronic kidney disease baseline cr 1.4     - Sepsis secondary to a urinary tract infection    - Anaemia of chronic disease . R/O acute component . - Chronic mental disability    - firm abnormal nodule lesion within the vagina with its urethra with cystinosis suspicion for gynecological malignancy    - Hypernatremia, mild, due to poor intake with subsequent dehydration      S/p Cystourethroscopy, bilateral JJ ureteral stent insertion   ? ?  of accuracy of urine eosinophills  With UTI /crane and pyuria . Seems uremic, though it is difficult to distinguish anorexia and nausea due to abdominal discomfort, known possible perineal malignancy. Appears hypovolemic. RECOMMENDATIONS  Half-normal saline drip  Encourage p.o. intake  Dialysis tomorrow morning, unless  Tunneled dialysis line to follow shortly  Consider switching to pepcid off PPI . Abx per ID .       Electronically signed by Sergo Saravia MD on 6/9/2019 at 1:33 PM

## 2019-06-09 NOTE — PROGRESS NOTES
HPI:  Ariana Bean is uncomfortable today and admits to significant abdominal cramping. When discussing other issues, her abdominal cramping seems to be less noticeable. No family members were present during my examination. Patient voiced no new complaints since hospital admission. Questions, answers, and tests reviewed. ROS:  Cardiovascular:   Denies any chest pain, irregular heartbeats, or palpitations. Respiratory:   Denies shortness of breath, coughing, sputum production, hemoptysis, or wheezing. Gastrointestinal:   Admits to abdominal pain and cramping. Extremities:   Denies any lower extremity swelling or edema. Neurology:    Denies any headache or focal neurological deficits. Admits to generalized weakness and deconditioning. Derm:    Denies any rashes, ulcers, or excoriations. Denies bruising. Admits to pain associated with the dialysis catheter insertion site. Genitourinary:    Denies any urgency, frequency, hematuria. Voiding with the use of a Braden catheter. Physical Exam:    Vitals:    06/09/19 0400   BP: (!) 153/82   Pulse: 67   Resp: 14   Temp: 98.3 °F (36.8 °C)   SpO2: 97%       HEENT:  PERRLA. EOMI. Sclera clear. Buccal mucosa moist.    Neck:  Supple. Trachea midline. No thyromegaly. No JVD. No bruits. Heart:  Rhythm regular, rate controlled. No murmurs. Lungs:  Symmetrical. Clear to auscultation bilaterally. No wheezes. No rhonchi. No rales. Abdomen: Soft. Non-tender. Non-distended. Bowel sounds positive. No organomegaly or masses. No pain on palpation    Extremities:  Peripheral pulses present. No peripheral edema. No ulcers. PICC line is in place. Neurologic:  Alert x 3. No focal deficit. Cranial nerves grossly intact. Skin:  No petechia. No hemorrhage. No wounds.     CBC with Differential:    Lab Results   Component Value Date    WBC 6.9 06/09/2019    RBC 2.65 06/09/2019    HGB 7.6 06/09/2019    HCT 24.5 06/09/2019     06/09/2019    MCV 92.5 06/09/2019 MCH 28.7 06/09/2019    MCHC 31.0 06/09/2019    RDW 14.6 06/09/2019    SEGSPCT 66 10/23/2013    LYMPHOPCT 30.3 06/09/2019    MONOPCT 7.5 06/09/2019    BASOPCT 0.6 06/09/2019    MONOSABS 0.52 06/09/2019    LYMPHSABS 2.10 06/09/2019    EOSABS 0.40 06/09/2019    BASOSABS 0.04 06/09/2019     BMP:    Lab Results   Component Value Date     06/09/2019    K 3.7 06/09/2019    K 5.4 05/31/2019     06/09/2019    CO2 26 06/09/2019    BUN 32 06/09/2019    LABALBU 3.1 05/31/2019    LABALBU 4.4 12/20/2011    CREATININE 5.7 06/09/2019    CALCIUM 8.7 06/09/2019    GFRAA 9 06/09/2019    LABGLOM 8 06/09/2019    GLUCOSE 76 06/09/2019    GLUCOSE 97 12/20/2011       Other Data:      Intake/Output Summary (Last 24 hours) at 6/9/2019 0923  Last data filed at 6/9/2019 0600  Gross per 24 hour   Intake 410 ml   Output 4200 ml   Net -3790 ml         Scheduled Medications:   famotidine  20 mg Oral BID    sodium chloride  250 mL Intravenous Once    sodium polystyrene  15 g Oral Once    sodium bicarbonate  650 mg Oral BID    chlorhexidine  15 mL Mouth/Throat BID    atorvastatin  40 mg Oral Daily    docusate sodium  100 mg Oral BID    ferrous sulfate  325 mg Oral BID WC    fluticasone  2 spray Nasal Daily    levothyroxine  100 mcg Oral Daily    mirtazapine  15 mg Oral Nightly    sodium chloride flush  10 mL Intravenous 2 times per day    heparin (porcine)  5,000 Units Subcutaneous 3 times per day         Infusion Medications:   dextrose         Assessment:   1. Acute on chronic kidney disease stage IV compatible with a neurogenic bladder with chronic b/l ureteral stents and recent institution of hemodialysis  2. Abnormal pelvic exam showing a firm abnormal lesion within the vagina with suspicion for gynecological malignancy status post exam under anesthesia with biopsies taken  3. Sepsis secondary to a urinary tract infection secondary to an indwelling Braden catheter with chronic stents  4.  Normocytic anemia of chronic disease  5. Hypothyroidism  6. Chronic mental disability  7. Hyperlipidemia    Plan: We will await further recommendations from the nephrology team regarding ongoing dialysis. She seems to have adequate urinary output currently and we will monitor this diuretic phase. Multiple subspecialists continue to provide consultation. The patient is very concerned regarding her or vaginal biopsies which have not yet returned. Her insurance issues are posing a significant problem in this will be sorted out tomorrow with the assistance of case management and social work team's. Chronic comorbidities are being monitored. She will require ongoing Braden catheter placement. Continue current therapy. See orders for further plan of care.     Shelby Hurley D.O.  9:23 AM  6/9/2019

## 2019-06-10 LAB
ANION GAP SERPL CALCULATED.3IONS-SCNC: 13 MMOL/L (ref 7–16)
APTT: 65.5 SEC (ref 24.5–35.1)
BASOPHILS ABSOLUTE: 0.04 E9/L (ref 0–0.2)
BASOPHILS RELATIVE PERCENT: 0.6 % (ref 0–2)
BUN BLDV-MCNC: 28 MG/DL (ref 6–20)
CALCIUM SERPL-MCNC: 8.8 MG/DL (ref 8.6–10.2)
CHLORIDE BLD-SCNC: 108 MMOL/L (ref 98–107)
CO2: 24 MMOL/L (ref 22–29)
CREAT SERPL-MCNC: 4.6 MG/DL (ref 0.5–1)
EOSINOPHILS ABSOLUTE: 0.44 E9/L (ref 0.05–0.5)
EOSINOPHILS RELATIVE PERCENT: 6.1 % (ref 0–6)
GFR AFRICAN AMERICAN: 12
GFR NON-AFRICAN AMERICAN: 10 ML/MIN/1.73
GLUCOSE BLD-MCNC: 83 MG/DL (ref 74–99)
HCT VFR BLD CALC: 25 % (ref 34–48)
HEMOGLOBIN: 7.7 G/DL (ref 11.5–15.5)
IMMATURE GRANULOCYTES #: 0.05 E9/L
IMMATURE GRANULOCYTES %: 0.7 % (ref 0–5)
INR BLD: 1.2
LYMPHOCYTES ABSOLUTE: 2.17 E9/L (ref 1.5–4)
LYMPHOCYTES RELATIVE PERCENT: 30 % (ref 20–42)
MAGNESIUM: 1.7 MG/DL (ref 1.6–2.6)
MCH RBC QN AUTO: 28.6 PG (ref 26–35)
MCHC RBC AUTO-ENTMCNC: 30.8 % (ref 32–34.5)
MCV RBC AUTO: 92.9 FL (ref 80–99.9)
MONOCYTES ABSOLUTE: 0.48 E9/L (ref 0.1–0.95)
MONOCYTES RELATIVE PERCENT: 6.6 % (ref 2–12)
NEUTROPHILS ABSOLUTE: 4.06 E9/L (ref 1.8–7.3)
NEUTROPHILS RELATIVE PERCENT: 56 % (ref 43–80)
PDW BLD-RTO: 14.8 FL (ref 11.5–15)
PHOSPHORUS: 4.6 MG/DL (ref 2.5–4.5)
PLATELET # BLD: 323 E9/L (ref 130–450)
PMV BLD AUTO: 9.9 FL (ref 7–12)
POTASSIUM SERPL-SCNC: 3.6 MMOL/L (ref 3.5–5)
PROTHROMBIN TIME: 13.2 SEC (ref 9.3–12.4)
RBC # BLD: 2.69 E12/L (ref 3.5–5.5)
SODIUM BLD-SCNC: 145 MMOL/L (ref 132–146)
WBC # BLD: 7.2 E9/L (ref 4.5–11.5)

## 2019-06-10 PROCEDURE — 84100 ASSAY OF PHOSPHORUS: CPT

## 2019-06-10 PROCEDURE — 80048 BASIC METABOLIC PNL TOTAL CA: CPT

## 2019-06-10 PROCEDURE — 83735 ASSAY OF MAGNESIUM: CPT

## 2019-06-10 PROCEDURE — 6360000002 HC RX W HCPCS: Performed by: INTERNAL MEDICINE

## 2019-06-10 PROCEDURE — 97110 THERAPEUTIC EXERCISES: CPT

## 2019-06-10 PROCEDURE — 2580000003 HC RX 258: Performed by: INTERNAL MEDICINE

## 2019-06-10 PROCEDURE — 36592 COLLECT BLOOD FROM PICC: CPT

## 2019-06-10 PROCEDURE — 1200000000 HC SEMI PRIVATE

## 2019-06-10 PROCEDURE — 85025 COMPLETE CBC W/AUTO DIFF WBC: CPT

## 2019-06-10 PROCEDURE — 97530 THERAPEUTIC ACTIVITIES: CPT

## 2019-06-10 PROCEDURE — 85730 THROMBOPLASTIN TIME PARTIAL: CPT

## 2019-06-10 PROCEDURE — 36415 COLL VENOUS BLD VENIPUNCTURE: CPT

## 2019-06-10 PROCEDURE — 6370000000 HC RX 637 (ALT 250 FOR IP): Performed by: INTERNAL MEDICINE

## 2019-06-10 PROCEDURE — 85610 PROTHROMBIN TIME: CPT

## 2019-06-10 RX ORDER — MORPHINE SULFATE 2 MG/ML
2 INJECTION, SOLUTION INTRAMUSCULAR; INTRAVENOUS ONCE
Status: COMPLETED | OUTPATIENT
Start: 2019-06-10 | End: 2019-06-10

## 2019-06-10 RX ORDER — HYDRALAZINE HYDROCHLORIDE 20 MG/ML
5 INJECTION INTRAMUSCULAR; INTRAVENOUS ONCE
Status: COMPLETED | OUTPATIENT
Start: 2019-06-10 | End: 2019-06-10

## 2019-06-10 RX ORDER — DEXTROSE AND SODIUM CHLORIDE 5; .45 G/100ML; G/100ML
INJECTION, SOLUTION INTRAVENOUS CONTINUOUS
Status: DISCONTINUED | OUTPATIENT
Start: 2019-06-10 | End: 2019-06-11

## 2019-06-10 RX ADMIN — SODIUM BICARBONATE 650 MG TABLET 650 MG: at 09:04

## 2019-06-10 RX ADMIN — Medication 300 UNITS: at 23:50

## 2019-06-10 RX ADMIN — HEPARIN SODIUM 5000 UNITS: 5000 INJECTION, SOLUTION INTRAVENOUS; SUBCUTANEOUS at 21:28

## 2019-06-10 RX ADMIN — Medication 10 ML: at 23:35

## 2019-06-10 RX ADMIN — HEPARIN SODIUM 5000 UNITS: 5000 INJECTION, SOLUTION INTRAVENOUS; SUBCUTANEOUS at 05:07

## 2019-06-10 RX ADMIN — ATORVASTATIN CALCIUM 40 MG: 40 TABLET, FILM COATED ORAL at 09:04

## 2019-06-10 RX ADMIN — LEVOTHYROXINE SODIUM 100 MCG: 100 TABLET ORAL at 05:07

## 2019-06-10 RX ADMIN — HYDRALAZINE HYDROCHLORIDE 5 MG: 20 INJECTION, SOLUTION INTRAMUSCULAR; INTRAVENOUS at 23:43

## 2019-06-10 RX ADMIN — DARBEPOETIN ALFA 40 MCG: 40 INJECTION, SOLUTION INTRAVENOUS; SUBCUTANEOUS at 11:07

## 2019-06-10 RX ADMIN — MIRTAZAPINE 15 MG: 15 TABLET, FILM COATED ORAL at 21:25

## 2019-06-10 RX ADMIN — MORPHINE SULFATE 2 MG: 2 INJECTION, SOLUTION INTRAMUSCULAR; INTRAVENOUS at 23:35

## 2019-06-10 RX ADMIN — HEPARIN SODIUM 5000 UNITS: 5000 INJECTION, SOLUTION INTRAVENOUS; SUBCUTANEOUS at 16:09

## 2019-06-10 RX ADMIN — FAMOTIDINE 20 MG: 20 TABLET ORAL at 09:04

## 2019-06-10 RX ADMIN — Medication 325 MG: at 09:04

## 2019-06-10 RX ADMIN — DEXTROSE AND SODIUM CHLORIDE: 5; 450 INJECTION, SOLUTION INTRAVENOUS at 10:51

## 2019-06-10 RX ADMIN — DEXTROSE AND SODIUM CHLORIDE: 5; 450 INJECTION, SOLUTION INTRAVENOUS at 21:43

## 2019-06-10 RX ADMIN — TRAMADOL HYDROCHLORIDE 25 MG: 50 TABLET, FILM COATED ORAL at 21:25

## 2019-06-10 RX ADMIN — SODIUM BICARBONATE 650 MG TABLET 650 MG: at 21:25

## 2019-06-10 RX ADMIN — Medication 325 MG: at 16:09

## 2019-06-10 RX ADMIN — FAMOTIDINE 20 MG: 20 TABLET ORAL at 21:25

## 2019-06-10 RX ADMIN — Medication 10 ML: at 21:25

## 2019-06-10 RX ADMIN — DOCUSATE SODIUM 100 MG: 100 CAPSULE, LIQUID FILLED ORAL at 09:03

## 2019-06-10 ASSESSMENT — PAIN SCALES - GENERAL
PAINLEVEL_OUTOF10: 0
PAINLEVEL_OUTOF10: 9
PAINLEVEL_OUTOF10: 0
PAINLEVEL_OUTOF10: 10
PAINLEVEL_OUTOF10: 0
PAINLEVEL_OUTOF10: 10
PAINLEVEL_OUTOF10: 10
PAINLEVEL_OUTOF10: 0
PAINLEVEL_OUTOF10: 0

## 2019-06-10 ASSESSMENT — PAIN DESCRIPTION - DESCRIPTORS
DESCRIPTORS: CONSTANT;DISCOMFORT;CRAMPING
DESCRIPTORS: CONSTANT;CRAMPING;SORE

## 2019-06-10 ASSESSMENT — PAIN - FUNCTIONAL ASSESSMENT
PAIN_FUNCTIONAL_ASSESSMENT: PREVENTS OR INTERFERES SOME ACTIVE ACTIVITIES AND ADLS

## 2019-06-10 ASSESSMENT — PAIN DESCRIPTION - PAIN TYPE
TYPE: CHRONIC PAIN

## 2019-06-10 ASSESSMENT — PAIN DESCRIPTION - ORIENTATION
ORIENTATION: LEFT;ANTERIOR

## 2019-06-10 ASSESSMENT — PAIN DESCRIPTION - FREQUENCY
FREQUENCY: CONTINUOUS

## 2019-06-10 ASSESSMENT — PAIN DESCRIPTION - LOCATION
LOCATION: ABDOMEN

## 2019-06-10 ASSESSMENT — PAIN DESCRIPTION - PROGRESSION
CLINICAL_PROGRESSION: GRADUALLY WORSENING

## 2019-06-10 ASSESSMENT — PAIN DESCRIPTION - ONSET
ONSET: ON-GOING

## 2019-06-10 NOTE — PROGRESS NOTES
Physical Therapy  Physical Therapy  Daily Treatment Note  6/10/2019  [id number]                      Alejandrina Underowod   [id number]                              1965    Patient Active Problem List   Diagnosis    Hyperlipidemia    Noncompliance    Depressive disorder    Halitosis    Dysmenorrhea    Bradycardia    Acquired hypothyroidism    Mild intermittent asthma without complication    Acute renal failure (ARF) (HCC)    Anxiety    Intellectual disability    Obstructive uropathy    Mixed stress and urge urinary incontinence    Vitamin D deficiency    Seasonal allergic rhinitis    Acute on chronic renal insufficiency    Acute renal failure superimposed on chronic kidney disease, on chronic dialysis (HCC)    Mild protein-calorie malnutrition (HCC)    Acute cystitis with hematuria       Recommendation for discharge: Subacute vs. LTAC  Equipment prescriptions needed:to be determined    [facility] Mobility Inpatient   How much difficulty turning over in bed?: A Little  How much difficulty sitting down on / standing up from a chair with arms?: A Little  How much difficulty moving from lying on back to sitting on side of bed?: A Little  How much help from another person moving to and from a bed to a chair?: A Little  How much help from another person needed to walk in hospital room?: A Little  How much help from another person for climbing 3-5 steps with a railing?: A Lot  AM-PAC Inpatient Mobility Raw Score : 17  AM-PAC Inpatient T-Scale Score : 42.13  Mobility Inpatient CMS 0-100% Score: 50.57  Mobility Inpatient CMS G-Code Modifier : CK      Precautions: falls, alarm    S: Patient cleared by nursing for treatment. Patient is agreeable to treatment. Pt c/o pain with her low back. Pain status: (measured on a visual analog scale with 0=no pain and 10=excruciating pain) no number given/10.    O: Pt was instructed in and performed the following:   Bed Mobility- Supine to sit-Minimal assists x 1     Scooting- Minimal Assist x 1    Sit to supine-Minimal assists x 1   Transfers-sit to stand- Minimal assists x 1     Gait:  Patient ambulated 50 feet x 4 using Wheeled Walker with Minimal assists x 1. Comments: V/C for upright posture, increased WILMAN/step length, and safety. Steps: NA  Treatment: Pt transferred to edge of bed, sat for 2 minutes,  ambulated in the hallway and back to edge of the bed. Pt performed seated ex's: ankle pumps, LAQ, marching, hip abd/add, x 10 reps AROM. V/C for technique. Comment: Call light left by patient. RTB at end of tx session with bed alarm on. A: Pt needing minimal assist with walker management. Pt able to progress with increased distance with no LOB but displays kyphotic posture, narrow WILMAN, small step lengths, and slow libby. P: Continue with physical therapy   MARISELA BRIGHT, PTA   GOALS to be met in 2 days. Bed mobility-  Independent                                         Transfers-Sit to stand-Independent               Gait:  Patient to ambulate 100 feet using wheeled walker with Modified Independent               Steps: Patient to go up and down 8  step(s) using 1 rail(s) with  Independent      Increase strength in affected mm groups by 1/3 grade  Increase balance to allow for improvement towards functional goals.   Increase endurance to allow for improvement towards functional goals.

## 2019-06-10 NOTE — PROGRESS NOTES
[address] [place] [address] [facility]  Progress Note      CC: reports abdominal pain/ less cramping today     ID following for hydronephrosis- atbs management  Face to face encounter     SUBJECTIVE:  Patient is tolerating medications. No reported adverse drug reactions. ROS: nausea no vomiting, diarrhea. + abdominal pain. No fevers. Had chills No rash. Tolerating atbs. Pain/ cramps that comes and goes. Medications:  Scheduled Meds:   darbepoetin lucio-polysorbate  40 mcg Subcutaneous Weekly - Monday    famotidine  20 mg Oral BID    sodium chloride  250 mL Intravenous Once    sodium polystyrene  15 g Oral Once    sodium bicarbonate  650 mg Oral BID    chlorhexidine  15 mL Mouth/Throat BID    atorvastatin  40 mg Oral Daily    docusate sodium  100 mg Oral BID    ferrous sulfate  325 mg Oral BID WC    fluticasone  2 spray Nasal Daily    levothyroxine  100 mcg Oral Daily    mirtazapine  15 mg Oral Nightly    sodium chloride flush  10 mL Intravenous 2 times per day    heparin (porcine)  5,000 Units Subcutaneous 3 times per day     Continuous Infusions:   dextrose 5 % and 0.45 % NaCl 84 mL/hr at 06/10/19 1051    dextrose       PRN Meds:acetaminophen, heparin flush, magnesium hydroxide, glucose, dextrose, glucagon (rDNA), dextrose, traMADol, sodium chloride flush, ondansetron  OBJECTIVE:  Patient Vitals for the past 24 hrs:   BP Temp Temp src Pulse Resp SpO2 Weight   06/10/19 0945 -- 98.5 °F (36.9 °C) Oral 75 16 -- --   06/10/19 0830 (!) 170/82 98.3 °F (36.8 °C) Oral 72 16 97 % --   06/10/19 0600 -- -- -- -- -- -- 163 lb 3.2 oz (74 kg)   06/10/19 0400 138/71 98.2 °F (36.8 °C) Oral 70 14 94 % --   06/10/19 0000 (!) 156/72 98 °F (36.7 °C) Oral 70 14 95 % --   06/09/19 2000 (!) 175/84 98.8 °F (37.1 °C) Oral 74 11 99 % --     Constitutional: The patient is awake today- abdominal pain. Skin: Warm and dry. No rashes were noted. Head: at/nc   Neck: Supple to movements.    Chest: No use of accessory muscles to breathe. Symmetrical expansion. Auscultation reveals no wheezing, crackles, or rhonchi. Cardiovascular: S1 and S2 are rhythmic and regular. Abdomen: Positive bowel sounds to auscultation. Extremities: No clubbing, no cyanosis, no edema.   Right upper arm with line picc- no phlebitis   Braden yellow  RIJ -HD-    Laboratory and Tests Review:  Lab Results   Component Value Date    WBC 7.2 06/10/2019    WBC 6.9 06/09/2019    WBC 9.0 06/08/2019    HGB 7.7 (L) 06/10/2019    HCT 25.0 (L) 06/10/2019    MCV 92.9 06/10/2019     06/10/2019     Lab Results   Component Value Date    NEUTROABS 4.06 06/10/2019    NEUTROABS 3.82 06/09/2019    NEUTROABS 6.13 06/08/2019     Lab Results   Component Value Date    ALT 23 05/31/2019    AST 18 05/31/2019    ALKPHOS 56 05/31/2019    BILITOT <0.2 05/31/2019     Lab Results   Component Value Date     06/10/2019    K 3.6 06/10/2019    K 5.4 05/31/2019     06/10/2019    CO2 24 06/10/2019    BUN 28 06/10/2019    CREATININE 4.6 06/10/2019    GFRAA 12 06/10/2019    LABGLOM 10 06/10/2019    GLUCOSE 83 06/10/2019    GLUCOSE 97 12/20/2011    PROT 6.0 05/31/2019    LABALBU 3.1 05/31/2019    LABALBU 4.4 12/20/2011    CALCIUM 8.8 06/10/2019    BILITOT <0.2 05/31/2019    ALKPHOS 56 05/31/2019    AST 18 05/31/2019    ALT 23 05/31/2019     Radiology:  Reviewed     Microbiology:   5/31/19- urine cx- no growth to date  Blood cx- no growth to date    ASSESSMENT:  · Bilateral hydronephrosis   · Neurogenic bladder  · Leukocytosis- improved   · S/p stent exchange  · UTI f/u cx ngtd  · JIAN- on HD    PLAN:  · No atbs- no active infection   · Urine cultures- No growth to date  · Nephrology/ urology following  · gynecology to evaluate -Post-operative Diagnosis:  Fixated and stenotic urethra with abnormal and nodular vaginal examination suspicious for malignancy  · S/p vaginal biopsy- pending   · Monitor labs- creat- 4.6  WBC- 7.2     Electronically signed by Romana Saxena JUSTYN Gonzalez - CNS on 6/10/2019 at 3:26 PM  Phone [phone]      Fax [phone]    I have discussed the case, including pertinent history and physical  exam findings . I have seen and examined the patient and the key elements of the encounter have been performed by me. I agree with the assessment, plan and orders as documented.       Treatment plan as per my recommendation     Marina Frank MD, FACP  6/11/2019  10:26 AM

## 2019-06-10 NOTE — PROGRESS NOTES
P Quality Flow/Interdisciplinary Rounds Progress Note        Quality Flow Rounds held on Marizol 10, 2019    Disciplines Attending:  Bedside Nurse, ,  and Nursing Unit [address] Vanesa Ferreira was admitted on 5/31/2019  2:28 PM    Anticipated Discharge Date:  Expected Discharge Date: 06/10/19    Disposition:    Ramon Score:  Ramon Scale Score: 19    Readmission Risk              Risk of Unplanned Readmission:        36           Discussed patient goal for the day, patient clinical progression, and barriers to discharge.   The following Goal(s) of the Day/Commitment(s) have been identified:  [facility]  [facility], 2019

## 2019-06-10 NOTE — PROGRESS NOTES
[facility] MD Chelsie Woods MD Alvan Kirsch, MD. Erika Montoya MD   Progress Note    6/10/2019    SUBJECTIVE:   6/7: On RA Braden in place Poor UO yesterday    6/9: Abdominal cramping. Ongoing anorexia, nausea, poor intake. No dyspnea at rest on room air. No chest pain or palpitations. No swelling. No pruritus Depressed as regards recent medical problems. 6/10 : On RA . Good UO . PROBLEM LIST:    Principal Problem:    Acute renal failure (ARF) (HCC)  Active Problems:    Mild protein-calorie malnutrition ([address]  Resolved Problems:    * No resolved hospital problems. *       DIET:    DIET RENAL;       Allergies : Patient has no known allergies. Past Medical History:   Diagnosis Date    Anxiety     Depression     Hyperlipidemia     Hypertension     Hypothyroidism     Intellectual disability     Leg pain, bilateral     Noncompliance with medications     Noncompliance with treatment     Osteoarthritis        Past Surgical History:   Procedure Laterality Date    CYSTOSCOPY Left 1/21/2019    CYSTOSCOPY, BILATERAL RETROGRADE PYELOGRAMS, BILATERAL STENT INSERTION performed by Meghan Melgoza MD at [facility] Bilateral 6/1/2019    CYSTOSCOPY, RETROGRADE PYELOGRAMS, BILATERAL URETERAL STENT EXCHANGE performed by Uriel Banerjee MD at [facility] N/A 6/6/2019    EXAM UNDEDR ANESTHESIA VAGINAL BIOPSIES performed by Angelica Mcclendon MD at [facility]. History   Problem Relation Age of Onset    Diabetes Brother     Thyroid Disease Mother     Other Daughter         Autism        reports that she quit smoking about 30 years ago. She has a 5.00 pack-year smoking history. She has never used smokeless tobacco. She reports that she does not drink alcohol or use drugs.     Review of Systems:   Constitutional: no fevers , no chills , feels ok   Eyes: no eye pain , no itching , no drainage  Ears, nose, mouth, throat, and face: no ear ,nose pain , hearing is ok ,no nasal drainage   Respiratory: no sob ,no cough ,no wheezing . Cardiovascular: no chest pain , no palpitation ,no sob . Gastrointestinal: no nausea, vomiting , constipation , no abdominal pain . Genitourinary:no urinary retention , no burning , dysuria . No polyuria   Hematologic/lymphatic: no bleeding , no cougulation issues . Musculoskeletal:no joint pain , no swelling . Neurological: no headaches ,no weakness , no numbness . Endocrine: no thirst , no weight issues .      MEDS (scheduled):    darbepoetin lucio-polysorbate  40 mcg Subcutaneous Weekly - Monday    famotidine  20 mg Oral BID    sodium chloride  250 mL Intravenous Once    sodium polystyrene  15 g Oral Once    sodium bicarbonate  650 mg Oral BID    chlorhexidine  15 mL Mouth/Throat BID    atorvastatin  40 mg Oral Daily    docusate sodium  100 mg Oral BID    ferrous sulfate  325 mg Oral BID WC    fluticasone  2 spray Nasal Daily    levothyroxine  100 mcg Oral Daily    mirtazapine  15 mg Oral Nightly    sodium chloride flush  10 mL Intravenous 2 times per day    heparin (porcine)  5,000 Units Subcutaneous 3 times per day       MEDS (infusions):   dextrose 5 % and 0.45 % NaCl 84 mL/hr at 06/10/19 1051    dextrose         MEDS (prn):  acetaminophen, heparin flush, magnesium hydroxide, glucose, dextrose, glucagon (rDNA), dextrose, traMADol, sodium chloride flush, ondansetron    PHYSICAL EXAM:     Patient Vitals for the past 24 hrs:   BP Temp Temp src Pulse Resp SpO2 Weight   06/10/19 0945 -- 98.5 °F (36.9 °C) Oral 75 16 -- --   06/10/19 0830 (!) 170/82 98.3 °F (36.8 °C) Oral 72 16 97 % --   06/10/19 0600 -- -- -- -- -- -- 163 lb 3.2 oz (74 kg)   06/10/19 0400 138/71 98.2 °F (36.8 °C) Oral 70 14 94 % --   06/10/19 0000 (!) 156/72 98 °F (36.7 °C) Oral 70 14 95 % --   06/09/19 2000 (!) 175/84 98.8 °F (37.1 °C) Oral 74 11 99 % --   @      Intake/Output Summary (Last 24 hours) at 6/10/2019 1211  Last data filed at 6/10/2019 1125  Gross per 24 hour   Intake 1229.91 ml   Output 4900 ml   Net -3670.09 ml         Wt Readings from Last 3 Encounters:   06/10/19 163 lb 3.2 oz (74 kg)   05/23/19 148 lb (67.1 kg)   05/15/19 140 lb 10.5 oz (63.8 kg)       Constitutional:  in no acute distress  Oral: mucus membranes moist  Neck: no JVD  Cardiovascular: S1, S2 regular rhythm, no murmur,or rub  Respiratory:  No crackles, no wheeze  Gastrointestinal:  Soft, nontender, nondistended, NABS  Ext: no edema, feet warm  Skin: dry, no rash  Neuro: awake, alert, interactive      DATA:    Recent Labs     06/08/19  0455 06/09/19  0515 06/10/19  0510   WBC 9.0 6.9 7.2   HGB 7.9* 7.6* 7.7*   HCT 25.1* 24.5* 25.0*   MCV 91.3 92.5 92.9    299 323     Recent Labs     06/08/19  1950 06/09/19  0515 06/10/19  0510    146 145   K 3.8 3.7 3.6    108* 108*   CO2 24 26 24   MG  --  1.8 1.7   PHOS  --  5.4* 4.6*   BUN 32* 32* 28*   CREATININE 5.7* 5.7* 4.6*       No results found for: LABPROT    ASSESSMENT:      - Acute kidney injury 2.2 obstructive uropathy in contest of poorly functioning bladder and neurogenic bladder .     - Chronic kidney disease baseline cr 1.4     - Sepsis secondary to a urinary tract infection    - Anaemia of chronic disease . R/O acute component . - Chronic mental disability    - firm abnormal nodule lesion within the vagina with its urethra with cystinosis suspicion for gynecological malignancy    - Hypernatremia, mild, due to poor intake with subsequent dehydration      S/p Cystourethroscopy, bilateral JJ ureteral stent insertion   ? ?  of accuracy of urine eosinophills  With UTI /crane and pyuria .            RECOMMENDATIONS  Kidney function appear to be improving   No HD today   Continue iv fluids       Electronically signed by Charmayne Sauers, MD on 6/10/2019 at 12:11 PM

## 2019-06-10 NOTE — PROGRESS NOTES
HPI:  Brando Alvarenga seems far more comfortable during my examination today. She has less abdominal pain but continues to complain of intermittent abdominal cramping. She is very anxious for the results of the vaginal biopsies. We had a long discussion regarding discharge planning as LTAC is no longer an option. We discussed skilled nursing facility placement. The patient's creatinine has trended downward and she has significant urinary output. I will discuss this with the nephrology team today. Patient voiced no new complaints since hospital admission. Questions, answers, and tests reviewed. ROS:  Cardiovascular:   Denies any chest pain, irregular heartbeats, or palpitations. Respiratory:   Denies shortness of breath, coughing, sputum production, hemoptysis, or wheezing. Gastrointestinal:   Admits to abdominal pain and cramping. Extremities:   Denies any lower extremity swelling or edema. Neurology:    Denies any headache or focal neurological deficits. Admits to generalized weakness and deconditioning. Derm:    Denies any rashes, ulcers, or excoriations. Denies bruising. Admits to pain associated with the dialysis catheter insertion site. Genitourinary:    Denies any urgency, frequency, hematuria. Voiding with the use of a Braden catheter. Physical Exam:    Vitals:    06/10/19 0945   BP:    Pulse: 75   Resp: 16   Temp: 98.5 °F (36.9 °C)   SpO2:        HEENT:  PERRLA. EOMI. Sclera clear. Buccal mucosa moist.    Neck:  Supple. Trachea midline. No thyromegaly. No JVD. No bruits. Heart:  Rhythm regular, rate controlled. No murmurs. Lungs:  Symmetrical. Clear to auscultation bilaterally. No wheezes. No rhonchi. No rales. Abdomen: Soft. Non-tender. Non-distended. Bowel sounds positive. No organomegaly or masses. No pain on palpation    Extremities:  Peripheral pulses present. No peripheral edema. No ulcers. PICC line is in place. Neurologic:  Alert x 3. No focal deficit.   Cranial nerves grossly intact. Skin:  No petechia. No hemorrhage. No wounds. CBC with Differential:    Lab Results   Component Value Date    WBC 7.2 06/10/2019    RBC 2.69 06/10/2019    HGB 7.7 06/10/2019    HCT 25.0 06/10/2019     06/10/2019    MCV 92.9 06/10/2019    MCH 28.6 06/10/2019    MCHC 30.8 06/10/2019    RDW 14.8 06/10/2019    SEGSPCT 66 10/23/2013    LYMPHOPCT 30.0 06/10/2019    MONOPCT 6.6 06/10/2019    BASOPCT 0.6 06/10/2019    MONOSABS 0.48 06/10/2019    LYMPHSABS 2.17 06/10/2019    EOSABS 0.44 06/10/2019    BASOSABS 0.04 06/10/2019     BMP:    Lab Results   Component Value Date     06/10/2019    K 3.6 06/10/2019    K 5.4 05/31/2019     06/10/2019    CO2 24 06/10/2019    BUN 28 06/10/2019    LABALBU 3.1 05/31/2019    LABALBU 4.4 12/20/2011    CREATININE 4.6 06/10/2019    CALCIUM 8.8 06/10/2019    GFRAA 12 06/10/2019    LABGLOM 10 06/10/2019    GLUCOSE 83 06/10/2019    GLUCOSE 97 12/20/2011       Other Data:      Intake/Output Summary (Last 24 hours) at 6/10/2019 1120  Last data filed at 6/10/2019 0848  Gross per 24 hour   Intake 1229.91 ml   Output 3500 ml   Net -2270.09 ml         Scheduled Medications:   darbepoetin lucio-polysorbate  40 mcg Subcutaneous Weekly - Monday    famotidine  20 mg Oral BID    sodium chloride  250 mL Intravenous Once    sodium polystyrene  15 g Oral Once    sodium bicarbonate  650 mg Oral BID    chlorhexidine  15 mL Mouth/Throat BID    atorvastatin  40 mg Oral Daily    docusate sodium  100 mg Oral BID    ferrous sulfate  325 mg Oral BID WC    fluticasone  2 spray Nasal Daily    levothyroxine  100 mcg Oral Daily    mirtazapine  15 mg Oral Nightly    sodium chloride flush  10 mL Intravenous 2 times per day    heparin (porcine)  5,000 Units Subcutaneous 3 times per day         Infusion Medications:   dextrose 5 % and 0.45 % NaCl 84 mL/hr at 06/10/19 1051    dextrose         Assessment:   1.  Acute on chronic kidney disease stage IV compatible with a neurogenic bladder with chronic b/l ureteral stents and recent institution of hemodialysis  2. Abnormal pelvic exam showing a firm abnormal lesion within the vagina with suspicion for gynecological malignancy status post exam under anesthesia with biopsies taken  3. Sepsis secondary to a urinary tract infection secondary to an indwelling Braden catheter with chronic stents  4. Normocytic anemia of chronic disease  5. Hypothyroidism  6. Chronic mental disability  7. Hyperlipidemia    Plan: With adequate urinary output and downward trending creatinine, we may be able to hold off on permanent dialysis. I will discuss this with nephrology team today. Patient is not considered a candidate for LTAC and we will begin looking into discharge planning to a skilled nursing facility. Chronic comorbidities are being monitored. We are awaiting final vaginal biopsy report.     Natalya Pastor D.O.  11:20 AM  6/10/2019

## 2019-06-10 NOTE — CARE COORDINATION
Ss note:6/10/2019.11:55 AM spoke with pt along with liaison Elsy from [facility], pt is agreeable to return to SNF at discharge. Pt is now medicaid pending, sw to complete a HENS, facility will do a LOC, will need updated PT/OT prior to discharge. Pt has rosa maria, awaiting medical determination if pt will require oupt HD. SW will follow.  AYO Dasilva

## 2019-06-11 LAB
ANION GAP SERPL CALCULATED.3IONS-SCNC: 14 MMOL/L (ref 7–16)
BASOPHILS ABSOLUTE: 0.05 E9/L (ref 0–0.2)
BASOPHILS RELATIVE PERCENT: 0.6 % (ref 0–2)
BUN BLDV-MCNC: 23 MG/DL (ref 6–20)
CALCIUM SERPL-MCNC: 9.5 MG/DL (ref 8.6–10.2)
CHLORIDE BLD-SCNC: 106 MMOL/L (ref 98–107)
CO2: 24 MMOL/L (ref 22–29)
CREAT SERPL-MCNC: 3.7 MG/DL (ref 0.5–1)
EOSINOPHILS ABSOLUTE: 0.42 E9/L (ref 0.05–0.5)
EOSINOPHILS RELATIVE PERCENT: 5.2 % (ref 0–6)
GFR AFRICAN AMERICAN: 15
GFR NON-AFRICAN AMERICAN: 13 ML/MIN/1.73
GLUCOSE BLD-MCNC: 87 MG/DL (ref 74–99)
HCT VFR BLD CALC: 26.3 % (ref 34–48)
HEMOGLOBIN: 8.1 G/DL (ref 11.5–15.5)
IMMATURE GRANULOCYTES #: 0.06 E9/L
IMMATURE GRANULOCYTES %: 0.7 % (ref 0–5)
LYMPHOCYTES ABSOLUTE: 2.35 E9/L (ref 1.5–4)
LYMPHOCYTES RELATIVE PERCENT: 28.9 % (ref 20–42)
MCH RBC QN AUTO: 28.3 PG (ref 26–35)
MCHC RBC AUTO-ENTMCNC: 30.8 % (ref 32–34.5)
MCV RBC AUTO: 92 FL (ref 80–99.9)
MONOCYTES ABSOLUTE: 0.47 E9/L (ref 0.1–0.95)
MONOCYTES RELATIVE PERCENT: 5.8 % (ref 2–12)
NEUTROPHILS ABSOLUTE: 4.79 E9/L (ref 1.8–7.3)
NEUTROPHILS RELATIVE PERCENT: 58.8 % (ref 43–80)
PDW BLD-RTO: 14.6 FL (ref 11.5–15)
PLATELET # BLD: 352 E9/L (ref 130–450)
PMV BLD AUTO: 9.9 FL (ref 7–12)
POTASSIUM SERPL-SCNC: 3.3 MMOL/L (ref 3.5–5)
RBC # BLD: 2.86 E12/L (ref 3.5–5.5)
SODIUM BLD-SCNC: 144 MMOL/L (ref 132–146)
WBC # BLD: 8.1 E9/L (ref 4.5–11.5)

## 2019-06-11 PROCEDURE — 36415 COLL VENOUS BLD VENIPUNCTURE: CPT

## 2019-06-11 PROCEDURE — 85025 COMPLETE CBC W/AUTO DIFF WBC: CPT

## 2019-06-11 PROCEDURE — 6370000000 HC RX 637 (ALT 250 FOR IP): Performed by: FAMILY MEDICINE

## 2019-06-11 PROCEDURE — 97530 THERAPEUTIC ACTIVITIES: CPT

## 2019-06-11 PROCEDURE — 2580000003 HC RX 258: Performed by: INTERNAL MEDICINE

## 2019-06-11 PROCEDURE — 36592 COLLECT BLOOD FROM PICC: CPT

## 2019-06-11 PROCEDURE — 2500000003 HC RX 250 WO HCPCS: Performed by: INTERNAL MEDICINE

## 2019-06-11 PROCEDURE — 6360000002 HC RX W HCPCS: Performed by: INTERNAL MEDICINE

## 2019-06-11 PROCEDURE — 80048 BASIC METABOLIC PNL TOTAL CA: CPT

## 2019-06-11 PROCEDURE — 6370000000 HC RX 637 (ALT 250 FOR IP): Performed by: INTERNAL MEDICINE

## 2019-06-11 PROCEDURE — 97110 THERAPEUTIC EXERCISES: CPT

## 2019-06-11 PROCEDURE — 6370000000 HC RX 637 (ALT 250 FOR IP): Performed by: SPECIALIST

## 2019-06-11 PROCEDURE — 1200000000 HC SEMI PRIVATE

## 2019-06-11 RX ORDER — POTASSIUM BICARBONATE 25 MEQ/1
50 TABLET, EFFERVESCENT ORAL ONCE
Status: DISCONTINUED | OUTPATIENT
Start: 2019-06-11 | End: 2019-06-11 | Stop reason: CLARIF

## 2019-06-11 RX ORDER — HYDRALAZINE HYDROCHLORIDE 20 MG/ML
5 INJECTION INTRAMUSCULAR; INTRAVENOUS ONCE
Status: COMPLETED | OUTPATIENT
Start: 2019-06-11 | End: 2019-06-11

## 2019-06-11 RX ORDER — DEXTROSE, SODIUM CHLORIDE, AND POTASSIUM CHLORIDE 5; .45; .15 G/100ML; G/100ML; G/100ML
INJECTION INTRAVENOUS CONTINUOUS
Status: DISCONTINUED | OUTPATIENT
Start: 2019-06-11 | End: 2019-06-14 | Stop reason: HOSPADM

## 2019-06-11 RX ADMIN — POTASSIUM CHLORIDE, DEXTROSE MONOHYDRATE AND SODIUM CHLORIDE: 150; 5; 450 INJECTION, SOLUTION INTRAVENOUS at 22:28

## 2019-06-11 RX ADMIN — HEPARIN SODIUM 5000 UNITS: 5000 INJECTION, SOLUTION INTRAVENOUS; SUBCUTANEOUS at 05:28

## 2019-06-11 RX ADMIN — FAMOTIDINE 20 MG: 20 TABLET ORAL at 08:56

## 2019-06-11 RX ADMIN — Medication 10 ML: at 08:55

## 2019-06-11 RX ADMIN — FLUTICASONE PROPIONATE 2 SPRAY: 50 SPRAY, METERED NASAL at 08:56

## 2019-06-11 RX ADMIN — ATORVASTATIN CALCIUM 40 MG: 40 TABLET, FILM COATED ORAL at 08:56

## 2019-06-11 RX ADMIN — Medication 325 MG: at 16:52

## 2019-06-11 RX ADMIN — TRAMADOL HYDROCHLORIDE 25 MG: 50 TABLET, FILM COATED ORAL at 20:53

## 2019-06-11 RX ADMIN — HEPARIN SODIUM 5000 UNITS: 5000 INJECTION, SOLUTION INTRAVENOUS; SUBCUTANEOUS at 21:01

## 2019-06-11 RX ADMIN — SODIUM BICARBONATE 650 MG TABLET 650 MG: at 08:56

## 2019-06-11 RX ADMIN — FAMOTIDINE 20 MG: 20 TABLET ORAL at 20:48

## 2019-06-11 RX ADMIN — ACETAMINOPHEN 650 MG: 325 TABLET, FILM COATED ORAL at 22:29

## 2019-06-11 RX ADMIN — DEXTROSE AND SODIUM CHLORIDE: 5; 450 INJECTION, SOLUTION INTRAVENOUS at 10:28

## 2019-06-11 RX ADMIN — LEVOTHYROXINE SODIUM 100 MCG: 100 TABLET ORAL at 05:32

## 2019-06-11 RX ADMIN — Medication 325 MG: at 08:56

## 2019-06-11 RX ADMIN — HEPARIN SODIUM 5000 UNITS: 5000 INJECTION, SOLUTION INTRAVENOUS; SUBCUTANEOUS at 14:45

## 2019-06-11 RX ADMIN — POTASSIUM CHLORIDE, DEXTROSE MONOHYDRATE AND SODIUM CHLORIDE: 150; 5; 450 INJECTION, SOLUTION INTRAVENOUS at 11:53

## 2019-06-11 RX ADMIN — SODIUM BICARBONATE 650 MG TABLET 650 MG: at 20:49

## 2019-06-11 RX ADMIN — MIRTAZAPINE 15 MG: 15 TABLET, FILM COATED ORAL at 20:49

## 2019-06-11 RX ADMIN — HYDRALAZINE HYDROCHLORIDE 5 MG: 20 INJECTION INTRAMUSCULAR; INTRAVENOUS at 08:56

## 2019-06-11 ASSESSMENT — PAIN DESCRIPTION - ORIENTATION: ORIENTATION: LOWER

## 2019-06-11 ASSESSMENT — PAIN SCALES - GENERAL
PAINLEVEL_OUTOF10: 0
PAINLEVEL_OUTOF10: 10
PAINLEVEL_OUTOF10: 5
PAINLEVEL_OUTOF10: 10
PAINLEVEL_OUTOF10: 0

## 2019-06-11 ASSESSMENT — PAIN DESCRIPTION - FREQUENCY: FREQUENCY: INTERMITTENT

## 2019-06-11 ASSESSMENT — PAIN - FUNCTIONAL ASSESSMENT: PAIN_FUNCTIONAL_ASSESSMENT: PREVENTS OR INTERFERES SOME ACTIVE ACTIVITIES AND ADLS

## 2019-06-11 ASSESSMENT — PAIN DESCRIPTION - LOCATION: LOCATION: ABDOMEN

## 2019-06-11 ASSESSMENT — PAIN DESCRIPTION - PAIN TYPE: TYPE: ACUTE PAIN

## 2019-06-11 ASSESSMENT — PAIN DESCRIPTION - DESCRIPTORS: DESCRIPTORS: CRAMPING

## 2019-06-11 NOTE — PROGRESS NOTES
Physical Therapy  Physical Therapy  Daily Treatment Note  6/11/2019  [id number]                      Madi Dugan   [id number]                              1965    Patient Active Problem List   Diagnosis    Hyperlipidemia    Noncompliance    Depressive disorder    Halitosis    Dysmenorrhea    Bradycardia    Acquired hypothyroidism    Mild intermittent asthma without complication    [facility]    Anxiety    Intellectual disability    Obstructive uropathy    Mixed stress and urge urinary incontinence    Vitamin D deficiency    Seasonal allergic rhinitis    Acute on chronic renal insufficiency    Acute renal failure superimposed on chronic kidney disease, on chronic dialysis ([facility])    Mild protein-calorie malnutrition ([facility]    Acute cystitis with hematuria       Recommendation for discharge: Subacute vs. LTAC  Equipment prescriptions needed:to be determined    AM-[facility] Mobility Inpatient   How much difficulty turning over in bed?: A Little  How much difficulty sitting down on / standing up from a chair with arms?: A Little  How much difficulty moving from lying on back to sitting on side of bed?: A Little  How much help from another person moving to and from a bed to a chair?: A Little  How much help from another person needed to walk in hospital room?: A Little  How much help from another person for climbing 3-5 steps with a railing?: A Lot  AM-PAC Inpatient Mobility Raw Score : 17  AM-PAC Inpatient T-Scale Score : 42.13  Mobility Inpatient CMS 0-100% Score: 50.57  Mobility Inpatient CMS G-Code Modifier : CK      Precautions: falls, alarm    S: Patient cleared by nursing for treatment. Patient is agreeable to treatment. No complaints Pain status: (measured on a visual analog scale with 0=no pain and 10=excruciating pain) no number given/10.    O: Pt was instructed in and performed the following:   Bed Mobility- Supine to sit-not assessed      Scooting- supervision    Sit to supine-supervision    Transfers-sit to stand- Minimal assist with cues     Gait:  Patient ambulated 65 ft x 2  using Wheeled Walker with Minimal assist.  Pt slightly unsteady with amb. Comments: V/C for upright posture, increased WILMAN/step length, and safety. Steps: NA  Treatment: as above, pt performed ap, laq, hip flex x 20 reps. .  Comment: Call light left by patient. RTB at end of tx session with bed alarm on. A: Pt needing minimal assist with walker management. Pt able to progress with increased distance with no LOB but displays kyphotic posture, narrow WILMAN, small step lengths, and slow libby. At risk for falls due to decreased strength and endurance. P: Continue with physical therapy   Brigid Jasmine, AARON   GOALS to be met in 2 days. Bed mobility-  Independent                                         Transfers-Sit to stand-Independent               Gait:  Patient to ambulate 100 feet using wheeled walker with Modified Independent               Steps: Patient to go up and down 8  step(s) using 1 rail(s) with  Independent      Increase strength in affected mm groups by 1/3 grade  Increase balance to allow for improvement towards functional goals.   Increase endurance to allow for improvement towards functional goals.

## 2019-06-11 NOTE — PROGRESS NOTES
98.9 °F (37.2 °C) Oral 74 19 99 % --     Constitutional: The patient is awake today- abdominal pain. Skin: Warm and dry. No rashes were noted. Head: at/nc   Neck: Supple to movements. Chest: No use of accessory muscles to breathe. Symmetrical expansion. Auscultation reveals no wheezing, crackles, or rhonchi. Cardiovascular: S1 and S2 are rhythmic and regular. Abdomen: Positive bowel sounds to auscultation. Extremities: No clubbing, no cyanosis, no edema.   Right upper arm with line picc- no phlebitis   Braden yellow  RIJ -HD-    Laboratory and Tests Review:  Lab Results   Component Value Date    WBC 8.1 06/11/2019    WBC 7.2 06/10/2019    WBC 6.9 06/09/2019    HGB 8.1 (L) 06/11/2019    HCT 26.3 (L) 06/11/2019    MCV 92.0 06/11/2019     06/11/2019     Lab Results   Component Value Date    NEUTROABS 4.79 06/11/2019    NEUTROABS 4.06 06/10/2019    NEUTROABS 3.82 06/09/2019     Lab Results   Component Value Date    ALT 23 05/31/2019    AST 18 05/31/2019    ALKPHOS 56 05/31/2019    BILITOT <0.2 05/31/2019     Lab Results   Component Value Date     06/11/2019    K 3.3 06/11/2019    K 5.4 05/31/2019     06/11/2019    CO2 24 06/11/2019    BUN 23 06/11/2019    CREATININE 3.7 06/11/2019    GFRAA 15 06/11/2019    LABGLOM 13 06/11/2019    GLUCOSE 87 06/11/2019    GLUCOSE 97 12/20/2011    PROT 6.0 05/31/2019    LABALBU 3.1 05/31/2019    LABALBU 4.4 12/20/2011    CALCIUM 9.5 06/11/2019    BILITOT <0.2 05/31/2019    ALKPHOS 56 05/31/2019    AST 18 05/31/2019    ALT 23 05/31/2019     Radiology:  Reviewed     Microbiology:   5/31/19- urine cx- no growth to date  Blood cx- no growth to date    ASSESSMENT:  · Bilateral hydronephrosis   · Neurogenic bladder  · Leukocytosis- improved   · S/p stent exchange  · UTI f/u cx ngtd  · JIAN- on HD    PLAN:  · No atbs- no active infection   · Urine cultures- No growth to date  · Nephrology/ urology following  · gynecology to evaluate -Post-operative Diagnosis: Fixated and stenotic urethra with abnormal and nodular vaginal examination suspicious for malignancy  · S/p vaginal biopsy- pending   · Monitor labs- creat- 4.6  WBC- 7.2       Ag Ramirez MD, FACP  6/11/2019  5:41 PM

## 2019-06-11 NOTE — DISCHARGE INSTR - COC
Summary (Last 24 hours) at 6/11/2019 1356  Last data filed at 6/11/2019 1344  Gross per 24 hour   Intake 2258.87 ml   Output 6375 ml   Net -4116.13 ml     I/O last 3 completed shifts: In: 1968.9 [P.O.:560; I.V.:1408.9]  Out: 6575 [Urine:6575]    Safety Concerns: At Risk for Falls    Impairments/Disabilities:      None    Nutrition Therapy:  Current Nutrition Therapy:   - Oral Diet:  Renal    Routes of Feeding: Oral  Liquids: No Restrictions  Daily Fluid Restriction: no  Last Modified Barium Swallow with Video (Video Swallowing Test): not done    Treatments at the Time of Hospital Discharge:   Respiratory Treatments: ***  Oxygen Therapy:  is not on home oxygen therapy.   Ventilator:    - No ventilator support    Rehab Therapies: Physical Therapy and Occupational Therapy  Weight Bearing Status/Restrictions: No weight bearing restirctions  Other Medical Equipment (for information only, NOT a DME order):  {EQUIPMENT:[id number]}  Other Treatments: weekly BMP and Mag level    Patient's personal belongings (please select all that are sent with patient):  None    RN SIGNATURE:  Electronically signed by Higinio Piper RN on 6/14/19 at 3:56 PM    CASE MANAGEMENT/SOCIAL WORK SECTION    Inpatient Status Date: ***    Readmission Risk Assessment Score:  Readmission Risk              Risk of Unplanned Readmission:        40           Discharging to Facility/ Agency   · Name: [facility]   · Address: [address] [place] [address]  · Phone: [phone]  · Fax: [phone]    Dialysis Facility (if applicable)   · Name:  · Address:  · Dialysis Schedule:  · Phone:  · Fax:    / signature: Electronically signed by AYO Castillo on 6/11/19 at 1:59 PM    PHYSICIAN SECTION    Prognosis: Good    Condition at Discharge: Stable    Rehab Potential (if transferring to Rehab): Good    Recommended Labs or Other Treatments After Discharge: ***    Physician Certification: I certify the above information and transfer of Tayler Fried  is necessary for the continuing treatment of the diagnosis listed and that she requires [place] for less 30 days.      Update Admission H&P: No change in H&P    PHYSICIAN SIGNATURE:  Electronically signed by Jane Abraham DO on 6/14/19 at 4:01 PM

## 2019-06-11 NOTE — PROGRESS NOTES
HPI:  Herbert Spivey seems comfortable during my examination today. We had a long discussion again today regarding her improving renal function and likelihood that she will be able to avoid dialysis. She continues to be anxious about her vaginal biopsies. We discussed discharge planning to a skilled nursing facility and she is very anxious. No family members or friends were present during my examination today. Patient voiced no new complaints since hospital admission. Questions, answers, and tests reviewed. ROS:  Cardiovascular:   Denies any chest pain, irregular heartbeats, or palpitations. Respiratory:   Denies shortness of breath, coughing, sputum production, hemoptysis, or wheezing. Gastrointestinal:   Admits to abdominal pain and cramping. Extremities:   Denies any lower extremity swelling or edema. Neurology:    Denies any headache or focal neurological deficits. Admits to generalized weakness and deconditioning. Derm:    Denies any rashes, ulcers, or excoriations. Denies bruising. Admits to pain associated with the dialysis catheter insertion site. Genitourinary:    Denies any urgency, frequency, hematuria. Voiding with the use of a Braden catheter. Physical Exam:    Vitals:    06/11/19 1030   BP: 125/75   Pulse: 88   Resp:    Temp:    SpO2:        HEENT:  PERRLA. EOMI. Sclera clear. Buccal mucosa moist.    Neck:  Supple. Trachea midline. No thyromegaly. No JVD. No bruits. Heart:  Rhythm regular, rate controlled. No murmurs. Lungs:  Symmetrical. Clear to auscultation bilaterally. No wheezes. No rhonchi. No rales. Abdomen: Soft. Non-tender. Non-distended. Bowel sounds positive. No organomegaly or masses. No pain on palpation    Extremities:  Peripheral pulses present. No peripheral edema. No ulcers. PICC line is in place. Neurologic:  Alert x 3. No focal deficit. Cranial nerves grossly intact. Skin:  No petechia. No hemorrhage. No wounds.     CBC with Differential:    Lab Results   Component Value Date    WBC 8.1 06/11/2019    RBC 2.86 06/11/2019    HGB 8.1 06/11/2019    HCT 26.3 06/11/2019     06/11/2019    MCV 92.0 06/11/2019    MCH 28.3 06/11/2019    MCHC 30.8 06/11/2019    RDW 14.6 06/11/2019    SEGSPCT 66 10/23/2013    LYMPHOPCT 28.9 06/11/2019    MONOPCT 5.8 06/11/2019    BASOPCT 0.6 06/11/2019    MONOSABS 0.47 06/11/2019    LYMPHSABS 2.35 06/11/2019    EOSABS 0.42 06/11/2019    BASOSABS 0.05 06/11/2019     BMP:    Lab Results   Component Value Date     06/11/2019    K 3.3 06/11/2019    K 5.4 05/31/2019     06/11/2019    CO2 24 06/11/2019    BUN 23 06/11/2019    LABALBU 3.1 05/31/2019    LABALBU 4.4 12/20/2011    CREATININE 3.7 06/11/2019    CALCIUM 9.5 06/11/2019    GFRAA 15 06/11/2019    LABGLOM 13 06/11/2019    GLUCOSE 87 06/11/2019    GLUCOSE 97 12/20/2011       Other Data:      Intake/Output Summary (Last 24 hours) at 6/11/2019 1052  Last data filed at 6/11/2019 1043  Gross per 24 hour   Intake 2058.87 ml   Output 7775 ml   Net -5716.13 ml         Scheduled Medications:   darbepoetin lucio-polysorbate  40 mcg Subcutaneous Weekly - Monday    famotidine  20 mg Oral BID    sodium chloride  250 mL Intravenous Once    sodium polystyrene  15 g Oral Once    sodium bicarbonate  650 mg Oral BID    chlorhexidine  15 mL Mouth/Throat BID    atorvastatin  40 mg Oral Daily    docusate sodium  100 mg Oral BID    ferrous sulfate  325 mg Oral BID     fluticasone  2 spray Nasal Daily    levothyroxine  100 mcg Oral Daily    mirtazapine  15 mg Oral Nightly    sodium chloride flush  10 mL Intravenous 2 times per day    heparin (porcine)  5,000 Units Subcutaneous 3 times per day         Infusion Medications:   dextrose 5 % and 0.45 % NaCl 84 mL/hr at 06/11/19 1028    dextrose         Assessment:   1.  Acute on chronic kidney disease stage IV compatible with a neurogenic bladder with chronic b/l ureteral stents and recent institution of hemodialysis  2. Abnormal pelvic exam showing a firm abnormal lesion within the vagina with suspicion for gynecological malignancy status post exam under anesthesia with biopsies taken  3. Sepsis secondary to a urinary tract infection secondary to an indwelling Braden catheter with chronic stents  4. Normocytic anemia of chronic disease  5. Hypothyroidism  6. Chronic mental disability  7. Hyperlipidemia    Plan:   As her creatinine continues to trend downward and she has adequate urinary output, I suspect dialysis can be avoided. I anticipate removal of the dialysis catheter today. We are awaiting final vaginal biopsies. She will require permanent Braden catheter and frequent exchanges to avoid infection. Plans are for discharge to a skilled nursing facility and I anticipate this occurring tomorrow. Lab work and vital signs are otherwise stable. Her abdominal pain appears better controlled. She is tolerating a diet and continues to require work with the therapy teams.     Ralph Araujo D.O.  10:52 AM  6/11/2019

## 2019-06-11 NOTE — PLAN OF CARE
Problem: Pain:  Goal: Pain level will decrease  Description  Pain level will decrease  Outcome: Met This Shift     Problem: Falls - Risk of:  Goal: Will remain free from falls  Description  Will remain free from falls  Outcome: Met This Shift  Goal: Absence of physical injury  Description  Absence of physical injury  Outcome: Met This Shift     Problem: Tissue Perfusion - Renal, Altered:  Goal: Electrolytes within specified parameters  Description  Electrolytes within specified parameters  Outcome: Met This Shift  Goal: Urine creatinine clearance will be within specified parameters  Description  Urine creatinine clearance will be within specified parameters  Outcome: Met This Shift  Goal: Ability to achieve a balanced intake and output will improve  Description  Ability to achieve a balanced intake and output will improve  Outcome: Met This Shift     Problem: Pain:  Goal: Control of acute pain  Description  Control of acute pain  Outcome: Not Met This Shift  Note:   Patient still complains of abdominal pain  Goal: Control of chronic pain  Description  Control of chronic pain  Outcome: Not Met This Shift  Note:   Patient still complains of abdominal pain

## 2019-06-11 NOTE — PROGRESS NOTES
Occupational Therapy  O.T. Bedside Treatment Note    Date:2019  Patient Name: Inge Verduzco  MRN: [id number]  : 1965  ROOM #: 9715/[id number]    Problem list / Diagnosis:   Patient Active Problem List   Diagnosis    Hyperlipidemia    Noncompliance    Depressive disorder    Halitosis    Dysmenorrhea    Bradycardia    Acquired hypothyroidism    Mild intermittent asthma without complication    Acute renal failure (ARF) (HCC)    Anxiety    Intellectual disability    Obstructive uropathy    Mixed stress and urge urinary incontinence    Vitamin D deficiency    Seasonal allergic rhinitis    Acute on chronic renal insufficiency    Acute renal failure superimposed on chronic kidney disease, on chronic dialysis (HCC)    Mild protein-calorie malnutrition ([facility] [address]    Acute cystitis with hematuria     Past Medical History:   Past Medical History:   Diagnosis Date    Anxiety     Depression     Hyperlipidemia     Hypertension     Hypothyroidism     Intellectual disability     Leg pain, bilateral     Noncompliance with medications     Noncompliance with treatment     Osteoarthritis      Onset/Medical history: medical history reviewed  Past medical history: reviewed    The admitting diagnosis and active problem list as listed above have been reviewed prior to treatment. Nursing cleared the patient for treatment. Patient is agreeable to treatment.     Discharge recommendations: [facility]  Equipment prescriptions needed:   TBD    AM - PAC Daily Activity Inpatient    AM-PAC Daily Activity Inpatient   How much help for putting on and taking off regular lower body clothing?: A Lot  How much help for Bathing?: A Lot  How much help for Toileting?: A Lot  How much help for putting on and taking off regular upper body clothing?: A Little  How much help for taking care of personal grooming?: A Little  How much help for eating meals?: None  AM-PAC Inpatient Daily Activity Raw Score: 16  AM-PAC Inpatient ECT's  -Pt would benefit from additional ADLs, safety education, balance activities, transfer training, strength exercises, and over all endurance building.     P:  - Plan of care: Patient will be seen by OT 1-3x/wk for therapeutic activity, ADL re-training, bed mobility,functional transfers, functional mobility, safety and fall prevention, balance and endurance activities, instruction and training in energy conservation principles, and   patient and family education.   - Patient and/or family understands diagnosis, prognosis and plan of care: yes   - ADL retraining, bathroom safety, DME    Treatment minutes: [address]

## 2019-06-11 NOTE — PLAN OF CARE
Problem: Pain:  Goal: Pain level will decrease  Description  Pain level will decrease  6/11/2019 1033 by Shane Bonner RN  Outcome: Met This Shift  6/11/2019 0341 by Lori Mcduffie RN  Outcome: Met This Shift  Goal: Control of acute pain  Description  Control of acute pain  6/11/2019 1033 by Shane Bonner RN  Outcome: Met This Shift  6/11/2019 0341 by Lori Mcduffie RN  Outcome: Not Met This Shift  Note:   Patient still complains of abdominal pain  Goal: Control of chronic pain  Description  Control of chronic pain  6/11/2019 1033 by Shane Bonner RN  Outcome: Met This Shift  6/11/2019 0341 by Lori Mcduffie RN  Outcome: Not Met This Shift  Note:   Patient still complains of abdominal pain     Problem: Falls - Risk of:  Goal: Will remain free from falls  Description  Will remain free from falls  6/11/2019 1033 by Shane Bonner RN  Outcome: Met This Shift  6/11/2019 0341 by Lori Mcduffie RN  Outcome: Met This Shift  Goal: Absence of physical injury  Description  Absence of physical injury  6/11/2019 1033 by Shane Bonner RN  Outcome: Met This Shift  6/11/2019 0341 by Lori Mcduffie RN  Outcome: Met This Shift     Problem: Tissue Perfusion - Renal, Altered:  Goal: Electrolytes within specified parameters  Description  Electrolytes within specified parameters  6/11/2019 1033 by Shane Bonner RN  Outcome: Met This Shift  6/11/2019 0341 by Lori Mcduffie RN  Outcome: Met This Shift  Goal: Urine creatinine clearance will be within specified parameters  Description  Urine creatinine clearance will be within specified parameters  6/11/2019 1033 by Shane Bonner RN  Outcome: Met This Shift  6/11/2019 0341 by Lroi Mcduffie RN  Outcome: Met This Shift  Goal: Ability to achieve a balanced intake and output will improve  Description  Ability to achieve a balanced intake and output will improve  6/11/2019 1033 by Shane Bonner RN  Outcome: Met This Shift  6/11/2019 0341 by Lori Mcduffie RN  Outcome: Met This Shift     Continue current treatment

## 2019-06-12 LAB
ANION GAP SERPL CALCULATED.3IONS-SCNC: 13 MMOL/L (ref 7–16)
BASOPHILS ABSOLUTE: 0.05 E9/L (ref 0–0.2)
BASOPHILS RELATIVE PERCENT: 0.6 % (ref 0–2)
BUN BLDV-MCNC: 21 MG/DL (ref 6–20)
CALCIUM SERPL-MCNC: 9.3 MG/DL (ref 8.6–10.2)
CHLORIDE BLD-SCNC: 106 MMOL/L (ref 98–107)
CO2: 24 MMOL/L (ref 22–29)
CREAT SERPL-MCNC: 3.3 MG/DL (ref 0.5–1)
EOSINOPHILS ABSOLUTE: 0.47 E9/L (ref 0.05–0.5)
EOSINOPHILS RELATIVE PERCENT: 5.6 % (ref 0–6)
GFR AFRICAN AMERICAN: 18
GFR NON-AFRICAN AMERICAN: 14 ML/MIN/1.73
GLUCOSE BLD-MCNC: 83 MG/DL (ref 74–99)
HCT VFR BLD CALC: 26.6 % (ref 34–48)
HEMOGLOBIN: 8.3 G/DL (ref 11.5–15.5)
IMMATURE GRANULOCYTES #: 0.11 E9/L
IMMATURE GRANULOCYTES %: 1.3 % (ref 0–5)
LYMPHOCYTES ABSOLUTE: 2.31 E9/L (ref 1.5–4)
LYMPHOCYTES RELATIVE PERCENT: 27.5 % (ref 20–42)
MAGNESIUM: 1.4 MG/DL (ref 1.6–2.6)
MCH RBC QN AUTO: 28.9 PG (ref 26–35)
MCHC RBC AUTO-ENTMCNC: 31.2 % (ref 32–34.5)
MCV RBC AUTO: 92.7 FL (ref 80–99.9)
MONOCYTES ABSOLUTE: 0.44 E9/L (ref 0.1–0.95)
MONOCYTES RELATIVE PERCENT: 5.2 % (ref 2–12)
NEUTROPHILS ABSOLUTE: 5.01 E9/L (ref 1.8–7.3)
NEUTROPHILS RELATIVE PERCENT: 59.8 % (ref 43–80)
PDW BLD-RTO: 14.8 FL (ref 11.5–15)
PLATELET # BLD: 346 E9/L (ref 130–450)
PMV BLD AUTO: 9.9 FL (ref 7–12)
POTASSIUM SERPL-SCNC: 3.7 MMOL/L (ref 3.5–5)
RBC # BLD: 2.87 E12/L (ref 3.5–5.5)
SODIUM BLD-SCNC: 143 MMOL/L (ref 132–146)
WBC # BLD: 8.4 E9/L (ref 4.5–11.5)

## 2019-06-12 PROCEDURE — 2580000003 HC RX 258: Performed by: INTERNAL MEDICINE

## 2019-06-12 PROCEDURE — 6370000000 HC RX 637 (ALT 250 FOR IP): Performed by: INTERNAL MEDICINE

## 2019-06-12 PROCEDURE — 6360000002 HC RX W HCPCS: Performed by: INTERNAL MEDICINE

## 2019-06-12 PROCEDURE — 2500000003 HC RX 250 WO HCPCS: Performed by: INTERNAL MEDICINE

## 2019-06-12 PROCEDURE — 36415 COLL VENOUS BLD VENIPUNCTURE: CPT

## 2019-06-12 PROCEDURE — 6370000000 HC RX 637 (ALT 250 FOR IP): Performed by: FAMILY MEDICINE

## 2019-06-12 PROCEDURE — 1200000000 HC SEMI PRIVATE

## 2019-06-12 PROCEDURE — 85025 COMPLETE CBC W/AUTO DIFF WBC: CPT

## 2019-06-12 PROCEDURE — 80048 BASIC METABOLIC PNL TOTAL CA: CPT

## 2019-06-12 PROCEDURE — 97530 THERAPEUTIC ACTIVITIES: CPT

## 2019-06-12 PROCEDURE — 83735 ASSAY OF MAGNESIUM: CPT

## 2019-06-12 PROCEDURE — 36592 COLLECT BLOOD FROM PICC: CPT

## 2019-06-12 RX ADMIN — SODIUM BICARBONATE 650 MG TABLET 650 MG: at 20:28

## 2019-06-12 RX ADMIN — LEVOTHYROXINE SODIUM 100 MCG: 100 TABLET ORAL at 05:20

## 2019-06-12 RX ADMIN — POTASSIUM CHLORIDE, DEXTROSE MONOHYDRATE AND SODIUM CHLORIDE: 150; 5; 450 INJECTION, SOLUTION INTRAVENOUS at 20:34

## 2019-06-12 RX ADMIN — FLUTICASONE PROPIONATE 2 SPRAY: 50 SPRAY, METERED NASAL at 10:23

## 2019-06-12 RX ADMIN — ACETAMINOPHEN 650 MG: 325 TABLET, FILM COATED ORAL at 22:18

## 2019-06-12 RX ADMIN — ATORVASTATIN CALCIUM 40 MG: 40 TABLET, FILM COATED ORAL at 10:22

## 2019-06-12 RX ADMIN — Medication 10 ML: at 12:23

## 2019-06-12 RX ADMIN — HEPARIN SODIUM 5000 UNITS: 5000 INJECTION, SOLUTION INTRAVENOUS; SUBCUTANEOUS at 05:20

## 2019-06-12 RX ADMIN — MAGNESIUM OXIDE TAB 400 MG (241.3 MG ELEMENTAL MG) 400 MG: 400 (241.3 MG) TAB at 12:23

## 2019-06-12 RX ADMIN — Medication 10 ML: at 20:35

## 2019-06-12 RX ADMIN — FAMOTIDINE 20 MG: 20 TABLET ORAL at 10:22

## 2019-06-12 RX ADMIN — TRAMADOL HYDROCHLORIDE 25 MG: 50 TABLET, FILM COATED ORAL at 20:34

## 2019-06-12 RX ADMIN — Medication 325 MG: at 17:34

## 2019-06-12 RX ADMIN — SODIUM BICARBONATE 650 MG TABLET 650 MG: at 10:22

## 2019-06-12 RX ADMIN — DOCUSATE SODIUM 100 MG: 100 CAPSULE, LIQUID FILLED ORAL at 20:28

## 2019-06-12 RX ADMIN — Medication 325 MG: at 10:22

## 2019-06-12 RX ADMIN — HEPARIN SODIUM 5000 UNITS: 5000 INJECTION, SOLUTION INTRAVENOUS; SUBCUTANEOUS at 14:38

## 2019-06-12 RX ADMIN — POTASSIUM CHLORIDE, DEXTROSE MONOHYDRATE AND SODIUM CHLORIDE: 150; 5; 450 INJECTION, SOLUTION INTRAVENOUS at 09:14

## 2019-06-12 RX ADMIN — FAMOTIDINE 20 MG: 20 TABLET ORAL at 20:28

## 2019-06-12 RX ADMIN — HEPARIN SODIUM 5000 UNITS: 5000 INJECTION, SOLUTION INTRAVENOUS; SUBCUTANEOUS at 20:29

## 2019-06-12 RX ADMIN — MIRTAZAPINE 15 MG: 15 TABLET, FILM COATED ORAL at 20:28

## 2019-06-12 ASSESSMENT — PAIN SCALES - GENERAL
PAINLEVEL_OUTOF10: 0
PAINLEVEL_OUTOF10: 10
PAINLEVEL_OUTOF10: 9

## 2019-06-12 NOTE — PROGRESS NOTES
HPI:  Bertha Fallon is very emotional during my examination today and we spent an extended period of time discussing her situation. I spoke with the pathologist today and preliminary vaginal biopsies/surgical biopsies are indicating squamous cell carcinoma. I explained this to the patient and she seems to understand this. She had multiple questions that were answered accordingly at the bedside. No family or friends were present during my examination. We also discussed the ongoing improvement in her renal function and the ability to avoid any further dialysis. She was very appreciative of this. Patient voiced no new complaints since hospital admission. Questions, answers, and tests reviewed. ROS:  Cardiovascular:   Denies any chest pain, irregular heartbeats, or palpitations. Respiratory:   Denies shortness of breath, coughing, sputum production, hemoptysis, or wheezing. Gastrointestinal:   No abdominal pain or cramping today. Extremities:   Denies any lower extremity swelling or edema. Neurology:    Denies any headache or focal neurological deficits. Admits to generalized weakness and deconditioning. Derm:    Denies any rashes, ulcers, or excoriations. Denies bruising. Admits to pain associated with the dialysis catheter insertion site. Genitourinary:    Denies any urgency, frequency, hematuria. Voiding with the use of a Braden catheter. Physical Exam:    Vitals:    06/12/19 0745   BP: (!) 181/77   Pulse: 73   Resp: 16   Temp: 98.5 °F (36.9 °C)   SpO2: 97%       HEENT:  PERRLA. EOMI. Sclera clear. Buccal mucosa moist.    Neck:  Supple. Trachea midline. No thyromegaly. No JVD. No bruits. Heart:  Rhythm regular, rate controlled. No murmurs. Lungs:  Symmetrical. Clear to auscultation bilaterally. No wheezes. No rhonchi. No rales. Abdomen: Soft. Non-tender. Non-distended. Bowel sounds positive. No organomegaly or masses. No pain on palpation    Extremities:  Peripheral pulses present.   No peripheral edema. No ulcers. PICC line is in place. Neurologic:  Alert x 3. No focal deficit. Cranial nerves grossly intact. Skin:  No petechia. No hemorrhage. No wounds.     CBC with Differential:    Lab Results   Component Value Date    WBC 8.4 06/12/2019    RBC 2.87 06/12/2019    HGB 8.3 06/12/2019    HCT 26.6 06/12/2019     06/12/2019    MCV 92.7 06/12/2019    MCH 28.9 06/12/2019    MCHC 31.2 06/12/2019    RDW 14.8 06/12/2019    SEGSPCT 66 10/23/2013    LYMPHOPCT 27.5 06/12/2019    MONOPCT 5.2 06/12/2019    BASOPCT 0.6 06/12/2019    MONOSABS 0.44 06/12/2019    LYMPHSABS 2.31 06/12/2019    EOSABS 0.47 06/12/2019    BASOSABS 0.05 06/12/2019     BMP:    Lab Results   Component Value Date     06/12/2019    K 3.7 06/12/2019    K 5.4 05/31/2019     06/12/2019    CO2 24 06/12/2019    BUN 21 06/12/2019    LABALBU 3.1 05/31/2019    LABALBU 4.4 12/20/2011    CREATININE 3.3 06/12/2019    CALCIUM 9.3 06/12/2019    GFRAA 18 06/12/2019    LABGLOM 14 06/12/2019    GLUCOSE 83 06/12/2019    GLUCOSE 97 12/20/2011       Other Data:      Intake/Output Summary (Last 24 hours) at 6/12/2019 1047  Last data filed at 6/12/2019 0618  Gross per 24 hour   Intake 2742.49 ml   Output 3200 ml   Net -457.51 ml         Scheduled Medications:   potassium bicarb-citric acid  40 mEq Oral Once    darbepoetin lucio-polysorbate  40 mcg Subcutaneous Weekly - Monday    famotidine  20 mg Oral BID    sodium chloride  250 mL Intravenous Once    sodium polystyrene  15 g Oral Once    sodium bicarbonate  650 mg Oral BID    chlorhexidine  15 mL Mouth/Throat BID    atorvastatin  40 mg Oral Daily    docusate sodium  100 mg Oral BID    ferrous sulfate  325 mg Oral BID     fluticasone  2 spray Nasal Daily    levothyroxine  100 mcg Oral Daily    mirtazapine  15 mg Oral Nightly    sodium chloride flush  10 mL Intravenous 2 times per day    heparin (porcine)  5,000 Units Subcutaneous 3 times per day         Infusion Medications:   dextrose 5% and 0.45% NaCl with KCl 20 mEq 100 mL/hr at 06/12/19 0914    dextrose         Assessment:   1. Acute on chronic kidney disease stage IV compatible with a neurogenic bladder with chronic b/l ureteral stents requiring temporary dialysis with ongoing improvement in her renal function and discontinuation of dialysis  2. Abnormal pelvic exam showing a firm abnormal lesion within the vagina with preliminary biopsy suggestive of squamous cell carcinoma  3. Sepsis secondary to a urinary tract infection secondary to an indwelling Braden catheter with chronic stents  4. Normocytic anemia of chronic disease  5. Hypothyroidism  6. Chronic mental disability  7. Hyperlipidemia    Plan:   After discussing her ongoing renal improvement and ongoing need for catheter placement, we then discussed the diagnosis of squamous cell carcinoma affecting the vagina and cervix. She will be evaluated by the oncologic team today. Multiple questions were answered and the patient seems to have a good understanding of the process. She has multiple questions regarding treatment modalities and I have deferred these to the oncologic team.  Lab work and vital signs are trending in the appropriate direction. Greater than 30 minutes of time was spent with the patient. This time included extensive discussion regarding her work-up and recent diagnosis of cancer. Multiple questions were answered accordingly.     Juan Carlos Case D.O.  10:47 AM  6/12/2019

## 2019-06-12 NOTE — PROGRESS NOTES
P Quality Flow/Interdisciplinary Rounds Progress Note        Quality Flow Rounds held on June 12, 2019    Disciplines Attending:  Bedside Nurse, ,  and Nursing Unit Leadership    Rosanna Pozo was admitted on 5/31/2019  2:28 PM    Anticipated Discharge Date:  Expected Discharge Date: 06/12/19    Disposition:    Ramon Score:  Ramon Scale Score: 19    Readmission Risk              Risk of Unplanned Readmission:        37           Discussed patient goal for the day, patient clinical progression, and barriers to discharge.   The following Goal(s) of the Day/Commitment(s) have been identified:  Activity progression, Mag low, still awaiting Biopsy, [facility] is plan      Mariela Ruffin  June 12, 2019

## 2019-06-12 NOTE — PROGRESS NOTES
expansion. Auscultation reveals no wheezing, crackles, or rhonchi. Cardiovascular: S1 and S2 are rhythmic and regular. Abdomen: Positive bowel sounds to auscultation. Extremities: No clubbing, no cyanosis, no edema.   Right upper arm with line picc- no phlebitis   Braden yellow- some blood-tinge noted  RIJ -HD-    Laboratory and Tests Review:  Lab Results   Component Value Date    WBC 8.4 06/12/2019    WBC 8.1 06/11/2019    WBC 7.2 06/10/2019    HGB 8.3 (L) 06/12/2019    HCT 26.6 (L) 06/12/2019    MCV 92.7 06/12/2019     06/12/2019     Lab Results   Component Value Date    NEUTROABS 5.01 06/12/2019    NEUTROABS 4.79 06/11/2019    NEUTROABS 4.06 06/10/2019     Lab Results   Component Value Date    ALT 23 05/31/2019    AST 18 05/31/2019    ALKPHOS 56 05/31/2019    BILITOT <0.2 05/31/2019     Lab Results   Component Value Date     06/12/2019    K 3.7 06/12/2019    K 5.4 05/31/2019     06/12/2019    CO2 24 06/12/2019    BUN 21 06/12/2019    CREATININE 3.3 06/12/2019    GFRAA 18 06/12/2019    LABGLOM 14 06/12/2019    GLUCOSE 83 06/12/2019    GLUCOSE 97 12/20/2011    PROT 6.0 05/31/2019    LABALBU 3.1 05/31/2019    LABALBU 4.4 12/20/2011    CALCIUM 9.3 06/12/2019    BILITOT <0.2 05/31/2019    ALKPHOS 56 05/31/2019    AST 18 05/31/2019    ALT 23 05/31/2019     Radiology:  Reviewed     Microbiology:   5/31/19- urine cx- no growth to date  Blood cx- no growth to date    ASSESSMENT:  · Bilateral hydronephrosis   · Neurogenic bladder  · Leukocytosis- improved   · S/p stent exchange  · UTI f/u cx ngtd  · JIAN- on HD    PLAN:  · No atbs- no active infection   · Urine cultures- No growth to date  · Nephrology/ urology following  · S/p vaginal biopsy- Preliminary path report showing SCC vagina/cervix   · Hematology/ oncology to see  · Monitor labs- creat-3.3 WBC- 8.4     Joselyn Arrington, APRN - CNS  6/12/2019  11:31 AM     I have discussed the case, including pertinent history and physical  exam findings . I have seen and examined the patient and the key elements of the encounter have been performed by me. I agree with the assessment, plan and orders as documented.       Treatment plan as per my recommendation     Nathalie Dumont MD, FACP  6/12/2019  10:35 PM

## 2019-06-12 NOTE — PLAN OF CARE
Problem: Pain:  Goal: Pain level will decrease  Description  Pain level will decrease  6/12/2019 0009 by Rajiv Ley RN  Outcome: Met This Shift    Goal: Control of acute pain  Description  Control of acute pain  6/12/2019 0009 by Rajiv Ley RN  Outcome: Met This Shift    Goal: Control of chronic pain  Description  Control of chronic pain  6/12/2019 0009 by Rajiv Ley RN  Outcome: Met This Shift       Problem: Falls - Risk of:  Goal: Will remain free from falls  Description  Will remain free from falls  6/12/2019 0009 by Rajiv Ley RN  Outcome: Met This Shift    Goal: Absence of physical injury  Description  Absence of physical injury  6/12/2019 0009 by Rajiv Ley RN  Outcome: Met This Shift

## 2019-06-12 NOTE — PROGRESS NOTES
Room #:  6270/1571-13  Date: 2019       Patient Name: Kavitha Akbar  : 1965      MRN: [id number]     Patient unavailable for physical therapy treatment due to pt tearful due to health concerns.       Giselle Easley, PTA

## 2019-06-12 NOTE — PROGRESS NOTES
Scooting- supervision    Sit to supine-min assist for lower extremities     Transfers-sit to stand- Minimal assist with cues     Gait:  Patient ambulated 65 ft x 2  using Wheeled Walker with Minimal assist.  Pt slightly unsteady with amb. Comments: V/C for upright posture, increased WILMAN/step length, and safety. Steps: NA  Treatment: as above, pt performed ap, laq,  x 20 reps. .  Comment: Call light left by patient. Pt returned to supine with alarm activated. . .  A: Pt with slow pace,  displays kyphotic posture, narrow WILMAN, small step length, and slow libby. At risk for falls due to decreased strength and endurance. P: Continue with physical therapy   Brigid Jasmine, AARON   GOALS to be met in 2 days. Bed mobility-  Independent                                         Transfers-Sit to stand-Independent               Gait:  Patient to ambulate 100 feet using wheeled walker with Modified Independent               Steps: Patient to go up and down 8  step(s) using 1 rail(s) with  Independent      Increase strength in affected mm groups by 1/3 grade  Increase balance to allow for improvement towards functional goals.   Increase endurance to allow for improvement towards functional goals.

## 2019-06-12 NOTE — CARE COORDINATION
Ss note:6/12/2019.3:13 PM Per oncology note review pt will need PET scan. Currently pt is medicaid PENDING only therefore there is no insurance coverage for PET scan until medicaid is approved. SW contacted Gita in [employer] to determine if process for medicaid approval can be expedited. Awaiting update from [facility] on Medicaid status. Pt is from [facility] and can return providing radiation and chemo is ordered AFTER rehab.   AYO Seymour

## 2019-06-12 NOTE — CONSULTS
([facility] 06/07/2019    Acute cystitis with hematuria 05/28/2019    Acute renal failure superimposed on chronic kidney disease, on chronic dialysis ([facility] 05/22/2019    Acute on chronic renal insufficiency 04/18/2019    Mixed stress and urge urinary incontinence 04/12/2019    Vitamin D deficiency 04/12/2019    Seasonal allergic rhinitis 04/12/2019    Anxiety 02/28/2019    Intellectual disability 02/28/2019    Obstructive uropathy 02/28/2019    Acute renal failure (ARF) ([facility] 01/18/2019    Acquired hypothyroidism 05/04/2016    Mild intermittent asthma without complication 03/65/4727    Dysmenorrhea 01/29/2014    Bradycardia 01/29/2014    Halitosis 10/27/2013    Hyperlipidemia 12/16/2011    Noncompliance 12/16/2011    Depressive disorder 12/16/2011        Past Surgical History:   Procedure Laterality Date    CYSTOSCOPY Left 1/21/2019    CYSTOSCOPY, BILATERAL RETROGRADE PYELOGRAMS, BILATERAL STENT INSERTION performed by Collin Gonzales MD at [place] [address] Bilateral 6/1/2019    CYSTOSCOPY, RETROGRADE PYELOGRAMS, BILATERAL URETERAL STENT EXCHANGE performed by Brown Monk MD at [address] [place] [address] N/A 6/6/2019    EXAM [address] performed by Arnie Pascal MD at [place] [address]. History  Family History   Problem Relation Age of Onset    Diabetes Brother     Thyroid Disease Mother     Other Daughter         Autism       Social History    TOBACCO:   reports that she quit smoking about 30 years ago. She has a 5.00 pack-year smoking history. She has never used smokeless tobacco.  ETOH:   reports that she does not drink alcohol. Home Medications  Prior to Admission medications    Medication Sig Start Date End Date Taking?  Authorizing Provider   acetaminophen (TYLENOL) 325 MG tablet Take 650 mg by mouth every 4 hours as needed for Pain   Yes Historical Provider, MD   dextrose 5 % SOLN 250 mL with vancomycin 1 g SOLR 1,000 mg Infuse 1,000 mg Clear  Neck: no JVD,  no adenopathy,  Lungs: Clear to auscultation   Heart: Regular rate and regular rhythm, no murmur, normal S1, S2  Abdomen: Soft, non-tender;no masses, no organomegaly  Extremities: No edema,no cyanosis, no clubbing. Skin:  No rash. Multiple moles. Neurologic:Cranial nerves grossly intact. No focal motor or sensory deficits . Chronic intellectually limited    Recent Laboratory Data-   Lab Results   Component Value Date    WBC 8.4 06/12/2019    HGB 8.3 (L) 06/12/2019    HCT 26.6 (L) 06/12/2019    MCV 92.7 06/12/2019     06/12/2019    LYMPHOPCT 27.5 06/12/2019    RBC 2.87 (L) 06/12/2019    MCH 28.9 06/12/2019    MCHC 31.2 (L) 06/12/2019    RDW 14.8 06/12/2019    NEUTOPHILPCT 59.8 06/12/2019    MONOPCT 5.2 06/12/2019    BASOPCT 0.6 06/12/2019    NEUTROABS 5.01 06/12/2019    LYMPHSABS 2.31 06/12/2019    MONOSABS 0.44 06/12/2019    EOSABS 0.47 06/12/2019    BASOSABS 0.05 06/12/2019       Lab Results   Component Value Date     06/12/2019    K 3.7 06/12/2019     06/12/2019    CO2 24 06/12/2019    BUN 21 (H) 06/12/2019    CREATININE 3.3 (H) 06/12/2019    GLUCOSE 83 06/12/2019    CALCIUM 9.3 06/12/2019    PROT 6.0 (L) 05/31/2019    LABALBU 3.1 (L) 05/31/2019    BILITOT <0.2 05/31/2019    ALKPHOS 56 05/31/2019    AST 18 05/31/2019    ALT 23 05/31/2019    LABGLOM 14 06/12/2019    GFRAA 18 06/12/2019       Lab Results   Component Value Date    IRON 44 06/03/2019    TIBC 214 (L) 06/03/2019    FERRITIN 231 06/03/2019           Radiology-    Ct Abdomen Pelvis Wo Contrast Additional Contrast? Oral    Result Date: 6/2/2019  LOCATION: 200 EXAM: CT ABDOMEN PELVIS WO CONTRAST COMPARISON: 5/22/2019 reporting cystitis and severe right hydroureteronephrosis with stable position of the ureteral stent. HISTORY: Abnormal labs, pain across pelvis, recent stent placement within the ureter. TECHNIQUE: Noncontrast helical abdomen and pelvis CT was performed.  Coronal and sagittal reconstructions also obtained. Automated dose control was used for this exam. CONTRAST: No IV contrast administered. No oral contrast administered. FINDINGS: Small bilateral pleural effusions are present new since prior examination. Small hiatal hernia is seen. Contrast in the esophagus may relate reflect reflux or dysmotility. There is thickening of the duodenal wall second portion worrisome for duodenitis. Correlation with patient's pain. Does this patient have epigastric pain clinically? Minimal induration of the presacral soft tissues of the lower pelvis new since prior examination, axial image 64. Inflammation or infection cannot be excluded. Correlation with inflammatory markers recommended. Midline umbilical hernia containing only fat similar to prior examination. The appendix is not seen although there are no secondary signs for acute appendicitis. There is no free air. The remaining bowel shows no bowel wall thickening or distention. Minimal subcutaneous edema is present of the lower abdomen. Soft tissue densities of the anterior abdomen likely reflect sequela from abdominal wall injections. Bilateral ureteral stents are seen. The right kidney demonstrates a stent properly positioned. There is continued right-sided hydronephrosis. No stone seen adjacent to the ureter. The left side demonstrates a properly positioned stent with hydronephrosis and hydroureter. No stone seen adjacent to the stent. Braden catheter in the urinary bladder makes evaluation difficult. The bladder is contracted and there is air present likely from the Braden. Phleboliths are present in the lower pelvis. 1. Development of small bilateral pleural effusions right side greater than left. 2. Retained fluid in the esophagus as described. 3. Bilateral hydronephrosis and hydroureter. Ureteral stents appear to properly positioned. No stones seen. 4. New presacral edema of the lower pelvis.  5. Suspected duodenal wall thickening of the second portion similar to the prior examination without duodenal inflammatory changes, equivocal.     Ct Abdomen Pelvis Wo Contrast    Result Date: 5/22/2019  LOCATION:200 EXAM: CT ABDOMEN PELVIS WO CONTRAST COMPARISON: None HISTORY: Bilateral flank pain TECHNIQUE:Noncontrast helical abdomen and pelvis CT was performed. Coronal and sagittal reconstructions also obtained. Automated dose control was used for this exam. CONTRAST: No IV contrast administered. FINDINGS: SUPPORT DEVICES: None LOWER THORAX: Limited evaluation of the lower chest demonstrates clear lung bases bilaterally. SOLID ORGANS: The liver, spleen, pancreas, and adrenal glands are normal. BILIARY SYSTEM: No evidence of biliary ductal dilatation. GENITOURINARY: Bilateral ureteral stents are in place and in satisfactory position. There is persistent severe right hydroureteronephrosis. Findings appear similar to the previous CT. The left kidney is not significantly dilated. No stones are appreciated. Prominent perivesical fat stranding and fluid seen in the lower abdomen and pelvis. Pelvic structures are unremarkable. GASTROINTESTINAL: The stomach, small and large bowel are normal in caliber without evidence of focal wall thickening. There is no evidence of bowel obstruction. APPENDIX: Normal. FLUID COLLECTIONS: None. LYMPH NODES: No adenopathy by size criteria. VASCULAR STRUCTURES: Visualized vascular structures appear normal. ABDOMINAL WALL: Normal with no herniations. OSSEOUS AND SOFT TISSUE STRUCTURES: Bone windows demonstrate no suspicious osteolytic or osteoblastic lesions. No fracture identified. 1. Findings suggesting cystitis. 2. Persistent severe right hydroureteronephrosis with stable position of the ureteral stents    Us Pelvis Complete    Result Date: 6/4/2019  Reading location: 200 INDICATION: Vaginal nodules FINDINGS: Transabdominal images of the pelvis. Uterus 68 x 30 x 38 mm with unremarkable myometrial contour and echogenicity.  Endometrial stripe 4.2 mm in AP dimension. Right ovary 16 x 12 x 15 mm and left 14 x 10 x 11 mm with unremarkable internal echogenicity. Small free fluid in the cul-de-sac. No acute finding. Ir Huslia Scales Device Plmt/replace/removal    Result Date: 6/4/2019  Location:200 Exam: IR FLUORO GUIDED CVA DEVICE PLMT/REPLACE/REMOVAL, IR ULTRASOUND GUIDANCE VASCULAR ACCESS HISTORY: Acute renal failure. Versed 1 mg IV was given for anxiety during the procedure. Fluoroscopy time 0.3 minutes. PROCEDURE: Informed consent was obtained. Ultrasound was used to localize the right  internal jugular vein. The skin was prepped and draped in a sterile fashion and then anesthetized with lidocaine. Maximal sterile barrier technique was utilized. Under direct ultrasound visualization, a micropuncture needle was used to gain access to the right internal jugular vein. A 0.018 guidewire was advanced into the central venous circulation under fluoroscopic guidance. A micropuncture sheath was placed. Through the sheath, a 0.035 J guidewire was advanced. Over the guidewire, the tract was dilated using 12 and 14 Western Cheryl dilators. A 13.5 Egyptian diameter temporary dialysis catheter 15 cm long was then advanced over the guidewire and positioned within the superior aspect of the right atrium. The catheter was then affixed to the skin using 2-0 silk. FINDINGS: Ultrasound demonstrates patent internal jugular veins bilaterally. Fluoroscopic spot film demonstrates placement of the right internal jugular hemodialysis catheter with the tip in the superior aspect of the right atrium. Successful placement of a right IJ temporary hemodialysis catheter. Ir Colin Scales Device Plmt/replace/removal    Result Date: 5/29/2019  Location:200 Exam: IR FLUORO GUIDED CVA DEVICE PLMT/REPLACE/REMOVAL HISTORY:   Infection FLUOROSCOPY TIME:  0.2 minutes PROCEDURE:  The procedure was explained to the patient and informed consent was obtained.   The skin over the  right arm was prepped and draped in a sterile fashion and then anesthetized with Lidocaine. Maximal sterile barrier technique was utilized. Under ultrasound guidance, the brachial vein was localized. Using a micropuncture needle, the vein was entered under direct ultrasound visualization. A 0.018 guidewire was advanced into the central venous circulation. A 5 Guatemalan peel-away sheath was placed. The 5 Guatemalan Power PICC line catheter was trimmed to 32 cm was then advanced through the peel-away sheath and positioned at the right atrial/SVC junction. Fluoroscopic spot film was obtained. FINDINGS:  Ultrasound shows the veins to be patent. Fluoroscopic spot film demonstrates the Power PICC line to be at the right atrial/SVC junction. Successful placement of a 5 Guatemalan double-lumen Power PICC line using ultrasound guidance via the right brachial  vein. Xr Urogram W Wo Kub W Wo Tomogram    Result Date: 6/1/2019  Reading location: 200 INDICATION: Ureteral stent exchange FINDINGS: 67.4 seconds of fluoroscopy and 4 spot images during ureteral stent exchange. No contrast injection. Operative fluoroscopy    Us Retroperitoneal Complete    Result Date: 5/31/2019  Reading location: 200 INDICATION: Renal insufficiency FINDINGS: Sonographic evaluation urinary tract compared with ultrasound 1/18/2019 and CT the abdomen and pelvis 5/22/2019. Right kidney 12.1 cm in length and the left 11.2 cm. At least moderate dilatation right renal collecting system. On recent CT, right ureteral stent is coiled in the renal pelvis. Dilatation the left renal collecting system is somewhat less severe than the right. Mid left renal cyst measures 13 mm and upper pole cyst 25.5 mm. No focal abnormality right renal collecting system. Nondistended urinary bladder. Moderate bilateral hydronephrosis, somewhat more severe on the right than the left.     Ir Ultrasound Guidance Vascular Access    Result Date: 6/4/2019  Location:200 Exam: IR FLUORO GUIDED CVA DEVICE PLMT/REPLACE/REMOVAL, IR ULTRASOUND GUIDANCE VASCULAR ACCESS HISTORY: Acute renal failure. Versed 1 mg IV was given for anxiety during the procedure. Fluoroscopy time 0.3 minutes. PROCEDURE: Informed consent was obtained. Ultrasound was used to localize the right  internal jugular vein. The skin was prepped and draped in a sterile fashion and then anesthetized with lidocaine. Maximal sterile barrier technique was utilized. Under direct ultrasound visualization, a micropuncture needle was used to gain access to the right internal jugular vein. A 0.018 guidewire was advanced into the central venous circulation under fluoroscopic guidance. A micropuncture sheath was placed. Through the sheath, a 0.035 J guidewire was advanced. Over the guidewire, the tract was dilated using 12 and 14 Western Cheryl dilators. A 13.5 German diameter temporary dialysis catheter 15 cm long was then advanced over the guidewire and positioned within the superior aspect of the right atrium. The catheter was then affixed to the skin using 2-0 silk. FINDINGS: Ultrasound demonstrates patent internal jugular veins bilaterally. Fluoroscopic spot film demonstrates placement of the right internal jugular hemodialysis catheter with the tip in the superior aspect of the right atrium. Successful placement of a right IJ temporary hemodialysis catheter. Ir Ultrasound Guidance Vascular Access    Result Date: 5/29/2019  Location:200 Exam: IR ULTRASOUND GUIDANCE VASCULAR ACCESS History:  PICC line placement Ultrasound evaluation of potential access was performed. After successfully identifying a patent right brachial vein, and following the administration of lidocaine, real-time ultrasound guidance was used to puncture the right brachial   vein. A permanent recording was created for the patient's record. Ultrasound guidance was provided during placement of a PICC line.          ASSESSMENT/PLAN :  Squamous cell carcinoma of the cervix by preliminary path report with possible involvement of the lower vaginal wall clinically per GYN exam  -pending final path results  -CT abdomen without contrast reviewed , suboptimal for assessment of cervix and pelvic lymphadenopathy  -unsure of HPV hx  -recommend GYN Oncology assessment   -She will need PET CT scan for definite staging . It appears that clinically she has at least a T3 lesion involving the cervix with possible extension into the lower one third of the vaginal wall. She does have hydronephrosis bilaterally partly related to her neurogenic bladder but cannot exclude pelvic lymphadenopathy and if PET/CT confirms pelvic lymphadenopathy she would be upstaged to stage IIIb    Once final pathology available and clinical staging established option of external radiation along with single agent carboplatinum adjusted to AUC of 4 will be addressed since she is not a candidate for cis-platinum because of her advanced CKD and impaired GFR    CKD IV with hydronephrosis, neurogenic bladder and stent placement  -Urology and Nephrology following  -undergoing intermittent hemodialysis  -s/p Cystourethroscopy, bilateral JJ ureteral stent insertion, pelvic examination 6/1  -currently has crane       Anemia likely multifactorial related to myelosuppression by recurrent sepsis as well as chronic kidney disease stage IV.   Iron studies and reticulocyte count will be obtained  Normocytic anemia of chronic disease  -Hbg 8.3 today  -monitor    Chronic mental disability with depression and psychosis  -Psych following      Will follow  Isaias Hancock M.D.

## 2019-06-13 LAB
ANION GAP SERPL CALCULATED.3IONS-SCNC: 11 MMOL/L (ref 7–16)
BASOPHILS ABSOLUTE: 0.04 E9/L (ref 0–0.2)
BASOPHILS RELATIVE PERCENT: 0.5 % (ref 0–2)
BUN BLDV-MCNC: 19 MG/DL (ref 6–20)
CALCIUM SERPL-MCNC: 9.1 MG/DL (ref 8.6–10.2)
CHLORIDE BLD-SCNC: 109 MMOL/L (ref 98–107)
CO2: 24 MMOL/L (ref 22–29)
CREAT SERPL-MCNC: 2.9 MG/DL (ref 0.5–1)
EOSINOPHILS ABSOLUTE: 0.39 E9/L (ref 0.05–0.5)
EOSINOPHILS RELATIVE PERCENT: 4.7 % (ref 0–6)
GFR AFRICAN AMERICAN: 20
GFR NON-AFRICAN AMERICAN: 17 ML/MIN/1.73
GLUCOSE BLD-MCNC: 95 MG/DL (ref 74–99)
HCT VFR BLD CALC: 25.1 % (ref 34–48)
HEMOGLOBIN: 7.8 G/DL (ref 11.5–15.5)
IMMATURE GRANULOCYTES #: 0.12 E9/L
IMMATURE GRANULOCYTES %: 1.4 % (ref 0–5)
LYMPHOCYTES ABSOLUTE: 2.48 E9/L (ref 1.5–4)
LYMPHOCYTES RELATIVE PERCENT: 29.6 % (ref 20–42)
MCH RBC QN AUTO: 29 PG (ref 26–35)
MCHC RBC AUTO-ENTMCNC: 31.1 % (ref 32–34.5)
MCV RBC AUTO: 93.3 FL (ref 80–99.9)
MONOCYTES ABSOLUTE: 0.48 E9/L (ref 0.1–0.95)
MONOCYTES RELATIVE PERCENT: 5.7 % (ref 2–12)
NEUTROPHILS ABSOLUTE: 4.86 E9/L (ref 1.8–7.3)
NEUTROPHILS RELATIVE PERCENT: 58.1 % (ref 43–80)
PDW BLD-RTO: 15 FL (ref 11.5–15)
PLATELET # BLD: 318 E9/L (ref 130–450)
PMV BLD AUTO: 10.3 FL (ref 7–12)
POTASSIUM SERPL-SCNC: 4 MMOL/L (ref 3.5–5)
RBC # BLD: 2.69 E12/L (ref 3.5–5.5)
SODIUM BLD-SCNC: 144 MMOL/L (ref 132–146)
WBC # BLD: 8.4 E9/L (ref 4.5–11.5)

## 2019-06-13 PROCEDURE — 1200000000 HC SEMI PRIVATE

## 2019-06-13 PROCEDURE — 97530 THERAPEUTIC ACTIVITIES: CPT

## 2019-06-13 PROCEDURE — 97110 THERAPEUTIC EXERCISES: CPT

## 2019-06-13 PROCEDURE — 6370000000 HC RX 637 (ALT 250 FOR IP): Performed by: INTERNAL MEDICINE

## 2019-06-13 PROCEDURE — 80048 BASIC METABOLIC PNL TOTAL CA: CPT

## 2019-06-13 PROCEDURE — 2500000003 HC RX 250 WO HCPCS: Performed by: INTERNAL MEDICINE

## 2019-06-13 PROCEDURE — 85025 COMPLETE CBC W/AUTO DIFF WBC: CPT

## 2019-06-13 PROCEDURE — 6360000002 HC RX W HCPCS: Performed by: INTERNAL MEDICINE

## 2019-06-13 PROCEDURE — 2580000003 HC RX 258: Performed by: INTERNAL MEDICINE

## 2019-06-13 PROCEDURE — 36415 COLL VENOUS BLD VENIPUNCTURE: CPT

## 2019-06-13 RX ADMIN — POTASSIUM CHLORIDE, DEXTROSE MONOHYDRATE AND SODIUM CHLORIDE: 150; 5; 450 INJECTION, SOLUTION INTRAVENOUS at 23:57

## 2019-06-13 RX ADMIN — MIRTAZAPINE 15 MG: 15 TABLET, FILM COATED ORAL at 20:53

## 2019-06-13 RX ADMIN — Medication 10 ML: at 13:15

## 2019-06-13 RX ADMIN — HEPARIN SODIUM 5000 UNITS: 5000 INJECTION, SOLUTION INTRAVENOUS; SUBCUTANEOUS at 20:55

## 2019-06-13 RX ADMIN — Medication 10 ML: at 20:56

## 2019-06-13 RX ADMIN — MAGNESIUM OXIDE TAB 400 MG (241.3 MG ELEMENTAL MG) 400 MG: 400 (241.3 MG) TAB at 08:55

## 2019-06-13 RX ADMIN — SODIUM BICARBONATE 650 MG TABLET 650 MG: at 20:53

## 2019-06-13 RX ADMIN — ATORVASTATIN CALCIUM 40 MG: 40 TABLET, FILM COATED ORAL at 08:55

## 2019-06-13 RX ADMIN — FAMOTIDINE 20 MG: 20 TABLET ORAL at 08:55

## 2019-06-13 RX ADMIN — ONDANSETRON 4 MG: 2 INJECTION INTRAMUSCULAR; INTRAVENOUS at 13:15

## 2019-06-13 RX ADMIN — Medication 325 MG: at 08:55

## 2019-06-13 RX ADMIN — DOCUSATE SODIUM 100 MG: 100 CAPSULE, LIQUID FILLED ORAL at 08:55

## 2019-06-13 RX ADMIN — FLUTICASONE PROPIONATE 2 SPRAY: 50 SPRAY, METERED NASAL at 08:55

## 2019-06-13 RX ADMIN — HEPARIN SODIUM 5000 UNITS: 5000 INJECTION, SOLUTION INTRAVENOUS; SUBCUTANEOUS at 05:37

## 2019-06-13 RX ADMIN — Medication 325 MG: at 17:05

## 2019-06-13 RX ADMIN — Medication 300 UNITS: at 13:15

## 2019-06-13 RX ADMIN — LEVOTHYROXINE SODIUM 100 MCG: 100 TABLET ORAL at 05:41

## 2019-06-13 RX ADMIN — POTASSIUM CHLORIDE, DEXTROSE MONOHYDRATE AND SODIUM CHLORIDE: 150; 5; 450 INJECTION, SOLUTION INTRAVENOUS at 10:35

## 2019-06-13 RX ADMIN — Medication 10 ML: at 08:55

## 2019-06-13 RX ADMIN — FAMOTIDINE 20 MG: 20 TABLET ORAL at 20:52

## 2019-06-13 RX ADMIN — SODIUM BICARBONATE 650 MG TABLET 650 MG: at 08:55

## 2019-06-13 RX ADMIN — HEPARIN SODIUM 5000 UNITS: 5000 INJECTION, SOLUTION INTRAVENOUS; SUBCUTANEOUS at 13:16

## 2019-06-13 ASSESSMENT — PAIN SCALES - GENERAL
PAINLEVEL_OUTOF10: 0

## 2019-06-13 NOTE — PLAN OF CARE
Problem: Pain:  Goal: Pain level will decrease  Description  Pain level will decrease  Outcome: Met This Shift     Problem: Pain:  Goal: Control of acute pain  Description  Control of acute pain  Outcome: Met This Shift     Problem: Pain:  Goal: Control of chronic pain  Description  Control of chronic pain  Outcome: Met This Shift     Problem: Falls - Risk of:  Goal: Will remain free from falls  Description  Will remain free from falls  6/13/2019 1254 by Shima Youssef RN  Outcome: Met This Shift     Problem: Falls - Risk of:  Goal: Absence of physical injury  Description  Absence of physical injury  6/13/2019 1254 by Shima Youssef RN  Outcome: Met This Shift     Problem: Tissue Perfusion - Renal, Altered:  Goal: Electrolytes within specified parameters  Description  Electrolytes within specified parameters  Outcome: Met This Shift

## 2019-06-13 NOTE — PROGRESS NOTES
Nutrition Assessment    Type and Reason for Visit: Reassess    Nutrition Recommendations:Continue current diet(Pt still refusing ONS)    Nutrition Assessment: Pt w/ ongoing mild malnutrition r/t early satiety however, Pt improving from a nutritional standpoint tolerating diet and increasing po. Pt dialysis treatments have been d/c'd d/t improving renal labs. However, pt at nutritional risk d/t possible catabolic illness r/t possible urothelial cell carcinoma w/ mets-final pathogenic results are pending. Pt still declining ONS     Malnutrition Assessment:  · Malnutrition Status: Mild Malnutrition  · Context: Acute illness or injury  · Findings of the 6 clinical characteristics of malnutrition (Minimum of 2 out of 6 clinical characteristics is required to make the diagnosis of moderate or severe Protein Calorie Malnutrition based on AND/ASPEN Guidelines):  1. Energy Intake-Less than or equal to 75% of estimated energy requirement, Greater than or equal to 7 days    2. Weight Loss-Unable to assess(2/2 fluid), unable to assess  3. Fat Loss-Mild subcutaneous fat loss, Orbital  4. Muscle Loss-No significant muscle mass loss   5. Fluid Accumulation-No significant fluid accumulation   6.  Strength-Not measured    Nutrition Risk Level:  Moderate    Nutrient Needs:  · Estimated Daily Total Kcal: 5808-8810(MS REE: 1186 x 1.3)  · Estimated Daily Protein (g): 65-75(1.5-1.8 g pro/kg IBW (renal labs improving))  · Estimated Daily Total Fluid (ml/day): 2182-1608(1ml/kcal)    Nutrition Diagnosis:   · Problem: Other (Comment), In context of acute illness or injury(Mild Malnutrition)  · Etiology: related to Early satiety     Signs and symptoms:  as evidenced by Intake 50-75%, Mild loss of subcutaneous fat    Objective Information:  · Nutrition-Focused Physical Findings: abd tender, +BS, cramping resolved, no edema, -I/O, pt following w/ psych, continues to refuse ONS  · Wound Type: Surgical Wound(vaginal incision)  · Current Nutrition Therapies:  · Oral Diet Orders: Renal   · Oral Diet intake: 51-75%  · Oral Nutrition Supplement (ONS) Orders: None  · Anthropometric Measures:  · Ht: 4' 11\" (149.9 cm)   · Current Body Wt: 150 lb (68 kg)(6/12 bedscale-UTO new wt 2/2 pt walking)  · Admission Body Wt: 150 lb 8 oz (68.3 kg)(5/31 no method)  · Usual Body Wt: 140 lb 11.2 oz (63.8 kg)(4/18/19 bed scale per EMR)  · % Weight Change: Pt w/ possible wt loss  2/2 fluid-DIONNE  · Ideal Body Wt: 98 lb (44.5 kg), % Ideal Body 153%  · BMI Classification: BMI 30.0 - 34.9 Obese Class I    Nutrition Interventions:   Continued Inpatient Monitoring, Education not appropriate at this time, Coordination of Care    Nutrition Evaluation:   · Evaluation: Goals set   · Goals: intakes >75% meal tray    · Monitoring: Meal Intake, Diet Tolerance, Skin Integrity, Wound Healing, I&O, Monitor Bowel Function, Mental Status/Confusion, Weight, Pertinent Labs      Electronically signed by Mayuri Adair, MS, RD, LD on 6/13/19 at 1:03 PM    Contact Number: [phone]

## 2019-06-13 NOTE — PROGRESS NOTES
HPI:  Fannie Son seems comfortable during my examination today with no abdominal pain or cramping. We again discussed the recent biopsy findings as new information was identified following my examination yesterday. Discharge planning is underway and we discussed this at length. Multiple subspecialist are providing consultation. No family was present during my examination. Patient voiced no new complaints since hospital admission. Questions, answers, and tests reviewed. ROS:  Cardiovascular:   Denies any chest pain, irregular heartbeats, or palpitations. Respiratory:   Denies shortness of breath, coughing, sputum production, hemoptysis, or wheezing. Gastrointestinal:   No abdominal pain or cramping today. Extremities:   Denies any lower extremity swelling or edema. Neurology:    Denies any headache or focal neurological deficits. Admits to generalized weakness and deconditioning. Derm:    Denies any rashes, ulcers, or excoriations. Denies bruising. Admits to pain associated with the dialysis catheter insertion site. Genitourinary:    Denies any urgency, frequency, hematuria. Voiding with the use of a Braden catheter. Physical Exam:    Vitals:    06/13/19 0815   BP: 112/80   Pulse: 72   Resp: 16   Temp: 98 °F (36.7 °C)   SpO2: 98%       HEENT:  PERRLA. EOMI. Sclera clear. Buccal mucosa moist.    Neck:  Supple. Trachea midline. No thyromegaly. No JVD. No bruits. Heart:  Rhythm regular, rate controlled. No murmurs. Lungs:  Symmetrical. Clear to auscultation bilaterally. No wheezes. No rhonchi. No rales. Abdomen: Soft. Non-tender. Non-distended. Bowel sounds positive. No organomegaly or masses. No pain on palpation    Extremities:  Peripheral pulses present. No peripheral edema. No ulcers. PICC line is in place. Neurologic:  Alert x 3. No focal deficit. Cranial nerves grossly intact. Skin:  No petechia. No hemorrhage. No wounds.     CBC with Differential:    Lab Results   Component Value Date    WBC 8.4 06/13/2019    RBC 2.69 06/13/2019    HGB 7.8 06/13/2019    HCT 25.1 06/13/2019     06/13/2019    MCV 93.3 06/13/2019    MCH 29.0 06/13/2019    MCHC 31.1 06/13/2019    RDW 15.0 06/13/2019    SEGSPCT 66 10/23/2013    LYMPHOPCT 29.6 06/13/2019    MONOPCT 5.7 06/13/2019    BASOPCT 0.5 06/13/2019    MONOSABS 0.48 06/13/2019    LYMPHSABS 2.48 06/13/2019    EOSABS 0.39 06/13/2019    BASOSABS 0.04 06/13/2019     BMP:    Lab Results   Component Value Date     06/13/2019    K 4.0 06/13/2019    K 5.4 05/31/2019     06/13/2019    CO2 24 06/13/2019    BUN 19 06/13/2019    LABALBU 3.1 05/31/2019    LABALBU 4.4 12/20/2011    CREATININE 2.9 06/13/2019    CALCIUM 9.1 06/13/2019    GFRAA 20 06/13/2019    LABGLOM 17 06/13/2019    GLUCOSE 95 06/13/2019    GLUCOSE 97 12/20/2011       Other Data:      Intake/Output Summary (Last 24 hours) at 6/13/2019 1021  Last data filed at 6/13/2019 1012  Gross per 24 hour   Intake 690 ml   Output 3550 ml   Net -2860 ml         Scheduled Medications:   magnesium oxide  400 mg Oral Daily    potassium bicarb-citric acid  40 mEq Oral Once    darbepoetin lucio-polysorbate  40 mcg Subcutaneous Weekly - Monday    famotidine  20 mg Oral BID    sodium chloride  250 mL Intravenous Once    sodium bicarbonate  650 mg Oral BID    chlorhexidine  15 mL Mouth/Throat BID    atorvastatin  40 mg Oral Daily    docusate sodium  100 mg Oral BID    ferrous sulfate  325 mg Oral BID     fluticasone  2 spray Nasal Daily    levothyroxine  100 mcg Oral Daily    mirtazapine  15 mg Oral Nightly    sodium chloride flush  10 mL Intravenous 2 times per day    heparin (porcine)  5,000 Units Subcutaneous 3 times per day         Infusion Medications:   dextrose 5% and 0.45% NaCl with KCl 20 mEq 75 mL/hr at 06/12/19 2034    dextrose         Assessment:   1.  Acute on chronic kidney disease stage IV compatible with a neurogenic bladder with chronic b/l ureteral stents requiring temporary dialysis with ongoing improvement in her renal function and discontinuation of dialysis  2. Abnormal pelvic exam showing a firm abnormal lesion within the vagina with preliminary biopsy suggestive of urothelial cell carcinoma with metastasis  3. Sepsis secondary to a urinary tract infection secondary to an indwelling Braden catheter with chronic stents  4. Normocytic anemia of chronic disease  5. Hypothyroidism  6. Chronic mental disability  7. Hyperlipidemia    Plan:   I had an extensive conversation with the pathology department yesterday regarding biopsy results. It appears as though the biopsies may suggest a urothelial cell carcinoma with metastasis. The oncologic and OB GYN teams are following. The urology team will also be updated. Plans are for dialysis catheter removal today as the patient's renal function continues to improve. Chronic comorbidities are otherwise well controlled. The patient will ultimately require PET scan as an outpatient for final staging. We will defer any further treatment strategies to the oncologic team once biopsies officially finalized. Plans will be for discharge to a skilled nursing facility and this will take significant effort to coordinate outpatient follow-up with all multiple subspecialists. The psychiatry team has been asked once again to evaluate the patient as with these complex medical decisions pending, we question whether guardianship may be necessary. Again, the patient seems to understand the gravity of the situation but with her limited intellect, I am not entirely sure if she is capable of making these decisions on her own as her family no longer wants to aid in this process. We are also coming to understand a more extensive and complex social situation that the patient is not necessarily forthcoming with her private life. Greater than 30 minutes of time was spent with the patient.   This time included extensive discussion regarding her work-up and recent diagnosis of cancer. Multiple questions were answered accordingly.     Cuong Martinez D.O.  10:21 AM  6/13/2019

## 2019-06-13 NOTE — PROGRESS NOTES
[facility] MD Elliott Santacruz MD Arelia Lessen, MD. Marco Negrete MD   Progress Note    6/13/2019    SUBJECTIVE:   6/7: On RA Braden in place Poor UO yesterday    6/9: Abdominal cramping. Ongoing anorexia, nausea, poor intake. No dyspnea at rest on room air. No chest pain or palpitations. No swelling. No pruritus Depressed as regards recent medical problems. 6/10 : On RA . Good UO .     6/12 : on RA . Good UO .     6/13 : on RA . Good UO     PROBLEM LIST:    Principal Problem:    Acute renal failure (ARF) (HCC)  Active Problems:    Mild protein-calorie malnutrition (HCC)  Resolved Problems:    * No resolved hospital problems. *       DIET:    DIET RENAL;       Allergies : Patient has no known allergies. Past Medical History:   Diagnosis Date    Anxiety     Depression     Hyperlipidemia     Hypertension     Hypothyroidism     Intellectual disability     Leg pain, bilateral     Noncompliance with medications     Noncompliance with treatment     Osteoarthritis        Past Surgical History:   Procedure Laterality Date    CYSTOSCOPY Left 1/21/2019    CYSTOSCOPY, BILATERAL RETROGRADE PYELOGRAMS, BILATERAL STENT INSERTION performed by Josh Jennings MD at [facility] Bilateral 6/1/2019    CYSTOSCOPY, RETROGRADE PYELOGRAMS, BILATERAL URETERAL STENT EXCHANGE performed by Leidy Wayne MD at [address] [place] [address] N/A 6/6/2019    EXAM UNDEDR ANESTHESIA VAGINAL BIOPSIES performed by Silvia Richards MD at [place] [address]. History   Problem Relation Age of Onset    Diabetes Brother     Thyroid Disease Mother     Other Daughter         Autism        reports that she quit smoking about 30 years ago. She has a 5.00 pack-year smoking history. She has never used smokeless tobacco. She reports that she does not drink alcohol or use drugs.     Review of Systems:   Constitutional: no fevers , no chills , feels ok   Eyes: no eye pain , no itching , no drainage  Ears, nose, mouth, throat, and face: no ear ,nose pain , hearing is ok ,no nasal drainage   Respiratory: no sob ,no cough ,no wheezing . Cardiovascular: no chest pain , no palpitation ,no sob . Gastrointestinal: no nausea, vomiting , constipation , no abdominal pain . Genitourinary:no urinary retention , no burning , dysuria . No polyuria   Hematologic/lymphatic: no bleeding , no cougulation issues . Musculoskeletal:no joint pain , no swelling . Neurological: no headaches ,no weakness , no numbness . Endocrine: no thirst , no weight issues .      MEDS (scheduled):    magnesium oxide  400 mg Oral Daily    potassium bicarb-citric acid  40 mEq Oral Once    darbepoetin lucio-polysorbate  40 mcg Subcutaneous Weekly - Monday    famotidine  20 mg Oral BID    sodium chloride  250 mL Intravenous Once    sodium bicarbonate  650 mg Oral BID    chlorhexidine  15 mL Mouth/Throat BID    atorvastatin  40 mg Oral Daily    docusate sodium  100 mg Oral BID    ferrous sulfate  325 mg Oral BID WC    fluticasone  2 spray Nasal Daily    levothyroxine  100 mcg Oral Daily    mirtazapine  15 mg Oral Nightly    sodium chloride flush  10 mL Intravenous 2 times per day    heparin (porcine)  5,000 Units Subcutaneous 3 times per day       MEDS (infusions):   dextrose 5% and 0.45% NaCl with KCl 20 mEq 75 mL/hr at 06/13/19 1035    dextrose         MEDS (prn):  acetaminophen, heparin flush, magnesium hydroxide, glucose, dextrose, glucagon (rDNA), dextrose, traMADol, sodium chloride flush, ondansetron    PHYSICAL EXAM:     Patient Vitals for the past 24 hrs:   BP Temp Temp src Pulse Resp SpO2   06/13/19 0815 112/80 98 °F (36.7 °C) Oral 72 16 98 %   06/13/19 0545 (!) 176/76 98.6 °F (37 °C) Oral 65 16 98 %   06/13/19 0000 (!) 162/80 98 °F (36.7 °C) Oral 79 16 96 %   @      Intake/Output Summary (Last 24 hours) at 6/13/2019 1444  Last data filed at 6/13/2019 1431  Gross per 24 hour   Intake 1253 ml Output 2900 ml   Net -1647 ml         Wt Readings from Last 3 Encounters:   06/12/19 150 lb 5 oz (68.2 kg)   05/23/19 148 lb (67.1 kg)   05/15/19 140 lb 10.5 oz (63.8 kg)       Constitutional:  in no acute distress  Oral: mucus membranes moist  Neck: no JVD  Cardiovascular: S1, S2 regular rhythm, no murmur,or rub  Respiratory:  No crackles, no wheeze  Gastrointestinal:  Soft, nontender, nondistended, NABS  Ext: no edema, feet warm  Skin: dry, no rash  Neuro: awake, alert, interactive      DATA:    Recent Labs     06/11/19 0527 06/12/19  0510 06/13/19  0529   WBC 8.1 8.4 8.4   HGB 8.1* 8.3* 7.8*   HCT 26.3* 26.6* 25.1*   MCV 92.0 92.7 93.3    346 318     Recent Labs     06/11/19 0527 06/12/19 0510 06/13/19  0529    143 144   K 3.3* 3.7 4.0    106 109*   CO2 24 24 24   MG  --  1.4*  --    BUN 23* 21* 19   CREATININE 3.7* 3.3* 2.9*       No results found for: LABPROT    ASSESSMENT:      - Acute kidney injury 2.2 obstructive uropathy in contest of poorly functioning bladder and neurogenic bladder .     - Chronic kidney disease baseline cr 1.4     - Sepsis secondary to a urinary tract infection    - Anaemia of chronic disease . R/O acute component . - Chronic mental disability    - firm abnormal nodule lesion within the vagina with its urethra with cystinosis suspicion for gynecological malignancy    - Hypernatremia, mild, due to poor intake with subsequent dehydration      S/p Cystourethroscopy, bilateral JJ ureteral stent insertion   ? ?  of accuracy of urine eosinophills  With UTI /crane and pyuria .            RECOMMENDATIONS  Kidney function continue to improve   d/c the dialysis catheter /orders placed yesterdat   Prelim pathology report showing Squamous cell carcinoma of the cervix           Electronically signed by Sary Moore MD on 6/13/2019 at 2:44 PM

## 2019-06-13 NOTE — PROGRESS NOTES
Physical Therapy  Physical Therapy  Daily Treatment Note  6/13/2019  [id number]                      Mike Encarnacion   [id number]                              1965    Patient Active Problem List   Diagnosis    Hyperlipidemia    Noncompliance    Depressive disorder    Halitosis    Dysmenorrhea    Bradycardia    Acquired hypothyroidism    Mild intermittent asthma without complication    Acute renal failure (ARF) (HCC)    Anxiety    Intellectual disability    Obstructive uropathy    Mixed stress and urge urinary incontinence    Vitamin D deficiency    Seasonal allergic rhinitis    Acute on chronic renal insufficiency    Acute renal failure superimposed on chronic kidney disease, on chronic dialysis (HCC)    Mild protein-calorie malnutrition (HCC)    Acute cystitis with hematuria       Recommendation for discharge: Subacute vs. LTAC  Equipment prescriptions needed:to be determined    [facility]   How much difficulty turning over in bed?: A Little  How much difficulty sitting down on / standing up from a chair with arms?: A Little  How much difficulty moving from lying on back to sitting on side of bed?: A Little  How much help from another person moving to and from a bed to a chair?: A Little  How much help from another person needed to walk in hospital room?: A Little  How much help from another person for climbing 3-5 steps with a railing?: A Lot  -PAC Inpatient Mobility Raw Score : 17  AM-PAC Inpatient T-Scale Score : 42.13  Mobility Inpatient CMS 0-100% Score: 50.57  Mobility Inpatient CMS G-Code Modifier : CK      Precautions: falls, alarm    S: Patient cleared by nursing for treatment. Patient is agreeable to treatment. Pain status: (measured on a visual analog scale with 0=no pain and 10=excruciating pain) no number given/10.    O: Pt was instructed in and performed the following:   Bed Mobility- Supine to sit- supervision       Scooting- supervision    Sit to supine-min

## 2019-06-13 NOTE — PROGRESS NOTES
bowel sounds to auscultation. Extremities: No clubbing, no cyanosis, no edema.   Right upper arm with line picc- no phlebitis   Crane yellow- some blood-tinge noted  RIJ -HD-    Laboratory and Tests Review:  Lab Results   Component Value Date    WBC 8.4 06/13/2019    WBC 8.4 06/12/2019    WBC 8.1 06/11/2019    HGB 7.8 (L) 06/13/2019    HCT 25.1 (L) 06/13/2019    MCV 93.3 06/13/2019     06/13/2019     Lab Results   Component Value Date    NEUTROABS 4.86 06/13/2019    NEUTROABS 5.01 06/12/2019    NEUTROABS 4.79 06/11/2019     Lab Results   Component Value Date    ALT 23 05/31/2019    AST 18 05/31/2019    ALKPHOS 56 05/31/2019    BILITOT <0.2 05/31/2019     Lab Results   Component Value Date     06/13/2019    K 4.0 06/13/2019    K 5.4 05/31/2019     06/13/2019    CO2 24 06/13/2019    BUN 19 06/13/2019    CREATININE 2.9 06/13/2019    GFRAA 20 06/13/2019    LABGLOM 17 06/13/2019    GLUCOSE 95 06/13/2019    GLUCOSE 97 12/20/2011    PROT 6.0 05/31/2019    LABALBU 3.1 05/31/2019    LABALBU 4.4 12/20/2011    CALCIUM 9.1 06/13/2019    BILITOT <0.2 05/31/2019    ALKPHOS 56 05/31/2019    AST 18 05/31/2019    ALT 23 05/31/2019     Radiology:  Reviewed     Microbiology:   5/31/19- urine cx- no growth to date  Blood cx- no growth to date    ASSESSMENT:  · Bilateral hydronephrosis   · Neurogenic bladder  · Leukocytosis- improved   · S/p stent exchange  · UTI f/u cx ngtd  · JIAN- on HD    PLAN:  · No atbs- no active infection   · Urine cultures- No growth to date  · Nephrology/ urology following  · S/p vaginal biopsy- Preliminary path report showing SCC vagina/cervix   · Hematology/ oncology to see  · Monitor labs- creat-3.3 WBC- 8.4  Ok to remove crane catheter from ID point of view     Orlando Cunningham MD, FACP  6/13/2019  3:25 PM

## 2019-06-13 NOTE — PROGRESS NOTES
Hematology/Oncology  Consult      Patient Name: Grayson Bliss  YOB: 1965  PCP: Terese Villegas DO   Referring Provider:      Reason for Consultation:   Chief Complaint   Patient presents with    Abnormal Lab     PATIENT TO THE ED TODAY PER NH WITH C/O ABNORMAL LABS. PATIENT WITH CREAT OF 5.6 AND BUN 45        Subjective:  Diffuse lower abdominal pain. Good PO intake. Denies any vaginal bleeding    ROS:  No f/c/s  No cp, palp  No cough, sob  No n/v    History of Present Illness:  Pt admitted on 5/31/19 for acute on chronic kidney disease stage IV with neurogenic bladder with chronic b/l stents, urosepsis, normocytic anemia, hypothyroid, chronic mental disability, hyperlipidemia. Urology placed stents and abnormal firm nodules were appreciated on pelvic exam 6/1/19. GYN was consulted and saw the pt 6/2 and was taken back to the OR on 6/6 for EUA and multiple cervical biopsies. The anterior cervical lip was noted to be hard and nodular, the vagina was stenotic, with biopsies taken from the anterior and posterior cervical lip and endocervical brush was also done    Nephrology, Urology, ID, GYN, Psych also following. She denies a hx of STI, genital warts. She admits to smoking 2ppd, but it is difficult to ascertain how long she smoked, maybe 3 years. She denies exposure to toxins. She has been on SSI her entire life. Her grandmother had cancer, but she does not know what kind. No other family members with cancer. She has no previous hx of cancer. Unsure of her LMP. She admits to spotting with her urine prior to admission. She admits to nausea. She denies HA, vision change, facial droop, weakness, fever, chills. No family is present at the bedside.      Diagnostic Data:     Past Medical History:   Diagnosis Date    Anxiety     Depression     Hyperlipidemia     Hypertension     Hypothyroidism     Intellectual disability     Leg pain, bilateral     Noncompliance with medications     Noncompliance with treatment     Osteoarthritis        Patient Active Problem List    Diagnosis Date Noted    Mild protein-calorie malnutrition ([facility] 06/07/2019    Acute cystitis with hematuria 05/28/2019    Acute renal failure superimposed on chronic kidney disease, on chronic dialysis ([facility] 05/22/2019    Acute on chronic renal insufficiency 04/18/2019    Mixed stress and urge urinary incontinence 04/12/2019    Vitamin D deficiency 04/12/2019    Seasonal allergic rhinitis 04/12/2019    Anxiety 02/28/2019    Intellectual disability 02/28/2019    Obstructive uropathy 02/28/2019    Acute renal failure (ARF) ([facility] 01/18/2019    Acquired hypothyroidism 05/04/2016    Mild intermittent asthma without complication 36/94/6628    Dysmenorrhea 01/29/2014    Bradycardia 01/29/2014    Halitosis 10/27/2013    Hyperlipidemia 12/16/2011    Noncompliance 12/16/2011    Depressive disorder 12/16/2011        Past Surgical History:   Procedure Laterality Date    CYSTOSCOPY Left 1/21/2019    CYSTOSCOPY, BILATERAL RETROGRADE PYELOGRAMS, BILATERAL STENT INSERTION performed by Griselda Matas, MD at [facility] Bilateral 6/1/2019    CYSTOSCOPY, RETROGRADE PYELOGRAMS, BILATERAL URETERAL STENT EXCHANGE performed by Dre Mims MD at [address] [place] [address] N/A 6/6/2019    EXAM [address] performed by Kinza Barnett MD at [place] [address]. History  Family History   Problem Relation Age of Onset    Diabetes Brother     Thyroid Disease Mother     Other Daughter         Autism       Social History    TOBACCO:   reports that she quit smoking about 30 years ago. She has a 5.00 pack-year smoking history. She has never used smokeless tobacco.  ETOH:   reports that she does not drink alcohol. Home Medications  Prior to Admission medications    Medication Sig Start Date End Date Taking?  Authorizing Provider   acetaminophen (TYLENOL) 325 MG tablet Take 650 mg by mouth every 4 hours as needed for Pain   Yes Historical Provider, MD   dextrose 5 % SOLN 250 mL with vancomycin 1 g SOLR 1,000 mg Infuse 1,000 mg intravenously every 24 hours   Yes Historical Provider, MD   mirtazapine (REMERON) 15 MG tablet Take 1 tablet by mouth nightly 5/24/19  Yes Janak Conti MD   ondansetron (ZOFRAN-ODT) 4 MG disintegrating tablet Take 1 tablet by mouth every 8 hours as needed for Nausea or Vomiting 4/23/19  Yes Hebert Stoll DO   ferrous sulfate 325 (65 Fe) MG tablet Take 1 tablet by mouth 2 times daily (with meals) 4/23/19  Yes Hebert Stoll DO   docusate sodium (COLACE, DULCOLAX) 100 MG CAPS Take 100 mg by mouth 2 times daily 4/23/19  Yes Hebert Stoll DO   levothyroxine (SYNTHROID) 100 MCG tablet TAKE 1 TABLET BY MOUTH EVERY MORNING 30 MINUTES BEFORE EATING. 4/10/19  Yes Celestine Trujillo DO   vitamin D (ERGOCALCIFEROL) 17765 units CAPS capsule Take 1 capsule by mouth once a week 4/9/19  Yes Celestine Trujillo DO   oxybutynin (DITROPAN-XL) 5 MG extended release tablet Take 1 tablet by mouth daily 4/9/19  Yes Celestine Trujillo DO   magnesium oxide (MAG-OX) 400 (241.3 Mg) MG TABS tablet Take 1 tablet by mouth 2 times daily 4/9/19  Yes Celestine Trujillo DO   fluticasone (FLONASE) 50 MCG/ACT nasal spray 2 sprays by Nasal route daily  Patient taking differently: 1 spray by Nasal route daily  4/9/19  Yes Celestine Trujillo DO   pantoprazole (PROTONIX) 40 MG tablet TAKE ONE TABLET BY MOUTH DAILY 4/9/19  Yes Tresia Shubert, DO   Compression Stockings MISC by Does not apply route Below knee  15 - 30 mm Hg 7/25/18   Blase Richar, DO       Allergies  No Known Allergies    Review of Systems:    Relevant for neurogenic bladder with recurrent UTI and urosepsis and pelvic discomfort.       Objective  /80   Pulse 72   Temp 98 °F (36.7 °C) (Oral)   Resp 16   Ht 4' 11\" (1.499 m)   Wt 150 lb 5 oz (68.2 kg)   LMP 05/21/2015 (Approximate)   SpO2 98%   BMI 30.36 kg/m²         Physical Exam:  General: Alert no acute distress,  , appears slow in cognition  Head and neck : PERRL, EOMI . Sclera non icteric. Poor dentition. Oropharynx : Clear  Neck: no JVD,  no adenopathy,  Lungs: Clear to auscultation   Heart: Regular rate and regular rhythm, no murmur, normal S1, S2  Abdomen: Soft, non-tender;no masses, no organomegaly  Extremities: No edema,no cyanosis, no clubbing. Skin:  No rash. Multiple moles. Neurologic:Cranial nerves grossly intact. No focal motor or sensory deficits . Chronic intellectually limited    Recent Laboratory Data-   Lab Results   Component Value Date    WBC 8.4 06/13/2019    HGB 7.8 (L) 06/13/2019    HCT 25.1 (L) 06/13/2019    MCV 93.3 06/13/2019     06/13/2019    LYMPHOPCT 29.6 06/13/2019    RBC 2.69 (L) 06/13/2019    MCH 29.0 06/13/2019    MCHC 31.1 (L) 06/13/2019    RDW 15.0 06/13/2019    NEUTOPHILPCT 58.1 06/13/2019    MONOPCT 5.7 06/13/2019    BASOPCT 0.5 06/13/2019    NEUTROABS 4.86 06/13/2019    LYMPHSABS 2.48 06/13/2019    MONOSABS 0.48 06/13/2019    EOSABS 0.39 06/13/2019    BASOSABS 0.04 06/13/2019       Lab Results   Component Value Date     06/13/2019    K 4.0 06/13/2019     (H) 06/13/2019    CO2 24 06/13/2019    BUN 19 06/13/2019    CREATININE 2.9 (H) 06/13/2019    GLUCOSE 95 06/13/2019    CALCIUM 9.1 06/13/2019    PROT 6.0 (L) 05/31/2019    LABALBU 3.1 (L) 05/31/2019    BILITOT <0.2 05/31/2019    ALKPHOS 56 05/31/2019    AST 18 05/31/2019    ALT 23 05/31/2019    LABGLOM 17 06/13/2019    GFRAA 20 06/13/2019       Lab Results   Component Value Date    IRON 44 06/03/2019    TIBC 214 (L) 06/03/2019    FERRITIN 231 06/03/2019           Radiology-    Ct Abdomen Pelvis Wo Contrast Additional Contrast? Oral    Result Date: 6/2/2019  LOCATION: 200 EXAM: CT ABDOMEN PELVIS WO CONTRAST COMPARISON: 5/22/2019 reporting cystitis and severe right hydroureteronephrosis with stable position of the ureteral stent.  HISTORY: Abnormal labs, pain across pelvis, recent stent placement within the ureter. TECHNIQUE: Noncontrast helical abdomen and pelvis CT was performed. Coronal and sagittal reconstructions also obtained. Automated dose control was used for this exam. CONTRAST: No IV contrast administered. No oral contrast administered. FINDINGS: Small bilateral pleural effusions are present new since prior examination. Small hiatal hernia is seen. Contrast in the esophagus may relate reflect reflux or dysmotility. There is thickening of the duodenal wall second portion worrisome for duodenitis. Correlation with patient's pain. Does this patient have epigastric pain clinically? Minimal induration of the presacral soft tissues of the lower pelvis new since prior examination, axial image 64. Inflammation or infection cannot be excluded. Correlation with inflammatory markers recommended. Midline umbilical hernia containing only fat similar to prior examination. The appendix is not seen although there are no secondary signs for acute appendicitis. There is no free air. The remaining bowel shows no bowel wall thickening or distention. Minimal subcutaneous edema is present of the lower abdomen. Soft tissue densities of the anterior abdomen likely reflect sequela from abdominal wall injections. Bilateral ureteral stents are seen. The right kidney demonstrates a stent properly positioned. There is continued right-sided hydronephrosis. No stone seen adjacent to the ureter. The left side demonstrates a properly positioned stent with hydronephrosis and hydroureter. No stone seen adjacent to the stent. Braden catheter in the urinary bladder makes evaluation difficult. The bladder is contracted and there is air present likely from the Braden. Phleboliths are present in the lower pelvis. 1. Development of small bilateral pleural effusions right side greater than left. 2. Retained fluid in the esophagus as described. 3. Bilateral hydronephrosis and hydroureter. Ureteral stents appear to properly positioned.  No minutes PROCEDURE:  The procedure was explained to the patient and informed consent was obtained. The skin over the  right arm was prepped and draped in a sterile fashion and then anesthetized with Lidocaine. Maximal sterile barrier technique was utilized. Under ultrasound guidance, the brachial vein was localized. Using a micropuncture needle, the vein was entered under direct ultrasound visualization. A 0.018 guidewire was advanced into the central venous circulation. A 5 Spanish peel-away sheath was placed. The 5 Spanish Power PICC line catheter was trimmed to 32 cm was then advanced through the peel-away sheath and positioned at the right atrial/SVC junction. Fluoroscopic spot film was obtained. FINDINGS:  Ultrasound shows the veins to be patent. Fluoroscopic spot film demonstrates the Power PICC line to be at the right atrial/SVC junction. Successful placement of a 5 Spanish double-lumen Power PICC line using ultrasound guidance via the right brachial  vein. Xr Urogram W Wo Kub W Wo Tomogram    Result Date: 6/1/2019  Reading location: 200 INDICATION: Ureteral stent exchange FINDINGS: 67.4 seconds of fluoroscopy and 4 spot images during ureteral stent exchange. No contrast injection. Operative fluoroscopy    Us Retroperitoneal Complete    Result Date: 5/31/2019  Reading location: 200 INDICATION: Renal insufficiency FINDINGS: Sonographic evaluation urinary tract compared with ultrasound 1/18/2019 and CT the abdomen and pelvis 5/22/2019. Right kidney 12.1 cm in length and the left 11.2 cm. At least moderate dilatation right renal collecting system. On recent CT, right ureteral stent is coiled in the renal pelvis. Dilatation the left renal collecting system is somewhat less severe than the right. Mid left renal cyst measures 13 mm and upper pole cyst 25.5 mm. No focal abnormality right renal collecting system. Nondistended urinary bladder.      Moderate bilateral hydronephrosis, somewhat more severe on the right than the left. Ir Ultrasound Guidance Vascular Access    Result Date: 6/4/2019  Location:200 Exam: IR FLUORO GUIDED CVA DEVICE PLMT/REPLACE/REMOVAL, IR ULTRASOUND GUIDANCE VASCULAR ACCESS HISTORY: Acute renal failure. Versed 1 mg IV was given for anxiety during the procedure. Fluoroscopy time 0.3 minutes. PROCEDURE: Informed consent was obtained. Ultrasound was used to localize the right  internal jugular vein. The skin was prepped and draped in a sterile fashion and then anesthetized with lidocaine. Maximal sterile barrier technique was utilized. Under direct ultrasound visualization, a micropuncture needle was used to gain access to the right internal jugular vein. A 0.018 guidewire was advanced into the central venous circulation under fluoroscopic guidance. A micropuncture sheath was placed. Through the sheath, a 0.035 J guidewire was advanced. Over the guidewire, the tract was dilated using 12 and 14 Western Cheryl dilators. A 13.5 Azeri diameter temporary dialysis catheter 15 cm long was then advanced over the guidewire and positioned within the superior aspect of the right atrium. The catheter was then affixed to the skin using 2-0 silk. FINDINGS: Ultrasound demonstrates patent internal jugular veins bilaterally. Fluoroscopic spot film demonstrates placement of the right internal jugular hemodialysis catheter with the tip in the superior aspect of the right atrium. Successful placement of a right IJ temporary hemodialysis catheter. Ir Ultrasound Guidance Vascular Access    Result Date: 5/29/2019  Location:200 Exam: IR ULTRASOUND GUIDANCE VASCULAR ACCESS History:  PICC line placement Ultrasound evaluation of potential access was performed. After successfully identifying a patent right brachial vein, and following the administration of lidocaine, real-time ultrasound guidance was used to puncture the right brachial   vein. A permanent recording was created for the patient's record. Ultrasound guidance was provided during placement of a PICC line. ASSESSMENT/PLAN :  Squamous cell carcinoma of the cervix by preliminary path report with possible involvement of the lower vaginal wall clinically per GYN exam  -pending final path results  -CT abdomen without contrast reviewed , suboptimal for assessment of cervix and pelvic lymphadenopathy  -unsure of HPV hx  -recommend GYN Oncology assessment   -She will need PET CT scan for definite staging   -Urology has signed off, but worth further discussion with them regarding possible urologic primary    It appears that clinically she has at least a T3 lesion involving the cervix with possible extension into the lower one third of the vaginal wall.   She does have hydronephrosis bilaterally partly related to her neurogenic bladder but cannot exclude pelvic lymphadenopathy and if PET/CT confirms pelvic lymphadenopathy she would be upstaged to stage IIIb    Once final pathology available and clinical staging established option of external radiation along with single agent carboplatinum adjusted to AUC of 4 will be addressed since she is not a candidate for cis-platinum because of her advanced CKD and impaired GFR    CKD IV with hydronephrosis, neurogenic bladder and stent placement  -Urology and Nephrology following  -undergoing intermittent hemodialysis  -s/p Cystourethroscopy, bilateral JJ ureteral stent insertion, pelvic examination 6/1  -currently has crane     Normocytic anemia of chronic disease  -Multifactorial related to myelosuppression by recurrent sepsis as well as chronic kidney disease stage IV  -Iron profile consistent with AOCD  -Hbg 7.8 today  -Monitor    Chronic mental disability with depression and psychosis  -Psych following      Uriel Martino MD

## 2019-06-13 NOTE — PROGRESS NOTES
[facility] Quality Flow/Interdisciplinary Rounds Progress Note        Quality Flow Rounds held on June 13, 2019    Disciplines Attending:  Bedside Nurse, ,  and Nursing Unit Leadership    David Babcock was admitted on 5/31/2019  2:28 PM    Anticipated Discharge Date:  Expected Discharge Date: 06/13/19    Disposition:    Ramon Score:  Ramon Scale Score: 19    Readmission Risk              Risk of Unplanned Readmission:        32           Discussed patient goal for the day, patient clinical progression, and barriers to discharge.   The following Goal(s) of the Day/Commitment(s) have been identified:  Activity progression, PT/OT, checking with [facility] for DC, Biopsy came back , B&C jumped up      Fransisca Clayton  June 13, 2019

## 2019-06-13 NOTE — PROGRESS NOTES
Occupational Therapy  O.T. Bedside Treatment Note    Date:2019  Patient Name: Vicky Manzo  MRN: [id number]  : 1965  ROOM #: [id number]    Problem list / Diagnosis:   Patient Active Problem List   Diagnosis    Hyperlipidemia    Noncompliance    Depressive disorder    Halitosis    Dysmenorrhea    Bradycardia    Acquired hypothyroidism    Mild intermittent asthma without complication    Acute renal failure (ARF) (HCC)    Anxiety    Intellectual disability    Obstructive uropathy    Mixed stress and urge urinary incontinence    Vitamin D deficiency    Seasonal allergic rhinitis    Acute on chronic renal insufficiency    Acute renal failure superimposed on chronic kidney disease, on chronic dialysis (HCC)    Mild protein-calorie malnutrition ([facility]    Acute cystitis with hematuria     Past Medical History:   Past Medical History:   Diagnosis Date    Anxiety     Depression     Hyperlipidemia     Hypertension     Hypothyroidism     Intellectual disability     Leg pain, bilateral     Noncompliance with medications     Noncompliance with treatment     Osteoarthritis      Onset/Medical history: medical history reviewed  Past medical history: reviewed    The admitting diagnosis and active problem list as listed above have been reviewed prior to treatment. Nursing cleared the patient for treatment. Patient is agreeable to treatment.     Discharge recommendations: [facility]  Vs. [facility]  Equipment prescriptions needed:   TBD    AM - PAC Daily Activity Inpatient    AM-PAC Daily Activity Inpatient   How much help for putting on and taking off regular lower body clothing?: A Lot  How much help for Bathing?: A Lot  How much help for Toileting?: A Lot  How much help for putting on and taking off regular upper body clothing?: A Little  How much help for taking care of personal grooming?: A Little  How much help for eating meals?: None  AM-PAC Inpatient Daily Activity Raw Score: 16  AM-PAC Inpatient ADL T-Scale Score : 35.96  ADL Inpatient CMS 0-100% Score: 53.32  ADL Inpatient CMS G-Code Modifier : CK      Precautions: Falls, hemodialysis, alarm    S:  - Pt cleared for treatment through nursing.  - Pain: no c/o pain. Pt states she has been crying a lot d/t reported bad news she found out yesterday. Pt states she has cancer ; Spiritual care called for pt. O:  - BUE AROM exercises: 10-15 reps in all planes of movement to increase ROM required for functional transfers/ADL participation. Exercises completed in shoulder and elbow flexion/extension, internal/external rotation, abduction/adduction, supination/pronation, digit and wrist flexion/extension, abduction/adduction. Pt states she is unable to complete digit opposition and declined attempting at this time. [facility] with visual cuing for follow through and increased  time. Min rest breaks d/t decreased tolerance. Function Assessment     Status  Goal Comment   Feeding:  Independent   Independent    N/T   Grooming:  Minimal Assist  Independent   N/T as pt had assist for grooming activities prior to session   UE dressing:  Minimal Assist  Independent  N/T   LE dressing: Moderate Assist  Independent  N/T; continue to assess if catheter is removed   Bathing:   Moderate Assist  Independent   N/T   Bed Mobility:       Sit to supine at supervision Independent   Assist to guide UB to sitting   Functional Transfers:    Transfer to EOB at supervision  Independent  Safety: fair    Functional Mobility: 60 ft x 2 with Barbara with assist for IV pole Modified [place]   No LOB noted; cuing for walker safety, as pt pushes walker ahead; decreased pace        A:  -Balance: Sitting: fair  EOB for B UE ROM ex's/LE ROM ex's       Standing: fair with min A with FWW  -Endurance: fair  -Edema: none noted  -Safety:fair -  (verbal and visual cuing for follow through; bed alarm on)  - Pt/family education/training: bed mobility, sitting balance/tolerance,  BUE ROM ex's, ECT's, functional mobility, transfer training  -Pt would benefit from additional ADLs, safety education, balance activities, transfer training, strength exercises, and over all endurance building.     P:  - Plan of care: Patient will be seen by OT 1-3x/wk for therapeutic activity, ADL re-training, bed mobility,functional transfers, functional mobility, safety and fall prevention, balance and endurance activities, instruction and training in energy conservation principles, and   patient and family education.   - Patient and/or family understands diagnosis, prognosis and plan of care: yes   - ADL retraining, bathroom safety, DME    Treatment minutes: [address]

## 2019-06-13 NOTE — PLAN OF CARE
Problem: Inadequate oral food/beverage intake (NI-2.1)  Goal: Food and/or Nutrient Delivery  Description  Individualized approach for food/nutrient provision.   Outcome: Met This Shift

## 2019-06-13 NOTE — PROGRESS NOTES
[facility] MD Елена Larsen MD Delon Grego, MD. Laura Andrews MD   Progress Note    6/12/2019    SUBJECTIVE:   6/7: On RA Braden in place Poor UO yesterday    6/9: Abdominal cramping. Ongoing anorexia, nausea, poor intake. No dyspnea at rest on room air. No chest pain or palpitations. No swelling. No pruritus Depressed as regards recent medical problems. 6/10 : On RA . Good UO .     6/12 : on RA . Good UO . PROBLEM LIST:    Principal Problem:    Acute renal failure (ARF) ([facility])  Active Problems:    Mild protein-calorie malnutrition ([address]  Resolved Problems:    * No resolved hospital problems. *       DIET:    DIET RENAL;       Allergies : Patient has no known allergies. Past Medical History:   Diagnosis Date    Anxiety     Depression     Hyperlipidemia     Hypertension     Hypothyroidism     Intellectual disability     Leg pain, bilateral     Noncompliance with medications     Noncompliance with treatment     Osteoarthritis        Past Surgical History:   Procedure Laterality Date    CYSTOSCOPY Left 1/21/2019    CYSTOSCOPY, BILATERAL RETROGRADE PYELOGRAMS, BILATERAL STENT INSERTION performed by Vadim Cruz MD at [facility] Bilateral 6/1/2019    CYSTOSCOPY, RETROGRADE PYELOGRAMS, BILATERAL URETERAL STENT EXCHANGE performed by Ana Maria Nelson MD at [address] [place] [address] N/A 6/6/2019    EXAM UNDEDR ANESTHESIA VAGINAL BIOPSIES performed by Elverna Severe, MD at [place] [address]. History   Problem Relation Age of Onset    Diabetes Brother     Thyroid Disease Mother     Other Daughter         Autism        reports that she quit smoking about 30 years ago. She has a 5.00 pack-year smoking history. She has never used smokeless tobacco. She reports that she does not drink alcohol or use drugs.     Review of Systems:   Constitutional: no fevers , no chills , feels ok   Eyes: no eye pain , no itching , no drainage  Ears, nose, mouth, throat, and face: no ear ,nose pain , hearing is ok ,no nasal drainage   Respiratory: no sob ,no cough ,no wheezing . Cardiovascular: no chest pain , no palpitation ,no sob . Gastrointestinal: no nausea, vomiting , constipation , no abdominal pain . Genitourinary:no urinary retention , no burning , dysuria . No polyuria   Hematologic/lymphatic: no bleeding , no cougulation issues . Musculoskeletal:no joint pain , no swelling . Neurological: no headaches ,no weakness , no numbness . Endocrine: no thirst , no weight issues .      MEDS (scheduled):    magnesium oxide  400 mg Oral Daily    potassium bicarb-citric acid  40 mEq Oral Once    darbepoetin lucio-polysorbate  40 mcg Subcutaneous Weekly - Monday    famotidine  20 mg Oral BID    sodium chloride  250 mL Intravenous Once    sodium bicarbonate  650 mg Oral BID    chlorhexidine  15 mL Mouth/Throat BID    atorvastatin  40 mg Oral Daily    docusate sodium  100 mg Oral BID    ferrous sulfate  325 mg Oral BID WC    fluticasone  2 spray Nasal Daily    levothyroxine  100 mcg Oral Daily    mirtazapine  15 mg Oral Nightly    sodium chloride flush  10 mL Intravenous 2 times per day    heparin (porcine)  5,000 Units Subcutaneous 3 times per day       MEDS (infusions):   dextrose 5% and 0.45% NaCl with KCl 20 mEq 75 mL/hr at 06/12/19 2034    dextrose         MEDS (prn):  acetaminophen, heparin flush, magnesium hydroxide, glucose, dextrose, glucagon (rDNA), dextrose, traMADol, sodium chloride flush, ondansetron    PHYSICAL EXAM:     Patient Vitals for the past 24 hrs:   BP Temp Temp src Pulse Resp SpO2 Weight   06/12/19 1100 (!) 159/78 98 °F (36.7 °C) Oral 81 16 -- --   06/12/19 0745 (!) 181/77 98.5 °F (36.9 °C) Oral 73 16 97 % --   06/12/19 0600 -- -- -- -- -- -- 150 lb 5 oz (68.2 kg)   06/11/19 2053 (!) 170/74 98 °F (36.7 °C) Oral 76 16 97 % --   @      Intake/Output Summary (Last 24 hours) at 6/12/2019 2042  Last data filed at 6/12/2019 1429  Gross per 24 hour   Intake 1996.66 ml   Output 3400 ml   Net -1403.34 ml         Wt Readings from Last 3 Encounters:   06/12/19 150 lb 5 oz (68.2 kg)   05/23/19 148 lb (67.1 kg)   05/15/19 140 lb 10.5 oz (63.8 kg)       Constitutional:  in no acute distress  Oral: mucus membranes moist  Neck: no JVD  Cardiovascular: S1, S2 regular rhythm, no murmur,or rub  Respiratory:  No crackles, no wheeze  Gastrointestinal:  Soft, nontender, nondistended, NABS  Ext: no edema, feet warm  Skin: dry, no rash  Neuro: awake, alert, interactive      DATA:    Recent Labs     06/10/19  0510 06/11/19  0527 06/12/19  0510   WBC 7.2 8.1 8.4   HGB 7.7* 8.1* 8.3*   HCT 25.0* 26.3* 26.6*   MCV 92.9 92.0 92.7    352 346     Recent Labs     06/10/19  0510 06/11/19  0527 06/12/19  0510    144 143   K 3.6 3.3* 3.7   * 106 106   CO2 24 24 24   MG 1.7  --  1.4*   PHOS 4.6*  --   --    BUN 28* 23* 21*   CREATININE 4.6* 3.7* 3.3*       No results found for: LABPROT    ASSESSMENT:      - Acute kidney injury 2.2 obstructive uropathy in contest of poorly functioning bladder and neurogenic bladder .     - Chronic kidney disease baseline cr 1.4     - Sepsis secondary to a urinary tract infection    - Anaemia of chronic disease . R/O acute component . - Chronic mental disability    - firm abnormal nodule lesion within the vagina with its urethra with cystinosis suspicion for gynecological malignancy    - Hypernatremia, mild, due to poor intake with subsequent dehydration      S/p Cystourethroscopy, bilateral JJ ureteral stent insertion   ? ?  of accuracy of urine eosinophills  With UTI /crane and pyuria . RECOMMENDATIONS  Kidney function continue to improve  Will d/c the dialysis catheter  Prelim pathology report showing squamous cell carcinoma .        Electronically signed by Shan Zhu MD on 6/12/2019 at 8:42 PM

## 2019-06-14 VITALS
RESPIRATION RATE: 16 BRPM | HEART RATE: 66 BPM | SYSTOLIC BLOOD PRESSURE: 147 MMHG | BODY MASS INDEX: 29.39 KG/M2 | WEIGHT: 145.8 LBS | HEIGHT: 59 IN | OXYGEN SATURATION: 98 % | TEMPERATURE: 98.7 F | DIASTOLIC BLOOD PRESSURE: 78 MMHG

## 2019-06-14 LAB
ANION GAP SERPL CALCULATED.3IONS-SCNC: 10 MMOL/L (ref 7–16)
BASOPHILS ABSOLUTE: 0.06 E9/L (ref 0–0.2)
BASOPHILS RELATIVE PERCENT: 0.6 % (ref 0–2)
BUN BLDV-MCNC: 19 MG/DL (ref 6–20)
CALCIUM SERPL-MCNC: 8.9 MG/DL (ref 8.6–10.2)
CHLORIDE BLD-SCNC: 108 MMOL/L (ref 98–107)
CO2: 24 MMOL/L (ref 22–29)
CREAT SERPL-MCNC: 2.8 MG/DL (ref 0.5–1)
EOSINOPHILS ABSOLUTE: 0.49 E9/L (ref 0.05–0.5)
EOSINOPHILS RELATIVE PERCENT: 5.1 % (ref 0–6)
GFR AFRICAN AMERICAN: 21
GFR NON-AFRICAN AMERICAN: 18 ML/MIN/1.73
GLUCOSE BLD-MCNC: 93 MG/DL (ref 74–99)
HCT VFR BLD CALC: 24.9 % (ref 34–48)
HEMOGLOBIN: 7.8 G/DL (ref 11.5–15.5)
IMMATURE GRANULOCYTES #: 0.21 E9/L
IMMATURE GRANULOCYTES %: 2.2 % (ref 0–5)
LYMPHOCYTES ABSOLUTE: 2.62 E9/L (ref 1.5–4)
LYMPHOCYTES RELATIVE PERCENT: 27.3 % (ref 20–42)
MCH RBC QN AUTO: 29.3 PG (ref 26–35)
MCHC RBC AUTO-ENTMCNC: 31.3 % (ref 32–34.5)
MCV RBC AUTO: 93.6 FL (ref 80–99.9)
MONOCYTES ABSOLUTE: 0.54 E9/L (ref 0.1–0.95)
MONOCYTES RELATIVE PERCENT: 5.6 % (ref 2–12)
NEUTROPHILS ABSOLUTE: 5.69 E9/L (ref 1.8–7.3)
NEUTROPHILS RELATIVE PERCENT: 59.2 % (ref 43–80)
PDW BLD-RTO: 14.9 FL (ref 11.5–15)
PLATELET # BLD: 332 E9/L (ref 130–450)
PMV BLD AUTO: 10.4 FL (ref 7–12)
POTASSIUM SERPL-SCNC: 4.3 MMOL/L (ref 3.5–5)
RBC # BLD: 2.66 E12/L (ref 3.5–5.5)
SODIUM BLD-SCNC: 142 MMOL/L (ref 132–146)
WBC # BLD: 9.6 E9/L (ref 4.5–11.5)

## 2019-06-14 PROCEDURE — 85025 COMPLETE CBC W/AUTO DIFF WBC: CPT

## 2019-06-14 PROCEDURE — 80048 BASIC METABOLIC PNL TOTAL CA: CPT

## 2019-06-14 PROCEDURE — 6370000000 HC RX 637 (ALT 250 FOR IP): Performed by: INTERNAL MEDICINE

## 2019-06-14 PROCEDURE — 6360000002 HC RX W HCPCS: Performed by: INTERNAL MEDICINE

## 2019-06-14 PROCEDURE — 2580000003 HC RX 258: Performed by: INTERNAL MEDICINE

## 2019-06-14 PROCEDURE — 97116 GAIT TRAINING THERAPY: CPT

## 2019-06-14 PROCEDURE — 97110 THERAPEUTIC EXERCISES: CPT

## 2019-06-14 PROCEDURE — 36415 COLL VENOUS BLD VENIPUNCTURE: CPT

## 2019-06-14 PROCEDURE — 6370000000 HC RX 637 (ALT 250 FOR IP): Performed by: FAMILY MEDICINE

## 2019-06-14 RX ORDER — TRAMADOL HYDROCHLORIDE 50 MG/1
50 TABLET ORAL EVERY 8 HOURS PRN
Qty: 15 TABLET | Refills: 0 | Status: SHIPPED | OUTPATIENT
Start: 2019-06-14 | End: 2019-06-19

## 2019-06-14 RX ORDER — ATORVASTATIN CALCIUM 40 MG/1
40 TABLET, FILM COATED ORAL DAILY
Qty: 30 TABLET | Refills: 3 | Status: ON HOLD | OUTPATIENT
Start: 2019-06-14 | End: 2019-09-17

## 2019-06-14 RX ORDER — SODIUM BICARBONATE 650 MG/1
650 TABLET ORAL 2 TIMES DAILY
Status: ON HOLD | DISCHARGE
Start: 2019-06-14 | End: 2019-07-11 | Stop reason: HOSPADM

## 2019-06-14 RX ADMIN — ATORVASTATIN CALCIUM 40 MG: 40 TABLET, FILM COATED ORAL at 08:51

## 2019-06-14 RX ADMIN — Medication 325 MG: at 17:18

## 2019-06-14 RX ADMIN — MAGNESIUM OXIDE TAB 400 MG (241.3 MG ELEMENTAL MG) 400 MG: 400 (241.3 MG) TAB at 08:51

## 2019-06-14 RX ADMIN — FAMOTIDINE 20 MG: 20 TABLET ORAL at 08:51

## 2019-06-14 RX ADMIN — HEPARIN SODIUM 5000 UNITS: 5000 INJECTION, SOLUTION INTRAVENOUS; SUBCUTANEOUS at 06:30

## 2019-06-14 RX ADMIN — ACETAMINOPHEN 650 MG: 325 TABLET, FILM COATED ORAL at 01:11

## 2019-06-14 RX ADMIN — Medication 325 MG: at 08:51

## 2019-06-14 RX ADMIN — MIRTAZAPINE 15 MG: 15 TABLET, FILM COATED ORAL at 20:33

## 2019-06-14 RX ADMIN — FAMOTIDINE 20 MG: 20 TABLET ORAL at 20:33

## 2019-06-14 RX ADMIN — DOCUSATE SODIUM 100 MG: 100 CAPSULE, LIQUID FILLED ORAL at 08:51

## 2019-06-14 RX ADMIN — DOCUSATE SODIUM 100 MG: 100 CAPSULE, LIQUID FILLED ORAL at 20:33

## 2019-06-14 RX ADMIN — FLUTICASONE PROPIONATE 2 SPRAY: 50 SPRAY, METERED NASAL at 08:52

## 2019-06-14 RX ADMIN — TRAMADOL HYDROCHLORIDE 25 MG: 50 TABLET, FILM COATED ORAL at 00:02

## 2019-06-14 RX ADMIN — SODIUM BICARBONATE 650 MG TABLET 650 MG: at 20:33

## 2019-06-14 RX ADMIN — Medication 300 UNITS: at 08:51

## 2019-06-14 RX ADMIN — SODIUM BICARBONATE 650 MG TABLET 650 MG: at 08:51

## 2019-06-14 RX ADMIN — Medication 10 ML: at 08:51

## 2019-06-14 RX ADMIN — LEVOTHYROXINE SODIUM 100 MCG: 100 TABLET ORAL at 06:30

## 2019-06-14 ASSESSMENT — PAIN DESCRIPTION - ORIENTATION
ORIENTATION: LEFT
ORIENTATION: LEFT

## 2019-06-14 ASSESSMENT — PAIN SCALES - GENERAL
PAINLEVEL_OUTOF10: 10
PAINLEVEL_OUTOF10: 0
PAINLEVEL_OUTOF10: 10
PAINLEVEL_OUTOF10: 0

## 2019-06-14 ASSESSMENT — PAIN DESCRIPTION - PAIN TYPE
TYPE: ACUTE PAIN
TYPE: ACUTE PAIN

## 2019-06-14 ASSESSMENT — PAIN DESCRIPTION - DESCRIPTORS
DESCRIPTORS: SHARP
DESCRIPTORS: SHARP

## 2019-06-14 ASSESSMENT — PAIN DESCRIPTION - LOCATION
LOCATION: ABDOMEN
LOCATION: ABDOMEN

## 2019-06-14 NOTE — PROGRESS NOTES
Hematology/Oncology  Consult      Patient Name: Binta Yin  YOB: 1965  PCP: Aries Velázquez DO   Referring Provider:      Reason for Consultation:   Chief Complaint   Patient presents with    Abnormal Lab     PATIENT TO THE ED TODAY PER NH WITH C/O ABNORMAL LABS. PATIENT WITH CREAT OF 5.6 AND BUN 45        Subjective:  Diffuse lower abdominal pain. Good PO intake. Denies any vaginal bleeding. No new complaints today    ROS:  No f/c/s  No cp, palp  No cough, sob  No n/v    History of Present Illness:  Pt admitted on 5/31/19 for acute on chronic kidney disease stage IV with neurogenic bladder with chronic b/l stents, urosepsis, normocytic anemia, hypothyroid, chronic mental disability, hyperlipidemia. Urology placed stents and abnormal firm nodules were appreciated on pelvic exam 6/1/19. GYN was consulted and saw the pt 6/2 and was taken back to the OR on 6/6 for EUA and multiple cervical biopsies. The anterior cervical lip was noted to be hard and nodular, the vagina was stenotic, with biopsies taken from the anterior and posterior cervical lip and endocervical brush was also done    Nephrology, Urology, ID, GYN, Psych also following. She denies a hx of STI, genital warts. She admits to smoking 2ppd, but it is difficult to ascertain how long she smoked, maybe 3 years. She denies exposure to toxins. She has been on SSI her entire life. Her grandmother had cancer, but she does not know what kind. No other family members with cancer. She has no previous hx of cancer. Unsure of her LMP. She admits to spotting with her urine prior to admission. She admits to nausea. She denies HA, vision change, facial droop, weakness, fever, chills. No family is present at the bedside.      Diagnostic Data:     Past Medical History:   Diagnosis Date    Anxiety     Depression     Hyperlipidemia     Hypertension     Hypothyroidism     Intellectual disability     Leg pain, bilateral     Noncompliance with medications     Noncompliance with treatment     Osteoarthritis        Patient Active Problem List    Diagnosis Date Noted    Mild protein-calorie malnutrition ([facility] 06/07/2019    Acute cystitis with hematuria 05/28/2019    Acute renal failure superimposed on chronic kidney disease, on chronic dialysis ([facility] 05/22/2019    Acute on chronic renal insufficiency 04/18/2019    Mixed stress and urge urinary incontinence 04/12/2019    Vitamin D deficiency 04/12/2019    Seasonal allergic rhinitis 04/12/2019    Anxiety 02/28/2019    Intellectual disability 02/28/2019    Obstructive uropathy 02/28/2019    Acute renal failure (ARF) ([facility] 01/18/2019    Acquired hypothyroidism 05/04/2016    Mild intermittent asthma without complication 06/75/8032    Dysmenorrhea 01/29/2014    Bradycardia 01/29/2014    Halitosis 10/27/2013    Hyperlipidemia 12/16/2011    Noncompliance 12/16/2011    Depressive disorder 12/16/2011        Past Surgical History:   Procedure Laterality Date    CYSTOSCOPY Left 1/21/2019    CYSTOSCOPY, BILATERAL RETROGRADE PYELOGRAMS, BILATERAL STENT INSERTION performed by Mando Tao MD at [facility] [address] Bilateral 6/1/2019    CYSTOSCOPY, RETROGRADE PYELOGRAMS, BILATERAL URETERAL STENT EXCHANGE performed by Jacqueline George MD at [address] [place] [address] N/A 6/6/2019    EXAM [address] performed by Samy Chavez MD at [facility]. History  Family History   Problem Relation Age of Onset    Diabetes Brother     Thyroid Disease Mother     Other Daughter         Autism       Social History    TOBACCO:   reports that she quit smoking about 30 years ago. She has a 5.00 pack-year smoking history. She has never used smokeless tobacco.  ETOH:   reports that she does not drink alcohol. Home Medications  Prior to Admission medications    Medication Sig Start Date End Date Taking?  Authorizing Provider   acetaminophen (TYLENOL) 325 MG tablet Take 650 mg by mouth every 4 hours as needed for Pain   Yes Historical Provider, MD   dextrose 5 % SOLN 250 mL with vancomycin 1 g SOLR 1,000 mg Infuse 1,000 mg intravenously every 24 hours   Yes Historical Provider, MD   mirtazapine (REMERON) 15 MG tablet Take 1 tablet by mouth nightly 5/24/19  Yes Adriana Dickerson MD   ondansetron (ZOFRAN-ODT) 4 MG disintegrating tablet Take 1 tablet by mouth every 8 hours as needed for Nausea or Vomiting 4/23/19  Yes Juan Carlos Case,    ferrous sulfate 325 (65 Fe) MG tablet Take 1 tablet by mouth 2 times daily (with meals) 4/23/19  Yes Juan Carlos Case,    docusate sodium (COLACE, DULCOLAX) 100 MG CAPS Take 100 mg by mouth 2 times daily 4/23/19  Yes Juan Carlos Case,    levothyroxine (SYNTHROID) 100 MCG tablet TAKE 1 TABLET BY MOUTH EVERY MORNING 30 MINUTES BEFORE EATING. 4/10/19  Yes Celestine Trujillo DO   vitamin D (ERGOCALCIFEROL) 26753 units CAPS capsule Take 1 capsule by mouth once a week 4/9/19  Yes Celestine Trujillo DO   oxybutynin (DITROPAN-XL) 5 MG extended release tablet Take 1 tablet by mouth daily 4/9/19  Yes Celestine Trujillo DO   magnesium oxide (MAG-OX) 400 (241.3 Mg) MG TABS tablet Take 1 tablet by mouth 2 times daily 4/9/19  Yes Celestine Trujillo DO   fluticasone (FLONASE) 50 MCG/ACT nasal spray 2 sprays by Nasal route daily  Patient taking differently: 1 spray by Nasal route daily  4/9/19  Yes Celestine Trujillo DO   pantoprazole (PROTONIX) 40 MG tablet TAKE ONE TABLET BY MOUTH DAILY 4/9/19  Yes Marizol Quinn, DO   Compression Stockings MISC by Does not apply route Below knee  15 - 30 mm Hg 7/25/18   Higinio Portillo, DO       Allergies  No Known Allergies    Review of Systems:    Relevant for neurogenic bladder with recurrent UTI and urosepsis and pelvic discomfort.       Objective  BP (!) 127/58   Pulse 66   Temp 98.3 °F (36.8 °C) (Oral)   Resp 18   Ht 4' 11\" (1.499 m)   Wt 145 lb 12.8 oz (66.1 kg)   LMP 05/21/2015 (Approximate)   SpO2 97%   BMI 29.45 kg/m² Physical Exam:  General: Alert no acute distress,  , appears slow in cognition  Head and neck : PERRL, EOMI . Sclera non icteric. Poor dentition. Oropharynx : Clear  Neck: no JVD,  no adenopathy,  Lungs: Clear to auscultation   Heart: Regular rate and regular rhythm, no murmur, normal S1, S2  Abdomen: Soft, non-tender;no masses, no organomegaly  Extremities: No edema,no cyanosis, no clubbing. Skin:  No rash. Multiple moles. Neurologic:Cranial nerves grossly intact. No focal motor or sensory deficits . Chronic intellectually limited    Recent Laboratory Data-   Lab Results   Component Value Date    WBC 9.6 06/14/2019    HGB 7.8 (L) 06/14/2019    HCT 24.9 (L) 06/14/2019    MCV 93.6 06/14/2019     06/14/2019    LYMPHOPCT 27.3 06/14/2019    RBC 2.66 (L) 06/14/2019    MCH 29.3 06/14/2019    MCHC 31.3 (L) 06/14/2019    RDW 14.9 06/14/2019    NEUTOPHILPCT 59.2 06/14/2019    MONOPCT 5.6 06/14/2019    BASOPCT 0.6 06/14/2019    NEUTROABS 5.69 06/14/2019    LYMPHSABS 2.62 06/14/2019    MONOSABS 0.54 06/14/2019    EOSABS 0.49 06/14/2019    BASOSABS 0.06 06/14/2019       Lab Results   Component Value Date     06/14/2019    K 4.3 06/14/2019     (H) 06/14/2019    CO2 24 06/14/2019    BUN 19 06/14/2019    CREATININE 2.8 (H) 06/14/2019    GLUCOSE 93 06/14/2019    CALCIUM 8.9 06/14/2019    PROT 6.0 (L) 05/31/2019    LABALBU 3.1 (L) 05/31/2019    BILITOT <0.2 05/31/2019    ALKPHOS 56 05/31/2019    AST 18 05/31/2019    ALT 23 05/31/2019    LABGLOM 18 06/14/2019    GFRAA 21 06/14/2019       Lab Results   Component Value Date    IRON 44 06/03/2019    TIBC 214 (L) 06/03/2019    FERRITIN 231 06/03/2019           Radiology-    Ct Abdomen Pelvis Wo Contrast Additional Contrast? Oral    Result Date: 6/2/2019  LOCATION: 200 EXAM: CT ABDOMEN PELVIS WO CONTRAST COMPARISON: 5/22/2019 reporting cystitis and severe right hydroureteronephrosis with stable position of the ureteral stent.  HISTORY: Abnormal labs, pain bilateral hydronephrosis, somewhat more severe on the right than the left. Ir Ultrasound Guidance Vascular Access    Result Date: 6/4/2019  Location:200 Exam: IR FLUORO GUIDED CVA DEVICE PLMT/REPLACE/REMOVAL, IR ULTRASOUND GUIDANCE VASCULAR ACCESS HISTORY: Acute renal failure. Versed 1 mg IV was given for anxiety during the procedure. Fluoroscopy time 0.3 minutes. PROCEDURE: Informed consent was obtained. Ultrasound was used to localize the right  internal jugular vein. The skin was prepped and draped in a sterile fashion and then anesthetized with lidocaine. Maximal sterile barrier technique was utilized. Under direct ultrasound visualization, a micropuncture needle was used to gain access to the right internal jugular vein. A 0.018 guidewire was advanced into the central venous circulation under fluoroscopic guidance. A micropuncture sheath was placed. Through the sheath, a 0.035 J guidewire was advanced. Over the guidewire, the tract was dilated using 12 and 14 [place] Cheryl dilators. A 13.5 [place] diameter temporary dialysis catheter 15 cm long was then advanced over the guidewire and positioned within the superior aspect of the right atrium. The catheter was then affixed to the skin using 2-0 silk. FINDINGS: Ultrasound demonstrates patent internal jugular veins bilaterally. Fluoroscopic spot film demonstrates placement of the right internal jugular hemodialysis catheter with the tip in the superior aspect of the right atrium. Successful placement of a right IJ temporary hemodialysis catheter. Ir Ultrasound Guidance Vascular Access    Result Date: 5/29/2019  Location:200 Exam: IR ULTRASOUND GUIDANCE VASCULAR ACCESS History:  PICC line placement Ultrasound evaluation of potential access was performed. After successfully identifying a patent right brachial vein, and following the administration of lidocaine, real-time ultrasound guidance was used to puncture the right brachial   vein.  A permanent recording was created for the patient's record. Ultrasound guidance was provided during placement of a PICC line. ASSESSMENT/PLAN :  Squamous cell carcinoma of the cervix by preliminary path report with possible involvement of the lower vaginal wall clinically per GYN exam  -pending final path results  -CT abdomen without contrast reviewed , suboptimal for assessment of cervix and pelvic lymphadenopathy  -unsure of HPV hx  -recommend GYN Oncology assessment   -She will need PET CT scan for definite staging   -Urology has signed off, but worth further discussion with them regarding possible urologic primary. NO mention of primary urologic cancer on imaging studies (pelvic US/ CT A/P w/o contrast)    It appears that clinically she has at least a T3 lesion involving the cervix with possible extension into the lower one third of the vaginal wall.   She does have hydronephrosis bilaterally partly related to her neurogenic bladder but cannot exclude pelvic lymphadenopathy and if PET/CT confirms pelvic lymphadenopathy she would be upstaged to stage IIIb    Once final pathology available and clinical staging established option of external radiation along with single agent carboplatinum adjusted to AUC of 4 will be addressed since she is not a candidate for cis-platinum because of her advanced CKD and impaired GFR    CKD IV with hydronephrosis, neurogenic bladder and stent placement  -Urology and Nephrology following  -undergoing intermittent hemodialysis  -s/p Cystourethroscopy, bilateral JJ ureteral stent insertion, pelvic examination 6/1  -currently has crane     Normocytic anemia of chronic disease  -Multifactorial related to myelosuppression by recurrent sepsis as well as chronic kidney disease stage IV  -Iron profile consistent with AOCD  -Hbg 7.8 today, stable  -Monitor    Chronic mental disability with depression and psychosis  -Psych following      Lucina Naranjo MD

## 2019-06-14 NOTE — PLAN OF CARE
Problem: Pain:  Goal: Pain level will decrease  Description  Pain level will decrease  6/13/2019 2258 by Priscilla Desai RN  Outcome: Met This Shift     Problem: Falls - Risk of:  Goal: Will remain free from falls  Description  Will remain free from falls  6/13/2019 2258 by Priscilla Desai RN  Outcome: Met This Shift

## 2019-06-14 NOTE — CARE COORDINATION
Ss note:6/14/2019.11:51 AM  Discharge order noted, consult noted for care co-ordinator Elroy Cameron to see pt to arrange outpt appts and ensure follow up. VM message let for Stella Wood CM spoke to Stella Wood she will meet with pt. Awaiting psych consult. Plan is to return to [facility] today.  AYO Stinson

## 2019-06-14 NOTE — CARE COORDINATION
[address] Transitions Interview     2019    Patient: Inge Verduzco Patient : 1965   MRN: [id number]  Reason for Admission: ARF  RARS: Readmission Risk Score: 35         Spoke with: Ya Ingram (Patient)      Readmission Risk  Patient Active Problem List   Diagnosis    Hyperlipidemia    Noncompliance    Depressive disorder    Halitosis    Dysmenorrhea    Bradycardia    Acquired hypothyroidism    Mild intermittent asthma without complication    Acute renal failure (ARF) (HCC)    Anxiety    Intellectual disability    Obstructive uropathy    Mixed stress and urge urinary incontinence    Vitamin D deficiency    Seasonal allergic rhinitis    Acute on chronic renal insufficiency    Acute renal failure superimposed on chronic kidney disease, on chronic dialysis (HCC)    Mild protein-calorie malnutrition (HCC)    Acute cystitis with hematuria       Inpatient Assessment  Care Transitions Summary    Care Transitions Inpatient Review  Medication Review  Are you able to afford your medications?:  Yes  How often do you have difficulty taking your medications?:  I always take them as prescribed. Housing Review  Who do you live with?:  Partner/Spouse/SO  Are you an active caregiver in your home?:  No  Social Support  Do you have a ?:  No  Do you have a [address] [place] [address]?:  No  Durable Medical Equipment  Patient DME:  Cory Barragan  Functional Review  Ability to seek help/take action for Emergent/Urgent situations i.e. fire, crime, inclement weather or health crisis.:  Needs Assistance  Ability handle personal hygiene needs (bathing/dressing/grooming): Independent  Ability to manage medications:  Needs Assistance  Ability to prepare food:  Dependent  Ability to maintain home (clean home, laundry):  Dependent  Ability to drive and/or has transportation:  Dependent  Ability to do shopping:  Needs Assistance  Ability to manage finances:   Independent  Is patient able to live independently?:  Yes  Hearing and Vision  Visual Impairment:  Visual impairment (Glasses/contacts)  Hearing Impairment:  None  Care Transitions Interventions  No Identified Needs       Met with patient today 6/14/19 at bedside with no family present. Per Dr. Harley Casey request who wants CTC to follow post hospital discharge for complexity care, follow up with PET scan and follow up with specialist appointments. Explained role and reason for visit to patient. Patient appears having intellectual disability but MR not noted in history. Patient answers and ask questions appropriately during CTC assessment but does have moments of outburst with raised voice when appearing frustrated in addition to moments of withdrawing and becoming non-engaged when asked about sexual history and not wanting to make eye contact. Patient states to CTC she makes own medical decisions and does not have POA or legal guardian. Patient noted to have three hospital admissions since April 2019 with ARF and comes from [facility]. Noted BUN/Creatinine on admission was 49/6.1 requiring temporary dialysis. Temporary dialysis catheter (IJ) was removed from right neck yesterday 6/13/19. Patient is s/p bilateral kidney stent placement d/t hydronephrosis and has indwelling crane catheter in place d/t neurogenic bladder. While IP patient underwent EUA with multiple cervical biopsies that's suspicious for squamous cell carcinoma of cervix. Patient reports not wanting to have crane catheter in place as she admits it's \"ruining my life\" d/t interfering with sexual activity with SO. CTC processed with patient on the importance to have urinary catheter in place indefinitely d/t neurogenic bladder which she acknowledges. Provided therapeutic listening and verbal support when patient verbalizing her SO dislike over not able to have sex. Patient does complain of diffuse abdominal pain that radiates around to back.  Patient tearful at moments stating she cries herself to sleep at night d/t pain. States having associated vaginal blood spotting that occurs off/on. States having nausea, vomiting, and poor appetite since December when first bout of ARF. Denies  Shortness of breath, chest pain or chest discomfort. Patient reports she lives with significant other Pamela Coronel) in basement floor apartment. States she has been with Ananya Yao for 22 years and they have one adult daughter who lives in group home. States Ananya Yao is on disability and receives a pension from being in [employer]. States she she has been on disability her entire life. States having 8 steps to climb down to apartment inside building. States having two brothers Meredith Higuera and Heydi Franco) and a cousin Charleen Horvath) who she identifies as part of her support network. States she utilizes a front-wheeled walker when ambulating. States a history of falls and verbalizes most recent fall occurring last year. States she not Ananya Yao drive and depend on friends for transportation. States having difficulty preparing meals since not able to afford mobile meals for approximately two months. States she has friends who take her grocery shopping if needed. States she tries to budget her money for rides as she verbalizes utilizing [employer] or friends for transportation. \[facility] [place] [facility]\"" she is independent in managing home medications as her brother bought her a mediset she uses in addition to having [employer] [address] in [facility] who label in big print names of medications and times of administration(morning, afternoon, evening). [facility] discussed case with WILY stauffer) and LIMA (Viktoriya) on nursing unit who advise of importance for patient to follow up with oncology and needing a PET scan in addition to following up with Dr. Byron Rollins (urology) and Dr. Blanca Penn for gynecology.  [facility] called [facility] and spoke with [place] [id number] who advises once office receives consult documentation from Dr. Crystal Dexter who seen while IP, office will reach out to CHF at [facility] and [facility] to get follow up appointment scheduled and PET scan ordered. Provided Sue with [facility] contact information who agrees to call when these appointments will be made. [facility] spoke with Campos Badillo )nurse) [facility] who provided follow up appointment with patient for 7/17/19 at 10:20 am in [place] office and advises a family member must be present at time of visit. [facility] entterd follow up appointment in discharge navigator along with note for family member to be present at appt time. [facility] attempted to contact Dr. Andrae Troy (Gynecology) office regarding follow up appointment but office is closed at this time. CTC will continue to follow up. Confirmed plan is to be discharged to [facility]note 2[facility] at the [facility] later today. Patient is receptive to be followed by [facility] post hospital discharge for Care Transition. [facility] plans on collaborating with care manager from [facility] as patient [facility] has been reinstated. Provide patient with [facility] contact information. [facility] wrote phone number in large numbers per patient request who was able to read number back to [facility] correctly. Denies any other needs at this time. Follow Up  No future appointments.     Health Maintenance  Health Maintenance Due   Topic Date Due    Breast cancer screen  05/18/2019       JUSTYN León

## 2019-06-14 NOTE — PROGRESS NOTES
[facility] MD Fredo Payan MD Zetta Billing, MD. March Eng MD   Progress Note    6/14/2019    SUBJECTIVE:   6/7: On RA Braden in place Poor UO yesterday    6/9: Abdominal cramping. Ongoing anorexia, nausea, poor intake. No dyspnea at rest on room air. No chest pain or palpitations. No swelling. No pruritus Depressed as regards recent medical problems. 6/10 : On RA . Good UO .     6/12 : on RA . Good UO .     6/13 : on RA . Good UO     6/14 : no issues . Good UO . Braden in place . PROBLEM LIST:    Principal Problem:    Acute renal failure (ARF) ([facility])  Active Problems:    Mild protein-calorie malnutrition ([address]  Resolved Problems:    * No resolved hospital problems. *       DIET:    DIET RENAL;       Allergies : Patient has no known allergies. Past Medical History:   Diagnosis Date    Anxiety     Depression     Hyperlipidemia     Hypertension     Hypothyroidism     Intellectual disability     Leg pain, bilateral     Noncompliance with medications     Noncompliance with treatment     Osteoarthritis        Past Surgical History:   Procedure Laterality Date    CYSTOSCOPY Left 1/21/2019    CYSTOSCOPY, BILATERAL RETROGRADE PYELOGRAMS, BILATERAL STENT INSERTION performed by Franc Loyola MD at [facility] Bilateral 6/1/2019    CYSTOSCOPY, RETROGRADE PYELOGRAMS, BILATERAL URETERAL STENT EXCHANGE performed by Marco Muhammad MD at [address] [place] [address] N/A 6/6/2019    EXAM UNDEDR ANESTHESIA VAGINAL BIOPSIES performed by Yousif Fitzgerald MD at [place] [address]. History   Problem Relation Age of Onset    Diabetes Brother     Thyroid Disease Mother     Other Daughter         Autism        reports that she quit smoking about 30 years ago. She has a 5.00 pack-year smoking history. She has never used smokeless tobacco. She reports that she does not drink alcohol or use drugs.     Review of Systems:   Constitutional: no fevers °C) Oral 75 16 98 % --   @      Intake/Output Summary (Last 24 hours) at 6/14/2019 1451  Last data filed at 6/14/2019 1416  Gross per 24 hour   Intake 551 ml   Output 3050 ml   Net -2499 ml         Wt Readings from Last 3 Encounters:   06/14/19 145 lb 12.8 oz (66.1 kg)   05/23/19 148 lb (67.1 kg)   05/15/19 140 lb 10.5 oz (63.8 kg)       Constitutional:  in no acute distress  Oral: mucus membranes moist  Neck: no JVD  Cardiovascular: S1, S2 regular rhythm, no murmur,or rub  Respiratory:  No crackles, no wheeze  Gastrointestinal:  Soft, nontender, nondistended, NABS  Ext: no edema, feet warm  Skin: dry, no rash  Neuro: awake, alert, interactive      DATA:    Recent Labs     06/12/19  0510 06/13/19  0529 06/14/19  0436   WBC 8.4 8.4 9.6   HGB 8.3* 7.8* 7.8*   HCT 26.6* 25.1* 24.9*   MCV 92.7 93.3 93.6    318 332     Recent Labs     06/12/19  0510 06/13/19  0529 06/14/19  0436    144 142   K 3.7 4.0 4.3    109* 108*   CO2 24 24 24   MG 1.4*  --   --    BUN 21* 19 19   CREATININE 3.3* 2.9* 2.8*       No results found for: LABPROT    ASSESSMENT:      - Acute kidney injury 2.2 obstructive uropathy in contest of poorly functioning bladder and neurogenic bladder .     - Chronic kidney disease baseline cr 1.4     - Sepsis secondary to a urinary tract infection    - Anaemia of chronic disease . R/O acute component . - Chronic mental disability    - firm abnormal nodule lesion within the vagina with its urethra with cystinosis suspicion for gynecological malignancy    - Hypernatremia, mild, due to poor intake with subsequent dehydration      S/p Cystourethroscopy, bilateral JJ ureteral stent insertion   ? ?  of accuracy of urine eosinophills  With UTI /crane and pyuria .            RECOMMENDATIONS  Kidney function continue to improve   dialysis line out   Prelim pathology report showing Squamous cell carcinoma of the cervix   Potential d/c today   D/w RN weekly bmp , mg and arrange for f/u in office in 2 weeks .            Electronically signed by Sary Moore MD on 6/14/2019 at 2:51 PM

## 2019-06-14 NOTE — DISCHARGE SUMMARY
Internal Medicine  Discharge Summary    NAME: Dominic Madrigal  :  1965  MRN:  [id number]  PCP:Celestine Trujillo DO  ADMITTED: 2019      DISCHARGED: 19    ADMITTING PHYSICIAN: Tami Stevens DO    CONSULTANT(S):   IP CONSULT TO INFECTIOUS DISEASES  IP CONSULT TO UROLOGY  IP CONSULT TO NEPHROLOGY  IP CONSULT TO OB GYN  IP CONSULT TO PSYCHIATRY  IP CONSULT TO SOCIAL WORK  IP CONSULT TO PSYCHIATRY  IP CONSULT TO PSYCHIATRY  IP CONSULT TO SOCIAL WORK  IP CONSULT TO SOCIAL WORK  IP CONSULT TO ONCOLOGY  IP CONSULT TO PSYCHIATRY     ADMITTING DIAGNOSIS:   Acute renal failure (ARF) ([facility] [address] [N17.9]     DISCHARGE DIAGNOSES:   1. Acute on chronic kidney disease stage IV compatible with a neurogenic bladder with chronic b/l ureteral stents requiring temporary dialysis with ongoing improvement in her renal function and discontinuation of dialysis  2. Abnormal pelvic exam showing a firm abnormal lesion within the vagina with preliminary biopsy suggestive of urothelial cell carcinoma with metastasis  3. Sepsis secondary to a urinary tract infection secondary to an indwelling Braden catheter with chronic stents  4. Normocytic anemia of chronic disease  5. Hypothyroidism  6. Chronic mental disability  7. Hyperlipidemia    BRIEF HISTORY OF PRESENT ILLNESS:   Patient is [address] [place] [address] emergency room with main complaint of abnormal lab work. Patient had laboratory done at the nursing facility. Patient with creatinine of 6. History is once again limited to the patient's underlying mental retardation. Patients that she has not been eating as much as normal. Patient unsure if 4 was removed and replaced. In the ER patient was found to have acute renal failure creatinine 7. Patient will ultrasound showed hydronephrosis. Patient to be admitted to the 3rd floor for treatment of acute renal failure.     LABS[de-identified]  Lab Results   Component Value Date    WBC 9.6 2019    HGB 7.8 (L) 2019    HCT 24.9 (L) 2019 and adrenal glands are normal. BILIARY SYSTEM: No evidence of biliary ductal dilatation. GENITOURINARY: Bilateral ureteral stents are in place and in satisfactory position. There is persistent severe right hydroureteronephrosis. Findings appear similar to the previous CT. The left kidney is not significantly dilated. No stones are appreciated. Prominent perivesical fat stranding and fluid seen in the lower abdomen and pelvis. Pelvic structures are unremarkable. GASTROINTESTINAL: The stomach, small and large bowel are normal in caliber without evidence of focal wall thickening. There is no evidence of bowel obstruction. APPENDIX: Normal. FLUID COLLECTIONS: None. LYMPH NODES: No adenopathy by size criteria. VASCULAR STRUCTURES: Visualized vascular structures appear normal. ABDOMINAL WALL: Normal with no herniations. OSSEOUS AND SOFT TISSUE STRUCTURES: Bone windows demonstrate no suspicious osteolytic or osteoblastic lesions. No fracture identified. 1. Findings suggesting cystitis. 2. Persistent severe right hydroureteronephrosis with stable position of the ureteral stents    Us Pelvis Complete    Result Date: 6/4/2019  Reading location: 200 INDICATION: Vaginal nodules FINDINGS: Transabdominal images of the pelvis. Uterus 68 x 30 x 38 mm with unremarkable myometrial contour and echogenicity. Endometrial stripe 4.2 mm in AP dimension. Right ovary 16 x 12 x 15 mm and left 14 x 10 x 11 mm with unremarkable internal echogenicity. Small free fluid in the cul-de-sac. No acute finding. Ir Aravind Wagnerich Device Plmt/replace/removal    Result Date: 6/4/2019  Location:200 Exam: IR FLUORO GUIDED CVA DEVICE PLMT/REPLACE/REMOVAL, IR ULTRASOUND GUIDANCE VASCULAR ACCESS HISTORY: Acute renal failure. Versed 1 mg IV was given for anxiety during the procedure. Fluoroscopy time 0.3 minutes. PROCEDURE: Informed consent was obtained. Ultrasound was used to localize the right  internal jugular vein.  The skin was prepped and draped in a sterile fashion and then anesthetized with lidocaine. Maximal sterile barrier technique was utilized. Under direct ultrasound visualization, a micropuncture needle was used to gain access to the right internal jugular vein. A 0.018 guidewire was advanced into the central venous circulation under fluoroscopic guidance. A micropuncture sheath was placed. Through the sheath, a 0.035 J guidewire was advanced. Over the guidewire, the tract was dilated using 12 and 14 Western Cheryl dilators. A 13.5 Algerian diameter temporary dialysis catheter 15 cm long was then advanced over the guidewire and positioned within the superior aspect of the right atrium. The catheter was then affixed to the skin using 2-0 silk. FINDINGS: Ultrasound demonstrates patent internal jugular veins bilaterally. Fluoroscopic spot film demonstrates placement of the right internal jugular hemodialysis catheter with the tip in the superior aspect of the right atrium. Successful placement of a right IJ temporary hemodialysis catheter. Ir Gurney Pavy Device Plmt/replace/removal    Result Date: 5/29/2019  Location:200 Exam: IR FLUORO GUIDED CVA DEVICE PLMT/REPLACE/REMOVAL HISTORY:   Infection FLUOROSCOPY TIME:  0.2 minutes PROCEDURE:  The procedure was explained to the patient and informed consent was obtained. The skin over the  right arm was prepped and draped in a sterile fashion and then anesthetized with Lidocaine. Maximal sterile barrier technique was utilized. Under ultrasound guidance, the brachial vein was localized. Using a micropuncture needle, the vein was entered under direct ultrasound visualization. A 0.018 guidewire was advanced into the central venous circulation. A 5 Algerian peel-away sheath was placed. The 5 Algerian Power PICC line catheter was trimmed to 32 cm was then advanced through the peel-away sheath and positioned at the right atrial/SVC junction. Fluoroscopic spot film was obtained.  FINDINGS:  Ultrasound shows the veins to be patent. Fluoroscopic spot film demonstrates the Power PICC line to be at the right atrial/SVC junction. Successful placement of a 5 Macedonian double-lumen Power PICC line using ultrasound guidance via the right brachial  vein. Xr Urogram W Wo Kub W Wo Tomogram    Result Date: 6/1/2019  Reading location: 200 INDICATION: Ureteral stent exchange FINDINGS: 67.4 seconds of fluoroscopy and 4 spot images during ureteral stent exchange. No contrast injection. Operative fluoroscopy    Us Retroperitoneal Complete    Result Date: 5/31/2019  Reading location: 200 INDICATION: Renal insufficiency FINDINGS: Sonographic evaluation urinary tract compared with ultrasound 1/18/2019 and CT the abdomen and pelvis 5/22/2019. Right kidney 12.1 cm in length and the left 11.2 cm. At least moderate dilatation right renal collecting system. On recent CT, right ureteral stent is coiled in the renal pelvis. Dilatation the left renal collecting system is somewhat less severe than the right. Mid left renal cyst measures 13 mm and upper pole cyst 25.5 mm. No focal abnormality right renal collecting system. Nondistended urinary bladder. Moderate bilateral hydronephrosis, somewhat more severe on the right than the left. Ir Ultrasound Guidance Vascular Access    Result Date: 6/4/2019  Location:200 Exam: IR FLUORO GUIDED CVA DEVICE PLMT/REPLACE/REMOVAL, IR ULTRASOUND GUIDANCE VASCULAR ACCESS HISTORY: Acute renal failure. Versed 1 mg IV was given for anxiety during the procedure. Fluoroscopy time 0.3 minutes. PROCEDURE: Informed consent was obtained. Ultrasound was used to localize the right  internal jugular vein. The skin was prepped and draped in a sterile fashion and then anesthetized with lidocaine. Maximal sterile barrier technique was utilized. Under direct ultrasound visualization, a micropuncture needle was used to gain access to the right internal jugular vein.  A 0.018 guidewire was advanced into the central venous circulation under fluoroscopic guidance. A micropuncture sheath was placed. Through the sheath, a 0.035 J guidewire was advanced. Over the guidewire, the tract was dilated using 12 and 14 Western Cheryl dilators. A 13.5 Khmer diameter temporary dialysis catheter 15 cm long was then advanced over the guidewire and positioned within the superior aspect of the right atrium. The catheter was then affixed to the skin using 2-0 silk. FINDINGS: Ultrasound demonstrates patent internal jugular veins bilaterally. Fluoroscopic spot film demonstrates placement of the right internal jugular hemodialysis catheter with the tip in the superior aspect of the right atrium. Successful placement of a right IJ temporary hemodialysis catheter. Ir Ultrasound Guidance Vascular Access    Result Date: 5/29/2019  Location:200 Exam: IR ULTRASOUND GUIDANCE VASCULAR ACCESS History:  PICC line placement Ultrasound evaluation of potential access was performed. After successfully identifying a patent right brachial vein, and following the administration of lidocaine, real-time ultrasound guidance was used to puncture the right brachial   vein. A permanent recording was created for the patient's record. Ultrasound guidance was provided during placement of a PICC line. HOSPITAL COURSE:   Ya spent an extended period of time hospitalized and this will be a brief synopsis of the events that occurred during the hospital stay. She had previously been discharged to a skilled nursing facility but had somehow removed her catheter. In the setting of a neurogenic bladder, this required immediate return to the emergency department. She was evaluated by the urologic team and underwent bilateral ureteral stent exchange. Braden catheter will remain in place indefinitely. During the urologic examination, she was found to have abnormalities of the vagina.   She was evaluated by the OB/GYN team and underwent examination under anesthesia. Multiple biopsies were obtained to lesions involving the vagina and cervix. Preliminary pathology report is indicating what appears to be metastatic urothelial cell carcinoma. While in the hospital, she also was found to be suffering from worsening renal function as result of the neurogenic bladder. She required temporary dialysis but her renal function ultimately responded and dialysis catheter was removed. She will need close outpatient follow-up with this. We discussed multiple discharge options. We asked the psychiatry team to provide consultation as well regarding competency and guardianship. At this point, the patient appears competent and capable of making her own decisions although she is mildly intellectually impaired. She will now be discharged to a skilled nursing facility. [id number] [place] [address] liaisons will arrange outpatient follow-up for urology, oncology, and nephrology. She will ultimately require PET scan as an outpatient and this will be deferred to the oncologic team.  I anticipate the need for chemotherapy in the future and I have discussed this with her at length. I spent an extended period of time speaking with the patient on a daily basis giving her the opportunity to ask questions. She seems to understand the gravity of the situation and asks appropriate questions. I believe she is competent to make her decisions. Again, outpatient follow-up will be vital in her improvement. This will be arranged accordingly. She is now acceptable for discharge. BRIEF PHYSICAL EXAMINATION AND LABORATORIES ON DAY OF DISCHARGE:  VITALS:  BP (!) 127/58   Pulse 66   Temp 98.3 °F (36.8 °C) (Oral)   Resp 18   Ht 4' 11\" (1.499 m)   Wt 145 lb 12.8 oz (66.1 kg)   LMP 05/21/2015 (Approximate)   SpO2 97%   BMI 29.45 kg/m²     HEENT:  PERRLA. EOMI. Sclera clear. Buccal mucosa moist.    Neck:  Supple. Trachea midline. No thyromegaly. No JVD. No bruits.     Heart:  Rhythm regular, rate controlled. No murmurs. Lungs:  Symmetrical. Clear to auscultation bilaterally. No wheezes. No rhonchi. No rales. Abdomen: Soft. Non-tender. Non-distended. Bowel sounds positive. No organomegaly or masses. No pain on palpation    Extremities:  Peripheral pulses present. No peripheral edema. No ulcers. Neurologic:  Alert x 3. No focal deficit. Cranial nerves grossly intact. Skin:  No petechia. No hemorrhage. No wounds. DISPOSITION:  The patient's condition is good. At this time the patient is without objective evidence of an acute process requiring continuing hospitalization or inpatient management. They are stable for discharge with outpatient follow-up. I have spoken with the patient and discussed the results of the current hospitalization, in addition to providing specific details for the plan of care and counseling regarding the diagnosis and prognosis. The plan has been discussed in detail and they are aware of the specific conditions for emergent return, as well as the importance of follow-up.   Their questions are answered at this time and they are agreeable with the plan for discharge to nursing home    DISCHARGE MEDICATIONS:    Geovany Perez   Home Medication Instructions [facility]:[id number]    Printed on:06/14/19 1048   Medication Information                      acetaminophen (TYLENOL) 325 MG tablet  Take 650 mg by mouth every 4 hours as needed for Pain             atorvastatin (LIPITOR) 40 MG tablet  Take 1 tablet by mouth daily             Compression Stockings MISC  by Does not apply route Below knee  15 - 30 mm Hg             dextrose 5 % SOLN 250 mL with vancomycin 1 g SOLR 1,000 mg  Infuse 1,000 mg intravenously every 24 hours             docusate sodium (COLACE, DULCOLAX) 100 MG CAPS  Take 100 mg by mouth 2 times daily             ferrous sulfate 325 (65 Fe) MG tablet  Take 1 tablet by mouth 2 times daily (with meals)             fluticasone (FLONASE) 50 MCG/ACT nasal spray  2 sprays by Nasal route daily             levothyroxine (SYNTHROID) 100 MCG tablet  TAKE 1 TABLET BY MOUTH EVERY MORNING 30 MINUTES BEFORE EATING.             magnesium oxide (MAG-OX) 400 (241.3 Mg) MG TABS tablet  Take 1 tablet by mouth 2 times daily             mirtazapine (REMERON) 15 MG tablet  Take 1 tablet by mouth nightly             ondansetron (ZOFRAN-ODT) 4 MG disintegrating tablet  Take 1 tablet by mouth every 8 hours as needed for Nausea or Vomiting             oxybutynin (DITROPAN-XL) 5 MG extended release tablet  Take 1 tablet by mouth daily             pantoprazole (PROTONIX) 40 MG tablet  TAKE ONE TABLET BY MOUTH DAILY             sodium bicarbonate 650 MG tablet  Take 1 tablet by mouth 2 times daily             traMADol (ULTRAM) 50 MG tablet  Take 1 tablet by mouth every 8 hours as needed for Pain for up to 5 days. vitamin D (ERGOCALCIFEROL) 14210 units CAPS capsule  Take 1 capsule by mouth once a week                 FOLLOW UP/INSTRUCTIONS:  · This patient is instructed to follow-up with her primary care physician. · Patient is instructed to follow-up with the consults listed above as directed by them. · she is instructed to resume home medications and take new medications as indicated in the list above. · If the patient has a recurrence of symptoms, she is instructed to go to the ED. Preparing for this patient's discharge, including paperwork, orders, instructions, and meeting with patient did require > 30 minutes.     Nerissa Banks DO   6/14/2019  10:43 AM

## 2019-06-14 NOTE — PLAN OF CARE
Problem: Pain:  Goal: Pain level will decrease  Description  Pain level will decrease  6/14/2019 1237 by Humble Soliz RN  Outcome: Met This Shift     Problem: Pain:  Goal: Control of acute pain  Description  Control of acute pain  6/14/2019 1237 by Humble Soliz RN  Outcome: Met This Shift     Problem: Pain:  Goal: Control of chronic pain  Description  Control of chronic pain  6/14/2019 1237 by Humble Soliz RN  Outcome: Met This Shift     Problem: Falls - Risk of:  Goal: Will remain free from falls  Description  Will remain free from falls  6/14/2019 1237 by Humble Soliz RN  Outcome: Met This Shift     Problem: Falls - Risk of:  Goal: Absence of physical injury  Description  Absence of physical injury  6/14/2019 1237 by Humble Soliz RN  Outcome: Met This Shift     Problem: Tissue Perfusion - Renal, Altered:  Goal: Electrolytes within specified parameters  Description  Electrolytes within specified parameters  6/14/2019 1237 by Humble Soliz RN  Outcome: Met This Shift     Problem: Tissue Perfusion - Renal, Altered:  Goal: Urine creatinine clearance will be within specified parameters  Description  Urine creatinine clearance will be within specified parameters  6/14/2019 1237 by Humble Soliz RN  Outcome: Met This Shift     Problem: Tissue Perfusion - Renal, Altered:  Goal: Ability to achieve a balanced intake and output will improve  Description  Ability to achieve a balanced intake and output will improve  6/14/2019 1237 by Humble Soliz RN  Outcome: Met This Shift

## 2019-06-14 NOTE — PROGRESS NOTES
[facility] Quality Flow/Interdisciplinary Rounds Progress Note        Quality Flow Rounds held on June 14, 2019    Disciplines Attending:  Bedside Nurse, ,  and Nursing Unit Leadership    Tayler Fried was admitted on 5/31/2019  2:28 PM    Anticipated Discharge Date:  Expected Discharge Date: 06/14/19    Disposition:    Ramon Score:  Ramon Scale Score: 19    Readmission Risk              Risk of Unplanned Readmission:        33           Discussed patient goal for the day, patient clinical progression, and barriers to discharge. The following Goal(s) of the Day/Commitment(s) have been identified:  Activity progression, still needing clarification of when PET Scan needs completed.        Chhaya Marques  June 14, 2019

## 2019-06-14 NOTE — PROGRESS NOTES
[address] [place] [address] [facility]  Progress Note      CC: emotional this morning-     ID following for hydronephrosis- atbs management  Face to face encounter     SUBJECTIVE:  Patient is tolerating medications. No reported adverse drug reactions. ROS: nausea no vomiting, diarrhea. + abdominal pain. No fevers. Had chills No rash. Emotional this am.     Medications:  Scheduled Meds:   magnesium oxide  400 mg Oral Daily    potassium bicarb-citric acid  40 mEq Oral Once    darbepoetin lucio-polysorbate  40 mcg Subcutaneous Weekly - Monday    famotidine  20 mg Oral BID    sodium chloride  250 mL Intravenous Once    sodium bicarbonate  650 mg Oral BID    chlorhexidine  15 mL Mouth/Throat BID    atorvastatin  40 mg Oral Daily    docusate sodium  100 mg Oral BID    ferrous sulfate  325 mg Oral BID WC    fluticasone  2 spray Nasal Daily    levothyroxine  100 mcg Oral Daily    mirtazapine  15 mg Oral Nightly    sodium chloride flush  10 mL Intravenous 2 times per day    heparin (porcine)  5,000 Units Subcutaneous 3 times per day     Continuous Infusions:   dextrose 5% and 0.45% NaCl with KCl 20 mEq 75 mL/hr at 06/13/19 2357    dextrose       PRN Meds:acetaminophen, heparin flush, magnesium hydroxide, glucose, dextrose, glucagon (rDNA), dextrose, traMADol, sodium chloride flush, ondansetron  OBJECTIVE:  Patient Vitals for the past 24 hrs:   BP Temp Temp src Pulse Resp SpO2 Weight   06/14/19 0743 (!) 127/58 98.3 °F (36.8 °C) Oral 66 18 97 % --   06/14/19 0630 -- -- -- -- -- -- 145 lb 12.8 oz (66.1 kg)   06/14/19 0005 -- -- -- -- -- 96 % --   06/13/19 2000 128/72 98.6 °F (37 °C) Oral 77 16 98 % --   06/13/19 1600 132/70 98.4 °F (36.9 °C) Oral 75 16 98 % --     Constitutional: The patient is awake today- abdominal pain. Skin: Warm and dry. No rashes were noted. Head: at/nc   Neck: Supple to movements. Chest: No use of accessory muscles to breathe. Symmetrical expansion.  Auscultation reveals no wheezing, crackles, or rhonchi. Cardiovascular: S1 and S2 are rhythmic and regular. Abdomen: Positive bowel sounds to auscultation. Extremities: No clubbing, no cyanosis, no edema.   Right upper arm with line picc- no phlebitis   Crane yellow- some blood-tinge noted  RIJ -HD-    Laboratory and Tests Review:  Lab Results   Component Value Date    WBC 9.6 06/14/2019    WBC 8.4 06/13/2019    WBC 8.4 06/12/2019    HGB 7.8 (L) 06/14/2019    HCT 24.9 (L) 06/14/2019    MCV 93.6 06/14/2019     06/14/2019     Lab Results   Component Value Date    NEUTROABS 5.69 06/14/2019    NEUTROABS 4.86 06/13/2019    NEUTROABS 5.01 06/12/2019     Lab Results   Component Value Date    ALT 23 05/31/2019    AST 18 05/31/2019    ALKPHOS 56 05/31/2019    BILITOT <0.2 05/31/2019     Lab Results   Component Value Date     06/14/2019    K 4.3 06/14/2019    K 5.4 05/31/2019     06/14/2019    CO2 24 06/14/2019    BUN 19 06/14/2019    CREATININE 2.8 06/14/2019    GFRAA 21 06/14/2019    LABGLOM 18 06/14/2019    GLUCOSE 93 06/14/2019    GLUCOSE 97 12/20/2011    PROT 6.0 05/31/2019    LABALBU 3.1 05/31/2019    LABALBU 4.4 12/20/2011    CALCIUM 8.9 06/14/2019    BILITOT <0.2 05/31/2019    ALKPHOS 56 05/31/2019    AST 18 05/31/2019    ALT 23 05/31/2019     Radiology:  Reviewed     Microbiology:   5/31/19- urine cx- no growth to date  Blood cx- no growth to date    ASSESSMENT:  · Bilateral hydronephrosis   · Neurogenic bladder  · Leukocytosis- improved   · S/p stent exchange  · UTI f/u cx ngtd  · JIAN- on HD    PLAN:  · No atbs- no active infection   · Urine cultures- No growth to date  · Nephrology/ urology following  · S/p vaginal biopsy- Preliminary path report showing SCC vagina/cervix   · Hematology/ oncology to see  · Monitor labs- creat-3.3 WBC- 8.4  Ok to remove crane catheter from ID point of view   Ok to discharge from ID point of view    Follow up with ID physician in 2 weeks    Bishnu Shin MD, FACP  6/14/2019  2:14 PM

## 2019-06-14 NOTE — PROGRESS NOTES
Physical Therapy  Physical Therapy  Daily Treatment Note  6/14/2019  [id number]                      Kavitha Akbar   [id number]                              1965    Patient Active Problem List   Diagnosis    Hyperlipidemia    Noncompliance    Depressive disorder    Halitosis    Dysmenorrhea    Bradycardia    Acquired hypothyroidism    Mild intermittent asthma without complication    [facility]F) ([facility]    Anxiety    Intellectual disability    Obstructive uropathy    Mixed stress and urge urinary incontinence    Vitamin D deficiency    Seasonal allergic rhinitis    Acute on chronic renal insufficiency    Acute renal failure superimposed on chronic kidney disease, on chronic dialysis ([facility])    Mild protein-calorie malnutrition ([facility] [address]    Acute cystitis with hematuria       Recommendation for discharge: Subacute vs. LTAC  Equipment prescriptions needed:to be determined    AM-[facility] Mobility Inpatient   How much difficulty turning over in bed?: A Little  How much difficulty sitting down on / standing up from a chair with arms?: A Little  How much difficulty moving from lying on back to sitting on side of bed?: A Little  How much help from another person moving to and from a bed to a chair?: A Little  How much help from another person needed to walk in hospital room?: A Little  How much help from another person for climbing 3-5 steps with a railing?: A Lot  AM-PAC Inpatient Mobility Raw Score : 17  AM-PAC Inpatient T-Scale Score : 42.13  Mobility Inpatient CMS 0-100% Score: 50.57  Mobility Inpatient CMS G-Code Modifier : CK      Precautions: falls, alarm    S: Patient cleared by nursing for treatment. Patient is agreeable to treatment. Pain status: (measured on a visual analog scale with 0=no pain and 10=excruciating pain)  0/10.    O: Pt was instructed in and performed the following:   Bed Mobility- Supine to sit- Not assessed  Pt sitting on edge of bed at start of tx

## 2019-06-25 NOTE — PROGRESS NOTES
[facility]. Tashi Lopez Joselo        Pt Name: Todd Lorenzana: 1965  Date of evaluation: 6/25/2019  Primary Care Physician: Terese Villegas DO  Reason for evaluation:   Chief Complaint   Patient presents with    New Patient    Follow-Up from [facility]     Cervical        Subjective: Here for follow up. This patient was first seen by Dr. Krista Barbosa when she was a patient at [address] [place] [address] June 12, 2019 for  squamous cell carcinoma the cervix. She is mentally challenged and is not with her caregivers today. She remains very anxious. She states she has ongoing vaginal spotting. PATHOLOGY:  6/6/19  Diagnosis:  A. Anterior cervical lip, biopsy: Invasive high-grade carcinoma, see  comment  Invasive carcinoma is present at the deep inked margin of biopsy. B. Posterior cervical lip, biopsy: Invasive high-grade carcinoma, see  comment  Invasive carcinoma is present at the deep inked margin of biopsy. C. Endocervical lip, biopsy: Predominantly blood, mucus and scant  fragments of benign superficial squamous epithelium. Comment:   Irregular infiltrative nests of high-grade carcinoma with  abundant pink cytoplasm and nuclear features ranging from hyperchromatic to vesicular with prominent nucleoli are present infiltrating the ubepithelial tissue underlying intact non-dysplastic squamous surface epithelium. Intracellular bridge formation is not definitively identified. Dermal vascular/lymphatic invasion is present, as  demonstrated by CD34 immunostain highlighting the presence of malignant cells within CD34 positive endothelial lined spaces. The malignant cells show cytoplasmic CK 5/6, strong nuclear p40 and  strong nuclear GATA3 expression, and are negative for p16, Mammaglobinand MOC 31 expression. Both squamous cell carcinoma and urothelial carcinoma can share a similarimmunostain profile including positivity for CK 5/6 and p40. AlthoughGATA 3 expression has been reported to be present in association withsquamous carcinomas, usually the pattern of nuclear expression is relatively weak/patchy. In this case strong nuclear CRISS 3 expression is present. Additionally the malignant cells lack p16 expression, a surrogate marker for HPV. The immunohistochemical findings, along with lack of overlying surface epithelial squamous dysplasia are felt to be suspicious for cervical involvement by urothelial carcinoma, either involving the cervix by contiguous spread or metastasis. Correlation with clinical and radiologic findings with attention to the bladder, bladder neck and urethra is recommended. Intradepartmental consultation is obtained. This case was discussed with Dr. Alexia Bates and Dr. Christin Mattson on  6/12/2019. OBJECTIVE:  VITALS:  oral temperature is 98.3 °F (36.8 °C). Her blood pressure is 117/71 and her pulse is 73. Her respiration is 20. Physical Exam:  Performance Status: 0  Well developed, well nourished female  General: Alert no acute distress,  , appears slow in cognition  Head and neck : PERRL, EOMI . Sclera non icteric. Poor dentition. Oropharynx : Clear  Neck: no JVD,  no adenopathy,  Lungs: Clear to auscultation   Heart: Regular rate and rhythm. No murmur. Abdomen: Soft, non-tender;no masses, no organomegaly. Extremities: No edema. Neurologic:Cranial nerves grossly intact. No focal motor or sensory deficits. Chronic intellectually limited. Skin:  No rash. Multiple moles. Medications  Prior to Admission medications    Medication Sig Start Date End Date Taking?  Authorizing Provider   sodium bicarbonate 650 MG tablet Take 1 tablet by mouth 2 times daily 6/14/19   Thirza Solders, DO   atorvastatin (LIPITOR) 40 MG tablet Take 1 tablet by mouth daily 6/14/19   Thirza Solders, DO   acetaminophen (TYLENOL) 325 MG tablet Take 650 mg by mouth every 4 hours as needed for Pain    Historical Provider, MD   dextrose 5 % LYMPHSABS 2.62 06/14/2019    MONOSABS 0.54 06/14/2019    EOSABS 0.49 06/14/2019    BASOSABS 0.06 06/14/2019       Lab Results   Component Value Date     06/14/2019    K 4.3 06/14/2019     (H) 06/14/2019    CO2 24 06/14/2019    BUN 19 06/14/2019    CREATININE 2.8 (H) 06/14/2019    GLUCOSE 93 06/14/2019    CALCIUM 8.9 06/14/2019    PROT 6.0 (L) 05/31/2019    LABALBU 3.1 (L) 05/31/2019    BILITOT <0.2 05/31/2019    ALKPHOS 56 05/31/2019    AST 18 05/31/2019    ALT 23 05/31/2019    LABGLOM 18 06/14/2019    GFRAA 21 06/14/2019         Lab Results   Component Value Date    IRON 44 06/03/2019    TIBC 214 (L) 06/03/2019    FERRITIN 231 06/03/2019             Radiology-  CT Abdomen Pelvis Wo Contrast Additional Contrast? Oral  Result Date: 6/2/2019  LOCATION: 200 EXAM: CT ABDOMEN PELVIS WO CONTRAST COMPARISON: 5/22/2019 reporting cystitis and severe right hydroureteronephrosis with stable position of the ureteral stent. HISTORY: Abnormal labs, pain across pelvis, recent stent placement within the ureter. TECHNIQUE: Noncontrast helical abdomen and pelvis CT was performed. Coronal and sagittal reconstructions also obtained. Automated dose control was used for this exam. CONTRAST: No IV contrast administered. No oral contrast administered. FINDINGS: Small bilateral pleural effusions are present new since prior examination. Small hiatal hernia is seen. Contrast in the esophagus may relate reflect reflux or dysmotility. There is thickening of the duodenal wall second portion worrisome for duodenitis. Correlation with patient's pain. Does this patient have epigastric pain clinically? Minimal induration of the presacral soft tissues of the lower pelvis new since prior examination, axial image 64. Inflammation or infection cannot be excluded. Correlation with inflammatory markers recommended. Midline umbilical hernia containing only fat similar to prior examination.  The appendix is not seen although there are no secondary signs for acute appendicitis. There is no free air. The remaining bowel shows no bowel wall thickening or distention. Minimal subcutaneous edema is present of the lower abdomen. Soft tissue densities of the anterior abdomen likely reflect sequela from abdominal wall injections. Bilateral ureteral stents are seen. The right kidney demonstrates a stent properly positioned. There is continued right-sided hydronephrosis. No stone seen adjacent to the ureter. The left side demonstrates a properly positioned stent with hydronephrosis and hydroureter. No stone seen adjacent to the stent. Braden catheter in the urinary bladder makes evaluation difficult. The bladder is contracted and there is air present likely from the Braden. Phleboliths are present in the lower pelvis. 1. Development of small bilateral pleural effusions right side greater than left. 2. Retained fluid in the esophagus as described. 3. Bilateral hydronephrosis and hydroureter. Ureteral stents appear to properly positioned. No stones seen. 4. New presacral edema of the lower pelvis. 5. Suspected duodenal wall thickening of the second portion similar to the prior examination without duodenal inflammatory changes, equivocal.       US Pelvis Complete  Result Date: 6/4/2019  Reading location: 200 INDICATION: Vaginal nodules FINDINGS: Transabdominal images of the pelvis. Uterus 68 x 30 x 38 mm with unremarkable myometrial contour and echogenicity. Endometrial stripe 4.2 mm in AP dimension. Right ovary 16 x 12 x 15 mm and left 14 x 10 x 11 mm with unremarkable internal echogenicity. Small free fluid in the cul-de-sac. No acute finding. IR Fluoro Guided Cva Device Plmt/replace/removal  Result Date: 6/4/2019  Location:200 Exam: IR FLUORO GUIDED CVA DEVICE PLMT/REPLACE/REMOVAL, IR ULTRASOUND GUIDANCE VASCULAR ACCESS HISTORY: Acute renal failure. Versed 1 mg IV was given for anxiety during the procedure. Fluoroscopy time 0.3 minutes. the peel-away sheath and positioned at the right atrial/SVC junction. Fluoroscopic spot film was obtained. FINDINGS:  Ultrasound shows the veins to be patent. Fluoroscopic spot film demonstrates the Power PICC line to be at the right atrial/SVC junction. Successful placement of a 5 Chilean double-lumen Power PICC line using ultrasound guidance via the right brachial  vein. Xr Urogram W Wo Kub W Wo Tomogram  Result Date: 6/1/2019  Reading location: 200 INDICATION: Ureteral stent exchange FINDINGS: 67.4 seconds of fluoroscopy and 4 spot images during ureteral stent exchange. No contrast injection. Operative fluoroscopy      US Retroperitoneal Complete  Result Date: 5/31/2019  Reading location: 200 INDICATION: Renal insufficiency FINDINGS: Sonographic evaluation urinary tract compared with ultrasound 1/18/2019 and CT the abdomen and pelvis 5/22/2019. Right kidney 12.1 cm in length and the left 11.2 cm. At least moderate dilatation right renal collecting system. On recent CT, right ureteral stent is coiled in the renal pelvis. Dilatation the left renal collecting system is somewhat less severe than the right. Mid left renal cyst measures 13 mm and upper pole cyst 25.5 mm. No focal abnormality right renal collecting system. Nondistended urinary bladder. Moderate bilateral hydronephrosis, somewhat more severe on the right than the left. IR Ultrasound Guidance Vascular Access  Result Date: 6/4/2019  Location:200 Exam: IR FLUORO GUIDED CVA DEVICE PLMT/REPLACE/REMOVAL, IR ULTRASOUND GUIDANCE VASCULAR ACCESS HISTORY: Acute renal failure. Versed 1 mg IV was given for anxiety during the procedure. Fluoroscopy time 0.3 minutes. PROCEDURE: Informed consent was obtained. Ultrasound was used to localize the right  internal jugular vein. The skin was prepped and draped in a sterile fashion and then anesthetized with lidocaine. Maximal sterile barrier technique was utilized.   Under direct ultrasound visualization, a micropuncture needle was used to gain access to the right internal jugular vein. A 0.018 guidewire was advanced into the central venous circulation under fluoroscopic guidance. A micropuncture sheath was placed. Through the sheath, a 0.035 J guidewire was advanced. Over the guidewire, the tract was dilated using 12 and 14 Western Cheryl dilators. A 13.5 Guatemalan diameter temporary dialysis catheter 15 cm long was then advanced over the guidewire and positioned within the superior aspect of the right atrium. The catheter was then affixed to the skin using 2-0 silk. FINDINGS: Ultrasound demonstrates patent internal jugular veins bilaterally. Fluoroscopic spot film demonstrates placement of the right internal jugular hemodialysis catheter with the tip in the superior aspect of the right atrium. Successful placement of a right IJ temporary hemodialysis catheter. IR Ultrasound Guidance Vascular Access  Result Date: 5/29/2019  Location:200 Exam: IR ULTRASOUND GUIDANCE VASCULAR ACCESS History:  PICC line placement Ultrasound evaluation of potential access was performed. After successfully identifying a patent right brachial vein, and following the administration of lidocaine, real-time ultrasound guidance was used to puncture the right brachial   vein. A permanent recording was created for the patient's record. Ultrasound guidance was provided during placement of a PICC line.          ASSESSMENT/PLAN :  Squamous cell carcinoma of the cervix by preliminary path report with possible involvement of the lower vaginal wall clinically per GYN exam  -pending final path results  -CT abdomen without contrast reviewed , suboptimal for assessment of cervix and pelvic lymphadenopathy  -unsure of HPV hx  -recommend GYN Oncology assessment   -She will need PET CT scan for definite staging .     It appears that clinically she has at least a T3 lesion involving the cervix with possible extension into the lower one third of the vaginal wall. She does have hydronephrosis bilaterally partly related to her neurogenic bladder but cannot exclude pelvic lymphadenopathy and if PET/CT confirms pelvic lymphadenopathy she would be upstaged to stage IIIb     Once final pathology available and clinical staging established option of external radiation along with single agent carboplatinum adjusted to AUC of 4 will be addressed since she is not a candidate for cis-platinum because of her advanced CKD and impaired GFR     CKD IV with hydronephrosis, neurogenic bladder and stent placement  -Urology and Nephrology following  -undergoing intermittent hemodialysis  -s/p Cystourethroscopy, bilateral JJ ureteral stent insertion, pelvic examination 6/1  -currently has crane         Anemia likely multifactorial related to myelosuppression by recurrent sepsis as well as chronic kidney disease stage IV. Iron studies and reticulocyte count will be obtained  Normocytic anemia of chronic disease  -Hbg 8.3 today  -monitor     Chronic mental disability with depression and psychosis  -Psych following       To check PET CT scan then obtain radiation oncology consult. Recheck CBC and iron studies and determine the need for IV iron versus transfusion        [place]. Nikunj Matson M.D., F.A.C.P.   Electronically signed 6/25/2019 at 11:41 AM

## 2019-06-26 ENCOUNTER — CARE COORDINATION (OUTPATIENT)
Dept: CASE MANAGEMENT | Age: 54
End: 2019-06-26

## 2019-06-26 ENCOUNTER — HOSPITAL ENCOUNTER (OUTPATIENT)
Dept: INFUSION THERAPY | Age: 54
Discharge: HOME | End: 2019-06-26
Payer: MEDICAID

## 2019-06-26 ENCOUNTER — OFFICE VISIT (OUTPATIENT)
Dept: ONCOLOGY | Age: 54
End: 2019-06-26
Payer: MEDICAID

## 2019-06-26 ENCOUNTER — TELEPHONE (OUTPATIENT)
Dept: ONCOLOGY | Age: 54
End: 2019-06-26

## 2019-06-26 VITALS
HEART RATE: 73 BPM | DIASTOLIC BLOOD PRESSURE: 71 MMHG | SYSTOLIC BLOOD PRESSURE: 117 MMHG | TEMPERATURE: 98.3 F | RESPIRATION RATE: 20 BRPM

## 2019-06-26 DIAGNOSIS — D50.8 OTHER IRON DEFICIENCY ANEMIA: Primary | ICD-10-CM

## 2019-06-26 DIAGNOSIS — C53.9 MALIGNANT NEOPLASM OF CERVIX, UNSPECIFIED SITE (HCC): ICD-10-CM

## 2019-06-26 DIAGNOSIS — D50.8 OTHER IRON DEFICIENCY ANEMIA: ICD-10-CM

## 2019-06-26 LAB
ALBUMIN SERPL-MCNC: 4.2 G/DL (ref 3.5–5.2)
ALP BLD-CCNC: 86 U/L (ref 35–104)
ALT SERPL-CCNC: 25 U/L (ref 0–32)
ANION GAP SERPL CALCULATED.3IONS-SCNC: 12 MMOL/L (ref 7–16)
AST SERPL-CCNC: 16 U/L (ref 0–31)
BASOPHILS ABSOLUTE: 0.06 E9/L (ref 0–0.2)
BASOPHILS RELATIVE PERCENT: 0.5 % (ref 0–2)
BILIRUB SERPL-MCNC: 0.4 MG/DL (ref 0–1.2)
BUN BLDV-MCNC: 43 MG/DL (ref 6–20)
CALCIUM SERPL-MCNC: 10.4 MG/DL (ref 8.6–10.2)
CHLORIDE BLD-SCNC: 102 MMOL/L (ref 98–107)
CO2: 26 MMOL/L (ref 22–29)
CREAT SERPL-MCNC: 2 MG/DL (ref 0.5–1)
EOSINOPHILS ABSOLUTE: 0.18 E9/L (ref 0.05–0.5)
EOSINOPHILS RELATIVE PERCENT: 1.5 % (ref 0–6)
FERRITIN: 266 NG/ML
GFR AFRICAN AMERICAN: 31
GFR NON-AFRICAN AMERICAN: 26 ML/MIN/1.73
GLUCOSE BLD-MCNC: 98 MG/DL (ref 74–99)
HCT VFR BLD CALC: 31.3 % (ref 34–48)
HEMOGLOBIN: 9.6 G/DL (ref 11.5–15.5)
IMMATURE GRANULOCYTES #: 0.09 E9/L
IMMATURE GRANULOCYTES %: 0.8 % (ref 0–5)
IRON SATURATION: 9 % (ref 15–50)
IRON: 26 MCG/DL (ref 37–145)
LYMPHOCYTES ABSOLUTE: 1.48 E9/L (ref 1.5–4)
LYMPHOCYTES RELATIVE PERCENT: 12.7 % (ref 20–42)
MCH RBC QN AUTO: 28.7 PG (ref 26–35)
MCHC RBC AUTO-ENTMCNC: 30.7 % (ref 32–34.5)
MCV RBC AUTO: 93.4 FL (ref 80–99.9)
MONOCYTES ABSOLUTE: 0.64 E9/L (ref 0.1–0.95)
MONOCYTES RELATIVE PERCENT: 5.5 % (ref 2–12)
NEUTROPHILS ABSOLUTE: 9.2 E9/L (ref 1.8–7.3)
NEUTROPHILS RELATIVE PERCENT: 79 % (ref 43–80)
PDW BLD-RTO: 15.5 FL (ref 11.5–15)
PLATELET # BLD: 342 E9/L (ref 130–450)
PMV BLD AUTO: 10.9 FL (ref 7–12)
POTASSIUM SERPL-SCNC: 4.4 MMOL/L (ref 3.5–5)
RBC # BLD: 3.35 E12/L (ref 3.5–5.5)
SODIUM BLD-SCNC: 140 MMOL/L (ref 132–146)
TOTAL IRON BINDING CAPACITY: 275 MCG/DL (ref 250–450)
TOTAL PROTEIN: 7.8 G/DL (ref 6.4–8.3)
WBC # BLD: 11.7 E9/L (ref 4.5–11.5)

## 2019-06-26 PROCEDURE — 83550 IRON BINDING TEST: CPT

## 2019-06-26 PROCEDURE — 36415 COLL VENOUS BLD VENIPUNCTURE: CPT

## 2019-06-26 PROCEDURE — 99214 OFFICE O/P EST MOD 30 MIN: CPT | Performed by: INTERNAL MEDICINE

## 2019-06-26 PROCEDURE — 85025 COMPLETE CBC W/AUTO DIFF WBC: CPT

## 2019-06-26 PROCEDURE — 83540 ASSAY OF IRON: CPT

## 2019-06-26 PROCEDURE — 82728 ASSAY OF FERRITIN: CPT

## 2019-06-26 PROCEDURE — 80053 COMPREHEN METABOLIC PANEL: CPT

## 2019-06-26 NOTE — CARE COORDINATION
Attempted to contact [place] at [facility] at the [facility] regarding post acute facility update. CTC left message for Musa VAIL) at [facility] at the [facility]  [phone] requesting a return call back to [facility] and provided contact information. [facility] will await return call.

## 2019-06-26 NOTE — PROGRESS NOTES
Vicky Manzo  1965 52 y.o. Referring Physician:    PCP: Angelia Perry, DO    Vitals:    19 1118   BP: 117/71   Pulse: 73   Resp: 20   Temp: 98.3 °F (36.8 °C)        Wt Readings from Last 3 Encounters:   19 145 lb 12.8 oz (66.1 kg)   19 148 lb (67.1 kg)   05/15/19 140 lb 10.5 oz (63.8 kg)        There is no height or weight on file to calculate BMI. Chief Complaint:   Chief Complaint   Patient presents with    New Patient    Follow-Up from [address] [place] [address]     Squamous cell carcinoma of the cervix    Chronic Kidney Disease     Stage IV         Cancer Staging  No matching staging information was found for the patient. Prior Radiation Therapy? NO    Concurrent Chemo/radiation? NO    Prior Chemotherapy? NO    Prior Hormonal Therapy? NO    Head and Neck Cancer? No, patient does NOT have HN cancer.       LMP:patient doesn't know how old she was     Age at first Menses: patient stated \"I don't remember than far\"    :1    Para: 1          Current Outpatient Medications:     sodium bicarbonate 650 MG tablet, Take 1 tablet by mouth 2 times daily, Disp: , Rfl:     atorvastatin (LIPITOR) 40 MG tablet, Take 1 tablet by mouth daily, Disp: 30 tablet, Rfl: 3    acetaminophen (TYLENOL) 325 MG tablet, Take 650 mg by mouth every 4 hours as needed for Pain, Disp: , Rfl:     mirtazapine (REMERON) 15 MG tablet, Take 1 tablet by mouth nightly, Disp: 30 tablet, Rfl: 0    ondansetron (ZOFRAN-ODT) 4 MG disintegrating tablet, Take 1 tablet by mouth every 8 hours as needed for Nausea or Vomiting, Disp: , Rfl:     ferrous sulfate 325 (65 Fe) MG tablet, Take 1 tablet by mouth 2 times daily (with meals), Disp: 30 tablet, Rfl: 3    docusate sodium (COLACE, DULCOLAX) 100 MG CAPS, Take 100 mg by mouth 2 times daily, Disp: 60 capsule, Rfl: 0    levothyroxine (SYNTHROID) 100 MCG tablet, TAKE 1 TABLET BY MOUTH EVERY MORNING 30 MINUTES BEFORE EATING., Disp: 30 tablet, Rfl: 2    vitamin D (ERGOCALCIFEROL) 31392 units CAPS capsule, Take 1 capsule by mouth once a week, Disp: 4 capsule, Rfl: 5    oxybutynin (DITROPAN-XL) 5 MG extended release tablet, Take 1 tablet by mouth daily, Disp: 30 tablet, Rfl: 3    magnesium oxide (MAG-OX) 400 (241.3 Mg) MG TABS tablet, Take 1 tablet by mouth 2 times daily, Disp: 60 tablet, Rfl: 3    fluticasone (FLONASE) 50 MCG/ACT nasal spray, 2 sprays by Nasal route daily (Patient taking differently: 1 spray by Nasal route daily ), Disp: 1 Bottle, Rfl: 3    pantoprazole (PROTONIX) 40 MG tablet, TAKE ONE TABLET BY MOUTH DAILY, Disp: 30 tablet, Rfl: 3    Compression Stockings MISC, by Does not apply route Below knee 15 - 30 mm Hg, Disp: 2 each, Rfl: 0    dextrose 5 % SOLN 250 mL with vancomycin 1 g SOLR 1,000 mg, Infuse 1,000 mg intravenously every 24 hours, Disp: , Rfl:        Past Medical History:   Diagnosis Date    Anxiety     Depression     Hyperlipidemia     Hypertension     Hypothyroidism     Intellectual disability     Leg pain, bilateral     Noncompliance with medications     Noncompliance with treatment     Osteoarthritis        Past Surgical History:   Procedure Laterality Date    CYSTOSCOPY Left 1/21/2019    CYSTOSCOPY, BILATERAL RETROGRADE PYELOGRAMS, BILATERAL STENT INSERTION performed by Kevin Pop MD at [facility] Bilateral 6/1/2019    CYSTOSCOPY, RETROGRADE PYELOGRAMS, BILATERAL URETERAL STENT EXCHANGE performed by Leslie Mercedes MD at [address] [place] [address] N/A 6/6/2019    EXAM [address] performed by Javier Dickens MD at [place] [address]. History   Problem Relation Age of Onset    Diabetes Brother     Thyroid Disease Mother     Other Daughter         Autism       Social History     Socioeconomic History    Marital status: Single     Spouse name: Not on file    Number of children: 1    Years of education: Not on file    Highest education level: Not on file   Occupational History    Occupation: unemployed     Employer: not employed   Social Needs    Financial resource strain: Not on file    Food insecurity:     Worry: Not on file     Inability: Not on file   [employer] needs:     Medical: Not on file     Non-medical: Not on file   Tobacco Use    Smoking status: Former Smoker     Packs/day: 1.00     Years: 5.00     Pack years: 5.00     Last attempt to quit: 1989     Years since quittin.5    Smokeless tobacco: Never Used   Substance and Sexual Activity    Alcohol use: No    Drug use: No    Sexual activity: Not on file   Lifestyle    Physical activity:     Days per week: Not on file     Minutes per session: Not on file    Stress: Not on file   Relationships    Social connections:     Talks on phone: Not on file     Gets together: Not on file     Attends Pentecostalism service: Not on file     Active member of club or organization: Not on file     Attends meetings of clubs or organizations: Not on file     Relationship status: Not on file    Intimate partner violence:     Fear of current or ex partner: Not on file     Emotionally abused: Not on file     Physically abused: Not on file     Forced sexual activity: Not on file   Other Topics Concern    Not on file   Social History Narrative    Not on file           Occupation: homemaker  Retired:  NO          REVIEW OF SYSTEMS:    Pacemaker/Defibulator/ICD:  No    Mediport: No           FALLS RISK SCREENING ASSESSMENT    Instructions:  Assess the patient and [place] the appropriate indicators that are present for fall risk identification. Total the numbers circled and assign a fall risk score from Table 2.  Reassess patient at a minimum every 12 weeks or with status change. Assessment   Date  2019     1. Mental Ability: confusion/cognitively impaired Yes - 3       2. Elimination Issues: incontinence, frequency Yes - 3       3.   Ambulatory: use of assistive devices (walker, cane, off-loading devices), attached to equipment (IV pole, oxygen) Yes - 2     4. Sensory Limitations: dizziness, vertigo, impaired vision Yes - 3       5. Age Less than 65 years - 0       6. Medication: diuretics, strong analgesics, hypnotics, sedatives, antihypertensive agents   Yes - 3   7. Falls:  recent history of falls within the last 3 months (not to include slipping or tripping)   Yes - 7   TOTAL 21    If score of 4 or greater was education given? yes       TABLE 2   Risk Score Risk Level Plan of Care   0-3 Little or  No Risk 1. Provide assistance as indicated for ambulation activities  2. Reorient confused/cognitively impaired patient  3. Call-light/bell within patient's reach  4. Chair/bed in low position, stretcher/bed with siderails up except when performing patient care activities  5. Educate patient/family/caregiver on falls prevention  6.  Reassess in 12 weeks or with any noted change in patient condition which places them at a risk for a fall   4-6 Moderate Risk 1. Provide assistance as indicated for ambulation activities  2. Reorient confused/cognitively impaired patient  3. Call-light/bell within patient's reach  4. Chair/bed in low position, stretcher/bed with siderails up except when performing patient care activities  5. Educate patient/family/caregiver on falls prevention  6. Falls risk precaution (Yellow sticker Level II) placed on patient chart   7 or   Higher High Risk 1. Place patient in easily observable treatment room  2. Patient attended at all times by family member or staff  3. Provide assistance as indicated for ambulation activities  4. Reorient confused/cognitively impaired patient  5. Call-light/bell within patient's reach  6. Chair/bed in low position, stretcher/bed with siderails up except when performing patient care activities  7. Educate patient/family/caregiver on falls prevention  8.   Falls risk precaution (Yellow sticker Level III) placed on patient chart           MALNUTRITION RISK SCREENING ASSESSMENT    Instructions:  Assess the patient and enter the appropriate indicators that are present for nutrition risk identification. Total the numbers entered and assign a risk score. Follow the appropriate action for total score listed below. Assessment   Date  6/26/2019     1. Have you lost weight without trying? 2- Yes, 5.1 kg to 10 kg     2. Have you been eating poorly because of a decreased appetite? 1- Yes   3. Do you have a diagnosis of head and neck cancer? 0- No                                                                                    TOTAL 3        Score of 0-1: No action  Score 2 or greater:  · For Non-Diabetic Patient: Recommend adding Ensure Complete 2 x daily and provide patient with Ensure wellness bag with coupons  · For Diabetic Patient: Recommend adding Glucerna Shake 2 x daily and provide patient with Glucerna Wellness bag with coupons  · Route to the dietitian via [address]    · Are you having  difficulty performing daily routine tasks  due to fatigue or weakness (ie: bathing/showering, dressing, housework, meal prep, work, child Jason Jewel): Yes     · Do you have any arm flexibility/ROM restrictions, swelling or pain that limit activity: Yes     · Any changes in memory, attention/focus that impact daily activities: Yes     · Do you avoid participation in leisure/social activity due weakness, fatigue or pain: Yes     ARE ANY OF THE ABOVE ARE ANSWERED YES: Yes - but NO OT referral request sent due to patient already being seen by OT. Patient states she's currently in rehab at [facility] at the [place]. PT ASSESSMENT FOR REFERRAL    · Have you had any recent falls in past 2 months: No     · Do you have difficulty  going up/down stairs: Yes     · Are you having difficulty walking: Yes     · Do you often hold onto furniture/environmental supports or feel off balance when you are walking:  Yes     · Do you need to take rest

## 2019-06-27 ENCOUNTER — TELEPHONE (OUTPATIENT)
Dept: RADIATION ONCOLOGY | Age: 54
End: 2019-06-27

## 2019-06-27 ENCOUNTER — TELEPHONE (OUTPATIENT)
Dept: CASE MANAGEMENT | Age: 54
End: 2019-06-27

## 2019-06-27 DIAGNOSIS — C80.0 SMALL CELL CARCINOMA CARCINOMATOSIS (HCC): Primary | ICD-10-CM

## 2019-06-27 NOTE — TELEPHONE ENCOUNTER
Called and spoke to Nish Segal at [facility] at the [facility] at [phone] gave her the new PET scan apt. Per Sheridan Skiff. for 7/1/19 at [facility]. E's scan at 9:00 am arrive at 8:30 am start prep on Nicola day before already have instructions from [place]. Also gave radiation consult apt. for Dr. Susanne Galvan for [address] 7/2/19 at 9:00 pending the Pet scan is done prior to apt. Per Dr. Susanne Galvan. Nish Segal stated she will check with transportation and get back to office as to whether this schedule works as she stated she could not get anyone to answer her at this moment and she will be off tomorrow. I will call back if I do not hear from them to confirm apt.

## 2019-06-27 NOTE — TELEPHONE ENCOUNTER
Received a message from Harrison Petersen at [facility] at the [address] confirming transportation has been set up for the pet scan at [facility] on 7/1/19 and the radiation consult with Dr. Supriya Escalona on 7/2/19 at [address].

## 2019-06-28 ENCOUNTER — CARE COORDINATION (OUTPATIENT)
Dept: CASE MANAGEMENT | Age: 54
End: 2019-06-28

## 2019-06-28 NOTE — CARE COORDINATION
Spoke with David Sainz () at [facility] who advises LIM[place] (Musa) is in meeting at present time. David Sainz took CTN name and contact information and will have her return call. TRansferred to [facility] VM leaving message requesting return call and provided CTN contact information.

## 2019-06-28 NOTE — CARE COORDINATION
CTN received call from patient who advises she was seen by Dr. Krista Barbosa (hematology/oncology) in office this week. Per chart review, patient was seen by Dr. Krista Barbosa on 6/26/19 who ordered PET scan and consulted radiation oncology. Per Dr. Krista Barbosa note he will recheck labs (CBC and iron) and determine the need for IV iron versus transfusion. Patient states attempted seeing Dr. Sheppard Dance (gynecology) on 6/25/19 but was unable to get into building d/t having outside steps to enter. [facility] appointment was rescheduled for 7/1/19 and will take walker with her to navigate getting into building. CTN explained to patient awaiting to talk with SW regarding POC at [facility] and will continue to follow for Care Transition.

## 2019-06-28 NOTE — CARE COORDINATION
Musa VAIL) from [facility] [place] returned CTN call from earlier today. Musa confirms patient was seen by Dr. Kevin Swain earlier this week and will check with nursing regarding follow up PET scan and to make scheduled appt with Dr. Halima Duncan (I/D). CTN advised Musa of [facility] urology appointment on 7/17/19 at 10:20 am and the importance to have family member present during visit per doctor request. Confirmed patient rescheduled to follow up with Dr. Andrae Troy (gynecology) on 7/1/19 in addition to Musa relaying that patient is being evaluated by Dr. Roberto Marquis for psychiatry. Musa states patient continues with depression and not wanting to have crane catheter in place and has had some \"outburst\" with staff and \"kicking people out of the room\" which has improved. CTN provided to [facility] PCP information per her request. [facility] patient is certified for therapy until 7/14/19. [facility] patient is improving and is receptive for CTN weekly calls for update. CTN will follow up again next week. Advised to call CTN with any questions or concerns and confirmed having contact information. CTN will continue following for Care Transition.

## 2019-06-29 PROBLEM — C67.9: Status: ACTIVE | Noted: 2019-06-29

## 2019-06-29 PROBLEM — C79.82: Status: ACTIVE | Noted: 2019-06-29

## 2019-07-01 ENCOUNTER — HOSPITAL ENCOUNTER (OUTPATIENT)
Dept: PET IMAGING | Age: 54
Discharge: HOME | DRG: 469 | End: 2019-07-03
Payer: MEDICAID

## 2019-07-01 ENCOUNTER — HOSPITAL ENCOUNTER (INPATIENT)
Age: 54
LOS: 9 days | Discharge: NURSING, REHAB OR LONG-TERM CARE | DRG: 469 | End: 2019-07-11
Attending: EMERGENCY MEDICINE | Admitting: INTERNAL MEDICINE
Payer: MEDICAID

## 2019-07-01 DIAGNOSIS — N17.9 ACUTE RENAL FAILURE, UNSPECIFIED ACUTE RENAL FAILURE TYPE (HCC): Primary | ICD-10-CM

## 2019-07-01 DIAGNOSIS — C79.82: ICD-10-CM

## 2019-07-01 DIAGNOSIS — C67.9: ICD-10-CM

## 2019-07-01 DIAGNOSIS — C53.9 MALIGNANT NEOPLASM OF CERVIX, UNSPECIFIED SITE (HCC): ICD-10-CM

## 2019-07-01 DIAGNOSIS — N13.9 OBSTRUCTIVE UROPATHY: ICD-10-CM

## 2019-07-01 LAB
ALBUMIN SERPL-MCNC: 3.4 G/DL (ref 3.5–5.2)
ALP BLD-CCNC: 207 U/L (ref 35–104)
ALT SERPL-CCNC: 61 U/L (ref 0–32)
ANION GAP SERPL CALCULATED.3IONS-SCNC: 17 MMOL/L (ref 7–16)
ANISOCYTOSIS: ABNORMAL
AST SERPL-CCNC: 71 U/L (ref 0–31)
BACTERIA: ABNORMAL /HPF
BASOPHILS ABSOLUTE: 0 E9/L (ref 0–0.2)
BASOPHILS RELATIVE PERCENT: 0 % (ref 0–2)
BILIRUB SERPL-MCNC: 0.3 MG/DL (ref 0–1.2)
BILIRUBIN URINE: NEGATIVE
BLOOD, URINE: ABNORMAL
BUN BLDV-MCNC: 86 MG/DL (ref 6–20)
CALCIUM SERPL-MCNC: 9.8 MG/DL (ref 8.6–10.2)
CHLORIDE BLD-SCNC: 98 MMOL/L (ref 98–107)
CLARITY: ABNORMAL
CO2: 22 MMOL/L (ref 22–29)
COLOR: YELLOW
CREAT SERPL-MCNC: 5.8 MG/DL (ref 0.5–1)
EOSINOPHILS ABSOLUTE: 0.79 E9/L (ref 0.05–0.5)
EOSINOPHILS RELATIVE PERCENT: 5.2 % (ref 0–6)
GFR AFRICAN AMERICAN: 9
GFR NON-AFRICAN AMERICAN: 8 ML/MIN/1.73
GLUCOSE BLD-MCNC: 117 MG/DL (ref 74–99)
GLUCOSE URINE: NEGATIVE MG/DL
HCT VFR BLD CALC: 25.2 % (ref 34–48)
HEMOGLOBIN: 7.9 G/DL (ref 11.5–15.5)
KETONES, URINE: NEGATIVE MG/DL
LACTIC ACID, SEPSIS: 1.1 MMOL/L (ref 0.5–1.9)
LEUKOCYTE ESTERASE, URINE: ABNORMAL
LYMPHOCYTES ABSOLUTE: 0.91 E9/L (ref 1.5–4)
LYMPHOCYTES RELATIVE PERCENT: 6.1 % (ref 20–42)
MCH RBC QN AUTO: 29.3 PG (ref 26–35)
MCHC RBC AUTO-ENTMCNC: 31.3 % (ref 32–34.5)
MCV RBC AUTO: 93.3 FL (ref 80–99.9)
METAMYELOCYTES RELATIVE PERCENT: 0.9 % (ref 0–1)
METER GLUCOSE: 130 MG/DL (ref 74–99)
MONOCYTES ABSOLUTE: 0.61 E9/L (ref 0.1–0.95)
MONOCYTES RELATIVE PERCENT: 4.3 % (ref 2–12)
NEUTROPHILS ABSOLUTE: 12.77 E9/L (ref 1.8–7.3)
NEUTROPHILS RELATIVE PERCENT: 83.5 % (ref 43–80)
NITRITE, URINE: NEGATIVE
NUCLEATED RED BLOOD CELLS: 0 /100 WBC
PDW BLD-RTO: 15.4 FL (ref 11.5–15)
PH UA: 7 (ref 5–9)
PLATELET # BLD: 308 E9/L (ref 130–450)
PMV BLD AUTO: 11.7 FL (ref 7–12)
POTASSIUM SERPL-SCNC: 4.9 MMOL/L (ref 3.5–5)
POTASSIUM SERPL-SCNC: 6.1 MMOL/L (ref 3.5–5)
PROTEIN UA: >=300 MG/DL
RBC # BLD: 2.7 E12/L (ref 3.5–5.5)
RBC UA: ABNORMAL /HPF (ref 0–2)
REASON FOR REJECTION: NORMAL
REASON FOR REJECTION: NORMAL
REJECTED TEST: NORMAL
REJECTED TEST: NORMAL
SODIUM BLD-SCNC: 137 MMOL/L (ref 132–146)
SPECIFIC GRAVITY UA: 1.01 (ref 1–1.03)
TOTAL PROTEIN: 7.8 G/DL (ref 6.4–8.3)
UROBILINOGEN, URINE: 0.2 E.U./DL
WBC # BLD: 15.2 E9/L (ref 4.5–11.5)
WBC UA: >20 /HPF (ref 0–5)

## 2019-07-01 PROCEDURE — 85025 COMPLETE CBC W/AUTO DIFF WBC: CPT

## 2019-07-01 PROCEDURE — 3430000000 HC RX DIAGNOSTIC RADIOPHARMACEUTICAL: Performed by: RADIOLOGY

## 2019-07-01 PROCEDURE — A9552 F18 FDG: HCPCS | Performed by: RADIOLOGY

## 2019-07-01 PROCEDURE — 93005 ELECTROCARDIOGRAM TRACING: CPT | Performed by: EMERGENCY MEDICINE

## 2019-07-01 PROCEDURE — 84132 ASSAY OF SERUM POTASSIUM: CPT

## 2019-07-01 PROCEDURE — 6360000002 HC RX W HCPCS: Performed by: EMERGENCY MEDICINE

## 2019-07-01 PROCEDURE — 81001 URINALYSIS AUTO W/SCOPE: CPT

## 2019-07-01 PROCEDURE — 2580000003 HC RX 258: Performed by: EMERGENCY MEDICINE

## 2019-07-01 PROCEDURE — 87088 URINE BACTERIA CULTURE: CPT

## 2019-07-01 PROCEDURE — 83605 ASSAY OF LACTIC ACID: CPT

## 2019-07-01 PROCEDURE — 82962 GLUCOSE BLOOD TEST: CPT

## 2019-07-01 PROCEDURE — 96365 THER/PROPH/DIAG IV INF INIT: CPT

## 2019-07-01 PROCEDURE — 78815 PET IMAGE W/CT SKULL-THIGH: CPT

## 2019-07-01 PROCEDURE — 99285 EMERGENCY DEPT VISIT HI MDM: CPT

## 2019-07-01 PROCEDURE — 80053 COMPREHEN METABOLIC PANEL: CPT

## 2019-07-01 RX ORDER — 0.9 % SODIUM CHLORIDE 0.9 %
1000 INTRAVENOUS SOLUTION INTRAVENOUS ONCE
Status: COMPLETED | OUTPATIENT
Start: 2019-07-01 | End: 2019-07-01

## 2019-07-01 RX ORDER — FLUDEOXYGLUCOSE F 18 200 MCI/ML
15 INJECTION, SOLUTION INTRAVENOUS
Status: COMPLETED | OUTPATIENT
Start: 2019-07-01 | End: 2019-07-01

## 2019-07-01 RX ADMIN — CEFTRIAXONE 1 G: 1 INJECTION, POWDER, FOR SOLUTION INTRAMUSCULAR; INTRAVENOUS at 22:41

## 2019-07-01 RX ADMIN — SODIUM CHLORIDE 1000 ML: 9 INJECTION, SOLUTION INTRAVENOUS at 20:52

## 2019-07-01 RX ADMIN — FLUDEOXYGLUCOSE F 18 15 MILLICURIE: 200 INJECTION, SOLUTION INTRAVENOUS at 08:45

## 2019-07-02 ENCOUNTER — APPOINTMENT (OUTPATIENT)
Dept: ULTRASOUND IMAGING | Age: 54
DRG: 469 | End: 2019-07-02
Payer: MEDICAID

## 2019-07-02 PROBLEM — N17.9 AKI (ACUTE KIDNEY INJURY) (HCC): Status: ACTIVE | Noted: 2019-07-02

## 2019-07-02 LAB
ANION GAP SERPL CALCULATED.3IONS-SCNC: 17 MMOL/L (ref 7–16)
BUN BLDV-MCNC: 80 MG/DL (ref 6–20)
CALCIUM SERPL-MCNC: 9.4 MG/DL (ref 8.6–10.2)
CHLORIDE BLD-SCNC: 104 MMOL/L (ref 98–107)
CHLORIDE URINE RANDOM: 52 MMOL/L
CO2: 21 MMOL/L (ref 22–29)
CORTISOL TOTAL: 17.99 MCG/DL (ref 2.68–18.4)
CREAT SERPL-MCNC: 5.6 MG/DL (ref 0.5–1)
CREATININE URINE: 40 MG/DL (ref 29–226)
CREATININE URINE: 46 MG/DL (ref 29–226)
EKG ATRIAL RATE: 83 BPM
EKG P AXIS: 27 DEGREES
EKG P-R INTERVAL: 152 MS
EKG Q-T INTERVAL: 362 MS
EKG QRS DURATION: 74 MS
EKG QTC CALCULATION (BAZETT): 425 MS
EKG R AXIS: 30 DEGREES
EKG T AXIS: 31 DEGREES
EKG VENTRICULAR RATE: 83 BPM
GFR AFRICAN AMERICAN: 10
GFR NON-AFRICAN AMERICAN: 8 ML/MIN/1.73
GLUCOSE BLD-MCNC: 150 MG/DL (ref 74–99)
HCT VFR BLD CALC: 24.9 % (ref 34–48)
HEMOGLOBIN: 7.5 G/DL (ref 11.5–15.5)
MAGNESIUM: 2.8 MG/DL (ref 1.6–2.6)
MCH RBC QN AUTO: 28.6 PG (ref 26–35)
MCHC RBC AUTO-ENTMCNC: 30.1 % (ref 32–34.5)
MCV RBC AUTO: 95 FL (ref 80–99.9)
PDW BLD-RTO: 15.4 FL (ref 11.5–15)
PHOSPHORUS: 5.4 MG/DL (ref 2.5–4.5)
PLATELET # BLD: 323 E9/L (ref 130–450)
PMV BLD AUTO: 11.7 FL (ref 7–12)
POTASSIUM SERPL-SCNC: 4.6 MMOL/L (ref 3.5–5)
PROCALCITONIN: 0.58 NG/ML (ref 0–0.08)
PROTEIN PROTEIN: 160 MG/DL (ref 0–12)
PROTEIN/CREAT RATIO: 3.5
PROTEIN/CREAT RATIO: 3.5 (ref 0–0.2)
RBC # BLD: 2.62 E12/L (ref 3.5–5.5)
SODIUM BLD-SCNC: 142 MMOL/L (ref 132–146)
SODIUM URINE: 52 MMOL/L
SODIUM URINE: 53 MMOL/L
TROPONIN: <0.01 NG/ML (ref 0–0.03)
WBC # BLD: 15.5 E9/L (ref 4.5–11.5)

## 2019-07-02 PROCEDURE — 84100 ASSAY OF PHOSPHORUS: CPT

## 2019-07-02 PROCEDURE — 2580000003 HC RX 258: Performed by: INTERNAL MEDICINE

## 2019-07-02 PROCEDURE — 99223 1ST HOSP IP/OBS HIGH 75: CPT | Performed by: INTERNAL MEDICINE

## 2019-07-02 PROCEDURE — 84484 ASSAY OF TROPONIN QUANT: CPT

## 2019-07-02 PROCEDURE — 85027 COMPLETE CBC AUTOMATED: CPT

## 2019-07-02 PROCEDURE — 36415 COLL VENOUS BLD VENIPUNCTURE: CPT

## 2019-07-02 PROCEDURE — 1200000000 HC SEMI PRIVATE

## 2019-07-02 PROCEDURE — 82533 TOTAL CORTISOL: CPT

## 2019-07-02 PROCEDURE — 80048 BASIC METABOLIC PNL TOTAL CA: CPT

## 2019-07-02 PROCEDURE — 93010 ELECTROCARDIOGRAM REPORT: CPT | Performed by: INTERNAL MEDICINE

## 2019-07-02 PROCEDURE — 82570 ASSAY OF URINE CREATININE: CPT

## 2019-07-02 PROCEDURE — 6370000000 HC RX 637 (ALT 250 FOR IP): Performed by: INTERNAL MEDICINE

## 2019-07-02 PROCEDURE — 84300 ASSAY OF URINE SODIUM: CPT

## 2019-07-02 PROCEDURE — 84156 ASSAY OF PROTEIN URINE: CPT

## 2019-07-02 PROCEDURE — 82436 ASSAY OF URINE CHLORIDE: CPT

## 2019-07-02 PROCEDURE — 83735 ASSAY OF MAGNESIUM: CPT

## 2019-07-02 PROCEDURE — 76770 US EXAM ABDO BACK WALL COMP: CPT

## 2019-07-02 PROCEDURE — 84145 PROCALCITONIN (PCT): CPT

## 2019-07-02 RX ORDER — FERROUS SULFATE 325(65) MG
325 TABLET ORAL 2 TIMES DAILY WITH MEALS
Status: DISCONTINUED | OUTPATIENT
Start: 2019-07-02 | End: 2019-07-11 | Stop reason: HOSPADM

## 2019-07-02 RX ORDER — FLUTICASONE PROPIONATE 50 MCG
1 SPRAY, SUSPENSION (ML) NASAL DAILY
Status: DISCONTINUED | OUTPATIENT
Start: 2019-07-02 | End: 2019-07-11 | Stop reason: HOSPADM

## 2019-07-02 RX ORDER — MIRTAZAPINE 15 MG/1
15 TABLET, FILM COATED ORAL NIGHTLY
Status: DISCONTINUED | OUTPATIENT
Start: 2019-07-02 | End: 2019-07-11 | Stop reason: HOSPADM

## 2019-07-02 RX ORDER — DOCUSATE SODIUM 100 MG/1
100 CAPSULE, LIQUID FILLED ORAL 2 TIMES DAILY
Status: DISCONTINUED | OUTPATIENT
Start: 2019-07-02 | End: 2019-07-11 | Stop reason: HOSPADM

## 2019-07-02 RX ORDER — SODIUM BICARBONATE 650 MG/1
650 TABLET ORAL 2 TIMES DAILY
Status: DISCONTINUED | OUTPATIENT
Start: 2019-07-02 | End: 2019-07-06

## 2019-07-02 RX ORDER — ACETAMINOPHEN 325 MG/1
650 TABLET ORAL EVERY 6 HOURS PRN
Status: DISCONTINUED | OUTPATIENT
Start: 2019-07-02 | End: 2019-07-11 | Stop reason: HOSPADM

## 2019-07-02 RX ORDER — LEVOTHYROXINE SODIUM 0.1 MG/1
100 TABLET ORAL DAILY
Status: DISCONTINUED | OUTPATIENT
Start: 2019-07-02 | End: 2019-07-11 | Stop reason: HOSPADM

## 2019-07-02 RX ORDER — 0.9 % SODIUM CHLORIDE 0.9 %
1000 INTRAVENOUS SOLUTION INTRAVENOUS ONCE
Status: COMPLETED | OUTPATIENT
Start: 2019-07-02 | End: 2019-07-02

## 2019-07-02 RX ORDER — ONDANSETRON 4 MG/1
4 TABLET, ORALLY DISINTEGRATING ORAL EVERY 8 HOURS PRN
Status: DISCONTINUED | OUTPATIENT
Start: 2019-07-02 | End: 2019-07-10

## 2019-07-02 RX ORDER — PANTOPRAZOLE SODIUM 40 MG/1
40 TABLET, DELAYED RELEASE ORAL DAILY
Status: DISCONTINUED | OUTPATIENT
Start: 2019-07-02 | End: 2019-07-11 | Stop reason: HOSPADM

## 2019-07-02 RX ORDER — ATORVASTATIN CALCIUM 40 MG/1
40 TABLET, FILM COATED ORAL DAILY
Status: DISCONTINUED | OUTPATIENT
Start: 2019-07-02 | End: 2019-07-11 | Stop reason: HOSPADM

## 2019-07-02 RX ORDER — OXYBUTYNIN CHLORIDE 5 MG/1
5 TABLET, EXTENDED RELEASE ORAL DAILY
Status: DISCONTINUED | OUTPATIENT
Start: 2019-07-02 | End: 2019-07-03

## 2019-07-02 RX ORDER — ERGOCALCIFEROL 1.25 MG/1
50000 CAPSULE ORAL WEEKLY
Status: DISCONTINUED | OUTPATIENT
Start: 2019-07-02 | End: 2019-07-11 | Stop reason: HOSPADM

## 2019-07-02 RX ORDER — SODIUM CHLORIDE 9 MG/ML
INJECTION, SOLUTION INTRAVENOUS CONTINUOUS
Status: DISCONTINUED | OUTPATIENT
Start: 2019-07-02 | End: 2019-07-08

## 2019-07-02 RX ADMIN — FERROUS SULFATE TAB 325 MG (65 MG ELEMENTAL FE) 325 MG: 325 (65 FE) TAB at 08:58

## 2019-07-02 RX ADMIN — SODIUM BICARBONATE 650 MG: 650 TABLET ORAL at 08:58

## 2019-07-02 RX ADMIN — LEVOTHYROXINE SODIUM 100 MCG: 100 TABLET ORAL at 06:08

## 2019-07-02 RX ADMIN — ATORVASTATIN CALCIUM 40 MG: 40 TABLET, FILM COATED ORAL at 08:58

## 2019-07-02 RX ADMIN — SODIUM CHLORIDE 1000 ML: 9 INJECTION, SOLUTION INTRAVENOUS at 04:20

## 2019-07-02 RX ADMIN — SODIUM CHLORIDE: 9 INJECTION, SOLUTION INTRAVENOUS at 21:39

## 2019-07-02 RX ADMIN — MAGNESIUM OXIDE TAB 400 MG (241.3 MG ELEMENTAL MG) 400 MG: 400 (241.3 MG) TAB at 08:58

## 2019-07-02 RX ADMIN — SODIUM BICARBONATE 650 MG: 650 TABLET ORAL at 21:39

## 2019-07-02 RX ADMIN — OXYBUTYNIN CHLORIDE 5 MG: 5 TABLET, EXTENDED RELEASE ORAL at 08:58

## 2019-07-02 RX ADMIN — DOCUSATE SODIUM 100 MG: 100 CAPSULE, LIQUID FILLED ORAL at 21:39

## 2019-07-02 RX ADMIN — FERROUS SULFATE TAB 325 MG (65 MG ELEMENTAL FE) 325 MG: 325 (65 FE) TAB at 16:51

## 2019-07-02 RX ADMIN — ERGOCALCIFEROL 50000 UNITS: 1.25 CAPSULE ORAL at 08:58

## 2019-07-02 RX ADMIN — ACETAMINOPHEN 650 MG: 325 TABLET ORAL at 21:46

## 2019-07-02 RX ADMIN — DOCUSATE SODIUM 100 MG: 100 CAPSULE, LIQUID FILLED ORAL at 08:59

## 2019-07-02 RX ADMIN — MAGNESIUM OXIDE TAB 400 MG (241.3 MG ELEMENTAL MG) 400 MG: 400 (241.3 MG) TAB at 21:39

## 2019-07-02 RX ADMIN — ACETAMINOPHEN 650 MG: 325 TABLET ORAL at 02:05

## 2019-07-02 RX ADMIN — MIRTAZAPINE 15 MG: 15 TABLET, FILM COATED ORAL at 21:39

## 2019-07-02 RX ADMIN — FLUTICASONE PROPIONATE 1 SPRAY: 50 SPRAY, METERED NASAL at 08:58

## 2019-07-02 RX ADMIN — PANTOPRAZOLE SODIUM 40 MG: 40 TABLET, DELAYED RELEASE ORAL at 08:58

## 2019-07-02 ASSESSMENT — PAIN DESCRIPTION - DESCRIPTORS
DESCRIPTORS: ACHING;DISCOMFORT
DESCRIPTORS: ACHING

## 2019-07-02 ASSESSMENT — PAIN SCALES - GENERAL
PAINLEVEL_OUTOF10: 2
PAINLEVEL_OUTOF10: 4
PAINLEVEL_OUTOF10: 0
PAINLEVEL_OUTOF10: 3
PAINLEVEL_OUTOF10: 0
PAINLEVEL_OUTOF10: 0

## 2019-07-02 ASSESSMENT — PAIN DESCRIPTION - ORIENTATION
ORIENTATION: LEFT;LOWER;MID;RIGHT
ORIENTATION: RIGHT;LEFT

## 2019-07-02 ASSESSMENT — PAIN DESCRIPTION - FREQUENCY
FREQUENCY: INTERMITTENT
FREQUENCY: INTERMITTENT

## 2019-07-02 ASSESSMENT — PAIN DESCRIPTION - PROGRESSION
CLINICAL_PROGRESSION: NOT CHANGED
CLINICAL_PROGRESSION: NOT CHANGED

## 2019-07-02 ASSESSMENT — PAIN DESCRIPTION - ONSET
ONSET: ON-GOING
ONSET: ON-GOING

## 2019-07-02 ASSESSMENT — PAIN DESCRIPTION - PAIN TYPE
TYPE: ACUTE PAIN
TYPE: ACUTE PAIN

## 2019-07-02 ASSESSMENT — PAIN DESCRIPTION - LOCATION
LOCATION: ABDOMEN
LOCATION: ABDOMEN

## 2019-07-02 NOTE — ED NOTES
Redraw potassium sent. Left brachial artery used under US with straight stick needle. Blood return brisk into tube.       Mateo Box RN  07/01/19 [id number]

## 2019-07-02 NOTE — CONSULTS
Past Surgical History:   Procedure Laterality Date    CYSTOSCOPY Left 1/21/2019    CYSTOSCOPY, BILATERAL RETROGRADE PYELOGRAMS, BILATERAL STENT INSERTION performed by Manuel Zimmerman MD at [facility] Bilateral 6/1/2019    CYSTOSCOPY, RETROGRADE PYELOGRAMS, BILATERAL URETERAL STENT EXCHANGE performed by Yvonne Garcia MD at [address] [place] [address] N/A 6/6/2019    EXAM [address] performed by Hugh Leon MD at [address]       Medications Prior to Admission:    Medications Prior to Admission: sodium bicarbonate 650 MG tablet, Take 1 tablet by mouth 2 times daily  atorvastatin (LIPITOR) 40 MG tablet, Take 1 tablet by mouth daily  acetaminophen (TYLENOL) 325 MG tablet, Take 650 mg by mouth every 4 hours as needed for Pain  [DISCONTINUED] dextrose 5 % SOLN 250 mL with vancomycin 1 g SOLR 1,000 mg, Infuse 1,000 mg intravenously every 24 hours  mirtazapine (REMERON) 15 MG tablet, Take 1 tablet by mouth nightly  ondansetron (ZOFRAN-ODT) 4 MG disintegrating tablet, Take 1 tablet by mouth every 8 hours as needed for Nausea or Vomiting  ferrous sulfate 325 (65 Fe) MG tablet, Take 1 tablet by mouth 2 times daily (with meals)  docusate sodium (COLACE, DULCOLAX) 100 MG CAPS, Take 100 mg by mouth 2 times daily  levothyroxine (SYNTHROID) 100 MCG tablet, TAKE 1 TABLET BY MOUTH EVERY MORNING 30 MINUTES BEFORE EATING.   vitamin D (ERGOCALCIFEROL) 82656 units CAPS capsule, Take 1 capsule by mouth once a week  oxybutynin (DITROPAN-XL) 5 MG extended release tablet, Take 1 tablet by mouth daily  magnesium oxide (MAG-OX) 400 (241.3 Mg) MG TABS tablet, Take 1 tablet by mouth 2 times daily  fluticasone (FLONASE) 50 MCG/ACT nasal spray, 2 sprays by Nasal route daily (Patient taking differently: 1 spray by Nasal route daily )  pantoprazole (PROTONIX) 40 MG tablet, TAKE ONE TABLET BY MOUTH DAILY  Compression Stockings MISC, by Does not apply route Below knee 15 - 30 mm Hg    Allergies:

## 2019-07-02 NOTE — PLAN OF CARE
Problem: Pain:  Description  Pain management should include both nonpharmacologic and pharmacologic interventions.   Goal: Pain level will decrease  Description  Pain level will decrease  Outcome: Met This Shift  Goal: Control of acute pain  Description  Control of acute pain  Outcome: Met This Shift  Goal: Control of chronic pain  Description  Control of chronic pain  Outcome: Met This Shift     Problem: Falls - Risk of:  Goal: Will remain free from falls  Description  Will remain free from falls  Outcome: Met This Shift  Goal: Absence of physical injury  Description  Absence of physical injury  Outcome: Met This Shift

## 2019-07-02 NOTE — DISCHARGE INSTR - COC
3 Years and older, IM (Fluzone 3 yrs and older or Afluria 5 yrs and older) 09/28/2016    Pneumococcal Polysaccharide ([id number]) 02/26/2019    Tdap (Boostrix, Adacel) 06/08/2018       Active Problems:  Patient Active Problem List   Diagnosis Code    Hyperlipidemia E78.5    Noncompliance Z91.19    Depressive disorder F32.9    Halitosis R19.6    Dysmenorrhea N94.6    Bradycardia R00.1    Acquired hypothyroidism E03.9    Mild intermittent asthma without complication U71.45    Acute renal failure (ARF) ([facility]) N17.9    Anxiety F41.9    Intellectual disability F79    Obstructive uropathy N13.9    Mixed stress and urge urinary incontinence N39.46    Vitamin D deficiency E55.9    Seasonal allergic rhinitis J30.2    Acute on chronic renal insufficiency N28.9, N18.9    Acute renal failure superimposed on chronic kidney disease, on chronic dialysis ([facility]) N17.9, N18.9, Z99.2    Mild protein-calorie malnutrition ([facility]) E44.1    Acute cystitis with hematuria N30.01    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 ([facility]) C79.82, C67.9    JIAN (acute kidney injury) ([facility]) N17.9       Isolation/Infection:   Isolation          No Isolation            Nurse Assessment:  Last Vital Signs: /64   Pulse 83   Temp 99.7 °F (37.6 °C) (Oral)   Resp 18   Ht 5' 3\" (1.6 m)   Wt 140 lb (63.5 kg)   LMP 05/21/2015 (Approximate)   SpO2 98%   BMI 24.80 kg/m²     Last documented pain score (0-10 scale): Pain Level: 0  Last Weight:   Wt Readings from Last 1 Encounters:   07/01/19 140 lb (63.5 kg)     Mental Status:  oriented and alert    IV Access:  - None    Nursing Mobility/ADLs:  Walking   Assisted  Transfer  Assisted  Bathing  Assisted  Dressing  Assisted  Toileting  Assisted  Feeding  Assisted  Med Admin  Independent  Med Delivery   whole    Wound Care Documentation and Therapy:        Elimination:  Continence:   · Bowel:  Yes  · Bladder: Yes  Urinary Catheter:   Colostomy/Ileostomy/Ileal Conduit:

## 2019-07-02 NOTE — H&P
disease  5. Hypothyroidism  6. Chronic mental disability  7. Hyperlipidemia     BRIEF HISTORY OF PRESENT ILLNESS:   \"room with main complaint of abnormal lab work. Patient had laboratory done at the nursing facility. Patient with creatinine of 6. History is once again limited to the patient's underlying mental retardation. Patients that she has not been eating as much as normal. Patient unsure if 4 was removed and replaced. In the ER patient was found to have acute renal failure creatinine 7. Patient will ultrasound showed hydronephrosis\"    Work up for possible cervical/bladder/uterine CA:  Per hem onc:  \"chronic kidney disease stage IV with neurogenic bladder with chronic b/l stents, urosepsis, normocytic anemia, hypothyroid, chronic mental disability, hyperlipidemia. Urology placed stents and abnormal firm nodules were appreciated on pelvic exam 6/1/19. GYN was consulted and saw the pt 6/2 and was taken back to the OR on 6/6 for EUA and multiple cervical biopsies. The anterior cervical lip was noted to be hard and nodular, the vagina was stenotic, with biopsies taken from the anterior and posterior cervical lip and endocervical brush was also done   BX: results:  Anterior cervical lip, biopsy: Invasive high-grade carcinoma, see  comment  Invasive carcinoma is present at the deep inked margin of biopsy. B. Posterior cervical lip, biopsy: Invasive high-grade carcinoma, see  comment  Invasive carcinoma is present at the deep inked margin of biopsy. C. Endocervical lip, biopsy: Predominantly blood, mucus and scant  fragments of benign superficial squamous epithelium. Comment:   Irregular infiltrative nests of high-grade carcinoma with  abundant pink cytoplasm and nuclear features ranging from hyperchromatic  to vesicular with prominent nucleoli are present infiltrating the  subepithelial tissue underlying intact non-dysplastic squamous surface  epithelium.  Intracellular bridge formation is not hours as needed for Nausea or Vomiting 4/23/19   Akash Morris, DO   ferrous sulfate 325 (65 Fe) MG tablet Take 1 tablet by mouth 2 times daily (with meals) 4/23/19   Laura Arturo, DO   docusate sodium (COLACE, DULCOLAX) 100 MG CAPS Take 100 mg by mouth 2 times daily 4/23/19   Laura Arturo, DO   levothyroxine (SYNTHROID) 100 MCG tablet TAKE 1 TABLET BY MOUTH EVERY MORNING 30 MINUTES BEFORE EATING. 4/10/19   Ron Gaffney, DO   vitamin D (ERGOCALCIFEROL) 52227 units CAPS capsule Take 1 capsule by mouth once a week 4/9/19   Ron Gaffney, DO   oxybutynin (DITROPAN-XL) 5 MG extended release tablet Take 1 tablet by mouth daily 4/9/19   Celestine Trujillo, DO   magnesium oxide (MAG-OX) 400 (241.3 Mg) MG TABS tablet Take 1 tablet by mouth 2 times daily 4/9/19   Ron Gaffney, DO   fluticasone (FLONASE) 50 MCG/ACT nasal spray 2 sprays by Nasal route daily  Patient taking differently: 1 spray by Nasal route daily  4/9/19   Celestine Trujillo,    pantoprazole (PROTONIX) 40 MG tablet TAKE ONE TABLET BY MOUTH DAILY 4/9/19   Ron Gaffney, DO   Compression Stockings MISC by Does not apply route Below knee  15 - 30 mm Hg 7/25/18   Madeline Davison,        Allergies:  Patient has no known allergies. Social History:   TOBACCO:   reports that she quit smoking about 30 years ago. She has a 5.00 pack-year smoking history. She has never used smokeless tobacco.  ETOH:   reports that she does not drink alcohol. Family History:       Problem Relation Age of Onset    Diabetes Brother     Thyroid Disease Mother     Other Daughter         Autism    Cancer Maternal Grandmother         patient doesn't know type.     Heart Attack Maternal Grandfather       Or deferred/otherwise considered non contributory to current admission  PHYSICAL EXAM:    VS: /63   Pulse 91   Temp 100 °F (37.8 °C) (Oral)   Resp 18   Ht 5' 3\" (1.6 m)   Wt 140 lb (63.5 kg)   LMP 05/21/2015 (Approximate)   SpO2 99%   BMI 24.80 kg/m²     General the upper to mid pelvis within the subcutaneous fat; although these may represent areas of fat necrosis due to prior subdermal injections, possibility of metastases is to be considered as these are FDG avid; 2 dominant nodular lesions measuring up to 1.4 cm in diameter anterolaterally on the left exhibit an SUV as high as 3.2. Moderate is not well evaluated evaluated here although it is nearly empty, the wall remains thickened and it is FDG avid which may be due to chronic cystitis although tumoral infiltration cannot be excluded. There is persistent bulkiness of the cervix which is FDG avid; SUV as high as 20.7 is noted. This extends into the lower uterine segment and also downward into the upper aspect of the vaginal vault. Overall bulk of material here is approximately unchanged compared with previous diagnostic CT scan. There is no evidence of regional lymphadenopathy. Pelvic sidewalls appear free of tumor. No suspicious skeletal uptake is seen. There is otherwise a normal biodistribution of radiotracer. There is no other abnormal tracer uptake seen     Known cervical carcinoma with localized spread as described. Persistent hydronephrosis and hydroureter in the presence of the bilateral ureteral stents and Braden catheter; query bladder invasion versus chronic cystitis. Query metastatic involvement of the subdermal fat in the anterior upper to mid pelvis, unchanged when compared with diagnostic CT images done 1 year ago. Correlation with patient's clinical history is suggested.        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 *    Code status/DVT prophylaxis and PLAN --see orders   Note extensive time spent coordinating care between ER docs, ER and floor nurses, and transitioning care over to day providers  Plan of care/ clinical impressions/communication specifics detailed below:    52 y.o. female    chronic kidney disease stage IV with neurogenic bladder with chronic b/l stents, urosepsis, normocytic anemia, hypothyroid, chronic mental disability, hyperlipidemia. Urology placed stents and abnormal firm nodules were appreciated on pelvic exam 6/1/19. She denies a hx of STI, genital warts. She admits to smoking 2ppd, but it is difficult to ascertain how long she smoked, maybe 3 years. Unsure of her LMP. She admits to spotting with her urine   Bx:  Most consistent with high grade carcinoma--immunohistochemical findings, along with  lack of overlying surface epithelial squamous dysplasia are felt to be  suspicious for cervical involvement by urothelial carcinoma, either  involving the cervix by contiguous spread or metastasis. CKD IV with hydronephrosis, neurogenic bladder and stent placement  No recent intermittent hemodialysis  -s/p Cystourethroscopy, bilateral JJ ureteral stent insertion, pelvic examination 6/1  -currently has crane     Known neurogenic bladder and bladder spasms  Obstructive uropathy and renal failure recurrent noted-- sent in bc cr.  5.5 at outpatient facillity  With K+ 4.9  Baseline cr 2-3 recently, --giving IV fluids, continue ditropan    Hem onc known to dr. Sahara Wade, seen at [facility] recently +obstructive uropathy-- work up high grade carcinoma- urothelial/cervical features-- no treatment yet      Anemia likely multifactorial related to myelosuppression by recurrent sepsis as well as chronic kidney disease stage IV.  Iron studies and reticulocyte count will be obtained  Normocytic anemia of chronic disease  -[address]  Anemia ACD, once again limited to the patient's underlying mental retardation. Patients that she has not been eating as much as normal. Patient unsure if 4 was removed and replaced. In the ER patient was found to have acute renal failure creatinine 7. Patient will ultrasound showed hydronephrosis\"    Work up for possible cervical/bladder/uterine CA:  Per hem onc:  \"chronic kidney disease stage IV with neurogenic bladder with chronic b/l stents, urosepsis, normocytic anemia, hypothyroid, chronic mental disability, hyperlipidemia. Urology placed stents and abnormal firm nodules were appreciated on pelvic exam 6/1/19. GYN was consulted and saw the pt 6/2 and was taken back to the OR on 6/6 for EUA and multiple cervical biopsies. The anterior cervical lip was noted to be hard and nodular, the vagina was stenotic, with biopsies taken from the anterior and posterior cervical lip and endocervical brush was also done   BX: results:  Anterior cervical lip, biopsy: Invasive high-grade carcinoma, see  comment  Invasive carcinoma is present at the deep inked margin of biopsy. B. Posterior cervical lip, biopsy: Invasive high-grade carcinoma, see  comment  Invasive carcinoma is present at the deep inked margin of biopsy. C. Endocervical lip, biopsy: Predominantly blood, mucus and scant  fragments of benign superficial squamous epithelium. Comment:   Irregular infiltrative nests of high-grade carcinoma with  abundant pink cytoplasm and nuclear features ranging from hyperchromatic  to vesicular with prominent nucleoli are present infiltrating the  subepithelial tissue underlying intact non-dysplastic squamous surface  epithelium. Intracellular bridge formation is not definitively  identified. Dermal vascular/lymphatic invasion is present, as  demonstrated by CD34 immunostain highlighting the presence of malignant  cells within CD34 positive endothelial lined spaces.     The malignant cells show cytoplasmic CK 5/6, strong nuclear p40 and  strong nuclear GATA3 expression, and are negative for p16, Mammaglobin  and MOC 31 expression. Both squamous cell carcinoma and urothelial carcinoma can share a similar  immunostain profile including positivity for CK 5/6 and p40. Although  CRISS 3 expression has been reported to be present in association with  squamous carcinomas, usually the pattern of nuclear expression is  relatively weak/patchy. In this case strong nuclear CRISS 3 expression is  present. Additionally the malignant cells lack p16 expression, a  surrogate marker for HPV. The immunohistochemical findings, along with  lack of overlying surface epithelial squamous dysplasia are felt to be  suspicious for cervical involvement by urothelial carcinoma, either  involving the cervix by contiguous spread or metastasis. Correlation with  clinical and radiologic findings with attention to the bladder, bladder  neck and urethra is recommended. \"    Cris Goyal MD   Night Officer, overnight admitting doctor at [facility] call day time doctor   for questions after 7:30am    Covering for [facility]  If Qs please call [phone]  Electronically signed by Heather Florentino MD on 7/2/2019 at 6:21 AM

## 2019-07-02 NOTE — PROGRESS NOTES
[facility]  [address] [facility]      Pt Name: Jose Carlos Bravo  YOB: 1965  Date of evaluation: 7/2/2019  Primary Care Physician: Giuseppe Segovia DO  Reason for evaluation:   Chief Complaint   Patient presents with    Other     abnormal labs, hyperkalemia         Subjective: Hospitalized with acute renal failure. Patient is mentally challenged. Had been seen during prior hospitalization at [facility]      Past history :  Pt admitted on 5/31/19 for acute on chronic kidney disease stage IV with neurogenic bladder with chronic b/l stents, urosepsis, normocytic anemia, hypothyroid, chronic mental disability, hyperlipidemia. Urology placed stents and abnormal firm nodules were appreciated on pelvic exam 6/1/19. GYN was consulted and saw the pt 6/2 and was taken back to the OR on 6/6 for EUA and multiple cervical biopsies. The anterior cervical lip was noted to be hard and nodular, the vagina was stenotic, with biopsies taken from the anterior and posterior cervical lip and endocervical brush was also done             PATHOLOGY:  6/6/19  Diagnosis:  A. Anterior cervical lip, biopsy: Invasive high-grade carcinoma, see  comment  Invasive carcinoma is present at the deep inked margin of biopsy. B. Posterior cervical lip, biopsy: Invasive high-grade carcinoma, see  comment  Invasive carcinoma is present at the deep inked margin of biopsy. C. Endocervical lip, biopsy: Predominantly blood, mucus and scant  fragments of benign superficial squamous epithelium. Comment:   Irregular infiltrative nests of high-grade carcinoma with  abundant pink cytoplasm and nuclear features ranging from hyperchromatic to vesicular with prominent nucleoli are present infiltrating the ubepithelial tissue underlying intact non-dysplastic squamous surface epithelium. Intracellular bridge formation is not definitively identified.  Dermal vascular/lymphatic invasion is present, as  demonstrated by CD34 immunostain No rash. Multiple moles. Medications  Prior to Admission medications    Medication Sig Start Date End Date Taking?  Authorizing Provider   sodium bicarbonate 650 MG tablet Take 1 tablet by mouth 2 times daily 6/14/19   Danelle Ulloa DO   atorvastatin (LIPITOR) 40 MG tablet Take 1 tablet by mouth daily 6/14/19   Danelle Ulloa DO   acetaminophen (TYLENOL) 325 MG tablet Take 650 mg by mouth every 4 hours as needed for Pain    Historical Provider, MD   mirtazapine (REMERON) 15 MG tablet Take 1 tablet by mouth nightly 5/24/19   Mariola Hernández MD   ondansetron (ZOFRAN-ODT) 4 MG disintegrating tablet Take 1 tablet by mouth every 8 hours as needed for Nausea or Vomiting 4/23/19   Danelle Ulloa DO   ferrous sulfate 325 (65 Fe) MG tablet Take 1 tablet by mouth 2 times daily (with meals) 4/23/19   Danelle Ulloa DO   docusate sodium (COLACE, DULCOLAX) 100 MG CAPS Take 100 mg by mouth 2 times daily 4/23/19   Danelle Ulloa DO   levothyroxine (SYNTHROID) 100 MCG tablet TAKE 1 TABLET BY MOUTH EVERY MORNING 30 MINUTES BEFORE EATING. 4/10/19   Davida Torres DO   vitamin D (ERGOCALCIFEROL) 51555 units CAPS capsule Take 1 capsule by mouth once a week 4/9/19   Davida Torres DO   oxybutynin (DITROPAN-XL) 5 MG extended release tablet Take 1 tablet by mouth daily 4/9/19   Celestine Trujillo DO   magnesium oxide (MAG-OX) 400 (241.3 Mg) MG TABS tablet Take 1 tablet by mouth 2 times daily 4/9/19   Davida Torres DO   fluticasone (FLONASE) 50 MCG/ACT nasal spray 2 sprays by Nasal route daily  Patient taking differently: 1 spray by Nasal route daily  4/9/19   Celestine Trujillo DO   pantoprazole (PROTONIX) 40 MG tablet TAKE ONE TABLET BY MOUTH DAILY 4/9/19   Davida Torres DO   Compression Stockings MISC by Does not apply route Below knee  15 - 30 mm Hg 7/25/18   Krishna Contreras DO    Scheduled Meds:  Continuous Infusions:  PRN Meds:.        Recent Laboratory Data-     Lab Results   Component Value Date    WBC 15.5 (H) 07/02/2019 HGB 7.5 (L) 07/02/2019    HCT 24.9 (L) 07/02/2019    MCV 95.0 07/02/2019     07/02/2019    LYMPHOPCT 6.1 (L) 07/01/2019    RBC 2.62 (L) 07/02/2019    MCH 28.6 07/02/2019    MCHC 30.1 (L) 07/02/2019    RDW 15.4 (H) 07/02/2019    NEUTOPHILPCT 83.5 (H) 07/01/2019    MONOPCT 4.3 07/01/2019    BASOPCT 0.0 07/01/2019    NEUTROABS 12.77 (H) 07/01/2019    LYMPHSABS 0.91 (L) 07/01/2019    MONOSABS 0.61 07/01/2019    EOSABS 0.79 (H) 07/01/2019    BASOSABS 0.00 07/01/2019       Lab Results   Component Value Date     07/02/2019    K 4.6 07/02/2019     07/02/2019    CO2 21 (L) 07/02/2019    BUN 80 (H) 07/02/2019    CREATININE 5.6 (H) 07/02/2019    GLUCOSE 150 (H) 07/02/2019    CALCIUM 9.4 07/02/2019    PROT 7.8 07/01/2019    LABALBU 3.4 (L) 07/01/2019    BILITOT 0.3 07/01/2019    ALKPHOS 207 (H) 07/01/2019    AST 71 (H) 07/01/2019    ALT 61 (H) 07/01/2019    LABGLOM 8 07/02/2019    GFRAA 10 07/02/2019         Lab Results   Component Value Date    IRON 26 (L) 06/26/2019    TIBC 275 06/26/2019    FERRITIN 266 06/26/2019             Radiology-  CT Abdomen Pelvis Wo Contrast Additional Contrast? Oral  Result Date: 6/2/2019  LOCATION: 200 EXAM: CT ABDOMEN PELVIS WO CONTRAST COMPARISON: 5/22/2019 reporting cystitis and severe right hydroureteronephrosis with stable position of the ureteral stent. HISTORY: Abnormal labs, pain across pelvis, recent stent placement within the ureter. TECHNIQUE: Noncontrast helical abdomen and pelvis CT was performed. Coronal and sagittal reconstructions also obtained. Automated dose control was used for this exam. CONTRAST: No IV contrast administered. No oral contrast administered. FINDINGS: Small bilateral pleural effusions are present new since prior examination. Small hiatal hernia is seen. Contrast in the esophagus may relate reflect reflux or dysmotility. There is thickening of the duodenal wall second portion worrisome for duodenitis. Correlation with patient's pain.  Does this echogenicity. Small free fluid in the cul-de-sac. No acute finding. IR Fluoro Guided Cva Device Plmt/replace/removal  Result Date: 6/4/2019  Location:200 Exam: IR FLUORO GUIDED CVA DEVICE PLMT/REPLACE/REMOVAL, IR ULTRASOUND GUIDANCE VASCULAR ACCESS HISTORY: Acute renal failure. Versed 1 mg IV was given for anxiety during the procedure. Fluoroscopy time 0.3 minutes. PROCEDURE: Informed consent was obtained. Ultrasound was used to localize the right  internal jugular vein. The skin was prepped and draped in a sterile fashion and then anesthetized with lidocaine. Maximal sterile barrier technique was utilized. Under direct ultrasound visualization, a micropuncture needle was used to gain access to the right internal jugular vein. A 0.018 guidewire was advanced into the central venous circulation under fluoroscopic guidance. A micropuncture sheath was placed. Through the sheath, a 0.035 J guidewire was advanced. Over the guidewire, the tract was dilated using 12 and 14 Western Cheryl dilators. A 13.5 Polish diameter temporary dialysis catheter 15 cm long was then advanced over the guidewire and positioned within the superior aspect of the right atrium. The catheter was then affixed to the skin using 2-0 silk. FINDINGS: Ultrasound demonstrates patent internal jugular veins bilaterally. Fluoroscopic spot film demonstrates placement of the right internal jugular hemodialysis catheter with the tip in the superior aspect of the right atrium. Successful placement of a right IJ temporary hemodialysis catheter. IR Fluoro Guided Cva Device Plmt/replace/removal  Result Date: 5/29/2019  Location:200 Exam: IR FLUORO GUIDED CVA DEVICE PLMT/REPLACE/REMOVAL HISTORY:   Infection FLUOROSCOPY TIME:  0.2 minutes PROCEDURE:  The procedure was explained to the patient and informed consent was obtained. The skin over the  right arm was prepped and draped in a sterile fashion and then anesthetized with Lidocaine.  Maximal sterile barrier technique was utilized. Under ultrasound guidance, the brachial vein was localized. Using a micropuncture needle, the vein was entered under direct ultrasound visualization. A 0.018 guidewire was advanced into the central venous circulation. A 5 American peel-away sheath was placed. The 5 American Power PICC line catheter was trimmed to 32 cm was then advanced through the peel-away sheath and positioned at the right atrial/SVC junction. Fluoroscopic spot film was obtained. FINDINGS:  Ultrasound shows the veins to be patent. Fluoroscopic spot film demonstrates the Power PICC line to be at the right atrial/SVC junction. Successful placement of a 5 American double-lumen Power PICC line using ultrasound guidance via the right brachial  vein. Xr Urogram W Wo Kub W Wo Tomogram  Result Date: 6/1/2019  Reading location: 200 INDICATION: Ureteral stent exchange FINDINGS: 67.4 seconds of fluoroscopy and 4 spot images during ureteral stent exchange. No contrast injection. Operative fluoroscopy      US Retroperitoneal Complete  Result Date: 5/31/2019  Reading location: 200 INDICATION: Renal insufficiency FINDINGS: Sonographic evaluation urinary tract compared with ultrasound 1/18/2019 and CT the abdomen and pelvis 5/22/2019. Right kidney 12.1 cm in length and the left 11.2 cm. At least moderate dilatation right renal collecting system. On recent CT, right ureteral stent is coiled in the renal pelvis. Dilatation the left renal collecting system is somewhat less severe than the right. Mid left renal cyst measures 13 mm and upper pole cyst 25.5 mm. No focal abnormality right renal collecting system. Nondistended urinary bladder. Moderate bilateral hydronephrosis, somewhat more severe on the right than the left.       IR Ultrasound Guidance Vascular Access  Result Date: 6/4/2019  Location:200 Exam: IR FLUORO GUIDED CVA DEVICE PLMT/REPLACE/REMOVAL, IR ULTRASOUND GUIDANCE VASCULAR ACCESS HISTORY: Acute renal no evidence of regional lymphadenopathy. Pelvic sidewalls appear free of tumor.       No suspicious skeletal uptake is seen.       There is otherwise a normal biodistribution of radiotracer. There is   no other abnormal tracer uptake seen         ASSESSMENT/PLAN :  42-year-old woman with mild mental retardation and recent diagnosis of high-grade invasive poorly differentiated carcinoma on a cervical biopsy with immunohistochemical stains suspicious of urothelial primary with extrinsic invasion of the cervix as well as the vaginal wall  She has prior history of CKD stage IV with hydronephrosis and neurogenic bladder and prior stent placement and has required intermittent hemodialysis in the past and most recently  She underwent cystourethroscopy, bilateral JJ ureteral stent insertion, pelvic examination 6/1  Her cystoscopy did not show any bladder tumors. The urethra was fixed and difficult to enter. Per urology there were abnormal firm nodular lesions within the vagina and stenosis suspicious for GYN malignancy  She is now hospitalized with acute kidney failure superimposed on CKD stage IV, her serum creatinine was up to 5.8 with an estimated GFR of 8    Her PET CT scan showed scattered subcutaneous soft tissue densities in the upper to mid pelvis which may represent fat necrosis from prior injections versus metastatic disease. Bladder wall was thickened and FDG avid and this may be related to chronic cystitis versus tumor infiltration. The cervix appears bulky with high SUV of 20.7 with abnormal metabolic activity extending into the lower uterine segment and into the vagina involved. There was no evidence of regional lymphadenopathy.     Her pathology is suggestive of metastatic urothelial carcinoma even though clinically urology feels she has a GYN malignancy.   In view of her progressive kidney failure she will need nephrology consult with possible need of hemodialysis  She would not be candidate for any

## 2019-07-03 LAB
ANION GAP SERPL CALCULATED.3IONS-SCNC: 18 MMOL/L (ref 7–16)
APTT: 35.5 SEC (ref 24.5–35.1)
BUN BLDV-MCNC: 84 MG/DL (ref 6–20)
CALCIUM SERPL-MCNC: 10.3 MG/DL (ref 8.6–10.2)
CHLORIDE BLD-SCNC: 105 MMOL/L (ref 98–107)
CO2: 20 MMOL/L (ref 22–29)
CREAT SERPL-MCNC: 5.9 MG/DL (ref 0.5–1)
GFR AFRICAN AMERICAN: 9
GFR NON-AFRICAN AMERICAN: 7 ML/MIN/1.73
GLUCOSE BLD-MCNC: 88 MG/DL (ref 74–99)
HCT VFR BLD CALC: 24.2 % (ref 34–48)
HEMOGLOBIN: 7.5 G/DL (ref 11.5–15.5)
INR BLD: 1.3
MAGNESIUM: 3 MG/DL (ref 1.6–2.6)
MCH RBC QN AUTO: 29.2 PG (ref 26–35)
MCHC RBC AUTO-ENTMCNC: 31 % (ref 32–34.5)
MCV RBC AUTO: 94.2 FL (ref 80–99.9)
PDW BLD-RTO: 15.3 FL (ref 11.5–15)
PHOSPHORUS: 5.8 MG/DL (ref 2.5–4.5)
PLATELET # BLD: 358 E9/L (ref 130–450)
PMV BLD AUTO: 11.4 FL (ref 7–12)
POTASSIUM SERPL-SCNC: 5 MMOL/L (ref 3.5–5)
PROTHROMBIN TIME: 15 SEC (ref 9.3–12.4)
RBC # BLD: 2.57 E12/L (ref 3.5–5.5)
SODIUM BLD-SCNC: 143 MMOL/L (ref 132–146)
URINE CULTURE, ROUTINE: NORMAL
WBC # BLD: 14.3 E9/L (ref 4.5–11.5)

## 2019-07-03 PROCEDURE — 6360000002 HC RX W HCPCS: Performed by: INTERNAL MEDICINE

## 2019-07-03 PROCEDURE — 85730 THROMBOPLASTIN TIME PARTIAL: CPT

## 2019-07-03 PROCEDURE — 36415 COLL VENOUS BLD VENIPUNCTURE: CPT

## 2019-07-03 PROCEDURE — 6370000000 HC RX 637 (ALT 250 FOR IP): Performed by: INTERNAL MEDICINE

## 2019-07-03 PROCEDURE — 84100 ASSAY OF PHOSPHORUS: CPT

## 2019-07-03 PROCEDURE — 83735 ASSAY OF MAGNESIUM: CPT

## 2019-07-03 PROCEDURE — APPSS30 APP SPLIT SHARED TIME 16-30 MINUTES: Performed by: PHYSICIAN ASSISTANT

## 2019-07-03 PROCEDURE — 1200000000 HC SEMI PRIVATE

## 2019-07-03 PROCEDURE — 99233 SBSQ HOSP IP/OBS HIGH 50: CPT | Performed by: INTERNAL MEDICINE

## 2019-07-03 PROCEDURE — 80048 BASIC METABOLIC PNL TOTAL CA: CPT

## 2019-07-03 PROCEDURE — 85027 COMPLETE CBC AUTOMATED: CPT

## 2019-07-03 PROCEDURE — 85610 PROTHROMBIN TIME: CPT

## 2019-07-03 PROCEDURE — 2580000003 HC RX 258: Performed by: INTERNAL MEDICINE

## 2019-07-03 RX ADMIN — ATORVASTATIN CALCIUM 40 MG: 40 TABLET, FILM COATED ORAL at 09:35

## 2019-07-03 RX ADMIN — FERROUS SULFATE TAB 325 MG (65 MG ELEMENTAL FE) 325 MG: 325 (65 FE) TAB at 09:35

## 2019-07-03 RX ADMIN — MIRTAZAPINE 15 MG: 15 TABLET, FILM COATED ORAL at 21:47

## 2019-07-03 RX ADMIN — CEFTRIAXONE 1 G: 1 INJECTION, POWDER, FOR SOLUTION INTRAMUSCULAR; INTRAVENOUS at 14:42

## 2019-07-03 RX ADMIN — DOCUSATE SODIUM 100 MG: 100 CAPSULE, LIQUID FILLED ORAL at 09:35

## 2019-07-03 RX ADMIN — SODIUM CHLORIDE: 9 INJECTION, SOLUTION INTRAVENOUS at 11:35

## 2019-07-03 RX ADMIN — LEVOTHYROXINE SODIUM 100 MCG: 100 TABLET ORAL at 05:55

## 2019-07-03 RX ADMIN — PANTOPRAZOLE SODIUM 40 MG: 40 TABLET, DELAYED RELEASE ORAL at 09:35

## 2019-07-03 RX ADMIN — SODIUM BICARBONATE 650 MG: 650 TABLET ORAL at 09:35

## 2019-07-03 RX ADMIN — SODIUM BICARBONATE 650 MG: 650 TABLET ORAL at 21:47

## 2019-07-03 RX ADMIN — FLUTICASONE PROPIONATE 1 SPRAY: 50 SPRAY, METERED NASAL at 09:35

## 2019-07-03 RX ADMIN — FERROUS SULFATE TAB 325 MG (65 MG ELEMENTAL FE) 325 MG: 325 (65 FE) TAB at 18:36

## 2019-07-03 RX ADMIN — ONDANSETRON 4 MG: 4 TABLET, ORALLY DISINTEGRATING ORAL at 23:46

## 2019-07-03 RX ADMIN — DOCUSATE SODIUM 100 MG: 100 CAPSULE, LIQUID FILLED ORAL at 21:47

## 2019-07-03 ASSESSMENT — PAIN SCALES - GENERAL: PAINLEVEL_OUTOF10: 0

## 2019-07-03 NOTE — CONSULTS
implants     Unspecified intellectual disabilities     Urinary tract infection, site not specified     Vitamin D deficiency, unspecified         Past Surgical History:   Procedure Laterality Date    CYSTOSCOPY Left 2019    CYSTOSCOPY, BILATERAL RETROGRADE PYELOGRAMS, BILATERAL STENT INSERTION performed by Ilya Cosme MD at [address] Bilateral 2019    CYSTOSCOPY, RETROGRADE PYELOGRAMS, BILATERAL URETERAL STENT EXCHANGE performed by Shane Bui MD at [address] N/A 2019    EXAM UNDEDR ANESTHESIA VAGINAL BIOPSIES performed by Amadou Guillen MD at [address]       Current Medications:    sodium chloride 65 mL/hr at 19 1135      acetaminophen, magnesium hydroxide, ondansetron    cefTRIAXone (ROCEPHIN) IV  1 g Intravenous Q24H    atorvastatin  40 mg Oral Daily    docusate sodium  100 mg Oral BID    ferrous sulfate  325 mg Oral BID WC    fluticasone  1 spray Nasal Daily    levothyroxine  100 mcg Oral Daily    mirtazapine  15 mg Oral Nightly    pantoprazole  40 mg Oral Daily    sodium bicarbonate  650 mg Oral BID    vitamin D  50,000 Units Oral Weekly        Allergies:  Patient has no known allergies.     Social History     Socioeconomic History    Marital status: Single     Spouse name: Not on file    Number of children: 1    Years of education: Not on file    Highest education level: Not on file   Occupational History    Occupation: unemployed     Employer: not employed   Social Needs    Financial resource strain: Not on file    Food insecurity:     Worry: Not on file     Inability: Not on file   Vericept needs:     Medical: Not on file     Non-medical: Not on file   Tobacco Use    Smoking status: Former Smoker     Packs/day: 1.00     Years: 5.00     Pack years: 5.00     Last attempt to quit: 1989     Years since quittin.5    Smokeless tobacco: Never Used   Substance and Sexual Activity    Alcohol use: No    Drug

## 2019-07-03 NOTE — PROGRESS NOTES
Perfect serve message sent to Dr. Melinda Maldonado per Dr. Florencio Bañuelos request regarding plan of care.       Electronically signed by Natalie Salazar RN on 7/3/2019 at 12:28 PM

## 2019-07-03 NOTE — PROGRESS NOTES
[facility] UROLOGY  PROGRESS NOTE    Chief Complaint:  Bilateral hydronephrosis/Left ureteral calculus/Microhematuria/ARF/Gross hematuria/UTI    HPI: She is tired and wants to go home today. She is not having any catheter discomfort. She denies any significant pain at this time    Vitals:    07/02/19 2015   BP: (!) 123/59   Pulse: 72   Resp: 18   Temp: 99.4 °F (37.4 °C)   SpO2: 96%       Allergies: Patient has no known allergies.     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 ([facility] 6/29/2019    Chronic kidney disease     Chronic major depressive disorder, recurrent episode ([facility]     Constipation, unspecified     Depression     Difficulty walking     Dysmenorrhea, unspecified     Dysphagia, oropharyngeal     Hyperlipidemia     Hypertension     Hypothyroidism     Intellectual disability     Leg pain, bilateral     Mild intermittent asthma, uncomplicated     Mild protein-calorie malnutrition (HCC)     Mixed incontinence     Muscle weakness (generalized)     Noncompliance with medications     Noncompliance with treatment     Obstructive and reflux uropathy     Osteoarthritis     Other fatigue     Other symbolic dysfunctions     Personal care impairment     Personal history of noncompliance with medical treatment, presenting hazards to health     Presence of urogenital implants     Unspecified intellectual disabilities     Urinary tract infection, site not specified     Vitamin D deficiency, unspecified        PAST SURGICAL HISTORY:   Past Surgical History:   Procedure Laterality Date    CYSTOSCOPY Left 1/21/2019    CYSTOSCOPY, BILATERAL RETROGRADE PYELOGRAMS, BILATERAL STENT INSERTION performed by Sabi Avelar MD at [address] [place] [address] performed in approximately 3 months as an outpatient  -Stable for discharge with Braden to leg bag from my standpoint        Blayne Pearl MD  7/3/2019  7:03 AM

## 2019-07-03 NOTE — PLAN OF CARE
Problem: Pain:  Goal: Pain level will decrease  Description  Pain level will decrease  7/2/2019 2301 by Lani Monterroso RN  Outcome: Met This Shift     Problem: Pain:  Goal: Control of acute pain  Description  Control of acute pain  7/2/2019 2301 by Lani Monterroso RN  Outcome: Met This Shift     Problem: Pain:  Goal: Control of chronic pain  Description  Control of chronic pain  7/2/2019 2301 by Lani Monterroso RN  Outcome: Met This Shift     Problem: Falls - Risk of:  Goal: Will remain free from falls  Description  Will remain free from falls  7/2/2019 2301 by Lani Monterroso RN  Outcome: Met This Shift     Problem: Falls - Risk of:  Goal: Absence of physical injury  Description  Absence of physical injury  7/2/2019 2301 by Lani Monterroso RN  Outcome: Met This Shift

## 2019-07-03 NOTE — CONSULTS
[facility] [employer]. MD Arpit Stoddard, MD Nathalia Thomas, MD Gisele Mcfadden, [facility]    Consultation  7/2/2019    Thank you for consult  Full note dictated, to follow  Briefly, 52 y.o. y.o. woman known to service, followed longitudinally from a nephrology standpoint by Dr. Nathalia Alvaerz, admitted with recurrent acute kidney injury noted on routine laboratory studies performed at her rehab facility. She denies all complaints. She has a comp gated past medical history which includes neurogenic bladder, bilateral ureteral stents, recent episode of acute kidney injury for which she was evaluated and treated at [facility].  She is undergone a number of dialysis treatments her temporary dialysis catheter, though as renal function had recovered sufficiently by the time of her discharge in mid June, dialysis catheter was removed. Notably also, she has been diagnosed with high-grade carcinoma of the cervix felt to be suspicious for cervical involvement by urothelial carcinoma, either involving the cervix by contiguous spread or metastasis. Dr. Halle Roach is evaluating and making recommendations as regards treatment. BUN/Cr = 19/2.8 (6/14) --> 43/2.0 (6/26) --> 86/5.8 (7/1) --> 80/5.6 (7/2)    A/R:  1. Acute kidney injury - creatinine had improved to 2.0 mg/dL as of 6/26 after an episode of acute kidney injury on her last hospitalization due to obstructive uropathy. Last dialysis was during her hospitalization, in early June. She tells me she has had her Braden in place since her admission to [address] [place] [address]. Offers no other history to suggest acute event. No NSAIDs. No antimicrobials currently. 2.  Anemia    3. High-grade carcinoma found on biopsies of the cervix, felt to be suspicious for cervical involvement by urothelial carcinoma either by contiguous spread or metastasis    4.   UTI versus colonization    Continue Braden  IV normal saline  Follow labs,

## 2019-07-03 NOTE — PROGRESS NOTES
mucus membranes moist  Neck: no JVD  Cardiovascular: S1, S2 regular rhythm, no murmur,or rub  Respiratory:  No crackles, no wheeze  Gastrointestinal:  Soft, nontender, nondistended, NABS  Ext: no edema, feet warm  Skin: dry, no rash  Neuro: awake, alert, interactive      DATA:    Recent Labs     07/01/19 2012 07/02/19  1040 07/03/19  0315   WBC 15.2* 15.5* 14.3*   HGB 7.9* 7.5* 7.5*   HCT 25.2* 24.9* 24.2*   MCV 93.3 95.0 94.2    323 358     Recent Labs     07/01/19 2012 07/01/19 2219 07/02/19  1040 07/03/19  0315     --  142 143   K 6.1* 4.9 4.6 5.0   CL 98  --  104 105   CO2 22  --  21* 20*   MG  --   --  2.8* 3.0*   PHOS  --   --  5.4* 5.8*   BUN 86*  --  80* 84*   CREATININE 5.8*  --  5.6* 5.9*   ALT 61*  --   --   --    AST 71*  --   --   --    BILITOT 0.3  --   --   --    ALKPHOS 207*  --   --   --        Lab Results   Component Value Date    LABPROT 3.5 (H) 07/02/2019    LABPROT 3.5 07/02/2019       ASSESSMENT / RECOMMENDATIONS:    1. Acute kidney injury - creatinine had improved to 2.0 mg/dL as of 6/26 after an episode of acute kidney injury on her last hospitalization due to obstructive uropathy. Last dialysis was during her hospitalization, in early June. She tells me she has had her Braden in place since her admission to [facility]. Offers no other history to suggest acute event. No NSAIDs. No antimicrobials currently.     2. Anemia     3. High-grade carcinoma found on biopsies of the cervix, felt to be suspicious for cervical involvement by urothelial carcinoma either by contiguous spread or metastasis     4.   UTI versus colonization     Continue Braden  IV normal saline  Follow labs, UO  discussed with urology -- may need perc neph tubes  Frankly uremic, acidemic, hyperkalemic -- likely to benefit from resuming HD    She agrees to proceed -- she has been her own POA in the past;  Speech imprediment makes it seem that she is not capable of making decisions, but she does show insight

## 2019-07-03 NOTE — CONSULTS
Consult received for question of need for guardianship. I have seen patient on consult service numerous times in the past. Despite mild to moderate intellectual disability she is still assumed to have capacity to make medical decisions. If at any point she is not demonstrating capacity to make a specific decision then surrogate decision maker, either POA or closest next of kin, can step in. I do not feel pursuing guardianship is appropriate. Psychiatry sign off.     Electronically signed by Tammie Mendoza MD on 7/3/2019 at 1:41 PM

## 2019-07-03 NOTE — PROGRESS NOTES
challenged. Skin:  No rash. Multiple moles. Medications  Prior to Admission medications    Medication Sig Start Date End Date Taking?  Authorizing Provider   sodium bicarbonate 650 MG tablet Take 1 tablet by mouth 2 times daily 6/14/19   Luiz Pedroza, DO   atorvastatin (LIPITOR) 40 MG tablet Take 1 tablet by mouth daily 6/14/19   Luiz Pedroza, DO   acetaminophen (TYLENOL) 325 MG tablet Take 650 mg by mouth every 4 hours as needed for Pain    Historical Provider, MD   mirtazapine (REMERON) 15 MG tablet Take 1 tablet by mouth nightly 5/24/19   Ashley Cooper MD   ondansetron (ZOFRAN-ODT) 4 MG disintegrating tablet Take 1 tablet by mouth every 8 hours as needed for Nausea or Vomiting 4/23/19   Luiz Pedroza, DO   ferrous sulfate 325 (65 Fe) MG tablet Take 1 tablet by mouth 2 times daily (with meals) 4/23/19   Luiz Pedroza, DO   docusate sodium (COLACE, DULCOLAX) 100 MG CAPS Take 100 mg by mouth 2 times daily 4/23/19   Luiz Pedroza,    levothyroxine (SYNTHROID) 100 MCG tablet TAKE 1 TABLET BY MOUTH EVERY MORNING 30 MINUTES BEFORE EATING. 4/10/19   Zachary Cervantes DO   vitamin D (ERGOCALCIFEROL) 26924 units CAPS capsule Take 1 capsule by mouth once a week 4/9/19   Zachary Cervantes DO   oxybutynin (DITROPAN-XL) 5 MG extended release tablet Take 1 tablet by mouth daily 4/9/19   Celestine Trujillo DO   magnesium oxide (MAG-OX) 400 (241.3 Mg) MG TABS tablet Take 1 tablet by mouth 2 times daily 4/9/19   Zachary Cervantes DO   fluticasone (FLONASE) 50 MCG/ACT nasal spray 2 sprays by Nasal route daily  Patient taking differently: 1 spray by Nasal route daily  4/9/19   Celestine Trujillo DO   pantoprazole (PROTONIX) 40 MG tablet TAKE ONE TABLET BY MOUTH DAILY 4/9/19   Zachary Cervantes DO   Compression Stockings MISC by Does not apply route Below knee  15 - 30 mm Hg 7/25/18   Ivy Mccord DO    Scheduled Meds:  Continuous Infusions:  PRN Meds:.        Recent Laboratory Data-     Lab Results   Component Value Date    WBC 14.3 pain. Does this patient have epigastric pain clinically? Minimal induration of the presacral soft tissues of the lower pelvis new since prior examination, axial image 64. Inflammation or infection cannot be excluded. Correlation with inflammatory markers recommended. Midline umbilical hernia containing only fat similar to prior examination. The appendix is not seen although there are no secondary signs for acute appendicitis. There is no free air. The remaining bowel shows no bowel wall thickening or distention. Minimal subcutaneous edema is present of the lower abdomen. Soft tissue densities of the anterior abdomen likely reflect sequela from abdominal wall injections. Bilateral ureteral stents are seen. The right kidney demonstrates a stent properly positioned. There is continued right-sided hydronephrosis. No stone seen adjacent to the ureter. The left side demonstrates a properly positioned stent with hydronephrosis and hydroureter. No stone seen adjacent to the stent. Braden catheter in the urinary bladder makes evaluation difficult. The bladder is contracted and there is air present likely from the Braden. Phleboliths are present in the lower pelvis. 1. Development of small bilateral pleural effusions right side greater than left. 2. Retained fluid in the esophagus as described. 3. Bilateral hydronephrosis and hydroureter. Ureteral stents appear to properly positioned. No stones seen. 4. New presacral edema of the lower pelvis. 5. Suspected duodenal wall thickening of the second portion similar to the prior examination without duodenal inflammatory changes, equivocal.       US Pelvis Complete  Result Date: 6/4/2019  Reading location: 200 INDICATION: Vaginal nodules FINDINGS: Transabdominal images of the pelvis. Uterus 68 x 30 x 38 mm with unremarkable myometrial contour and echogenicity. Endometrial stripe 4.2 mm in AP dimension.  Right ovary 16 x 12 x 15 mm and left 14 x 10 x 11 mm with unremarkable sterile barrier technique was utilized. Under ultrasound guidance, the brachial vein was localized. Using a micropuncture needle, the vein was entered under direct ultrasound visualization. A 0.018 guidewire was advanced into the central venous circulation. A 5 Portuguese peel-away sheath was placed. The 5 Portuguese Power PICC line catheter was trimmed to 32 cm was then advanced through the peel-away sheath and positioned at the right atrial/SVC junction. Fluoroscopic spot film was obtained. FINDINGS:  Ultrasound shows the veins to be patent. Fluoroscopic spot film demonstrates the Power PICC line to be at the right atrial/SVC junction. Successful placement of a 5 Portuguese double-lumen Power PICC line using ultrasound guidance via the right brachial  vein. Xr Urogram W Wo Kub W Wo Tomogram  Result Date: 6/1/2019  Reading location: 200 INDICATION: Ureteral stent exchange FINDINGS: 67.4 seconds of fluoroscopy and 4 spot images during ureteral stent exchange. No contrast injection. Operative fluoroscopy      US Retroperitoneal Complete  Result Date: 5/31/2019  Reading location: 200 INDICATION: Renal insufficiency FINDINGS: Sonographic evaluation urinary tract compared with ultrasound 1/18/2019 and CT the abdomen and pelvis 5/22/2019. Right kidney 12.1 cm in length and the left 11.2 cm. At least moderate dilatation right renal collecting system. On recent CT, right ureteral stent is coiled in the renal pelvis. Dilatation the left renal collecting system is somewhat less severe than the right. Mid left renal cyst measures 13 mm and upper pole cyst 25.5 mm. No focal abnormality right renal collecting system. Nondistended urinary bladder. Moderate bilateral hydronephrosis, somewhat more severe on the right than the left.       IR Ultrasound Guidance Vascular Access  Result Date: 6/4/2019  Location:200 Exam: IR FLUORO GUIDED CVA DEVICE PLMT/REPLACE/REMOVAL, IR ULTRASOUND GUIDANCE VASCULAR ACCESS HISTORY: Acute for any type of platinum therapy    - Anemia likely multifactorial related to myelosuppression by recurrent sepsis as well as chronic kidney disease. Hgb stable and normocytic. Transfuse for <7    - Chronic mental disability with depression and psychosis  - Outpatient immunotherapy with single agent pembrolizumab w/ Dr. Da Cavazos and if no response consideration for palliative radiation therapy to minimize her vaginal spotting. Dr. Kenny Co aware.  Pt will have transport issues that will need to be addressed   - Planning BL nephrostomy tubes through IR  - Overall prognosis poor      Estefanía Butcher MD  Electronically signed 7/3/2019 at 4:31 PM

## 2019-07-04 LAB
ANION GAP SERPL CALCULATED.3IONS-SCNC: 15 MMOL/L (ref 7–16)
BUN BLDV-MCNC: 74 MG/DL (ref 6–20)
CALCIUM SERPL-MCNC: 9.1 MG/DL (ref 8.6–10.2)
CHLORIDE BLD-SCNC: 107 MMOL/L (ref 98–107)
CO2: 19 MMOL/L (ref 22–29)
CREAT SERPL-MCNC: 5.4 MG/DL (ref 0.5–1)
GFR AFRICAN AMERICAN: 10
GFR NON-AFRICAN AMERICAN: 8 ML/MIN/1.73
GLUCOSE BLD-MCNC: 177 MG/DL (ref 74–99)
HCT VFR BLD CALC: 26.1 % (ref 34–48)
HEMOGLOBIN: 8.2 G/DL (ref 11.5–15.5)
MCH RBC QN AUTO: 29.3 PG (ref 26–35)
MCHC RBC AUTO-ENTMCNC: 31.4 % (ref 32–34.5)
MCV RBC AUTO: 93.2 FL (ref 80–99.9)
PDW BLD-RTO: 15 FL (ref 11.5–15)
PLATELET # BLD: 418 E9/L (ref 130–450)
PMV BLD AUTO: 11.1 FL (ref 7–12)
POTASSIUM SERPL-SCNC: 4.3 MMOL/L (ref 3.5–5)
RBC # BLD: 2.8 E12/L (ref 3.5–5.5)
SODIUM BLD-SCNC: 141 MMOL/L (ref 132–146)
WBC # BLD: 11.6 E9/L (ref 4.5–11.5)

## 2019-07-04 PROCEDURE — 99233 SBSQ HOSP IP/OBS HIGH 50: CPT | Performed by: INTERNAL MEDICINE

## 2019-07-04 PROCEDURE — 80074 ACUTE HEPATITIS PANEL: CPT

## 2019-07-04 PROCEDURE — 2580000003 HC RX 258: Performed by: INTERNAL MEDICINE

## 2019-07-04 PROCEDURE — 80048 BASIC METABOLIC PNL TOTAL CA: CPT

## 2019-07-04 PROCEDURE — 6370000000 HC RX 637 (ALT 250 FOR IP): Performed by: INTERNAL MEDICINE

## 2019-07-04 PROCEDURE — 6360000002 HC RX W HCPCS: Performed by: INTERNAL MEDICINE

## 2019-07-04 PROCEDURE — 5A1D70Z PERFORMANCE OF URINARY FILTRATION, INTERMITTENT, LESS THAN 6 HOURS PER DAY: ICD-10-PCS | Performed by: INTERNAL MEDICINE

## 2019-07-04 PROCEDURE — 36415 COLL VENOUS BLD VENIPUNCTURE: CPT

## 2019-07-04 PROCEDURE — 1200000000 HC SEMI PRIVATE

## 2019-07-04 PROCEDURE — 86706 HEP B SURFACE ANTIBODY: CPT

## 2019-07-04 PROCEDURE — 85027 COMPLETE CBC AUTOMATED: CPT

## 2019-07-04 RX ADMIN — FERROUS SULFATE TAB 325 MG (65 MG ELEMENTAL FE) 325 MG: 325 (65 FE) TAB at 17:25

## 2019-07-04 RX ADMIN — LEVOTHYROXINE SODIUM 100 MCG: 100 TABLET ORAL at 06:28

## 2019-07-04 RX ADMIN — ATORVASTATIN CALCIUM 40 MG: 40 TABLET, FILM COATED ORAL at 07:46

## 2019-07-04 RX ADMIN — SODIUM BICARBONATE 650 MG: 650 TABLET ORAL at 21:18

## 2019-07-04 RX ADMIN — SODIUM CHLORIDE: 9 INJECTION, SOLUTION INTRAVENOUS at 17:31

## 2019-07-04 RX ADMIN — FLUTICASONE PROPIONATE 1 SPRAY: 50 SPRAY, METERED NASAL at 07:46

## 2019-07-04 RX ADMIN — ACETAMINOPHEN 650 MG: 325 TABLET ORAL at 21:18

## 2019-07-04 RX ADMIN — DOCUSATE SODIUM 100 MG: 100 CAPSULE, LIQUID FILLED ORAL at 07:46

## 2019-07-04 RX ADMIN — SODIUM CHLORIDE: 9 INJECTION, SOLUTION INTRAVENOUS at 02:49

## 2019-07-04 RX ADMIN — SODIUM BICARBONATE 650 MG: 650 TABLET ORAL at 07:46

## 2019-07-04 RX ADMIN — FERROUS SULFATE TAB 325 MG (65 MG ELEMENTAL FE) 325 MG: 325 (65 FE) TAB at 07:46

## 2019-07-04 RX ADMIN — CEFTRIAXONE 1 G: 1 INJECTION, POWDER, FOR SOLUTION INTRAMUSCULAR; INTRAVENOUS at 12:53

## 2019-07-04 RX ADMIN — PANTOPRAZOLE SODIUM 40 MG: 40 TABLET, DELAYED RELEASE ORAL at 07:46

## 2019-07-04 RX ADMIN — MIRTAZAPINE 15 MG: 15 TABLET, FILM COATED ORAL at 21:18

## 2019-07-04 RX ADMIN — DOCUSATE SODIUM 100 MG: 100 CAPSULE, LIQUID FILLED ORAL at 21:18

## 2019-07-04 ASSESSMENT — PAIN SCALES - GENERAL
PAINLEVEL_OUTOF10: 0
PAINLEVEL_OUTOF10: 3
PAINLEVEL_OUTOF10: 0

## 2019-07-04 ASSESSMENT — PAIN DESCRIPTION - FREQUENCY: FREQUENCY: INTERMITTENT

## 2019-07-04 ASSESSMENT — PAIN DESCRIPTION - DESCRIPTORS: DESCRIPTORS: CRAMPING

## 2019-07-04 ASSESSMENT — PAIN DESCRIPTION - PAIN TYPE: TYPE: ACUTE PAIN

## 2019-07-04 ASSESSMENT — PAIN DESCRIPTION - ONSET: ONSET: ON-GOING

## 2019-07-04 ASSESSMENT — PAIN DESCRIPTION - ORIENTATION: ORIENTATION: RIGHT;LEFT

## 2019-07-04 ASSESSMENT — PAIN DESCRIPTION - LOCATION: LOCATION: ABDOMEN

## 2019-07-04 ASSESSMENT — PAIN - FUNCTIONAL ASSESSMENT: PAIN_FUNCTIONAL_ASSESSMENT: ACTIVITIES ARE NOT PREVENTED

## 2019-07-04 ASSESSMENT — PAIN DESCRIPTION - PROGRESSION: CLINICAL_PROGRESSION: NOT CHANGED

## 2019-07-04 NOTE — PROGRESS NOTES
portion worrisome for duodenitis. Correlation with patient's pain. Does this patient have epigastric pain clinically? Minimal induration of the presacral soft tissues of the lower pelvis new since prior examination, axial image 64. Inflammation or infection cannot be excluded. Correlation with inflammatory markers recommended. Midline umbilical hernia containing only fat similar to prior examination. The appendix is not seen although there are no secondary signs for acute appendicitis. There is no free air. The remaining bowel shows no bowel wall thickening or distention. Minimal subcutaneous edema is present of the lower abdomen. Soft tissue densities of the anterior abdomen likely reflect sequela from abdominal wall injections. Bilateral ureteral stents are seen. The right kidney demonstrates a stent properly positioned. There is continued right-sided hydronephrosis. No stone seen adjacent to the ureter. The left side demonstrates a properly positioned stent with hydronephrosis and hydroureter. No stone seen adjacent to the stent. Braden catheter in the urinary bladder makes evaluation difficult. The bladder is contracted and there is air present likely from the Braden. Phleboliths are present in the lower pelvis. 1. Development of small bilateral pleural effusions right side greater than left. 2. Retained fluid in the esophagus as described. 3. Bilateral hydronephrosis and hydroureter. Ureteral stents appear to properly positioned. No stones seen. 4. New presacral edema of the lower pelvis. 5. Suspected duodenal wall thickening of the second portion similar to the prior examination without duodenal inflammatory changes, equivocal.       US Pelvis Complete  Result Date: 6/4/2019  Reading location: [facility] INDICATION: Vaginal nodules FINDINGS: Transabdominal images of the pelvis. Uterus 68 x 30 x 38 mm with unremarkable myometrial contour and echogenicity. Endometrial stripe 4.2 mm in AP dimension.  Right ovary 16 x 12 x 15 mm and left 14 x 10 x 11 mm with unremarkable internal echogenicity. Small free fluid in the cul-de-sac. No acute finding. IR Fluoro Guided Cva Device Plmt/replace/removal  Result Date: 6/4/2019  Location:200 Exam: IR FLUORO GUIDED CVA DEVICE PLMT/REPLACE/REMOVAL, IR ULTRASOUND GUIDANCE VASCULAR ACCESS HISTORY: Acute renal failure. Versed 1 mg IV was given for anxiety during the procedure. Fluoroscopy time 0.3 minutes. PROCEDURE: Informed consent was obtained. Ultrasound was used to localize the right  internal jugular vein. The skin was prepped and draped in a sterile fashion and then anesthetized with lidocaine. Maximal sterile barrier technique was utilized. Under direct ultrasound visualization, a micropuncture needle was used to gain access to the right internal jugular vein. A 0.018 guidewire was advanced into the central venous circulation under fluoroscopic guidance. A micropuncture sheath was placed. Through the sheath, a 0.035 J guidewire was advanced. Over the guidewire, the tract was dilated using 12 and 14 Western Cheryl dilators. A 13.5 Arabic diameter temporary dialysis catheter 15 cm long was then advanced over the guidewire and positioned within the superior aspect of the right atrium. The catheter was then affixed to the skin using 2-0 silk. FINDINGS: Ultrasound demonstrates patent internal jugular veins bilaterally. Fluoroscopic spot film demonstrates placement of the right internal jugular hemodialysis catheter with the tip in the superior aspect of the right atrium. Successful placement of a right IJ temporary hemodialysis catheter. IR Fluoro Guided Cva Device Plmt/replace/removal  Result Date: 5/29/2019  Location:200 Exam: IR FLUORO GUIDED CVA DEVICE PLMT/REPLACE/REMOVAL HISTORY:   Infection FLUOROSCOPY TIME:  0.2 minutes PROCEDURE:  The procedure was explained to the patient and informed consent was obtained.   The skin over the  right arm was prepped and draped in here is approximately unchanged compared with previous   diagnostic CT scan. There is no evidence of regional lymphadenopathy. Pelvic sidewalls appear free of tumor.       No suspicious skeletal uptake is seen.       There is otherwise a normal biodistribution of radiotracer. There is   no other abnormal tracer uptake seen         ASSESSMENT/PLAN :  28-year-old woman with mild mental retardation and recent diagnosis of high-grade invasive poorly differentiated carcinoma on a cervical biopsy with immunohistochemical stains suspicious of urothelial primary with extrinsic invasion of the cervix as well as the vaginal wall  She has prior history of CKD stage IV with hydronephrosis and neurogenic bladder and prior stent placement and has required intermittent hemodialysis in the past and most recently  She underwent cystourethroscopy, bilateral JJ ureteral stent insertion, pelvic examination 6/1  Her cystoscopy did not show any bladder tumors. The urethra was fixed and difficult to enter. Per urology there were abnormal firm nodular lesions within the vagina and stenosis suspicious for GYN malignancy  She is now hospitalized with acute kidney failure superimposed on CKD stage IV, her serum creatinine was up to 5.8 with an estimated GFR of 8    Her PET CT scan showed scattered subcutaneous soft tissue densities in the upper to mid pelvis which may represent fat necrosis from prior injections versus metastatic disease. Bladder wall was thickened and FDG avid and this may be related to chronic cystitis versus tumor infiltration. The cervix appears bulky with high SUV of 20.7 with abnormal metabolic activity extending into the lower uterine segment and into the vagina involved. There was no evidence of regional lymphadenopathy.     Her pathology is suggestive of metastatic urothelial carcinoma even though clinically urology feels she has a GYN malignancy.   In view of her progressive kidney failure she will need

## 2019-07-04 NOTE — PROGRESS NOTES
[facility] MD Frankie Patino MD Gelene Acron, MD Marcella Levin, MD Pollyann Cull, CNP  Progress Note    7/4/2019    SUBJECTIVE:   7/3:  \" scared\"  Anxious. (-) sob/bolton/cp/palp Appetite poor, mild persistent nausea. UO picked up a little . 7/4: No new complaint. No dyspnea on room air. No nausea. Continue anorexia but seems a little better than yesterday. Normal saline running at home normal.  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  *         DIET:    DIET RENAL;     MEDS (scheduled):    cefTRIAXone (ROCEPHIN) IV  1 g Intravenous Q24H    atorvastatin  40 mg Oral Daily    docusate sodium  100 mg Oral BID    ferrous sulfate  325 mg Oral BID WC    fluticasone  1 spray Nasal Daily    levothyroxine  100 mcg Oral Daily    mirtazapine  15 mg Oral Nightly    pantoprazole  40 mg Oral Daily    sodium bicarbonate  650 mg Oral BID    vitamin D  50,000 Units Oral Weekly       MEDS (infusions):   sodium chloride 85 mL/hr at 07/04/19 1253       MEDS (prn):  acetaminophen, magnesium hydroxide, ondansetron    PHYSICAL EXAM:     Patient Vitals for the past 24 hrs:   BP Temp Temp src Pulse Resp SpO2 Weight   07/04/19 0738 (!) 154/73 98.7 °F (37.1 °C) Oral 72 16 98 % --   07/04/19 0508 -- -- -- -- -- -- 130 lb 9.6 oz (59.2 kg)   07/03/19 2033 133/62 98.4 °F (36.9 °C) Oral 71 16 100 % --   @      Intake/Output Summary (Last 24 hours) at 7/4/2019 1354  Last data filed at 7/4/2019 1302  Gross per 24 hour   Intake 2008 ml   Output 1250 ml   Net 758 ml         Wt Readings from UO  discussed with urology -- may need perc neph tubes  Frankly uremic, acidemic, hyperkalemic -- all a little better today. hold off on dialysis for now   Will reevaluate in a.m. Note: She has been her own POA in the past;  Speech imprediment makes it seem that she is not capable of making decisions, but she does show insight and understanding of the clinical scenario.           Electronically signed by Misael Oconnor MD on 7/4/2019 at 1:54 PM

## 2019-07-05 ENCOUNTER — ANESTHESIA EVENT (OUTPATIENT)
Dept: OPERATING ROOM | Age: 54
End: 2019-07-05

## 2019-07-05 LAB
ANION GAP SERPL CALCULATED.3IONS-SCNC: 17 MMOL/L (ref 7–16)
BUN BLDV-MCNC: 72 MG/DL (ref 6–20)
CALCIUM SERPL-MCNC: 8.8 MG/DL (ref 8.6–10.2)
CHLORIDE BLD-SCNC: 105 MMOL/L (ref 98–107)
CO2: 17 MMOL/L (ref 22–29)
CREAT SERPL-MCNC: 5.4 MG/DL (ref 0.5–1)
GFR AFRICAN AMERICAN: 10
GFR NON-AFRICAN AMERICAN: 8 ML/MIN/1.73
GLUCOSE BLD-MCNC: 97 MG/DL (ref 74–99)
HAV IGM SER IA-ACNC: NORMAL
HBV SURFACE AB TITR SER: NORMAL {TITER}
HCT VFR BLD CALC: 24.4 % (ref 34–48)
HEMOGLOBIN: 7.4 G/DL (ref 11.5–15.5)
HEPATITIS B CORE IGM ANTIBODY: NORMAL
HEPATITIS B SURFACE ANTIGEN INTERPRETATION: NORMAL
HEPATITIS C ANTIBODY INTERPRETATION: NORMAL
MAGNESIUM: 2.6 MG/DL (ref 1.6–2.6)
MCH RBC QN AUTO: 29 PG (ref 26–35)
MCHC RBC AUTO-ENTMCNC: 30.3 % (ref 32–34.5)
MCV RBC AUTO: 95.7 FL (ref 80–99.9)
PDW BLD-RTO: 15.1 FL (ref 11.5–15)
PHOSPHORUS: 5.5 MG/DL (ref 2.5–4.5)
PLATELET # BLD: 397 E9/L (ref 130–450)
PMV BLD AUTO: 11.1 FL (ref 7–12)
POTASSIUM SERPL-SCNC: 4.7 MMOL/L (ref 3.5–5)
RBC # BLD: 2.55 E12/L (ref 3.5–5.5)
SODIUM BLD-SCNC: 139 MMOL/L (ref 132–146)
WBC # BLD: 11.2 E9/L (ref 4.5–11.5)

## 2019-07-05 PROCEDURE — 36415 COLL VENOUS BLD VENIPUNCTURE: CPT

## 2019-07-05 PROCEDURE — 1200000000 HC SEMI PRIVATE

## 2019-07-05 PROCEDURE — 85027 COMPLETE CBC AUTOMATED: CPT

## 2019-07-05 PROCEDURE — 80048 BASIC METABOLIC PNL TOTAL CA: CPT

## 2019-07-05 PROCEDURE — 6360000002 HC RX W HCPCS: Performed by: INTERNAL MEDICINE

## 2019-07-05 PROCEDURE — 83735 ASSAY OF MAGNESIUM: CPT

## 2019-07-05 PROCEDURE — 6370000000 HC RX 637 (ALT 250 FOR IP): Performed by: INTERNAL MEDICINE

## 2019-07-05 PROCEDURE — 84100 ASSAY OF PHOSPHORUS: CPT

## 2019-07-05 PROCEDURE — 99233 SBSQ HOSP IP/OBS HIGH 50: CPT | Performed by: INTERNAL MEDICINE

## 2019-07-05 PROCEDURE — 2580000003 HC RX 258: Performed by: INTERNAL MEDICINE

## 2019-07-05 RX ADMIN — SODIUM BICARBONATE 650 MG: 650 TABLET ORAL at 15:00

## 2019-07-05 RX ADMIN — SODIUM CHLORIDE 100 MG: 9 INJECTION, SOLUTION INTRAVENOUS at 21:36

## 2019-07-05 RX ADMIN — DOCUSATE SODIUM 100 MG: 100 CAPSULE, LIQUID FILLED ORAL at 20:55

## 2019-07-05 RX ADMIN — FERROUS SULFATE TAB 325 MG (65 MG ELEMENTAL FE) 325 MG: 325 (65 FE) TAB at 19:03

## 2019-07-05 RX ADMIN — FLUTICASONE PROPIONATE 1 SPRAY: 50 SPRAY, METERED NASAL at 15:00

## 2019-07-05 RX ADMIN — MIRTAZAPINE 15 MG: 15 TABLET, FILM COATED ORAL at 20:55

## 2019-07-05 RX ADMIN — SODIUM BICARBONATE 650 MG: 650 TABLET ORAL at 20:55

## 2019-07-05 RX ADMIN — PANTOPRAZOLE SODIUM 40 MG: 40 TABLET, DELAYED RELEASE ORAL at 15:00

## 2019-07-05 RX ADMIN — CEFTRIAXONE 1 G: 1 INJECTION, POWDER, FOR SOLUTION INTRAMUSCULAR; INTRAVENOUS at 12:39

## 2019-07-05 RX ADMIN — SODIUM CHLORIDE 25 MG: 9 INJECTION, SOLUTION INTRAVENOUS at 19:09

## 2019-07-05 RX ADMIN — SODIUM CHLORIDE: 9 INJECTION, SOLUTION INTRAVENOUS at 15:00

## 2019-07-05 RX ADMIN — ATORVASTATIN CALCIUM 40 MG: 40 TABLET, FILM COATED ORAL at 15:00

## 2019-07-05 ASSESSMENT — PAIN SCALES - GENERAL
PAINLEVEL_OUTOF10: 0

## 2019-07-05 NOTE — PROGRESS NOTES
[facility]  [address] [facility]      Pt Name: Margarette Stock  YOB: 1965  Date of evaluation: 7/5/2019  Primary Care Physician: Chadwick De La Fuente DO  Reason for evaluation:   Chief Complaint   Patient presents with    Other     abnormal labs, hyperkalemia         Subjective: No significant vaginal bleeding. She is agreeable to nephrostomy tubes. Hospitalized with acute renal failure. Patient is mentally challenged. Had been seen during prior hospitalization at [facility]. No new issues      Past history :  Pt admitted on 5/31/19 for acute on chronic kidney disease stage IV with neurogenic bladder with chronic b/l stents, urosepsis, normocytic anemia, hypothyroid, chronic mental disability, hyperlipidemia. Urology placed stents and abnormal firm nodules were appreciated on pelvic exam 6/1/19. GYN was consulted and saw the pt 6/2 and was taken back to the OR on 6/6 for EUA and multiple cervical biopsies. The anterior cervical lip was noted to be hard and nodular, the vagina was stenotic, with biopsies taken from the anterior and posterior cervical lip and endocervical brush was also done        PATHOLOGY:  6/6/19  Diagnosis:  A. Anterior cervical lip, biopsy: Invasive high-grade carcinoma, see  comment  Invasive carcinoma is present at the deep inked margin of biopsy. B. Posterior cervical lip, biopsy: Invasive high-grade carcinoma, see  comment  Invasive carcinoma is present at the deep inked margin of biopsy. C. Endocervical lip, biopsy: Predominantly blood, mucus and scant  fragments of benign superficial squamous epithelium. Comment:   Irregular infiltrative nests of high-grade carcinoma with  abundant pink cytoplasm and nuclear features ranging from hyperchromatic to vesicular with prominent nucleoli are present infiltrating the ubepithelial tissue underlying intact non-dysplastic squamous surface epithelium. Intracellular bridge formation is not definitively identified. right arm was prepped and draped in a sterile fashion and then anesthetized with Lidocaine. Maximal sterile barrier technique was utilized. Under ultrasound guidance, the brachial vein was localized. Using a micropuncture needle, the vein was entered under direct ultrasound visualization. A 0.018 guidewire was advanced into the central venous circulation. A 5 Argentine peel-away sheath was placed. The 5 Argentine Power PICC line catheter was trimmed to 32 cm was then advanced through the peel-away sheath and positioned at the right atrial/SVC junction. Fluoroscopic spot film was obtained. FINDINGS:  Ultrasound shows the veins to be patent. Fluoroscopic spot film demonstrates the Power PICC line to be at the right atrial/SVC junction. Successful placement of a 5 Argentine double-lumen Power PICC line using ultrasound guidance via the right brachial  vein. Xr Urogram W Wo Kub W Wo Tomogram  Result Date: 6/1/2019  Reading location: 200 INDICATION: Ureteral stent exchange FINDINGS: 67.4 seconds of fluoroscopy and 4 spot images during ureteral stent exchange. No contrast injection. Operative fluoroscopy      US Retroperitoneal Complete  Result Date: 5/31/2019  Reading location: 200 INDICATION: Renal insufficiency FINDINGS: Sonographic evaluation urinary tract compared with ultrasound 1/18/2019 and CT the abdomen and pelvis 5/22/2019. Right kidney 12.1 cm in length and the left 11.2 cm. At least moderate dilatation right renal collecting system. On recent CT, right ureteral stent is coiled in the renal pelvis. Dilatation the left renal collecting system is somewhat less severe than the right. Mid left renal cyst measures 13 mm and upper pole cyst 25.5 mm. No focal abnormality right renal collecting system. Nondistended urinary bladder. Moderate bilateral hydronephrosis, somewhat more severe on the right than the left.       IR Ultrasound Guidance Vascular Access  Result Date: 6/4/2019  Location:200 Exam: IR FLUORO GUIDED CVA DEVICE PLMT/REPLACE/REMOVAL, IR ULTRASOUND GUIDANCE VASCULAR ACCESS HISTORY: Acute renal failure. Versed 1 mg IV was given for anxiety during the procedure. Fluoroscopy time 0.3 minutes. PROCEDURE: Informed consent was obtained. Ultrasound was used to localize the right  internal jugular vein. The skin was prepped and draped in a sterile fashion and then anesthetized with lidocaine. Maximal sterile barrier technique was utilized. Under direct ultrasound visualization, a micropuncture needle was used to gain access to the right internal jugular vein. A 0.018 guidewire was advanced into the central venous circulation under fluoroscopic guidance. A micropuncture sheath was placed. Through the sheath, a 0.035 J guidewire was advanced. Over the guidewire, the tract was dilated using 12 and 14 Western Cheryl dilators. A 13.5 Libyan diameter temporary dialysis catheter 15 cm long was then advanced over the guidewire and positioned within the superior aspect of the right atrium. The catheter was then affixed to the skin using 2-0 silk. FINDINGS: Ultrasound demonstrates patent internal jugular veins bilaterally. Fluoroscopic spot film demonstrates placement of the right internal jugular hemodialysis catheter with the tip in the superior aspect of the right atrium. Successful placement of a right IJ temporary hemodialysis catheter. IR Ultrasound Guidance Vascular Access  Result Date: 5/29/2019  Location:200 Exam: IR ULTRASOUND GUIDANCE VASCULAR ACCESS History:  PICC line placement Ultrasound evaluation of potential access was performed. After successfully identifying a patent right brachial vein, and following the administration of lidocaine, real-time ultrasound guidance was used to puncture the right brachial   vein. A permanent recording was created for the patient's record. Ultrasound guidance was provided during placement of a PICC line.      PET CT scan: July 2019     Findings:       PET images Overall bulk   of material here is approximately unchanged compared with previous   diagnostic CT scan. There is no evidence of regional lymphadenopathy. Pelvic sidewalls appear free of tumor.       No suspicious skeletal uptake is seen.       There is otherwise a normal biodistribution of radiotracer. There is   no other abnormal tracer uptake seen         ASSESSMENT/PLAN :  52-year-old woman with mild mental retardation and recent diagnosis of high-grade invasive poorly differentiated carcinoma on a cervical biopsy with immunohistochemical stains suspicious of urothelial primary with extrinsic invasion of the cervix as well as the vaginal wall  She has prior history of CKD stage IV with hydronephrosis and neurogenic bladder and prior stent placement and has required intermittent hemodialysis in the past and most recently  She underwent cystourethroscopy, bilateral JJ ureteral stent insertion, pelvic examination 6/1  Her cystoscopy did not show any bladder tumors. The urethra was fixed and difficult to enter. Per urology there were abnormal firm nodular lesions within the vagina and stenosis suspicious for GYN malignancy  She is now hospitalized with acute kidney failure superimposed on CKD stage IV, her serum creatinine was up to 5.8 with an estimated GFR of 8    Her PET CT scan showed scattered subcutaneous soft tissue densities in the upper to mid pelvis which may represent fat necrosis from prior injections versus metastatic disease. Bladder wall was thickened and FDG avid and this may be related to chronic cystitis versus tumor infiltration. The cervix appears bulky with high SUV of 20.7 with abnormal metabolic activity extending into the lower uterine segment and into the vagina involved. There was no evidence of regional lymphadenopathy.     Her pathology is suggestive of metastatic urothelial carcinoma even though clinically urology feels she has a GYN malignancy.   In view of her progressive kidney

## 2019-07-05 NOTE — ANESTHESIA PRE PROCEDURE
K 5.4 05/31/2019     07/05/2019    CO2 17 07/05/2019    BUN 72 07/05/2019    CREATININE 5.4 07/05/2019    GFRAA 10 07/05/2019    LABGLOM 8 07/05/2019    GLUCOSE 97 07/05/2019    GLUCOSE 97 12/20/2011    PROT 7.8 07/01/2019    CALCIUM 8.8 07/05/2019    BILITOT 0.3 07/01/2019    ALKPHOS 207 07/01/2019    AST 71 07/01/2019    ALT 61 07/01/2019       POC Tests: No results for input(s): POCGLU, POCNA, POCK, POCCL, POCBUN, POCHEMO, POCHCT in the last 72 hours. Coags:   Lab Results   Component Value Date    PROTIME 15.0 07/03/2019    INR 1.3 07/03/2019    APTT 35.5 07/03/2019       HCG (If Applicable):   Lab Results   Component Value Date    PREGSERUM NEGATIVE 01/29/2014        ABGs: No results found for: PHART, PO2ART, NYC3DJZ, BDJ1NDS, BEART, P9SUMPSZ     Type & Screen (If Applicable):  No results found for: LABABO, [address]    Anesthesia Evaluation  Patient summary reviewed no history of anesthetic complications:   Airway: Mallampati: III  TM distance: >3 FB   Neck ROM: full  Mouth opening: > = 3 FB Dental: normal exam         Pulmonary:   (+) decreased breath sounds,  asthma:                            Cardiovascular:    (+) hypertension:, hyperlipidemia        Rhythm: regular  Rate: normal                    Neuro/Psych:   (+) psychiatric history:            GI/Hepatic/Renal: Neg GI/Hepatic/Renal ROS  (+) renal disease: dialysis, ESRD and ARF,           Endo/Other:    (+) hypothyroidism::., malignancy/cancer. Abdominal:   (+) obese,     Abdomen: soft. Vascular:                                        Anesthesia Plan      MAC     ASA 4       Induction: intravenous. MIPS: Postoperative opioids intended and Prophylactic antiemetics administered. Anesthetic plan and risks discussed with patient. Plan discussed with CRNA.           Patient to be re-evaluated DOS by anesthesia team    [address] No[address] Marcial, DO   7/5/2019

## 2019-07-06 ENCOUNTER — ANESTHESIA (OUTPATIENT)
Dept: OPERATING ROOM | Age: 54
End: 2019-07-06

## 2019-07-06 ENCOUNTER — APPOINTMENT (OUTPATIENT)
Dept: GENERAL RADIOLOGY | Age: 54
DRG: 469 | End: 2019-07-06
Payer: MEDICAID

## 2019-07-06 LAB
ANION GAP SERPL CALCULATED.3IONS-SCNC: 18 MMOL/L (ref 7–16)
BUN BLDV-MCNC: 70 MG/DL (ref 6–20)
CALCIUM SERPL-MCNC: 9.1 MG/DL (ref 8.6–10.2)
CHLORIDE BLD-SCNC: 104 MMOL/L (ref 98–107)
CO2: 16 MMOL/L (ref 22–29)
CREAT SERPL-MCNC: 5.8 MG/DL (ref 0.5–1)
GFR AFRICAN AMERICAN: 9
GFR NON-AFRICAN AMERICAN: 8 ML/MIN/1.73
GLUCOSE BLD-MCNC: 129 MG/DL (ref 74–99)
HCT VFR BLD CALC: 29.8 % (ref 34–48)
HEMOGLOBIN: 9.1 G/DL (ref 11.5–15.5)
MAGNESIUM: 2.6 MG/DL (ref 1.6–2.6)
MCH RBC QN AUTO: 28.7 PG (ref 26–35)
MCHC RBC AUTO-ENTMCNC: 30.5 % (ref 32–34.5)
MCV RBC AUTO: 94 FL (ref 80–99.9)
PDW BLD-RTO: 14.8 FL (ref 11.5–15)
PHOSPHORUS: 5.4 MG/DL (ref 2.5–4.5)
PLATELET # BLD: 471 E9/L (ref 130–450)
PMV BLD AUTO: 10.6 FL (ref 7–12)
POTASSIUM SERPL-SCNC: 4.1 MMOL/L (ref 3.5–5)
RBC # BLD: 3.17 E12/L (ref 3.5–5.5)
SODIUM BLD-SCNC: 138 MMOL/L (ref 132–146)
WBC # BLD: 15.1 E9/L (ref 4.5–11.5)

## 2019-07-06 PROCEDURE — 6370000000 HC RX 637 (ALT 250 FOR IP): Performed by: INTERNAL MEDICINE

## 2019-07-06 PROCEDURE — 2580000003 HC RX 258: Performed by: INTERNAL MEDICINE

## 2019-07-06 PROCEDURE — 84100 ASSAY OF PHOSPHORUS: CPT

## 2019-07-06 PROCEDURE — 80048 BASIC METABOLIC PNL TOTAL CA: CPT

## 2019-07-06 PROCEDURE — 36415 COLL VENOUS BLD VENIPUNCTURE: CPT

## 2019-07-06 PROCEDURE — 1200000000 HC SEMI PRIVATE

## 2019-07-06 PROCEDURE — 83735 ASSAY OF MAGNESIUM: CPT

## 2019-07-06 PROCEDURE — 85027 COMPLETE CBC AUTOMATED: CPT

## 2019-07-06 PROCEDURE — 99232 SBSQ HOSP IP/OBS MODERATE 35: CPT | Performed by: INTERNAL MEDICINE

## 2019-07-06 PROCEDURE — 6360000002 HC RX W HCPCS: Performed by: INTERNAL MEDICINE

## 2019-07-06 RX ORDER — SODIUM BICARBONATE 650 MG/1
1300 TABLET ORAL 2 TIMES DAILY
Status: DISCONTINUED | OUTPATIENT
Start: 2019-07-06 | End: 2019-07-07

## 2019-07-06 RX ADMIN — SODIUM CHLORIDE 125 MG: 900 INJECTION INTRAVENOUS at 19:35

## 2019-07-06 RX ADMIN — ACETAMINOPHEN 650 MG: 325 TABLET ORAL at 21:29

## 2019-07-06 RX ADMIN — SODIUM BICARBONATE 1300 MG: 650 TABLET ORAL at 21:29

## 2019-07-06 RX ADMIN — FLUTICASONE PROPIONATE 1 SPRAY: 50 SPRAY, METERED NASAL at 07:47

## 2019-07-06 RX ADMIN — MIRTAZAPINE 15 MG: 15 TABLET, FILM COATED ORAL at 21:29

## 2019-07-06 RX ADMIN — SODIUM CHLORIDE: 9 INJECTION, SOLUTION INTRAVENOUS at 14:00

## 2019-07-06 RX ADMIN — SODIUM CHLORIDE: 9 INJECTION, SOLUTION INTRAVENOUS at 03:34

## 2019-07-06 RX ADMIN — DOCUSATE SODIUM 100 MG: 100 CAPSULE, LIQUID FILLED ORAL at 21:29

## 2019-07-06 RX ADMIN — FERROUS SULFATE TAB 325 MG (65 MG ELEMENTAL FE) 325 MG: 325 (65 FE) TAB at 18:11

## 2019-07-06 RX ADMIN — CEFTRIAXONE 1 G: 1 INJECTION, POWDER, FOR SOLUTION INTRAMUSCULAR; INTRAVENOUS at 14:00

## 2019-07-06 ASSESSMENT — PAIN DESCRIPTION - PROGRESSION: CLINICAL_PROGRESSION: NOT CHANGED

## 2019-07-06 ASSESSMENT — PAIN SCALES - GENERAL
PAINLEVEL_OUTOF10: 0
PAINLEVEL_OUTOF10: 3
PAINLEVEL_OUTOF10: 0
PAINLEVEL_OUTOF10: 0

## 2019-07-06 ASSESSMENT — PAIN DESCRIPTION - ORIENTATION: ORIENTATION: RIGHT;LEFT

## 2019-07-06 ASSESSMENT — PAIN - FUNCTIONAL ASSESSMENT: PAIN_FUNCTIONAL_ASSESSMENT: ACTIVITIES ARE NOT PREVENTED

## 2019-07-06 ASSESSMENT — PAIN DESCRIPTION - ONSET: ONSET: GRADUAL

## 2019-07-06 ASSESSMENT — PAIN DESCRIPTION - LOCATION: LOCATION: ABDOMEN

## 2019-07-06 ASSESSMENT — PAIN DESCRIPTION - PAIN TYPE: TYPE: ACUTE PAIN

## 2019-07-06 ASSESSMENT — PAIN DESCRIPTION - DESCRIPTORS: DESCRIPTORS: CRAMPING

## 2019-07-06 ASSESSMENT — PAIN DESCRIPTION - FREQUENCY: FREQUENCY: INTERMITTENT

## 2019-07-06 NOTE — PROGRESS NOTES
Dermal vascular/lymphatic invasion is present, as  demonstrated by CD34 immunostain highlighting the presence of malignant cells within CD34 positive endothelial lined spaces. The malignant cells show cytoplasmic CK 5/6, strong nuclear p40 and  strong nuclear GATA3 expression, and are negative for p16, Mammaglobinand MOC 31 expression. Both squamous cell carcinoma and urothelial carcinoma can share a similar immunostain profile including positivity for CK 5/6 and p40. AlthoughGATA 3 expression has been reported to be present in association withsquamous carcinomas, usually the pattern of nuclear expression is relatively weak/patchy. In this case strong nuclear CRISS 3 expression is present. Additionally the malignant cells lack p16 expression, a surrogate marker for HPV. The immunohistochemical findings, along with lack of overlying surface epithelial squamous dysplasia are felt to be suspicious for cervical involvement by urothelial carcinoma, either involving the cervix by contiguous spread or metastasis. Correlation with clinical and radiologic findings with attention to the bladder, bladder neck and urethra is recommended. Intradepartmental consultation is obtained. This case was discussed with Dr. Jonathan Eckert and Dr. Luke Pena on  6/12/2019. OBJECTIVE:  VITALS:  height is 5' 3\" (1.6 m) and weight is 139 lb 3.2 oz (63.1 kg). Her oral temperature is 98.2 °F (36.8 °C). Her blood pressure is 168/72 (abnormal) and her pulse is 70. Her respiration is 16 and oxygen saturation is 97%. Physical Exam:  Performance Status: 0  Well developed, well nourished female  General: Alert no acute distress,  , appears slow in cognition  Head and neck : PERRL, EOMI . Sclera non icteric. Poor dentition. Oropharynx : Clear  Neck: no JVD,  no adenopathy,  Lungs: Clear to auscultation   Heart: Regular rate and rhythm. No murmur. Abdomen: Soft, non-tender;no masses, no organomegaly. Extremities: No edema. Neurologic:Cranial nerves grossly intact. No focal motor or sensory deficits. Mentally challenged. Skin:  No rash. Multiple moles. Medications  Prior to Admission medications    Medication Sig Start Date End Date Taking?  Authorizing Provider   sodium bicarbonate 650 MG tablet Take 1 tablet by mouth 2 times daily 6/14/19   Susan Borja DO   atorvastatin (LIPITOR) 40 MG tablet Take 1 tablet by mouth daily 6/14/19   Susan Borja DO   acetaminophen (TYLENOL) 325 MG tablet Take 650 mg by mouth every 4 hours as needed for Pain    Historical Provider, MD   mirtazapine (REMERON) 15 MG tablet Take 1 tablet by mouth nightly 5/24/19   Severo Marquez MD   ondansetron (ZOFRAN-ODT) 4 MG disintegrating tablet Take 1 tablet by mouth every 8 hours as needed for Nausea or Vomiting 4/23/19   Susan Borja DO   ferrous sulfate 325 (65 Fe) MG tablet Take 1 tablet by mouth 2 times daily (with meals) 4/23/19   Susan Borja DO   docusate sodium (COLACE, DULCOLAX) 100 MG CAPS Take 100 mg by mouth 2 times daily 4/23/19   Susan Borja DO   levothyroxine (SYNTHROID) 100 MCG tablet TAKE 1 TABLET BY MOUTH EVERY MORNING 30 MINUTES BEFORE EATING. 4/10/19   Mark White DO   vitamin D (ERGOCALCIFEROL) 84445 units CAPS capsule Take 1 capsule by mouth once a week 4/9/19   Mark White DO   oxybutynin (DITROPAN-XL) 5 MG extended release tablet Take 1 tablet by mouth daily 4/9/19   Celestine Trujillo DO   magnesium oxide (MAG-OX) 400 (241.3 Mg) MG TABS tablet Take 1 tablet by mouth 2 times daily 4/9/19   Mark White DO   fluticasone (FLONASE) 50 MCG/ACT nasal spray 2 sprays by Nasal route daily  Patient taking differently: 1 spray by Nasal route daily  4/9/19   Celestine Trujillo DO   pantoprazole (PROTONIX) 40 MG tablet TAKE ONE TABLET BY MOUTH DAILY 4/9/19   Mark White DO   Compression Stockings MISC by Does not apply route Below knee  15 - 30 mm Hg 7/25/18   Donnell Ponce, DO    Scheduled Meds:  Continuous upper aspect of the vaginal vault. Overall bulk   of material here is approximately unchanged compared with previous   diagnostic CT scan. There is no evidence of regional lymphadenopathy. Pelvic sidewalls appear free of tumor.       No suspicious skeletal uptake is seen.       There is otherwise a normal biodistribution of radiotracer. There is   no other abnormal tracer uptake seen         ASSESSMENT/PLAN :  41-year-old woman with mild mental retardation and recent diagnosis of high-grade invasive poorly differentiated carcinoma on a cervical biopsy with immunohistochemical stains suspicious of urothelial primary with extrinsic invasion of the cervix as well as the vaginal wall  She has prior history of CKD stage IV with hydronephrosis and neurogenic bladder and prior stent placement and has required intermittent hemodialysis in the past and most recently  She underwent cystourethroscopy, bilateral JJ ureteral stent insertion, pelvic examination 6/1  Her cystoscopy did not show any bladder tumors. The urethra was fixed and difficult to enter. Per urology there were abnormal firm nodular lesions within the vagina and stenosis suspicious for GYN malignancy  She is now hospitalized with acute kidney failure superimposed on CKD stage IV, her serum creatinine was up to 5.8 with an estimated GFR of 8    Her PET CT scan showed scattered subcutaneous soft tissue densities in the upper to mid pelvis which may represent fat necrosis from prior injections versus metastatic disease. Bladder wall was thickened and FDG avid and this may be related to chronic cystitis versus tumor infiltration. The cervix appears bulky with high SUV of 20.7 with abnormal metabolic activity extending into the lower uterine segment and into the vagina involved.   There was no evidence of regional lymphadenopathy.     Her pathology is suggestive of metastatic urothelial carcinoma even though clinically urology feels she has a GYN

## 2019-07-07 LAB
ANION GAP SERPL CALCULATED.3IONS-SCNC: 16 MMOL/L (ref 7–16)
BUN BLDV-MCNC: 63 MG/DL (ref 6–20)
CALCIUM SERPL-MCNC: 9.1 MG/DL (ref 8.6–10.2)
CHLORIDE BLD-SCNC: 109 MMOL/L (ref 98–107)
CO2: 16 MMOL/L (ref 22–29)
CREAT SERPL-MCNC: 6.4 MG/DL (ref 0.5–1)
GFR AFRICAN AMERICAN: 8
GFR NON-AFRICAN AMERICAN: 7 ML/MIN/1.73
GLUCOSE BLD-MCNC: 89 MG/DL (ref 74–99)
POTASSIUM SERPL-SCNC: 4.8 MMOL/L (ref 3.5–5)
SODIUM BLD-SCNC: 141 MMOL/L (ref 132–146)

## 2019-07-07 PROCEDURE — 6370000000 HC RX 637 (ALT 250 FOR IP): Performed by: INTERNAL MEDICINE

## 2019-07-07 PROCEDURE — 1200000000 HC SEMI PRIVATE

## 2019-07-07 PROCEDURE — 80048 BASIC METABOLIC PNL TOTAL CA: CPT

## 2019-07-07 PROCEDURE — 99233 SBSQ HOSP IP/OBS HIGH 50: CPT | Performed by: INTERNAL MEDICINE

## 2019-07-07 PROCEDURE — 36415 COLL VENOUS BLD VENIPUNCTURE: CPT

## 2019-07-07 PROCEDURE — 2580000003 HC RX 258: Performed by: INTERNAL MEDICINE

## 2019-07-07 PROCEDURE — 6360000002 HC RX W HCPCS: Performed by: INTERNAL MEDICINE

## 2019-07-07 RX ORDER — LORAZEPAM 0.5 MG/1
0.5 TABLET ORAL ONCE
Status: DISCONTINUED | OUTPATIENT
Start: 2019-07-07 | End: 2019-07-11 | Stop reason: HOSPADM

## 2019-07-07 RX ORDER — SODIUM BICARBONATE 650 MG/1
1300 TABLET ORAL 3 TIMES DAILY
Status: DISCONTINUED | OUTPATIENT
Start: 2019-07-07 | End: 2019-07-11 | Stop reason: HOSPADM

## 2019-07-07 RX ORDER — POLYETHYLENE GLYCOL 3350 17 G/17G
17 POWDER, FOR SOLUTION ORAL ONCE
Status: DISCONTINUED | OUTPATIENT
Start: 2019-07-07 | End: 2019-07-11 | Stop reason: HOSPADM

## 2019-07-07 RX ADMIN — FLUTICASONE PROPIONATE 1 SPRAY: 50 SPRAY, METERED NASAL at 09:15

## 2019-07-07 RX ADMIN — ACETAMINOPHEN 650 MG: 325 TABLET ORAL at 21:33

## 2019-07-07 RX ADMIN — LEVOTHYROXINE SODIUM 100 MCG: 100 TABLET ORAL at 06:08

## 2019-07-07 RX ADMIN — SODIUM CHLORIDE: 9 INJECTION, SOLUTION INTRAVENOUS at 12:55

## 2019-07-07 RX ADMIN — CEFTRIAXONE 1 G: 1 INJECTION, POWDER, FOR SOLUTION INTRAMUSCULAR; INTRAVENOUS at 12:55

## 2019-07-07 RX ADMIN — ONDANSETRON 4 MG: 4 TABLET, ORALLY DISINTEGRATING ORAL at 21:33

## 2019-07-07 RX ADMIN — SODIUM CHLORIDE 125 MG: 900 INJECTION INTRAVENOUS at 19:31

## 2019-07-07 ASSESSMENT — PAIN DESCRIPTION - ORIENTATION: ORIENTATION: RIGHT;UPPER;LOWER

## 2019-07-07 ASSESSMENT — PAIN DESCRIPTION - ONSET: ONSET: ON-GOING

## 2019-07-07 ASSESSMENT — PAIN DESCRIPTION - DESCRIPTORS: DESCRIPTORS: CRAMPING

## 2019-07-07 ASSESSMENT — PAIN DESCRIPTION - FREQUENCY: FREQUENCY: CONTINUOUS

## 2019-07-07 ASSESSMENT — PAIN SCALES - GENERAL
PAINLEVEL_OUTOF10: 0
PAINLEVEL_OUTOF10: 0
PAINLEVEL_OUTOF10: 3

## 2019-07-07 ASSESSMENT — PAIN - FUNCTIONAL ASSESSMENT: PAIN_FUNCTIONAL_ASSESSMENT: ACTIVITIES ARE NOT PREVENTED

## 2019-07-07 ASSESSMENT — PAIN DESCRIPTION - LOCATION: LOCATION: ABDOMEN

## 2019-07-07 ASSESSMENT — PAIN DESCRIPTION - PROGRESSION: CLINICAL_PROGRESSION: NOT CHANGED

## 2019-07-07 ASSESSMENT — PAIN DESCRIPTION - PAIN TYPE: TYPE: ACUTE PAIN

## 2019-07-07 NOTE — PROGRESS NOTES
(H) 07/02/2019    LABPROT 3.5 07/02/2019       ASSESSMENT / RECOMMENDATIONS:    1. Acute kidney injury - creatinine had improved to 2.0 mg/dL as of 6/26 after an episode of acute kidney injury on her last hospitalization due to obstructive uropathy. Last dialysis was during her hospitalization, in early June. She tells me she has had her Braden in place since her admission to [address] [place] [address]. Offers no other history to suggest acute event. No NSAIDs. No antimicrobials currently.     2. Anemia. Iron deficient. On IV Ferrlecit, Aranesp     3. High-grade carcinoma found on biopsies of the cervix, felt to be suspicious for cervical involvement by urothelial carcinoma either by contiguous spread or metastasis     4. UTI versus colonization    5. Hematuria     Creatinine worsened  Nonoliguric.  uremic. Metabolic acidosis  Refused dialysis line placement and dialysis 7/3 (after agreeing to it)  Refused bilat PNT placement 7/5 (after agreeing to it)    Another long discussion regarding placement of PNT's. Hold dialysis again  Will re plan for bilateral percutaneous nephrostomy on Monday  If renal function does not improve post nephrostomies, need to consider initiating dialysis  Urgency/emergent hemodialysis not warranted at this point  Continue Braden  Continue IV normal salin  Encourage oral intake  Follow labs, UO  Increase NaHCO3 tabs to 1300 mg 3 times daily  Would not start phosphate binder just yet    Note: She has been her own POA in the past;  Speech imprediment makes it seem that she is not capable of making decisions, but she does show insight and understanding of the clinical scenario.     Discussed with Dr. Gerard Kevin    Electronically signed by Ganga Croft MD on 7/7/2019 at 12:36 PM

## 2019-07-07 NOTE — PLAN OF CARE
Problem: Pain:  Description  Pain management should include both nonpharmacologic and pharmacologic interventions.   Goal: Control of acute pain  Description  Control of acute pain  7/7/2019 0131 by Don Beltre RN  Outcome: Met This Shift     Problem: Falls - Risk of:  Goal: Will remain free from falls  Description  Will remain free from falls  7/7/2019 0131 by Don Beltre RN  Outcome: Met This Shift

## 2019-07-07 NOTE — PLAN OF CARE
Problem: Pain:  Goal: Pain level will decrease  Description  Pain level will decrease  Outcome: Met This Shift  Goal: Control of acute pain  Description  Control of acute pain  Outcome: Met This Shift  Goal: Control of chronic pain  Description  Control of chronic pain  Outcome: Met This Shift     Problem: Falls - Risk of:  Goal: Will remain free from falls  Description  Will remain free from falls  Outcome: Met This Shift  Goal: Absence of physical injury  Description  Absence of physical injury  Outcome: Met This Shift     Problem: Activity:  Goal: Risk for activity intolerance will decrease  Description  Risk for activity intolerance will decrease  Outcome: Met This Shift     Problem: Fluid Volume:  Goal: Will show no signs or symptoms of fluid imbalance  Description  Will show no signs or symptoms of fluid imbalance  Outcome: Met This Shift     Problem: Health Behavior:  Goal: Ability to manage health-related needs will improve  Description  Ability to manage health-related needs will improve  Outcome: Met This Shift     Problem: Nutritional:  Goal: Maintenance of adequate nutrition will be supported  Description  Maintenance of adequate nutrition will be supported  Outcome: Met This Shift     Problem: Physical Regulation:  Goal: Complications related to the disease process, condition or treatment will be avoided or minimized  Description  Complications related to the disease process, condition or treatment will be avoided or minimized  Outcome: Met This Shift     Problem: Urinary Elimination:  Goal: Ability to achieve and maintain adequate urine output will be supported  Description  Ability to achieve and maintain adequate urine output will be supported  Outcome: Met This Shift     Problem: Risk for Impaired Skin Integrity  Goal: Tissue integrity - skin and mucous membranes  Description  Structural intactness and normal physiological function of skin and  mucous membranes.   Outcome: Met This Shift

## 2019-07-08 LAB
ANION GAP SERPL CALCULATED.3IONS-SCNC: 16 MMOL/L (ref 7–16)
BUN BLDV-MCNC: 63 MG/DL (ref 6–20)
CALCIUM SERPL-MCNC: 8.7 MG/DL (ref 8.6–10.2)
CHLORIDE BLD-SCNC: 110 MMOL/L (ref 98–107)
CO2: 12 MMOL/L (ref 22–29)
CREAT SERPL-MCNC: 6.3 MG/DL (ref 0.5–1)
GFR AFRICAN AMERICAN: 8
GFR NON-AFRICAN AMERICAN: 7 ML/MIN/1.73
GLUCOSE BLD-MCNC: 76 MG/DL (ref 74–99)
HCT VFR BLD CALC: 23.3 % (ref 34–48)
HCT VFR BLD CALC: 25.5 % (ref 34–48)
HEMOGLOBIN: 7.2 G/DL (ref 11.5–15.5)
HEMOGLOBIN: 7.8 G/DL (ref 11.5–15.5)
INR BLD: 1.2
MAGNESIUM: 2.5 MG/DL (ref 1.6–2.6)
MCH RBC QN AUTO: 29.3 PG (ref 26–35)
MCHC RBC AUTO-ENTMCNC: 30.9 % (ref 32–34.5)
MCV RBC AUTO: 94.7 FL (ref 80–99.9)
PDW BLD-RTO: 15.2 FL (ref 11.5–15)
PHOSPHORUS: 6.6 MG/DL (ref 2.5–4.5)
PLATELET # BLD: 448 E9/L (ref 130–450)
PMV BLD AUTO: 10.4 FL (ref 7–12)
POTASSIUM SERPL-SCNC: 4.3 MMOL/L (ref 3.5–5)
POTASSIUM SERPL-SCNC: 5.3 MMOL/L (ref 3.5–5)
PROTHROMBIN TIME: 13.5 SEC (ref 9.3–12.4)
RBC # BLD: 2.46 E12/L (ref 3.5–5.5)
SODIUM BLD-SCNC: 138 MMOL/L (ref 132–146)
WBC # BLD: 13.9 E9/L (ref 4.5–11.5)

## 2019-07-08 PROCEDURE — 2500000003 HC RX 250 WO HCPCS: Performed by: INTERNAL MEDICINE

## 2019-07-08 PROCEDURE — 6370000000 HC RX 637 (ALT 250 FOR IP): Performed by: INTERNAL MEDICINE

## 2019-07-08 PROCEDURE — 84132 ASSAY OF SERUM POTASSIUM: CPT

## 2019-07-08 PROCEDURE — 99253 IP/OBS CNSLTJ NEW/EST LOW 45: CPT | Performed by: RADIOLOGY

## 2019-07-08 PROCEDURE — 85027 COMPLETE CBC AUTOMATED: CPT

## 2019-07-08 PROCEDURE — 36415 COLL VENOUS BLD VENIPUNCTURE: CPT

## 2019-07-08 PROCEDURE — 83735 ASSAY OF MAGNESIUM: CPT

## 2019-07-08 PROCEDURE — 6360000002 HC RX W HCPCS: Performed by: INTERNAL MEDICINE

## 2019-07-08 PROCEDURE — 85610 PROTHROMBIN TIME: CPT

## 2019-07-08 PROCEDURE — 1200000000 HC SEMI PRIVATE

## 2019-07-08 PROCEDURE — APPSS30 APP SPLIT SHARED TIME 16-30 MINUTES: Performed by: NURSE PRACTITIONER

## 2019-07-08 PROCEDURE — 80048 BASIC METABOLIC PNL TOTAL CA: CPT

## 2019-07-08 PROCEDURE — 99232 SBSQ HOSP IP/OBS MODERATE 35: CPT | Performed by: INTERNAL MEDICINE

## 2019-07-08 PROCEDURE — 84100 ASSAY OF PHOSPHORUS: CPT

## 2019-07-08 PROCEDURE — 85014 HEMATOCRIT: CPT

## 2019-07-08 PROCEDURE — 2580000003 HC RX 258: Performed by: INTERNAL MEDICINE

## 2019-07-08 PROCEDURE — 85018 HEMOGLOBIN: CPT

## 2019-07-08 PROCEDURE — 2580000003 HC RX 258

## 2019-07-08 RX ORDER — SODIUM CHLORIDE 0.9 % (FLUSH) 0.9 %
SYRINGE (ML) INJECTION
Status: COMPLETED
Start: 2019-07-08 | End: 2019-07-08

## 2019-07-08 RX ORDER — SEVELAMER CARBONATE 800 MG/1
800 TABLET, FILM COATED ORAL
Status: DISCONTINUED | OUTPATIENT
Start: 2019-07-08 | End: 2019-07-11 | Stop reason: HOSPADM

## 2019-07-08 RX ADMIN — ACETAMINOPHEN 650 MG: 325 TABLET ORAL at 22:51

## 2019-07-08 RX ADMIN — SODIUM CHLORIDE 125 MG: 900 INJECTION INTRAVENOUS at 20:56

## 2019-07-08 RX ADMIN — SODIUM CHLORIDE: 9 INJECTION, SOLUTION INTRAVENOUS at 03:21

## 2019-07-08 RX ADMIN — Medication 10 ML: at 20:56

## 2019-07-08 RX ADMIN — SODIUM BICARBONATE: 84 INJECTION, SOLUTION INTRAVENOUS at 12:41

## 2019-07-08 RX ADMIN — SEVELAMER CARBONATE 800 MG: 800 TABLET, FILM COATED ORAL at 17:26

## 2019-07-08 RX ADMIN — ONDANSETRON 4 MG: 4 TABLET, ORALLY DISINTEGRATING ORAL at 17:28

## 2019-07-08 RX ADMIN — FERROUS SULFATE TAB 325 MG (65 MG ELEMENTAL FE) 325 MG: 325 (65 FE) TAB at 17:25

## 2019-07-08 RX ADMIN — CEFTRIAXONE 1 G: 1 INJECTION, POWDER, FOR SOLUTION INTRAMUSCULAR; INTRAVENOUS at 13:04

## 2019-07-08 RX ADMIN — ACETAMINOPHEN 650 MG: 325 TABLET ORAL at 04:19

## 2019-07-08 ASSESSMENT — PAIN DESCRIPTION - PROGRESSION: CLINICAL_PROGRESSION: NOT CHANGED

## 2019-07-08 ASSESSMENT — PAIN DESCRIPTION - FREQUENCY
FREQUENCY: CONTINUOUS
FREQUENCY: INTERMITTENT
FREQUENCY: INTERMITTENT

## 2019-07-08 ASSESSMENT — PAIN DESCRIPTION - DESCRIPTORS
DESCRIPTORS: DISCOMFORT;SORE
DESCRIPTORS: CRAMPING
DESCRIPTORS: DISCOMFORT;SORE

## 2019-07-08 ASSESSMENT — PAIN DESCRIPTION - PAIN TYPE
TYPE: ACUTE PAIN

## 2019-07-08 ASSESSMENT — PAIN DESCRIPTION - ONSET
ONSET: ON-GOING
ONSET: ON-GOING

## 2019-07-08 ASSESSMENT — PAIN - FUNCTIONAL ASSESSMENT
PAIN_FUNCTIONAL_ASSESSMENT: ACTIVITIES ARE NOT PREVENTED

## 2019-07-08 ASSESSMENT — PAIN SCALES - GENERAL
PAINLEVEL_OUTOF10: 0
PAINLEVEL_OUTOF10: 0
PAINLEVEL_OUTOF10: 10
PAINLEVEL_OUTOF10: 3
PAINLEVEL_OUTOF10: 0

## 2019-07-08 ASSESSMENT — PAIN DESCRIPTION - LOCATION
LOCATION: ABDOMEN;BACK
LOCATION: ABDOMEN
LOCATION: ABDOMEN;BACK

## 2019-07-08 ASSESSMENT — PAIN DESCRIPTION - ORIENTATION: ORIENTATION: RIGHT;UPPER;LOWER

## 2019-07-08 NOTE — PROGRESS NOTES
Dr. Lachelle Andrade spoke with the pt. Over the phone and explained to the patient about the nephrostomy tubes. Dr. Lachelle Andrade called the desk to make us aware that the patient told him she wanted to die. Dr. uY Vasquez, night physician made aware. He ordered constant observation and a psych consult. Electronically signed by Beto Beltran RN on 7/8/2019 at 6:23 AM     Constant observation initiated 0629.     Electronically signed by Beto Beltran RN on 7/8/2019 at 7:00 AM

## 2019-07-08 NOTE — PROGRESS NOTES
Spoke with RN caring for patient on floor. Informed her that Bilateral nephrostomy tube placement is scheduled for 0730 tomorrow morning  7/9/19. NPO orders placed. New treatment consent needs to be obtained from patient.

## 2019-07-08 NOTE — PROGRESS NOTES
d/w'd Dr Dolores Guy of IR.      --> Hold dialysis    --> plan was for bilateral percutaneous nephrostomy today -- Earlier this morning she refused to go to IR for PNT's. I talked to her by phone at  6:30 am, still refused. Just talked to her now, she agrees to proceed. D/w'd Dr Dolores Guy, multiple add-ons have filled the IR schedule, unlikely to be available, will put her on the schedule for tomorrow, but try to slip in today.      --> If renal function does not improve post nephrostomies, need to consider initiating dialysis  Urgency/emergent hemodialysis not warranted at this point -- though in absence of PNT's, may become that    Continue Braden  Continue IVF while npo -- will change to HCO3 gtt given worsening acidosis  Encourage oral intake when no longer npo  Follow labs, UO  Cont NaHCO3 tabs to 1300 mg 3 times daily -- refused doses yesterday, on hold right now (npo for PNT's)  start phosphate binder when taking po    Note: She has been her own POA in the past;  Speech imprediment makes it seem that she is not capable of making decisions, but she does show insight and understanding of the clinical scenario.     Discussed with Axel Triana NP  D/w'd Dr Dolores Guy, IR    > 35 min in discussions, coordinating    Electronically signed by Lula Morris MD on 7/8/2019

## 2019-07-08 NOTE — CONSULTS
 levothyroxine (SYNTHROID) tablet 100 mcg  100 mcg Oral Daily Alexandro Pelayo MD   Stopped at 07/08/19 0700    mirtazapine (REMERON) tablet 15 mg  15 mg Oral Nightly Alexandro Pelayo MD   15 mg at 07/06/19 2129    ondansetron (ZOFRAN-ODT) disintegrating tablet 4 mg  4 mg Oral Q8H PRN Alexandro Pelayo MD   4 mg at 07/07/19 2133    pantoprazole (PROTONIX) tablet 40 mg  40 mg Oral Daily Alexandro Pelayo MD   40 mg at 07/05/19 1500    vitamin D (ERGOCALCIFEROL) capsule 50,000 Units  50,000 Units Oral Weekly Alexandro Pelayo MD   50,000 Units at 07/02/19 7397       Family History:  Family History   Problem Relation Age of Onset    Diabetes Brother     Thyroid Disease Mother     Other Daughter         Autism    Cancer Maternal Grandmother         patient doesn't know type.  Heart Attack Maternal Grandfather        Social History:       reports that she quit smoking about 30 years ago. She has a 5.00 pack-year smoking history. She has never used smokeless tobacco..   reports that she does not drink alcohol. .   reports that she does not use drugs. Review of Systems:  Obtained from the patient, chart review and nursing assessment. (Not evaluable)    Constitutional:  No fever, chills or night sweats. Denies recent weight loss. Eyes:  No blurred or changes in vision. Denies discharge or pain. ENT:  No headaches, hearing loss or vertigo. No mouth sores or sore throat. No change in taste or smell. Cardiovascular:  No chest discomfort, dyspnea on exertion or palpitations. Respiratory: Has no cough or wheezing. Has no sputum production or hemoptysis. Has no pleuritic pain. Gastrointestinal: No abdominal pain, appetite loss or nausea. No change in bowel habits. No hematochezia or melena. Genitourinary: Patient acknowledges no dysuria, trouble voiding, urgency or hematuria. No nocturia or increased frequency. Musculoskeletal: No gait disturbance, weakness or joint complaints.   Integumentary: No rash or pruritis. Neurological: No headache, diplopia, syncope, change in muscle strength, numbness or tingling. No change in gait, balance, coordination, mood, affect, memory, mentation, behavior. Psychiatric: No anxiety, or depression. Endocrine: No temperature intolerance. No excessive thirst, fluid intake, or urination. No tremor. Hematologic/Lymphatic: No abnormal bruising or bleeding, blood clots or swollen lymph nodes. Allergic/Immunologic: No nasal congestion or hives. Physical examination:   Vitals:    07/07/19 1945 07/08/19 0038 07/08/19 0745 07/08/19 1130   BP: (!) 148/67  (!) 162/70 (!) 182/78   Pulse: 69  66 66   Resp: 18  16 16   Temp: 98.5 °F (36.9 °C)  98.7 °F (37.1 °C) 99 °F (37.2 °C)   TempSrc: Oral  Oral Oral   SpO2: 100%  97% 99%   Weight:  141 lb 4.8 oz (64.1 kg)     Height:          ECOG Performance Status: 3-4    Constitutional: Appears calm    Abdomen: Soft nontender. No palpable mass    Musculoskeletal:Extremities: .   No pedal edema        Radiation safety and oncologic treatment support:    - Previous radiation history:  No  - History of autoimmune or connective tissue disease:  No  - Nutritional support/ PEG:  Not applicable  - Dental evaluation:  Not applicable  -  requested:  Not asked for.  - Oncology Nurse navigator requested:  - Transportation for daily treatment:  Self    Other Investigations/Laboratory Data:    Lab Results   Component Value Date    WBC 13.9 (H) 07/08/2019    HGB 7.8 (L) 07/08/2019    HCT 25.5 (L) 07/08/2019    MCV 94.7 07/08/2019     07/08/2019       Lab Results   Component Value Date     07/08/2019    K 4.3 07/08/2019    K 5.4 05/31/2019     07/08/2019    CO2 12 07/08/2019    BUN 63 07/08/2019    CREATININE 6.3 07/08/2019    GLUCOSE 76 07/08/2019    GLUCOSE 97 12/20/2011    CALCIUM 8.7 07/08/2019       Tumor markers: No results found for: PSA, CEA, , [id number],     Impression: Locally advanced low pelvic malignancy

## 2019-07-08 NOTE — PROGRESS NOTES
Pulmonary/Chest: clear to auscultation bilaterally- no wheezes, rales or rhonchi, normal air movement, no respiratory distress  Cardiovascular: normal rate, regular rhythm and intact distal pulses  Abdomen: soft, non-tender, non-distended, normal bowel sounds, no masses or organomegaly  Extremities: no cyanosis, no clubbing and no edema  Musculoskeletal: normal range of motion, no joint swelling, deformity or tenderness  Neurologic: no cranial nerve deficit and speech normal      Recent Labs     07/06/19 0415 07/07/19 0745 07/08/19 0226    141 138   K 4.1 4.8 5.3*    109* 110*   CO2 16* 16* 12*   BUN 70* 63* 63*   CREATININE 5.8* 6.4* 6.3*   GLUCOSE 129* 89 76   CALCIUM 9.1 9.1 8.7       Recent Labs     07/06/19 0415 07/08/19 0226   WBC 15.1* 13.9*   RBC 3.17* 2.46*   HGB 9.1* 7.2*   HCT 29.8* 23.3*   MCV 94.0 94.7   MCH 28.7 29.3   MCHC 30.5* 30.9*   RDW 14.8 15.2*   * 448   MPV 10.6 10.4          Radiology:   US RETROPERITONEAL COMPLETE   Final Result   Moderate to severe bilateral hydronephrosis   Findings compatible with a left renal cyst         IR Interventional Radiology Procedure Request    (Results Pending)       Assessment:    Principal Problem:    Acute renal failure (ARF) ([address]  Active Problems:    Acquired hypothyroidism    Intellectual disability    Obstructive uropathy    Acute renal failure superimposed on chronic kidney disease, on chronic dialysis ([address]    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 *      Plan:  1. Acute Renal Failure superimposed on Chronic Kidney Disease -  possibly needing HD vs bilateral nephrostomy tubes - BUN/creatinine remains elevated - leukocytosis - afebrile - Renal following - will monitor   2.  Obstructive Uropathy - NPO for insertion nephrostomy tube - nephrology on - crane intact draining cloudy aury urine with sediment

## 2019-07-08 NOTE — PROGRESS NOTES
Notified Night physician that pt. Is refusing to go down for bilateral nephrostomy tube placement with IR. Electronically signed by Ankush Wynn RN on 7/8/2019 at 5:42 AM    Notified Dr. Alyce Coulter via [employer] about patient's refusal    Electronically signed by Ankush Wynn RN on 7/8/2019 at 5:46 AM      Pt. Also refusing bloodwork. K redraw and INR.     Electronically signed by Ankush Wynn RN on 7/8/2019 at 5:46 AM

## 2019-07-08 NOTE — PLAN OF CARE
Problem: Pain:  Goal: Pain level will decrease  Description  Pain level will decrease  Outcome: Met This Shift     Problem: Pain:  Goal: Control of acute pain  Description  Control of acute pain  Outcome: Met This Shift     Problem: Pain:  Goal: Control of chronic pain  Description  Control of chronic pain  Outcome: Met This Shift     Problem: Falls - Risk of:  Goal: Will remain free from falls  Description  Will remain free from falls  Outcome: Met This Shift     Problem: Falls - Risk of:  Goal: Absence of physical injury  Description  Absence of physical injury  Outcome: Met This Shift     Problem:  Activity:  Goal: Risk for activity intolerance will decrease  Description  Risk for activity intolerance will decrease  Outcome: Met This Shift     Problem: Fluid Volume:  Goal: Will show no signs or symptoms of fluid imbalance  Description  Will show no signs or symptoms of fluid imbalance  Outcome: Met This Shift     Problem: Health Behavior:  Goal: Ability to manage health-related needs will improve  Description  Ability to manage health-related needs will improve  Outcome: Met This Shift     Problem: Nutritional:  Goal: Maintenance of adequate nutrition will be supported  Description  Maintenance of adequate nutrition will be supported  Outcome: Met This Shift     Problem: Physical Regulation:  Goal: Complications related to the disease process, condition or treatment will be avoided or minimized  Description  Complications related to the disease process, condition or treatment will be avoided or minimized  Outcome: Met This Shift     Problem: Urinary Elimination:  Goal: Ability to achieve and maintain adequate urine output will be supported  Description  Ability to achieve and maintain adequate urine output will be supported  7/8/2019 0043 by Padmini Avelar RN  Outcome: Met This Shift     Problem: Risk for Impaired Skin Integrity  Goal: Tissue integrity - skin and mucous membranes  Description  Structural

## 2019-07-08 NOTE — PROGRESS NOTES
Dermal vascular/lymphatic invasion is present, as  demonstrated by CD34 immunostain highlighting the presence of malignant cells within CD34 positive endothelial lined spaces. The malignant cells show cytoplasmic CK 5/6, strong nuclear p40 and  strong nuclear GATA3 expression, and are negative for p16, Mammaglobinand MOC 31 expression. Both squamous cell carcinoma and urothelial carcinoma can share a similar immunostain profile including positivity for CK 5/6 and p40. AlthoughGATA 3 expression has been reported to be present in association withsquamous carcinomas, usually the pattern of nuclear expression is relatively weak/patchy. In this case strong nuclear CRISS 3 expression is present. Additionally the malignant cells lack p16 expression, a surrogate marker for HPV. The immunohistochemical findings, along with lack of overlying surface epithelial squamous dysplasia are felt to be suspicious for cervical involvement by urothelial carcinoma, either involving the cervix by contiguous spread or metastasis. Correlation with clinical and radiologic findings with attention to the bladder, bladder neck and urethra is recommended. Intradepartmental consultation is obtained. This case was discussed with Dr. Asya Stokes and Dr. Reyes Kraus on  6/12/2019. OBJECTIVE:  VITALS:  height is 5' 3\" (1.6 m) and weight is 141 lb 4.8 oz (64.1 kg). Her oral temperature is 98.7 °F (37.1 °C). Her blood pressure is 162/70 (abnormal) and her pulse is 66. Her respiration is 16 and oxygen saturation is 97%. Physical Exam:  Performance Status: 0  Well developed, well nourished female  General: Alert no acute distress,  , appears slow in cognition  Head and neck : PERRL, EOMI . Sclera non icteric. Poor dentition. Oropharynx : Clear  Neck: no JVD,  no adenopathy,  Lungs: Clear to auscultation   Heart: Regular rate and rhythm. No murmur. Abdomen: Soft, non-tender;no masses, no organomegaly. Extremities: No edema. Endometrial stripe 4.2 mm in AP dimension. Right ovary 16 x 12 x 15 mm and left 14 x 10 x 11 mm with unremarkable internal echogenicity. Small free fluid in the cul-de-sac. No acute finding. IR Fluoro Guided Cva Device Plmt/replace/removal  Result Date: 6/4/2019  Location:200 Exam: IR FLUORO GUIDED CVA DEVICE PLMT/REPLACE/REMOVAL, IR ULTRASOUND GUIDANCE VASCULAR ACCESS HISTORY: Acute renal failure. Versed 1 mg IV was given for anxiety during the procedure. Fluoroscopy time 0.3 minutes. PROCEDURE: Informed consent was obtained. Ultrasound was used to localize the right  internal jugular vein. The skin was prepped and draped in a sterile fashion and then anesthetized with lidocaine. Maximal sterile barrier technique was utilized. Under direct ultrasound visualization, a micropuncture needle was used to gain access to the right internal jugular vein. A 0.018 guidewire was advanced into the central venous circulation under fluoroscopic guidance. A micropuncture sheath was placed. Through the sheath, a 0.035 J guidewire was advanced. Over the guidewire, the tract was dilated using 12 and 14 Western Cheryl dilators. A 13.5 Slovenian diameter temporary dialysis catheter 15 cm long was then advanced over the guidewire and positioned within the superior aspect of the right atrium. The catheter was then affixed to the skin using 2-0 silk. FINDINGS: Ultrasound demonstrates patent internal jugular veins bilaterally. Fluoroscopic spot film demonstrates placement of the right internal jugular hemodialysis catheter with the tip in the superior aspect of the right atrium. Successful placement of a right IJ temporary hemodialysis catheter. IR Fluoro Guided Cva Device Plmt/replace/removal  Result Date: 5/29/2019  Location:200 Exam: IR FLUORO GUIDED CVA DEVICE PLMT/REPLACE/REMOVAL HISTORY:   Infection FLUOROSCOPY TIME:  0.2 minutes PROCEDURE:  The procedure was explained to the patient and informed consent was obtained.   The skin over the  right arm was prepped and draped in a sterile fashion and then anesthetized with Lidocaine. Maximal sterile barrier technique was utilized. Under ultrasound guidance, the brachial vein was localized. Using a micropuncture needle, the vein was entered under direct ultrasound visualization. A 0.018 guidewire was advanced into the central venous circulation. A 5 Greek peel-away sheath was placed. The 5 Greek Power PICC line catheter was trimmed to 32 cm was then advanced through the peel-away sheath and positioned at the right atrial/SVC junction. Fluoroscopic spot film was obtained. FINDINGS:  Ultrasound shows the veins to be patent. Fluoroscopic spot film demonstrates the Power PICC line to be at the right atrial/SVC junction. Successful placement of a 5 Greek double-lumen Power PICC line using ultrasound guidance via the right brachial  vein. Xr Urogram W Wo Kub W Wo Tomogram  Result Date: 6/1/2019  Reading location: 200 INDICATION: Ureteral stent exchange FINDINGS: 67.4 seconds of fluoroscopy and 4 spot images during ureteral stent exchange. No contrast injection. Operative fluoroscopy      US Retroperitoneal Complete  Result Date: 5/31/2019  Reading location: 200 INDICATION: Renal insufficiency FINDINGS: Sonographic evaluation urinary tract compared with ultrasound 1/18/2019 and CT the abdomen and pelvis 5/22/2019. Right kidney 12.1 cm in length and the left 11.2 cm. At least moderate dilatation right renal collecting system. On recent CT, right ureteral stent is coiled in the renal pelvis. Dilatation the left renal collecting system is somewhat less severe than the right. Mid left renal cyst measures 13 mm and upper pole cyst 25.5 mm. No focal abnormality right renal collecting system. Nondistended urinary bladder. Moderate bilateral hydronephrosis, somewhat more severe on the right than the left.       IR Ultrasound Guidance Vascular Access  Result Date: 2019     Findings:       PET images were obtained from skull base to the mid thigh.       Nondiagnostic CT images were obtained for the purposes of attenuation   correction.       16.5 mCi of F-18 deoxyglucose was injected.       Images reveal foci of FDG uptake. FDG uptake however does not exceed   the threshold SUV.       The pharynx and hypopharynx or free of neoplastic changes. Major   airways are widely patent.       In the chest, no metastatic pulmonic nodules are noted. No   hypermetabolic axillary, mediastinal, hilar lymph nodes are   visualized. There is no no longer any evidence of pleural effusion. Heart appears borderline enlarged.       Below the hemidiaphragms, there is no evidence of metastatic   involvement to the liver coarse clean. The gallbladder has an   unremarkable nondiagnostic CT appearance. Is no evidence of adrenal   neoplasm. Bilateral transurethral double pigtail stents are again   seen. Moderate bilateral hydronephrosis and hydroureter appear stable. Bladder remains significantly evacuated by Braden catheter.       Small umbilical hernia containing fat is again demonstrated.        Scattered small subcutaneous soft tissue densities are noted as before   anteriorly in the upper to mid pelvis within the subcutaneous fat;   although these may represent areas of fat necrosis due to prior   subdermal injections, possibility of metastases is to be considered as   these are FDG avid; 2 dominant nodular lesions measuring up to 1.4 cm   in diameter anterolaterally on the left exhibit an SUV as high as 3.2.       Moderate is not well evaluated evaluated here although it is nearly   empty, the wall remains thickened and it is FDG avid which may be due   to chronic cystitis although tumoral infiltration cannot be excluded.       There is persistent bulkiness of the cervix which is FDG avid; SUV as   high as 20.7 is noted.  This extends into the lower uterine segment and   also downward into the upper aspect of the vaginal vault. Overall bulk   of material here is approximately unchanged compared with previous   diagnostic CT scan. There is no evidence of regional lymphadenopathy. Pelvic sidewalls appear free of tumor.       No suspicious skeletal uptake is seen.       There is otherwise a normal biodistribution of radiotracer. There is   no other abnormal tracer uptake seen         ASSESSMENT/PLAN :  47-year-old woman with mild mental retardation and recent diagnosis of high-grade invasive poorly differentiated carcinoma on a cervical biopsy with immunohistochemical stains suspicious of urothelial primary with extrinsic invasion of the cervix as well as the vaginal wall  She has prior history of CKD stage IV with hydronephrosis and neurogenic bladder and prior stent placement and has required intermittent hemodialysis in the past and most recently  She underwent cystourethroscopy, bilateral JJ ureteral stent insertion, pelvic examination 6/1  Her cystoscopy did not show any bladder tumors. The urethra was fixed and difficult to enter. Per urology there were abnormal firm nodular lesions within the vagina and stenosis suspicious for GYN malignancy  She is now hospitalized with acute kidney failure superimposed on CKD stage IV, her serum creatinine was up to 5.8 with an estimated GFR of 8    Her PET CT scan showed scattered subcutaneous soft tissue densities in the upper to mid pelvis which may represent fat necrosis from prior injections versus metastatic disease. Bladder wall was thickened and FDG avid and this may be related to chronic cystitis versus tumor infiltration. The cervix appears bulky with high SUV of 20.7 with abnormal metabolic activity extending into the lower uterine segment and into the vagina involved.   There was no evidence of regional lymphadenopathy.     Her pathology is suggestive of metastatic urothelial carcinoma even though clinically urology feels she has a GYN malignancy. In view of her progressive kidney failure she will need nephrology consult with possible need of hemodialysis  She would not be candidate for any type of platinum therapy    - Anemia likely multifactorial related to myelosuppression by recurrent urosepsis as well as chronic kidney disease. Hgb stable and normocytic.  Transfuse for <7    - Chronic mental disability with depression and psychosis  - Dr. Margarita Guillory XRT to start this week  - Outpatient Keytruda s/p XRT  - Planning BL nephrostomy tubes through IR scheduled for 07:30 7/9      Gerry Prince MD  Electronically signed 7/8/2019 at 10:31 AM

## 2019-07-09 ENCOUNTER — APPOINTMENT (OUTPATIENT)
Dept: CT IMAGING | Age: 54
DRG: 469 | End: 2019-07-09
Payer: MEDICAID

## 2019-07-09 ENCOUNTER — HOSPITAL ENCOUNTER (OUTPATIENT)
Dept: RADIATION ONCOLOGY | Age: 54
Discharge: HOME | End: 2019-07-09
Attending: RADIOLOGY
Payer: MEDICAID

## 2019-07-09 LAB
ANION GAP SERPL CALCULATED.3IONS-SCNC: 17 MMOL/L (ref 7–16)
BUN BLDV-MCNC: 58 MG/DL (ref 6–20)
CALCIUM SERPL-MCNC: 9.5 MG/DL (ref 8.6–10.2)
CHLORIDE BLD-SCNC: 107 MMOL/L (ref 98–107)
CO2: 23 MMOL/L (ref 22–29)
CREAT SERPL-MCNC: 6.6 MG/DL (ref 0.5–1)
GFR AFRICAN AMERICAN: 8
GFR NON-AFRICAN AMERICAN: 7 ML/MIN/1.73
GLUCOSE BLD-MCNC: 130 MG/DL (ref 74–99)
HCT VFR BLD CALC: 26.1 % (ref 34–48)
HEMOGLOBIN: 8.3 G/DL (ref 11.5–15.5)
MAGNESIUM: 2 MG/DL (ref 1.6–2.6)
MCH RBC QN AUTO: 28.9 PG (ref 26–35)
MCHC RBC AUTO-ENTMCNC: 31.8 % (ref 32–34.5)
MCV RBC AUTO: 90.9 FL (ref 80–99.9)
PDW BLD-RTO: 15.1 FL (ref 11.5–15)
PHOSPHORUS: 5.5 MG/DL (ref 2.5–4.5)
PLATELET # BLD: 507 E9/L (ref 130–450)
PMV BLD AUTO: 10 FL (ref 7–12)
POTASSIUM SERPL-SCNC: 3.9 MMOL/L (ref 3.5–5)
RBC # BLD: 2.87 E12/L (ref 3.5–5.5)
SODIUM BLD-SCNC: 147 MMOL/L (ref 132–146)
WBC # BLD: 18.3 E9/L (ref 4.5–11.5)

## 2019-07-09 PROCEDURE — 6360000002 HC RX W HCPCS: Performed by: INTERNAL MEDICINE

## 2019-07-09 PROCEDURE — 2580000003 HC RX 258: Performed by: INTERNAL MEDICINE

## 2019-07-09 PROCEDURE — 36415 COLL VENOUS BLD VENIPUNCTURE: CPT

## 2019-07-09 PROCEDURE — 85027 COMPLETE CBC AUTOMATED: CPT

## 2019-07-09 PROCEDURE — 6360000002 HC RX W HCPCS: Performed by: RADIOLOGY

## 2019-07-09 PROCEDURE — 83735 ASSAY OF MAGNESIUM: CPT

## 2019-07-09 PROCEDURE — 1200000000 HC SEMI PRIVATE

## 2019-07-09 PROCEDURE — 2500000003 HC RX 250 WO HCPCS: Performed by: RADIOLOGY

## 2019-07-09 PROCEDURE — 77334 RADIATION TREATMENT AID(S): CPT | Performed by: RADIOLOGY

## 2019-07-09 PROCEDURE — 6370000000 HC RX 637 (ALT 250 FOR IP): Performed by: NURSE PRACTITIONER

## 2019-07-09 PROCEDURE — 2500000003 HC RX 250 WO HCPCS: Performed by: INTERNAL MEDICINE

## 2019-07-09 PROCEDURE — 99232 SBSQ HOSP IP/OBS MODERATE 35: CPT | Performed by: INTERNAL MEDICINE

## 2019-07-09 PROCEDURE — 0T9030Z DRAINAGE OF RIGHT KIDNEY WITH DRAINAGE DEVICE, PERCUTANEOUS APPROACH: ICD-10-PCS | Performed by: RADIOLOGY

## 2019-07-09 PROCEDURE — 6370000000 HC RX 637 (ALT 250 FOR IP): Performed by: INTERNAL MEDICINE

## 2019-07-09 PROCEDURE — 80048 BASIC METABOLIC PNL TOTAL CA: CPT

## 2019-07-09 PROCEDURE — APPSS30 APP SPLIT SHARED TIME 16-30 MINUTES: Performed by: NURSE PRACTITIONER

## 2019-07-09 PROCEDURE — 84100 ASSAY OF PHOSPHORUS: CPT

## 2019-07-09 PROCEDURE — 50432 PLMT NEPHROSTOMY CATHETER: CPT

## 2019-07-09 PROCEDURE — C1729 CATH, DRAINAGE: HCPCS

## 2019-07-09 PROCEDURE — 2580000003 HC RX 258

## 2019-07-09 PROCEDURE — 0T9130Z DRAINAGE OF LEFT KIDNEY WITH DRAINAGE DEVICE, PERCUTANEOUS APPROACH: ICD-10-PCS | Performed by: RADIOLOGY

## 2019-07-09 RX ORDER — FENTANYL CITRATE 50 UG/ML
25 INJECTION, SOLUTION INTRAMUSCULAR; INTRAVENOUS ONCE
Status: COMPLETED | OUTPATIENT
Start: 2019-07-09 | End: 2019-07-09

## 2019-07-09 RX ORDER — HYDRALAZINE HYDROCHLORIDE 20 MG/ML
10 INJECTION INTRAMUSCULAR; INTRAVENOUS ONCE
Status: COMPLETED | OUTPATIENT
Start: 2019-07-09 | End: 2019-07-09

## 2019-07-09 RX ORDER — SODIUM CHLORIDE 0.9 % (FLUSH) 0.9 %
SYRINGE (ML) INJECTION
Status: COMPLETED
Start: 2019-07-09 | End: 2019-07-09

## 2019-07-09 RX ORDER — HYDROCODONE BITARTRATE AND ACETAMINOPHEN 5; 325 MG/1; MG/1
1 TABLET ORAL EVERY 6 HOURS PRN
Status: DISCONTINUED | OUTPATIENT
Start: 2019-07-09 | End: 2019-07-11 | Stop reason: HOSPADM

## 2019-07-09 RX ORDER — MIDAZOLAM HYDROCHLORIDE 1 MG/ML
0.5 INJECTION INTRAMUSCULAR; INTRAVENOUS ONCE
Status: COMPLETED | OUTPATIENT
Start: 2019-07-09 | End: 2019-07-09

## 2019-07-09 RX ORDER — LIDOCAINE HYDROCHLORIDE 20 MG/ML
10 INJECTION, SOLUTION INFILTRATION; PERINEURAL ONCE
Status: COMPLETED | OUTPATIENT
Start: 2019-07-09 | End: 2019-07-09

## 2019-07-09 RX ADMIN — SODIUM CHLORIDE 125 MG: 900 INJECTION INTRAVENOUS at 19:50

## 2019-07-09 RX ADMIN — FENTANYL CITRATE 25 MCG: 50 INJECTION, SOLUTION INTRAMUSCULAR; INTRAVENOUS at 08:44

## 2019-07-09 RX ADMIN — FENTANYL CITRATE 25 MCG: 50 INJECTION, SOLUTION INTRAMUSCULAR; INTRAVENOUS at 09:22

## 2019-07-09 RX ADMIN — CEFTRIAXONE 1 G: 1 INJECTION, POWDER, FOR SOLUTION INTRAMUSCULAR; INTRAVENOUS at 13:27

## 2019-07-09 RX ADMIN — SODIUM BICARBONATE: 84 INJECTION, SOLUTION INTRAVENOUS at 21:57

## 2019-07-09 RX ADMIN — Medication 10 ML: at 19:51

## 2019-07-09 RX ADMIN — FLUTICASONE PROPIONATE 1 SPRAY: 50 SPRAY, METERED NASAL at 13:01

## 2019-07-09 RX ADMIN — MIDAZOLAM HYDROCHLORIDE 0.5 MG: 1 INJECTION, SOLUTION INTRAMUSCULAR; INTRAVENOUS at 08:40

## 2019-07-09 RX ADMIN — ONDANSETRON 4 MG: 4 TABLET, ORALLY DISINTEGRATING ORAL at 22:17

## 2019-07-09 RX ADMIN — MIDAZOLAM HYDROCHLORIDE 0.5 MG: 1 INJECTION, SOLUTION INTRAMUSCULAR; INTRAVENOUS at 08:30

## 2019-07-09 RX ADMIN — LIDOCAINE HYDROCHLORIDE 10 ML: 20 INJECTION, SOLUTION INFILTRATION; PERINEURAL at 08:47

## 2019-07-09 RX ADMIN — MIDAZOLAM HYDROCHLORIDE 0.5 MG: 1 INJECTION, SOLUTION INTRAMUSCULAR; INTRAVENOUS at 09:46

## 2019-07-09 RX ADMIN — SODIUM BICARBONATE: 84 INJECTION, SOLUTION INTRAVENOUS at 04:52

## 2019-07-09 RX ADMIN — HYDROCODONE BITARTRATE AND ACETAMINOPHEN 1 TABLET: 5; 325 TABLET ORAL at 12:21

## 2019-07-09 RX ADMIN — HYDRALAZINE HYDROCHLORIDE 10 MG: 20 INJECTION, SOLUTION INTRAMUSCULAR; INTRAVENOUS at 09:08

## 2019-07-09 RX ADMIN — HYDROCODONE BITARTRATE AND ACETAMINOPHEN 1 TABLET: 5; 325 TABLET ORAL at 18:37

## 2019-07-09 ASSESSMENT — PAIN SCALES - GENERAL
PAINLEVEL_OUTOF10: 4
PAINLEVEL_OUTOF10: 10
PAINLEVEL_OUTOF10: 0
PAINLEVEL_OUTOF10: 0
PAINLEVEL_OUTOF10: 4
PAINLEVEL_OUTOF10: 10
PAINLEVEL_OUTOF10: 10
PAINLEVEL_OUTOF10: 0
PAINLEVEL_OUTOF10: 9
PAINLEVEL_OUTOF10: 5

## 2019-07-09 ASSESSMENT — PAIN - FUNCTIONAL ASSESSMENT
PAIN_FUNCTIONAL_ASSESSMENT: PREVENTS OR INTERFERES SOME ACTIVE ACTIVITIES AND ADLS
PAIN_FUNCTIONAL_ASSESSMENT: 0-10
PAIN_FUNCTIONAL_ASSESSMENT: PREVENTS OR INTERFERES SOME ACTIVE ACTIVITIES AND ADLS

## 2019-07-09 ASSESSMENT — PAIN DESCRIPTION - LOCATION
LOCATION: BACK
LOCATION: BACK

## 2019-07-09 ASSESSMENT — PAIN DESCRIPTION - FREQUENCY
FREQUENCY: CONTINUOUS
FREQUENCY: CONTINUOUS

## 2019-07-09 ASSESSMENT — PAIN DESCRIPTION - ONSET
ONSET: ON-GOING
ONSET: PROGRESSIVE

## 2019-07-09 ASSESSMENT — PAIN DESCRIPTION - PROGRESSION
CLINICAL_PROGRESSION: GRADUALLY WORSENING
CLINICAL_PROGRESSION: NOT CHANGED

## 2019-07-09 ASSESSMENT — PAIN DESCRIPTION - ORIENTATION
ORIENTATION: RIGHT;LEFT;LOWER
ORIENTATION: RIGHT;LEFT;LOWER

## 2019-07-09 ASSESSMENT — PAIN DESCRIPTION - PAIN TYPE
TYPE: SURGICAL PAIN
TYPE: SURGICAL PAIN

## 2019-07-09 ASSESSMENT — PAIN DESCRIPTION - DESCRIPTORS: DESCRIPTORS: CONSTANT;DISCOMFORT;SORE;TENDER

## 2019-07-09 NOTE — PROGRESS NOTES
Neurologic:Cranial nerves grossly intact. No focal motor or sensory deficits. Mentally challenged. Skin:  No rash. Multiple moles. Medications  Prior to Admission medications    Medication Sig Start Date End Date Taking?  Authorizing Provider   sodium bicarbonate 650 MG tablet Take 1 tablet by mouth 2 times daily 6/14/19   Grant Ash, DO   atorvastatin (LIPITOR) 40 MG tablet Take 1 tablet by mouth daily 6/14/19   Grant Ash, DO   acetaminophen (TYLENOL) 325 MG tablet Take 650 mg by mouth every 4 hours as needed for Pain    Historical Provider, MD   mirtazapine (REMERON) 15 MG tablet Take 1 tablet by mouth nightly 5/24/19   Ritika Alvarez MD   ondansetron (ZOFRAN-ODT) 4 MG disintegrating tablet Take 1 tablet by mouth every 8 hours as needed for Nausea or Vomiting 4/23/19   Grant Ash, DO   ferrous sulfate 325 (65 Fe) MG tablet Take 1 tablet by mouth 2 times daily (with meals) 4/23/19   Grant Ash, DO   docusate sodium (COLACE, DULCOLAX) 100 MG CAPS Take 100 mg by mouth 2 times daily 4/23/19   Grant Ash, DO   levothyroxine (SYNTHROID) 100 MCG tablet TAKE 1 TABLET BY MOUTH EVERY MORNING 30 MINUTES BEFORE EATING. 4/10/19   Brittney Orozco DO   vitamin D (ERGOCALCIFEROL) 03299 units CAPS capsule Take 1 capsule by mouth once a week 4/9/19   Brittney Orozco DO   oxybutynin (DITROPAN-XL) 5 MG extended release tablet Take 1 tablet by mouth daily 4/9/19   Celestine Trujillo DO   magnesium oxide (MAG-OX) 400 (241.3 Mg) MG TABS tablet Take 1 tablet by mouth 2 times daily 4/9/19   Brittney Orozco DO   fluticasone (FLONASE) 50 MCG/ACT nasal spray 2 sprays by Nasal route daily  Patient taking differently: 1 spray by Nasal route daily  4/9/19   Celestine Trujillo DO   pantoprazole (PROTONIX) 40 MG tablet TAKE ONE TABLET BY MOUTH DAILY 4/9/19   Brittney Orozco DO   Compression Stockings MISC by Does not apply route Below knee  15 - 30 mm Hg 7/25/18   Figueroa Chery,     Scheduled Meds:  Continuous There is thickening of the duodenal wall second portion worrisome for duodenitis. Correlation with patient's pain. Does this patient have epigastric pain clinically? Minimal induration of the presacral soft tissues of the lower pelvis new since prior examination, axial image 64. Inflammation or infection cannot be excluded. Correlation with inflammatory markers recommended. Midline umbilical hernia containing only fat similar to prior examination. The appendix is not seen although there are no secondary signs for acute appendicitis. There is no free air. The remaining bowel shows no bowel wall thickening or distention. Minimal subcutaneous edema is present of the lower abdomen. Soft tissue densities of the anterior abdomen likely reflect sequela from abdominal wall injections. Bilateral ureteral stents are seen. The right kidney demonstrates a stent properly positioned. There is continued right-sided hydronephrosis. No stone seen adjacent to the ureter. The left side demonstrates a properly positioned stent with hydronephrosis and hydroureter. No stone seen adjacent to the stent. Braden catheter in the urinary bladder makes evaluation difficult. The bladder is contracted and there is air present likely from the Braden. Phleboliths are present in the lower pelvis. 1. Development of small bilateral pleural effusions right side greater than left. 2. Retained fluid in the esophagus as described. 3. Bilateral hydronephrosis and hydroureter. Ureteral stents appear to properly positioned. No stones seen. 4. New presacral edema of the lower pelvis. 5. Suspected duodenal wall thickening of the second portion similar to the prior examination without duodenal inflammatory changes, equivocal.       US Pelvis Complete  Result Date: 6/4/2019  Reading location: 200 INDICATION: Vaginal nodules FINDINGS: Transabdominal images of the pelvis. Uterus 68 x 30 x 38 mm with unremarkable myometrial contour and echogenicity. 6/4/2019  Location:200 Exam: IR FLUORO GUIDED CVA DEVICE PLMT/REPLACE/REMOVAL, IR ULTRASOUND GUIDANCE VASCULAR ACCESS HISTORY: Acute renal failure. Versed 1 mg IV was given for anxiety during the procedure. Fluoroscopy time 0.3 minutes. PROCEDURE: Informed consent was obtained. Ultrasound was used to localize the right  internal jugular vein. The skin was prepped and draped in a sterile fashion and then anesthetized with lidocaine. Maximal sterile barrier technique was utilized. Under direct ultrasound visualization, a micropuncture needle was used to gain access to the right internal jugular vein. A 0.018 guidewire was advanced into the central venous circulation under fluoroscopic guidance. A micropuncture sheath was placed. Through the sheath, a 0.035 J guidewire was advanced. Over the guidewire, the tract was dilated using 12 and 14 Western Cheryl dilators. A 13.5 Romanian diameter temporary dialysis catheter 15 cm long was then advanced over the guidewire and positioned within the superior aspect of the right atrium. The catheter was then affixed to the skin using 2-0 silk. FINDINGS: Ultrasound demonstrates patent internal jugular veins bilaterally. Fluoroscopic spot film demonstrates placement of the right internal jugular hemodialysis catheter with the tip in the superior aspect of the right atrium. Successful placement of a right IJ temporary hemodialysis catheter. IR Ultrasound Guidance Vascular Access  Result Date: 5/29/2019  Location:200 Exam: IR ULTRASOUND GUIDANCE VASCULAR ACCESS History:  PICC line placement Ultrasound evaluation of potential access was performed. After successfully identifying a patent right brachial vein, and following the administration of lidocaine, real-time ultrasound guidance was used to puncture the right brachial   vein. A permanent recording was created for the patient's record. Ultrasound guidance was provided during placement of a PICC line.      PET CT scan: July malignancy. In view of her progressive kidney failure she will need nephrology consult with possible need of hemodialysis  She would not be candidate for any type of platinum therapy    - Anemia likely multifactorial related to myelosuppression by recurrent urosepsis as well as chronic kidney disease. Hgb stable and normocytic. Transfuse for <7    - Chronic mental disability with depression and psychosis  - Dr. Yariel Stephens to start this week  - Outpatient Keytruda s/p XRT  -Status post BL nephrostomy tubes .       Beckie Shah MD  Electronically signed 7/9/2019 at 6:06 PM

## 2019-07-10 ENCOUNTER — HOSPITAL ENCOUNTER (OUTPATIENT)
Dept: RADIATION ONCOLOGY | Age: 54
End: 2019-07-10
Attending: RADIOLOGY
Payer: MEDICAID

## 2019-07-10 ENCOUNTER — HOSPITAL ENCOUNTER (OUTPATIENT)
Age: 54
Discharge: HOME | End: 2019-07-10
Payer: MEDICAID

## 2019-07-10 ENCOUNTER — HOSPITAL ENCOUNTER (OUTPATIENT)
Dept: RADIATION ONCOLOGY | Age: 54
Discharge: HOME | End: 2019-07-10
Attending: RADIOLOGY
Payer: MEDICAID

## 2019-07-10 LAB
ANION GAP SERPL CALCULATED.3IONS-SCNC: 17 MMOL/L (ref 7–16)
BUN BLDV-MCNC: 49 MG/DL (ref 6–20)
CALCIUM SERPL-MCNC: 9 MG/DL (ref 8.6–10.2)
CHLORIDE BLD-SCNC: 100 MMOL/L (ref 98–107)
CO2: 27 MMOL/L (ref 22–29)
CREAT SERPL-MCNC: 5.4 MG/DL (ref 0.5–1)
GFR AFRICAN AMERICAN: 10
GFR NON-AFRICAN AMERICAN: 8 ML/MIN/1.73
GLUCOSE BLD-MCNC: 126 MG/DL (ref 74–99)
HCT VFR BLD CALC: 23.5 % (ref 34–48)
HEMOGLOBIN: 7.3 G/DL (ref 11.5–15.5)
MAGNESIUM: 1.7 MG/DL (ref 1.6–2.6)
MCH RBC QN AUTO: 29.7 PG (ref 26–35)
MCHC RBC AUTO-ENTMCNC: 31.1 % (ref 32–34.5)
MCV RBC AUTO: 95.5 FL (ref 80–99.9)
PDW BLD-RTO: 15.5 FL (ref 11.5–15)
PHOSPHORUS: 5 MG/DL (ref 2.5–4.5)
PLATELET # BLD: 423 E9/L (ref 130–450)
PMV BLD AUTO: 10 FL (ref 7–12)
POTASSIUM SERPL-SCNC: 3.1 MMOL/L (ref 3.5–5)
RBC # BLD: 2.46 E12/L (ref 3.5–5.5)
SODIUM BLD-SCNC: 144 MMOL/L (ref 132–146)
WBC # BLD: 10.8 E9/L (ref 4.5–11.5)

## 2019-07-10 PROCEDURE — 80048 BASIC METABOLIC PNL TOTAL CA: CPT

## 2019-07-10 PROCEDURE — A0425 GROUND MILEAGE: HCPCS

## 2019-07-10 PROCEDURE — 77417 THER RADIOLOGY PORT IMAGE(S): CPT | Performed by: RADIOLOGY

## 2019-07-10 PROCEDURE — 97530 THERAPEUTIC ACTIVITIES: CPT

## 2019-07-10 PROCEDURE — 1200000000 HC SEMI PRIVATE

## 2019-07-10 PROCEDURE — 6370000000 HC RX 637 (ALT 250 FOR IP): Performed by: INTERNAL MEDICINE

## 2019-07-10 PROCEDURE — 85027 COMPLETE CBC AUTOMATED: CPT

## 2019-07-10 PROCEDURE — 2580000003 HC RX 258: Performed by: INTERNAL MEDICINE

## 2019-07-10 PROCEDURE — 6370000000 HC RX 637 (ALT 250 FOR IP): Performed by: NURSE PRACTITIONER

## 2019-07-10 PROCEDURE — 2580000003 HC RX 258

## 2019-07-10 PROCEDURE — 77295 3-D RADIOTHERAPY PLAN: CPT | Performed by: RADIOLOGY

## 2019-07-10 PROCEDURE — 77334 RADIATION TREATMENT AID(S): CPT | Performed by: RADIOLOGY

## 2019-07-10 PROCEDURE — 84100 ASSAY OF PHOSPHORUS: CPT

## 2019-07-10 PROCEDURE — 36415 COLL VENOUS BLD VENIPUNCTURE: CPT

## 2019-07-10 PROCEDURE — 97161 PT EVAL LOW COMPLEX 20 MIN: CPT

## 2019-07-10 PROCEDURE — APPSS30 APP SPLIT SHARED TIME 16-30 MINUTES: Performed by: NURSE PRACTITIONER

## 2019-07-10 PROCEDURE — 6360000002 HC RX W HCPCS: Performed by: INTERNAL MEDICINE

## 2019-07-10 PROCEDURE — 77300 RADIATION THERAPY DOSE PLAN: CPT | Performed by: RADIOLOGY

## 2019-07-10 PROCEDURE — A0428 BLS: HCPCS

## 2019-07-10 PROCEDURE — 97165 OT EVAL LOW COMPLEX 30 MIN: CPT

## 2019-07-10 PROCEDURE — 6360000002 HC RX W HCPCS: Performed by: NURSE PRACTITIONER

## 2019-07-10 PROCEDURE — 83735 ASSAY OF MAGNESIUM: CPT

## 2019-07-10 PROCEDURE — 77412 RADIATION TX DELIVERY LVL 3: CPT | Performed by: RADIOLOGY

## 2019-07-10 PROCEDURE — 99232 SBSQ HOSP IP/OBS MODERATE 35: CPT | Performed by: INTERNAL MEDICINE

## 2019-07-10 RX ORDER — SODIUM CHLORIDE 0.9 % (FLUSH) 0.9 %
SYRINGE (ML) INJECTION
Status: COMPLETED
Start: 2019-07-10 | End: 2019-07-10

## 2019-07-10 RX ORDER — POTASSIUM CHLORIDE 7.45 MG/ML
10 INJECTION INTRAVENOUS
Status: DISCONTINUED | OUTPATIENT
Start: 2019-07-10 | End: 2019-07-10

## 2019-07-10 RX ORDER — POTASSIUM CHLORIDE 7.45 MG/ML
10 INJECTION INTRAVENOUS
Status: COMPLETED | OUTPATIENT
Start: 2019-07-10 | End: 2019-07-11

## 2019-07-10 RX ORDER — ONDANSETRON 2 MG/ML
4 INJECTION INTRAMUSCULAR; INTRAVENOUS EVERY 8 HOURS PRN
Status: DISCONTINUED | OUTPATIENT
Start: 2019-07-10 | End: 2019-07-11 | Stop reason: HOSPADM

## 2019-07-10 RX ORDER — POTASSIUM CHLORIDE 20 MEQ/1
40 TABLET, EXTENDED RELEASE ORAL ONCE
Status: DISCONTINUED | OUTPATIENT
Start: 2019-07-10 | End: 2019-07-11

## 2019-07-10 RX ORDER — SODIUM CHLORIDE 0.9 % (FLUSH) 0.9 %
SYRINGE (ML) INJECTION
Status: DISPENSED
Start: 2019-07-10 | End: 2019-07-10

## 2019-07-10 RX ORDER — MAGNESIUM SULFATE IN WATER 40 MG/ML
2 INJECTION, SOLUTION INTRAVENOUS ONCE
Status: COMPLETED | OUTPATIENT
Start: 2019-07-10 | End: 2019-07-10

## 2019-07-10 RX ADMIN — DOCUSATE SODIUM 100 MG: 100 CAPSULE, LIQUID FILLED ORAL at 08:37

## 2019-07-10 RX ADMIN — ONDANSETRON 4 MG: 4 TABLET, ORALLY DISINTEGRATING ORAL at 10:34

## 2019-07-10 RX ADMIN — Medication 10 ML: at 19:54

## 2019-07-10 RX ADMIN — MAGNESIUM SULFATE HEPTAHYDRATE 2 G: 40 INJECTION, SOLUTION INTRAVENOUS at 19:56

## 2019-07-10 RX ADMIN — LEVOTHYROXINE SODIUM 100 MCG: 100 TABLET ORAL at 06:04

## 2019-07-10 RX ADMIN — HYDROCODONE BITARTRATE AND ACETAMINOPHEN 1 TABLET: 5; 325 TABLET ORAL at 02:10

## 2019-07-10 RX ADMIN — ONDANSETRON 4 MG: 2 INJECTION INTRAMUSCULAR; INTRAVENOUS at 11:10

## 2019-07-10 RX ADMIN — CEFTRIAXONE 1 G: 1 INJECTION, POWDER, FOR SOLUTION INTRAMUSCULAR; INTRAVENOUS at 14:04

## 2019-07-10 RX ADMIN — FERROUS SULFATE TAB 325 MG (65 MG ELEMENTAL FE) 325 MG: 325 (65 FE) TAB at 08:37

## 2019-07-10 RX ADMIN — HYDROCODONE BITARTRATE AND ACETAMINOPHEN 1 TABLET: 5; 325 TABLET ORAL at 08:38

## 2019-07-10 RX ADMIN — POTASSIUM CHLORIDE 10 MEQ: 10 INJECTION, SOLUTION INTRAVENOUS at 22:04

## 2019-07-10 RX ADMIN — POTASSIUM CHLORIDE 10 MEQ: 10 INJECTION, SOLUTION INTRAVENOUS at 23:15

## 2019-07-10 ASSESSMENT — PAIN DESCRIPTION - PROGRESSION: CLINICAL_PROGRESSION: NOT CHANGED

## 2019-07-10 ASSESSMENT — PAIN DESCRIPTION - FREQUENCY
FREQUENCY: CONTINUOUS
FREQUENCY: CONTINUOUS

## 2019-07-10 ASSESSMENT — PAIN DESCRIPTION - LOCATION
LOCATION: BACK

## 2019-07-10 ASSESSMENT — PAIN DESCRIPTION - PAIN TYPE
TYPE: SURGICAL PAIN
TYPE: ACUTE PAIN
TYPE: SURGICAL PAIN

## 2019-07-10 ASSESSMENT — PAIN SCALES - GENERAL
PAINLEVEL_OUTOF10: 10
PAINLEVEL_OUTOF10: 0
PAINLEVEL_OUTOF10: 10

## 2019-07-10 ASSESSMENT — PAIN - FUNCTIONAL ASSESSMENT
PAIN_FUNCTIONAL_ASSESSMENT: PREVENTS OR INTERFERES SOME ACTIVE ACTIVITIES AND ADLS
PAIN_FUNCTIONAL_ASSESSMENT: PREVENTS OR INTERFERES SOME ACTIVE ACTIVITIES AND ADLS

## 2019-07-10 ASSESSMENT — PAIN DESCRIPTION - DESCRIPTORS
DESCRIPTORS: CONSTANT;SORE
DESCRIPTORS: ACHING;DISCOMFORT;DULL
DESCRIPTORS: SORE

## 2019-07-10 ASSESSMENT — PAIN DESCRIPTION - ONSET
ONSET: ON-GOING
ONSET: ON-GOING

## 2019-07-10 ASSESSMENT — PAIN DESCRIPTION - ORIENTATION
ORIENTATION: RIGHT;LEFT;LOWER
ORIENTATION: RIGHT;LEFT;LOWER
ORIENTATION: RIGHT;LEFT

## 2019-07-10 NOTE — PROGRESS NOTES
°F (37.1 °C) Oral 68 18 96 % --   07/10/19 0507 (!) 128/93 98.5 °F (36.9 °C) Oral 67 16 93 % --   07/10/19 0115 -- -- -- -- -- -- 144 lb (65.3 kg)   07/09/19 2321 139/71 99.8 °F (37.7 °C) Oral 80 16 -- --   07/09/19 2030 136/64 99.3 °F (37.4 °C) Oral 70 16 94 % --   07/09/19 1605 (!) 140/67 98.8 °F (37.1 °C) Oral 77 16 -- --   @      Intake/Output Summary (Last 24 hours) at 7/10/2019 1334  Last data filed at 7/10/2019 7697  Gross per 24 hour   Intake 1455 ml   Output 5750 ml   Net -4295 ml         Wt Readings from Last 3 Encounters:   07/10/19 144 lb (65.3 kg)   06/14/19 145 lb 12.8 oz (66.1 kg)   05/23/19 148 lb (67.1 kg)       Constitutional:  in no acute distress  Oral: mucus membranes moist  Neck: no JVD  Cardiovascular: S1, S2 regular rhythm, no murmur,or rub  Respiratory:  No crackles, no wheeze  Gastrointestinal:  Soft, nontender, nondistended, NABS  Ext: no edema, feet warm  Skin: dry, no rash  Neuro: awake, alert, interactive      DATA:    Recent Labs     07/08/19 0226 07/08/19  1155 07/09/19  1257 07/10/19  0519   WBC 13.9*  --  18.3* 10.8   HGB 7.2* 7.8* 8.3* 7.3*   HCT 23.3* 25.5* 26.1* 23.5*   MCV 94.7  --  90.9 95.5     --  507* 423     Recent Labs     07/08/19 0226 07/08/19  1155 07/09/19  1257 07/10/19  0519     --  147* 144   K 5.3* 4.3 3.9 3.1*   *  --  107 100   CO2 12*  --  23 27   MG 2.5  --  2.0 1.7   PHOS 6.6*  --  5.5* 5.0*   BUN 63*  --  58* 49*   CREATININE 6.3*  --  6.6* 5.4*       Lab Results   Component Value Date    LABPROT 3.5 (H) 07/02/2019    LABPROT 3.5 07/02/2019       ASSESSMENT / RECOMMENDATIONS:    1. Acute kidney injury - creatinine had improved to 2.0 mg/dL as of 6/26 after an episode of acute kidney injury on her last hospitalization due to obstructive uropathy. Last dialysis was during her hospitalization, in early June. She tells me she has had her Braden in place since her admission to [address] [place] [address]. Offers no other history to suggest acute event.   No

## 2019-07-11 ENCOUNTER — HOSPITAL ENCOUNTER (OUTPATIENT)
Age: 54
Discharge: HOME | End: 2019-07-11
Payer: MEDICAID

## 2019-07-11 ENCOUNTER — APPOINTMENT (OUTPATIENT)
Dept: RADIATION ONCOLOGY | Age: 54
End: 2019-07-11
Attending: RADIOLOGY
Payer: MEDICAID

## 2019-07-11 ENCOUNTER — HOSPITAL ENCOUNTER (OUTPATIENT)
Dept: RADIATION ONCOLOGY | Age: 54
Discharge: HOME | End: 2019-07-11
Attending: RADIOLOGY
Payer: MEDICAID

## 2019-07-11 VITALS
BODY MASS INDEX: 23.8 KG/M2 | HEIGHT: 63 IN | SYSTOLIC BLOOD PRESSURE: 151 MMHG | TEMPERATURE: 98 F | OXYGEN SATURATION: 98 % | HEART RATE: 74 BPM | RESPIRATION RATE: 18 BRPM | WEIGHT: 134.3 LBS | DIASTOLIC BLOOD PRESSURE: 72 MMHG

## 2019-07-11 LAB
ANION GAP SERPL CALCULATED.3IONS-SCNC: 14 MMOL/L (ref 7–16)
BUN BLDV-MCNC: 32 MG/DL (ref 6–20)
CALCIUM SERPL-MCNC: 9.1 MG/DL (ref 8.6–10.2)
CHLORIDE BLD-SCNC: 100 MMOL/L (ref 98–107)
CO2: 26 MMOL/L (ref 22–29)
CREAT SERPL-MCNC: 3.7 MG/DL (ref 0.5–1)
GFR AFRICAN AMERICAN: 15
GFR NON-AFRICAN AMERICAN: 13 ML/MIN/1.73
GLUCOSE BLD-MCNC: 175 MG/DL (ref 74–99)
HCT VFR BLD CALC: 25.8 % (ref 34–48)
HEMOGLOBIN: 7.9 G/DL (ref 11.5–15.5)
MAGNESIUM: 2.1 MG/DL (ref 1.6–2.6)
MCH RBC QN AUTO: 28.6 PG (ref 26–35)
MCHC RBC AUTO-ENTMCNC: 30.6 % (ref 32–34.5)
MCV RBC AUTO: 93.5 FL (ref 80–99.9)
PDW BLD-RTO: 15.8 FL (ref 11.5–15)
PHOSPHORUS: 3.3 MG/DL (ref 2.5–4.5)
PLATELET # BLD: 442 E9/L (ref 130–450)
PMV BLD AUTO: 10.1 FL (ref 7–12)
POTASSIUM SERPL-SCNC: 3.2 MMOL/L (ref 3.5–5)
RBC # BLD: 2.76 E12/L (ref 3.5–5.5)
SODIUM BLD-SCNC: 140 MMOL/L (ref 132–146)
WBC # BLD: 14.1 E9/L (ref 4.5–11.5)

## 2019-07-11 PROCEDURE — 99221 1ST HOSP IP/OBS SF/LOW 40: CPT | Performed by: PSYCHIATRY & NEUROLOGY

## 2019-07-11 PROCEDURE — 83735 ASSAY OF MAGNESIUM: CPT

## 2019-07-11 PROCEDURE — 6370000000 HC RX 637 (ALT 250 FOR IP): Performed by: INTERNAL MEDICINE

## 2019-07-11 PROCEDURE — 2580000003 HC RX 258: Performed by: INTERNAL MEDICINE

## 2019-07-11 PROCEDURE — 84100 ASSAY OF PHOSPHORUS: CPT

## 2019-07-11 PROCEDURE — 36415 COLL VENOUS BLD VENIPUNCTURE: CPT

## 2019-07-11 PROCEDURE — APPSS45 APP SPLIT SHARED TIME 31-45 MINUTES: Performed by: NURSE PRACTITIONER

## 2019-07-11 PROCEDURE — 77412 RADIATION TX DELIVERY LVL 3: CPT | Performed by: RADIOLOGY

## 2019-07-11 PROCEDURE — 99222 1ST HOSP IP/OBS MODERATE 55: CPT | Performed by: SURGERY

## 2019-07-11 PROCEDURE — 85027 COMPLETE CBC AUTOMATED: CPT

## 2019-07-11 PROCEDURE — A0428 BLS: HCPCS

## 2019-07-11 PROCEDURE — A0425 GROUND MILEAGE: HCPCS

## 2019-07-11 PROCEDURE — 97530 THERAPEUTIC ACTIVITIES: CPT

## 2019-07-11 PROCEDURE — 6370000000 HC RX 637 (ALT 250 FOR IP): Performed by: NURSE PRACTITIONER

## 2019-07-11 PROCEDURE — 99239 HOSP IP/OBS DSCHRG MGMT >30: CPT | Performed by: INTERNAL MEDICINE

## 2019-07-11 PROCEDURE — 80048 BASIC METABOLIC PNL TOTAL CA: CPT

## 2019-07-11 PROCEDURE — 6360000002 HC RX W HCPCS: Performed by: INTERNAL MEDICINE

## 2019-07-11 RX ORDER — SEVELAMER CARBONATE 800 MG/1
800 TABLET, FILM COATED ORAL
Qty: 90 TABLET | Refills: 0 | Status: ON HOLD | DISCHARGE
Start: 2019-07-11 | End: 2019-10-24 | Stop reason: HOSPADM

## 2019-07-11 RX ORDER — POTASSIUM BICARBONATE 25 MEQ/1
50 TABLET, EFFERVESCENT ORAL ONCE
Status: DISCONTINUED | OUTPATIENT
Start: 2019-07-11 | End: 2019-07-11

## 2019-07-11 RX ORDER — HYDROCODONE BITARTRATE AND ACETAMINOPHEN 5; 325 MG/1; MG/1
1 TABLET ORAL EVERY 6 HOURS PRN
Qty: 12 TABLET | Refills: 0 | Status: SHIPPED | OUTPATIENT
Start: 2019-07-11 | End: 2019-07-14

## 2019-07-11 RX ORDER — SODIUM BICARBONATE 650 MG/1
1300 TABLET ORAL 3 TIMES DAILY
Qty: 180 TABLET | Refills: 0 | DISCHARGE
Start: 2019-07-11 | End: 2019-07-11

## 2019-07-11 RX ORDER — SODIUM CHLORIDE 0.9 % (FLUSH) 0.9 %
10 SYRINGE (ML) INJECTION 2 TIMES DAILY
Status: DISCONTINUED | OUTPATIENT
Start: 2019-07-11 | End: 2019-07-11 | Stop reason: HOSPADM

## 2019-07-11 RX ORDER — SODIUM CHLORIDE 0.9 % (FLUSH) 0.9 %
10 SYRINGE (ML) INJECTION PRN
Status: DISCONTINUED | OUTPATIENT
Start: 2019-07-11 | End: 2019-07-11 | Stop reason: HOSPADM

## 2019-07-11 RX ORDER — POTASSIUM CHLORIDE 20 MEQ/1
20 TABLET, EXTENDED RELEASE ORAL DAILY
Qty: 30 TABLET | Refills: 0 | Status: ON HOLD | DISCHARGE
Start: 2019-07-11 | End: 2019-09-14

## 2019-07-11 RX ORDER — SODIUM BICARBONATE 650 MG/1
1300 TABLET ORAL 2 TIMES DAILY
Qty: 180 TABLET | Refills: 0 | Status: ON HOLD | DISCHARGE
Start: 2019-07-11 | End: 2019-09-17

## 2019-07-11 RX ADMIN — POTASSIUM BICARBONATE 20 MEQ: 782 TABLET, EFFERVESCENT ORAL at 12:17

## 2019-07-11 RX ADMIN — SODIUM BICARBONATE 1300 MG: 650 TABLET ORAL at 10:01

## 2019-07-11 RX ADMIN — HYDROCODONE BITARTRATE AND ACETAMINOPHEN 1 TABLET: 5; 325 TABLET ORAL at 09:57

## 2019-07-11 RX ADMIN — CEFTRIAXONE 1 G: 1 INJECTION, POWDER, FOR SOLUTION INTRAMUSCULAR; INTRAVENOUS at 12:50

## 2019-07-11 RX ADMIN — POTASSIUM CHLORIDE 10 MEQ: 10 INJECTION, SOLUTION INTRAVENOUS at 01:08

## 2019-07-11 RX ADMIN — SODIUM CHLORIDE 125 MG: 900 INJECTION INTRAVENOUS at 02:19

## 2019-07-11 RX ADMIN — ATORVASTATIN CALCIUM 40 MG: 40 TABLET, FILM COATED ORAL at 10:00

## 2019-07-11 RX ADMIN — Medication 10 ML: at 09:58

## 2019-07-11 RX ADMIN — POTASSIUM CHLORIDE 10 MEQ: 10 INJECTION, SOLUTION INTRAVENOUS at 00:11

## 2019-07-11 RX ADMIN — FERROUS SULFATE TAB 325 MG (65 MG ELEMENTAL FE) 325 MG: 325 (65 FE) TAB at 10:01

## 2019-07-11 RX ADMIN — DOCUSATE SODIUM 100 MG: 100 CAPSULE, LIQUID FILLED ORAL at 10:00

## 2019-07-11 RX ADMIN — Medication 10 ML: at 09:42

## 2019-07-11 ASSESSMENT — PAIN - FUNCTIONAL ASSESSMENT: PAIN_FUNCTIONAL_ASSESSMENT: PREVENTS OR INTERFERES SOME ACTIVE ACTIVITIES AND ADLS

## 2019-07-11 ASSESSMENT — PAIN DESCRIPTION - DESCRIPTORS: DESCRIPTORS: DISCOMFORT

## 2019-07-11 ASSESSMENT — PAIN DESCRIPTION - FREQUENCY: FREQUENCY: INTERMITTENT

## 2019-07-11 ASSESSMENT — PAIN DESCRIPTION - PAIN TYPE: TYPE: ACUTE PAIN

## 2019-07-11 ASSESSMENT — PAIN DESCRIPTION - ONSET: ONSET: GRADUAL

## 2019-07-11 ASSESSMENT — PAIN DESCRIPTION - PROGRESSION: CLINICAL_PROGRESSION: GRADUALLY IMPROVING

## 2019-07-11 ASSESSMENT — PAIN SCALES - GENERAL: PAINLEVEL_OUTOF10: 6

## 2019-07-11 ASSESSMENT — PAIN DESCRIPTION - LOCATION: LOCATION: BACK

## 2019-07-11 NOTE — PROGRESS NOTES
High-grade carcinoma found on biopsies of the cervix, felt to be suspicious for cervical involvement by urothelial carcinoma either by contiguous spread or metastasis     4. UTI versus colonization    5. Hematuria     Creatinine improved. Excellent UO through PNT's  Metabolic acidosis --improved  Refused dialysis line placement and dialysis 7/3 (after agreeing to it)  Refused bilat PNT placement 7/5 (after agreeing to it)  Refused PNT again 7/8  Underwent placement of bilateral percutaneous nephrostomy tube 7/9  radiation therapy today    Azotemia continues to improve. Good urine output via PNT's  Phosphorus dropping  Hypokalemic    Supplement potassium  Stop phosphate binder   Continue Braden  Encourage oral intake   Follow labs, UO  decr NaHCO3 tabs to 1300 mg 2 times daily   okay for discharge from renal standpoint        Note: She has been her own POA in the past;  Speech imprediment makes it seem that she is not capable of making decisions, but she does show insight and understanding of the clinical scenario.     Electronically signed by Osmin Segovia MD on 7/11/2019

## 2019-07-11 NOTE — BH NOTE
bicarb-citric acid (EFFER-K) effervescent tablet 20 mEq, 20 mEq, Oral, Once  ondansetron (ZOFRAN) injection 4 mg, 4 mg, Intravenous, Q8H PRN  HYDROcodone-acetaminophen (NORCO) 5-325 MG per tablet 1 tablet, 1 tablet, Oral, Q6H PRN  sevelamer (RENVELA) tablet 800 mg, 800 mg, Oral, TID WC  polyethylene glycol (GLYCOLAX) packet 17 g, 17 g, Oral, Once  LORazepam (ATIVAN) tablet 0.5 mg, 0.5 mg, Oral, Once  sodium bicarbonate tablet 1,300 mg, 1,300 mg, Oral, TID  darbepoetin lucio-polysorbate (ARANESP) injection 40 mcg, 40 mcg, Subcutaneous, Weekly - Monday  [COMPLETED] ferric gluconate (FERRLECIT) 25 mg in sodium chloride 0.9 % 50 mL (test dose) IVPB, 25 mg, Intravenous, Once **FOLLOWED BY** [COMPLETED] ferric gluconate (FERRLECIT) 100 mg in sodium chloride 0.9 % 100 mL IVPB, 100 mg, Intravenous, Once **FOLLOWED BY** ferric gluconate (FERRLECIT) 125 mg in sodium chloride 0.9 % 100 mL IVPB, 125 mg, Intravenous, Daily  cefTRIAXone (ROCEPHIN) 1 g in dextrose 5 % 50 mL IVPB (vial-mate), 1 g, Intravenous, Q24H  acetaminophen (TYLENOL) tablet 650 mg, 650 mg, Oral, Q6H PRN  magnesium hydroxide (MILK OF MAGNESIA) 400 MG/5ML suspension 30 mL, 30 mL, Oral, Daily PRN  atorvastatin (LIPITOR) tablet 40 mg, 40 mg, Oral, Daily  docusate sodium (COLACE) capsule 100 mg, 100 mg, Oral, BID  ferrous sulfate tablet 325 mg, 325 mg, Oral, BID WC  fluticasone (FLONASE) 50 MCG/ACT nasal spray 1 spray, 1 spray, Nasal, Daily  levothyroxine (SYNTHROID) tablet 100 mcg, 100 mcg, Oral, Daily  mirtazapine (REMERON) tablet 15 mg, 15 mg, Oral, Nightly  pantoprazole (PROTONIX) tablet 40 mg, 40 mg, Oral, Daily  vitamin D (ERGOCALCIFEROL) capsule 50,000 Units, 50,000 Units, Oral, Weekly    ASSESSMENT:    Depressive Disorder, stable                                      Intellectual Disability, mild to moderate     Personality Disorder, unspecified    PLAN: Stop constant observation. Patient is not felt to be an imminent risk of danger to herself.  OK to

## 2019-07-11 NOTE — DISCHARGE SUMMARY
[facility] EMERGENCY SERVICE Physician Discharge Summary       [place], [place]  [address] [facility] [phone]    Schedule an appointment as soon as possible for a visit in 1 week  Call to schedule a post hospital follow up appointment    [address] [place] [phone]    Schedule an appointment as soon as possible for a visit in 2 weeks  Call to for appointment     Lula Morris MD  [address] [place] [address] [phone]    Schedule an appointment as soon as possible for a visit in 1 week  Call for a post hospital follow up appointment     Kesha Mims MD  . [facility] [phone]    Schedule an appointment as soon as possible for a visit in 1 week  Call for an appointment and continue radiation treatment as ordered     Cecil Hernandez MD  [address] [place] [address]  [phone]    Schedule an appointment as soon as possible for a visit in 3 months  Call for a follow up appointment       Activity level: As Tolerated     Diet: DIET RENAL;    Labs:BMP in am 7/12/19    Dispo:[place] at the [facility] at discharge: Stable          Patient ID:  Juliette Nurse  [id number]  52 y.o.  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  *      Discharge Diagnoses: Principal Problem:    Acute bicarbonate 650 MG tablet  Take 2 tablets by mouth 3 times daily  What changed:    · how much to take  · when to take this        CONTINUE taking these medications    acetaminophen 325 MG tablet  Commonly known as:  TYLENOL     atorvastatin 40 MG tablet  Commonly known as:  LIPITOR  Take 1 tablet by mouth daily     Compression Stockings Misc  by Does not apply route Below knee  15 - 30 mm Hg     docusate 100 MG Caps  Commonly known as:  COLACE, DULCOLAX  Take 100 mg by mouth 2 times daily     ferrous sulfate 325 (65 Fe) MG tablet  Take 1 tablet by mouth 2 times daily (with meals)     levothyroxine 100 MCG tablet  Commonly known as:  SYNTHROID  TAKE 1 TABLET BY MOUTH EVERY MORNING 30 MINUTES BEFORE EATING.     magnesium oxide 400 (241.3 Mg) MG Tabs tablet  Commonly known as:  MAG-OX  Take 1 tablet by mouth 2 times daily     mirtazapine 15 MG tablet  Commonly known as:  REMERON  Take 1 tablet by mouth nightly     ondansetron 4 MG disintegrating tablet  Commonly known as:  ZOFRAN-ODT  Take 1 tablet by mouth every 8 hours as needed for Nausea or Vomiting     oxybutynin 5 MG extended release tablet  Commonly known as:  DITROPAN-XL  Take 1 tablet by mouth daily     pantoprazole 40 MG tablet  Commonly known as:  PROTONIX  TAKE ONE TABLET BY MOUTH DAILY     vitamin D 22856 units Caps capsule  Commonly known as:  ERGOCALCIFEROL  Take 1 capsule by mouth once a week        STOP taking these medications    dextrose 5 % SOLN 250 mL with vancomycin 1 g SOLR 1,000 mg           Where to Get Your Medications      You can get these medications from any pharmacy    Bring a paper prescription for each of these medications  · HYDROcodone-acetaminophen 5-325 MG per tablet     Information about where to get these medications is not yet available    Ask your nurse or doctor about these medications  · potassium chloride 20 MEQ extended release tablet  · sevelamer 800 MG tablet  · sodium bicarbonate 650 MG tablet           Note that more

## 2019-07-11 NOTE — PATIENT CARE CONFERENCE
[facility] Quality Flow/Interdisciplinary Rounds Progress Note        Quality Flow Rounds held on July 11, 2019    Disciplines Attending:  Bedside Nurse, ,  and Nursing Unit Leadership    Yudith Montgomery was admitted on 7/1/2019  7:53 PM    Anticipated Discharge Date:  Expected Discharge Date: 07/11/19    Disposition:    Ramon Score:  Ramon Scale Score: 20    Readmission Risk              Risk of Unplanned Readmission:        44           Discussed patient goal for the day, patient clinical progression, and barriers to discharge.   The following Goal(s) of the Day/Commitment(s) have been identified:  Labs - Report Results      Antwon Price  July 11, 2019

## 2019-07-12 ENCOUNTER — HOSPITAL ENCOUNTER (OUTPATIENT)
Dept: RADIATION ONCOLOGY | Age: 54
Discharge: HOME | End: 2019-07-12
Attending: RADIOLOGY
Payer: MEDICAID

## 2019-07-12 PROCEDURE — 77412 RADIATION TX DELIVERY LVL 3: CPT | Performed by: RADIOLOGY

## 2019-07-15 ENCOUNTER — HOSPITAL ENCOUNTER (OUTPATIENT)
Dept: RADIATION ONCOLOGY | Age: 54
Discharge: HOME | End: 2019-07-15
Attending: RADIOLOGY
Payer: MEDICAID

## 2019-07-15 ENCOUNTER — APPOINTMENT (OUTPATIENT)
Dept: ULTRASOUND IMAGING | Age: 54
End: 2019-07-15
Payer: MEDICAID

## 2019-07-15 ENCOUNTER — HOSPITAL ENCOUNTER (EMERGENCY)
Age: 54
Discharge: HOME | End: 2019-07-16
Attending: EMERGENCY MEDICINE
Payer: MEDICAID

## 2019-07-15 DIAGNOSIS — R31.9 HEMATURIA, UNSPECIFIED TYPE: ICD-10-CM

## 2019-07-15 DIAGNOSIS — T83.512A URINARY TRACT INFECTION ASSOCIATED WITH NEPHROSTOMY CATHETER, INITIAL ENCOUNTER (HCC): ICD-10-CM

## 2019-07-15 DIAGNOSIS — I80.8 THROMBOPHLEBITIS ARM: ICD-10-CM

## 2019-07-15 DIAGNOSIS — N39.0 URINARY TRACT INFECTION ASSOCIATED WITH NEPHROSTOMY CATHETER, INITIAL ENCOUNTER (HCC): ICD-10-CM

## 2019-07-15 DIAGNOSIS — N30.40 RADIATION CYSTITIS: ICD-10-CM

## 2019-07-15 DIAGNOSIS — R11.0 NAUSEA: Primary | ICD-10-CM

## 2019-07-15 LAB
ANION GAP SERPL CALCULATED.3IONS-SCNC: 14 MMOL/L (ref 7–16)
BACTERIA: ABNORMAL /HPF
BILIRUBIN URINE: NEGATIVE
BLOOD, URINE: ABNORMAL
BUN BLDV-MCNC: 35 MG/DL (ref 6–20)
CALCIUM SERPL-MCNC: 9.6 MG/DL (ref 8.6–10.2)
CASTS 2: ABNORMAL /LPF
CASTS: ABNORMAL /LPF
CHLORIDE BLD-SCNC: 101 MMOL/L (ref 98–107)
CLARITY: CLEAR
CO2: 22 MMOL/L (ref 22–29)
COLOR: YELLOW
CREAT SERPL-MCNC: 2.1 MG/DL (ref 0.5–1)
EPITHELIAL CELLS, UA: ABNORMAL /HPF
GFR AFRICAN AMERICAN: 30
GFR NON-AFRICAN AMERICAN: 24 ML/MIN/1.73
GLUCOSE BLD-MCNC: 156 MG/DL (ref 74–99)
GLUCOSE URINE: NEGATIVE MG/DL
HCT VFR BLD CALC: 29.9 % (ref 34–48)
HEMOGLOBIN: 9.2 G/DL (ref 11.5–15.5)
KETONES, URINE: NEGATIVE MG/DL
LEUKOCYTE ESTERASE, URINE: ABNORMAL
MCH RBC QN AUTO: 28.8 PG (ref 26–35)
MCHC RBC AUTO-ENTMCNC: 30.8 % (ref 32–34.5)
MCV RBC AUTO: 93.7 FL (ref 80–99.9)
NITRITE, URINE: POSITIVE
PDW BLD-RTO: 15.9 FL (ref 11.5–15)
PH UA: 6.5 (ref 5–9)
PLATELET # BLD: 466 E9/L (ref 130–450)
PMV BLD AUTO: 10.2 FL (ref 7–12)
POTASSIUM SERPL-SCNC: 5.1 MMOL/L (ref 3.5–5)
PROTEIN UA: >=300 MG/DL
RBC # BLD: 3.19 E12/L (ref 3.5–5.5)
RBC UA: >20 /HPF (ref 0–2)
REASON FOR REJECTION: NORMAL
REJECTED TEST: NORMAL
SODIUM BLD-SCNC: 137 MMOL/L (ref 132–146)
SPECIFIC GRAVITY UA: 1.02 (ref 1–1.03)
UROBILINOGEN, URINE: 0.2 E.U./DL
WBC # BLD: 13.7 E9/L (ref 4.5–11.5)
WBC UA: >20 /HPF (ref 0–5)

## 2019-07-15 PROCEDURE — 85027 COMPLETE CBC AUTOMATED: CPT

## 2019-07-15 PROCEDURE — 96374 THER/PROPH/DIAG INJ IV PUSH: CPT

## 2019-07-15 PROCEDURE — 36415 COLL VENOUS BLD VENIPUNCTURE: CPT

## 2019-07-15 PROCEDURE — 93971 EXTREMITY STUDY: CPT

## 2019-07-15 PROCEDURE — 81001 URINALYSIS AUTO W/SCOPE: CPT

## 2019-07-15 PROCEDURE — 99284 EMERGENCY DEPT VISIT MOD MDM: CPT

## 2019-07-15 PROCEDURE — 6360000002 HC RX W HCPCS: Performed by: EMERGENCY MEDICINE

## 2019-07-15 PROCEDURE — 77412 RADIATION TX DELIVERY LVL 3: CPT | Performed by: RADIOLOGY

## 2019-07-15 PROCEDURE — 96372 THER/PROPH/DIAG INJ SC/IM: CPT

## 2019-07-15 PROCEDURE — 2580000003 HC RX 258: Performed by: EMERGENCY MEDICINE

## 2019-07-15 PROCEDURE — 80048 BASIC METABOLIC PNL TOTAL CA: CPT

## 2019-07-15 PROCEDURE — 96375 TX/PRO/DX INJ NEW DRUG ADDON: CPT

## 2019-07-15 PROCEDURE — 6370000000 HC RX 637 (ALT 250 FOR IP): Performed by: EMERGENCY MEDICINE

## 2019-07-15 RX ORDER — 0.9 % SODIUM CHLORIDE 0.9 %
500 INTRAVENOUS SOLUTION INTRAVENOUS ONCE
Status: COMPLETED | OUTPATIENT
Start: 2019-07-15 | End: 2019-07-15

## 2019-07-15 RX ORDER — ONDANSETRON 2 MG/ML
4 INJECTION INTRAMUSCULAR; INTRAVENOUS ONCE
Status: COMPLETED | OUTPATIENT
Start: 2019-07-15 | End: 2019-07-15

## 2019-07-15 RX ORDER — PROMETHAZINE HYDROCHLORIDE 25 MG/ML
25 INJECTION, SOLUTION INTRAMUSCULAR; INTRAVENOUS ONCE
Status: COMPLETED | OUTPATIENT
Start: 2019-07-15 | End: 2019-07-15

## 2019-07-15 RX ORDER — CEFDINIR 300 MG/1
300 CAPSULE ORAL ONCE
Status: COMPLETED | OUTPATIENT
Start: 2019-07-15 | End: 2019-07-15

## 2019-07-15 RX ORDER — CEFDINIR 300 MG/1
300 CAPSULE ORAL DAILY
Qty: 10 CAPSULE | Refills: 0 | Status: SHIPPED | OUTPATIENT
Start: 2019-07-15 | End: 2019-07-25

## 2019-07-15 RX ORDER — MORPHINE SULFATE 4 MG/ML
4 INJECTION, SOLUTION INTRAMUSCULAR; INTRAVENOUS ONCE
Status: COMPLETED | OUTPATIENT
Start: 2019-07-15 | End: 2019-07-15

## 2019-07-15 RX ADMIN — PROMETHAZINE HYDROCHLORIDE 25 MG: 25 INJECTION INTRAMUSCULAR; INTRAVENOUS at 21:21

## 2019-07-15 RX ADMIN — ONDANSETRON 4 MG: 2 INJECTION INTRAMUSCULAR; INTRAVENOUS at 19:54

## 2019-07-15 RX ADMIN — MORPHINE SULFATE 4 MG: 4 INJECTION, SOLUTION INTRAMUSCULAR; INTRAVENOUS at 19:54

## 2019-07-15 RX ADMIN — SODIUM CHLORIDE 500 ML: 9 INJECTION, SOLUTION INTRAVENOUS at 19:54

## 2019-07-15 RX ADMIN — CEFDINIR 300 MG: 300 CAPSULE ORAL at 22:55

## 2019-07-15 ASSESSMENT — PAIN DESCRIPTION - PAIN TYPE
TYPE: ACUTE PAIN
TYPE: ACUTE PAIN

## 2019-07-15 ASSESSMENT — PAIN DESCRIPTION - LOCATION: LOCATION: ABDOMEN

## 2019-07-15 ASSESSMENT — ENCOUNTER SYMPTOMS
NAUSEA: 1
VOMITING: 0
DIARRHEA: 0
CONSTIPATION: 0
ABDOMINAL PAIN: 1
BACK PAIN: 1
SHORTNESS OF BREATH: 0
BLOOD IN STOOL: 0

## 2019-07-15 ASSESSMENT — PAIN DESCRIPTION - ORIENTATION: ORIENTATION: MID

## 2019-07-15 ASSESSMENT — PAIN SCALES - GENERAL
PAINLEVEL_OUTOF10: 10
PAINLEVEL_OUTOF10: 3

## 2019-07-15 ASSESSMENT — PAIN DESCRIPTION - DESCRIPTORS: DESCRIPTORS: ACHING

## 2019-07-15 NOTE — ED PROVIDER NOTES
Patient comes to the ED with multiple complaints. She states she is having aching in her back for the past several days after having bilateral nephrostomy tubes placed. She was recently admitted for an obstructive uropathy causing acute on chronic renal failure. She had her nephrostomy tubes placed which have been working appropriately. 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 She has had a few radiation treatments of her bladder and states that is also been causing some nausea. No emesis reported. She states today she noted that there was blood in her Braden bag. The urostomy tubes do not appear to have any blood in them. She thinks this may be due to the radiation. Patient also complains of swelling of her left forearm with pain. She states this was after an IV that had been placed during the recent procedure. Denies chest pain or shortness of breath. Denies fever or chills. Has some mild lower abdominal discomfort. Denies any change in her stooling habits. No diarrhea or constipation. No blood in the stool. Review of Systems   Constitutional: Negative for chills, fatigue and fever. Respiratory: Negative for shortness of breath. Cardiovascular: Negative for chest pain. Gastrointestinal: Positive for abdominal pain ( Discomfort in her lower abdomen after radiation therapy) and nausea. Negative for blood in stool, constipation, diarrhea and vomiting. Genitourinary: Positive for hematuria. Musculoskeletal: Positive for back pain ( Near nephrostomy tubes) and myalgias. Neurological: Negative for dizziness, syncope and light-headedness. Hematological: Does not bruise/bleed easily. Physical Exam   Constitutional: She is oriented to person, place, and time. She appears well-developed and well-nourished. No distress. Patient appears very anxious   HENT:   Head: Normocephalic and atraumatic.    Eyes: Conjunctivae are

## 2019-07-16 ENCOUNTER — HOSPITAL ENCOUNTER (OUTPATIENT)
Dept: RADIATION ONCOLOGY | Age: 54
Discharge: HOME | End: 2019-07-16
Attending: RADIOLOGY
Payer: MEDICAID

## 2019-07-16 VITALS
HEIGHT: 59 IN | HEART RATE: 70 BPM | OXYGEN SATURATION: 98 % | DIASTOLIC BLOOD PRESSURE: 68 MMHG | RESPIRATION RATE: 20 BRPM | SYSTOLIC BLOOD PRESSURE: 104 MMHG | TEMPERATURE: 98.6 F | BODY MASS INDEX: 25.2 KG/M2 | WEIGHT: 125 LBS

## 2019-07-16 PROCEDURE — 77336 RADIATION PHYSICS CONSULT: CPT | Performed by: RADIOLOGY

## 2019-07-16 PROCEDURE — 77412 RADIATION TX DELIVERY LVL 3: CPT | Performed by: RADIOLOGY

## 2019-07-17 ENCOUNTER — HOSPITAL ENCOUNTER (OUTPATIENT)
Dept: RADIATION ONCOLOGY | Age: 54
Discharge: HOME | End: 2019-07-17
Attending: RADIOLOGY
Payer: MEDICAID

## 2019-07-17 ENCOUNTER — CARE COORDINATION (OUTPATIENT)
Dept: CASE MANAGEMENT | Age: 54
End: 2019-07-17

## 2019-07-17 PROCEDURE — 77412 RADIATION TX DELIVERY LVL 3: CPT | Performed by: RADIOLOGY

## 2019-07-17 PROCEDURE — 77417 THER RADIOLOGY PORT IMAGE(S): CPT | Performed by: RADIOLOGY

## 2019-07-17 NOTE — CARE COORDINATION
CTN received phone call from Blake Gutierrez at [facility] advising she is unable to get hold of nurse at [address] at the [facility] regarding follow up appointment for chemo/visit with Dr. Kassie Laird (hematology/oncology) on 8/7/29 at 9:50 am. Jasmin Pawnee that CTN is no longer following for Care Transition but she can contact [facility] ([facility]) at facility to update on upcoming appointment. CTN will update follow up visit in [facility].

## 2019-07-18 ENCOUNTER — HOSPITAL ENCOUNTER (OUTPATIENT)
Dept: RADIATION ONCOLOGY | Age: 54
Discharge: HOME | End: 2019-07-18
Payer: MEDICAID

## 2019-07-18 ENCOUNTER — HOSPITAL ENCOUNTER (OUTPATIENT)
Dept: RADIATION ONCOLOGY | Age: 54
Discharge: HOME | End: 2019-07-18
Attending: RADIOLOGY
Payer: MEDICAID

## 2019-07-18 VITALS — WEIGHT: 125 LBS | BODY MASS INDEX: 25.25 KG/M2

## 2019-07-18 DIAGNOSIS — C68.0 URETHRAL CANCER (HCC): Primary | ICD-10-CM

## 2019-07-18 PROCEDURE — 99999 PR OFFICE/OUTPT VISIT,PROCEDURE ONLY: CPT | Performed by: RADIOLOGY

## 2019-07-18 PROCEDURE — 77412 RADIATION TX DELIVERY LVL 3: CPT | Performed by: RADIOLOGY

## 2019-07-19 ENCOUNTER — HOSPITAL ENCOUNTER (OUTPATIENT)
Dept: RADIATION ONCOLOGY | Age: 54
Discharge: HOME | End: 2019-07-19
Attending: RADIOLOGY
Payer: MEDICAID

## 2019-07-19 PROCEDURE — 77412 RADIATION TX DELIVERY LVL 3: CPT | Performed by: RADIOLOGY

## 2019-07-22 ENCOUNTER — APPOINTMENT (OUTPATIENT)
Dept: RADIATION ONCOLOGY | Age: 54
End: 2019-07-22
Attending: RADIOLOGY
Payer: MEDICAID

## 2019-07-23 ENCOUNTER — HOSPITAL ENCOUNTER (OUTPATIENT)
Dept: RADIATION ONCOLOGY | Age: 54
Discharge: HOME | End: 2019-07-23
Attending: RADIOLOGY
Payer: MEDICAID

## 2019-07-23 PROCEDURE — 77412 RADIATION TX DELIVERY LVL 3: CPT | Performed by: RADIOLOGY

## 2019-07-24 ENCOUNTER — HOSPITAL ENCOUNTER (OUTPATIENT)
Dept: RADIATION ONCOLOGY | Age: 54
Discharge: HOME | End: 2019-07-24
Attending: RADIOLOGY
Payer: MEDICAID

## 2019-07-24 PROCEDURE — 77336 RADIATION PHYSICS CONSULT: CPT | Performed by: RADIOLOGY

## 2019-07-24 PROCEDURE — 77412 RADIATION TX DELIVERY LVL 3: CPT | Performed by: RADIOLOGY

## 2019-07-25 ENCOUNTER — HOSPITAL ENCOUNTER (OUTPATIENT)
Dept: RADIATION ONCOLOGY | Age: 54
Discharge: HOME | End: 2019-07-25
Attending: RADIOLOGY
Payer: MEDICAID

## 2019-07-25 DIAGNOSIS — C68.0 URETHRAL CANCER (HCC): Primary | ICD-10-CM

## 2019-07-25 PROCEDURE — 77412 RADIATION TX DELIVERY LVL 3: CPT | Performed by: RADIOLOGY

## 2019-07-25 PROCEDURE — 99999 PR OFFICE/OUTPT VISIT,PROCEDURE ONLY: CPT | Performed by: RADIOLOGY

## 2019-07-25 PROCEDURE — 77417 THER RADIOLOGY PORT IMAGE(S): CPT | Performed by: RADIOLOGY

## 2019-07-26 ENCOUNTER — HOSPITAL ENCOUNTER (OUTPATIENT)
Dept: RADIATION ONCOLOGY | Age: 54
Discharge: HOME | End: 2019-07-26
Attending: RADIOLOGY
Payer: MEDICAID

## 2019-07-26 PROCEDURE — 77412 RADIATION TX DELIVERY LVL 3: CPT | Performed by: RADIOLOGY

## 2019-07-29 ENCOUNTER — HOSPITAL ENCOUNTER (OUTPATIENT)
Dept: RADIATION ONCOLOGY | Age: 54
Discharge: HOME | End: 2019-07-29
Attending: RADIOLOGY
Payer: MEDICAID

## 2019-07-29 PROCEDURE — 77412 RADIATION TX DELIVERY LVL 3: CPT | Performed by: RADIOLOGY

## 2019-07-30 ENCOUNTER — HOSPITAL ENCOUNTER (OUTPATIENT)
Dept: RADIATION ONCOLOGY | Age: 54
End: 2019-07-30
Attending: RADIOLOGY
Payer: MEDICAID

## 2019-07-30 ENCOUNTER — HOSPITAL ENCOUNTER (OUTPATIENT)
Age: 54
Discharge: HOME | End: 2019-07-30
Payer: MEDICAID

## 2019-07-30 LAB
ABO/RH: NORMAL
ANTIBODY SCREEN: NORMAL

## 2019-07-30 PROCEDURE — 86850 RBC ANTIBODY SCREEN: CPT

## 2019-07-30 PROCEDURE — 86901 BLOOD TYPING SEROLOGIC RH(D): CPT

## 2019-07-30 PROCEDURE — 86900 BLOOD TYPING SEROLOGIC ABO: CPT

## 2019-07-30 PROCEDURE — 86923 COMPATIBILITY TEST ELECTRIC: CPT

## 2019-07-30 PROCEDURE — 36415 COLL VENOUS BLD VENIPUNCTURE: CPT

## 2019-07-31 ENCOUNTER — TELEPHONE (OUTPATIENT)
Dept: CASE MANAGEMENT | Age: 54
End: 2019-07-31

## 2019-07-31 ENCOUNTER — HOSPITAL ENCOUNTER (OUTPATIENT)
Dept: RADIATION ONCOLOGY | Age: 54
Discharge: HOME | End: 2019-07-31
Attending: RADIOLOGY
Payer: MEDICAID

## 2019-07-31 ENCOUNTER — HOSPITAL ENCOUNTER (OUTPATIENT)
Dept: INFUSION THERAPY | Age: 54
Setting detail: INFUSION SERIES
Discharge: HOME | End: 2019-07-31
Payer: MEDICAID

## 2019-07-31 VITALS
HEART RATE: 67 BPM | SYSTOLIC BLOOD PRESSURE: 116 MMHG | DIASTOLIC BLOOD PRESSURE: 49 MMHG | RESPIRATION RATE: 18 BRPM | TEMPERATURE: 98.3 F

## 2019-07-31 PROCEDURE — 36430 TRANSFUSION BLD/BLD COMPNT: CPT

## 2019-07-31 PROCEDURE — P9016 RBC LEUKOCYTES REDUCED: HCPCS

## 2019-07-31 PROCEDURE — 2580000003 HC RX 258

## 2019-07-31 PROCEDURE — 77412 RADIATION TX DELIVERY LVL 3: CPT | Performed by: RADIOLOGY

## 2019-07-31 RX ORDER — SODIUM CHLORIDE 0.9 % (FLUSH) 0.9 %
SYRINGE (ML) INJECTION
Status: COMPLETED
Start: 2019-07-31 | End: 2019-07-31

## 2019-07-31 RX ORDER — SODIUM CHLORIDE 0.9 % (FLUSH) 0.9 %
10 SYRINGE (ML) INJECTION PRN
Status: DISCONTINUED | OUTPATIENT
Start: 2019-07-31 | End: 2019-08-01 | Stop reason: HOSPADM

## 2019-07-31 RX ORDER — 0.9 % SODIUM CHLORIDE 0.9 %
250 INTRAVENOUS SOLUTION INTRAVENOUS ONCE
Status: DISCONTINUED | OUTPATIENT
Start: 2019-07-31 | End: 2019-08-01 | Stop reason: HOSPADM

## 2019-07-31 RX ADMIN — Medication 10 ML: at 14:30

## 2019-07-31 ASSESSMENT — PAIN SCALES - GENERAL: PAINLEVEL_OUTOF10: 0

## 2019-07-31 NOTE — PROGRESS NOTES
Spoke to nurse in charge of Clifford Beltrán and told her pt was a very hard IV stick. We are keeping in her IV site because of her being a hard stick. Arm will be wrapped with kerlix gauze to protect IV site.

## 2019-08-01 ENCOUNTER — HOSPITAL ENCOUNTER (OUTPATIENT)
Dept: RADIATION ONCOLOGY | Age: 54
Discharge: HOME | End: 2019-08-01
Attending: RADIOLOGY
Payer: MEDICAID

## 2019-08-01 ENCOUNTER — HOSPITAL ENCOUNTER (OUTPATIENT)
Dept: INFUSION THERAPY | Age: 54
Setting detail: INFUSION SERIES
Discharge: HOME | End: 2019-08-01
Payer: MEDICAID

## 2019-08-01 VITALS
TEMPERATURE: 98.6 F | HEART RATE: 76 BPM | DIASTOLIC BLOOD PRESSURE: 70 MMHG | RESPIRATION RATE: 18 BRPM | OXYGEN SATURATION: 97 % | SYSTOLIC BLOOD PRESSURE: 118 MMHG

## 2019-08-01 VITALS
BODY MASS INDEX: 25.25 KG/M2 | HEART RATE: 70 BPM | SYSTOLIC BLOOD PRESSURE: 110 MMHG | RESPIRATION RATE: 16 BRPM | WEIGHT: 125 LBS | DIASTOLIC BLOOD PRESSURE: 78 MMHG

## 2019-08-01 DIAGNOSIS — C67.9: Primary | ICD-10-CM

## 2019-08-01 DIAGNOSIS — C79.82: Primary | ICD-10-CM

## 2019-08-01 LAB
BLOOD BANK DISPENSE STATUS: NORMAL
BLOOD BANK DISPENSE STATUS: NORMAL
BLOOD BANK PRODUCT CODE: NORMAL
BLOOD BANK PRODUCT CODE: NORMAL
BPU ID: NORMAL
BPU ID: NORMAL
DESCRIPTION BLOOD BANK: NORMAL
DESCRIPTION BLOOD BANK: NORMAL

## 2019-08-01 PROCEDURE — 77336 RADIATION PHYSICS CONSULT: CPT | Performed by: RADIOLOGY

## 2019-08-01 PROCEDURE — 2580000003 HC RX 258: Performed by: INTERNAL MEDICINE

## 2019-08-01 PROCEDURE — 77412 RADIATION TX DELIVERY LVL 3: CPT | Performed by: RADIOLOGY

## 2019-08-01 PROCEDURE — P9016 RBC LEUKOCYTES REDUCED: HCPCS

## 2019-08-01 PROCEDURE — 2580000003 HC RX 258

## 2019-08-01 PROCEDURE — 36430 TRANSFUSION BLD/BLD COMPNT: CPT

## 2019-08-01 RX ORDER — 0.9 % SODIUM CHLORIDE 0.9 %
250 INTRAVENOUS SOLUTION INTRAVENOUS ONCE
Status: DISCONTINUED | OUTPATIENT
Start: 2019-08-01 | End: 2019-08-02 | Stop reason: HOSPADM

## 2019-08-01 RX ORDER — SODIUM CHLORIDE 0.9 % (FLUSH) 0.9 %
10 SYRINGE (ML) INJECTION PRN
Status: DISCONTINUED | OUTPATIENT
Start: 2019-08-01 | End: 2019-08-02 | Stop reason: HOSPADM

## 2019-08-01 RX ORDER — SODIUM CHLORIDE 0.9 % (FLUSH) 0.9 %
SYRINGE (ML) INJECTION
Status: COMPLETED
Start: 2019-08-01 | End: 2019-08-01

## 2019-08-01 RX ADMIN — Medication 10 ML: at 09:36

## 2019-08-01 RX ADMIN — Medication 10 ML: at 08:49

## 2019-08-01 RX ADMIN — Medication 10 ML: at 09:35

## 2019-08-02 ENCOUNTER — TELEPHONE (OUTPATIENT)
Dept: INFUSION THERAPY | Age: 54
End: 2019-08-02

## 2019-08-03 ENCOUNTER — HOSPITAL ENCOUNTER (EMERGENCY)
Age: 54
Discharge: OTHER INSTITUTION | End: 2019-08-04
Payer: MEDICAID

## 2019-08-03 ENCOUNTER — APPOINTMENT (OUTPATIENT)
Dept: CT IMAGING | Age: 54
End: 2019-08-03
Payer: MEDICAID

## 2019-08-03 VITALS
HEIGHT: 59 IN | HEART RATE: 80 BPM | OXYGEN SATURATION: 98 % | BODY MASS INDEX: 25.2 KG/M2 | DIASTOLIC BLOOD PRESSURE: 67 MMHG | TEMPERATURE: 98.3 F | RESPIRATION RATE: 18 BRPM | SYSTOLIC BLOOD PRESSURE: 110 MMHG | WEIGHT: 125 LBS

## 2019-08-03 DIAGNOSIS — R10.32 LEFT LOWER QUADRANT PAIN: Primary | ICD-10-CM

## 2019-08-03 DIAGNOSIS — Z85.51 HISTORY OF BLADDER CANCER: ICD-10-CM

## 2019-08-03 DIAGNOSIS — Z85.42 HISTORY OF UTERINE CANCER: ICD-10-CM

## 2019-08-03 LAB
ALBUMIN SERPL-MCNC: 3.3 G/DL (ref 3.5–5.2)
ALP BLD-CCNC: 64 U/L (ref 35–104)
ALT SERPL-CCNC: 23 U/L (ref 0–32)
ANION GAP SERPL CALCULATED.3IONS-SCNC: 14 MMOL/L (ref 7–16)
AST SERPL-CCNC: 11 U/L (ref 0–31)
BACTERIA: ABNORMAL /HPF
BASOPHILS ABSOLUTE: 0.03 E9/L (ref 0–0.2)
BASOPHILS RELATIVE PERCENT: 0.3 % (ref 0–2)
BILIRUB SERPL-MCNC: <0.2 MG/DL (ref 0–1.2)
BILIRUBIN URINE: NEGATIVE
BLOOD, URINE: ABNORMAL
BUN BLDV-MCNC: 26 MG/DL (ref 6–20)
CALCIUM SERPL-MCNC: 9.9 MG/DL (ref 8.6–10.2)
CHLORIDE BLD-SCNC: 104 MMOL/L (ref 98–107)
CLARITY: ABNORMAL
CO2: 23 MMOL/L (ref 22–29)
COLOR: ABNORMAL
CREAT SERPL-MCNC: 1.4 MG/DL (ref 0.5–1)
EOSINOPHILS ABSOLUTE: 0.69 E9/L (ref 0.05–0.5)
EOSINOPHILS RELATIVE PERCENT: 7.4 % (ref 0–6)
GFR AFRICAN AMERICAN: 47
GFR NON-AFRICAN AMERICAN: 39 ML/MIN/1.73
GLUCOSE BLD-MCNC: 119 MG/DL (ref 74–99)
GLUCOSE URINE: NEGATIVE MG/DL
HCT VFR BLD CALC: 34.8 % (ref 34–48)
HEMOGLOBIN: 11.1 G/DL (ref 11.5–15.5)
IMMATURE GRANULOCYTES #: 0.1 E9/L
IMMATURE GRANULOCYTES %: 1.1 % (ref 0–5)
KETONES, URINE: NEGATIVE MG/DL
LACTIC ACID: 1.5 MMOL/L (ref 0.5–2.2)
LEUKOCYTE ESTERASE, URINE: ABNORMAL
LYMPHOCYTES ABSOLUTE: 0.7 E9/L (ref 1.5–4)
LYMPHOCYTES RELATIVE PERCENT: 7.5 % (ref 20–42)
MCH RBC QN AUTO: 29.4 PG (ref 26–35)
MCHC RBC AUTO-ENTMCNC: 31.9 % (ref 32–34.5)
MCV RBC AUTO: 92.3 FL (ref 80–99.9)
MONOCYTES ABSOLUTE: 0.36 E9/L (ref 0.1–0.95)
MONOCYTES RELATIVE PERCENT: 3.9 % (ref 2–12)
NEUTROPHILS ABSOLUTE: 7.44 E9/L (ref 1.8–7.3)
NEUTROPHILS RELATIVE PERCENT: 79.8 % (ref 43–80)
NITRITE, URINE: NEGATIVE
PDW BLD-RTO: 17.8 FL (ref 11.5–15)
PH UA: 6.5 (ref 5–9)
PLATELET # BLD: 296 E9/L (ref 130–450)
PMV BLD AUTO: 10 FL (ref 7–12)
POTASSIUM REFLEX MAGNESIUM: 4 MMOL/L (ref 3.5–5)
PROTEIN UA: >=300 MG/DL
RBC # BLD: 3.77 E12/L (ref 3.5–5.5)
RBC UA: >20 /HPF (ref 0–2)
SODIUM BLD-SCNC: 141 MMOL/L (ref 132–146)
SPECIFIC GRAVITY UA: 1.02 (ref 1–1.03)
TOTAL PROTEIN: 7 G/DL (ref 6.4–8.3)
UROBILINOGEN, URINE: 0.2 E.U./DL
WBC # BLD: 9.3 E9/L (ref 4.5–11.5)
WBC UA: ABNORMAL /HPF (ref 0–5)

## 2019-08-03 PROCEDURE — 96375 TX/PRO/DX INJ NEW DRUG ADDON: CPT

## 2019-08-03 PROCEDURE — 85025 COMPLETE CBC W/AUTO DIFF WBC: CPT

## 2019-08-03 PROCEDURE — 36415 COLL VENOUS BLD VENIPUNCTURE: CPT

## 2019-08-03 PROCEDURE — 81001 URINALYSIS AUTO W/SCOPE: CPT

## 2019-08-03 PROCEDURE — 74176 CT ABD & PELVIS W/O CONTRAST: CPT

## 2019-08-03 PROCEDURE — 83605 ASSAY OF LACTIC ACID: CPT

## 2019-08-03 PROCEDURE — 96376 TX/PRO/DX INJ SAME DRUG ADON: CPT

## 2019-08-03 PROCEDURE — 87088 URINE BACTERIA CULTURE: CPT

## 2019-08-03 PROCEDURE — 6360000002 HC RX W HCPCS: Performed by: NURSE PRACTITIONER

## 2019-08-03 PROCEDURE — 99284 EMERGENCY DEPT VISIT MOD MDM: CPT

## 2019-08-03 PROCEDURE — 80053 COMPREHEN METABOLIC PANEL: CPT

## 2019-08-03 PROCEDURE — 2580000003 HC RX 258: Performed by: NURSE PRACTITIONER

## 2019-08-03 PROCEDURE — 96374 THER/PROPH/DIAG INJ IV PUSH: CPT

## 2019-08-03 RX ORDER — FENTANYL CITRATE 50 UG/ML
50 INJECTION, SOLUTION INTRAMUSCULAR; INTRAVENOUS ONCE
Status: COMPLETED | OUTPATIENT
Start: 2019-08-03 | End: 2019-08-03

## 2019-08-03 RX ORDER — ONDANSETRON 2 MG/ML
4 INJECTION INTRAMUSCULAR; INTRAVENOUS ONCE
Status: COMPLETED | OUTPATIENT
Start: 2019-08-03 | End: 2019-08-03

## 2019-08-03 RX ORDER — 0.9 % SODIUM CHLORIDE 0.9 %
500 INTRAVENOUS SOLUTION INTRAVENOUS ONCE
Status: COMPLETED | OUTPATIENT
Start: 2019-08-03 | End: 2019-08-03

## 2019-08-03 RX ADMIN — FENTANYL CITRATE 50 MCG: 0.05 INJECTION, SOLUTION INTRAMUSCULAR; INTRAVENOUS at 17:30

## 2019-08-03 RX ADMIN — FENTANYL CITRATE 50 MCG: 50 INJECTION, SOLUTION INTRAMUSCULAR; INTRAVENOUS at 22:30

## 2019-08-03 RX ADMIN — SODIUM CHLORIDE 500 ML: 9 INJECTION, SOLUTION INTRAVENOUS at 19:23

## 2019-08-03 RX ADMIN — ONDANSETRON HYDROCHLORIDE 4 MG: 2 SOLUTION INTRAMUSCULAR; INTRAVENOUS at 17:30

## 2019-08-03 ASSESSMENT — PAIN DESCRIPTION - DESCRIPTORS: DESCRIPTORS: CRAMPING

## 2019-08-03 ASSESSMENT — PAIN SCALES - GENERAL
PAINLEVEL_OUTOF10: 10
PAINLEVEL_OUTOF10: 10
PAINLEVEL_OUTOF10: 1
PAINLEVEL_OUTOF10: 6

## 2019-08-03 ASSESSMENT — PAIN DESCRIPTION - PAIN TYPE: TYPE: ACUTE PAIN

## 2019-08-03 ASSESSMENT — PAIN DESCRIPTION - LOCATION: LOCATION: ABDOMEN

## 2019-08-03 ASSESSMENT — PAIN DESCRIPTION - ORIENTATION: ORIENTATION: LEFT

## 2019-08-03 NOTE — ED PROVIDER NOTES
[facility]    Department of Emergency Medicine   ED  Provider Note  Admit Date/RoomTime: 8/3/2019  3:18 PM  ED Room: Jonnathan Bashir C/HC  MRN: [id number]  Chief Complaint       Abdominal Pain (pt has hx of bladder cancer, diagnosed in april of this year, had radiation tx, last tx day yesterday, having this LLQ pain with diarrhea on friday, pt states she also had a blood transfusion this past thursday)    History of Present Illness   Source of history provided by:  patient. History/Exam Limitations: none.       Elyssa Milner is a 52 y.o. old female who has a past medical history of:   Past Medical History:   Diagnosis Date    Acute cystitis with hematuria     Acute seasonal allergic rhinitis     Anemia, unspecified     Anxiety     Bradycardia, unspecified     [facility] 06/12/2019    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 [facility] 6/29/2019    Chronic kidney disease     Chronic major depressive disorder, recurrent episode (HCC)     Constipation, unspecified     Depression     Difficulty walking     Dysmenorrhea, unspecified     Dysphagia, oropharyngeal     Hyperlipidemia     Hypertension     Hypothyroidism     Intellectual disability     Leg pain, bilateral     Mild intermittent asthma, uncomplicated     Mild protein-calorie malnutrition (HCC)     Mixed incontinence     Muscle weakness (generalized)     Noncompliance with medications     Noncompliance with treatment     Obstructive and reflux uropathy     Osteoarthritis     Other fatigue     Other symbolic dysfunctions     Personal care impairment     Personal history of noncompliance with medical treatment, presenting hazards to health     Presence of urogenital implants     Unspecified intellectual disabilities     Urinary tract infection, site not specified     Vitamin D deficiency, unspecified     presents to the emergency department by ambulance where the patient received nothing prior to arrival., for Patient has no known allergies. Physical Exam           ED Triage Vitals [08/03/19 1519]   BP Temp Temp Source Pulse Resp SpO2 Height Weight   111/63 97.1 °F (36.2 °C) Temporal 66 16 97 % 4' 11\" (1.499 m) 125 lb (56.7 kg)      Oxygen Saturation Interpretation: Normal.    · General Appearance/Constitutional:  Alert, development consistent with age. · HEENT:  NC/NT. PERRLA. Airway patent. · Neck:  Supple. No lymphadenopathy. · Respiratory:  No retractions. Lungs Clear to auscultation and breath sounds equal.  · CV:  Regular rate and rhythm. · GI:  General Appearance: normal.         Bowel sounds: normal bowel sounds. Distension:  None. Tenderness: moderate tenderness is present in the LLQ. Liver: non-tender. Spleen:  non-tender. Pulsatile Mass: absent. Hernia:  no inguinal or femoral hernias noted. : Catheter present upon arrival, gross blood noted with small clots. · Back: CVA Tenderness: No.  Nephrostomy tubes present and patent with draining bilaterally  · Integument:  Normal turgor. Warm, dry, without visible rash, unless noted elsewhere. · Lymphatics: No edema, cap.refill <3sec. · Neurological:  Orientation age-appropriate. Motor functions intact.     Lab / Imaging Results   (All laboratory and radiology results have been personally reviewed by myself)  Labs:  Results for orders placed or performed during the hospital encounter of 08/03/19   Comprehensive Metabolic Panel w/ Reflex to MG   Result Value Ref Range    Sodium 141 132 - 146 mmol/L    Potassium reflex Magnesium 4.0 3.5 - 5.0 mmol/L    Chloride 104 98 - 107 mmol/L    CO2 23 22 - 29 mmol/L    Anion Gap 14 7 - 16 mmol/L    Glucose 119 (H) 74 - 99 mg/dL    BUN 26 (H) 6 - 20 mg/dL    CREATININE 1.4 (H) 0.5 - 1.0 mg/dL    GFR Non-African American 39 >=60 mL/min/1.73    GFR African American 47     Calcium 9.9 8.6 - 10.2 mg/dL    Total Protein 7.0 6.4 - 8.3 entire stable in the emergency department. Counseling: The emergency provider has spoken with the patient and discussed todays results, in addition to providing specific details for the plan of care and counseling regarding the diagnosis and prognosis. Questions are answered at this time and they are agreeable with the plan to be discharged. At this time the patient is without objective evidence of an acute process requiring hospitalization or inpatient management. They have remained hemodynamically stable throughout their entire ED visit and are stable for discharge with outpatient follow-up. The plan has been discussed in detail and they are aware of the specific conditions for emergent return, as well as the importance of follow-up. Assessment      1. Left lower quadrant pain    2. History of bladder cancer    3. History of uterine cancer      Plan   Discharge to nursing home  Patient condition is good    New Medications     Discharge Medication List as of 8/3/2019  9:52 PM        Electronically signed by JUSTYN Small NP   DD: 8/3/19  **This report was transcribed using voice recognition software. Every effort was made to ensure accuracy; however, inadvertent computerized transcription errors may be present.   END OF ED PROVIDER NOTE      JUSTYN Vyas NP  08/04/19 1037

## 2019-08-04 NOTE — ED NOTES
EMT will take patient back to nursing home       Kinga Cali, [address] [facility]  08/03/19 0743

## 2019-08-04 NOTE — ED NOTES
Attempted to call nurse to nurse to nursing home.  No answer on 4 attempts       Leslie Boston RN  08/03/19 5544

## 2019-08-04 NOTE — ED NOTES
Called EMT ambulance for [facility] for transport. Was informed that they were delayed due to multiple emergencies. EMT staff states \"We will be heading there next\". Patient updated.       Pee Zapata RN  08/04/19 0028

## 2019-08-05 ENCOUNTER — TELEPHONE (OUTPATIENT)
Dept: INFUSION THERAPY | Age: 54
End: 2019-08-05

## 2019-08-05 LAB — URINE CULTURE, ROUTINE: NORMAL

## 2019-08-05 NOTE — TELEPHONE ENCOUNTER
Left a message for [employer] with regards to patient being approved for four doses of Keytruda and her next appointment will be on 8-7-19 at 9:50 am. If any questions do arise, she is to return call to [phone] or [phone].

## 2019-08-06 NOTE — PROGRESS NOTES
necrosis from prior injections versus metastatic disease. Bladder wall was thickened and FDG avid and this may be related to chronic cystitis versus tumor infiltration. The cervix appears bulky with high SUV of 20.7 with abnormal metabolic activity extending into the lower uterine segment and into the vagina involved. There was no evidence of regional lymphadenopathy.     Her pathology is suggestive of metastatic urothelial carcinoma even though clinically urology feels she has a GYN malignancy. In view of her progressive kidney failure she will need nephrology consult with possible need of hemodialysis  She would not be candidate for any type of platinum therapy     - Anemia likely multifactorial related to myelosuppression by recurrent urosepsis as well as chronic kidney disease. Hgb stable and normocytic. Transfuse for <7    - Chronic mental disability with depression and psychosis  - Dr. Cash Mas planned palliative XRT. Completed last week  - Outpatient Keytruda s/p XRT  -Status post BL nephrostomy tubes . Her renal function has returned to baseline and her serum creatinine is improved and down to 1.4         PLAN:  To receive Cycle #1 Keytruda. To return for Cycle #2 Keytruda 8/28/19. Natty Stevens M.D., F.A.C.P.   Electronically signed 8/7/2019 at 11:00 AM

## 2019-08-07 ENCOUNTER — TELEPHONE (OUTPATIENT)
Dept: CASE MANAGEMENT | Age: 54
End: 2019-08-07

## 2019-08-07 ENCOUNTER — HOSPITAL ENCOUNTER (OUTPATIENT)
Dept: INFUSION THERAPY | Age: 54
Discharge: HOME | End: 2019-08-07
Payer: MEDICAID

## 2019-08-07 ENCOUNTER — OFFICE VISIT (OUTPATIENT)
Dept: ONCOLOGY | Age: 54
End: 2019-08-07
Payer: MEDICAID

## 2019-08-07 VITALS
BODY MASS INDEX: 25.14 KG/M2 | DIASTOLIC BLOOD PRESSURE: 60 MMHG | OXYGEN SATURATION: 99 % | SYSTOLIC BLOOD PRESSURE: 109 MMHG | WEIGHT: 124.7 LBS | TEMPERATURE: 97.8 F | HEIGHT: 59 IN | HEART RATE: 66 BPM

## 2019-08-07 VITALS — SYSTOLIC BLOOD PRESSURE: 103 MMHG | HEART RATE: 67 BPM | RESPIRATION RATE: 20 BRPM | DIASTOLIC BLOOD PRESSURE: 73 MMHG

## 2019-08-07 DIAGNOSIS — C53.9 MALIGNANT NEOPLASM OF CERVIX, UNSPECIFIED SITE (HCC): Primary | ICD-10-CM

## 2019-08-07 DIAGNOSIS — D50.8 OTHER IRON DEFICIENCY ANEMIA: Primary | ICD-10-CM

## 2019-08-07 DIAGNOSIS — D50.8 OTHER IRON DEFICIENCY ANEMIA: ICD-10-CM

## 2019-08-07 DIAGNOSIS — C68.9 UROTHELIAL CANCER (HCC): ICD-10-CM

## 2019-08-07 LAB
ALBUMIN SERPL-MCNC: 3.5 G/DL (ref 3.5–5.2)
ALP BLD-CCNC: 58 U/L (ref 35–104)
ALT SERPL-CCNC: 22 U/L (ref 0–32)
ANION GAP SERPL CALCULATED.3IONS-SCNC: 13 MMOL/L (ref 7–16)
ANISOCYTOSIS: ABNORMAL
AST SERPL-CCNC: 12 U/L (ref 0–31)
BASOPHILS ABSOLUTE: 0 E9/L (ref 0–0.2)
BASOPHILS RELATIVE PERCENT: 0.5 % (ref 0–2)
BILIRUB SERPL-MCNC: <0.2 MG/DL (ref 0–1.2)
BUN BLDV-MCNC: 26 MG/DL (ref 6–20)
CALCIUM SERPL-MCNC: 9.9 MG/DL (ref 8.6–10.2)
CHLORIDE BLD-SCNC: 102 MMOL/L (ref 98–107)
CO2: 25 MMOL/L (ref 22–29)
CREAT SERPL-MCNC: 1.3 MG/DL (ref 0.5–1)
EOSINOPHILS ABSOLUTE: 0.53 E9/L (ref 0.05–0.5)
EOSINOPHILS RELATIVE PERCENT: 6.1 % (ref 0–6)
FERRITIN: 996 NG/ML
GFR AFRICAN AMERICAN: 51
GFR NON-AFRICAN AMERICAN: 42 ML/MIN/1.73
GLUCOSE BLD-MCNC: 110 MG/DL (ref 74–99)
HCT VFR BLD CALC: 35.6 % (ref 34–48)
HEMOGLOBIN: 11.1 G/DL (ref 11.5–15.5)
IRON SATURATION: 23 % (ref 15–50)
IRON: 51 MCG/DL (ref 37–145)
LYMPHOCYTES ABSOLUTE: 0.26 E9/L (ref 1.5–4)
LYMPHOCYTES RELATIVE PERCENT: 2.6 % (ref 20–42)
MCH RBC QN AUTO: 29.4 PG (ref 26–35)
MCHC RBC AUTO-ENTMCNC: 31.2 % (ref 32–34.5)
MCV RBC AUTO: 94.2 FL (ref 80–99.9)
MONOCYTES ABSOLUTE: 0.17 E9/L (ref 0.1–0.95)
MONOCYTES RELATIVE PERCENT: 1.7 % (ref 2–12)
NEUTROPHILS ABSOLUTE: 7.83 E9/L (ref 1.8–7.3)
NEUTROPHILS RELATIVE PERCENT: 89.6 % (ref 43–80)
OVALOCYTES: ABNORMAL
PDW BLD-RTO: 17.4 FL (ref 11.5–15)
PLATELET # BLD: 318 E9/L (ref 130–450)
PMV BLD AUTO: 10 FL (ref 7–12)
POIKILOCYTES: ABNORMAL
POTASSIUM SERPL-SCNC: 4.2 MMOL/L (ref 3.5–5)
RBC # BLD: 3.78 E12/L (ref 3.5–5.5)
SODIUM BLD-SCNC: 140 MMOL/L (ref 132–146)
TOTAL IRON BINDING CAPACITY: 226 MCG/DL (ref 250–450)
TOTAL PROTEIN: 7.2 G/DL (ref 6.4–8.3)
WBC # BLD: 8.7 E9/L (ref 4.5–11.5)

## 2019-08-07 PROCEDURE — 83540 ASSAY OF IRON: CPT

## 2019-08-07 PROCEDURE — 82728 ASSAY OF FERRITIN: CPT

## 2019-08-07 PROCEDURE — 96413 CHEMO IV INFUSION 1 HR: CPT

## 2019-08-07 PROCEDURE — 80053 COMPREHEN METABOLIC PANEL: CPT

## 2019-08-07 PROCEDURE — 36415 COLL VENOUS BLD VENIPUNCTURE: CPT

## 2019-08-07 PROCEDURE — 2580000003 HC RX 258: Performed by: INTERNAL MEDICINE

## 2019-08-07 PROCEDURE — 83550 IRON BINDING TEST: CPT

## 2019-08-07 PROCEDURE — 6360000002 HC RX W HCPCS: Performed by: INTERNAL MEDICINE

## 2019-08-07 PROCEDURE — 85025 COMPLETE CBC W/AUTO DIFF WBC: CPT

## 2019-08-07 RX ORDER — MEPERIDINE HYDROCHLORIDE 25 MG/ML
12.5 INJECTION INTRAMUSCULAR; INTRAVENOUS; SUBCUTANEOUS ONCE
Status: CANCELLED | OUTPATIENT
Start: 2019-08-07

## 2019-08-07 RX ORDER — SODIUM CHLORIDE 9 MG/ML
20 INJECTION, SOLUTION INTRAVENOUS ONCE
Status: CANCELLED | OUTPATIENT
Start: 2019-08-07

## 2019-08-07 RX ORDER — SODIUM CHLORIDE 0.9 % (FLUSH) 0.9 %
10 SYRINGE (ML) INJECTION PRN
Status: CANCELLED | OUTPATIENT
Start: 2019-08-07

## 2019-08-07 RX ORDER — SODIUM CHLORIDE 9 MG/ML
INJECTION, SOLUTION INTRAVENOUS CONTINUOUS
Status: CANCELLED | OUTPATIENT
Start: 2019-08-07

## 2019-08-07 RX ORDER — SODIUM CHLORIDE 9 MG/ML
20 INJECTION, SOLUTION INTRAVENOUS ONCE
Status: COMPLETED | OUTPATIENT
Start: 2019-08-07 | End: 2019-08-07

## 2019-08-07 RX ORDER — DIPHENHYDRAMINE HYDROCHLORIDE 50 MG/ML
50 INJECTION INTRAMUSCULAR; INTRAVENOUS ONCE
Status: CANCELLED | OUTPATIENT
Start: 2019-08-07

## 2019-08-07 RX ORDER — EPINEPHRINE 1 MG/ML
0.3 INJECTION, SOLUTION, CONCENTRATE INTRAVENOUS PRN
Status: CANCELLED | OUTPATIENT
Start: 2019-08-07

## 2019-08-07 RX ORDER — HEPARIN SODIUM (PORCINE) LOCK FLUSH IV SOLN 100 UNIT/ML 100 UNIT/ML
500 SOLUTION INTRAVENOUS PRN
Status: CANCELLED | OUTPATIENT
Start: 2019-08-07

## 2019-08-07 RX ORDER — METHYLPREDNISOLONE SODIUM SUCCINATE 125 MG/2ML
125 INJECTION, POWDER, LYOPHILIZED, FOR SOLUTION INTRAMUSCULAR; INTRAVENOUS ONCE
Status: CANCELLED | OUTPATIENT
Start: 2019-08-07

## 2019-08-07 RX ADMIN — SODIUM CHLORIDE 200 MG: 9 INJECTION, SOLUTION INTRAVENOUS at 12:48

## 2019-08-07 RX ADMIN — SODIUM CHLORIDE 20 ML/HR: 9 INJECTION, SOLUTION INTRAVENOUS at 12:48

## 2019-08-07 NOTE — PRE SEDATION
Information:  No      Pre-Sedation Documentation and Exam:   Vital signs have been reviewed (see flow sheet for vitals).     Mallampati Airway Assessment:  dentition not prohibitive    Prior History of Anesthesia Complications:   none    ASA Classification:  Class 3 - A patient with severe systemic disease that limits activity but is not incapacitating    Sedation/ Anesthesia Plan:   intravenous sedation    Medications Planned:   midazolam (Versed) intravenously and fentanyl intravenously    Patient is an appropriate candidate for plan of sedation: yes    Electronically signed by Henri De Dios MD on 8/7/2019 at 6:08 PM

## 2019-08-09 ENCOUNTER — APPOINTMENT (OUTPATIENT)
Dept: CT IMAGING | Age: 54
End: 2019-08-09
Payer: MEDICAID

## 2019-08-09 ENCOUNTER — TELEPHONE (OUTPATIENT)
Dept: CASE MANAGEMENT | Age: 54
End: 2019-08-09

## 2019-08-09 ENCOUNTER — HOSPITAL ENCOUNTER (EMERGENCY)
Age: 54
Discharge: HOME | End: 2019-08-09
Attending: EMERGENCY MEDICINE
Payer: MEDICAID

## 2019-08-09 VITALS
DIASTOLIC BLOOD PRESSURE: 54 MMHG | OXYGEN SATURATION: 100 % | TEMPERATURE: 98.4 F | BODY MASS INDEX: 27.82 KG/M2 | WEIGHT: 138.01 LBS | HEART RATE: 66 BPM | HEIGHT: 59 IN | RESPIRATION RATE: 18 BRPM | SYSTOLIC BLOOD PRESSURE: 106 MMHG

## 2019-08-09 DIAGNOSIS — R42 DIZZINESS: Primary | ICD-10-CM

## 2019-08-09 DIAGNOSIS — N30.01 ACUTE CYSTITIS WITH HEMATURIA: ICD-10-CM

## 2019-08-09 LAB
ALBUMIN SERPL-MCNC: 3.6 G/DL (ref 3.5–5.2)
ALP BLD-CCNC: 59 U/L (ref 35–104)
ALT SERPL-CCNC: 23 U/L (ref 0–32)
ANION GAP SERPL CALCULATED.3IONS-SCNC: 11 MMOL/L (ref 7–16)
AST SERPL-CCNC: 19 U/L (ref 0–31)
BACTERIA: ABNORMAL /HPF
BASOPHILS ABSOLUTE: 0.07 E9/L (ref 0–0.2)
BASOPHILS RELATIVE PERCENT: 0.9 % (ref 0–2)
BILIRUB SERPL-MCNC: <0.2 MG/DL (ref 0–1.2)
BILIRUBIN URINE: NEGATIVE
BLOOD, URINE: ABNORMAL
BUN BLDV-MCNC: 30 MG/DL (ref 6–20)
CALCIUM SERPL-MCNC: 9.8 MG/DL (ref 8.6–10.2)
CHLORIDE BLD-SCNC: 103 MMOL/L (ref 98–107)
CLARITY: ABNORMAL
CO2: 25 MMOL/L (ref 22–29)
COLOR: YELLOW
CREAT SERPL-MCNC: 1.4 MG/DL (ref 0.5–1)
EOSINOPHILS ABSOLUTE: 0.39 E9/L (ref 0.05–0.5)
EOSINOPHILS RELATIVE PERCENT: 5.3 % (ref 0–6)
GFR AFRICAN AMERICAN: 47
GFR NON-AFRICAN AMERICAN: 39 ML/MIN/1.73
GLUCOSE BLD-MCNC: 94 MG/DL (ref 74–99)
GLUCOSE URINE: NEGATIVE MG/DL
HCT VFR BLD CALC: 36.2 % (ref 34–48)
HEMOGLOBIN: 11.3 G/DL (ref 11.5–15.5)
KETONES, URINE: NEGATIVE MG/DL
LACTIC ACID: 0.9 MMOL/L (ref 0.5–2.2)
LEUKOCYTE ESTERASE, URINE: ABNORMAL
LIPASE: 28 U/L (ref 13–60)
LYMPHOCYTES ABSOLUTE: 0.22 E9/L (ref 1.5–4)
LYMPHOCYTES RELATIVE PERCENT: 2.6 % (ref 20–42)
MCH RBC QN AUTO: 29 PG (ref 26–35)
MCHC RBC AUTO-ENTMCNC: 31.2 % (ref 32–34.5)
MCV RBC AUTO: 93.1 FL (ref 80–99.9)
MONOCYTES ABSOLUTE: 0.07 E9/L (ref 0.1–0.95)
MONOCYTES RELATIVE PERCENT: 0.9 % (ref 2–12)
NEUTROPHILS ABSOLUTE: 6.66 E9/L (ref 1.8–7.3)
NEUTROPHILS RELATIVE PERCENT: 90.4 % (ref 43–80)
NITRITE, URINE: POSITIVE
PDW BLD-RTO: 17.2 FL (ref 11.5–15)
PH UA: 6 (ref 5–9)
PLATELET # BLD: 320 E9/L (ref 130–450)
PMV BLD AUTO: 9.9 FL (ref 7–12)
POTASSIUM REFLEX MAGNESIUM: 4 MMOL/L (ref 3.5–5)
PROTEIN UA: 100 MG/DL
RBC # BLD: 3.89 E12/L (ref 3.5–5.5)
RBC # BLD: NORMAL 10*6/UL
RBC UA: ABNORMAL /HPF (ref 0–2)
SODIUM BLD-SCNC: 139 MMOL/L (ref 132–146)
SPECIFIC GRAVITY UA: 1.02 (ref 1–1.03)
TOTAL PROTEIN: 7.5 G/DL (ref 6.4–8.3)
TROPONIN: <0.01 NG/ML (ref 0–0.03)
UROBILINOGEN, URINE: 0.2 E.U./DL
WBC # BLD: 7.4 E9/L (ref 4.5–11.5)
WBC UA: >20 /HPF (ref 0–5)

## 2019-08-09 PROCEDURE — 6360000002 HC RX W HCPCS: Performed by: EMERGENCY MEDICINE

## 2019-08-09 PROCEDURE — 96374 THER/PROPH/DIAG INJ IV PUSH: CPT

## 2019-08-09 PROCEDURE — 83690 ASSAY OF LIPASE: CPT

## 2019-08-09 PROCEDURE — 87088 URINE BACTERIA CULTURE: CPT

## 2019-08-09 PROCEDURE — 83605 ASSAY OF LACTIC ACID: CPT

## 2019-08-09 PROCEDURE — 80053 COMPREHEN METABOLIC PANEL: CPT

## 2019-08-09 PROCEDURE — 96375 TX/PRO/DX INJ NEW DRUG ADDON: CPT

## 2019-08-09 PROCEDURE — 70450 CT HEAD/BRAIN W/O DYE: CPT

## 2019-08-09 PROCEDURE — 74176 CT ABD & PELVIS W/O CONTRAST: CPT

## 2019-08-09 PROCEDURE — 87040 BLOOD CULTURE FOR BACTERIA: CPT

## 2019-08-09 PROCEDURE — 87077 CULTURE AEROBIC IDENTIFY: CPT

## 2019-08-09 PROCEDURE — 81001 URINALYSIS AUTO W/SCOPE: CPT

## 2019-08-09 PROCEDURE — 87186 SC STD MICRODIL/AGAR DIL: CPT

## 2019-08-09 PROCEDURE — 93005 ELECTROCARDIOGRAM TRACING: CPT | Performed by: EMERGENCY MEDICINE

## 2019-08-09 PROCEDURE — 84484 ASSAY OF TROPONIN QUANT: CPT

## 2019-08-09 PROCEDURE — 85025 COMPLETE CBC W/AUTO DIFF WBC: CPT

## 2019-08-09 PROCEDURE — 99284 EMERGENCY DEPT VISIT MOD MDM: CPT

## 2019-08-09 PROCEDURE — 2580000003 HC RX 258: Performed by: EMERGENCY MEDICINE

## 2019-08-09 PROCEDURE — 36415 COLL VENOUS BLD VENIPUNCTURE: CPT

## 2019-08-09 RX ORDER — 0.9 % SODIUM CHLORIDE 0.9 %
1000 INTRAVENOUS SOLUTION INTRAVENOUS ONCE
Status: COMPLETED | OUTPATIENT
Start: 2019-08-09 | End: 2019-08-09

## 2019-08-09 RX ORDER — CEFDINIR 300 MG/1
300 CAPSULE ORAL 2 TIMES DAILY
Qty: 20 CAPSULE | Refills: 0 | Status: SHIPPED | OUTPATIENT
Start: 2019-08-09 | End: 2019-08-19

## 2019-08-09 RX ORDER — HYDROCODONE BITARTRATE AND ACETAMINOPHEN 5; 325 MG/1; MG/1
1 TABLET ORAL EVERY 6 HOURS PRN
COMMUNITY

## 2019-08-09 RX ORDER — CLONAZEPAM 0.5 MG/1
0.25 TABLET ORAL 2 TIMES DAILY
COMMUNITY

## 2019-08-09 RX ORDER — ONDANSETRON 2 MG/ML
4 INJECTION INTRAMUSCULAR; INTRAVENOUS ONCE
Status: COMPLETED | OUTPATIENT
Start: 2019-08-09 | End: 2019-08-09

## 2019-08-09 RX ORDER — SERTRALINE HYDROCHLORIDE 25 MG/1
25 TABLET, FILM COATED ORAL DAILY
COMMUNITY

## 2019-08-09 RX ADMIN — ONDANSETRON 4 MG: 2 INJECTION INTRAMUSCULAR; INTRAVENOUS at 12:33

## 2019-08-09 RX ADMIN — SODIUM CHLORIDE 1000 ML: 9 INJECTION, SOLUTION INTRAVENOUS at 12:33

## 2019-08-09 RX ADMIN — WATER 1 G: 1 INJECTION INTRAMUSCULAR; INTRAVENOUS; SUBCUTANEOUS at 14:21

## 2019-08-09 ASSESSMENT — ENCOUNTER SYMPTOMS
EYE REDNESS: 0
SHORTNESS OF BREATH: 0
VOMITING: 0
ABDOMINAL PAIN: 0
NAUSEA: 0
DIARRHEA: 1

## 2019-08-09 ASSESSMENT — PAIN SCALES - GENERAL: PAINLEVEL_OUTOF10: 7

## 2019-08-09 ASSESSMENT — PAIN DESCRIPTION - DESCRIPTORS: DESCRIPTORS: ACHING

## 2019-08-09 ASSESSMENT — PAIN DESCRIPTION - PAIN TYPE: TYPE: ACUTE PAIN

## 2019-08-09 ASSESSMENT — PAIN DESCRIPTION - ORIENTATION: ORIENTATION: RIGHT;LEFT

## 2019-08-09 ASSESSMENT — PAIN DESCRIPTION - LOCATION: LOCATION: BACK;FLANK

## 2019-08-09 NOTE — ED PROVIDER NOTES
Chief complaint: Diarrhea and dizziness      HPI:  8/9/19, Time: 11:47 AM         Hermilo Campos is a 52 y.o. female presenting to the ED for diarrhea and dizziness. The patient states that she is feeling fatigued and is having a difficult time ambulating secondary to her fatigue and diarrhea. The patient recently had chemotherapy yesterday. This was her first chemotherapy of her regimen. She does have urethral cancer. She does have bilateral nephrostomy tubes as well as a Braden. The patient states that she does feel slightly dizzy which she describes as lightheaded. This is worse with activity and exertion. There are no alleviating factors. The patient has not had any treatments prior to arrival.  The patient does admit to multiple episodes of loose stool. No melena or hematochezia. The patient has not had any recent antibiotics. Patient is from a nursing home. Nursing home charts the staff states that the patient \"feels neglected. \"  She denies any fevers, chills, chest pain, shortness of breath or abdominal pain. ROS:   Review of Systems   Constitutional: Positive for fatigue. Negative for chills. HENT: Negative for congestion. Eyes: Negative for redness. Respiratory: Negative for shortness of breath. Cardiovascular: Negative for chest pain. Gastrointestinal: Positive for diarrhea. Negative for abdominal pain, nausea and vomiting. Genitourinary: Negative for dysuria. Musculoskeletal: Negative for arthralgias. Skin: Negative for rash. Neurological: Positive for dizziness and light-headedness.    Psychiatric/Behavioral: Negative for confusion.         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), Chronic kidney disease, Chronic major depressive disorder, recurrent episode ([facility] Constipation, unspecified, Depression, Difficulty walking, Dysmenorrhea, unspecified, Dysphagia, oropharyngeal, Hyperlipidemia, Hypertension, Hypothyroidism, Intellectual disability, Leg pain, bilateral, Mild intermittent asthma, uncomplicated, Mild protein-calorie malnutrition ([address] Mixed incontinence, Muscle weakness (generalized), Noncompliance with medications, Noncompliance with treatment, Obstructive and reflux uropathy, Osteoarthritis, Other fatigue, Other symbolic dysfunctions, Personal care impairment, Personal history of noncompliance with medical treatment, presenting hazards to health, Presence of urogenital implants, Unspecified intellectual disabilities, Urinary tract infection, site not specified, and Vitamin D deficiency, unspecified. Past Surgical History:  has a past surgical history that includes Cystoscopy (Left, 1/21/2019); Cystoscopy (Bilateral, 6/1/2019); and Vulva surgery (N/A, 6/6/2019). Social History:  reports that she quit smoking about 30 years ago. She has a 5.00 pack-year smoking history. She has never used smokeless tobacco. She reports that she does not drink alcohol or use drugs. Family History: family history includes Cancer in her maternal grandmother; Diabetes in her brother; Heart Attack in her maternal grandfather; Other in her daughter; Thyroid Disease in her mother. The patients home medications have been reviewed. Allergies: Patient has no known allergies.     ---------------------------------------------------PHYSICAL EXAM--------------------------------------    Constitutional/General: Alert and oriented x3, well appearing, non toxic in NAD  Head: Normocephalic and atraumatic  Mouth: Oropharynx clear, handling secretions, no trismus  Neck: Supple, full ROM, non tender to palpation in the midline, no stridor, no crepitus, no meningeal instructed to return to the ER for any new or worsening symptoms. Additional discharge instructions were given verbally. All questions were answered. Patient is comfortable and agreeable with discharge plan. Patient in no acute distress and non-toxic in appearance. Re-Evaluations/Consultations: In bed no acute distress. Results of today's test.  Patient will be discharged to follow-up outpatient. Critical Care: 0 minutes        This patient's ED course included: History, physical examination, reevaluation prior to disposition, labs, imaging, telemetry monitoring, EKG       This patient has remained hemodynamically stable during their ED course. Counseling: The emergency provider has spoken with the patient and discussed todays results, in addition to providing specific details for the plan of care and counseling regarding the diagnosis and prognosis. Questions are answered at this time and they are agreeable with the plan.       --------------------------------- IMPRESSION AND DISPOSITION ---------------------------------    IMPRESSION  1. Dizziness    2. Acute cystitis with hematuria        DISPOSITION  Disposition: Discharge to home  Patient condition is stable        NOTE: This report was transcribed using voice recognition software.  Every effort was made to ensure accuracy; however, inadvertent computerized transcription errors may be present          Almita Chacon DO  08/09/19 4020

## 2019-08-09 NOTE — ED NOTES
[facility] at the [facility] was called at [phone] for give a nursing report, no answer available from nursing, a message was left with staff to call the emergency room back for report.       Jennifer Fowler RN  08/09/19 8820

## 2019-08-10 LAB
EKG ATRIAL RATE: 62 BPM
EKG P AXIS: 36 DEGREES
EKG P-R INTERVAL: 152 MS
EKG Q-T INTERVAL: 412 MS
EKG QRS DURATION: 84 MS
EKG QTC CALCULATION (BAZETT): 418 MS
EKG R AXIS: 69 DEGREES
EKG T AXIS: 17 DEGREES
EKG VENTRICULAR RATE: 62 BPM

## 2019-08-10 PROCEDURE — 93010 ELECTROCARDIOGRAM REPORT: CPT | Performed by: INTERNAL MEDICINE

## 2019-08-12 ENCOUNTER — TELEPHONE (OUTPATIENT)
Dept: CASE MANAGEMENT | Age: 54
End: 2019-08-12

## 2019-08-12 LAB
ORGANISM: ABNORMAL
URINE CULTURE, ROUTINE: ABNORMAL

## 2019-08-12 NOTE — TELEPHONE ENCOUNTER
Patient called asking what her appointment was for on 08/14/2019. Informed her she's to see Dr. Stasia Gottron at 1074 for a consult to discuss having a medi-port placed for her Keytruda treatments. Patient stated she received IV fluids when she went to the ER last week and is now feeling a little better. Informed patient she's scheduled for her next Keytruda treatment on 08/28/2019 with Dr. Heidi Coppola, she verbalized understanding and denied further needs today.  Aaron Vu  RN, ADN, BSE, OCN  Patient Nurse Navigator

## 2019-08-13 ENCOUNTER — TELEPHONE (OUTPATIENT)
Dept: CASE MANAGEMENT | Age: 54
End: 2019-08-13

## 2019-08-14 ENCOUNTER — TELEPHONE (OUTPATIENT)
Dept: SURGERY | Age: 54
End: 2019-08-14

## 2019-08-14 ENCOUNTER — INITIAL CONSULT (OUTPATIENT)
Dept: SURGERY | Age: 54
End: 2019-08-14
Payer: MEDICAID

## 2019-08-14 VITALS
HEART RATE: 68 BPM | SYSTOLIC BLOOD PRESSURE: 94 MMHG | DIASTOLIC BLOOD PRESSURE: 57 MMHG | BODY MASS INDEX: 25.85 KG/M2 | WEIGHT: 128 LBS

## 2019-08-14 DIAGNOSIS — C68.9 UROTHELIAL CANCER (HCC): ICD-10-CM

## 2019-08-14 DIAGNOSIS — I87.8 POOR VENOUS ACCESS: Primary | ICD-10-CM

## 2019-08-14 LAB
BLOOD CULTURE, ROUTINE: NORMAL
CULTURE, BLOOD 2: NORMAL

## 2019-08-14 PROCEDURE — G8427 DOCREV CUR MEDS BY ELIG CLIN: HCPCS | Performed by: SURGERY

## 2019-08-14 PROCEDURE — G8417 CALC BMI ABV UP PARAM F/U: HCPCS | Performed by: SURGERY

## 2019-08-14 PROCEDURE — 1036F TOBACCO NON-USER: CPT | Performed by: SURGERY

## 2019-08-14 PROCEDURE — 99204 OFFICE O/P NEW MOD 45 MIN: CPT | Performed by: SURGERY

## 2019-08-14 PROCEDURE — 3017F COLORECTAL CA SCREEN DOC REV: CPT | Performed by: SURGERY

## 2019-08-14 NOTE — PROGRESS NOTES
images of the brain were obtained without the use of intravenous contrast. The ventricles, cisterns and sulci are prominent consistent with atrophy. There is atrophy of the cerebellum. There is no evidence of a mass or shift of the midline structures. There is decreased attenuation in periventricular white matter consistent with small vessel ischemic disease. There is no evidence of acute intracranial hemorrhage or CVA. There is no extra-axial fluid collection or mass. Bone windows of the calvarium are unremarkable. 1. There is no acute intracranial abnormality. Specifically, there is no acute intracranial hemorrhage. 2. Atrophy and periventricular leukomalacia.         ASSESSMENT:  52 y.o. female with urothelial Ca with poor venous access    PLAN:  Risks and benefits of mediport placement were discussed and she would like to proceed    Electronically signed by Laura Salas DO on 8/14/19 at 9:43 AM    General Surgery Consult Note  Zulema Fisher MD, MS    Patient's Name/Date of Birth: Hermilo Campos / 1965    Date: August 14, 2019     Surgeon: Trudy Palacios MD    Requesting Physician:     Chief Complaint: poor venous access    Patient Active Problem List   Diagnosis    Hyperlipidemia    Noncompliance    Depressive disorder    Halitosis    Dysmenorrhea    Bradycardia    Acquired hypothyroidism    Mild intermittent asthma without complication    Acute renal failure (ARF) ([address]    Anxiety    Intellectual disability    Obstructive uropathy    Mixed stress and urge urinary incontinence    Vitamin D deficiency    Seasonal allergic rhinitis    Acute on chronic renal insufficiency    Acute renal failure superimposed on chronic kidney disease, on chronic dialysis ([address]    Mild protein-calorie malnutrition ([address]    Acute cystitis with hematuria    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 female bladder CA, poor venous access  Patient Active Problem List   Diagnosis    Hyperlipidemia    Noncompliance    Depressive disorder    Halitosis    Dysmenorrhea    Bradycardia    Acquired hypothyroidism    Mild intermittent asthma without complication    Acute renal failure (ARF) (HCC)    Anxiety    Intellectual disability    Obstructive uropathy    Mixed stress and urge urinary incontinence    Vitamin D deficiency    Seasonal allergic rhinitis    Acute on chronic renal insufficiency    Acute renal failure superimposed on chronic kidney disease, on chronic dialysis (HCC)    Mild protein-calorie malnutrition ([address]    Acute cystitis with hematuria    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 Reviewed chart, vitals, labs and radiologic studies. I also participated in medical decision making with the resident/NP on the date of service and I agree with all of the pertinent clinical information, assessment and treatment plan. I have reviewed and edited the note above based on my findings during my history, exam, and decision making.        Physician Signature: Electronically signed by Dr. Renuka Brady  [phone] (p)  8/14/2019  10:14 AM

## 2019-08-19 ENCOUNTER — HOSPITAL ENCOUNTER (OUTPATIENT)
Dept: GENERAL RADIOLOGY | Age: 54
Discharge: HOME | End: 2019-08-21
Payer: MEDICAID

## 2019-08-19 ENCOUNTER — HOSPITAL ENCOUNTER (OUTPATIENT)
Age: 54
Setting detail: OUTPATIENT SURGERY
Discharge: HOME | End: 2019-08-19
Attending: SURGERY | Admitting: SURGERY
Payer: MEDICAID

## 2019-08-19 ENCOUNTER — APPOINTMENT (OUTPATIENT)
Dept: GENERAL RADIOLOGY | Age: 54
End: 2019-08-19
Attending: SURGERY
Payer: MEDICAID

## 2019-08-19 ENCOUNTER — ANESTHESIA EVENT (OUTPATIENT)
Dept: OPERATING ROOM | Age: 54
End: 2019-08-19
Payer: MEDICAID

## 2019-08-19 ENCOUNTER — ANESTHESIA (OUTPATIENT)
Dept: OPERATING ROOM | Age: 54
End: 2019-08-19
Payer: MEDICAID

## 2019-08-19 VITALS
TEMPERATURE: 97.4 F | WEIGHT: 126.5 LBS | BODY MASS INDEX: 25.5 KG/M2 | SYSTOLIC BLOOD PRESSURE: 121 MMHG | DIASTOLIC BLOOD PRESSURE: 60 MMHG | RESPIRATION RATE: 16 BRPM | HEART RATE: 56 BPM | OXYGEN SATURATION: 98 % | HEIGHT: 59 IN

## 2019-08-19 VITALS
RESPIRATION RATE: 18 BRPM | DIASTOLIC BLOOD PRESSURE: 53 MMHG | OXYGEN SATURATION: 100 % | SYSTOLIC BLOOD PRESSURE: 92 MMHG

## 2019-08-19 DIAGNOSIS — I87.8 POOR VENOUS ACCESS: ICD-10-CM

## 2019-08-19 PROCEDURE — C1751 CATH, INF, PER/CENT/MIDLINE: HCPCS

## 2019-08-19 PROCEDURE — 3700000000 HC ANESTHESIA ATTENDED CARE: Performed by: SURGERY

## 2019-08-19 PROCEDURE — 2709999900 HC NON-CHARGEABLE SUPPLY: Performed by: SURGERY

## 2019-08-19 PROCEDURE — 7100000001 HC PACU RECOVERY - ADDTL 15 MIN: Performed by: SURGERY

## 2019-08-19 PROCEDURE — 6360000002 HC RX W HCPCS: Performed by: SURGERY

## 2019-08-19 PROCEDURE — 36561 INSERT TUNNELED CV CATH: CPT | Performed by: SURGERY

## 2019-08-19 PROCEDURE — 3209999900 FLUORO FOR SURGICAL PROCEDURES

## 2019-08-19 PROCEDURE — 77001 FLUOROGUIDE FOR VEIN DEVICE: CPT | Performed by: SURGERY

## 2019-08-19 PROCEDURE — 71045 X-RAY EXAM CHEST 1 VIEW: CPT

## 2019-08-19 PROCEDURE — 3600000003 HC SURGERY LEVEL 3 BASE: Performed by: SURGERY

## 2019-08-19 PROCEDURE — C1788 PORT, INDWELLING, IMP: HCPCS | Performed by: SURGERY

## 2019-08-19 PROCEDURE — 3700000001 HC ADD 15 MINUTES (ANESTHESIA): Performed by: SURGERY

## 2019-08-19 PROCEDURE — 76937 US GUIDE VASCULAR ACCESS: CPT

## 2019-08-19 PROCEDURE — 6360000002 HC RX W HCPCS

## 2019-08-19 PROCEDURE — 7100000011 HC PHASE II RECOVERY - ADDTL 15 MIN: Performed by: SURGERY

## 2019-08-19 PROCEDURE — 7100000000 HC PACU RECOVERY - FIRST 15 MIN: Performed by: SURGERY

## 2019-08-19 PROCEDURE — 7100000010 HC PHASE II RECOVERY - FIRST 15 MIN: Performed by: SURGERY

## 2019-08-19 PROCEDURE — 6360000002 HC RX W HCPCS: Performed by: ANESTHESIOLOGY

## 2019-08-19 PROCEDURE — 2580000003 HC RX 258: Performed by: SURGERY

## 2019-08-19 PROCEDURE — 76937 US GUIDE VASCULAR ACCESS: CPT | Performed by: SURGERY

## 2019-08-19 PROCEDURE — 3600000013 HC SURGERY LEVEL 3 ADDTL 15MIN: Performed by: SURGERY

## 2019-08-19 DEVICE — PORT 8FR PWR INJ MID SIZED TI DIGNITY W/ ATTACH CATH: Type: IMPLANTABLE DEVICE | Site: CHEST  WALL | Status: FUNCTIONAL

## 2019-08-19 RX ORDER — HYDROCODONE BITARTRATE AND ACETAMINOPHEN 5; 325 MG/1; MG/1
1 TABLET ORAL ONCE
Status: DISCONTINUED | OUTPATIENT
Start: 2019-08-19 | End: 2019-08-19 | Stop reason: HOSPADM

## 2019-08-19 RX ORDER — MORPHINE SULFATE 2 MG/ML
INJECTION, SOLUTION INTRAMUSCULAR; INTRAVENOUS
Status: COMPLETED
Start: 2019-08-19 | End: 2019-08-19

## 2019-08-19 RX ORDER — MIDAZOLAM HYDROCHLORIDE 1 MG/ML
INJECTION INTRAMUSCULAR; INTRAVENOUS
Status: COMPLETED
Start: 2019-08-19 | End: 2019-08-19

## 2019-08-19 RX ORDER — HYDROCODONE BITARTRATE AND ACETAMINOPHEN 5; 325 MG/1; MG/1
2 TABLET ORAL PRN
Status: DISCONTINUED | OUTPATIENT
Start: 2019-08-19 | End: 2019-08-19 | Stop reason: HOSPADM

## 2019-08-19 RX ORDER — PROPOFOL 10 MG/ML
INJECTION, EMULSION INTRAVENOUS CONTINUOUS PRN
Status: DISCONTINUED | OUTPATIENT
Start: 2019-08-19 | End: 2019-08-19 | Stop reason: SDUPTHER

## 2019-08-19 RX ORDER — FENTANYL CITRATE 50 UG/ML
25 INJECTION, SOLUTION INTRAMUSCULAR; INTRAVENOUS EVERY 5 MIN PRN
Status: DISCONTINUED | OUTPATIENT
Start: 2019-08-19 | End: 2019-08-19 | Stop reason: HOSPADM

## 2019-08-19 RX ORDER — FENTANYL CITRATE 50 UG/ML
INJECTION, SOLUTION INTRAMUSCULAR; INTRAVENOUS PRN
Status: DISCONTINUED | OUTPATIENT
Start: 2019-08-19 | End: 2019-08-19 | Stop reason: SDUPTHER

## 2019-08-19 RX ORDER — MORPHINE SULFATE 2 MG/ML
INJECTION, SOLUTION INTRAMUSCULAR; INTRAVENOUS
Status: DISCONTINUED
Start: 2019-08-19 | End: 2019-08-19 | Stop reason: HOSPADM

## 2019-08-19 RX ORDER — SODIUM CHLORIDE 0.9 % (FLUSH) 0.9 %
10 SYRINGE (ML) INJECTION PRN
Status: DISCONTINUED | OUTPATIENT
Start: 2019-08-19 | End: 2019-08-19 | Stop reason: HOSPADM

## 2019-08-19 RX ORDER — SODIUM CHLORIDE 9 MG/ML
INJECTION, SOLUTION INTRAVENOUS CONTINUOUS
Status: DISCONTINUED | OUTPATIENT
Start: 2019-08-19 | End: 2019-08-19 | Stop reason: HOSPADM

## 2019-08-19 RX ORDER — HYDROCODONE BITARTRATE AND ACETAMINOPHEN 5; 325 MG/1; MG/1
1 TABLET ORAL PRN
Status: DISCONTINUED | OUTPATIENT
Start: 2019-08-19 | End: 2019-08-19 | Stop reason: HOSPADM

## 2019-08-19 RX ORDER — SODIUM CHLORIDE 0.9 % (FLUSH) 0.9 %
10 SYRINGE (ML) INJECTION EVERY 12 HOURS SCHEDULED
Status: DISCONTINUED | OUTPATIENT
Start: 2019-08-19 | End: 2019-08-19 | Stop reason: HOSPADM

## 2019-08-19 RX ORDER — MIDAZOLAM HYDROCHLORIDE 1 MG/ML
2 INJECTION INTRAMUSCULAR; INTRAVENOUS ONCE
Status: COMPLETED | OUTPATIENT
Start: 2019-08-19 | End: 2019-08-19

## 2019-08-19 RX ORDER — MIDAZOLAM HYDROCHLORIDE 1 MG/ML
INJECTION INTRAMUSCULAR; INTRAVENOUS PRN
Status: DISCONTINUED | OUTPATIENT
Start: 2019-08-19 | End: 2019-08-19 | Stop reason: SDUPTHER

## 2019-08-19 RX ORDER — MORPHINE SULFATE 2 MG/ML
2 INJECTION, SOLUTION INTRAMUSCULAR; INTRAVENOUS EVERY 5 MIN PRN
Status: DISCONTINUED | OUTPATIENT
Start: 2019-08-19 | End: 2019-08-19 | Stop reason: HOSPADM

## 2019-08-19 RX ORDER — HYDROMORPHONE HYDROCHLORIDE 1 MG/ML
0.5 INJECTION, SOLUTION INTRAMUSCULAR; INTRAVENOUS; SUBCUTANEOUS EVERY 5 MIN PRN
Status: DISCONTINUED | OUTPATIENT
Start: 2019-08-19 | End: 2019-08-19 | Stop reason: HOSPADM

## 2019-08-19 RX ADMIN — MORPHINE SULFATE 2 MG: 2 INJECTION, SOLUTION INTRAMUSCULAR; INTRAVENOUS at 15:00

## 2019-08-19 RX ADMIN — MIDAZOLAM HYDROCHLORIDE 2 MG: 1 INJECTION, SOLUTION INTRAMUSCULAR; INTRAVENOUS at 13:20

## 2019-08-19 RX ADMIN — MIDAZOLAM HYDROCHLORIDE 2 MG: 1 INJECTION INTRAMUSCULAR; INTRAVENOUS at 13:20

## 2019-08-19 RX ADMIN — MIDAZOLAM 1 MG: 1 INJECTION INTRAMUSCULAR; INTRAVENOUS at 13:43

## 2019-08-19 RX ADMIN — SODIUM CHLORIDE: 9 INJECTION, SOLUTION INTRAVENOUS at 13:43

## 2019-08-19 RX ADMIN — FENTANYL CITRATE 50 MCG: 50 INJECTION, SOLUTION INTRAMUSCULAR; INTRAVENOUS at 13:46

## 2019-08-19 RX ADMIN — MORPHINE SULFATE 2 MG: 2 INJECTION, SOLUTION INTRAMUSCULAR; INTRAVENOUS at 15:05

## 2019-08-19 RX ADMIN — PROPOFOL 75 MCG/KG/MIN: 10 INJECTION, EMULSION INTRAVENOUS at 13:46

## 2019-08-19 RX ADMIN — Medication 2 G: at 13:43

## 2019-08-19 ASSESSMENT — PAIN DESCRIPTION - ONSET
ONSET: SUDDEN
ONSET: ON-GOING
ONSET: ON-GOING

## 2019-08-19 ASSESSMENT — PAIN - FUNCTIONAL ASSESSMENT
PAIN_FUNCTIONAL_ASSESSMENT: ACTIVITIES ARE NOT PREVENTED
PAIN_FUNCTIONAL_ASSESSMENT: 0-10
PAIN_FUNCTIONAL_ASSESSMENT: ACTIVITIES ARE NOT PREVENTED

## 2019-08-19 ASSESSMENT — PAIN DESCRIPTION - PAIN TYPE
TYPE: ACUTE PAIN;SURGICAL PAIN
TYPE: ACUTE PAIN;SURGICAL PAIN
TYPE: SURGICAL PAIN

## 2019-08-19 ASSESSMENT — PAIN DESCRIPTION - LOCATION
LOCATION: CHEST

## 2019-08-19 ASSESSMENT — PULMONARY FUNCTION TESTS
PIF_VALUE: 50
PIF_VALUE: 48
PIF_VALUE: 16
PIF_VALUE: 50
PIF_VALUE: 16
PIF_VALUE: 18
PIF_VALUE: 21
PIF_VALUE: 17
PIF_VALUE: 6
PIF_VALUE: 16
PIF_VALUE: 0
PIF_VALUE: 16
PIF_VALUE: 18
PIF_VALUE: 48
PIF_VALUE: 16
PIF_VALUE: 19
PIF_VALUE: 18
PIF_VALUE: 0
PIF_VALUE: 18
PIF_VALUE: 50
PIF_VALUE: 50
PIF_VALUE: 19
PIF_VALUE: 16
PIF_VALUE: 18
PIF_VALUE: 50
PIF_VALUE: 0
PIF_VALUE: 18
PIF_VALUE: 17
PIF_VALUE: 20
PIF_VALUE: 16
PIF_VALUE: 0
PIF_VALUE: 16
PIF_VALUE: 16
PIF_VALUE: 18
PIF_VALUE: 16

## 2019-08-19 ASSESSMENT — PAIN DESCRIPTION - PROGRESSION
CLINICAL_PROGRESSION: GRADUALLY IMPROVING
CLINICAL_PROGRESSION: GRADUALLY WORSENING

## 2019-08-19 ASSESSMENT — PAIN SCALES - GENERAL
PAINLEVEL_OUTOF10: 7
PAINLEVEL_OUTOF10: 7
PAINLEVEL_OUTOF10: 0
PAINLEVEL_OUTOF10: 8

## 2019-08-19 ASSESSMENT — PAIN DESCRIPTION - FREQUENCY
FREQUENCY: CONTINUOUS

## 2019-08-19 ASSESSMENT — PAIN DESCRIPTION - DESCRIPTORS
DESCRIPTORS: CONSTANT;DISCOMFORT;SORE
DESCRIPTORS: CONSTANT;DISCOMFORT;SORE

## 2019-08-19 ASSESSMENT — PAIN DESCRIPTION - ORIENTATION
ORIENTATION: LEFT;ANTERIOR;UPPER
ORIENTATION: LEFT;ANTERIOR
ORIENTATION: LEFT

## 2019-08-19 ASSESSMENT — LIFESTYLE VARIABLES: SMOKING_STATUS: 0

## 2019-08-19 NOTE — OP NOTE
tolerated the procedure well and went to recovery in stable condition. A chest X-ray was ordered to check for catheter placement.     Physician Signature: Electronically signed by Dr. Akil Bateman M.D.

## 2019-08-19 NOTE — ANESTHESIA POSTPROCEDURE EVALUATION
Department of Anesthesiology  Postprocedure Note    Patient: Ghazala Reed  MRN: [id number]  YOB: 1965  Date of evaluation: 8/19/2019  Time:  3:47 PM     Procedure Summary     Date:  08/19/19 Room / Location:  [address] [place] [address] 03 / [address] [address] [place] [address]    Anesthesia Start:  3760 Anesthesia Stop:  8892    Procedure:  MEDIPORT CATHETER INSERTION (DO NOT MOVE TIME) (N/A ) Diagnosis:  (POOR VENOUS ACCESS)    Surgeon:  Lindsay Rdz MD Responsible Provider:  Coleman Da Silva MD    Anesthesia Type:  MAC ASA Status:  3          Anesthesia Type: MAC    Tevin Phase I: Tevin Score: 10    Tevin Phase II:      Last vitals: Reviewed and per EMR flowsheets.        Anesthesia Post Evaluation    Patient location during evaluation: PACU  Patient participation: complete - patient participated  Level of consciousness: awake and alert  Airway patency: patent  Nausea & Vomiting: no nausea and no vomiting  Complications: no  Cardiovascular status: hemodynamically stable  Respiratory status: acceptable  Hydration status: euvolemic

## 2019-08-19 NOTE — ANESTHESIA PRE PROCEDURE
Department of Anesthesiology  Preprocedure Note       Name:  Osmar Chandler   Age:  52 y.o.  :  1965                                          MRN:  [id number]         Date:  2019      Surgeon: Nigel Joshi):  Audelia Sharma MD    Procedure: MEDIPORT CATHETER INSERTION (DO NOT MOVE TIME) (N/A )    Medications prior to admission:   Prior to Admission medications    Medication Sig Start Date End Date Taking? Authorizing Provider   clonazePAM (KLONOPIN) 0.5 MG tablet Take 0.5 mg by mouth 2 times daily as needed. Yes Historical Provider, MD   HYDROcodone-acetaminophen (NORCO) 5-325 MG per tablet Take 1 tablet by mouth every 6 hours as needed for Pain.    Yes Historical Provider, MD   sertraline (ZOLOFT) 25 MG tablet Take 25 mg by mouth daily   Yes Historical Provider, MD   cefdinir (OMNICEF) 300 MG capsule Take 1 capsule by mouth 2 times daily for 10 days 19 Yes Elisabeth Reveles DO   sevelamer (RENVELA) 800 MG tablet Take 1 tablet by mouth 3 times daily (with meals) 19  Yes JUSTYN Diaz CNP   potassium chloride (KLOR-CON M) 20 MEQ extended release tablet Take 1 tablet by mouth daily 19  Yes JUSTYN Diaz CNP   sodium bicarbonate 650 MG tablet Take 2 tablets by mouth 2 times daily 19  Yes JUSTYN Diaz CNP   atorvastatin (LIPITOR) 40 MG tablet Take 1 tablet by mouth daily  Patient taking differently: Take 40 mg by mouth nightly  19  Yes Jeanette Guzman DO   acetaminophen (TYLENOL) 325 MG tablet Take 650 mg by mouth every 4 hours as needed for Pain   Yes Historical Provider, MD   mirtazapine (REMERON) 15 MG tablet Take 1 tablet by mouth nightly 19  Yes Lebron Neal MD   ondansetron (ZOFRAN-ODT) 4 MG disintegrating tablet Take 1 tablet by mouth every 8 hours as needed for Nausea or Vomiting 19  Yes Jeanette Guzman DO   ferrous sulfate 325 (65 Fe) MG tablet Take 1 tablet by mouth 2 times daily (with meals) 19  Yes Jeanette Guzman DO   docusate 08/09/2019    LABGLOM 39 08/09/2019    GLUCOSE 94 08/09/2019    GLUCOSE 97 12/20/2011    PROT 7.5 08/09/2019    CALCIUM 9.8 08/09/2019    BILITOT <0.2 08/09/2019    ALKPHOS 59 08/09/2019    AST 19 08/09/2019    ALT 23 08/09/2019       POC Tests: No results for input(s): POCGLU, POCNA, POCK, POCCL, POCBUN, POCHEMO, POCHCT in the last 72 hours. Coags:   Lab Results   Component Value Date    PROTIME 13.5 07/08/2019    INR 1.2 07/08/2019    APTT 35.5 07/03/2019       HCG (If Applicable):   Lab Results   Component Value Date    PREGSERUM NEGATIVE 01/29/2014        ABGs: No results found for: PHART, PO2ART, VAY7RVG, PJK0KFC, BEART, K1NHTEMI     Type & Screen (If Applicable):  No results found for: LABABO, [address]    Anesthesia Evaluation  Patient summary reviewed no history of anesthetic complications:   Airway: Mallampati: III  TM distance: >3 FB   Neck ROM: full  Mouth opening: > = 3 FB Dental: normal exam         Pulmonary:   (+) decreased breath sounds,  asthma:     (-) not a current smoker                           Cardiovascular:    (+) hypertension:, hyperlipidemia        Rhythm: regular  Rate: normal                    Neuro/Psych:   (+) psychiatric history: stable with treatment            GI/Hepatic/Renal: Neg GI/Hepatic/Renal ROS  (+) renal disease: dialysis, ESRD and ARF,           Endo/Other:    (+) hypothyroidism::., malignancy/cancer. Abdominal:   (+) obese,     Abdomen: soft. Vascular: negative vascular ROS. Anesthesia Plan      MAC     ASA 3       Induction: intravenous. Anesthetic plan and risks discussed with patient. Plan discussed with CRNA. DOS STAFF ADDENDUM:    Pt seen and examined, chart reviewed (including anesthesia, drug and allergy history). Anesthetic plan, risks, benefits, alternatives, and personnel involved discussed with patient. Patient verbalized an understanding and agrees to proceed.   Plan discussed with care team members and agreed upon.     Arleen Rubi MD  Staff Anesthesiologist  12:44 PM    Arleen Rubi MD   8/19/2019

## 2019-08-28 ENCOUNTER — TELEPHONE (OUTPATIENT)
Dept: ONCOLOGY | Age: 54
End: 2019-08-28

## 2019-08-28 ENCOUNTER — HOSPITAL ENCOUNTER (OUTPATIENT)
Dept: INFUSION THERAPY | Age: 54
Discharge: HOME | End: 2019-08-28
Payer: MEDICAID

## 2019-08-28 ENCOUNTER — OFFICE VISIT (OUTPATIENT)
Dept: ONCOLOGY | Age: 54
End: 2019-08-28
Payer: MEDICAID

## 2019-08-28 VITALS
WEIGHT: 128.5 LBS | BODY MASS INDEX: 25.91 KG/M2 | HEART RATE: 59 BPM | SYSTOLIC BLOOD PRESSURE: 130 MMHG | TEMPERATURE: 97.5 F | DIASTOLIC BLOOD PRESSURE: 69 MMHG | OXYGEN SATURATION: 99 % | HEIGHT: 59 IN

## 2019-08-28 DIAGNOSIS — C68.9 UROTHELIAL CANCER (HCC): ICD-10-CM

## 2019-08-28 DIAGNOSIS — C53.9 MALIGNANT NEOPLASM OF CERVIX, UNSPECIFIED SITE (HCC): Primary | ICD-10-CM

## 2019-08-28 LAB
ALBUMIN SERPL-MCNC: 3.9 G/DL (ref 3.5–5.2)
ALP BLD-CCNC: 58 U/L (ref 35–104)
ALT SERPL-CCNC: 16 U/L (ref 0–32)
ANION GAP SERPL CALCULATED.3IONS-SCNC: 11 MMOL/L (ref 7–16)
ANISOCYTOSIS: ABNORMAL
AST SERPL-CCNC: 11 U/L (ref 0–31)
BASOPHILS ABSOLUTE: 0.05 E9/L (ref 0–0.2)
BASOPHILS RELATIVE PERCENT: 0.7 % (ref 0–2)
BILIRUB SERPL-MCNC: 0.3 MG/DL (ref 0–1.2)
BUN BLDV-MCNC: 29 MG/DL (ref 6–20)
CALCIUM SERPL-MCNC: 9.8 MG/DL (ref 8.6–10.2)
CHLORIDE BLD-SCNC: 104 MMOL/L (ref 98–107)
CO2: 27 MMOL/L (ref 22–29)
CREAT SERPL-MCNC: 1.5 MG/DL (ref 0.5–1)
EOSINOPHILS ABSOLUTE: 0.36 E9/L (ref 0.05–0.5)
EOSINOPHILS RELATIVE PERCENT: 4.7 % (ref 0–6)
GFR AFRICAN AMERICAN: 43
GFR NON-AFRICAN AMERICAN: 36 ML/MIN/1.73
GLUCOSE BLD-MCNC: 94 MG/DL (ref 74–99)
HCT VFR BLD CALC: 31.8 % (ref 34–48)
HEMOGLOBIN: 10 G/DL (ref 11.5–15.5)
IMMATURE GRANULOCYTES #: 0.09 E9/L
IMMATURE GRANULOCYTES %: 1.2 % (ref 0–5)
LYMPHOCYTES ABSOLUTE: 0.56 E9/L (ref 1.5–4)
LYMPHOCYTES RELATIVE PERCENT: 7.3 % (ref 20–42)
MCH RBC QN AUTO: 29.9 PG (ref 26–35)
MCHC RBC AUTO-ENTMCNC: 31.4 % (ref 32–34.5)
MCV RBC AUTO: 95.2 FL (ref 80–99.9)
MONOCYTES ABSOLUTE: 0.45 E9/L (ref 0.1–0.95)
MONOCYTES RELATIVE PERCENT: 5.9 % (ref 2–12)
NEUTROPHILS ABSOLUTE: 6.15 E9/L (ref 1.8–7.3)
NEUTROPHILS RELATIVE PERCENT: 80.2 % (ref 43–80)
OVALOCYTES: ABNORMAL
PDW BLD-RTO: 18.4 FL (ref 11.5–15)
PLATELET # BLD: 311 E9/L (ref 130–450)
PMV BLD AUTO: 10.3 FL (ref 7–12)
POIKILOCYTES: ABNORMAL
POTASSIUM SERPL-SCNC: 4.4 MMOL/L (ref 3.5–5)
RBC # BLD: 3.34 E12/L (ref 3.5–5.5)
SODIUM BLD-SCNC: 142 MMOL/L (ref 132–146)
TOTAL PROTEIN: 6.8 G/DL (ref 6.4–8.3)
WBC # BLD: 7.7 E9/L (ref 4.5–11.5)

## 2019-08-28 PROCEDURE — 85025 COMPLETE CBC W/AUTO DIFF WBC: CPT

## 2019-08-28 PROCEDURE — 80053 COMPREHEN METABOLIC PANEL: CPT

## 2019-08-28 PROCEDURE — 2580000003 HC RX 258: Performed by: INTERNAL MEDICINE

## 2019-08-28 PROCEDURE — 96413 CHEMO IV INFUSION 1 HR: CPT

## 2019-08-28 PROCEDURE — 6360000002 HC RX W HCPCS: Performed by: INTERNAL MEDICINE

## 2019-08-28 RX ORDER — METHYLPREDNISOLONE SODIUM SUCCINATE 125 MG/2ML
125 INJECTION, POWDER, LYOPHILIZED, FOR SOLUTION INTRAMUSCULAR; INTRAVENOUS ONCE
Status: CANCELLED | OUTPATIENT
Start: 2019-08-28

## 2019-08-28 RX ORDER — SODIUM CHLORIDE 9 MG/ML
20 INJECTION, SOLUTION INTRAVENOUS ONCE
Status: CANCELLED | OUTPATIENT
Start: 2019-08-28

## 2019-08-28 RX ORDER — SODIUM CHLORIDE 0.9 % (FLUSH) 0.9 %
10 SYRINGE (ML) INJECTION PRN
Status: CANCELLED | OUTPATIENT
Start: 2019-08-28

## 2019-08-28 RX ORDER — DIPHENHYDRAMINE HYDROCHLORIDE 50 MG/ML
50 INJECTION INTRAMUSCULAR; INTRAVENOUS ONCE
Status: CANCELLED | OUTPATIENT
Start: 2019-08-28

## 2019-08-28 RX ORDER — HEPARIN SODIUM (PORCINE) LOCK FLUSH IV SOLN 100 UNIT/ML 100 UNIT/ML
500 SOLUTION INTRAVENOUS PRN
Status: DISCONTINUED | OUTPATIENT
Start: 2019-08-28 | End: 2019-08-29 | Stop reason: HOSPADM

## 2019-08-28 RX ORDER — HEPARIN SODIUM (PORCINE) LOCK FLUSH IV SOLN 100 UNIT/ML 100 UNIT/ML
500 SOLUTION INTRAVENOUS PRN
Status: CANCELLED | OUTPATIENT
Start: 2019-08-28

## 2019-08-28 RX ORDER — SODIUM CHLORIDE 9 MG/ML
INJECTION, SOLUTION INTRAVENOUS CONTINUOUS
Status: CANCELLED | OUTPATIENT
Start: 2019-08-28

## 2019-08-28 RX ORDER — MEPERIDINE HYDROCHLORIDE 25 MG/ML
12.5 INJECTION INTRAMUSCULAR; INTRAVENOUS; SUBCUTANEOUS ONCE
Status: CANCELLED | OUTPATIENT
Start: 2019-08-28

## 2019-08-28 RX ORDER — SODIUM CHLORIDE 0.9 % (FLUSH) 0.9 %
10 SYRINGE (ML) INJECTION PRN
Status: DISCONTINUED | OUTPATIENT
Start: 2019-08-28 | End: 2019-08-29 | Stop reason: HOSPADM

## 2019-08-28 RX ORDER — SODIUM CHLORIDE 9 MG/ML
20 INJECTION, SOLUTION INTRAVENOUS ONCE
Status: COMPLETED | OUTPATIENT
Start: 2019-08-28 | End: 2019-08-28

## 2019-08-28 RX ORDER — EPINEPHRINE 1 MG/ML
0.3 INJECTION, SOLUTION, CONCENTRATE INTRAVENOUS PRN
Status: CANCELLED | OUTPATIENT
Start: 2019-08-28

## 2019-08-28 RX ADMIN — SODIUM CHLORIDE 20 ML/HR: 9 INJECTION, SOLUTION INTRAVENOUS at 11:35

## 2019-08-28 RX ADMIN — Medication 10 ML: at 12:37

## 2019-08-28 RX ADMIN — SODIUM CHLORIDE 200 MG: 9 INJECTION, SOLUTION INTRAVENOUS at 11:55

## 2019-08-28 RX ADMIN — Medication 500 UNITS: at 12:37

## 2019-08-29 ENCOUNTER — TELEPHONE (OUTPATIENT)
Dept: CASE MANAGEMENT | Age: 54
End: 2019-08-29

## 2019-08-29 NOTE — TELEPHONE ENCOUNTER
Late entry 08/28/2019:Met with patient during her cycle 2 Keytruda treatment for her urothelial cancer. She reported having a decreased appetite, but denied issues with her breathing or frequent diarrhea. Patient said she's taking her anti-emetics as needed. Her biggest concern today is that she reported her 90 day period for rehab will be done on 08/29/2019 and she's not sure if she'll be able to stay at the [facility] or need to return home to her apartment. Patient said she's been talking with the Medicare rep at the [facility] about it and stated her boyfriend lives at her apartment and can help her sometimes if she needs it. She said she'll also need to start paying for a taxi to bring her to her clinic appointments. Will update the [facility] patient navigator to see if patient would qualify for transportation through her insurance. Patient is scheduled to return to clinic on 09/18/2019 at 1015 to see Dr. Nina Mendoza and for cycle 3 Keytruda. Encouraged her to call clinic with any questions or concerns. Moncho Garcia  RN, ADN, BSE, OCN  Patient Nurse Navigator

## 2019-08-30 ENCOUNTER — TELEPHONE (OUTPATIENT)
Dept: INFUSION THERAPY | Age: 54
End: 2019-08-30

## 2019-09-11 ENCOUNTER — HOSPITAL ENCOUNTER (OUTPATIENT)
Dept: RADIATION ONCOLOGY | Age: 54
Discharge: HOME | End: 2019-09-11
Attending: RADIOLOGY
Payer: MEDICAID

## 2019-09-11 VITALS
WEIGHT: 128 LBS | DIASTOLIC BLOOD PRESSURE: 60 MMHG | TEMPERATURE: 98 F | RESPIRATION RATE: 16 BRPM | SYSTOLIC BLOOD PRESSURE: 104 MMHG | BODY MASS INDEX: 25.85 KG/M2 | HEART RATE: 66 BPM

## 2019-09-11 DIAGNOSIS — C68.9 UROTHELIAL CANCER (HCC): Primary | ICD-10-CM

## 2019-09-11 PROCEDURE — 99999 PR OFFICE/OUTPT VISIT,PROCEDURE ONLY: CPT | Performed by: NURSE PRACTITIONER

## 2019-09-11 NOTE — PROGRESS NOTES
sertraline (ZOLOFT) 25 MG tablet Take 25 mg by mouth daily      sevelamer (RENVELA) 800 MG tablet Take 1 tablet by mouth 3 times daily (with meals) 90 tablet 0    potassium chloride (KLOR-CON M) 20 MEQ extended release tablet Take 1 tablet by mouth daily 30 tablet 0    sodium bicarbonate 650 MG tablet Take 2 tablets by mouth 2 times daily 180 tablet 0    atorvastatin (LIPITOR) 40 MG tablet Take 1 tablet by mouth daily (Patient taking differently: Take 40 mg by mouth nightly ) 30 tablet 3    acetaminophen (TYLENOL) 325 MG tablet Take 650 mg by mouth every 4 hours as needed for Pain      mirtazapine (REMERON) 15 MG tablet Take 1 tablet by mouth nightly 30 tablet 0    ondansetron (ZOFRAN-ODT) 4 MG disintegrating tablet Take 1 tablet by mouth every 8 hours as needed for Nausea or Vomiting      ferrous sulfate 325 (65 Fe) MG tablet Take 1 tablet by mouth 2 times daily (with meals) 30 tablet 3    docusate sodium (COLACE, DULCOLAX) 100 MG CAPS Take 100 mg by mouth 2 times daily 60 capsule 0    levothyroxine (SYNTHROID) 100 MCG tablet TAKE 1 TABLET BY MOUTH EVERY MORNING 30 MINUTES BEFORE EATING. 30 tablet 2    vitamin D (ERGOCALCIFEROL) 43186 units CAPS capsule Take 1 capsule by mouth once a week 4 capsule 5    oxybutynin (DITROPAN-XL) 5 MG extended release tablet Take 1 tablet by mouth daily 30 tablet 3    magnesium oxide (MAG-OX) 400 (241.3 Mg) MG TABS tablet Take 1 tablet by mouth 2 times daily 60 tablet 3    fluticasone (FLONASE) 50 MCG/ACT nasal spray 2 sprays by Nasal route daily (Patient taking differently: 1 spray by Nasal route daily ) 1 Bottle 3    pantoprazole (PROTONIX) 40 MG tablet TAKE ONE TABLET BY MOUTH DAILY 30 tablet 3    Compression Stockings MISC by Does not apply route Below knee  15 - 30 mm Hg 2 each 0     No current facility-administered medications for this encounter.           Physical Examination:   Vitals:    09/11/19 1049   BP: 104/60   Pulse: 66   Resp: 16   Temp: 98 °F (36.7 °C)

## 2019-09-13 ENCOUNTER — APPOINTMENT (OUTPATIENT)
Dept: CT IMAGING | Age: 54
DRG: 461 | End: 2019-09-13
Payer: MEDICAID

## 2019-09-13 ENCOUNTER — TELEPHONE (OUTPATIENT)
Dept: CASE MANAGEMENT | Age: 54
End: 2019-09-13

## 2019-09-13 ENCOUNTER — HOSPITAL ENCOUNTER (INPATIENT)
Age: 54
LOS: 11 days | Discharge: NURSING, REHAB OR LONG-TERM CARE | DRG: 461 | End: 2019-09-25
Attending: EMERGENCY MEDICINE | Admitting: INTERNAL MEDICINE
Payer: MEDICAID

## 2019-09-13 DIAGNOSIS — N13.9 OBSTRUCTIVE UROPATHY: ICD-10-CM

## 2019-09-13 DIAGNOSIS — N17.9 ACUTE RENAL FAILURE, UNSPECIFIED ACUTE RENAL FAILURE TYPE (HCC): ICD-10-CM

## 2019-09-13 DIAGNOSIS — R19.7 DIARRHEA, UNSPECIFIED TYPE: ICD-10-CM

## 2019-09-13 DIAGNOSIS — N10 ACUTE PYELONEPHRITIS: ICD-10-CM

## 2019-09-13 DIAGNOSIS — N39.0 URINARY TRACT INFECTION ASSOCIATED WITH NEPHROSTOMY CATHETER, INITIAL ENCOUNTER (HCC): Primary | ICD-10-CM

## 2019-09-13 DIAGNOSIS — R10.30 LOWER ABDOMINAL PAIN: ICD-10-CM

## 2019-09-13 DIAGNOSIS — T83.512A URINARY TRACT INFECTION ASSOCIATED WITH NEPHROSTOMY CATHETER, INITIAL ENCOUNTER (HCC): Primary | ICD-10-CM

## 2019-09-13 DIAGNOSIS — Z46.6 ENCOUNTER FOR REMOVAL OF URETERAL STENT: ICD-10-CM

## 2019-09-13 LAB
ALBUMIN SERPL-MCNC: 3.3 G/DL (ref 3.5–5.2)
ALP BLD-CCNC: 71 U/L (ref 35–104)
ALT SERPL-CCNC: 14 U/L (ref 0–32)
ANION GAP SERPL CALCULATED.3IONS-SCNC: 9 MMOL/L (ref 7–16)
AST SERPL-CCNC: 18 U/L (ref 0–31)
BACTERIA: ABNORMAL /HPF
BASOPHILS ABSOLUTE: 0.03 E9/L (ref 0–0.2)
BASOPHILS RELATIVE PERCENT: 0.3 % (ref 0–2)
BILIRUB SERPL-MCNC: <0.2 MG/DL (ref 0–1.2)
BILIRUBIN URINE: NEGATIVE
BLOOD, URINE: ABNORMAL
BUN BLDV-MCNC: 26 MG/DL (ref 6–20)
CALCIUM SERPL-MCNC: 9.5 MG/DL (ref 8.6–10.2)
CHLORIDE BLD-SCNC: 105 MMOL/L (ref 98–107)
CLARITY: ABNORMAL
CO2: 28 MMOL/L (ref 22–29)
COLOR: YELLOW
CREAT SERPL-MCNC: 1.5 MG/DL (ref 0.5–1)
EOSINOPHILS ABSOLUTE: 0.37 E9/L (ref 0.05–0.5)
EOSINOPHILS RELATIVE PERCENT: 3.6 % (ref 0–6)
GFR AFRICAN AMERICAN: 43
GFR NON-AFRICAN AMERICAN: 36 ML/MIN/1.73
GLUCOSE BLD-MCNC: 118 MG/DL (ref 74–99)
GLUCOSE URINE: NEGATIVE MG/DL
HCT VFR BLD CALC: 29.5 % (ref 34–48)
HEMOGLOBIN: 9.5 G/DL (ref 11.5–15.5)
IMMATURE GRANULOCYTES #: 0.07 E9/L
IMMATURE GRANULOCYTES %: 0.7 % (ref 0–5)
KETONES, URINE: NEGATIVE MG/DL
LEUKOCYTE ESTERASE, URINE: ABNORMAL
LYMPHOCYTES ABSOLUTE: 0.78 E9/L (ref 1.5–4)
LYMPHOCYTES RELATIVE PERCENT: 7.7 % (ref 20–42)
MCH RBC QN AUTO: 30.9 PG (ref 26–35)
MCHC RBC AUTO-ENTMCNC: 32.2 % (ref 32–34.5)
MCV RBC AUTO: 96.1 FL (ref 80–99.9)
MONOCYTES ABSOLUTE: 0.62 E9/L (ref 0.1–0.95)
MONOCYTES RELATIVE PERCENT: 6.1 % (ref 2–12)
NEUTROPHILS ABSOLUTE: 8.27 E9/L (ref 1.8–7.3)
NEUTROPHILS RELATIVE PERCENT: 81.6 % (ref 43–80)
NITRITE, URINE: NEGATIVE
PDW BLD-RTO: 17.7 FL (ref 11.5–15)
PH UA: 7.5 (ref 5–9)
PLATELET # BLD: 284 E9/L (ref 130–450)
PMV BLD AUTO: 10.5 FL (ref 7–12)
POTASSIUM REFLEX MAGNESIUM: 4.7 MMOL/L (ref 3.5–5)
PROTEIN UA: >=300 MG/DL
RBC # BLD: 3.07 E12/L (ref 3.5–5.5)
RBC UA: >20 /HPF (ref 0–2)
SODIUM BLD-SCNC: 142 MMOL/L (ref 132–146)
SPECIFIC GRAVITY UA: 1.02 (ref 1–1.03)
TOTAL PROTEIN: 7.1 G/DL (ref 6.4–8.3)
UROBILINOGEN, URINE: 0.2 E.U./DL
WBC # BLD: 10.1 E9/L (ref 4.5–11.5)
WBC UA: >20 /HPF (ref 0–5)

## 2019-09-13 PROCEDURE — 85025 COMPLETE CBC W/AUTO DIFF WBC: CPT

## 2019-09-13 PROCEDURE — 81001 URINALYSIS AUTO W/SCOPE: CPT

## 2019-09-13 PROCEDURE — 36415 COLL VENOUS BLD VENIPUNCTURE: CPT

## 2019-09-13 PROCEDURE — 2580000003 HC RX 258: Performed by: EMERGENCY MEDICINE

## 2019-09-13 PROCEDURE — 99285 EMERGENCY DEPT VISIT HI MDM: CPT

## 2019-09-13 PROCEDURE — 80053 COMPREHEN METABOLIC PANEL: CPT

## 2019-09-13 RX ORDER — 0.9 % SODIUM CHLORIDE 0.9 %
500 INTRAVENOUS SOLUTION INTRAVENOUS ONCE
Status: COMPLETED | OUTPATIENT
Start: 2019-09-13 | End: 2019-09-13

## 2019-09-13 RX ADMIN — SODIUM CHLORIDE 500 ML: 9 INJECTION, SOLUTION INTRAVENOUS at 19:40

## 2019-09-13 ASSESSMENT — PAIN DESCRIPTION - PAIN TYPE: TYPE: ACUTE PAIN

## 2019-09-13 ASSESSMENT — PAIN SCALES - GENERAL: PAINLEVEL_OUTOF10: 10

## 2019-09-13 NOTE — ED PROVIDER NOTES
Management Options  Acute pyelonephritis: new, needed workup  Diarrhea, unspecified type: new, needed workup  Lower abdominal pain: new, needed workup  Urinary tract infection associated with nephrostomy catheter, initial encounter [facility]): new, needed workup     Amount and/or Complexity of Data Reviewed  Clinical lab tests: ordered and reviewed  Tests in the radiology section of CPT®: ordered and reviewed  Tests in the medicine section of CPT®: ordered and reviewed  Discuss the patient with other providers: yes  Independent visualization of images, tracings, or specimens: yes    Risk of Complications, Morbidity, and/or Mortality  Presenting problems: high  Diagnostic procedures: moderate  Management options: moderate    Patient Progress  Patient progress: stable          Consultations:   Medicine - Dr. Jeffrey Albert Resident - Dr. Oneil Cole      ED Course as of Sep 14 0200   Fri Sep 13, 2019   2329 Pt doing better with fluids and meds. Informed of all results so far. Await CT Abd/pelvis and results. [MP]   Sat Sep 14, 2019   8065 Spoke with Dr. Marcos Franklin. Discussed PT condition and care. Informed of all results. Agrees with above and Admit. [MP]   5781 Spoke with Dr. Oneil Cole [facility] Resident). Discussed PT condition and care. Informed of all results. Agrees with above and Tele Admit. Will call him back if abnormal CT results. [MP]      ED Course User Index  [MP] Lizette Díaz,        Counseling: The emergency provider has spoken with the patient and discussed todays results, in addition to providing specific details for the plan of care and counseling regarding the diagnosis and prognosis. Questions are answered at this time and they are agreeable with the plan.      --------------------------------- IMPRESSION AND DISPOSITION ---------------------------------    IMPRESSION  1. Urinary tract [facility]rost[facility] catheter[facility]    2. Acute pyelonephritis    3.  Diarrhea, unspecified type    4. Lower abdominal pain        DISPOSITION  Disposition: Admit to telemetry  Patient condition is stable    9/13/19, 6:01 PM.    This note is prepared by Mikael Pederson, acting as Scribe for [employer] . [employer], DO:  The scribe's documentation has been prepared under my direction and personally reviewed by me in its entirety. I confirm that the note above accurately reflects all work, treatment, procedures, and medical decision making performed by me.              [employer],   09/14/19 4988

## 2019-09-14 ENCOUNTER — APPOINTMENT (OUTPATIENT)
Dept: CT IMAGING | Age: 54
DRG: 461 | End: 2019-09-14
Payer: MEDICAID

## 2019-09-14 PROBLEM — N10 ACUTE PYELONEPHRITIS: Status: ACTIVE | Noted: 2019-09-14

## 2019-09-14 PROBLEM — N30.90 CYSTITIS: Status: ACTIVE | Noted: 2019-09-14

## 2019-09-14 PROCEDURE — 74176 CT ABD & PELVIS W/O CONTRAST: CPT

## 2019-09-14 PROCEDURE — 6360000002 HC RX W HCPCS: Performed by: EMERGENCY MEDICINE

## 2019-09-14 PROCEDURE — 87081 CULTURE SCREEN ONLY: CPT

## 2019-09-14 PROCEDURE — 87088 URINE BACTERIA CULTURE: CPT

## 2019-09-14 PROCEDURE — 6370000000 HC RX 637 (ALT 250 FOR IP): Performed by: INTERNAL MEDICINE

## 2019-09-14 PROCEDURE — 36415 COLL VENOUS BLD VENIPUNCTURE: CPT

## 2019-09-14 PROCEDURE — 6360000002 HC RX W HCPCS: Performed by: INTERNAL MEDICINE

## 2019-09-14 PROCEDURE — 2580000003 HC RX 258: Performed by: EMERGENCY MEDICINE

## 2019-09-14 PROCEDURE — 2580000003 HC RX 258: Performed by: INTERNAL MEDICINE

## 2019-09-14 PROCEDURE — 99222 1ST HOSP IP/OBS MODERATE 55: CPT | Performed by: CLINICAL NURSE SPECIALIST

## 2019-09-14 PROCEDURE — 6360000002 HC RX W HCPCS

## 2019-09-14 PROCEDURE — 96365 THER/PROPH/DIAG IV INF INIT: CPT

## 2019-09-14 PROCEDURE — 2140000000 HC CCU INTERMEDIATE R&B

## 2019-09-14 RX ORDER — MORPHINE SULFATE 2 MG/ML
2 INJECTION, SOLUTION INTRAMUSCULAR; INTRAVENOUS ONCE
Status: COMPLETED | OUTPATIENT
Start: 2019-09-14 | End: 2019-09-14

## 2019-09-14 RX ORDER — SODIUM CHLORIDE 0.9 % (FLUSH) 0.9 %
10 SYRINGE (ML) INJECTION PRN
Status: DISCONTINUED | OUTPATIENT
Start: 2019-09-14 | End: 2019-09-14 | Stop reason: SDUPTHER

## 2019-09-14 RX ORDER — OXYBUTYNIN CHLORIDE 5 MG/1
5 TABLET, EXTENDED RELEASE ORAL DAILY
Status: DISCONTINUED | OUTPATIENT
Start: 2019-09-14 | End: 2019-09-17

## 2019-09-14 RX ORDER — SODIUM CHLORIDE 0.9 % (FLUSH) 0.9 %
10 SYRINGE (ML) INJECTION EVERY 12 HOURS SCHEDULED
Status: DISCONTINUED | OUTPATIENT
Start: 2019-09-14 | End: 2019-09-25 | Stop reason: HOSPADM

## 2019-09-14 RX ORDER — SODIUM CHLORIDE 0.9 % (FLUSH) 0.9 %
10 SYRINGE (ML) INJECTION PRN
Status: DISCONTINUED | OUTPATIENT
Start: 2019-09-14 | End: 2019-09-25 | Stop reason: HOSPADM

## 2019-09-14 RX ORDER — SODIUM CHLORIDE 0.9 % (FLUSH) 0.9 %
10 SYRINGE (ML) INJECTION EVERY 12 HOURS SCHEDULED
Status: DISCONTINUED | OUTPATIENT
Start: 2019-09-14 | End: 2019-09-14 | Stop reason: SDUPTHER

## 2019-09-14 RX ORDER — HEPARIN SODIUM 10000 [USP'U]/ML
5000 INJECTION, SOLUTION INTRAVENOUS; SUBCUTANEOUS EVERY 8 HOURS
Status: DISCONTINUED | OUTPATIENT
Start: 2019-09-14 | End: 2019-09-15

## 2019-09-14 RX ORDER — ACETAMINOPHEN 325 MG/1
650 TABLET ORAL EVERY 4 HOURS PRN
Status: DISCONTINUED | OUTPATIENT
Start: 2019-09-14 | End: 2019-09-25 | Stop reason: HOSPADM

## 2019-09-14 RX ORDER — 0.9 % SODIUM CHLORIDE 0.9 %
250 INTRAVENOUS SOLUTION INTRAVENOUS ONCE
Status: COMPLETED | OUTPATIENT
Start: 2019-09-14 | End: 2019-09-14

## 2019-09-14 RX ORDER — DOCUSATE SODIUM 100 MG/1
100 CAPSULE, LIQUID FILLED ORAL 2 TIMES DAILY
Status: DISCONTINUED | OUTPATIENT
Start: 2019-09-14 | End: 2019-09-14

## 2019-09-14 RX ORDER — SENNA PLUS 8.6 MG/1
1 TABLET ORAL NIGHTLY
Status: DISCONTINUED | OUTPATIENT
Start: 2019-09-14 | End: 2019-09-21

## 2019-09-14 RX ORDER — ONDANSETRON 2 MG/ML
4 INJECTION INTRAMUSCULAR; INTRAVENOUS EVERY 6 HOURS PRN
Status: DISCONTINUED | OUTPATIENT
Start: 2019-09-14 | End: 2019-09-25 | Stop reason: HOSPADM

## 2019-09-14 RX ORDER — ATORVASTATIN CALCIUM 40 MG/1
40 TABLET, FILM COATED ORAL NIGHTLY
Status: DISCONTINUED | OUTPATIENT
Start: 2019-09-14 | End: 2019-09-25 | Stop reason: HOSPADM

## 2019-09-14 RX ORDER — ONDANSETRON 2 MG/ML
INJECTION INTRAMUSCULAR; INTRAVENOUS
Status: COMPLETED
Start: 2019-09-14 | End: 2019-09-14

## 2019-09-14 RX ORDER — ONDANSETRON 2 MG/ML
4 INJECTION INTRAMUSCULAR; INTRAVENOUS ONCE
Status: COMPLETED | OUTPATIENT
Start: 2019-09-14 | End: 2019-09-14

## 2019-09-14 RX ORDER — LEVOTHYROXINE SODIUM 0.1 MG/1
100 TABLET ORAL DAILY
Status: DISCONTINUED | OUTPATIENT
Start: 2019-09-14 | End: 2019-09-25 | Stop reason: HOSPADM

## 2019-09-14 RX ORDER — CIPROFLOXACIN 2 MG/ML
400 INJECTION, SOLUTION INTRAVENOUS
Status: DISCONTINUED | OUTPATIENT
Start: 2019-09-14 | End: 2019-09-14

## 2019-09-14 RX ORDER — MIRTAZAPINE 15 MG/1
15 TABLET, FILM COATED ORAL NIGHTLY
Status: DISCONTINUED | OUTPATIENT
Start: 2019-09-14 | End: 2019-09-25 | Stop reason: HOSPADM

## 2019-09-14 RX ORDER — POTASSIUM CHLORIDE 20 MEQ/1
20 TABLET, EXTENDED RELEASE ORAL DAILY
Status: ON HOLD | COMMUNITY
End: 2019-09-17

## 2019-09-14 RX ORDER — CLONAZEPAM 0.5 MG/1
0.5 TABLET ORAL 2 TIMES DAILY PRN
Status: DISCONTINUED | OUTPATIENT
Start: 2019-09-14 | End: 2019-09-25 | Stop reason: HOSPADM

## 2019-09-14 RX ADMIN — HEPARIN SODIUM 5000 UNITS: 10000 INJECTION INTRAVENOUS; SUBCUTANEOUS at 15:32

## 2019-09-14 RX ADMIN — OXYBUTYNIN CHLORIDE 5 MG: 5 TABLET, EXTENDED RELEASE ORAL at 10:18

## 2019-09-14 RX ADMIN — VANCOMYCIN HYDROCHLORIDE 1250 MG: 10 INJECTION, POWDER, LYOPHILIZED, FOR SOLUTION INTRAVENOUS at 02:24

## 2019-09-14 RX ADMIN — MIRTAZAPINE 15 MG: 15 TABLET, FILM COATED ORAL at 21:02

## 2019-09-14 RX ADMIN — Medication 10 ML: at 20:53

## 2019-09-14 RX ADMIN — CLONAZEPAM 0.5 MG: 0.5 TABLET ORAL at 20:45

## 2019-09-14 RX ADMIN — ATORVASTATIN CALCIUM 40 MG: 40 TABLET, FILM COATED ORAL at 20:46

## 2019-09-14 RX ADMIN — PIPERACILLIN SODIUM AND TAZOBACTAM SODIUM 3.38 G: 3; .375 INJECTION, POWDER, LYOPHILIZED, FOR SOLUTION INTRAVENOUS at 10:18

## 2019-09-14 RX ADMIN — HEPARIN SODIUM 5000 UNITS: 10000 INJECTION INTRAVENOUS; SUBCUTANEOUS at 21:08

## 2019-09-14 RX ADMIN — SODIUM CHLORIDE 250 ML: 9 INJECTION, SOLUTION INTRAVENOUS at 16:10

## 2019-09-14 RX ADMIN — Medication 10 ML: at 10:18

## 2019-09-14 RX ADMIN — SENNOSIDES 8.6 MG: 8.6 TABLET, FILM COATED ORAL at 20:45

## 2019-09-14 RX ADMIN — ACETAMINOPHEN 650 MG: 325 TABLET, FILM COATED ORAL at 20:46

## 2019-09-14 RX ADMIN — PIPERACILLIN AND TAZOBACTAM 4.5 G: 4; .5 INJECTION, POWDER, LYOPHILIZED, FOR SOLUTION INTRAVENOUS; PARENTERAL at 00:33

## 2019-09-14 RX ADMIN — ONDANSETRON HYDROCHLORIDE 4 MG: 2 SOLUTION INTRAMUSCULAR; INTRAVENOUS at 02:16

## 2019-09-14 RX ADMIN — ONDANSETRON HYDROCHLORIDE 4 MG: 2 SOLUTION INTRAMUSCULAR; INTRAVENOUS at 02:15

## 2019-09-14 RX ADMIN — Medication 10 ML: at 21:02

## 2019-09-14 RX ADMIN — MORPHINE SULFATE 2 MG: 2 INJECTION, SOLUTION INTRAMUSCULAR; INTRAVENOUS at 02:12

## 2019-09-14 ASSESSMENT — PAIN SCALES - GENERAL
PAINLEVEL_OUTOF10: 10
PAINLEVEL_OUTOF10: 6
PAINLEVEL_OUTOF10: 9

## 2019-09-14 ASSESSMENT — PAIN DESCRIPTION - FREQUENCY
FREQUENCY: CONTINUOUS
FREQUENCY: CONTINUOUS

## 2019-09-14 ASSESSMENT — ENCOUNTER SYMPTOMS
VOMITING: 0
DIARRHEA: 0
RESPIRATORY NEGATIVE: 1
SORE THROAT: 0
PHOTOPHOBIA: 0
CONSTIPATION: 0
ABDOMINAL PAIN: 1
NAUSEA: 0
ABDOMINAL DISTENTION: 0

## 2019-09-14 ASSESSMENT — PAIN DESCRIPTION - PROGRESSION
CLINICAL_PROGRESSION: NOT CHANGED
CLINICAL_PROGRESSION: NOT CHANGED

## 2019-09-14 ASSESSMENT — PAIN DESCRIPTION - DESCRIPTORS
DESCRIPTORS: CONSTANT;CRAMPING;DISCOMFORT
DESCRIPTORS: CONSTANT;DISCOMFORT;CRAMPING

## 2019-09-14 ASSESSMENT — PAIN DESCRIPTION - LOCATION
LOCATION: ABDOMEN
LOCATION: ABDOMEN

## 2019-09-14 ASSESSMENT — PAIN DESCRIPTION - PAIN TYPE
TYPE: ACUTE PAIN
TYPE: ACUTE PAIN

## 2019-09-14 ASSESSMENT — PAIN DESCRIPTION - ORIENTATION
ORIENTATION: RIGHT;LEFT
ORIENTATION: RIGHT;LEFT

## 2019-09-14 ASSESSMENT — PAIN DESCRIPTION - DIRECTION
RADIATING_TOWARDS: LOWER
RADIATING_TOWARDS: LOWER

## 2019-09-14 ASSESSMENT — PAIN DESCRIPTION - ONSET
ONSET: ON-GOING
ONSET: ON-GOING

## 2019-09-14 NOTE — CONSULTS
Autism    Cancer Maternal Grandmother         patient doesn't know type.  Heart Attack Maternal Grandfather    . Otherwise non-pertinent to the chief complaint. REVIEW OF SYSTEMS:    CONSTITUTIONAL:  No chills, fevers or night sweats. No loss of weight. EYES:  No double vision or drainage from eyes, ears or throat. HEENT:  No neck stiffness. No dysphagia. No drainage from eyes, ears or throat  RESPIRATORY:  No cough, productive sputum or hemoptysis. CARDIOVASCULAR:  No chest pain, palpitations, orthopnea or dyspnea on exertion. GASTROINTESTINAL: Positive nausea, vomiting, diarrhea or constipation or hematochezia   GENITOURINARY:  No frequency burning dysuria or hematuria. Suprapubic pain  INTEGUMENT/BREAST:  No rash or breast masses. HEMATOLOGIC/LYMPHATIC:  No lymphadenopathy or blood dyscrasics. ALLERGIC/IMMUNOLOGIC:  No anaphylaxis. ENDOCRINE:  No polyuria or polydipsia or temperature intolerance. MUSCULOSKELETAL:  No myalgia or arthralgia. Full ROM. NEUROLOGICAL:  No focal motor sensory deficit. BEHAVIOR/PSYCH:  No psychosis. PHYSICAL EXAM:    Vitals:    BP (!) 92/54   Pulse 62   Temp 98 °F (36.7 °C) (Temporal)   Resp 16   Ht 4' 11\" (1.499 m)   Wt 132 lb 5 oz (60 kg)   LMP 05/21/2015 (Approximate)   SpO2 100%   BMI 26.72 kg/m²   Constitutional: The patient is awake, alert, and oriented. Skin: Warm and dry. No rashes were noted. HEENT: Eyes show round, and reactive pupils. No jaundice. Moist mucous membranes, no ulcerations, no thrush. Neck: Supple to movements. No lymphadenopathy. Chest: No use of accessory muscles to breathe. Symmetrical expansion. Auscultation reveals no wheezing, crackles, or rhonchi. Cardiovascular: S1 and S2 are rhythmic and regular. No murmurs appreciated. Abdomen: Positive bowel sounds to auscultation. Benign to palpation depth with suprapubic tenderness. No masses felt. No hepatosplenomegaly.   Extremities: No clubbing, no cyanosis, no

## 2019-09-14 NOTE — H&P
[facility]  Internal Medicine Residency Program  History and Physical    Patient:  Daja Little 52 y.o. female MRN: [id number]     Date of Service: 9/14/2019    Hospital Day: 2      Chief complaint: had concerns including Abdominal Pain (complains of suprapubic pain, history of uterine ca with mets, recently started chemo). History of Present Illness   The patient is a 52 y.o. female with a PMH of urothelial Carcinoma of the urinary bladder, Stage IV, s/p chemotherapy and pelvic radiation, Bilateral stents, UTI. Who presented to the ED with sever abdominal pain that started about a week ago, the patient stated that the pain is in her belly bilaterally, dull in nature, worsens by nothing, and does not radiate. She has also been having nausea for the past 2 weeks. The patient had her most recent pelvic radiation in July and scheduled for 3rd cycle immunotherapy [place] [facility]) on September 18. The patient has been having chills the past few weeks. She denied chest pain, SOB, other ROS was negative.        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 ([address] 6/29/2019    Chronic kidney disease     Chronic major depressive disorder, recurrent episode (HCC)     Constipation, unspecified     Depression     Difficulty walking     Dysmenorrhea, unspecified     Dysphagia, oropharyngeal     Hyperlipidemia     Hypertension     Hypothyroidism     Intellectual disability     Leg pain, bilateral     Mild intermittent asthma, uncomplicated     Mild protein-calorie malnutrition (HCC)     Mixed incontinence     Muscle weakness lymphadenopathy  · Pulmonary/Chest: clear to auscultation bilaterally- no wheezes, rales or rhonchi, normal air movement, no respiratory distress  · Cardiovascular: normal rate, regular rhythm, normal S1 and S2, no murmurs, rubs, clicks, or gallops, distal pulses intact, no carotid bruits  · Abdomen: soft, non-tender, Distended, Bilateral Nephrostomy Tubes, -ve Hughes sign, no rebound tenderness, normal bowel sounds, no masses or organomegaly  · Extremities: no cyanosis, clubbing or edema  · Musculoskeletal: normal range of motion, no joint swelling, deformity or tenderness  · Neurologic: reflexes normal and symmetric, no cranial nerve deficit, gait, coordination and speech normal   Labs and Imaging Studies   Basic Labs  Recent Labs     09/13/19  2205      K 4.7      CO2 28   BUN 26*   CREATININE 1.5*   GLUCOSE 118*   CALCIUM 9.5       Recent Labs     09/13/19 2205   WBC 10.1   RBC 3.07*   HGB 9.5*   HCT 29.5*   MCV 96.1   MCH 30.9   MCHC 32.2   RDW 17.7*      MPV 10.5       CBC:   Lab Results   Component Value Date    WBC 10.1 09/13/2019    RBC 3.07 09/13/2019    HGB 9.5 09/13/2019    HCT 29.5 09/13/2019    MCV 96.1 09/13/2019    RDW 17.7 09/13/2019     09/13/2019     BMP:    Lab Results   Component Value Date     09/13/2019    K 4.7 09/13/2019     09/13/2019    CO2 28 09/13/2019    BUN 26 09/13/2019     HFP:    Lab Results   Component Value Date    PROT 7.1 09/13/2019     CMP:  Lab Results   Component Value Date     09/13/2019    K 4.7 09/13/2019     09/13/2019    CO2 28 09/13/2019    BUN 26 09/13/2019    PROT 7.1 09/13/2019     FLP:    Lab Results   Component Value Date    CHOL 177 04/19/2019    TRIG 177 04/19/2019    HDL 38 04/19/2019     U/A:  No components found for: Fara Everts, USPGRAV, UPH, UPROTEIN, UGLUCOSE, UKETONE, UBILI, UBLOOD, UNITRITE, UUROBIL, ULEUKEST, USQEPI, URENEPI, UWBC, URBC, Synchari, Ericson  TSH:    Lab Results   Component Value Date    TSH 0.017 04/19/2019     PT/INR:  No results found for: PTINR  PTT:    Lab Results   Component Value Date    APTT 35.5 07/03/2019     HgBA1c:  No components found for: HGBA1C  Iron Studies:    Lab Results   Component Value Date    TIBC 226 08/07/2019    FERRITIN 996 08/07/2019     VITAMIN B12: No components found for: B12  FOLATE:    Lab Results   Component Value Date    FOLATE 5.4 04/19/2019     RPR:  No results found for: RPR  HIV:  No results found for: HIV  ASHLEY:  No results found for: ANATITER  RF:  No results found for: RF  AIP:  No results found for: ANATITER, C3, C4, CRP, RF  PSA: No results found for: PSA    Imaging Studies:     Ct Abdomen Pelvis Wo Contrast Additional Contrast? None    Result Date: 9/14/2019  EXAM: CT Abdomen and Pelvis Without Contrast EXAM DATE/TIME: 9/13/2019 11:45 PM CLINICAL HISTORY: 52years old, female; Abdominal pain; Generalized TECHNIQUE: Imaging protocol: Computed tomography of the abdomen and pelvis without contrast. Radiation optimization: All CT scans at this facility use at least one of these dose optimization techniques: automated exposure control; mA and/or kV adjustment per patient size (includes targeted exams where dose is matched to clinical indication); or iterative reconstruction. COMPARISON: CT ABDOMEN PELVIS WO CONTRAST 8/9/2019 12:48 PM FINDINGS: Mediastinum: Small-sized hiatal hernia. Liver: Normal. Gallbladder and bile ducts: Normal Pancreas: Normal. Spleen: Normal.  Adrenals: Normal.  Kidneys and ureters: Bilateral posterior approach percutaneous nephrostomy tubes in place. Bilateral double-J ureteral stents in place. Stomach and bowel: Normal.  Appendix: No evidence of appendicitis. Intraperitoneal space: Small amount of fluid within the pelvis. Vasculature: Several phleboliths within the pelvis. Lymph nodes: Unremarkable. No enlarged lymph nodes. Bladder: Urinary bladder decompressed by Braden catheter. Reproductive: Unremarkable as visualized.

## 2019-09-14 NOTE — PROGRESS NOTES
Hematology / oncology. [facility]        Pt Name: Faustino Frank: 1965  Date of evaluation: 9/14/2019  Primary Care Physician: Tata Carver DO  Reason for evaluation:   Chief Complaint   Patient presents with    Abdominal Pain     complains of suprapubic pain, history of uterine ca with mets, recently started chemo      Status reviewed . Admitted with lower abd. Pain   No N ./ V / D . No other new sxs   S/p  cycle 2 of immunotherapy. Completed XRT on 8/1/19    She stays at [facility] at the [facility]. OBJECTIVE:  VITALS:  height is 4' 11\" (1.499 m) and weight is 132 lb 5 oz (60 kg). Her temporal temperature is 98 °F (36.7 °C). Her blood pressure is 92/54 (abnormal) and her pulse is 62. Her respiration is 16 and oxygen saturation is 100%. Physical Exam:  Performance Status: 0  Well developed, well nourished female  General: Alert no acute distress,  appears slow in cognition  Head and neck : PERRL, Sclera non icteric. Poor dentition.   Oropharynx : Clear  Neck: no JVD,  no adenopathy,  Lungs: Clear to auscultation   Heart: Regular rate and rhythm. No murmur. Abdomen: Soft, non-tender;no masses, no organomegaly. Bilateral nephrostomy tubes in place  Extremities: No edema. Neurologic:Cranial nerves grossly intact. No focal motor or sensory deficits. Mentally challenged. Skin:  No rash. Multiple moles.       Medications  Prior to Admission medications    Medication Sig Start Date End Date Taking? Authorizing Provider   potassium chloride (KLOR-CON M) 20 MEQ extended release tablet Take 20 mEq by mouth daily   Yes Historical Provider, MD   clonazePAM (KLONOPIN) 0.5 MG tablet Take 0.5 mg by mouth 2 times daily as needed. Yes Historical Provider, MD   HYDROcodone-acetaminophen (NORCO) 5-325 MG per tablet Take 1 tablet by mouth every 6 hours as needed for Pain (moderate pain malignant neoplasm of uterus).     Yes Historical Provider, MD   sertraline (ZOLOFT) 25 MG tablet Meds:  Continuous Infusions:  PRN Meds:.        Recent Laboratory Data-     Lab Results   Component Value Date    WBC 10.1 2019    HGB 9.5 (L) 2019    HCT 29.5 (L) 2019    MCV 96.1 2019     2019    LYMPHOPCT 7.7 (L) 2019    RBC 3.07 (L) 2019    MCH 30.9 2019    MCHC 32.2 2019    RDW 17.7 (H) 2019    NEUTOPHILPCT 81.6 (H) 2019    MONOPCT 6.1 2019    BASOPCT 0.3 2019    NEUTROABS 8.27 (H) 2019    LYMPHSABS 0.78 (L) 2019    MONOSABS 0.62 2019    EOSABS 0.37 2019    BASOSABS 0.03 2019       Lab Results   Component Value Date     2019    K 4.7 2019     2019    CO2 28 2019    BUN 26 (H) 2019    CREATININE 1.5 (H) 2019    GLUCOSE 118 (H) 2019    CALCIUM 9.5 2019    PROT 7.1 2019    LABALBU 3.3 (L) 2019    BILITOT <0.2 2019    ALKPHOS 71 2019    AST 18 2019    ALT 14 2019    LABGLOM 36 2019    GFRAA 43 2019         Lab Results   Component Value Date    IRON 51 2019    TIBC 226 (L) 2019    FERRITIN 996 2019               Radiology-  CT Abdomen Pelvis Wo Contrast Additional Contrast? None  Result Date: 8/3/2019  Patient MRN:  [id number] : 1965 Age: 52 years Gender: Female Order Date:  8/3/2019 7:30 PM TECHNIQUE/NUMBER OF IMAGES/COMPARISON/CLINICAL HISTORY: CT abdomen pelvis Axial images were obtained with sagittal and coronal reconstructions. Left lower quadrant pain. 296 images. 57-year-old female patient. FINDINGS: There are prepped venous catheter draining the right and left kidneys. There are additional catheter is in the corresponding ureters for the right and left kidneys. All 4 catheters are in proper position. There is a Braden catheter in the bladder. In the bladder correlates with the Braden catheter present. There is diffuse thickening of the bladder wall.  Some stranding of history of bladder malleolus and the vulvar malignancy. US Dup Upper Extremity Left Venous  Result Date: 7/15/2019  LOCATION: 200 EXAM: US DUP UPPER EXTREMITY LEFT VENOUS COMPARISON: None HISTORY:  swelling and pain  Technique:  Multiple Realtime, grayscale and color-flow spectral waveform analysis Doppler ultrasound images of the left upper extremity was performed. Evaluation was obtained from the forearm to the neck. Compression technique as well as doppler and color flow images were obtained. Findings: Echogenic thrombus with minimal to no vascular flow in the mid to distal basilic pain. Otherwise normal flow and color flow patterns were identified with normal changes to  venous augmentation. Complete compression of the venous system was also noted signifying no evidence of acute vein thrombosis. No soft tissue mass or cyst is identified. 1. No evidence of acute venous thrombosis left upper extremity. 2. Superficial thrombophlebitis of the left basilic vein. CT Nephrostomy Catheter Placement  Result Date: 2019  Patient MRN:  [id number] : 1965 Age: 52 years Gender: Female Order Date:  2019 8:30 AM EXAM: CT NEPHROSTOMY CATHETER PLACEMENT NUMBER OF IMAGES: 164 INDICATION:  neph tube COMPARISON: PET/CT of 2019. FINDINGS: Informed consent was obtained. Appropriate timeout procedure was performed. Conscious sedation and analgesia were utilized. IV hydralazine was used for intraoperative blood pressure control. Direct CT guidance and fluoroscopic guidance were employed. Local anesthesia was administered. The left intrarenal collecting system was accessed with an 18-gauge trocar needle. Guidewire was subsequently advanced and appropriate fascial dilatation was accomplished followed by placement and of an 8 Monegasque pigtail catheter. Contrast was injected into the collecting system and ureter and the catheter adjusted and locked appropriately.  Nephrostomy tube was then fixed to the skin using both suture material and an adhesive appliance. Patient tolerated the procedure well. Status post uneventful CT and fluoroscopy guided left-sided percutaneous nephrostomy tube placement as described. CT Nephrostomy Catheter Placement  Result Date: 2019  Patient MRN:  [id number] : 1965 Age: 52 years Gender: Female Order Date:  2019 8:30 AM EXAM: CT NEPHROSTOMY CATHETER PLACEMENT NUMBER OF IMAGES: 164 INDICATION:  neph tube COMPARISON: PET/CT of 2019. FINDINGS: Informed consent was obtained. Appropriate timeout procedure was performed. Conscious sedation and analgesia were utilized. IV hydralazine was used for intraoperative blood pressure control. Direct CT guidance and fluoroscopic guidance were employed. Local anesthesia was administered. The right intrarenal collecting system was accessed with an 18-gauge trocar needle. Guidewire was subsequently advanced and appropriate fascial dilatation was accomplished followed by placement and of an 8 Spanish pigtail catheter. Contrast was injected into the collecting system and ureter and the catheter adjusted and locked appropriately. Nephrostomy tube was then fixed to the skin using both suture material and an adhesive appliance. Patient tolerated the procedure well. Status post uneventful CT and fluoroscopy guided right-sided percutaneous nephrostomy tube placement as described.          ASSESSMENT/PLAN :  38-year-old woman with mild mental retardation and recent diagnosis of high-grade invasive poorly differentiated carcinoma on a cervical biopsy with immunohistochemical stains suspicious of urothelial primary with extrinsic invasion of the cervix as well as the vaginal wall  She has prior history of CKD stage IV with hydronephrosis and neurogenic bladder and prior stent placement and has required intermittent hemodialysis in the past and most recently  She underwent cystourethroscopy, bilateral JJ ureteral stent insertion,

## 2019-09-14 NOTE — CONSULTS
Inpatient consult to Urology  Consult performed by: Sara Berry, APRN - CNP  Consult ordered by: Hieu Butler MD           INPATIENT CONSULTATION RECORD FOR  9/14/2019      [facility]  [address] [place] [address]. Rosina Duenas, [address] [facility]  [phone]        REASON FOR CONSULTATION:  pyelonephritis, urostomy, urothelial cancer. HISTORY OF PRESENT ILLNESS:      The patient is a 52 y.o. female patient who is known to our group. She has a history of neurogenic bladder, bilateral hydronephrosis, renal stones, bilateral J stents, bilateral nephrostomy tubes, and CKD. She was scheduled to have her stents exchanged Monday at [facility] by Dr Jacobo Bell. She has  presented to the ED yesterday evening for  abdominal pain that started about one week ago. She reports the pain is in both sided of her belly. She  denies radiation of the pain. Associated symptoms of chills and nausea. Denies  Constipation, diarrhea, cough, SOB, chest pain, or fevers. She had 3 organisms  Found on her last UTI 8/12/19. > 100,000 of Enterobacter cloacae (2 morphologies) and  Raoultella planticola. Vancomycin and Zosyn started in ED. ID consulted. UA + large WBC's and RBC's. Akshat Gunn has recent diagnosis of high-grade invasive poorly differentiated carcinoma on a cervical biopsy with immunohistochemical stains suspicious of urothelial primary with extrinsic invasion of the cervix as well as the vaginal wall.  mediport placed 8/19/19. Plan was  for palliative radiation followed by [facility]. She completed radiation 8/1/19. Keytruda given 8/7, 8/28. She is due for 3rd treatment 9/16. Akshat Gunn is mentally challenged much of her HPI was obtained from medical records.    Past Medical History:   Diagnosis Date    Acute cystitis with hematuria     Acute kidney failure (HCC)     Acute seasonal allergic rhinitis     Anemia, unspecified     Anxiety     Bradycardia, unspecified     Cancer ([facility] [address] 06/12/2019    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 ([facility] [address] 6/29/2019    Cellulitis     LUE    Chronic kidney disease     Chronic major depressive disorder, recurrent episode (HCC)     Constipation, unspecified     Depression     Difficulty walking     Dysmenorrhea, unspecified     Dysphagia, oropharyngeal     Hematuria     Hydronephrosis     Hyperlipidemia     Hypertension     Hypothyroidism     Intellectual disability     Leg pain, bilateral     Mild intermittent asthma, uncomplicated     Mild protein-calorie malnutrition (HCC)     Mixed incontinence     Muscle weakness (generalized)     Neuromuscular dysfunction of bladder     Noncompliance with medications     Noncompliance with treatment     Obstructive and reflux uropathy     Osteoarthritis     Other fatigue     Other symbolic dysfunctions     Personal care impairment     Personal history of noncompliance with medical treatment, presenting hazards to health     Presence of urogenital implants     Unspecified intellectual disabilities     Urinary tract infection, site not specified     Vitamin D deficiency, unspecified          Past Surgical History:   Procedure Laterality Date    CYSTOSCOPY Left 1/21/2019    CYSTOSCOPY, BILATERAL RETROGRADE PYELOGRAMS, BILATERAL STENT INSERTION performed by Kailey Gordon MD at [address] [facility] Bilateral 6/1/2019    CYSTOSCOPY, RETROGRADE PYELOGRAMS, BILATERAL URETERAL STENT EXCHANGE performed by Abdullahi Robbins MD at [address] / REMOVAL / [address] N/A 8/19/2019    MEDIPORT CATHETER INSERTION (DO NOT MOVE TIME) performed by Akil Bateman MD at [address] N/A 6/6/2019    EXAM [address] performed by Mitali Omalley MD at [address]       Medications Prior to Admission:    Medications Prior to Admission: potassium chloride (KLOR-CON M) 20 MEQ extended release tablet, Take 20 mEq by mouth daily  clonazePAM

## 2019-09-14 NOTE — PROGRESS NOTES
Attempted to call patient's brother Nadia Weber to get consent for patient to go down for her procedure today. Message left.  Zunilda Mendoza  9/14/2019

## 2019-09-14 NOTE — CONSULTS
challenged and appears to have a simplistic understanding of her illness and treatment modalities. She understands that she has cancer and is currently receiving treatment. She states that she will have her stents replaced. She does not know if she wants to continue treatment for her cancer. She appears overwhelmed by further discussion about her illness and treatment options. She has a counselor that she sees on a regular basis and states that she has been helpful. She states that her brother Griselda Triplett helps her with decision making. Parents are . She has an adult daughter who lives in a group home and is autistic. Griselda Triplett is her next of kin. She gave me permission to contact Griselda Triplett and discuss her medical condition. Spoke with Griselda Triplett by phone. He verified that he is her oldest sibling and next of kin. He states that he is frustrated by this situation. He does not feel that his sister is able to comprehend her medical condition and make decision for herself, however, he is not her legal guardian. He is conflicted about making decisions for her. He is asking that his sister be evaluated for medical competency and the ability to make her own medical decision. He is willing to assist with decision making if she is deemed unable to make her own decisions. Griselda Triplett will visit his sister tomorrow and would like to have further discussion with the PM . PM  will meet with the patient's brother, Griselda Triplett tomorrow for further discussion about decision making process. Palliative medicine will continue a discussion about goals of care after the decision making process has been clarified. Need further discussion about goals of care; continuing current level of care including possible need for HD versus a comfort approach to care.              Past Medical History:    Past Medical History:   Diagnosis Date    Acute cystitis with hematuria     Acute kidney failure ([facility] [address]     Acute

## 2019-09-15 LAB
ALBUMIN SERPL-MCNC: 3.4 G/DL (ref 3.5–5.2)
ALP BLD-CCNC: 69 U/L (ref 35–104)
ALT SERPL-CCNC: 15 U/L (ref 0–32)
ANION GAP SERPL CALCULATED.3IONS-SCNC: 13 MMOL/L (ref 7–16)
AST SERPL-CCNC: 11 U/L (ref 0–31)
BASOPHILS ABSOLUTE: 0.03 E9/L (ref 0–0.2)
BASOPHILS RELATIVE PERCENT: 0.4 % (ref 0–2)
BILIRUB SERPL-MCNC: <0.2 MG/DL (ref 0–1.2)
BUN BLDV-MCNC: 25 MG/DL (ref 6–20)
CALCIUM SERPL-MCNC: 9.3 MG/DL (ref 8.6–10.2)
CHLORIDE BLD-SCNC: 101 MMOL/L (ref 98–107)
CO2: 26 MMOL/L (ref 22–29)
CREAT SERPL-MCNC: 1.5 MG/DL (ref 0.5–1)
EOSINOPHILS ABSOLUTE: 0.39 E9/L (ref 0.05–0.5)
EOSINOPHILS RELATIVE PERCENT: 5.4 % (ref 0–6)
GFR AFRICAN AMERICAN: 43
GFR NON-AFRICAN AMERICAN: 36 ML/MIN/1.73
GLUCOSE BLD-MCNC: 110 MG/DL (ref 74–99)
HCT VFR BLD CALC: 28.3 % (ref 34–48)
HEMOGLOBIN: 8.7 G/DL (ref 11.5–15.5)
IMMATURE GRANULOCYTES #: 0.08 E9/L
IMMATURE GRANULOCYTES %: 1.1 % (ref 0–5)
LYMPHOCYTES ABSOLUTE: 0.67 E9/L (ref 1.5–4)
LYMPHOCYTES RELATIVE PERCENT: 9.2 % (ref 20–42)
MCH RBC QN AUTO: 30.3 PG (ref 26–35)
MCHC RBC AUTO-ENTMCNC: 30.7 % (ref 32–34.5)
MCV RBC AUTO: 98.6 FL (ref 80–99.9)
MONOCYTES ABSOLUTE: 0.49 E9/L (ref 0.1–0.95)
MONOCYTES RELATIVE PERCENT: 6.7 % (ref 2–12)
NEUTROPHILS ABSOLUTE: 5.62 E9/L (ref 1.8–7.3)
NEUTROPHILS RELATIVE PERCENT: 77.2 % (ref 43–80)
ORGANISM: ABNORMAL
PDW BLD-RTO: 17.6 FL (ref 11.5–15)
PLATELET # BLD: 301 E9/L (ref 130–450)
PMV BLD AUTO: 10.3 FL (ref 7–12)
POTASSIUM SERPL-SCNC: 4.3 MMOL/L (ref 3.5–5)
PROCALCITONIN: 0.07 NG/ML (ref 0–0.08)
RBC # BLD: 2.87 E12/L (ref 3.5–5.5)
SODIUM BLD-SCNC: 140 MMOL/L (ref 132–146)
TOTAL PROTEIN: 7.2 G/DL (ref 6.4–8.3)
WBC # BLD: 7.3 E9/L (ref 4.5–11.5)

## 2019-09-15 PROCEDURE — 6370000000 HC RX 637 (ALT 250 FOR IP): Performed by: INTERNAL MEDICINE

## 2019-09-15 PROCEDURE — 80053 COMPREHEN METABOLIC PANEL: CPT

## 2019-09-15 PROCEDURE — 85025 COMPLETE CBC W/AUTO DIFF WBC: CPT

## 2019-09-15 PROCEDURE — 99233 SBSQ HOSP IP/OBS HIGH 50: CPT | Performed by: EMERGENCY MEDICINE

## 2019-09-15 PROCEDURE — 2140000000 HC CCU INTERMEDIATE R&B

## 2019-09-15 PROCEDURE — 36415 COLL VENOUS BLD VENIPUNCTURE: CPT

## 2019-09-15 PROCEDURE — 84145 PROCALCITONIN (PCT): CPT

## 2019-09-15 PROCEDURE — 2580000003 HC RX 258: Performed by: INTERNAL MEDICINE

## 2019-09-15 PROCEDURE — 6360000002 HC RX W HCPCS: Performed by: INTERNAL MEDICINE

## 2019-09-15 PROCEDURE — 99232 SBSQ HOSP IP/OBS MODERATE 35: CPT | Performed by: INTERNAL MEDICINE

## 2019-09-15 RX ORDER — SODIUM CHLORIDE, SODIUM LACTATE, POTASSIUM CHLORIDE, AND CALCIUM CHLORIDE .6; .31; .03; .02 G/100ML; G/100ML; G/100ML; G/100ML
250 INJECTION, SOLUTION INTRAVENOUS ONCE
Status: COMPLETED | OUTPATIENT
Start: 2019-09-15 | End: 2019-09-15

## 2019-09-15 RX ORDER — CEFAZOLIN SODIUM 1 G/50ML
1 SOLUTION INTRAVENOUS
Status: DISCONTINUED | OUTPATIENT
Start: 2019-09-16 | End: 2019-09-15 | Stop reason: SDUPTHER

## 2019-09-15 RX ORDER — 0.9 % SODIUM CHLORIDE 0.9 %
250 INTRAVENOUS SOLUTION INTRAVENOUS ONCE
Status: COMPLETED | OUTPATIENT
Start: 2019-09-15 | End: 2019-09-15

## 2019-09-15 RX ORDER — SODIUM CHLORIDE, SODIUM LACTATE, POTASSIUM CHLORIDE, CALCIUM CHLORIDE 600; 310; 30; 20 MG/100ML; MG/100ML; MG/100ML; MG/100ML
INJECTION, SOLUTION INTRAVENOUS CONTINUOUS
Status: DISCONTINUED | OUTPATIENT
Start: 2019-09-15 | End: 2019-09-17

## 2019-09-15 RX ADMIN — Medication 10 ML: at 20:50

## 2019-09-15 RX ADMIN — SODIUM CHLORIDE, POTASSIUM CHLORIDE, SODIUM LACTATE AND CALCIUM CHLORIDE: 600; 310; 30; 20 INJECTION, SOLUTION INTRAVENOUS at 11:13

## 2019-09-15 RX ADMIN — OXYBUTYNIN CHLORIDE 5 MG: 5 TABLET, EXTENDED RELEASE ORAL at 09:02

## 2019-09-15 RX ADMIN — ACETAMINOPHEN 650 MG: 325 TABLET, FILM COATED ORAL at 20:49

## 2019-09-15 RX ADMIN — MUPIROCIN: 20 OINTMENT TOPICAL at 20:49

## 2019-09-15 RX ADMIN — MUPIROCIN: 20 OINTMENT TOPICAL at 15:37

## 2019-09-15 RX ADMIN — ATORVASTATIN CALCIUM 40 MG: 40 TABLET, FILM COATED ORAL at 20:50

## 2019-09-15 RX ADMIN — SODIUM CHLORIDE, POTASSIUM CHLORIDE, SODIUM LACTATE AND CALCIUM CHLORIDE 250 ML: 600; 310; 30; 20 INJECTION, SOLUTION INTRAVENOUS at 11:12

## 2019-09-15 RX ADMIN — CLONAZEPAM 0.5 MG: 0.5 TABLET ORAL at 20:49

## 2019-09-15 RX ADMIN — ENOXAPARIN SODIUM 40 MG: 40 INJECTION SUBCUTANEOUS at 15:37

## 2019-09-15 RX ADMIN — SODIUM CHLORIDE 250 ML: 9 INJECTION, SOLUTION INTRAVENOUS at 09:02

## 2019-09-15 RX ADMIN — SODIUM CHLORIDE, POTASSIUM CHLORIDE, SODIUM LACTATE AND CALCIUM CHLORIDE: 600; 310; 30; 20 INJECTION, SOLUTION INTRAVENOUS at 23:34

## 2019-09-15 RX ADMIN — SENNOSIDES 8.6 MG: 8.6 TABLET, FILM COATED ORAL at 20:49

## 2019-09-15 RX ADMIN — MIRTAZAPINE 15 MG: 15 TABLET, FILM COATED ORAL at 20:49

## 2019-09-15 ASSESSMENT — ENCOUNTER SYMPTOMS
RESPIRATORY NEGATIVE: 1
NAUSEA: 1
DIARRHEA: 0
ABDOMINAL PAIN: 1
VOMITING: 0

## 2019-09-15 ASSESSMENT — PAIN SCALES - GENERAL
PAINLEVEL_OUTOF10: 0
PAINLEVEL_OUTOF10: 5
PAINLEVEL_OUTOF10: 0

## 2019-09-15 NOTE — PROGRESS NOTES
adenopathy, no carotid bruit, no JVD, supple, symmetrical, trachea midline and thyroid not enlarged, symmetric, no tenderness/mass/nodules  · Lung: clear to auscultation bilaterally  · Heart: regular rate and rhythm, S1, S2 normal, no murmur, click, rub or gallop  · Abdomen: distendd, tender  · Extremities:  extremities normal, atraumatic, no cyanosis or edema  · Musculokeletal: No joint swelling, no muscle tenderness. ROM normal in all joints of extremities. · Neurologic: Mental status: Alert, slow, anxious, easily cries  Subject  Pertinent Labs & Imaging Studies   kallie  CBC:   Lab Results   Component Value Date    WBC 7.3 09/15/2019    RBC 2.87 09/15/2019    HGB 8.7 09/15/2019    HCT 28.3 09/15/2019    MCV 98.6 09/15/2019    MCH 30.3 09/15/2019    MCHC 30.7 09/15/2019    RDW 17.6 09/15/2019     09/15/2019    MPV 10.3 09/15/2019     BMP:    Lab Results   Component Value Date     09/13/2019    K 4.7 09/13/2019     09/13/2019    CO2 28 09/13/2019    BUN 26 09/13/2019    LABALBU 3.3 09/13/2019    LABALBU 4.4 12/20/2011    CREATININE 1.5 09/13/2019    CALCIUM 9.5 09/13/2019    GFRAA 43 09/13/2019    LABGLOM 36 09/13/2019    GLUCOSE 118 09/13/2019    GLUCOSE 97 12/20/2011       Resident's Assessment and Plan     Sravani Arroyo is a 52 y.o. female with a PMH of urothelial Carcinoma of the urinary bladder, Stage IV, s/p chemotherapy and pelvic radiation, Bilateral stents, UTI. Who presented to the ED with sever abdominal pain that started about a week ago, and is admitted for urologic stent removal procedure 9/16. 1.Abdominal pain 2/2 Pyelonephritis vs Radiation induced cystitis ? ?       - stent exchanges schedule 9/16   - Sever abdominal pain on presentation, more prominent in the flanks    - NO rebound tenderness, -ve Hughes's sign.    - UA was positive for Bacteruria   - Hx of Radiation in 7/2019   - Order urine culture    - NO fever, Hx of chills, No Leukocytosis    - Bilateral Nephrostomy tubes

## 2019-09-15 NOTE — PROGRESS NOTES
MG tablet Take 25 mg by mouth daily   Yes Historical Provider, MD   sevelamer (RENVELA) 800 MG tablet Take 1 tablet by mouth 3 times daily (with meals) 7/11/19  Yes JUSTYN Moreno CNP   sodium bicarbonate 650 MG tablet Take 2 tablets by mouth 2 times daily  Patient taking differently: Take 650 mg by mouth 2 times daily  7/11/19  Yes JUSTYN Moreno CNP   atorvastatin (LIPITOR) 40 MG tablet Take 1 tablet by mouth daily  Patient taking differently: Take 40 mg by mouth nightly  6/14/19  Yes Genevieve Tinoco DO   acetaminophen (TYLENOL) 325 MG tablet Take 650 mg by mouth every 4 hours as needed for Pain or Fever    Yes Historical Provider, MD   mirtazapine (REMERON) 15 MG tablet Take 1 tablet by mouth nightly 5/24/19  Yes Aide Irizarry MD   ondansetron (ZOFRAN-ODT) 4 MG disintegrating tablet Take 1 tablet by mouth every 8 hours as needed for Nausea or Vomiting 4/23/19  Yes Genevieve Tinoco DO   ferrous sulfate 325 (65 Fe) MG tablet Take 1 tablet by mouth 2 times daily (with meals) 4/23/19  Yes Genevieve Tinoco DO   docusate sodium (COLACE, DULCOLAX) 100 MG CAPS Take 100 mg by mouth 2 times daily 4/23/19  Yes Genevieve Tinoco DO   levothyroxine (SYNTHROID) 100 MCG tablet TAKE 1 TABLET BY MOUTH EVERY MORNING 30 MINUTES BEFORE EATING. 4/10/19  Yes Render Gravely, DO   vitamin D (ERGOCALCIFEROL) 76094 units CAPS capsule Take 1 capsule by mouth once a week  Patient taking differently: Take 50,000 Units by mouth once a week Every Tuesday 4/9/19  Yes Render Gravely, DO   oxybutynin (DITROPAN-XL) 5 MG extended release tablet Take 1 tablet by mouth daily 4/9/19  Yes Celestine Trujillo, DO   magnesium oxide (MAG-OX) 400 (241.3 Mg) MG TABS tablet Take 1 tablet by mouth 2 times daily 4/9/19  Yes Celestine Trujillo DO   pantoprazole (PROTONIX) 40 MG tablet TAKE ONE TABLET BY MOUTH DAILY 4/9/19  Yes Render Gravely, DO   Compression Stockings MISC by Does not apply route Below knee  15 - 30 mm Hg 7/25/18   Suresh Hess, DO stranding of the fat planes is seen around the bladder. The uterus and ovaries appear unremarkable. There are some stranding of the fat planes and infiltration of the soft tissues in the right and left pelvic sidewalls more on the right than on the left temporal lobe the trajectory of the ureters. These findings have to be correlate clinically. Patient has history of bladder and gynecological malignancy. The fat planes are of the pelvic floor cavity is a ill-defined and is also presacral edema present. No conspicuous adenopathy is seen however along the trajectory for the right and left external and common iliac lymphatic chains are in the midline retroperitoneal region. There is a mild to moderate size hiatal hernia. There are preserved density for the liver. No focal lesions are observed, this is a normal IV contrast study. The spleen appear unremarkable. Unremarkable appearance for the pancreas. Gallbladder is contracted but there are food contents in the stomach. Small-sized gallstones are likely to be present. Adrenals not enlarged. Aorta and IVC appear unremarkable. There is a small midline umbilical hernia with omental fat contents above the umbilical scar. Lower lung bases demonstrate no significant findings. Visualized bones demonstrate degenerative changes in L3-4, L4-5 disc space levels and corresponding facet joints. 1.No hydronephrotic changes in the right or left kidney. Percutaneous nephrostomy catheter in ureter catheter are in proper position for the right and for the left kidneys. 2. The Braden catheter is present in the bladder which is not distended. The fusion thickening of the bladder wall with stranding of the fat planes around the bladder is seen in the around the floor of the pelvic cavity. 3. Interstitial infiltrates are seen along the trajectory of the multifidus particular the right ureter predominant distally including the L4 cavity in the pelvic sidewalls.  Please correlate with fixed to the skin using both suture material and an adhesive appliance. Patient tolerated the procedure well. Status post uneventful CT and fluoroscopy guided left-sided percutaneous nephrostomy tube placement as described. CT Nephrostomy Catheter Placement  Result Date: 2019  Patient MRN:  [id number] : 1965 Age: 52 years Gender: Female Order Date:  2019 8:30 AM EXAM: CT NEPHROSTOMY CATHETER PLACEMENT NUMBER OF IMAGES: 164 INDICATION:  neph tube COMPARISON: PET/CT of 2019. FINDINGS: Informed consent was obtained. Appropriate timeout procedure was performed. Conscious sedation and analgesia were utilized. IV hydralazine was used for intraoperative blood pressure control. Direct CT guidance and fluoroscopic guidance were employed. Local anesthesia was administered. The right intrarenal collecting system was accessed with an 18-gauge trocar needle. Guidewire was subsequently advanced and appropriate fascial dilatation was accomplished followed by placement and of an 8 Icelandic pigtail catheter. Contrast was injected into the collecting system and ureter and the catheter adjusted and locked appropriately. Nephrostomy tube was then fixed to the skin using both suture material and an adhesive appliance. Patient tolerated the procedure well. Status post uneventful CT and fluoroscopy guided right-sided percutaneous nephrostomy tube placement as described.          ASSESSMENT/PLAN :  15-year-old woman with mild mental retardation and recent diagnosis of high-grade invasive poorly differentiated carcinoma on a cervical biopsy with immunohistochemical stains suspicious of urothelial primary with extrinsic invasion of the cervix as well as the vaginal wall  She has prior history of CKD stage IV with hydronephrosis and neurogenic bladder and prior stent placement and has required intermittent hemodialysis in the past and most recently  She underwent cystourethroscopy, bilateral JJ ureteral stent

## 2019-09-15 NOTE — PROGRESS NOTES
Palliative Care Department    Progress Note    Chief Complaint: Wolm Litten is a 52 y.o. female with chief complaint of abdominal pain. Assessment/Plan        Active Hospital Problems    Diagnosis Date Noted    Acute pyelonephritis [N10] 09/14/2019    Cystitis [N30.90] 09/14/2019    Urinary tract infection associated with nephrostomy catheter ([facility] [R74.210M, N39.0]     Palliative care by specialist [Z51.5]        Palliative Care Goals or Care/Code Status:   -The patient is sitting up in bed and is complaining of only mild abdominal pain at this time. She continues to remain concerned about her current medical condition and possible surgery on Monday. There has been discussions regarding possible competency issues with the patient. A progress note from July from Dr. Bridgett Earl (psychiatry) dated that the patient was competent to make her own decisions. On discussing her current medical condition, the patient was very knowledgeable of what is happening with her. She is requesting all medical treatment to continue at this time and remain a full code at this time. Her goal is to see her child, in the near future, who is in a group home. The patient does have a brother, Tonie Pagan, who is next of kin. We will have palliative medicine social work assist with sorting out Durable Power of  for Healthcare and living will paperwork with the patient. I do not feel that the patient needs another psychiatric consult for competency given what I saw today with her knowledge of her medical condition as well as her A&O x3. Further discussions will be needed with the patient. The patient has IV Dilaudid for her pain, but she did not use any over the evening.     Time/Communication  Time In: 1040  Time Out: 1115    Greater than 51% of time spent, total 35 minutes in counseling and coordination of care at the bedside regarding goals ofcare, symptom management, diagnosis and prognosis and see PYELOGRAMS, BILATERAL URETERAL STENT EXCHANGE performed by Cecil Hernandez MD at [address] [facility] [address] / REMOVAL / [address] N/A 8/19/2019    MEDIPORT CATHETER INSERTION (DO NOT MOVE TIME) performed by Hanh Gibson MD at [address] N/A 6/6/2019    EXAM UNDEDR ANESTHESIA VAGINAL BIOPSIES performed by Judy Madden MD at [facility] OR       Family History   Problem Relation Age of Onset    Diabetes Brother     Thyroid Disease Mother     Other Daughter         Autism    Cancer Maternal Grandmother         patient doesn't know type.  Heart Attack Maternal Grandfather        Unable to obtain family history due to N/A- family history available. No Known Allergies    Review of Systems:   See palliative care ROS/ESAS below; see HPI. All Review of Systems are Negative except as stated in the HPI. Social history:  Veteranstatus: no.  Marital status: Single. Living status: nursing home . Work history: None. Family Meeting:  Participants:patient. Family meeting was held to discuss:diagnosis, prognosis, treatment options, goals of care, prior expressed wishes and advanced care planning. Objective:     Physical Exam  BP (!) 98/51   Pulse 59   Temp 96.4 °F (35.8 °C) (Oral)   Resp 14   Ht 4' 11\" (1.499 m)   Wt 132 lb 5 oz (60 kg)   LMP 05/21/2015 (Approximate)   SpO2 98%   BMI 26.72 kg/m²     Physical Exam   Constitutional: She is oriented to person, place, and time. No distress. Chronically ill appearing female. HENT:   Head: Normocephalic and atraumatic. Right Ear: External ear normal.   Left Ear: External ear normal.   Nose: Nose normal.   Mouth/Throat: Oropharynx is clear and moist. No oropharyngeal exudate. Eyes: Pupils are equal, round, and reactive to light. Conjunctivae and EOM are normal. Right eye exhibits no discharge. Left eye exhibits no discharge. No scleral icterus. Neck: Normal range of motion. Neck supple.  No tracheal

## 2019-09-16 ENCOUNTER — APPOINTMENT (OUTPATIENT)
Dept: GENERAL RADIOLOGY | Age: 54
DRG: 461 | End: 2019-09-16
Payer: MEDICAID

## 2019-09-16 ENCOUNTER — ANESTHESIA (OUTPATIENT)
Dept: OPERATING ROOM | Age: 54
DRG: 461 | End: 2019-09-16
Payer: MEDICAID

## 2019-09-16 ENCOUNTER — ANESTHESIA EVENT (OUTPATIENT)
Dept: OPERATING ROOM | Age: 54
DRG: 461 | End: 2019-09-16
Payer: MEDICAID

## 2019-09-16 VITALS
RESPIRATION RATE: 11 BRPM | OXYGEN SATURATION: 100 % | SYSTOLIC BLOOD PRESSURE: 89 MMHG | DIASTOLIC BLOOD PRESSURE: 61 MMHG

## 2019-09-16 PROBLEM — F32.A ANXIETY AND DEPRESSION: Status: ACTIVE | Noted: 2019-02-28

## 2019-09-16 LAB
ALBUMIN SERPL-MCNC: 3 G/DL (ref 3.5–5.2)
ALP BLD-CCNC: 62 U/L (ref 35–104)
ALT SERPL-CCNC: 15 U/L (ref 0–32)
ANION GAP SERPL CALCULATED.3IONS-SCNC: 10 MMOL/L (ref 7–16)
AST SERPL-CCNC: 13 U/L (ref 0–31)
BASOPHILS ABSOLUTE: 0.04 E9/L (ref 0–0.2)
BASOPHILS RELATIVE PERCENT: 0.6 % (ref 0–2)
BILIRUB SERPL-MCNC: <0.2 MG/DL (ref 0–1.2)
BUN BLDV-MCNC: 25 MG/DL (ref 6–20)
CALCIUM SERPL-MCNC: 8.9 MG/DL (ref 8.6–10.2)
CHLORIDE BLD-SCNC: 102 MMOL/L (ref 98–107)
CO2: 26 MMOL/L (ref 22–29)
CREAT SERPL-MCNC: 1.6 MG/DL (ref 0.5–1)
EOSINOPHILS ABSOLUTE: 0.33 E9/L (ref 0.05–0.5)
EOSINOPHILS RELATIVE PERCENT: 5.1 % (ref 0–6)
GFR AFRICAN AMERICAN: 40
GFR NON-AFRICAN AMERICAN: 33 ML/MIN/1.73
GLUCOSE BLD-MCNC: 101 MG/DL (ref 74–99)
HCT VFR BLD CALC: 26.6 % (ref 34–48)
HEMOGLOBIN: 8.4 G/DL (ref 11.5–15.5)
IMMATURE GRANULOCYTES #: 0.08 E9/L
IMMATURE GRANULOCYTES %: 1.2 % (ref 0–5)
LACTIC ACID: 1.1 MMOL/L (ref 0.5–2.2)
LYMPHOCYTES ABSOLUTE: 0.69 E9/L (ref 1.5–4)
LYMPHOCYTES RELATIVE PERCENT: 10.7 % (ref 20–42)
MAGNESIUM: 1.9 MG/DL (ref 1.6–2.6)
MCH RBC QN AUTO: 30.5 PG (ref 26–35)
MCHC RBC AUTO-ENTMCNC: 31.6 % (ref 32–34.5)
MCV RBC AUTO: 96.7 FL (ref 80–99.9)
MONOCYTES ABSOLUTE: 0.35 E9/L (ref 0.1–0.95)
MONOCYTES RELATIVE PERCENT: 5.5 % (ref 2–12)
NEUTROPHILS ABSOLUTE: 4.93 E9/L (ref 1.8–7.3)
NEUTROPHILS RELATIVE PERCENT: 76.9 % (ref 43–80)
PDW BLD-RTO: 17.4 FL (ref 11.5–15)
PHOSPHORUS: 3.7 MG/DL (ref 2.5–4.5)
PLATELET # BLD: 292 E9/L (ref 130–450)
PMV BLD AUTO: 10.3 FL (ref 7–12)
POTASSIUM SERPL-SCNC: 4.2 MMOL/L (ref 3.5–5)
RBC # BLD: 2.75 E12/L (ref 3.5–5.5)
SODIUM BLD-SCNC: 138 MMOL/L (ref 132–146)
TOTAL PROTEIN: 6.4 G/DL (ref 6.4–8.3)
URINE CULTURE, ROUTINE: NORMAL
WBC # BLD: 6.4 E9/L (ref 4.5–11.5)

## 2019-09-16 PROCEDURE — 2709999900 HC NON-CHARGEABLE SUPPLY: Performed by: UROLOGY

## 2019-09-16 PROCEDURE — 3600000013 HC SURGERY LEVEL 3 ADDTL 15MIN: Performed by: UROLOGY

## 2019-09-16 PROCEDURE — 0TP98DZ REMOVAL OF INTRALUMINAL DEVICE FROM URETER, VIA NATURAL OR ARTIFICIAL OPENING ENDOSCOPIC: ICD-10-PCS | Performed by: UROLOGY

## 2019-09-16 PROCEDURE — 3700000001 HC ADD 15 MINUTES (ANESTHESIA): Performed by: UROLOGY

## 2019-09-16 PROCEDURE — 6360000002 HC RX W HCPCS

## 2019-09-16 PROCEDURE — 6360000002 HC RX W HCPCS: Performed by: ANESTHESIOLOGY

## 2019-09-16 PROCEDURE — 7100000001 HC PACU RECOVERY - ADDTL 15 MIN: Performed by: UROLOGY

## 2019-09-16 PROCEDURE — 2580000003 HC RX 258: Performed by: INTERNAL MEDICINE

## 2019-09-16 PROCEDURE — 6370000000 HC RX 637 (ALT 250 FOR IP): Performed by: INTERNAL MEDICINE

## 2019-09-16 PROCEDURE — 99232 SBSQ HOSP IP/OBS MODERATE 35: CPT | Performed by: INTERNAL MEDICINE

## 2019-09-16 PROCEDURE — 6360000002 HC RX W HCPCS: Performed by: INTERNAL MEDICINE

## 2019-09-16 PROCEDURE — 83735 ASSAY OF MAGNESIUM: CPT

## 2019-09-16 PROCEDURE — 6360000002 HC RX W HCPCS: Performed by: CLINICAL NURSE SPECIALIST

## 2019-09-16 PROCEDURE — 3700000000 HC ANESTHESIA ATTENDED CARE: Performed by: UROLOGY

## 2019-09-16 PROCEDURE — 2140000000 HC CCU INTERMEDIATE R&B

## 2019-09-16 PROCEDURE — 2580000003 HC RX 258

## 2019-09-16 PROCEDURE — 2580000003 HC RX 258: Performed by: CLINICAL NURSE SPECIALIST

## 2019-09-16 PROCEDURE — 7100000000 HC PACU RECOVERY - FIRST 15 MIN: Performed by: UROLOGY

## 2019-09-16 PROCEDURE — 3600000003 HC SURGERY LEVEL 3 BASE: Performed by: UROLOGY

## 2019-09-16 PROCEDURE — 6370000000 HC RX 637 (ALT 250 FOR IP): Performed by: UROLOGY

## 2019-09-16 PROCEDURE — 80053 COMPREHEN METABOLIC PANEL: CPT

## 2019-09-16 PROCEDURE — 2500000003 HC RX 250 WO HCPCS

## 2019-09-16 PROCEDURE — 3209999900 FLUORO FOR SURGICAL PROCEDURES

## 2019-09-16 PROCEDURE — 84100 ASSAY OF PHOSPHORUS: CPT

## 2019-09-16 PROCEDURE — 83605 ASSAY OF LACTIC ACID: CPT

## 2019-09-16 PROCEDURE — 36415 COLL VENOUS BLD VENIPUNCTURE: CPT

## 2019-09-16 PROCEDURE — 85025 COMPLETE CBC W/AUTO DIFF WBC: CPT

## 2019-09-16 RX ORDER — SODIUM CHLORIDE 9 MG/ML
INJECTION, SOLUTION INTRAVENOUS CONTINUOUS PRN
Status: DISCONTINUED | OUTPATIENT
Start: 2019-09-16 | End: 2019-09-16 | Stop reason: SDUPTHER

## 2019-09-16 RX ORDER — HYDROCODONE BITARTRATE AND ACETAMINOPHEN 5; 325 MG/1; MG/1
1 TABLET ORAL PRN
Status: DISCONTINUED | OUTPATIENT
Start: 2019-09-16 | End: 2019-09-16

## 2019-09-16 RX ORDER — HEPARIN SODIUM (PORCINE) LOCK FLUSH IV SOLN 100 UNIT/ML 100 UNIT/ML
SOLUTION INTRAVENOUS
Status: COMPLETED
Start: 2019-09-16 | End: 2019-09-16

## 2019-09-16 RX ORDER — MIDAZOLAM HYDROCHLORIDE 1 MG/ML
INJECTION INTRAMUSCULAR; INTRAVENOUS PRN
Status: DISCONTINUED | OUTPATIENT
Start: 2019-09-16 | End: 2019-09-16 | Stop reason: SDUPTHER

## 2019-09-16 RX ORDER — PROPOFOL 10 MG/ML
INJECTION, EMULSION INTRAVENOUS CONTINUOUS PRN
Status: DISCONTINUED | OUTPATIENT
Start: 2019-09-16 | End: 2019-09-16 | Stop reason: SDUPTHER

## 2019-09-16 RX ORDER — MEPERIDINE HYDROCHLORIDE 50 MG/ML
12.5 INJECTION INTRAMUSCULAR; INTRAVENOUS; SUBCUTANEOUS EVERY 5 MIN PRN
Status: DISCONTINUED | OUTPATIENT
Start: 2019-09-16 | End: 2019-09-16

## 2019-09-16 RX ORDER — GLYCOPYRROLATE 1 MG/5 ML
SYRINGE (ML) INTRAVENOUS PRN
Status: DISCONTINUED | OUTPATIENT
Start: 2019-09-16 | End: 2019-09-16 | Stop reason: SDUPTHER

## 2019-09-16 RX ORDER — POTASSIUM CHLORIDE 20 MEQ/1
20 TABLET, EXTENDED RELEASE ORAL DAILY
Status: DISCONTINUED | OUTPATIENT
Start: 2019-09-16 | End: 2019-09-17

## 2019-09-16 RX ORDER — FENTANYL CITRATE 50 UG/ML
INJECTION, SOLUTION INTRAMUSCULAR; INTRAVENOUS PRN
Status: DISCONTINUED | OUTPATIENT
Start: 2019-09-16 | End: 2019-09-16 | Stop reason: SDUPTHER

## 2019-09-16 RX ORDER — PROMETHAZINE HYDROCHLORIDE 25 MG/ML
6.25 INJECTION, SOLUTION INTRAMUSCULAR; INTRAVENOUS EVERY 10 MIN PRN
Status: DISCONTINUED | OUTPATIENT
Start: 2019-09-16 | End: 2019-09-16

## 2019-09-16 RX ORDER — MORPHINE SULFATE 2 MG/ML
1 INJECTION, SOLUTION INTRAMUSCULAR; INTRAVENOUS EVERY 5 MIN PRN
Status: DISCONTINUED | OUTPATIENT
Start: 2019-09-16 | End: 2019-09-16

## 2019-09-16 RX ORDER — KETAMINE HCL IN NACL, ISO-OSM 100MG/10ML
SYRINGE (ML) INJECTION PRN
Status: DISCONTINUED | OUTPATIENT
Start: 2019-09-16 | End: 2019-09-16 | Stop reason: SDUPTHER

## 2019-09-16 RX ORDER — ONDANSETRON 2 MG/ML
INJECTION INTRAMUSCULAR; INTRAVENOUS PRN
Status: DISCONTINUED | OUTPATIENT
Start: 2019-09-16 | End: 2019-09-16 | Stop reason: SDUPTHER

## 2019-09-16 RX ORDER — DEXAMETHASONE SODIUM PHOSPHATE 10 MG/ML
INJECTION INTRAMUSCULAR; INTRAVENOUS PRN
Status: DISCONTINUED | OUTPATIENT
Start: 2019-09-16 | End: 2019-09-16 | Stop reason: SDUPTHER

## 2019-09-16 RX ORDER — MORPHINE SULFATE 2 MG/ML
2 INJECTION, SOLUTION INTRAMUSCULAR; INTRAVENOUS EVERY 5 MIN PRN
Status: DISCONTINUED | OUTPATIENT
Start: 2019-09-16 | End: 2019-09-16

## 2019-09-16 RX ORDER — HYDROCODONE BITARTRATE AND ACETAMINOPHEN 5; 325 MG/1; MG/1
2 TABLET ORAL PRN
Status: DISCONTINUED | OUTPATIENT
Start: 2019-09-16 | End: 2019-09-16

## 2019-09-16 RX ADMIN — ENOXAPARIN SODIUM 40 MG: 40 INJECTION SUBCUTANEOUS at 12:27

## 2019-09-16 RX ADMIN — Medication 10 ML: at 22:15

## 2019-09-16 RX ADMIN — PROPOFOL 30 MCG/KG/MIN: 10 INJECTION, EMULSION INTRAVENOUS at 09:53

## 2019-09-16 RX ADMIN — Medication 10 MG: at 09:57

## 2019-09-16 RX ADMIN — CLONAZEPAM 0.5 MG: 0.5 TABLET ORAL at 22:23

## 2019-09-16 RX ADMIN — OXYBUTYNIN CHLORIDE 5 MG: 5 TABLET, EXTENDED RELEASE ORAL at 08:47

## 2019-09-16 RX ADMIN — SODIUM CHLORIDE: 9 INJECTION, SOLUTION INTRAVENOUS at 09:40

## 2019-09-16 RX ADMIN — MORPHINE SULFATE 2 MG: 2 INJECTION, SOLUTION INTRAMUSCULAR; INTRAVENOUS at 10:36

## 2019-09-16 RX ADMIN — MIRTAZAPINE 15 MG: 15 TABLET, FILM COATED ORAL at 22:15

## 2019-09-16 RX ADMIN — POTASSIUM CHLORIDE 20 MEQ: 20 TABLET, EXTENDED RELEASE ORAL at 12:27

## 2019-09-16 RX ADMIN — MUPIROCIN: 20 OINTMENT TOPICAL at 22:15

## 2019-09-16 RX ADMIN — FENTANYL CITRATE 25 MCG: 50 INJECTION, SOLUTION INTRAMUSCULAR; INTRAVENOUS at 09:59

## 2019-09-16 RX ADMIN — Medication 20 MG: at 09:53

## 2019-09-16 RX ADMIN — SENNOSIDES 8.6 MG: 8.6 TABLET, FILM COATED ORAL at 22:15

## 2019-09-16 RX ADMIN — MEROPENEM 1 G: 1 INJECTION INTRAVENOUS at 17:53

## 2019-09-16 RX ADMIN — MUPIROCIN: 20 OINTMENT TOPICAL at 12:27

## 2019-09-16 RX ADMIN — ONDANSETRON HYDROCHLORIDE 4 MG: 2 INJECTION, SOLUTION INTRAMUSCULAR; INTRAVENOUS at 09:56

## 2019-09-16 RX ADMIN — MIDAZOLAM HYDROCHLORIDE 2 MG: 1 INJECTION, SOLUTION INTRAMUSCULAR; INTRAVENOUS at 09:40

## 2019-09-16 RX ADMIN — FENTANYL CITRATE 25 MCG: 50 INJECTION, SOLUTION INTRAMUSCULAR; INTRAVENOUS at 09:53

## 2019-09-16 RX ADMIN — ATORVASTATIN CALCIUM 40 MG: 40 TABLET, FILM COATED ORAL at 22:15

## 2019-09-16 RX ADMIN — MORPHINE SULFATE 2 MG: 2 INJECTION, SOLUTION INTRAMUSCULAR; INTRAVENOUS at 10:43

## 2019-09-16 RX ADMIN — DEXAMETHASONE SODIUM PHOSPHATE 10 MG: 10 INJECTION INTRAMUSCULAR; INTRAVENOUS at 09:56

## 2019-09-16 RX ADMIN — ACETAMINOPHEN 650 MG: 325 TABLET, FILM COATED ORAL at 22:15

## 2019-09-16 RX ADMIN — Medication 500 UNITS: at 02:33

## 2019-09-16 RX ADMIN — MEROPENEM 1 G: 1 INJECTION INTRAVENOUS at 04:43

## 2019-09-16 RX ADMIN — Medication 10 ML: at 02:33

## 2019-09-16 RX ADMIN — Medication 0.2 MG: at 09:40

## 2019-09-16 ASSESSMENT — PULMONARY FUNCTION TESTS
PIF_VALUE: 0
PIF_VALUE: 1
PIF_VALUE: 0

## 2019-09-16 ASSESSMENT — PAIN SCALES - GENERAL
PAINLEVEL_OUTOF10: 9
PAINLEVEL_OUTOF10: 0
PAINLEVEL_OUTOF10: 10

## 2019-09-16 ASSESSMENT — PAIN DESCRIPTION - DESCRIPTORS
DESCRIPTORS: ACHING;BURNING
DESCRIPTORS: ACHING;BURNING

## 2019-09-16 ASSESSMENT — PAIN DESCRIPTION - PAIN TYPE
TYPE: SURGICAL PAIN
TYPE: SURGICAL PAIN

## 2019-09-16 ASSESSMENT — PAIN DESCRIPTION - LOCATION
LOCATION: VAGINA
LOCATION: VAGINA

## 2019-09-16 ASSESSMENT — LIFESTYLE VARIABLES: SMOKING_STATUS: 0

## 2019-09-16 NOTE — ANESTHESIA POSTPROCEDURE EVALUATION
Department of Anesthesiology  Postprocedure Note    Patient: Katerine Garces  MRN: [id number]  YOB: 1965  Date of evaluation: 9/16/2019  Time:  8:13 AM     Procedure Summary     Date:  09/16/19 [place]  [facility] [place] [facility] [place]    Anesthesia Start:   Anesthesia Stop:      Procedure:  CYSTOSCOPY RETROGRADE PYELOGRAM STENT INSERTION, EXCHANGE (Bilateral ) Diagnosis:  (ACUTE PYELONEPHRITIS)    Surgeon:  Kunal Ryan MD Responsible Provider:      Anesthesia Type:  MAC ASA Status:  3          Anesthesia Type: No value filed. Tevin Phase I:      Tevin Phase II:      Last vitals: Reviewed and per EMR flowsheets.        Anesthesia Post Evaluation    Patient location during evaluation: PACU  Patient participation: complete - patient participated  Level of consciousness: awake  Pain score: 3  Airway patency: patent  Nausea & Vomiting: no nausea and no vomiting  Complications: no  Cardiovascular status: blood pressure returned to baseline  Respiratory status: acceptable  Hydration status: euvolemic

## 2019-09-16 NOTE — PLAN OF CARE
Problem: Pain:  Goal: Pain level will decrease  Description  Pain level will decrease  Outcome: Met This Shift  Goal: Control of acute pain  Description  Control of acute pain  Outcome: Met This Shift  Goal: Control of chronic pain  Description  Control of chronic pain  Outcome: Met This Shift     Problem: Confusion - Acute:  Goal: Absence of continued neurological deterioration signs and symptoms  Description  Absence of continued neurological deterioration signs and symptoms  Outcome: Met This Shift  Goal: Mental status will be restored to baseline  Description  Mental status will be restored to baseline  Outcome: Met This Shift     Problem: Injury - Risk of, Physical Injury:  Goal: Absence of physical injury  Description  Absence of physical injury  Outcome: Met This Shift  Goal: Will remain free from falls  Description  Will remain free from falls  Outcome: Met This Shift     Problem: Mood - Altered:  Goal: Mood stable  Description  Mood stable  Outcome: Met This Shift  Goal: Absence of abusive behavior  Description  Absence of abusive behavior  Outcome: Met This Shift     Problem: Psychomotor Activity - Altered:  Goal: Absence of psychomotor disturbance signs and symptoms  Description  Absence of psychomotor disturbance signs and symptoms  Outcome: Met This Shift     Problem: Sensory Perception - Impaired:  Goal: Demonstrations of improved sensory functioning will increase  Description  Demonstrations of improved sensory functioning will increase  Outcome: Met This Shift     Problem: Falls - Risk of:  Goal: Absence of physical injury  Description  Absence of physical injury  Outcome: Met This Shift  Goal: Will remain free from falls  Description  Will remain free from falls  Outcome: Met This Shift

## 2019-09-16 NOTE — PROGRESS NOTES
[facility]  Internal Medicine Residency Program  Progress Note - House Team 2    Patient:  Elyssa Milner 52 y.o. female MRN: [id number]     Date of Service: 9/16/2019     CC: had concerns including Abdominal Pain (complains of suprapubic pain, history of uterine ca with mets, recently started chemo). Overnight events: Had hypotensive episode, given  mL bolus. Started on LR 60mL/hr infusion. BP goal 90s/60s. Subjective     Pt is a 51 yo female w/ PMH of Stage IV urothelial carcinoma of the bladder (s/p 2 rounds chemo and pelvic radiation, this past July), bilateral nephrostomy/stents, and UTI who presented w/ severe abdominal pain of 1 week's duration prior to admit. The pain is bilateral, dull, not exacerbated by any distinct factors, and does not radiate. She has also had nausea for 2 weeks prior to admit, likely chemo-induced. Her 3rd cycle of immunotherapy Lillivan Bai) was scheduled for 9/18. Pt seen this AM. She states there is still pain. She has emotional concerns regarding the stent replacement, hospitalization, and chemo. Listened to concerns, discussed reasons for stent replacement, and provided emotional support. Pt planned to call cousin or brother, both involved in care, for emotional support. Objective     Physical Exam:  · Vitals: BP (!) 111/55   Pulse 65   Temp 98.4 °F (36.9 °C) (Temporal)   Resp 16   Ht 4' 11\" (1.499 m)   Wt 134 lb 1.6 oz (60.8 kg)   LMP 05/21/2015 (Approximate)   SpO2 97%   BMI 27.08 kg/m²     · I & O - 24hr: No intake/output data recorded. *Physical exam not conducted in lieu of providing emotional support and counseling regarding procedure (see subj.  above)  Subject  Pertinent Labs & Imaging Studies   kallie  CBC:   Lab Results   Component Value Date    WBC 6.4 09/16/2019    RBC 2.75 09/16/2019    HGB 8.4 09/16/2019    HCT 26.6 09/16/2019    MCV 96.7 09/16/2019    MCH 30.5 09/16/2019    MCHC 31.6 09/16/2019    RDW 17.4 09/16/2019

## 2019-09-16 NOTE — PROGRESS NOTES
Palliative Medicine   Progression of Care     Patient seated up in bed eating her lunch. Stated that she just got back from surgery. We were asked to see the patient because of the poor prognosis per Dr. Litzy Patel from her uretal stent removal surgery. Patient went to the OR today to have her stents removed. Priya Mohamud unable to place new stents because of how extensive the cancer is at this point. Patient will need to keep her bilateral nephrostomy tubes in order for the urine to come out of her kidneys. Dr. Loretta Plaza in room explaining what happened during surgery to patient. Daria Franz stated that she is a slow learner, started to cry, and stated I do not understand. Dr. Loretta Plaza very attentive and patient drawing pictures for Ya. Accompanied by licensed social work from [address] to help assist patient and family with completing healthcare power of  paperwork. Patient stated that she wants her brother Annamaria Frederick first on her healthcare power of , and cousin Misael as alternate. Social work stayed with patient after the bad news of how extensive her cancer has progressed. Patient asked her if she could please stay. Patient stated that she wants to talk to Dr. Angelia Webber before she makes any decisions. She stated, Ron Blank is my kidney doctor and I need to talk to him\". Waiting to see Dr. Angelia Webber later. She said that she did not get to talk to him because he had to go right into another procedure after her. LSW accompanied myself to offer support while I evaluated the patient. Will follow along closely to see what patient and family decide.        Noah Norman APRN-CNP, AGACNP-BC

## 2019-09-16 NOTE — PROGRESS NOTES
New message sent via [employer] serve to Roberto Portillo concerning Metastatic cervical cancer with poor prognosis per Dr. Luis Ortiz.

## 2019-09-16 NOTE — PROGRESS NOTES
[facility]    Attending Physician Statement  I have discussed the case, including pertinent history (medical, surgical, family and social) and exam findings with the resident and the team.  I have seen and examined the patient and the key elements of the encounter have been performed by me. I agree with the assessment, plan and orders as documented by the resident. The patient was seen earlier by me on rounds with the team.    She is very anxious and worried about her medical conditions - she will have scope tomorrow by GYN - no other issues at this time. All questions answered. I have reviewed my findings and recommendations with Derek Aggarwal. Remainder of medical problems as per resident note.       Silva Gonzalez M.D., Faheem Steel  Internal Medicine Residency Faculty

## 2019-09-16 NOTE — CARE COORDINATION
Care Coordination:  Visited the patient to discuss transition of care upon discharge. Pt adamant she does not want to return to [facility] -feels they did not take her seriously when she was ill, only gave her a pain pill and delayed her from coming to hospital.  She states she wants to go back to her apartment, but she cannot do steps and her brother is helping her. She is very emotional, states Dr here earlier discussed tumor and she is just worried and waiting for Dr Cary Dugan. She asked if I could call her brother Phoenix to see where she needs to go after hospital stay. I spoke with her, trying to comfort her, she states she just wants to speak to the doctor. I called and spoke to Phoenix. He is actually on his way. He received a call from palliative care and he needs to understand her condition completely prior to making decisions regarding further care/ stacy/ or hospice. I have given him my contact number, and he will call tomorrow when he gets moment from work to update me on discharge destination depending on his conversations with physicians olivia.     Nahomisonja Maxwell

## 2019-09-16 NOTE — PROGRESS NOTES
Symmetrical expansion. Auscultation reveals no wheezing, crackles, or rhonchi. Cardiovascular: S1 and S2 are rhythmic and regular. No murmurs appreciated. Abdomen: Positive bowel sounds to auscultation. + tender, + bilat nephrostomy tubes- clear yellow urine. Extremities: No clubbing, no cyanosis, no edema.   Braden- yellow  Left chest medi-port- accessed     Laboratory and Tests Review:  Lab Results   Component Value Date    WBC 6.4 09/16/2019    WBC 7.3 09/15/2019    WBC 10.1 09/13/2019    HGB 8.4 (L) 09/16/2019    HCT 26.6 (L) 09/16/2019    MCV 96.7 09/16/2019     09/16/2019     Lab Results   Component Value Date    NEUTROABS 4.93 09/16/2019    NEUTROABS 5.62 09/15/2019    NEUTROABS 8.27 (H) 09/13/2019     No results found for: CRP  No results found for: SEDRATE  Lab Results   Component Value Date    ALT 15 09/16/2019    AST 13 09/16/2019    ALKPHOS 62 09/16/2019    BILITOT <0.2 09/16/2019     Lab Results   Component Value Date     09/16/2019    K 4.2 09/16/2019    K 4.7 09/13/2019     09/16/2019    CO2 26 09/16/2019    BUN 25 09/16/2019    CREATININE 1.6 09/16/2019    GFRAA 40 09/16/2019    LABGLOM 33 09/16/2019    GLUCOSE 101 09/16/2019    GLUCOSE 97 12/20/2011    PROT 6.4 09/16/2019    LABALBU 3.0 09/16/2019    LABALBU 4.4 12/20/2011    CALCIUM 8.9 09/16/2019    BILITOT <0.2 09/16/2019    ALKPHOS 62 09/16/2019    AST 13 09/16/2019    ALT 15 09/16/2019     Radiology:  Reviewed     Microbiology:   9/14/19- urine cx- < 10,000 mixed GPO and GNR  8/9/19- urine cx- enterobacter- raoultella, enterobacter     ASSESSMENT:  · Cancer - urothelial carcinoma on chemo  · MRSA colonization   · Has urinary stents in place- with bilateral nephrostomies    · Neurogenic bladder     PLAN:  · Currently no atbs  · Will start atbs pre-op tomorrow prior to stent exchange OR REMOVAL  · Check cultures  · Monitor labs- creat- 1.5    Nahum Bonilla MD  2:16 PM  9/16/2019

## 2019-09-16 NOTE — ANESTHESIA PRE PROCEDURE
Department of Anesthesiology  Preprocedure Note       Name:  Ghazala Reed   Age:  52 y.o.  :  1965                                          MRN:  [id number]         Date:  2019      Surgeon: Dio Araujo):  Juanita Guy MD    Procedure: CYSTOSCOPY RETROGRADE PYELOGRAM STENT INSERTION, EXCHANGE (Bilateral )    Medications prior to admission:   Prior to Admission medications    Medication Sig Start Date End Date Taking? Authorizing Provider   potassium chloride (KLOR-CON M) 20 MEQ extended release tablet Take 20 mEq by mouth daily   Yes Historical Provider, MD   clonazePAM (KLONOPIN) 0.5 MG tablet Take 0.5 mg by mouth 2 times daily as needed. Yes Historical Provider, MD   HYDROcodone-acetaminophen (NORCO) 5-325 MG per tablet Take 1 tablet by mouth every 6 hours as needed for Pain (moderate pain malignant neoplasm of uterus).     Yes Historical Provider, MD   sertraline (ZOLOFT) 25 MG tablet Take 25 mg by mouth daily   Yes Historical Provider, MD   sevelamer (RENVELA) 800 MG tablet Take 1 tablet by mouth 3 times daily (with meals) 19  Yes MannyJUSTYN Mcmullen CNP   sodium bicarbonate 650 MG tablet Take 2 tablets by mouth 2 times daily  Patient taking differently: Take 650 mg by mouth 2 times daily  19  Yes JUSTYN Saucedo CNP   atorvastatin (LIPITOR) 40 MG tablet Take 1 tablet by mouth daily  Patient taking differently: Take 40 mg by mouth nightly  19  Yes Dimas Moon DO   acetaminophen (TYLENOL) 325 MG tablet Take 650 mg by mouth every 4 hours as needed for Pain or Fever    Yes Historical Provider, MD   mirtazapine (REMERON) 15 MG tablet Take 1 tablet by mouth nightly 19  Yes Colletta Hausen, MD   ondansetron (ZOFRAN-ODT) 4 MG disintegrating tablet Take 1 tablet by mouth every 8 hours as needed for Nausea or Vomiting 19  Yes Dimas Moon DO   ferrous sulfate 325 (65 Fe) MG tablet Take 1 tablet by mouth 2 times daily (with meals) 19  Yes Dimas Moon DO   docusate sodium (COLACE, DULCOLAX) 100 MG CAPS Take 100 mg by mouth 2 times daily 4/23/19  Yes Salome Fong,    levothyroxine (SYNTHROID) 100 MCG tablet TAKE 1 TABLET BY MOUTH EVERY MORNING 30 MINUTES BEFORE EATING. 4/10/19  Yes Nusrat Baldwin DO   vitamin D (ERGOCALCIFEROL) 54976 units CAPS capsule Take 1 capsule by mouth once a week  Patient taking differently: Take 50,000 Units by mouth once a week Every Tuesday 4/9/19  Yes Nusrat Baldwin DO   oxybutynin (DITROPAN-XL) 5 MG extended release tablet Take 1 tablet by mouth daily 4/9/19  Yes Celestine Trujillo DO   magnesium oxide (MAG-OX) 400 (241.3 Mg) MG TABS tablet Take 1 tablet by mouth 2 times daily 4/9/19  Yes Celestine Trujillo DO   pantoprazole (PROTONIX) 40 MG tablet TAKE ONE TABLET BY MOUTH DAILY 4/9/19  Yes Nusrat Baldwin DO   Compression Stockings MISC by Does not apply route Below knee  15 - 30 mm Hg 7/25/18   Benjamín Carvalho DO       Current medications:    Current Facility-Administered Medications   Medication Dose Route Frequency Provider Last Rate Last Dose    lactated ringers infusion   Intravenous Continuous Sindy Bernstein MD 60 mL/hr at 09/15/19 2334      enoxaparin (LOVENOX) injection 40 mg  40 mg Subcutaneous Daily Medina Adan MD   40 mg at 09/15/19 1537    mupirocin (BACTROBAN) 2 % ointment   Nasal BID Sindy Bernstein MD        meropenem (MERREM) 1 g in sodium chloride 0.9 % 100 mL IVPB (mini-bag)  1 g Intravenous Q12H JUSTYN Sotrey - CNS   Stopped at 09/16/19 0518    atorvastatin (LIPITOR) tablet 40 mg  40 mg Oral Nightly Gem Mckinney MD   40 mg at 09/15/19 2050    sodium chloride flush 0.9 % injection 10 mL  10 mL Intravenous 2 times per day Gem Mckinney MD   10 mL at 09/15/19 2050    sodium chloride flush 0.9 % injection 10 mL  10 mL Intravenous PRN Gem Mckinney MD   10 mL at 09/16/19 0233    magnesium hydroxide (MILK OF MAGNESIA) 400 MG/5ML suspension 30 mL  30 mL Oral Daily PRN Gem Mckinney MD catheter ([facility] T83.512A, N39.0    Palliative care by specialist Z51.5    Cystitis N30.90       Past Medical History:        Diagnosis Date    Acute cystitis with hematuria     [facility]     Acute seasonal allergic rhinitis     Anemia, unspecified     Anxiety     Bradycardia, unspecified     Cancer ([facility] 06/12/2019    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 ([facility] 6/29/2019    Cellulitis     LUE    Chronic kidney disease     Chronic major depressive disorder, recurrent episode (HCC)     Constipation, unspecified     Depression     Difficulty walking     Dysmenorrhea, unspecified     Dysphagia, oropharyngeal     Hematuria     Hydronephrosis     Hyperlipidemia     Hypertension     Hypothyroidism     Intellectual disability     Leg pain, bilateral     Mild intermittent asthma, uncomplicated     Mild protein-calorie malnutrition (HCC)     Mixed incontinence     Muscle weakness (generalized)     Neuromuscular dysfunction of bladder     Noncompliance with medications     Noncompliance with treatment     Obstructive and reflux uropathy     Osteoarthritis     Other fatigue     Other symbolic dysfunctions     Personal care impairment     Personal history of noncompliance with medical treatment, presenting hazards to health     Presence of urogenital implants     Unspecified intellectual disabilities     Urinary tract infection, site not specified     Vitamin D deficiency, unspecified        Past Surgical History:        Procedure Laterality Date    CYSTOSCOPY Left 1/21/2019    CYSTOSCOPY, BILATERAL RETROGRADE PYELOGRAMS, BILATERAL STENT INSERTION performed by John York MD at [facility] Bilateral 6/1/2019    CYSTOSCOPY, RETROGRADE PYELOGRAMS, BILATERAL URETERAL STENT EXCHANGE performed by Luane Peabody, MD at [address] [place] [address] [address] / REMOVAL / [address] N/A 8/19/2019    MEDIPORT CATHETER INSERTION (DO NOT MOVE TIME) performed by Latoya Acosta MD [facility] [address] N/A 2019    Figueroayenny Chery ANESTHESIA VAGINAL BIOPSIES performed by Erica Laguna MD at [address] [facility] History:    Social History     Tobacco Use    Smoking status: Former Smoker     Packs/day: 1.00     Years: 5.00     Pack years: 5.00     Last attempt to quit: 1989     Years since quittin.7    Smokeless tobacco: Never Used   Substance Use Topics    Alcohol use: No                                Counseling given: Not Answered      Vital Signs (Current):   Vitals:    09/15/19 1100 09/15/19 2045 09/15/19 2330 19 0634   BP: (!) 98/51 100/60 (!) 111/55    Pulse:   65    Resp:   16    Temp:   98.4 °F (36.9 °C)    TempSrc:   Temporal    SpO2:   97%    Weight:    134 lb 1.6 oz (60.8 kg)   Height:                                                  BP Readings from Last 3 Encounters:   09/15/19 (!) 111/55   19 104/60   19 130/69       NPO Status:                                                                                 BMI:   Wt Readings from Last 3 Encounters:   19 134 lb 1.6 oz (60.8 kg)   19 128 lb (58.1 kg)   19 128 lb 8 oz (58.3 kg)     Body mass index is 27.08 kg/m².     CBC:   Lab Results   Component Value Date    WBC 6.4 2019    RBC 2.75 2019    HGB 8.4 2019    HCT 26.6 2019    MCV 96.7 2019    RDW 17.4 2019     2019       CMP:   Lab Results   Component Value Date     2019    K 4.2 2019    K 4.7 2019     2019    CO2 26 2019    BUN 25 2019    CREATININE 1.6 2019    GFRAA 40 2019    LABGLOM 33 2019    GLUCOSE 101 2019    GLUCOSE 97 2011    PROT 6.4 2019    CALCIUM 8.9 2019    BILITOT <0.2 2019    ALKPHOS 62 2019    AST 13 2019    ALT 15 2019       POC Tests: No results for input(s): POCEDUARDO, CHARLES, AYAAN,

## 2019-09-16 NOTE — PROGRESS NOTES
daily   Yes Historical Provider, MD   sevelamer (RENVELA) 800 MG tablet Take 1 tablet by mouth 3 times daily (with meals) 7/11/19  Yes JUSTYN Ceja CNP   sodium bicarbonate 650 MG tablet Take 2 tablets by mouth 2 times daily  Patient taking differently: Take 650 mg by mouth 2 times daily  7/11/19  Yes JUSTYN Ceja CNP   atorvastatin (LIPITOR) 40 MG tablet Take 1 tablet by mouth daily  Patient taking differently: Take 40 mg by mouth nightly  6/14/19  Yes Gene Safe, DO   acetaminophen (TYLENOL) 325 MG tablet Take 650 mg by mouth every 4 hours as needed for Pain or Fever    Yes Historical Provider, MD   mirtazapine (REMERON) 15 MG tablet Take 1 tablet by mouth nightly 5/24/19  Yes Amena Baron MD   ondansetron (ZOFRAN-ODT) 4 MG disintegrating tablet Take 1 tablet by mouth every 8 hours as needed for Nausea or Vomiting 4/23/19  Yes Gene Safe, DO   ferrous sulfate 325 (65 Fe) MG tablet Take 1 tablet by mouth 2 times daily (with meals) 4/23/19  Yes Gene Safe, DO   docusate sodium (COLACE, DULCOLAX) 100 MG CAPS Take 100 mg by mouth 2 times daily 4/23/19  Yes Gene Safe, DO   levothyroxine (SYNTHROID) 100 MCG tablet TAKE 1 TABLET BY MOUTH EVERY MORNING 30 MINUTES BEFORE EATING. 4/10/19  Yes Leia Urbina, DO   vitamin D (ERGOCALCIFEROL) 88509 units CAPS capsule Take 1 capsule by mouth once a week  Patient taking differently: Take 50,000 Units by mouth once a week Every Tuesday 4/9/19  Yes Leia Urbina, DO   oxybutynin (DITROPAN-XL) 5 MG extended release tablet Take 1 tablet by mouth daily 4/9/19  Yes Celestine Trujillo, DO   magnesium oxide (MAG-OX) 400 (241.3 Mg) MG TABS tablet Take 1 tablet by mouth 2 times daily 4/9/19  Yes Celestine Trujillo,    pantoprazole (PROTONIX) 40 MG tablet TAKE ONE TABLET BY MOUTH DAILY 4/9/19  Yes Leia Urbina, DO   Compression Stockings MISC by Does not apply route Below knee  15 - 30 mm Hg 7/25/18   Octavio Dimes, DO    Scheduled Meds:  Continuous

## 2019-09-17 PROBLEM — N18.9 ACUTE KIDNEY INJURY SUPERIMPOSED ON CHRONIC KIDNEY DISEASE (HCC): Status: ACTIVE | Noted: 2019-07-02

## 2019-09-17 LAB
ANION GAP SERPL CALCULATED.3IONS-SCNC: 10 MMOL/L (ref 7–16)
ANION GAP SERPL CALCULATED.3IONS-SCNC: 9 MMOL/L (ref 7–16)
ANISOCYTOSIS: ABNORMAL
BASOPHILS ABSOLUTE: 0 E9/L (ref 0–0.2)
BASOPHILS RELATIVE PERCENT: 0.1 % (ref 0–2)
BUN BLDV-MCNC: 21 MG/DL (ref 6–20)
BUN BLDV-MCNC: 21 MG/DL (ref 6–20)
CALCIUM SERPL-MCNC: 9.1 MG/DL (ref 8.6–10.2)
CALCIUM SERPL-MCNC: 9.2 MG/DL (ref 8.6–10.2)
CHLORIDE BLD-SCNC: 102 MMOL/L (ref 98–107)
CHLORIDE BLD-SCNC: 103 MMOL/L (ref 98–107)
CHLORIDE URINE RANDOM: 148 MMOL/L
CO2: 27 MMOL/L (ref 22–29)
CO2: 27 MMOL/L (ref 22–29)
CORTISOL TOTAL: 9.18 MCG/DL (ref 2.68–18.4)
CREAT SERPL-MCNC: 1.7 MG/DL (ref 0.5–1)
CREAT SERPL-MCNC: 1.8 MG/DL (ref 0.5–1)
CREATININE URINE: 54 MG/DL (ref 29–226)
EOSINOPHILS ABSOLUTE: 0.19 E9/L (ref 0.05–0.5)
EOSINOPHILS RELATIVE PERCENT: 2.6 % (ref 0–6)
GFR AFRICAN AMERICAN: 35
GFR AFRICAN AMERICAN: 38
GFR NON-AFRICAN AMERICAN: 29 ML/MIN/1.73
GFR NON-AFRICAN AMERICAN: 31 ML/MIN/1.73
GLUCOSE BLD-MCNC: 111 MG/DL (ref 74–99)
GLUCOSE BLD-MCNC: 84 MG/DL (ref 74–99)
HCT VFR BLD CALC: 26.2 % (ref 34–48)
HEMOGLOBIN: 8.2 G/DL (ref 11.5–15.5)
LYMPHOCYTES ABSOLUTE: 0.59 E9/L (ref 1.5–4)
LYMPHOCYTES RELATIVE PERCENT: 7.8 % (ref 20–42)
MCH RBC QN AUTO: 30.8 PG (ref 26–35)
MCHC RBC AUTO-ENTMCNC: 31.3 % (ref 32–34.5)
MCV RBC AUTO: 98.5 FL (ref 80–99.9)
MONOCYTES ABSOLUTE: 0.44 E9/L (ref 0.1–0.95)
MONOCYTES RELATIVE PERCENT: 6.1 % (ref 2–12)
NEUTROPHILS ABSOLUTE: 6.22 E9/L (ref 1.8–7.3)
NEUTROPHILS RELATIVE PERCENT: 83.5 % (ref 43–80)
OSMOLALITY URINE: 490 MOSM/KG (ref 300–900)
OVALOCYTES: ABNORMAL
PDW BLD-RTO: 17.4 FL (ref 11.5–15)
PLATELET # BLD: 291 E9/L (ref 130–450)
PMV BLD AUTO: 10.4 FL (ref 7–12)
POIKILOCYTES: ABNORMAL
POLYCHROMASIA: ABNORMAL
POTASSIUM SERPL-SCNC: 4.5 MMOL/L (ref 3.5–5)
POTASSIUM SERPL-SCNC: 4.6 MMOL/L (ref 3.5–5)
POTASSIUM, UR: 52.5 MMOL/L
RBC # BLD: 2.66 E12/L (ref 3.5–5.5)
SODIUM BLD-SCNC: 138 MMOL/L (ref 132–146)
SODIUM BLD-SCNC: 140 MMOL/L (ref 132–146)
SODIUM URINE: 158 MMOL/L
T4 FREE: 1.08 NG/DL (ref 0.93–1.7)
TSH SERPL DL<=0.05 MIU/L-ACNC: 2.84 UIU/ML (ref 0.27–4.2)
WBC # BLD: 7.4 E9/L (ref 4.5–11.5)

## 2019-09-17 PROCEDURE — 2580000003 HC RX 258: Performed by: CLINICAL NURSE SPECIALIST

## 2019-09-17 PROCEDURE — 84443 ASSAY THYROID STIM HORMONE: CPT

## 2019-09-17 PROCEDURE — 6370000000 HC RX 637 (ALT 250 FOR IP): Performed by: INTERNAL MEDICINE

## 2019-09-17 PROCEDURE — 6360000002 HC RX W HCPCS: Performed by: INTERNAL MEDICINE

## 2019-09-17 PROCEDURE — 82533 TOTAL CORTISOL: CPT

## 2019-09-17 PROCEDURE — 80048 BASIC METABOLIC PNL TOTAL CA: CPT

## 2019-09-17 PROCEDURE — 6370000000 HC RX 637 (ALT 250 FOR IP): Performed by: UROLOGY

## 2019-09-17 PROCEDURE — 99231 SBSQ HOSP IP/OBS SF/LOW 25: CPT | Performed by: NURSE PRACTITIONER

## 2019-09-17 PROCEDURE — 84133 ASSAY OF URINE POTASSIUM: CPT

## 2019-09-17 PROCEDURE — 82436 ASSAY OF URINE CHLORIDE: CPT

## 2019-09-17 PROCEDURE — 99232 SBSQ HOSP IP/OBS MODERATE 35: CPT | Performed by: INTERNAL MEDICINE

## 2019-09-17 PROCEDURE — 36415 COLL VENOUS BLD VENIPUNCTURE: CPT

## 2019-09-17 PROCEDURE — 2140000000 HC CCU INTERMEDIATE R&B

## 2019-09-17 PROCEDURE — 6360000002 HC RX W HCPCS: Performed by: CLINICAL NURSE SPECIALIST

## 2019-09-17 PROCEDURE — 2580000003 HC RX 258: Performed by: INTERNAL MEDICINE

## 2019-09-17 PROCEDURE — 85025 COMPLETE CBC W/AUTO DIFF WBC: CPT

## 2019-09-17 PROCEDURE — 83935 ASSAY OF URINE OSMOLALITY: CPT

## 2019-09-17 PROCEDURE — 84439 ASSAY OF FREE THYROXINE: CPT

## 2019-09-17 PROCEDURE — 82570 ASSAY OF URINE CREATININE: CPT

## 2019-09-17 PROCEDURE — 84300 ASSAY OF URINE SODIUM: CPT

## 2019-09-17 RX ORDER — ATORVASTATIN CALCIUM 40 MG/1
40 TABLET, FILM COATED ORAL NIGHTLY
COMMUNITY

## 2019-09-17 RX ORDER — SODIUM BICARBONATE 650 MG/1
650 TABLET ORAL 2 TIMES DAILY
Status: ON HOLD | COMMUNITY
End: 2019-09-25 | Stop reason: HOSPADM

## 2019-09-17 RX ORDER — DEXTROSE AND SODIUM CHLORIDE 5; .9 G/100ML; G/100ML
INJECTION, SOLUTION INTRAVENOUS CONTINUOUS
Status: DISCONTINUED | OUTPATIENT
Start: 2019-09-17 | End: 2019-09-19

## 2019-09-17 RX ORDER — MIDODRINE HYDROCHLORIDE 5 MG/1
2.5 TABLET ORAL 2 TIMES DAILY WITH MEALS
Status: DISCONTINUED | OUTPATIENT
Start: 2019-09-17 | End: 2019-09-17

## 2019-09-17 RX ORDER — COSYNTROPIN 0.25 MG/ML
250 INJECTION, POWDER, FOR SOLUTION INTRAMUSCULAR; INTRAVENOUS ONCE
Status: COMPLETED | OUTPATIENT
Start: 2019-09-18 | End: 2019-09-18

## 2019-09-17 RX ORDER — MIDODRINE HYDROCHLORIDE 5 MG/1
5 TABLET ORAL
Status: DISCONTINUED | OUTPATIENT
Start: 2019-09-17 | End: 2019-09-19

## 2019-09-17 RX ORDER — POTASSIUM CHLORIDE 1.5 G/1.77G
20 POWDER, FOR SOLUTION ORAL EVERY MORNING
Status: ON HOLD | COMMUNITY
End: 2019-09-25 | Stop reason: HOSPADM

## 2019-09-17 RX ADMIN — ENOXAPARIN SODIUM 40 MG: 40 INJECTION SUBCUTANEOUS at 08:49

## 2019-09-17 RX ADMIN — MEROPENEM 1 G: 1 INJECTION INTRAVENOUS at 03:50

## 2019-09-17 RX ADMIN — SODIUM CHLORIDE, POTASSIUM CHLORIDE, SODIUM LACTATE AND CALCIUM CHLORIDE: 600; 310; 30; 20 INJECTION, SOLUTION INTRAVENOUS at 05:43

## 2019-09-17 RX ADMIN — SODIUM CHLORIDE, POTASSIUM CHLORIDE, SODIUM LACTATE AND CALCIUM CHLORIDE: 600; 310; 30; 20 INJECTION, SOLUTION INTRAVENOUS at 02:32

## 2019-09-17 RX ADMIN — ACETAMINOPHEN 650 MG: 325 TABLET, FILM COATED ORAL at 09:14

## 2019-09-17 RX ADMIN — Medication 10 ML: at 15:26

## 2019-09-17 RX ADMIN — MIDODRINE HYDROCHLORIDE 5 MG: 5 TABLET ORAL at 17:44

## 2019-09-17 RX ADMIN — ATORVASTATIN CALCIUM 40 MG: 40 TABLET, FILM COATED ORAL at 21:42

## 2019-09-17 RX ADMIN — OXYBUTYNIN CHLORIDE 5 MG: 5 TABLET, EXTENDED RELEASE ORAL at 08:49

## 2019-09-17 RX ADMIN — MIDODRINE HYDROCHLORIDE 2.5 MG: 5 TABLET ORAL at 10:56

## 2019-09-17 RX ADMIN — MUPIROCIN: 20 OINTMENT TOPICAL at 21:42

## 2019-09-17 RX ADMIN — MUPIROCIN: 20 OINTMENT TOPICAL at 08:49

## 2019-09-17 RX ADMIN — Medication 10 ML: at 19:17

## 2019-09-17 RX ADMIN — MIRTAZAPINE 15 MG: 15 TABLET, FILM COATED ORAL at 21:42

## 2019-09-17 RX ADMIN — LEVOTHYROXINE SODIUM 100 MCG: 100 TABLET ORAL at 05:49

## 2019-09-17 RX ADMIN — Medication 10 ML: at 15:41

## 2019-09-17 RX ADMIN — Medication 10 ML: at 15:38

## 2019-09-17 RX ADMIN — DEXTROSE AND SODIUM CHLORIDE: 5; 900 INJECTION, SOLUTION INTRAVENOUS at 19:18

## 2019-09-17 RX ADMIN — MEROPENEM 1 G: 1 INJECTION INTRAVENOUS at 15:39

## 2019-09-17 ASSESSMENT — PAIN DESCRIPTION - DESCRIPTORS
DESCRIPTORS: ACHING;DISCOMFORT;CONSTANT;SORE
DESCRIPTORS: ACHING;CRAMPING;DISCOMFORT

## 2019-09-17 ASSESSMENT — PAIN DESCRIPTION - PROGRESSION
CLINICAL_PROGRESSION: GRADUALLY IMPROVING
CLINICAL_PROGRESSION: GRADUALLY IMPROVING

## 2019-09-17 ASSESSMENT — PAIN DESCRIPTION - PAIN TYPE
TYPE: ACUTE PAIN
TYPE: ACUTE PAIN

## 2019-09-17 ASSESSMENT — PAIN SCALES - GENERAL
PAINLEVEL_OUTOF10: 10
PAINLEVEL_OUTOF10: 6
PAINLEVEL_OUTOF10: 4
PAINLEVEL_OUTOF10: 0
PAINLEVEL_OUTOF10: 0

## 2019-09-17 ASSESSMENT — PAIN DESCRIPTION - FREQUENCY
FREQUENCY: CONTINUOUS
FREQUENCY: INTERMITTENT

## 2019-09-17 ASSESSMENT — PAIN DESCRIPTION - LOCATION
LOCATION: ABDOMEN
LOCATION: ABDOMEN

## 2019-09-17 ASSESSMENT — PAIN DESCRIPTION - ONSET
ONSET: ON-GOING
ONSET: ON-GOING

## 2019-09-17 ASSESSMENT — PAIN DESCRIPTION - ORIENTATION
ORIENTATION: LEFT
ORIENTATION: LEFT

## 2019-09-17 NOTE — PROGRESS NOTES
the fat planes and infiltration of the soft tissues in the right and left pelvic sidewalls more on the right than on the left temporal lobe the trajectory of the ureters. These findings have to be correlate clinically. Patient has history of bladder and gynecological malignancy. The fat planes are of the pelvic floor cavity is a ill-defined and is also presacral edema present. No conspicuous adenopathy is seen however along the trajectory for the right and left external and common iliac lymphatic chains are in the midline retroperitoneal region. There is a mild to moderate size hiatal hernia. There are preserved density for the liver. No focal lesions are observed, this is a normal IV contrast study. The spleen appear unremarkable. Unremarkable appearance for the pancreas. Gallbladder is contracted but there are food contents in the stomach. Small-sized gallstones are likely to be present. Adrenals not enlarged. Aorta and IVC appear unremarkable. There is a small midline umbilical hernia with omental fat contents above the umbilical scar. Lower lung bases demonstrate no significant findings. Visualized bones demonstrate degenerative changes in L3-4, L4-5 disc space levels and corresponding facet joints. 1.No hydronephrotic changes in the right or left kidney. Percutaneous nephrostomy catheter in ureter catheter are in proper position for the right and for the left kidneys. 2. The Braden catheter is present in the bladder which is not distended. The fusion thickening of the bladder wall with stranding of the fat planes around the bladder is seen in the around the floor of the pelvic cavity. 3. Interstitial infiltrates are seen along the trajectory of the multifidus particular the right ureter predominant distally including the L4 cavity in the pelvic sidewalls. Please correlate with the clinical history of bladder malleolus and the vulvar malignancy.       US Dup Upper Extremity Left Venous  Result Date: post uneventful CT and fluoroscopy guided left-sided percutaneous nephrostomy tube placement as described. CT Nephrostomy Catheter Placement  Result Date: 2019  Patient MRN:  [id number] : 1965 Age: 52 years Gender: Female Order Date:  2019 8:30 AM EXAM: CT NEPHROSTOMY CATHETER PLACEMENT NUMBER OF IMAGES: 164 INDICATION:  neph tube COMPARISON: PET/CT of 2019. FINDINGS: Informed consent was obtained. Appropriate timeout procedure was performed. Conscious sedation and analgesia were utilized. IV hydralazine was used for intraoperative blood pressure control. Direct CT guidance and fluoroscopic guidance were employed. Local anesthesia was administered. The right intrarenal collecting system was accessed with an 18-gauge trocar needle. Guidewire was subsequently advanced and appropriate fascial dilatation was accomplished followed by placement and of an 8 Slovak pigtail catheter. Contrast was injected into the collecting system and ureter and the catheter adjusted and locked appropriately. Nephrostomy tube was then fixed to the skin using both suture material and an adhesive appliance. Patient tolerated the procedure well. Status post uneventful CT and fluoroscopy guided right-sided percutaneous nephrostomy tube placement as described. ASSESSMENT/PLAN :  57-year-old woman with mild mental retardation and recent diagnosis of high-grade invasive poorly differentiated carcinoma on a cervical biopsy with immunohistochemical stains suspicious of urothelial primary with extrinsic invasion of the cervix as well as the vaginal wall  She has prior history of CKD stage IV with hydronephrosis and neurogenic bladder and prior stent placement and has required intermittent hemodialysis in the past and most recently  She underwent cystourethroscopy, bilateral JJ ureteral stent insertion, pelvic examination   Her cystoscopy did not show any bladder tumors.   The urethra was fixed and difficult

## 2019-09-17 NOTE — PROGRESS NOTES
[facility]  Internal Medicine Residency Program  Progress Note - House Team 2    Patient:  Roseann Richardson 52 y.o. female   MRN: [id number]       Date of Service: 2019    Allergy: Patient has no known allergies. Subjective     Patient was seen and examined in AM. Patient is very sad and worried about placement. She doesn't want to go to her previous nursing home - [facility]. Patient was teary. Tried to console the patient. It is difficult to make the patient fully appreciate and understand the severity of her condition, considering her mild mental retardation. She also complains of some lower abdominal pain, decreased since admission; non-tender. 24 hour change: Stable overnight. No acute overnight issues reported. Continuing her on meropenem since ureteral stent removal on 2019. Hemo/onc attending Dr. Sam Kelley will see patient this evening at 6pm; planned for a discussion about further patient care. Objective     TEMPERATURE:  Current - Temp: 98.1 °F (36.7 °C); Max - Temp  Av.8 °F (37.1 °C)  Min: 98.1 °F (36.7 °C)  Max: 99.3 °F (37.4 °C)  RESPIRATIONS RANGE: Resp  Av.5  Min: 16  Max: 20  PULSE RANGE: Pulse  Av  Min: 64  Max: 94  BLOOD PRESSURE RANGE:  Systolic (68VHT), AQR:57 , Min:82 , GUU:240   ; Diastolic (92GWZ), MFD:60, Min:50, Max:68    PULSE OXIMETRY RANGE: SpO2  Av.7 %  Min: 94 %  Max: 99 %    I & O - 24hr:    Intake/Output Summary (Last 24 hours) at 2019 1144  Last data filed at 2019 0941  Gross per 24 hour   Intake 1530 ml   Output 1665 ml   Net -135 ml     Physical Exam   Constitutional: She is oriented to person, place, and time. She appears well-developed and well-nourished. HENT:   Moist mucous membrane; Grossly normal.   Eyes: Pupils are equal, round, and reactive to light. Conjunctivae and EOM are normal.   Neck: Normal range of motion. Neck supple. No JVD present.    Cardiovascular: Normal rate, regular rhythm, normal heart

## 2019-09-17 NOTE — PROGRESS NOTES
Palliative Medicine   Progress Note  Provider:  Grayson ALEJANDRA-RONY , AGACNP-BC        Hospital Day: 5    Assessment/Plan  1) Continue current treatment  2) Oncology input-palliative XRT. Completed   3) Urology- removed bilateral uretal stents 9/16, Palmer nephrostomy tubes left in place  4) Palliative SW to help complete HC-POA with patient and family  5) Palliative care for support of patient and family      Palliative Care Encounter:  Patient lying in bed. Stated that she had some pain in her lower abdomen for which she was given tylenol. Stated that it is decreasing. Dr. Natalya Pradhan to come in this evening around 6 pm to speak to patient about options with her advanced cancer. The patient's brother Rachael Encarnacion will also be here this evening stated by patient. Patient still would like to talk to Dr. Brianna Leo. Patient stated that she does not want to make any decisions until she gets to speak to her doctors, and brother Rachael Encarnacion. ROS: UNLESS STATED PATIENT DENIES:  CONSTITUTIONAL:  fever, chill, rigors, nausea, vomiting, fatigue, decreased appetite. HEENT: blurry vision, double vision, hearing problem, tinnitus, hoarseness, dysphagia, odynophagia  RESPIRATORY: cough, shortness of breath, sputum expectoration. CARDIOVASCULAR:  Chest pain/pressure, palpitation, syncope, irregular beats  GASTROINTESTINAL:  abdominal or rectal pain, diarrhea, constipation, - stated better with Tylenol. GENITOURINARY:  Burning, frequency, urgency, incontinence, discharge  INTEGUMENTARY: rash, wound, pruritis  HEMATOLOGIC/LYMPHATIC:  Swelling, sores, gum bleeding, easy bruising, pica.   MUSCULOSKELETAL:  pain, edema, joint swelling or redness  NEUROLOGICAL:  light headed, dizziness, loss of consciousness, weakness, change in memory, seizures, tremors, tearful      Physical Exam:  Vitals:    BP (!) 95/51   Pulse 94   Temp 98.1 °F (36.7 °C) (Temporal)   Resp 20   Ht 4' 11\" (1.499 m)   Wt 135 lb 6.4 oz (61.4 kg)   LMP 05/21/2015 (Approximate)   SpO2 97%   BMI 27.35 kg/m²     Gen:  Alert, appears stated age, well nourished, in no acute distress  HEENT:  Normocephalic, conjunctiva pink, no drainage, mucosa moist  Neck:  Supple  Lungs:  CTA bilaterally, no audible rhonchi or wheezes noted  Heart[de-identified]  RRR, no murmur, rub, or gallop noted during exam  Abd:  Soft, non tender, non distended, BS+  :  Palmer nephrostomy tubes, yellow  Ext:  Moving all extremities, no edema, pulses present  Skin:  Warm and dry  Neuro:  PERRL, Alert, oriented x 3; following commands    Labs and imaging have been reviewed. Alan Harrington APRN-CNP, AGACNP-BC 9/17/2019 9:49 AM    Time in minutes spent: 15    More than 50% of this interaction was related to counseling or information given r/t disease process; plan of care; and code status. Discussed patient and the plan of care with the other IDT members of Palliative Care, Supervising/Collaborating physician and with consultants and patient. Thank you for allowing us to work with you in the care of this patient. Note: This report was completed using computer[employer] voiced recognition software. Every effort has been made to ensure accuracy; however, inadvertent computerized transcription errors may be present.

## 2019-09-18 ENCOUNTER — OFFICE VISIT (OUTPATIENT)
Dept: ONCOLOGY | Age: 54
End: 2019-09-18

## 2019-09-18 ENCOUNTER — APPOINTMENT (OUTPATIENT)
Dept: CT IMAGING | Age: 54
DRG: 461 | End: 2019-09-18
Payer: MEDICAID

## 2019-09-18 ENCOUNTER — HOSPITAL ENCOUNTER (OUTPATIENT)
Dept: INFUSION THERAPY | Age: 54
Discharge: HOME | End: 2019-09-18
Payer: MEDICAID

## 2019-09-18 DIAGNOSIS — C53.9 MALIGNANT NEOPLASM OF CERVIX, UNSPECIFIED SITE (HCC): ICD-10-CM

## 2019-09-18 DIAGNOSIS — D50.8 OTHER IRON DEFICIENCY ANEMIA: ICD-10-CM

## 2019-09-18 DIAGNOSIS — C53.9 MALIGNANT NEOPLASM OF CERVIX, UNSPECIFIED SITE (HCC): Primary | ICD-10-CM

## 2019-09-18 LAB
ANION GAP SERPL CALCULATED.3IONS-SCNC: 9 MMOL/L (ref 7–16)
ANISOCYTOSIS: ABNORMAL
BASOPHILS ABSOLUTE: 0 E9/L (ref 0–0.2)
BASOPHILS RELATIVE PERCENT: 0.5 % (ref 0–2)
BUN BLDV-MCNC: 16 MG/DL (ref 6–20)
BURR CELLS: ABNORMAL
CALCIUM SERPL-MCNC: 8.8 MG/DL (ref 8.6–10.2)
CHLORIDE BLD-SCNC: 108 MMOL/L (ref 98–107)
CO2: 26 MMOL/L (ref 22–29)
CORTISOL TOTAL: 12.24 MCG/DL (ref 2.68–18.4)
CORTISOL TOTAL: 20.63 MCG/DL (ref 2.68–18.4)
CORTISOL TOTAL: 24.26 MCG/DL (ref 2.68–18.4)
CREAT SERPL-MCNC: 1.5 MG/DL (ref 0.5–1)
EOSINOPHILS ABSOLUTE: 0.23 E9/L (ref 0.05–0.5)
EOSINOPHILS RELATIVE PERCENT: 3.5 % (ref 0–6)
GFR AFRICAN AMERICAN: 43
GFR NON-AFRICAN AMERICAN: 36 ML/MIN/1.73
GLUCOSE BLD-MCNC: 101 MG/DL (ref 74–99)
HCT VFR BLD CALC: 27.5 % (ref 34–48)
HEMOGLOBIN: 8.5 G/DL (ref 11.5–15.5)
LYMPHOCYTES ABSOLUTE: 0.53 E9/L (ref 1.5–4)
LYMPHOCYTES RELATIVE PERCENT: 7.8 % (ref 20–42)
MCH RBC QN AUTO: 30.5 PG (ref 26–35)
MCHC RBC AUTO-ENTMCNC: 30.9 % (ref 32–34.5)
MCV RBC AUTO: 98.6 FL (ref 80–99.9)
MONOCYTES ABSOLUTE: 0.2 E9/L (ref 0.1–0.95)
MONOCYTES RELATIVE PERCENT: 2.6 % (ref 2–12)
NEUTROPHILS ABSOLUTE: 5.68 E9/L (ref 1.8–7.3)
NEUTROPHILS RELATIVE PERCENT: 86.1 % (ref 43–80)
OVALOCYTES: ABNORMAL
PDW BLD-RTO: 17.4 FL (ref 11.5–15)
PLATELET # BLD: 288 E9/L (ref 130–450)
PMV BLD AUTO: 10.2 FL (ref 7–12)
POIKILOCYTES: ABNORMAL
POLYCHROMASIA: ABNORMAL
POTASSIUM SERPL-SCNC: 4.5 MMOL/L (ref 3.5–5)
RBC # BLD: 2.79 E12/L (ref 3.5–5.5)
SODIUM BLD-SCNC: 143 MMOL/L (ref 132–146)
WBC # BLD: 6.6 E9/L (ref 4.5–11.5)

## 2019-09-18 PROCEDURE — 99232 SBSQ HOSP IP/OBS MODERATE 35: CPT | Performed by: INTERNAL MEDICINE

## 2019-09-18 PROCEDURE — 82533 TOTAL CORTISOL: CPT

## 2019-09-18 PROCEDURE — 6370000000 HC RX 637 (ALT 250 FOR IP): Performed by: INTERNAL MEDICINE

## 2019-09-18 PROCEDURE — 80048 BASIC METABOLIC PNL TOTAL CA: CPT

## 2019-09-18 PROCEDURE — 6360000004 HC RX CONTRAST MEDICATION: Performed by: RADIOLOGY

## 2019-09-18 PROCEDURE — 2580000003 HC RX 258: Performed by: INTERNAL MEDICINE

## 2019-09-18 PROCEDURE — 6360000002 HC RX W HCPCS: Performed by: INTERNAL MEDICINE

## 2019-09-18 PROCEDURE — 1200000000 HC SEMI PRIVATE

## 2019-09-18 PROCEDURE — 85025 COMPLETE CBC W/AUTO DIFF WBC: CPT

## 2019-09-18 PROCEDURE — 6360000002 HC RX W HCPCS: Performed by: CLINICAL NURSE SPECIALIST

## 2019-09-18 PROCEDURE — 2580000003 HC RX 258: Performed by: CLINICAL NURSE SPECIALIST

## 2019-09-18 PROCEDURE — 36415 COLL VENOUS BLD VENIPUNCTURE: CPT

## 2019-09-18 PROCEDURE — 82024 ASSAY OF ACTH: CPT

## 2019-09-18 PROCEDURE — 50431 NJX PX NFROSGRM &/URTRGRM: CPT

## 2019-09-18 RX ADMIN — MIDODRINE HYDROCHLORIDE 5 MG: 5 TABLET ORAL at 12:21

## 2019-09-18 RX ADMIN — Medication 10 ML: at 20:17

## 2019-09-18 RX ADMIN — MEROPENEM 1 G: 1 INJECTION INTRAVENOUS at 04:08

## 2019-09-18 RX ADMIN — MIRTAZAPINE 15 MG: 15 TABLET, FILM COATED ORAL at 20:18

## 2019-09-18 RX ADMIN — DEXTROSE AND SODIUM CHLORIDE: 5; 900 INJECTION, SOLUTION INTRAVENOUS at 15:18

## 2019-09-18 RX ADMIN — IOPAMIDOL 30 ML: 612 INJECTION, SOLUTION INTRAVENOUS at 16:15

## 2019-09-18 RX ADMIN — HYDROMORPHONE HYDROCHLORIDE 0.5 MG: 1 INJECTION, SOLUTION INTRAMUSCULAR; INTRAVENOUS; SUBCUTANEOUS at 20:17

## 2019-09-18 RX ADMIN — MEROPENEM 1 G: 1 INJECTION INTRAVENOUS at 17:37

## 2019-09-18 RX ADMIN — COSYNTROPIN 250 MCG: 0.25 INJECTION, POWDER, LYOPHILIZED, FOR SOLUTION INTRAMUSCULAR; INTRAVENOUS at 08:27

## 2019-09-18 RX ADMIN — MIDODRINE HYDROCHLORIDE 5 MG: 5 TABLET ORAL at 08:11

## 2019-09-18 RX ADMIN — SENNOSIDES 8.6 MG: 8.6 TABLET, FILM COATED ORAL at 20:18

## 2019-09-18 RX ADMIN — MUPIROCIN: 20 OINTMENT TOPICAL at 08:11

## 2019-09-18 RX ADMIN — ENOXAPARIN SODIUM 40 MG: 40 INJECTION SUBCUTANEOUS at 08:30

## 2019-09-18 RX ADMIN — MIDODRINE HYDROCHLORIDE 5 MG: 5 TABLET ORAL at 17:37

## 2019-09-18 RX ADMIN — ATORVASTATIN CALCIUM 40 MG: 40 TABLET, FILM COATED ORAL at 20:18

## 2019-09-18 RX ADMIN — MUPIROCIN: 20 OINTMENT TOPICAL at 20:18

## 2019-09-18 RX ADMIN — DEXTROSE AND SODIUM CHLORIDE: 5; 900 INJECTION, SOLUTION INTRAVENOUS at 05:02

## 2019-09-18 RX ADMIN — LEVOTHYROXINE SODIUM 100 MCG: 100 TABLET ORAL at 06:00

## 2019-09-18 ASSESSMENT — PAIN DESCRIPTION - LOCATION: LOCATION: ABDOMEN

## 2019-09-18 ASSESSMENT — PAIN DESCRIPTION - FREQUENCY: FREQUENCY: CONTINUOUS

## 2019-09-18 ASSESSMENT — PAIN DESCRIPTION - PAIN TYPE: TYPE: ACUTE PAIN

## 2019-09-18 ASSESSMENT — PAIN SCALES - GENERAL
PAINLEVEL_OUTOF10: 0
PAINLEVEL_OUTOF10: 4
PAINLEVEL_OUTOF10: 8

## 2019-09-18 ASSESSMENT — PAIN DESCRIPTION - ONSET: ONSET: ON-GOING

## 2019-09-18 ASSESSMENT — PAIN DESCRIPTION - ORIENTATION: ORIENTATION: LEFT;LOWER

## 2019-09-18 NOTE — PROGRESS NOTES
tenderness/mass/nodules  · Lung: clear to auscultation bilaterally  · Heart: regular rate and rhythm, S1, S2 normal, no murmur, click, rub or gallop  · Abdomen: soft, normal bowel sounds, nondistended  · Extremities:  extremities normal, atraumatic, no cyanosis or edema  · Musculokeletal: No joint swelling, no muscle tenderness. ROM normal in all joints of extremities.    · Neurologic: Mental status: Alert, oriented; affect: sad  Subject  Pertinent Labs & Imaging Studies   kallie  CBC:   Lab Results   Component Value Date    WBC 6.6 09/18/2019    RBC 2.79 09/18/2019    HGB 8.5 09/18/2019    HCT 27.5 09/18/2019    MCV 98.6 09/18/2019    MCH 30.5 09/18/2019    MCHC 30.9 09/18/2019    RDW 17.4 09/18/2019     09/18/2019    MPV 10.2 09/18/2019     CBC with Differential:    Lab Results   Component Value Date    WBC 6.6 09/18/2019    RBC 2.79 09/18/2019    HGB 8.5 09/18/2019    HCT 27.5 09/18/2019     09/18/2019    MCV 98.6 09/18/2019    MCH 30.5 09/18/2019    MCHC 30.9 09/18/2019    RDW 17.4 09/18/2019    NRBC 0.0 07/01/2019    SEGSPCT 66 10/23/2013    METASPCT 0.9 07/01/2019    LYMPHOPCT 7.8 09/18/2019    MONOPCT 2.6 09/18/2019    BASOPCT 0.5 09/18/2019    MONOSABS 0.20 09/18/2019    LYMPHSABS 0.53 09/18/2019    EOSABS 0.23 09/18/2019    BASOSABS 0.00 09/18/2019     WBC:    Lab Results   Component Value Date    WBC 6.6 09/18/2019     Platelets:    Lab Results   Component Value Date     09/18/2019     Hemoglobin/Hematocrit:    Lab Results   Component Value Date    HGB 8.5 09/18/2019    HCT 27.5 09/18/2019     CMP:    Lab Results   Component Value Date     09/18/2019    K 4.5 09/18/2019    K 4.7 09/13/2019     09/18/2019    CO2 26 09/18/2019    BUN 16 09/18/2019    CREATININE 1.5 09/18/2019    GFRAA 43 09/18/2019    LABGLOM 36 09/18/2019    GLUCOSE 101 09/18/2019    GLUCOSE 97 12/20/2011    PROT 6.4 09/16/2019    LABALBU 3.0 09/16/2019    LABALBU 4.4 12/20/2011    CALCIUM 8.8 09/18/2019

## 2019-09-18 NOTE — PROGRESS NOTES
pain likely secondary to tumor burden and infiltration  - PRN acetaminophen and hydromorphone; controlled with only acetaminophen so far                2. Hypothyroidism  - TSH 2.8 and T4 1.08 as of 9/17/2019  - continued on home medication, Synthroid 100 mcg daily                3. Nasuea, resolved   - Possible chemo induced.   - On Zofran PRN, hasn't required since 9/15/2019                4. Hx of Depression   - Continued on Mirtazapine 15 mg                5. Urothelial Carcinoma stage IV  - completed two cycles of chemotherapy   - Pelvic Radiation in July 2019.   - Oncology onboard   - Palliative onboard - Patient and family not agreeable for palliative care/hospice     6. HLD   - On atorvastatin 40 mg QD    7.  Low blood pressure  - BP has been on lower side since admission around 90s/60s  - Started on Midodrine 2.5mg BD on 9/17/2019; increased to 5mg BD as BP still 86/48 2 hours after 1st dose    # PT/OT evaluation:  # Peptic ulcer prophylaxis: on diet  # DVT Prophylaxis: enoxaparin  # Disposition: Cont current care    [address], DO PGY-1   Internal medicine resident  Attending Physician: Dr. Rita Stern

## 2019-09-18 NOTE — PROGRESS NOTES
Coordination of care discussion and chart review with PM team.Oncology notes reviewed, plans for continued therapy with re-evaluation noted. Pt resting at this time.  will remain available for on-going psychosocial support, as needed.

## 2019-09-18 NOTE — PROGRESS NOTES
[address] [place] [address] [facility]  [facility]  Progress Note        CC: + suprapubic pain  Emotional     Face- to face encounter    SUBJECTIVE:  Patient is tolerating medications. No reported adverse drug reactions. No nausea, vomiting, diarrhea. No rash, cancer- receiving chemotherapy. No shortness of breath.+ Pain/ abdominal/ suprapubic     Medications:  Scheduled Meds:   midodrine  5 mg Oral TID WC    enoxaparin  40 mg Subcutaneous Daily    mupirocin   Nasal BID    meropenem  1 g Intravenous Q12H    atorvastatin  40 mg Oral Nightly    sodium chloride flush  10 mL Intravenous 2 times per day    levothyroxine  100 mcg Oral Daily    mirtazapine  15 mg Oral Nightly    senna  1 tablet Oral Nightly     Continuous Infusions:   dextrose 5 % and 0.9 % NaCl 100 mL/hr at 09/18/19 0502     PRN Meds:sodium chloride flush, magnesium hydroxide, ondansetron, acetaminophen, HYDROmorphone, clonazePAM  OBJECTIVE:  Patient Vitals for the past 24 hrs:   BP Temp Temp src Pulse Resp SpO2 Weight   09/18/19 0809 (!) 87/59 98.2 °F (36.8 °C) Temporal 65 18 98 % --   09/18/19 0508 -- -- -- -- -- -- 135 lb (61.2 kg)   09/17/19 2000 96/60 97.8 °F (36.6 °C) Temporal 71 18 99 % --   09/17/19 1521 92/60 98.4 °F (36.9 °C) Oral 67 18 98 % --   09/17/19 1320 (!) 86/48 -- -- -- -- -- --   09/17/19 1230 (!) 84/49 97.1 °F (36.2 °C) Temporal 66 18 95 % --   09/17/19 1206 -- -- -- -- -- -- 134 lb 4 oz (60.9 kg)     Constitutional: The patient is awake, alert, sitting at bedside- + abdominal pain, + emotional  Skin: Warm and dry. No rashes were noted. Head: Eyes show round, and reactive pupils. No jaundice. Mouth: Moist mucous membranes, no ulcerations, no thrush. Neck: Supple to movements. No lymphadenopathy. Chest: No use of accessory muscles to breathe. Symmetrical expansion. Auscultation reveals no wheezing, crackles, or rhonchi. Cardiovascular: S1 and S2 are rhythmic and regular. No murmurs appreciated.    Abdomen: Positive bowel sounds to auscultation. + tender, + bilat nephrostomy tubes- clear yellow urine. Extremities: No clubbing, no cyanosis, no edema.   Braden- yellow  Left chest medi-port- accessed     Laboratory and Tests Review:  Lab Results   Component Value Date    WBC 6.6 09/18/2019    WBC 7.4 09/17/2019    WBC 6.4 09/16/2019    HGB 8.5 (L) 09/18/2019    HCT 27.5 (L) 09/18/2019    MCV 98.6 09/18/2019     09/18/2019     Lab Results   Component Value Date    NEUTROABS 5.68 09/18/2019    NEUTROABS 6.22 09/17/2019    NEUTROABS 4.93 09/16/2019     No results found for: CRP  No results found for: SEDRATE  Lab Results   Component Value Date    ALT 15 09/16/2019    AST 13 09/16/2019    ALKPHOS 62 09/16/2019    BILITOT <0.2 09/16/2019     Lab Results   Component Value Date     09/18/2019    K 4.5 09/18/2019    K 4.7 09/13/2019     09/18/2019    CO2 26 09/18/2019    BUN 16 09/18/2019    CREATININE 1.5 09/18/2019    GFRAA 43 09/18/2019    LABGLOM 36 09/18/2019    GLUCOSE 101 09/18/2019    GLUCOSE 97 12/20/2011    PROT 6.4 09/16/2019    LABALBU 3.0 09/16/2019    LABALBU 4.4 12/20/2011    CALCIUM 8.8 09/18/2019    BILITOT <0.2 09/16/2019    ALKPHOS 62 09/16/2019    AST 13 09/16/2019    ALT 15 09/16/2019     Radiology:  Reviewed     Microbiology:   9/14/19- urine cx- < 10,000 mixed GPO and GNR  8/9/19- urine cx- enterobacter- raoultella, enterobacter     ASSESSMENT:  · Cancer - urothelial carcinoma on chemo  · MRSA colonization   · Has urinary stents in place- with bilateral nephrostomies    · Neurogenic bladder     PLAN:  Currently no atbs  Okay to discharge from ID point of view      Esthela Pitt MD  12:02 PM  9/18/2019

## 2019-09-19 ENCOUNTER — ANESTHESIA (OUTPATIENT)
Dept: INTERVENTIONAL RADIOLOGY/VASCULAR | Age: 54
DRG: 461 | End: 2019-09-19
Payer: MEDICAID

## 2019-09-19 ENCOUNTER — APPOINTMENT (OUTPATIENT)
Dept: INTERVENTIONAL RADIOLOGY/VASCULAR | Age: 54
DRG: 461 | End: 2019-09-19
Payer: MEDICAID

## 2019-09-19 ENCOUNTER — ANESTHESIA EVENT (OUTPATIENT)
Dept: INTERVENTIONAL RADIOLOGY/VASCULAR | Age: 54
DRG: 461 | End: 2019-09-19
Payer: MEDICAID

## 2019-09-19 VITALS — OXYGEN SATURATION: 100 % | DIASTOLIC BLOOD PRESSURE: 67 MMHG | SYSTOLIC BLOOD PRESSURE: 98 MMHG

## 2019-09-19 LAB
ANION GAP SERPL CALCULATED.3IONS-SCNC: 10 MMOL/L (ref 7–16)
BASOPHILS ABSOLUTE: 0.03 E9/L (ref 0–0.2)
BASOPHILS RELATIVE PERCENT: 0.4 % (ref 0–2)
BUN BLDV-MCNC: 12 MG/DL (ref 6–20)
CALCIUM SERPL-MCNC: 8.8 MG/DL (ref 8.6–10.2)
CHLORIDE BLD-SCNC: 108 MMOL/L (ref 98–107)
CO2: 24 MMOL/L (ref 22–29)
CREAT SERPL-MCNC: 1.4 MG/DL (ref 0.5–1)
EOSINOPHILS ABSOLUTE: 0.28 E9/L (ref 0.05–0.5)
EOSINOPHILS RELATIVE PERCENT: 3.5 % (ref 0–6)
GFR AFRICAN AMERICAN: 47
GFR NON-AFRICAN AMERICAN: 39 ML/MIN/1.73
GLUCOSE BLD-MCNC: 102 MG/DL (ref 74–99)
HCT VFR BLD CALC: 27.4 % (ref 34–48)
HEMOGLOBIN: 8.2 G/DL (ref 11.5–15.5)
IMMATURE GRANULOCYTES #: 0.16 E9/L
IMMATURE GRANULOCYTES %: 2 % (ref 0–5)
LYMPHOCYTES ABSOLUTE: 0.73 E9/L (ref 1.5–4)
LYMPHOCYTES RELATIVE PERCENT: 9.1 % (ref 20–42)
MCH RBC QN AUTO: 30 PG (ref 26–35)
MCHC RBC AUTO-ENTMCNC: 29.9 % (ref 32–34.5)
MCV RBC AUTO: 100.4 FL (ref 80–99.9)
MONOCYTES ABSOLUTE: 0.52 E9/L (ref 0.1–0.95)
MONOCYTES RELATIVE PERCENT: 6.5 % (ref 2–12)
NEUTROPHILS ABSOLUTE: 6.29 E9/L (ref 1.8–7.3)
NEUTROPHILS RELATIVE PERCENT: 78.5 % (ref 43–80)
PDW BLD-RTO: 17.3 FL (ref 11.5–15)
PLATELET # BLD: 310 E9/L (ref 130–450)
PMV BLD AUTO: 10.5 FL (ref 7–12)
POTASSIUM SERPL-SCNC: 4.1 MMOL/L (ref 3.5–5)
RBC # BLD: 2.73 E12/L (ref 3.5–5.5)
SODIUM BLD-SCNC: 142 MMOL/L (ref 132–146)
WBC # BLD: 8 E9/L (ref 4.5–11.5)

## 2019-09-19 PROCEDURE — 3700000001 HC ADD 15 MINUTES (ANESTHESIA)

## 2019-09-19 PROCEDURE — 80048 BASIC METABOLIC PNL TOTAL CA: CPT

## 2019-09-19 PROCEDURE — 6360000002 HC RX W HCPCS: Performed by: CLINICAL NURSE SPECIALIST

## 2019-09-19 PROCEDURE — 6360000004 HC RX CONTRAST MEDICATION: Performed by: RADIOLOGY

## 2019-09-19 PROCEDURE — 1200000000 HC SEMI PRIVATE

## 2019-09-19 PROCEDURE — 2580000003 HC RX 258

## 2019-09-19 PROCEDURE — C1769 GUIDE WIRE: HCPCS

## 2019-09-19 PROCEDURE — 6370000000 HC RX 637 (ALT 250 FOR IP): Performed by: INTERNAL MEDICINE

## 2019-09-19 PROCEDURE — 2580000003 HC RX 258: Performed by: INTERNAL MEDICINE

## 2019-09-19 PROCEDURE — 99232 SBSQ HOSP IP/OBS MODERATE 35: CPT | Performed by: INTERNAL MEDICINE

## 2019-09-19 PROCEDURE — 36415 COLL VENOUS BLD VENIPUNCTURE: CPT

## 2019-09-19 PROCEDURE — 85025 COMPLETE CBC W/AUTO DIFF WBC: CPT

## 2019-09-19 PROCEDURE — 3700000000 HC ANESTHESIA ATTENDED CARE

## 2019-09-19 PROCEDURE — 2580000003 HC RX 258: Performed by: CLINICAL NURSE SPECIALIST

## 2019-09-19 PROCEDURE — 6360000002 HC RX W HCPCS

## 2019-09-19 PROCEDURE — 50435 EXCHANGE NEPHROSTOMY CATH: CPT

## 2019-09-19 RX ORDER — FENTANYL CITRATE 50 UG/ML
INJECTION, SOLUTION INTRAMUSCULAR; INTRAVENOUS PRN
Status: DISCONTINUED | OUTPATIENT
Start: 2019-09-19 | End: 2019-09-19 | Stop reason: SDUPTHER

## 2019-09-19 RX ORDER — MORPHINE SULFATE 15 MG/1
7.5 TABLET ORAL EVERY 6 HOURS PRN
Status: DISCONTINUED | OUTPATIENT
Start: 2019-09-19 | End: 2019-09-23

## 2019-09-19 RX ORDER — MIDODRINE HYDROCHLORIDE 2.5 MG/1
2.5 TABLET ORAL
Status: DISCONTINUED | OUTPATIENT
Start: 2019-09-19 | End: 2019-09-25 | Stop reason: HOSPADM

## 2019-09-19 RX ORDER — PROPOFOL 10 MG/ML
INJECTION, EMULSION INTRAVENOUS CONTINUOUS PRN
Status: DISCONTINUED | OUTPATIENT
Start: 2019-09-19 | End: 2019-09-19 | Stop reason: SDUPTHER

## 2019-09-19 RX ORDER — SODIUM CHLORIDE 9 MG/ML
INJECTION, SOLUTION INTRAVENOUS CONTINUOUS PRN
Status: DISCONTINUED | OUTPATIENT
Start: 2019-09-19 | End: 2019-09-19 | Stop reason: SDUPTHER

## 2019-09-19 RX ORDER — MIDAZOLAM HYDROCHLORIDE 1 MG/ML
INJECTION INTRAMUSCULAR; INTRAVENOUS PRN
Status: DISCONTINUED | OUTPATIENT
Start: 2019-09-19 | End: 2019-09-19 | Stop reason: SDUPTHER

## 2019-09-19 RX ORDER — DEXTROSE AND SODIUM CHLORIDE 5; .9 G/100ML; G/100ML
INJECTION, SOLUTION INTRAVENOUS CONTINUOUS
Status: ACTIVE | OUTPATIENT
Start: 2019-09-19 | End: 2019-09-19

## 2019-09-19 RX ADMIN — MIDODRINE HYDROCHLORIDE 2.5 MG: 5 TABLET ORAL at 11:19

## 2019-09-19 RX ADMIN — FENTANYL CITRATE 50 MCG: 50 INJECTION, SOLUTION INTRAMUSCULAR; INTRAVENOUS at 09:48

## 2019-09-19 RX ADMIN — MIDODRINE HYDROCHLORIDE 2.5 MG: 5 TABLET ORAL at 16:57

## 2019-09-19 RX ADMIN — PROPOFOL 50 MCG/KG/MIN: 10 INJECTION, EMULSION INTRAVENOUS at 09:54

## 2019-09-19 RX ADMIN — MIDAZOLAM HYDROCHLORIDE 1 MG: 1 INJECTION, SOLUTION INTRAMUSCULAR; INTRAVENOUS at 09:54

## 2019-09-19 RX ADMIN — DEXTROSE AND SODIUM CHLORIDE: 5; 900 INJECTION, SOLUTION INTRAVENOUS at 00:51

## 2019-09-19 RX ADMIN — MEROPENEM 1 G: 1 INJECTION INTRAVENOUS at 04:46

## 2019-09-19 RX ADMIN — MIDAZOLAM HYDROCHLORIDE 1 MG: 1 INJECTION, SOLUTION INTRAMUSCULAR; INTRAVENOUS at 09:47

## 2019-09-19 RX ADMIN — IOVERSOL 10 ML: 678 INJECTION INTRA-ARTERIAL; INTRAVENOUS at 10:17

## 2019-09-19 RX ADMIN — SODIUM CHLORIDE: 9 INJECTION, SOLUTION INTRAVENOUS at 09:45

## 2019-09-19 RX ADMIN — Medication 10 ML: at 08:08

## 2019-09-19 RX ADMIN — CLONAZEPAM 0.5 MG: 0.5 TABLET ORAL at 11:20

## 2019-09-19 RX ADMIN — Medication 10 ML: at 20:36

## 2019-09-19 RX ADMIN — FENTANYL CITRATE 50 MCG: 50 INJECTION, SOLUTION INTRAMUSCULAR; INTRAVENOUS at 09:54

## 2019-09-19 RX ADMIN — LEVOTHYROXINE SODIUM 100 MCG: 100 TABLET ORAL at 06:25

## 2019-09-19 RX ADMIN — ATORVASTATIN CALCIUM 40 MG: 40 TABLET, FILM COATED ORAL at 20:36

## 2019-09-19 RX ADMIN — Medication 10 ML: at 08:05

## 2019-09-19 RX ADMIN — MEROPENEM 1 G: 1 INJECTION INTRAVENOUS at 16:57

## 2019-09-19 RX ADMIN — Medication 10 ML: at 06:05

## 2019-09-19 RX ADMIN — SENNOSIDES 8.6 MG: 8.6 TABLET, FILM COATED ORAL at 20:36

## 2019-09-19 RX ADMIN — MIRTAZAPINE 15 MG: 15 TABLET, FILM COATED ORAL at 22:02

## 2019-09-19 RX ADMIN — CLONAZEPAM 0.5 MG: 0.5 TABLET ORAL at 20:36

## 2019-09-19 ASSESSMENT — PAIN SCALES - GENERAL
PAINLEVEL_OUTOF10: 0

## 2019-09-19 ASSESSMENT — PAIN - FUNCTIONAL ASSESSMENT
PAIN_FUNCTIONAL_ASSESSMENT: 0-10
PAIN_FUNCTIONAL_ASSESSMENT: 0-10

## 2019-09-19 NOTE — PROGRESS NOTES
Palliative Medicine   Progression of Care     Patient asleep when entered room. According to sitter, patient just fell asleep. She has been with patient since 0700 today. Patient went this am for  IR NEPHROSTOMY EXCHANGE CATHETER. When patient returned to room she \"freaked out\" according to sitter because the collection bags looked the same to her. Palliative was perfect served to see about long term pain management for the patient. Started the patient on MSIR 7.5mg Q 6H PRN. Creatinine level is 1.4 today. Patient has not required a lot of pain medication since admission. Received dilaudid 0.5 mg IV yesterday evening at 2017. Patient has not asked for any pain medication today, no discomfort stated. PDMP checked, Palliative Care following for support of patient and family.    Melanie Lee APRN-CNP, AGACNP-BC

## 2019-09-19 NOTE — PROGRESS NOTES
This RN was called to pt room by pt who was c/o pain. Pt was relaxed in bed watching TV upon entering the room. The pt then began screaming and crying stating she was in pain and her \"stomach hurt so bad\". Vital signs were obtained and pain medication administered. Pt kept saying that \"no one cares about me. You don't care about me either. I don't want to do this any more. I just want to die. Ill just fall on the floor. \" This RN provided emotional support to the pt and sitter was placed at bedside for pt safety. This RN notified the Team 2 JESUS via perfect serve who came to the floor to assess the pt. Sitter remained at bedside for pt safety at this time.

## 2019-09-19 NOTE — PROGRESS NOTES
[address] [place] [address] [facility]  Progress Note        CC: + suprapubic pain  Emotional     Face- to face encounter    SUBJECTIVE:  Patient is tolerating medications. No reported adverse drug reactions. No nausea, vomiting, diarrhea. No rash, cancer- receiving chemotherapy. No shortness of breath.+ Pain/ abdominal/ suprapubic     Medications:  Scheduled Meds:   midodrine  5 mg Oral TID WC    enoxaparin  40 mg Subcutaneous Daily    mupirocin   Nasal BID    meropenem  1 g Intravenous Q12H    atorvastatin  40 mg Oral Nightly    sodium chloride flush  10 mL Intravenous 2 times per day    levothyroxine  100 mcg Oral Daily    mirtazapine  15 mg Oral Nightly    senna  1 tablet Oral Nightly     Continuous Infusions:   dextrose 5 % and 0.9 % NaCl 100 mL/hr at 09/19/19 0051     PRN Meds:sodium chloride flush, magnesium hydroxide, ondansetron, acetaminophen, HYDROmorphone, clonazePAM  OBJECTIVE:  Patient Vitals for the past 24 hrs:   BP Temp Temp src Pulse Resp SpO2 Weight   09/19/19 1035 (!) 143/65 97.8 °F (36.6 °C) Oral 70 16 99 % --   09/19/19 1022 98/67 -- -- 75 12 97 % --   09/19/19 1020 (!) 94/52 97.7 °F (36.5 °C) Oral 70 16 100 % --   09/19/19 0454 -- -- -- -- -- -- 134 lb 8 oz (61 kg)   09/19/19 0032 (!) 112/56 98.7 °F (37.1 °C) Temporal 69 18 97 % --   09/18/19 2000 124/70 98.7 °F (37.1 °C) Temporal 63 20 -- --   09/18/19 1530 92/60 97.8 °F (36.6 °C) Temporal 62 18 96 % --   09/18/19 1219 98/64 -- -- 70 -- -- --     Constitutional: The patient is awake, alert, sitting at bedside- + abdominal pain, + emotional  Skin: Warm and dry. No rashes were noted. Head: Eyes show round, and reactive pupils. No jaundice. Mouth: Moist mucous membranes, no ulcerations, no thrush. Neck: Supple to movements. No lymphadenopathy. Chest: No use of accessory muscles to breathe. Symmetrical expansion. Auscultation reveals no wheezing, crackles, or rhonchi.    Cardiovascular: S1 and S2 are rhythmic and regular. No murmurs appreciated. Abdomen: Positive bowel sounds to auscultation. + tender, + bilat nephrostomy tubes- clear yellow urine. Extremities: No clubbing, no cyanosis, no edema.   Braden- yellow  Left chest medi-port- accessed     Laboratory and Tests Review:  Lab Results   Component Value Date    WBC 8.0 09/19/2019    WBC 6.6 09/18/2019    WBC 7.4 09/17/2019    HGB 8.2 (L) 09/19/2019    HCT 27.4 (L) 09/19/2019    .4 (H) 09/19/2019     09/19/2019     Lab Results   Component Value Date    NEUTROABS 6.29 09/19/2019    NEUTROABS 5.68 09/18/2019    NEUTROABS 6.22 09/17/2019     No results found for: CRP  No results found for: SEDRATE  Lab Results   Component Value Date    ALT 15 09/16/2019    AST 13 09/16/2019    ALKPHOS 62 09/16/2019    BILITOT <0.2 09/16/2019     Lab Results   Component Value Date     09/19/2019    K 4.1 09/19/2019    K 4.7 09/13/2019     09/19/2019    CO2 24 09/19/2019    BUN 12 09/19/2019    CREATININE 1.4 09/19/2019    GFRAA 47 09/19/2019    LABGLOM 39 09/19/2019    GLUCOSE 102 09/19/2019    GLUCOSE 97 12/20/2011    PROT 6.4 09/16/2019    LABALBU 3.0 09/16/2019    LABALBU 4.4 12/20/2011    CALCIUM 8.8 09/19/2019    BILITOT <0.2 09/16/2019    ALKPHOS 62 09/16/2019    AST 13 09/16/2019    ALT 15 09/16/2019     Radiology:  Reviewed     Microbiology:   9/14/19- urine cx- < 10,000 mixed GPO and GNR  8/9/19- urine cx- enterobacter- raoultella, enterobacter     ASSESSMENT:  · Cancer - urothelial carcinoma on chemo  · MRSA colonization   ·  with bilateral nephrostomies  -now changed  · Stents have been removed  · Neurogenic bladder     PLAN:  Misti op Merrem  for placement of nephrostomy tubes        Vamsi Urrutia MD  10:45 AM  9/19/2019

## 2019-09-19 NOTE — ANESTHESIA POSTPROCEDURE EVALUATION
Department of Anesthesiology  Postprocedure Note    Patient: Imelda Cool  MRN: [id number]  YOB: 1965  Date of evaluation: 9/19/2019  Time:  11:25 AM     Procedure Summary     Date:  09/19/19 Room / Location:  [address] [place] [address] Procedures; [facility] Radiology    Anesthesia Start:  9442 Anesthesia Stop:  0827    Procedure:  IR NEPHROSTOMY EXCHANGE CATHETER Diagnosis:       (Nephrostomy tube care. Do these need to be changed?)      (Nephrostomy tube care. Do these need to be changed?)    Scheduled Providers:  Ron Radiologist Responsible Provider:  Jose M Osborne MD    Anesthesia Type:  MAC ASA Status:  3          Anesthesia Type: MAC    Tevin Phase I: Tevin Score: 10    Tevin Phase II:      Last vitals: Reviewed and per EMR flowsheets.        Anesthesia Post Evaluation    Patient location during evaluation: PACU  Patient participation: complete - patient participated  Level of consciousness: awake and alert  Airway patency: patent  Nausea & Vomiting: no nausea and no vomiting  Complications: no  Cardiovascular status: hemodynamically stable and blood pressure returned to baseline  Respiratory status: acceptable  Hydration status: euvolemic

## 2019-09-19 NOTE — ANESTHESIA PRE PROCEDURE
BEFORE EATING. 4/10/19  Yes Magalie Putnam DO   vitamin D (ERGOCALCIFEROL) 75988 units CAPS capsule Take 1 capsule by mouth once a week  Patient taking differently: Take 50,000 Units by mouth once a week Every Tuesday 4/9/19  Yes Magalie Putnam DO   oxybutynin (DITROPAN-XL) 5 MG extended release tablet Take 1 tablet by mouth daily 4/9/19  Yes Magalie Putnam DO   magnesium oxide (MAG-OX) 400 (241.3 Mg) MG TABS tablet Take 1 tablet by mouth 2 times daily 4/9/19  Yes Celestine Trujillo DO   pantoprazole (PROTONIX) 40 MG tablet TAKE ONE TABLET BY MOUTH DAILY 4/9/19  Yes Magalie Putnam DO   Compression Stockings MISC by Does not apply route Below knee  15 - 30 mm Hg 7/25/18   Rio Quintanilla, DO       Current medications:    Current Facility-Administered Medications   Medication Dose Route Frequency Provider Last Rate Last Dose    midodrine (PROAMATINE) tablet 5 mg  5 mg Oral TID WC Julia Apple MD   5 mg at 09/18/19 1737    dextrose 5 % and 0.9 % sodium chloride infusion   Intravenous Continuous Julia Apple  mL/hr at 09/19/19 0051      enoxaparin (LOVENOX) injection 40 mg  40 mg Subcutaneous Daily Ron Casarez MD   40 mg at 09/18/19 0830    mupirocin (BACTROBAN) 2 % ointment   Nasal BID Julia Apple MD        meropenem (MERREM) 1 g in sodium chloride 0.9 % 100 mL IVPB (mini-bag)  1 g Intravenous Q12H JUSTYN Salinas - CNS   Stopped at 09/19/19 0520    atorvastatin (LIPITOR) tablet 40 mg  40 mg Oral Nightly Huy Dennis MD   40 mg at 09/18/19 2018    sodium chloride flush 0.9 % injection 10 mL  10 mL Intravenous 2 times per day Huy Dennis MD   10 mL at 09/18/19 2017    sodium chloride flush 0.9 % injection 10 mL  10 mL Intravenous PRN Huy Dennis MD   10 mL at 09/19/19 0808    magnesium hydroxide (MILK OF MAGNESIA) 400 MG/5ML suspension 30 mL  30 mL Oral Daily PRN Huy Dennis MD        ondansetron [facility]) injection 4 mg  4 mg Intravenous Q6H PRN Anatoliy Levy BILATERAL STENT REMOVAL performed by Fab Infante MD at [address] / REMOVAL / [address] Said N/A 2019    MEDIPORT CATHETER INSERTION (DO NOT MOVE TIME) performed by Aubrie Felix MD at [address] [facility] [address] [facility] [address] N/A 2019    Ted Cat ANESTHESIA VAGINAL BIOPSIES performed by Joan Chisholm MD at [address] [facility] [address] History:    Social History     Tobacco Use    Smoking status: Former Smoker     Packs/day: 1.00     Years: 5.00     Pack years: 5.00     Last attempt to quit: 1989     Years since quittin.7    Smokeless tobacco: Never Used   Substance Use Topics    Alcohol use: No                                Counseling given: Not Answered      Vital Signs (Current):   Vitals:    19 1530 19 2000 19 0032 19 0454   BP: 92/60 124/70 (!) 112/56    Pulse: 62 63 69    Resp:     Temp: 36.6 °C (97.8 °F) 37.1 °C (98.7 °F) 37.1 °C (98.7 °F)    TempSrc: Temporal Temporal Temporal    SpO2: 96%  97%    Weight:    134 lb 8 oz (61 kg)   Height:                                                  BP Readings from Last 3 Encounters:   19 (!) 112/56   19 89/61   19 104/60       NPO Status:  NPO > 10 hours                                                                                BMI:   Wt Readings from Last 3 Encounters:   19 134 lb 8 oz (61 kg)   19 128 lb (58.1 kg)   19 128 lb 8 oz (58.3 kg)     Body mass index is 27.17 kg/m².     CBC:   Lab Results   Component Value Date    WBC 8.0 2019    RBC 2.73 2019    HGB 8.2 2019    HCT 27.4 2019    .4 2019    RDW 17.3 2019     2019       CMP:   Lab Results   Component Value Date     2019    K 4.5 2019    K 4.7 2019     2019    CO2 26 2019    BUN 16 2019    CREATININE 1.5 2019    GFRAA 43 2019    LABGLOM 36 2019    GLUCOSE 101 2019 GLUCOSE 97 12/20/2011    PROT 6.4 09/16/2019    CALCIUM 8.8 09/18/2019    BILITOT <0.2 09/16/2019    ALKPHOS 62 09/16/2019    AST 13 09/16/2019    ALT 15 09/16/2019       POC Tests: No results for input(s): POCGLU, POCNA, POCK, POCCL, POCBUN, POCHEMO, POCHCT in the last 72 hours. Coags:   Lab Results   Component Value Date    PROTIME 13.5 07/08/2019    INR 1.2 07/08/2019    APTT 35.5 07/03/2019       HCG (If Applicable):   Lab Results   Component Value Date    PREGSERUM NEGATIVE 01/29/2014        ABGs: No results found for: PHART, PO2ART, RGY4XJM, VLY2RKV, BEART, T0OJZENM     Type & Screen (If Applicable):  No results found for: LABABO, [address]    Anesthesia Evaluation  Patient summary reviewed and Nursing notes reviewed no history of anesthetic complications:   Airway: Mallampati: III  TM distance: >3 FB   Neck ROM: full  Mouth opening: > = 3 FB Dental:      Comment: Pt denies     Pulmonary:   (+) asthma:                            Cardiovascular:    (+) hypertension:, hyperlipidemia      ECG reviewed  Rhythm: regular  Rate: normal  Echocardiogram reviewed                  Neuro/Psych:   (+) psychiatric history:depression/anxiety             GI/Hepatic/Renal:   (+) renal disease:,          ROS comment: For bilateral nephrostomy tube exchange     Cancer - urothelial carcinoma on chemo  MRSA colonization   bilateral nephrostomies    Neurogenic bladder . Endo/Other:    (+) hypothyroidism::., .                 Abdominal:           Vascular:                                    Anesthesia Plan      MAC     ASA 3     (Chest mediport )  Induction: intravenous. Anesthetic plan and risks discussed with patient. Plan discussed with attending.                 JUSTYN Kahn - CRNA   9/19/2019

## 2019-09-20 LAB — ADRENOCORTICOTROPIC HORMONE: 16.3 PG/ML (ref 7.2–63.3)

## 2019-09-20 PROCEDURE — 97530 THERAPEUTIC ACTIVITIES: CPT

## 2019-09-20 PROCEDURE — 1200000000 HC SEMI PRIVATE

## 2019-09-20 PROCEDURE — 2580000003 HC RX 258: Performed by: INTERNAL MEDICINE

## 2019-09-20 PROCEDURE — 97161 PT EVAL LOW COMPLEX 20 MIN: CPT

## 2019-09-20 PROCEDURE — 2580000003 HC RX 258: Performed by: CLINICAL NURSE SPECIALIST

## 2019-09-20 PROCEDURE — 6360000002 HC RX W HCPCS: Performed by: INTERNAL MEDICINE

## 2019-09-20 PROCEDURE — 6370000000 HC RX 637 (ALT 250 FOR IP): Performed by: NURSE PRACTITIONER

## 2019-09-20 PROCEDURE — 99231 SBSQ HOSP IP/OBS SF/LOW 25: CPT | Performed by: INTERNAL MEDICINE

## 2019-09-20 PROCEDURE — 97165 OT EVAL LOW COMPLEX 30 MIN: CPT

## 2019-09-20 PROCEDURE — 6370000000 HC RX 637 (ALT 250 FOR IP): Performed by: INTERNAL MEDICINE

## 2019-09-20 PROCEDURE — 6360000002 HC RX W HCPCS: Performed by: CLINICAL NURSE SPECIALIST

## 2019-09-20 RX ADMIN — Medication 10 ML: at 09:06

## 2019-09-20 RX ADMIN — MEROPENEM 1 G: 1 INJECTION INTRAVENOUS at 04:46

## 2019-09-20 RX ADMIN — MUPIROCIN: 20 OINTMENT TOPICAL at 20:47

## 2019-09-20 RX ADMIN — MIDODRINE HYDROCHLORIDE 2.5 MG: 5 TABLET ORAL at 12:37

## 2019-09-20 RX ADMIN — ATORVASTATIN CALCIUM 40 MG: 40 TABLET, FILM COATED ORAL at 20:46

## 2019-09-20 RX ADMIN — ENOXAPARIN SODIUM 40 MG: 40 INJECTION SUBCUTANEOUS at 09:06

## 2019-09-20 RX ADMIN — MUPIROCIN: 20 OINTMENT TOPICAL at 09:06

## 2019-09-20 RX ADMIN — SENNOSIDES 8.6 MG: 8.6 TABLET, FILM COATED ORAL at 20:47

## 2019-09-20 RX ADMIN — MIRTAZAPINE 15 MG: 15 TABLET, FILM COATED ORAL at 20:46

## 2019-09-20 RX ADMIN — MEROPENEM 1 G: 1 INJECTION INTRAVENOUS at 16:08

## 2019-09-20 RX ADMIN — MORPHINE SULFATE 7.5 MG: 15 TABLET ORAL at 16:08

## 2019-09-20 RX ADMIN — Medication 10 ML: at 04:46

## 2019-09-20 RX ADMIN — MIDODRINE HYDROCHLORIDE 2.5 MG: 5 TABLET ORAL at 16:08

## 2019-09-20 RX ADMIN — CLONAZEPAM 0.5 MG: 0.5 TABLET ORAL at 20:46

## 2019-09-20 RX ADMIN — MIDODRINE HYDROCHLORIDE 2.5 MG: 5 TABLET ORAL at 09:07

## 2019-09-20 RX ADMIN — Medication 10 ML: at 20:47

## 2019-09-20 RX ADMIN — LEVOTHYROXINE SODIUM 100 MCG: 100 TABLET ORAL at 05:24

## 2019-09-20 ASSESSMENT — PAIN DESCRIPTION - PAIN TYPE: TYPE: ACUTE PAIN

## 2019-09-20 ASSESSMENT — PAIN DESCRIPTION - FREQUENCY: FREQUENCY: CONTINUOUS

## 2019-09-20 ASSESSMENT — PAIN DESCRIPTION - LOCATION: LOCATION: ABDOMEN;LEG

## 2019-09-20 ASSESSMENT — PAIN DESCRIPTION - DESCRIPTORS: DESCRIPTORS: ACHING;CONSTANT;DISCOMFORT

## 2019-09-20 ASSESSMENT — PAIN SCALES - GENERAL
PAINLEVEL_OUTOF10: 0
PAINLEVEL_OUTOF10: 6

## 2019-09-20 ASSESSMENT — PAIN DESCRIPTION - PROGRESSION: CLINICAL_PROGRESSION: NOT CHANGED

## 2019-09-20 ASSESSMENT — PAIN DESCRIPTION - ONSET: ONSET: ON-GOING

## 2019-09-20 ASSESSMENT — PAIN - FUNCTIONAL ASSESSMENT: PAIN_FUNCTIONAL_ASSESSMENT: PREVENTS OR INTERFERES SOME ACTIVE ACTIVITIES AND ADLS

## 2019-09-20 NOTE — PROGRESS NOTES
Labs and Imaging Studies     CBC:   Recent Labs     09/18/19  0500 09/19/19  0605   WBC 6.6 8.0   HGB 8.5* 8.2*   HCT 27.5* 27.4*   MCV 98.6 100.4*    310       BMP:    Recent Labs     09/17/19  1530 09/18/19  0500 09/19/19  0810    143 142   K 4.5 4.5 4.1    108* 108*   CO2 27 26 24   BUN 21* 16 12   CREATININE 1.7* 1.5* 1.4*   GLUCOSE 111* 101* 102*       Imaging Reviewed. Resident's Assessment and Plan      Len Later is 52 y.o. female with a PMH of urothelial Carcinoma of the urinary bladder, Stage IV, s/p chemotherapy and pelvic radiation, Bilateral stents, UTI. Who presented to the ED with sever abdominal pain that started about a week ago, the patient stated that the pain is in her belly bilaterally, dull in nature, worsens by nothing, and does not radiate. She has also been having nausea for the past 2 weeks. The patient had her most recent pelvic radiation in July and scheduled for 3rd cycle immunotherapy Donnalee Sarkis) on September 18. She had stent removal done. Urology did not want to replace nephrostomy tubes due to the invasive extrinsic mets CA. IR nephrostomy tube replacement done 9/19. Waiting placement 9/18. 1. Abdominal pain 2/2 radiation cystitis vs tumor burden  - unlikely to be UTI; urine culture from 9/14/2019 grew <10,000 CFU/ml of mixed gram positive and gram negative organisms ; likely due to chronic stents  - Bilateral ureteral stent removed on 9/16/2019; started on meropenem pre-operatively   - cystoscopy 9/16/2019 \"urethra fixed narrow infiltrated with tumor. Just to get the cystoscope through the urethra was a significant task. \"  - Abdominal pain likely secondary to tumor burden and infiltration  - IR nephrostomy tube replacement done 9/19.   - PRN acetaminophen and hydromorphone; controlled with only acetaminophen so far                2.  Hypothyroidism  - TSH 2.8 and T4 1.08 as of 9/17/2019  - continued on home medication, Synthroid 100 mcg

## 2019-09-21 LAB
ANION GAP SERPL CALCULATED.3IONS-SCNC: 10 MMOL/L (ref 7–16)
BASOPHILS ABSOLUTE: 0.04 E9/L (ref 0–0.2)
BASOPHILS RELATIVE PERCENT: 0.6 % (ref 0–2)
BUN BLDV-MCNC: 20 MG/DL (ref 6–20)
CALCIUM SERPL-MCNC: 9.2 MG/DL (ref 8.6–10.2)
CHLORIDE BLD-SCNC: 105 MMOL/L (ref 98–107)
CO2: 27 MMOL/L (ref 22–29)
CREAT SERPL-MCNC: 1.4 MG/DL (ref 0.5–1)
EOSINOPHILS ABSOLUTE: 0.36 E9/L (ref 0.05–0.5)
EOSINOPHILS RELATIVE PERCENT: 5.1 % (ref 0–6)
GFR AFRICAN AMERICAN: 47
GFR NON-AFRICAN AMERICAN: 39 ML/MIN/1.73
GLUCOSE BLD-MCNC: 93 MG/DL (ref 74–99)
HCT VFR BLD CALC: 26.9 % (ref 34–48)
HEMOGLOBIN: 8.5 G/DL (ref 11.5–15.5)
IMMATURE GRANULOCYTES #: 0.13 E9/L
IMMATURE GRANULOCYTES %: 1.9 % (ref 0–5)
LYMPHOCYTES ABSOLUTE: 0.66 E9/L (ref 1.5–4)
LYMPHOCYTES RELATIVE PERCENT: 9.4 % (ref 20–42)
MCH RBC QN AUTO: 30.9 PG (ref 26–35)
MCHC RBC AUTO-ENTMCNC: 31.6 % (ref 32–34.5)
MCV RBC AUTO: 97.8 FL (ref 80–99.9)
MONOCYTES ABSOLUTE: 0.41 E9/L (ref 0.1–0.95)
MONOCYTES RELATIVE PERCENT: 5.9 % (ref 2–12)
NEUTROPHILS ABSOLUTE: 5.4 E9/L (ref 1.8–7.3)
NEUTROPHILS RELATIVE PERCENT: 77.1 % (ref 43–80)
PDW BLD-RTO: 17.2 FL (ref 11.5–15)
PLATELET # BLD: 318 E9/L (ref 130–450)
PMV BLD AUTO: 10.3 FL (ref 7–12)
POTASSIUM SERPL-SCNC: 4.5 MMOL/L (ref 3.5–5)
RBC # BLD: 2.75 E12/L (ref 3.5–5.5)
SODIUM BLD-SCNC: 142 MMOL/L (ref 132–146)
WBC # BLD: 7 E9/L (ref 4.5–11.5)

## 2019-09-21 PROCEDURE — 2580000003 HC RX 258: Performed by: CLINICAL NURSE SPECIALIST

## 2019-09-21 PROCEDURE — 80048 BASIC METABOLIC PNL TOTAL CA: CPT

## 2019-09-21 PROCEDURE — 6360000002 HC RX W HCPCS: Performed by: INTERNAL MEDICINE

## 2019-09-21 PROCEDURE — 85025 COMPLETE CBC W/AUTO DIFF WBC: CPT

## 2019-09-21 PROCEDURE — 99232 SBSQ HOSP IP/OBS MODERATE 35: CPT | Performed by: INTERNAL MEDICINE

## 2019-09-21 PROCEDURE — 6360000002 HC RX W HCPCS: Performed by: CLINICAL NURSE SPECIALIST

## 2019-09-21 PROCEDURE — 1200000000 HC SEMI PRIVATE

## 2019-09-21 PROCEDURE — 6370000000 HC RX 637 (ALT 250 FOR IP): Performed by: INTERNAL MEDICINE

## 2019-09-21 PROCEDURE — 2580000003 HC RX 258: Performed by: INTERNAL MEDICINE

## 2019-09-21 PROCEDURE — 36415 COLL VENOUS BLD VENIPUNCTURE: CPT

## 2019-09-21 RX ORDER — MEGESTROL ACETATE 40 MG/ML
400 SUSPENSION ORAL DAILY
Status: DISCONTINUED | OUTPATIENT
Start: 2019-09-21 | End: 2019-09-25 | Stop reason: HOSPADM

## 2019-09-21 RX ORDER — SENNA AND DOCUSATE SODIUM 50; 8.6 MG/1; MG/1
2 TABLET, FILM COATED ORAL DAILY
Status: DISCONTINUED | OUTPATIENT
Start: 2019-09-21 | End: 2019-09-25 | Stop reason: HOSPADM

## 2019-09-21 RX ADMIN — LEVOTHYROXINE SODIUM 100 MCG: 100 TABLET ORAL at 06:09

## 2019-09-21 RX ADMIN — ENOXAPARIN SODIUM 40 MG: 40 INJECTION SUBCUTANEOUS at 11:24

## 2019-09-21 RX ADMIN — SENNOSIDES, DOCUSATE SODIUM 1 TABLET: 50; 8.6 TABLET, FILM COATED ORAL at 14:36

## 2019-09-21 RX ADMIN — Medication 10 ML: at 20:32

## 2019-09-21 RX ADMIN — Medication 10 ML: at 11:25

## 2019-09-21 RX ADMIN — MUPIROCIN: 20 OINTMENT TOPICAL at 11:24

## 2019-09-21 RX ADMIN — MUPIROCIN: 20 OINTMENT TOPICAL at 20:32

## 2019-09-21 RX ADMIN — CLONAZEPAM 0.5 MG: 0.5 TABLET ORAL at 16:42

## 2019-09-21 RX ADMIN — MIDODRINE HYDROCHLORIDE 2.5 MG: 5 TABLET ORAL at 16:42

## 2019-09-21 RX ADMIN — MIRTAZAPINE 15 MG: 15 TABLET, FILM COATED ORAL at 20:32

## 2019-09-21 RX ADMIN — MEROPENEM 1 G: 1 INJECTION INTRAVENOUS at 06:09

## 2019-09-21 RX ADMIN — MIDODRINE HYDROCHLORIDE 2.5 MG: 5 TABLET ORAL at 11:25

## 2019-09-21 RX ADMIN — ATORVASTATIN CALCIUM 40 MG: 40 TABLET, FILM COATED ORAL at 20:32

## 2019-09-21 ASSESSMENT — PAIN SCALES - GENERAL
PAINLEVEL_OUTOF10: 0
PAINLEVEL_OUTOF10: 0
PAINLEVEL_OUTOF10: 6

## 2019-09-21 NOTE — PROGRESS NOTES
[facility]  Internal Medicine Residency Program  Progress Note - House Team 2    Patient:  Roseann Richardson 52 y.o. female   MRN: [id number]       Date of Service: 2019    Allergy: Patient has no known allergies. Subjective     Patient was seen and examined in AM. Patient is comfortable and has no complaints today. Pt agreed to go to her [facility]. Waiting for precert to discharge to nursing home. 24 hour change: No acute events reported. Objective     TEMPERATURE:  Current - Temp: 98.4 °F (36.9 °C); Max - Temp  Av.7 °F (37.1 °C)  Min: 98.4 °F (36.9 °C)  Max: 98.9 °F (37.2 °C)  PULSE OXIMETRY RANGE: SpO2  Av %  Min: 98 %  Max: 100 %      Physical Exam   Constitutional: She is oriented to person, place, and time. She appears well-developed. HENT:   Moist mucous membrane; Grossly normal.   Eyes: Conjunctivae and EOM are normal.   Neck: Normal range of motion. Neck supple. No JVD present. Cardiovascular: Normal rate, regular rhythm and normal heart sounds. No murmur heard. Pulmonary/Chest: Breath sounds normal. No stridor. She has no rales. Abdominal: Soft. Bowel sounds are normal. She exhibits no distension. Musculoskeletal: She exhibits no edema or tenderness. Neurological: She is alert and oriented to person, place, and time. Grossly normal neurological examination. Psychiatric:   Sad affect, patient is worried about her placement. She was consolable. Labs and Imaging Studies     CBC:   Recent Labs     19  0605 19  1145   WBC 8.0 7.0   HGB 8.2* 8.5*   HCT 27.4* 26.9*   .4* 97.8    318       BMP:    Recent Labs     19  0810 19  1145    142   K 4.1 4.5   * 105   CO2 24 27   BUN 12 20   CREATININE 1.4* 1.4*   GLUCOSE 102* 93       Imaging Reviewed.     Resident's Assessment and Plan      Roseann Richardson is 52 y.o. female with a PMH of urothelial Carcinoma of the urinary bladder, Stage IV, s/p chemotherapy

## 2019-09-22 PROCEDURE — 6360000002 HC RX W HCPCS: Performed by: INTERNAL MEDICINE

## 2019-09-22 PROCEDURE — 6370000000 HC RX 637 (ALT 250 FOR IP): Performed by: INTERNAL MEDICINE

## 2019-09-22 PROCEDURE — 1200000000 HC SEMI PRIVATE

## 2019-09-22 PROCEDURE — 99231 SBSQ HOSP IP/OBS SF/LOW 25: CPT | Performed by: INTERNAL MEDICINE

## 2019-09-22 PROCEDURE — 2580000003 HC RX 258: Performed by: INTERNAL MEDICINE

## 2019-09-22 RX ADMIN — Medication 10 ML: at 20:51

## 2019-09-22 RX ADMIN — MIDODRINE HYDROCHLORIDE 2.5 MG: 5 TABLET ORAL at 11:45

## 2019-09-22 RX ADMIN — MEGESTROL ACETATE 400 MG: 40 SUSPENSION ORAL at 08:42

## 2019-09-22 RX ADMIN — MIDODRINE HYDROCHLORIDE 2.5 MG: 5 TABLET ORAL at 08:41

## 2019-09-22 RX ADMIN — ENOXAPARIN SODIUM 40 MG: 40 INJECTION SUBCUTANEOUS at 08:42

## 2019-09-22 RX ADMIN — LEVOTHYROXINE SODIUM 100 MCG: 100 TABLET ORAL at 06:32

## 2019-09-22 RX ADMIN — MIRTAZAPINE 15 MG: 15 TABLET, FILM COATED ORAL at 20:49

## 2019-09-22 RX ADMIN — SENNOSIDES, DOCUSATE SODIUM 2 TABLET: 50; 8.6 TABLET, FILM COATED ORAL at 08:41

## 2019-09-22 RX ADMIN — ATORVASTATIN CALCIUM 40 MG: 40 TABLET, FILM COATED ORAL at 20:49

## 2019-09-22 RX ADMIN — MIDODRINE HYDROCHLORIDE 2.5 MG: 5 TABLET ORAL at 16:54

## 2019-09-22 RX ADMIN — Medication 10 ML: at 08:42

## 2019-09-22 ASSESSMENT — PAIN SCALES - GENERAL
PAINLEVEL_OUTOF10: 2
PAINLEVEL_OUTOF10: 0

## 2019-09-22 ASSESSMENT — PAIN DESCRIPTION - LOCATION: LOCATION: BACK

## 2019-09-22 ASSESSMENT — PAIN DESCRIPTION - ONSET: ONSET: AWAKENED FROM SLEEP

## 2019-09-22 ASSESSMENT — PAIN DESCRIPTION - DESCRIPTORS: DESCRIPTORS: ACHING

## 2019-09-22 ASSESSMENT — PAIN DESCRIPTION - PROGRESSION: CLINICAL_PROGRESSION: GRADUALLY IMPROVING

## 2019-09-22 ASSESSMENT — PAIN DESCRIPTION - FREQUENCY: FREQUENCY: INTERMITTENT

## 2019-09-22 ASSESSMENT — PAIN DESCRIPTION - PAIN TYPE: TYPE: ACUTE PAIN

## 2019-09-22 NOTE — PLAN OF CARE
Problem: Discharge Planning:  Goal: Ability to perform activities of daily living will improve  Description  Ability to perform activities of daily living will improve  Outcome: Met This Shift     Problem: Injury - Risk of, Physical Injury:  Goal: Absence of physical injury  Description  Absence of physical injury  9/22/2019 0850 by Brenna Interiano RN  Outcome: Met This Shift     Problem: Injury - Risk of, Physical Injury:  Goal: Will remain free from falls  Description  Will remain free from falls  9/22/2019 0850 by Brenna Interiano RN  Outcome: Met This Shift

## 2019-09-23 LAB
ANION GAP SERPL CALCULATED.3IONS-SCNC: 14 MMOL/L (ref 7–16)
BUN BLDV-MCNC: 31 MG/DL (ref 6–20)
CALCIUM SERPL-MCNC: 9.2 MG/DL (ref 8.6–10.2)
CHLORIDE BLD-SCNC: 103 MMOL/L (ref 98–107)
CO2: 23 MMOL/L (ref 22–29)
CREAT SERPL-MCNC: 1.2 MG/DL (ref 0.5–1)
GFR AFRICAN AMERICAN: 56
GFR NON-AFRICAN AMERICAN: 46 ML/MIN/1.73
GLUCOSE BLD-MCNC: 104 MG/DL (ref 74–99)
HCT VFR BLD CALC: 26 % (ref 34–48)
HEMOGLOBIN: 8.3 G/DL (ref 11.5–15.5)
MCH RBC QN AUTO: 31.1 PG (ref 26–35)
MCHC RBC AUTO-ENTMCNC: 31.9 % (ref 32–34.5)
MCV RBC AUTO: 97.4 FL (ref 80–99.9)
PDW BLD-RTO: 16.6 FL (ref 11.5–15)
PLATELET # BLD: 308 E9/L (ref 130–450)
PMV BLD AUTO: 10.4 FL (ref 7–12)
POTASSIUM SERPL-SCNC: 3.9 MMOL/L (ref 3.5–5)
RBC # BLD: 2.67 E12/L (ref 3.5–5.5)
SODIUM BLD-SCNC: 140 MMOL/L (ref 132–146)
WBC # BLD: 8 E9/L (ref 4.5–11.5)

## 2019-09-23 PROCEDURE — 80048 BASIC METABOLIC PNL TOTAL CA: CPT

## 2019-09-23 PROCEDURE — 6360000002 HC RX W HCPCS: Performed by: INTERNAL MEDICINE

## 2019-09-23 PROCEDURE — 97530 THERAPEUTIC ACTIVITIES: CPT

## 2019-09-23 PROCEDURE — 1200000000 HC SEMI PRIVATE

## 2019-09-23 PROCEDURE — 2580000003 HC RX 258: Performed by: INTERNAL MEDICINE

## 2019-09-23 PROCEDURE — 36415 COLL VENOUS BLD VENIPUNCTURE: CPT

## 2019-09-23 PROCEDURE — 85027 COMPLETE CBC AUTOMATED: CPT

## 2019-09-23 PROCEDURE — 99231 SBSQ HOSP IP/OBS SF/LOW 25: CPT | Performed by: INTERNAL MEDICINE

## 2019-09-23 PROCEDURE — 6370000000 HC RX 637 (ALT 250 FOR IP): Performed by: INTERNAL MEDICINE

## 2019-09-23 RX ADMIN — CLONAZEPAM 0.5 MG: 0.5 TABLET ORAL at 21:43

## 2019-09-23 RX ADMIN — MIRTAZAPINE 15 MG: 15 TABLET, FILM COATED ORAL at 21:43

## 2019-09-23 RX ADMIN — MIDODRINE HYDROCHLORIDE 2.5 MG: 5 TABLET ORAL at 12:26

## 2019-09-23 RX ADMIN — Medication 10 ML: at 21:43

## 2019-09-23 RX ADMIN — MIDODRINE HYDROCHLORIDE 2.5 MG: 5 TABLET ORAL at 16:27

## 2019-09-23 RX ADMIN — ATORVASTATIN CALCIUM 40 MG: 40 TABLET, FILM COATED ORAL at 21:43

## 2019-09-23 RX ADMIN — ENOXAPARIN SODIUM 40 MG: 40 INJECTION SUBCUTANEOUS at 08:29

## 2019-09-23 RX ADMIN — MIDODRINE HYDROCHLORIDE 2.5 MG: 5 TABLET ORAL at 08:28

## 2019-09-23 RX ADMIN — LEVOTHYROXINE SODIUM 100 MCG: 100 TABLET ORAL at 06:13

## 2019-09-23 RX ADMIN — Medication 10 ML: at 08:29

## 2019-09-23 RX ADMIN — HYDROMORPHONE HYDROCHLORIDE 0.5 MG: 1 INJECTION, SOLUTION INTRAMUSCULAR; INTRAVENOUS; SUBCUTANEOUS at 16:27

## 2019-09-23 RX ADMIN — SENNOSIDES, DOCUSATE SODIUM 2 TABLET: 50; 8.6 TABLET, FILM COATED ORAL at 08:28

## 2019-09-23 ASSESSMENT — PAIN DESCRIPTION - LOCATION: LOCATION: FLANK

## 2019-09-23 ASSESSMENT — PAIN - FUNCTIONAL ASSESSMENT: PAIN_FUNCTIONAL_ASSESSMENT: ACTIVITIES ARE NOT PREVENTED

## 2019-09-23 ASSESSMENT — PAIN DESCRIPTION - ORIENTATION: ORIENTATION: RIGHT;LEFT

## 2019-09-23 ASSESSMENT — PAIN SCALES - GENERAL
PAINLEVEL_OUTOF10: 6
PAINLEVEL_OUTOF10: 0
PAINLEVEL_OUTOF10: 10
PAINLEVEL_OUTOF10: 0

## 2019-09-23 ASSESSMENT — PAIN DESCRIPTION - ONSET: ONSET: ON-GOING

## 2019-09-23 ASSESSMENT — PAIN DESCRIPTION - FREQUENCY: FREQUENCY: CONTINUOUS

## 2019-09-23 ASSESSMENT — PAIN DESCRIPTION - PAIN TYPE: TYPE: SURGICAL PAIN;ACUTE PAIN

## 2019-09-23 ASSESSMENT — PAIN DESCRIPTION - PROGRESSION: CLINICAL_PROGRESSION: NOT CHANGED

## 2019-09-23 ASSESSMENT — PAIN DESCRIPTION - DESCRIPTORS: DESCRIPTORS: ACHING;CONSTANT;DISCOMFORT

## 2019-09-23 NOTE — PROGRESS NOTES
distal third   where it tapers off. Patient is to return for bilateral nephrostomy   catheter exchange. FLUORO FOR SURGICAL PROCEDURES   Final Result   Apparent removal of the left ureteral stent            CT ABDOMEN PELVIS WO CONTRAST Additional Contrast? None   Final Result   Both nephrostomy tube in the lower calyx appears to be in   satisfactory position. There is mild fullness of both renal pelvis and   collecting system with 2 ureters are visualized until the distal third   where it tapers off. Patient is to return for bilateral nephrostomy   catheter exchange. IR GUIDED NEPHROSTOMY CATH EXCHANGE    (Results Pending)   IR GUIDED NEPHROSTOMY CATH EXCHANGE    (Results Pending)       Resident's Assessment and Plan   Celestino Pack is 52 y.o. female with a PMH of urothelial Carcinoma of the urinary bladder, Stage IV, s/p chemotherapy and pelvic radiation, Bilateral stents, UTI and ?mental retardationpresented to the ED with sever abdominal pain that started about a week ago, the patient stated that the pain is in her belly bilaterally, dull in nature, worsens by nothing, and does not radiate. She has also been having nausea for the past 2 weeks before admission. The patient had her most recent pelvic radiation in July and scheduled for 3rd cycle immunotherapy Amanda Sanchez) on September 18. During this admission, LOS 8 (9/22), she had stent removal done 9/16. Urology unable to replace nephrostomy tubes due to the invasive extrinsic mets CA. IR nephrostomy tube replacement was done on 9/19. patient is very emotional and crying frequently. She also voiced one night that she was unwilling to live and \"go one like this\". A sitter was placed ever since. She and her brother (POA) do not want palliative care at this time. She was unwilling to go back to her Nursing Home as she felt they did not manage her pain well.  However upon convincing that medications will be placed  she is agreeable to go to her nursing home. Braden in place; to be removed 9/23 followed by voiding trials - per urology. 2 more chemo session remaining - patient is concerned about nausea/iarrhea after chemo and also about prognosis. Dr. Da Cavazos is following. Patient is comfortable. stable and we are presently waiting precertification as of 5/50.  9/23 - Stable, labs and imaging reviewed. Awaiting pre-certification for placement, then discharge.        1. Complicated UTI  - s/p IR bilateral nephrostomy tube exchange 9/19/19  - ID consulted  - initially on meropenem 9/14/19 - 9/21/19  - Braden catheter out, will monitor I's/O's     2. JIAN on CKD, stage III  - BMP qd  - Cr today 1.2, near historic baseline 1.0    3. Chronic Hypotension  - BP low normal today  - on midodrine 2.5mg TID  - continue to monitor BP  - stim test not suggestive of adrenal insufficiency     4. High grade invasive urothelial carcinoma  - s/p palliative radiation last week  - Keytruda per hem/onc     5. Chronic Normocytic Anemia  - possibly 2/2 CKD  - CBC qd, H/H stable overnight   - transfuse if Hb < 7     6. Hypothyroidism  - levothyroxine 100mcg qd  - TSH and free T4 wnl     7. Anxiety and depression  patient to be continued on mirtazapine and prn clonazepam.     Pre-certification and placement awaited.     PT/OT evaluation: not ordered  DVT prophylaxis/ GI prophylaxis: Lovenox  Disposition: continue current care    Luis Alfredo uGerra MD, PGY-1  Attending physician: Dr. Yanira Cary

## 2019-09-23 NOTE — PROGRESS NOTES
Occupational Therapy     Date:2019  Patient Name: Len Christopher  MRN: [id number]  : 1965  Room: [address] [place] [address]-A     Completed chart review & attempted to see pt with pt refusing therapy. Educated pt on importance of participation with no results. Informed SW &  & will attempt in pm.    Will attempt to see pt at another time. Holli Zamora.  [address] [place] [address], [address] [place] [address]

## 2019-09-23 NOTE — PLAN OF CARE
Problem: Pain:  Goal: Pain level will decrease  Description  Pain level will decrease  9/23/2019 1712 by Cain Motley RN  Outcome: Met This Shift     Problem: Pain:  Goal: Control of acute pain  Description  Control of acute pain  9/23/2019 1712 by Cain Motley RN  Outcome: Met This Shift     Problem: Confusion - Acute:  Goal: Mental status will be restored to baseline  Description  Mental status will be restored to baseline  Outcome: Met This Shift     Problem: Injury - Risk of, Physical Injury:  Goal: Absence of physical injury  Description  Absence of physical injury  9/23/2019 1712 by Cain Motley RN  Outcome: Met This Shift     Problem: Injury - Risk of, Physical Injury:  Goal: Will remain free from falls  Description  Will remain free from falls  9/23/2019 1712 by Cain Motley RN  Outcome: Met This Shift     Problem: Falls - Risk of:  Goal: Absence of physical injury  Description  Absence of physical injury  9/23/2019 1712 by Cain Motley RN  Outcome: Met This Shift     Problem: Falls - Risk of:  Goal: Will remain free from falls  Description  Will remain free from falls  9/23/2019 1712 by Cain Motley RN  Outcome: Met This Shift     Problem: Risk for Impaired Skin Integrity  Goal: Tissue integrity - skin and mucous membranes  Description  Structural intactness and normal physiological function of skin and  mucous membranes.   Outcome: Met This Shift     Problem: Suicide risk  Goal: Provide patient with safe environment  Description  Provide patient with safe environment  Outcome: Met This Shift

## 2019-09-24 PROCEDURE — 99253 IP/OBS CNSLTJ NEW/EST LOW 45: CPT | Performed by: PSYCHIATRY & NEUROLOGY

## 2019-09-24 PROCEDURE — 6360000002 HC RX W HCPCS: Performed by: INTERNAL MEDICINE

## 2019-09-24 PROCEDURE — 1200000000 HC SEMI PRIVATE

## 2019-09-24 PROCEDURE — 2580000003 HC RX 258: Performed by: INTERNAL MEDICINE

## 2019-09-24 PROCEDURE — 6370000000 HC RX 637 (ALT 250 FOR IP): Performed by: INTERNAL MEDICINE

## 2019-09-24 PROCEDURE — 99231 SBSQ HOSP IP/OBS SF/LOW 25: CPT | Performed by: INTERNAL MEDICINE

## 2019-09-24 RX ADMIN — MIRTAZAPINE 15 MG: 15 TABLET, FILM COATED ORAL at 20:42

## 2019-09-24 RX ADMIN — ONDANSETRON 4 MG: 2 INJECTION INTRAMUSCULAR; INTRAVENOUS at 16:08

## 2019-09-24 RX ADMIN — MIDODRINE HYDROCHLORIDE 2.5 MG: 5 TABLET ORAL at 08:30

## 2019-09-24 RX ADMIN — Medication 10 ML: at 08:31

## 2019-09-24 RX ADMIN — ATORVASTATIN CALCIUM 40 MG: 40 TABLET, FILM COATED ORAL at 20:42

## 2019-09-24 RX ADMIN — SENNOSIDES, DOCUSATE SODIUM 2 TABLET: 50; 8.6 TABLET, FILM COATED ORAL at 08:30

## 2019-09-24 RX ADMIN — LEVOTHYROXINE SODIUM 100 MCG: 100 TABLET ORAL at 06:01

## 2019-09-24 RX ADMIN — MIDODRINE HYDROCHLORIDE 2.5 MG: 5 TABLET ORAL at 16:04

## 2019-09-24 RX ADMIN — Medication 10 ML: at 20:42

## 2019-09-24 RX ADMIN — MIDODRINE HYDROCHLORIDE 2.5 MG: 5 TABLET ORAL at 11:31

## 2019-09-24 RX ADMIN — ENOXAPARIN SODIUM 40 MG: 40 INJECTION SUBCUTANEOUS at 08:30

## 2019-09-24 RX ADMIN — ACETAMINOPHEN 650 MG: 325 TABLET, FILM COATED ORAL at 23:44

## 2019-09-24 RX ADMIN — CLONAZEPAM 0.5 MG: 0.5 TABLET ORAL at 23:44

## 2019-09-24 ASSESSMENT — PAIN DESCRIPTION - FREQUENCY: FREQUENCY: CONTINUOUS

## 2019-09-24 ASSESSMENT — PAIN DESCRIPTION - PAIN TYPE: TYPE: ACUTE PAIN;SURGICAL PAIN

## 2019-09-24 ASSESSMENT — PAIN SCALES - GENERAL
PAINLEVEL_OUTOF10: 0
PAINLEVEL_OUTOF10: 0
PAINLEVEL_OUTOF10: 4

## 2019-09-24 ASSESSMENT — PAIN DESCRIPTION - PROGRESSION: CLINICAL_PROGRESSION: GRADUALLY WORSENING

## 2019-09-24 ASSESSMENT — PAIN DESCRIPTION - ONSET: ONSET: GRADUAL

## 2019-09-24 ASSESSMENT — PAIN DESCRIPTION - LOCATION: LOCATION: BACK

## 2019-09-24 ASSESSMENT — PAIN - FUNCTIONAL ASSESSMENT: PAIN_FUNCTIONAL_ASSESSMENT: PREVENTS OR INTERFERES WITH MANY ACTIVE NOT PASSIVE ACTIVITIES

## 2019-09-24 ASSESSMENT — PAIN DESCRIPTION - ORIENTATION: ORIENTATION: RIGHT;LEFT;LOWER

## 2019-09-24 ASSESSMENT — PAIN DESCRIPTION - DESCRIPTORS: DESCRIPTORS: ACHING;DISCOMFORT;CONSTANT

## 2019-09-24 NOTE — PLAN OF CARE
Problem: Pain:  Goal: Pain level will decrease  Description  Pain level will decrease  Outcome: Met This Shift  Goal: Control of acute pain  Description  Control of acute pain  Outcome: Met This Shift     Problem: Confusion - Acute:  Goal: Absence of continued neurological deterioration signs and symptoms  Description  Absence of continued neurological deterioration signs and symptoms  Outcome: Met This Shift     Problem: Injury - Risk of, Physical Injury:  Goal: Absence of physical injury  Description  Absence of physical injury  Outcome: Met This Shift  Goal: Will remain free from falls  Description  Will remain free from falls  Outcome: Met This Shift     Problem: Sleep Pattern Disturbance:  Goal: Appears well-rested  Description  Appears well-rested  Outcome: Met This Shift     Problem: Falls - Risk of:  Goal: Absence of physical injury  Description  Absence of physical injury  Outcome: Met This Shift  Goal: Will remain free from falls  Description  Will remain free from falls  Outcome: Met This Shift     Problem: Urinary Elimination:  Goal: Signs and symptoms of infection will decrease  Description  Signs and symptoms of infection will decrease  Outcome: Met This Shift     Problem: Risk for Impaired Skin Integrity  Goal: Tissue integrity - skin and mucous membranes  Description  Structural intactness and normal physiological function of skin and  mucous membranes.   Outcome: Met This Shift     Problem: Suicide risk  Goal: Provide patient with safe environment  Description  Provide patient with safe environment  Outcome: Met This Shift

## 2019-09-24 NOTE — CARE COORDINATION
Per Iglesia Vance from [facility] at the [facility], precert has been obtained and is good for 48 hrs but per psych note today, Patient will be transferred to psychiatric care after medically stable. Charge nurse notified to check if patient is medically cleared to go to psych.   Mk Villar RN CM

## 2019-09-24 NOTE — PROGRESS NOTES
kallie  CBC:   Lab Results   Component Value Date    WBC 8.0 09/23/2019    RBC 2.67 09/23/2019    HGB 8.3 09/23/2019    HCT 26.0 09/23/2019    MCV 97.4 09/23/2019    MCH 31.1 09/23/2019    MCHC 31.9 09/23/2019    RDW 16.6 09/23/2019     09/23/2019    MPV 10.4 09/23/2019     CMP:    Lab Results   Component Value Date     09/23/2019    K 3.9 09/23/2019    K 4.7 09/13/2019     09/23/2019    CO2 23 09/23/2019    BUN 31 09/23/2019    CREATININE 1.2 09/23/2019    GFRAA 56 09/23/2019    LABGLOM 46 09/23/2019    GLUCOSE 104 09/23/2019    GLUCOSE 97 12/20/2011    PROT 6.4 09/16/2019    LABALBU 3.0 09/16/2019    LABALBU 4.4 12/20/2011    CALCIUM 9.2 09/23/2019    BILITOT <0.2 09/16/2019    ALKPHOS 62 09/16/2019    AST 13 09/16/2019    ALT 15 09/16/2019     BMP:    Lab Results   Component Value Date     09/23/2019    K 3.9 09/23/2019    K 4.7 09/13/2019     09/23/2019    CO2 23 09/23/2019    BUN 31 09/23/2019    LABALBU 3.0 09/16/2019    LABALBU 4.4 12/20/2011    CREATININE 1.2 09/23/2019    CALCIUM 9.2 09/23/2019    GFRAA 56 09/23/2019    LABGLOM 46 09/23/2019    GLUCOSE 104 09/23/2019    GLUCOSE 97 12/20/2011       IR GUIDED NEPHROSTOMY CATH EXCHANGE   Final Result   Bilateral nephrostomy tube catheter exchange under   fluoroscopy with 8 Ethiopian nephrostomy catheter. Patient is amenable for placement of a Universal stent bilaterally      I would recommend patient return for 3 months catheter exchange         IR GUIDED NEPHROSTOMY CATH EXCHANGE   Final Result   Bilateral nephrostomy tube catheter exchange under   fluoroscopy with 8 Ethiopian nephrostomy catheter. Patient is amenable for placement of a Universal stent bilaterally      I would recommend patient return for 3 months catheter exchange         CT NEPHROSTOGRAM EXISTING  ACCESS   Final Result   Both nephrostomy tube in the lower calyx appears to be in   satisfactory position.  There is mild fullness of both renal pelvis and

## 2019-09-24 NOTE — CONSULTS
Assisted Living [] Group Home  [] Shelter [] Other   2. Employment:  [x] Unemployed  [] Employed  [] Disabled  [] Retired   1. Legal History: [x] No Arrest [] Arrest  [] Theft  []  Assault  [] Substances   4. History of Trama/ Abuse: [] Denies  [] Emotional [x] Physical [] Sexual   5. Spirituality: [x] Spiritual [] Not Spiritual   6. Substance Abuse: [] Denies  [x] Drug of choice - unidentified      [] Amphetamines [] Marijuana [] Cocaine      [] Opioids  [] Alcohol  [] Benzodiazepines      For further SH review SW note. Risk Assessment:  1. Risk Factors:   [x] Depression  [] Anxiety  [] Psychosis   [x] Suicidal/Homicidal Thoughts [] Suicide Attempt [] Substance Abuse     2. Protective Factors: [x] Controlled Environment         [x] Supportive Family []         [] Christianity Support     3. Level of Risk: [] Mild [] Moderate [x] Severe      Strengths & Weaknesses:    1. Strengths: [x] Ability to communicate feelings     [] Independent ADL's     [] Supportive Family    [] Current Health Status     2.  Weaknesses: [x] Emotional          [] Motivational     MEDICATIONS: Current Facility-Administered Medications: megestrol (MEGACE) 40 MG/ML suspension 400 mg, 400 mg, Oral, Daily  sennosides-docusate sodium (SENOKOT-S) 8.6-50 MG tablet 2 tablet, 2 tablet, Oral, Daily  midodrine (PROAMATINE) tablet 2.5 mg, 2.5 mg, Oral, TID WC  enoxaparin (LOVENOX) injection 40 mg, 40 mg, Subcutaneous, Daily  atorvastatin (LIPITOR) tablet 40 mg, 40 mg, Oral, Nightly  sodium chloride flush 0.9 % injection 10 mL, 10 mL, Intravenous, 2 times per day  sodium chloride flush 0.9 % injection 10 mL, 10 mL, Intravenous, PRN  magnesium hydroxide (MILK OF MAGNESIA) 400 MG/5ML suspension 30 mL, 30 mL, Oral, Daily PRN  ondansetron (ZOFRAN) injection 4 mg, 4 mg, Intravenous, Q6H PRN  acetaminophen (TYLENOL) tablet 650 mg, 650 mg, Oral, Q4H PRN  HYDROmorphone (DILAUDID) injection 0.5 mg, 0.5 mg, Intravenous, Q4H PRN  clonazePAM (KLONOPIN) tablet [x] Suicidal [x] Homicidal  [] Delusional      [] Paranoid  [] Somatic  [] Grandiose    Perception: [x]  None  [] Auditory   [] Visual  [] tactile   [] olfactory  [] Illusions         Insight: [] Intact  [] Fair  [x] Limited    Judgement:  [] Intact  [] Fair  [x] Limited        ASSESSMENT  Patient Active Problem List   Diagnosis    Hypothyroidism    Hyperlipidemia    Noncompliance    Depressive disorder    Halitosis    Dysmenorrhea    Bradycardia    Acquired hypothyroidism    Mild intermittent asthma without complication    Acute renal failure ([facility]F) [facility]    Anxiety and depression    Intellectual disability    Obstructive uropathy    Mixed stress and urge urinary incontinence    Vitamin D deficiency    Seasonal allergic rhinitis    Acute on chronic renal insufficiency    Acute renal failure superimposed on chronic kidney disease, on chronic dialysis ([facility])    Mild protein-calorie malnutrition ([address]    Acute cystitis with hematuria    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     SignedJohna Goodpasture  9/24/2019  11:35 AM

## 2019-09-25 VITALS
HEIGHT: 60 IN | BODY MASS INDEX: 26.31 KG/M2 | HEART RATE: 68 BPM | WEIGHT: 134 LBS | TEMPERATURE: 97.6 F | RESPIRATION RATE: 18 BRPM | SYSTOLIC BLOOD PRESSURE: 98 MMHG | OXYGEN SATURATION: 99 % | DIASTOLIC BLOOD PRESSURE: 61 MMHG

## 2019-09-25 LAB
ANION GAP SERPL CALCULATED.3IONS-SCNC: 7 MMOL/L (ref 7–16)
BUN BLDV-MCNC: 31 MG/DL (ref 6–20)
CALCIUM SERPL-MCNC: 10 MG/DL (ref 8.6–10.2)
CHLORIDE BLD-SCNC: 105 MMOL/L (ref 98–107)
CO2: 29 MMOL/L (ref 22–29)
CREAT SERPL-MCNC: 1.4 MG/DL (ref 0.5–1)
GFR AFRICAN AMERICAN: 47
GFR NON-AFRICAN AMERICAN: 39 ML/MIN/1.73
GLUCOSE BLD-MCNC: 141 MG/DL (ref 74–99)
POTASSIUM SERPL-SCNC: 4.1 MMOL/L (ref 3.5–5)
SODIUM BLD-SCNC: 141 MMOL/L (ref 132–146)

## 2019-09-25 PROCEDURE — 36415 COLL VENOUS BLD VENIPUNCTURE: CPT

## 2019-09-25 PROCEDURE — 6360000002 HC RX W HCPCS: Performed by: INTERNAL MEDICINE

## 2019-09-25 PROCEDURE — 80048 BASIC METABOLIC PNL TOTAL CA: CPT

## 2019-09-25 PROCEDURE — 2580000003 HC RX 258: Performed by: INTERNAL MEDICINE

## 2019-09-25 PROCEDURE — 99238 HOSP IP/OBS DSCHRG MGMT 30/<: CPT | Performed by: INTERNAL MEDICINE

## 2019-09-25 PROCEDURE — 6370000000 HC RX 637 (ALT 250 FOR IP): Performed by: INTERNAL MEDICINE

## 2019-09-25 RX ORDER — MIDODRINE HYDROCHLORIDE 2.5 MG/1
2.5 TABLET ORAL
Qty: 90 TABLET | Refills: 3 | Status: ON HOLD | OUTPATIENT
Start: 2019-09-25 | End: 2019-10-24 | Stop reason: HOSPADM

## 2019-09-25 RX ORDER — HEPARIN SODIUM (PORCINE) LOCK FLUSH IV SOLN 100 UNIT/ML 100 UNIT/ML
100 SOLUTION INTRAVENOUS PRN
Status: DISCONTINUED | OUTPATIENT
Start: 2019-09-25 | End: 2019-09-25 | Stop reason: HOSPADM

## 2019-09-25 RX ADMIN — SENNOSIDES, DOCUSATE SODIUM 2 TABLET: 50; 8.6 TABLET, FILM COATED ORAL at 10:16

## 2019-09-25 RX ADMIN — Medication 10 ML: at 10:18

## 2019-09-25 RX ADMIN — LEVOTHYROXINE SODIUM 100 MCG: 100 TABLET ORAL at 06:00

## 2019-09-25 RX ADMIN — HEPARIN 100 UNITS: 100 SYRINGE at 12:18

## 2019-09-25 RX ADMIN — MIDODRINE HYDROCHLORIDE 2.5 MG: 5 TABLET ORAL at 10:16

## 2019-09-25 RX ADMIN — ENOXAPARIN SODIUM 40 MG: 40 INJECTION SUBCUTANEOUS at 10:17

## 2019-09-25 ASSESSMENT — PAIN SCALES - GENERAL: PAINLEVEL_OUTOF10: 0

## 2019-09-25 NOTE — PLAN OF CARE
Contacted patient's brother, Karen Beasley, to update him on patient's current condition and discharge status to [facility]. Also contacted nursing staff at [facility] regarding available psychiatric services, patient will follow closely with facility psychiatry and counselors.      Angela King MD, PGY-1

## 2019-09-25 NOTE — CARE COORDINATION
Discharge order noted. Met with internal med. Patient is A&O x3, thought content appropriate, denying any current SI/HI and staff confirms that patient has not had any further SI/HI. they said she can discharge back to [facility] at the [facility]. Susi from the [facility] notified and set up transport via [facility] for 12:30 pm today. Charge nurse and RN notified. I notified patient as well as her brother Precious Sanders and they are both in agreement. [place] form in envelope in soft chart.   Leigha Murray RN CM

## 2019-09-25 NOTE — PLAN OF CARE
Problem: Suicide risk  Goal: Provide patient with safe environment  Description  Provide patient with safe environment  9/24/2019 2147 by Dioni Parsi RN  Outcome: Met This Shift

## 2019-09-25 NOTE — DISCHARGE INSTR - COC
Continuity of Care Form    Patient Name: Robin Putnam   :  1965  MRN:  [id number]    Admit date:  2019  Discharge date:  2019    Code Status Order: Prior   Advance Directives:   [facility] Documentation     Date/Time Healthcare Directive Type of Healthcare Directive Copy in [address] Agent's Name Healthcare Agent's Phone Number    [phone]  No, patient does not have an advance directive for healthcare treatment -- -- -- -- --          Admitting Physician:  Abhishek Garcia MD  PCP: Magalie Putnam DO    Discharging Nurse: Kasi Beltre Unit/Room#: 9577/8872-B  Discharging Unit Phone Number: [phone]    Emergency Contact:   Extended Emergency Contact Information  Primary Emergency Contact: Enrrique Mendoza [address] Phone: [phone]  Mobile Phone: [phone]  Relation: Brother/Sister  Hearing or visual needs: None  Other needs: None  Preferred language: English   needed?  No  Secondary Emergency Contact: Denisha Romo [address] [facility] Phone: [phone]  Relation: Brother/Sister    Past Surgical History:  Past Surgical History:   Procedure Laterality Date    CYSTOSCOPY Left 2019    CYSTOSCOPY, BILATERAL RETROGRADE PYELOGRAMS, BILATERAL STENT INSERTION performed by Lenka Christensen MD at [address] [place] [address] Drive Bilateral 2019    CYSTOSCOPY, RETROGRADE PYELOGRAMS, BILATERAL URETERAL STENT EXCHANGE performed by Clarita More MD at [address] [place] [address] / [address] [place] [address] Rd / STONE Bilateral 2019    CYSTOSCOPY RETROGRADE PYELOGRAM BILATERAL STENT REMOVAL performed by Kristel Viera MD at [address] [place] [address] / REMOVAL / REPLACEMENT VENOUS ACCESS CATHETER N/A 2019    MEDIPORT CATHETER INSERTION (DO NOT MOVE TIME) performed by Max Horne MD at [address] N/A 2019    EXAM UNDEDR ANESTHESIA VAGINAL BIOPSIES performed by Conrado Larson MD at None active    Resolved    MRSA 09/14/19 09/15/19 09/14/19 MRSA SCREENING CULTURE ONLY   19 Caesar Edwards RN          Nurse Assessment:  Last Vital Signs: BP 98/61   Pulse 68   Temp 97.6 °F (36.4 °C) (Temporal)   Resp 18   Ht 5' 0.04\" (1.525 m)   Wt 134 lb (60.8 kg)   LMP 2015 (Approximate)   SpO2 99%   BMI 26.14 kg/m²     Last documented pain score (0-10 scale): Pain Level: 0  Last Weight:   Wt Readings from Last 1 Encounters:   19 128 lb (58.1 kg)     Mental Status:  oriented X3    IV Access:  - None    Nursing Mobility/ADLs:  Walking   Assisted  Transfer  Assisted  Bathing  Assisted  Dressing  Assisted  Toileting  Assisted  Feeding  Independent  Med Admin  Independent  Med Delivery   whole    Wound Care Documentation and Therapy:        Elimination:  Continence:   · Bowel: Yes  · Bladder: Yes  Urinary Catheter: None  Nephrostomy tubes  Colostomy/Ileostomy/Ileal Conduit: No       Date of Last BM: 2019    Intake/Output Summary (Last 24 hours) at 2019 0859  Last data filed at 2019 0600  Gross per 24 hour   Intake 720 ml   Output 750 ml   Net -30 ml     I/O last 3 completed shifts: In: 5 [P.O.:720]  Out: 750 [Urine:750]    Safety Concerns: At Risk for Falls    Impairments/Disabilities:      None    Nutrition Therapy:  Current Nutrition Therapy:   - Oral Diet:  General    Routes of Feeding: Oral  Liquids: No Restrictions  Daily Fluid Restriction: no  Last Modified Barium Swallow with Video (Video Swallowing Test): not done    Treatments at the Time of Hospital Discharge:   Respiratory Treatments: ***  Oxygen Therapy:  is not on home oxygen therapy.   Ventilator:    - No ventilator support    Rehab Therapies: {THERAPEUTIC INTERVENTION:3933925860}  Weight Bearing Status/Restrictions: [address] [employer]}  Other Medical Equipment (for information only, NOT a DME order):  {EQUIPMENT:[id number]}  Other Treatments: ***    Patient's personal belongings (please

## 2019-09-26 ENCOUNTER — TELEPHONE (OUTPATIENT)
Dept: CASE MANAGEMENT | Age: 54
End: 2019-09-26

## 2019-09-30 ENCOUNTER — TELEPHONE (OUTPATIENT)
Dept: CASE MANAGEMENT | Age: 54
End: 2019-09-30

## 2019-09-30 ENCOUNTER — TELEPHONE (OUTPATIENT)
Dept: ONCOLOGY | Age: 54
End: 2019-09-30

## 2019-09-30 NOTE — TELEPHONE ENCOUNTER
I called and spoke to [facility] @ wing 2. Left patient appointment info. [facility] said she would work on transportation. And I also told her I had called Friday and gotten transferred a few times I left the appt info w/ them but they must not have written it.

## 2019-10-04 ENCOUNTER — TELEPHONE (OUTPATIENT)
Dept: CASE MANAGEMENT | Age: 54
End: 2019-10-04

## 2019-10-04 ENCOUNTER — APPOINTMENT (OUTPATIENT)
Dept: ULTRASOUND IMAGING | Age: 54
End: 2019-10-04
Payer: MEDICAID

## 2019-10-04 ENCOUNTER — HOSPITAL ENCOUNTER (EMERGENCY)
Age: 54
Discharge: HOME | End: 2019-10-04
Attending: EMERGENCY MEDICINE
Payer: MEDICAID

## 2019-10-04 VITALS
SYSTOLIC BLOOD PRESSURE: 133 MMHG | HEART RATE: 64 BPM | OXYGEN SATURATION: 99 % | RESPIRATION RATE: 16 BRPM | DIASTOLIC BLOOD PRESSURE: 79 MMHG | TEMPERATURE: 97 F

## 2019-10-04 DIAGNOSIS — M79.604 RIGHT LEG PAIN: Primary | ICD-10-CM

## 2019-10-04 PROCEDURE — 93971 EXTREMITY STUDY: CPT

## 2019-10-04 PROCEDURE — 99284 EMERGENCY DEPT VISIT MOD MDM: CPT

## 2019-10-07 ENCOUNTER — HOSPITAL ENCOUNTER (OUTPATIENT)
Dept: INFUSION THERAPY | Age: 54
Discharge: HOME | End: 2019-10-07
Payer: MEDICAID

## 2019-10-07 ENCOUNTER — OFFICE VISIT (OUTPATIENT)
Dept: ONCOLOGY | Age: 54
End: 2019-10-07
Payer: MEDICAID

## 2019-10-07 VITALS
TEMPERATURE: 97.1 F | HEART RATE: 54 BPM | WEIGHT: 130.8 LBS | BODY MASS INDEX: 26.37 KG/M2 | OXYGEN SATURATION: 99 % | HEIGHT: 59 IN | DIASTOLIC BLOOD PRESSURE: 57 MMHG | SYSTOLIC BLOOD PRESSURE: 107 MMHG

## 2019-10-07 DIAGNOSIS — C68.9 UROTHELIAL CANCER (HCC): ICD-10-CM

## 2019-10-07 DIAGNOSIS — C67.9: Primary | ICD-10-CM

## 2019-10-07 DIAGNOSIS — C79.82: Primary | ICD-10-CM

## 2019-10-07 DIAGNOSIS — C53.9 MALIGNANT NEOPLASM OF CERVIX, UNSPECIFIED SITE (HCC): Primary | ICD-10-CM

## 2019-10-07 PROCEDURE — 96413 CHEMO IV INFUSION 1 HR: CPT

## 2019-10-07 PROCEDURE — 2580000003 HC RX 258: Performed by: INTERNAL MEDICINE

## 2019-10-07 PROCEDURE — 6360000002 HC RX W HCPCS: Performed by: INTERNAL MEDICINE

## 2019-10-07 PROCEDURE — 36593 DECLOT VASCULAR DEVICE: CPT

## 2019-10-07 RX ORDER — MEPERIDINE HYDROCHLORIDE 25 MG/ML
12.5 INJECTION INTRAMUSCULAR; INTRAVENOUS; SUBCUTANEOUS ONCE
Status: CANCELLED | OUTPATIENT
Start: 2019-10-07

## 2019-10-07 RX ORDER — SODIUM CHLORIDE 9 MG/ML
20 INJECTION, SOLUTION INTRAVENOUS ONCE
Status: CANCELLED | OUTPATIENT
Start: 2019-10-07

## 2019-10-07 RX ORDER — SODIUM CHLORIDE 9 MG/ML
20 INJECTION, SOLUTION INTRAVENOUS ONCE
Status: DISCONTINUED | OUTPATIENT
Start: 2019-10-07 | End: 2019-10-08 | Stop reason: HOSPADM

## 2019-10-07 RX ORDER — HEPARIN SODIUM (PORCINE) LOCK FLUSH IV SOLN 100 UNIT/ML 100 UNIT/ML
500 SOLUTION INTRAVENOUS PRN
Status: DISCONTINUED | OUTPATIENT
Start: 2019-10-07 | End: 2019-10-08 | Stop reason: HOSPADM

## 2019-10-07 RX ORDER — SODIUM CHLORIDE 0.9 % (FLUSH) 0.9 %
10 SYRINGE (ML) INJECTION PRN
Status: DISCONTINUED | OUTPATIENT
Start: 2019-10-07 | End: 2019-10-08 | Stop reason: HOSPADM

## 2019-10-07 RX ORDER — SODIUM CHLORIDE 0.9 % (FLUSH) 0.9 %
10 SYRINGE (ML) INJECTION PRN
Status: CANCELLED | OUTPATIENT
Start: 2019-10-07

## 2019-10-07 RX ORDER — HEPARIN SODIUM (PORCINE) LOCK FLUSH IV SOLN 100 UNIT/ML 100 UNIT/ML
500 SOLUTION INTRAVENOUS PRN
Status: CANCELLED | OUTPATIENT
Start: 2019-10-07

## 2019-10-07 RX ORDER — METHYLPREDNISOLONE SODIUM SUCCINATE 125 MG/2ML
125 INJECTION, POWDER, LYOPHILIZED, FOR SOLUTION INTRAMUSCULAR; INTRAVENOUS ONCE
Status: CANCELLED | OUTPATIENT
Start: 2019-10-07

## 2019-10-07 RX ORDER — EPINEPHRINE 1 MG/ML
0.3 INJECTION, SOLUTION, CONCENTRATE INTRAVENOUS PRN
Status: CANCELLED | OUTPATIENT
Start: 2019-10-07

## 2019-10-07 RX ORDER — SODIUM CHLORIDE 9 MG/ML
INJECTION, SOLUTION INTRAVENOUS CONTINUOUS
Status: CANCELLED | OUTPATIENT
Start: 2019-10-07

## 2019-10-07 RX ORDER — DIPHENHYDRAMINE HYDROCHLORIDE 50 MG/ML
50 INJECTION INTRAMUSCULAR; INTRAVENOUS ONCE
Status: CANCELLED | OUTPATIENT
Start: 2019-10-07

## 2019-10-07 RX ADMIN — WATER 2.2 ML: 1 INJECTION INTRAMUSCULAR; INTRAVENOUS; SUBCUTANEOUS at 11:10

## 2019-10-07 RX ADMIN — ALTEPLASE 2 MG: 2.2 INJECTION, POWDER, LYOPHILIZED, FOR SOLUTION INTRAVENOUS at 12:34

## 2019-10-07 RX ADMIN — SODIUM CHLORIDE 200 MG: 9 INJECTION, SOLUTION INTRAVENOUS at 14:05

## 2019-10-07 RX ADMIN — SODIUM CHLORIDE 20 ML/HR: 9 INJECTION, SOLUTION INTRAVENOUS at 14:04

## 2019-10-07 RX ADMIN — ALTEPLASE 2 MG: 2.2 INJECTION, POWDER, LYOPHILIZED, FOR SOLUTION INTRAVENOUS at 11:10

## 2019-10-20 ENCOUNTER — APPOINTMENT (OUTPATIENT)
Dept: CT IMAGING | Age: 54
DRG: 466 | End: 2019-10-20
Payer: MEDICAID

## 2019-10-20 ENCOUNTER — HOSPITAL ENCOUNTER (INPATIENT)
Age: 54
LOS: 4 days | Discharge: NURSING, REHAB OR LONG-TERM CARE | DRG: 466 | End: 2019-10-24
Attending: EMERGENCY MEDICINE | Admitting: INTERNAL MEDICINE
Payer: MEDICAID

## 2019-10-20 DIAGNOSIS — N12 PYELONEPHRITIS: Primary | ICD-10-CM

## 2019-10-20 LAB
ANION GAP SERPL CALCULATED.3IONS-SCNC: 13 MMOL/L (ref 7–16)
BACTERIA: ABNORMAL /HPF
BASOPHILS ABSOLUTE: 0.03 E9/L (ref 0–0.2)
BASOPHILS RELATIVE PERCENT: 0.3 % (ref 0–2)
BILIRUBIN URINE: NEGATIVE
BLOOD, URINE: ABNORMAL
BUN BLDV-MCNC: 22 MG/DL (ref 6–20)
CALCIUM SERPL-MCNC: 9.9 MG/DL (ref 8.6–10.2)
CHLORIDE BLD-SCNC: 102 MMOL/L (ref 98–107)
CLARITY: ABNORMAL
CO2: 24 MMOL/L (ref 22–29)
COLOR: YELLOW
CREAT SERPL-MCNC: 1.4 MG/DL (ref 0.5–1)
EKG ATRIAL RATE: 64 BPM
EKG P AXIS: 33 DEGREES
EKG P-R INTERVAL: 166 MS
EKG Q-T INTERVAL: 436 MS
EKG QRS DURATION: 88 MS
EKG QTC CALCULATION (BAZETT): 449 MS
EKG R AXIS: 23 DEGREES
EKG T AXIS: 30 DEGREES
EKG VENTRICULAR RATE: 64 BPM
EOSINOPHILS ABSOLUTE: 0.1 E9/L (ref 0.05–0.5)
EOSINOPHILS RELATIVE PERCENT: 0.9 % (ref 0–6)
EPITHELIAL CELLS, UA: ABNORMAL /HPF
GFR AFRICAN AMERICAN: 47
GFR NON-AFRICAN AMERICAN: 39 ML/MIN/1.73
GLUCOSE BLD-MCNC: 89 MG/DL (ref 74–99)
GLUCOSE URINE: NEGATIVE MG/DL
HCT VFR BLD CALC: 36.4 % (ref 34–48)
HEMOGLOBIN: 11.6 G/DL (ref 11.5–15.5)
IMMATURE GRANULOCYTES #: 0.05 E9/L
IMMATURE GRANULOCYTES %: 0.5 % (ref 0–5)
KETONES, URINE: NEGATIVE MG/DL
LACTIC ACID: 0.9 MMOL/L (ref 0.5–2.2)
LEUKOCYTE ESTERASE, URINE: ABNORMAL
LYMPHOCYTES ABSOLUTE: 0.73 E9/L (ref 1.5–4)
LYMPHOCYTES RELATIVE PERCENT: 6.7 % (ref 20–42)
MCH RBC QN AUTO: 31 PG (ref 26–35)
MCHC RBC AUTO-ENTMCNC: 31.9 % (ref 32–34.5)
MCV RBC AUTO: 97.3 FL (ref 80–99.9)
MONOCYTES ABSOLUTE: 0.55 E9/L (ref 0.1–0.95)
MONOCYTES RELATIVE PERCENT: 5 % (ref 2–12)
MUCUS: PRESENT
NEUTROPHILS ABSOLUTE: 9.45 E9/L (ref 1.8–7.3)
NEUTROPHILS RELATIVE PERCENT: 86.6 % (ref 43–80)
NITRITE, URINE: NEGATIVE
PDW BLD-RTO: 14.2 FL (ref 11.5–15)
PH UA: 6.5 (ref 5–9)
PLATELET # BLD: 253 E9/L (ref 130–450)
PMV BLD AUTO: 10.6 FL (ref 7–12)
POTASSIUM REFLEX MAGNESIUM: 6.2 MMOL/L (ref 3.5–5)
PROTEIN UA: 100 MG/DL
RBC # BLD: 3.74 E12/L (ref 3.5–5.5)
RBC UA: ABNORMAL /HPF (ref 0–2)
SEDIMENTATION RATE, ERYTHROCYTE: 76 MM/HR (ref 0–20)
SODIUM BLD-SCNC: 139 MMOL/L (ref 132–146)
SPECIFIC GRAVITY UA: 1.02 (ref 1–1.03)
UROBILINOGEN, URINE: 0.2 E.U./DL
WBC # BLD: 10.9 E9/L (ref 4.5–11.5)
WBC UA: >20 /HPF (ref 0–5)

## 2019-10-20 PROCEDURE — 83605 ASSAY OF LACTIC ACID: CPT

## 2019-10-20 PROCEDURE — 93010 ELECTROCARDIOGRAM REPORT: CPT | Performed by: INTERNAL MEDICINE

## 2019-10-20 PROCEDURE — 6360000002 HC RX W HCPCS: Performed by: EMERGENCY MEDICINE

## 2019-10-20 PROCEDURE — 87088 URINE BACTERIA CULTURE: CPT

## 2019-10-20 PROCEDURE — 87077 CULTURE AEROBIC IDENTIFY: CPT

## 2019-10-20 PROCEDURE — 6370000000 HC RX 637 (ALT 250 FOR IP): Performed by: INTERNAL MEDICINE

## 2019-10-20 PROCEDURE — 85025 COMPLETE CBC W/AUTO DIFF WBC: CPT

## 2019-10-20 PROCEDURE — 85651 RBC SED RATE NONAUTOMATED: CPT

## 2019-10-20 PROCEDURE — 96365 THER/PROPH/DIAG IV INF INIT: CPT

## 2019-10-20 PROCEDURE — 96366 THER/PROPH/DIAG IV INF ADDON: CPT

## 2019-10-20 PROCEDURE — 87040 BLOOD CULTURE FOR BACTERIA: CPT

## 2019-10-20 PROCEDURE — 80048 BASIC METABOLIC PNL TOTAL CA: CPT

## 2019-10-20 PROCEDURE — 99285 EMERGENCY DEPT VISIT HI MDM: CPT

## 2019-10-20 PROCEDURE — 96375 TX/PRO/DX INJ NEW DRUG ADDON: CPT

## 2019-10-20 PROCEDURE — 1200000000 HC SEMI PRIVATE

## 2019-10-20 PROCEDURE — 36415 COLL VENOUS BLD VENIPUNCTURE: CPT

## 2019-10-20 PROCEDURE — 74176 CT ABD & PELVIS W/O CONTRAST: CPT

## 2019-10-20 PROCEDURE — 2580000003 HC RX 258: Performed by: EMERGENCY MEDICINE

## 2019-10-20 PROCEDURE — 6360000002 HC RX W HCPCS: Performed by: INTERNAL MEDICINE

## 2019-10-20 PROCEDURE — 81001 URINALYSIS AUTO W/SCOPE: CPT

## 2019-10-20 PROCEDURE — 93005 ELECTROCARDIOGRAM TRACING: CPT | Performed by: EMERGENCY MEDICINE

## 2019-10-20 PROCEDURE — 2580000003 HC RX 258: Performed by: INTERNAL MEDICINE

## 2019-10-20 PROCEDURE — 87186 SC STD MICRODIL/AGAR DIL: CPT

## 2019-10-20 RX ORDER — SODIUM CHLORIDE 0.9 % (FLUSH) 0.9 %
10 SYRINGE (ML) INJECTION EVERY 12 HOURS SCHEDULED
Status: DISCONTINUED | OUTPATIENT
Start: 2019-10-20 | End: 2019-10-24 | Stop reason: HOSPADM

## 2019-10-20 RX ORDER — SODIUM CHLORIDE 9 MG/ML
INJECTION, SOLUTION INTRAVENOUS CONTINUOUS
Status: DISCONTINUED | OUTPATIENT
Start: 2019-10-20 | End: 2019-10-22

## 2019-10-20 RX ORDER — ONDANSETRON 4 MG/1
4 TABLET, ORALLY DISINTEGRATING ORAL EVERY 8 HOURS PRN
Status: DISCONTINUED | OUTPATIENT
Start: 2019-10-20 | End: 2019-10-24 | Stop reason: HOSPADM

## 2019-10-20 RX ORDER — OXYBUTYNIN CHLORIDE 5 MG/1
5 TABLET, EXTENDED RELEASE ORAL DAILY
Status: DISCONTINUED | OUTPATIENT
Start: 2019-10-20 | End: 2019-10-24 | Stop reason: HOSPADM

## 2019-10-20 RX ORDER — MIDODRINE HYDROCHLORIDE 5 MG/1
5 TABLET ORAL EVERY 6 HOURS PRN
Status: DISCONTINUED | OUTPATIENT
Start: 2019-10-20 | End: 2019-10-20 | Stop reason: DRUGHIGH

## 2019-10-20 RX ORDER — LEVOTHYROXINE SODIUM 0.1 MG/1
100 TABLET ORAL DAILY
Status: DISCONTINUED | OUTPATIENT
Start: 2019-10-21 | End: 2019-10-24 | Stop reason: HOSPADM

## 2019-10-20 RX ORDER — CLONAZEPAM 0.5 MG/1
0.25 TABLET ORAL 2 TIMES DAILY
Status: DISCONTINUED | OUTPATIENT
Start: 2019-10-20 | End: 2019-10-24 | Stop reason: HOSPADM

## 2019-10-20 RX ORDER — HYDROCODONE BITARTRATE AND ACETAMINOPHEN 5; 325 MG/1; MG/1
1 TABLET ORAL EVERY 6 HOURS PRN
Status: DISCONTINUED | OUTPATIENT
Start: 2019-10-20 | End: 2019-10-24 | Stop reason: HOSPADM

## 2019-10-20 RX ORDER — 0.9 % SODIUM CHLORIDE 0.9 %
1000 INTRAVENOUS SOLUTION INTRAVENOUS ONCE
Status: COMPLETED | OUTPATIENT
Start: 2019-10-20 | End: 2019-10-20

## 2019-10-20 RX ORDER — MIRTAZAPINE 15 MG/1
15 TABLET, FILM COATED ORAL NIGHTLY
Status: DISCONTINUED | OUTPATIENT
Start: 2019-10-20 | End: 2019-10-24 | Stop reason: HOSPADM

## 2019-10-20 RX ORDER — MIDODRINE HYDROCHLORIDE 5 MG/1
5 TABLET ORAL
COMMUNITY

## 2019-10-20 RX ORDER — CIPROFLOXACIN 2 MG/ML
400 INJECTION, SOLUTION INTRAVENOUS ONCE
Status: COMPLETED | OUTPATIENT
Start: 2019-10-20 | End: 2019-10-20

## 2019-10-20 RX ORDER — PANTOPRAZOLE SODIUM 40 MG/1
40 TABLET, DELAYED RELEASE ORAL DAILY
Status: DISCONTINUED | OUTPATIENT
Start: 2019-10-20 | End: 2019-10-24 | Stop reason: HOSPADM

## 2019-10-20 RX ORDER — FERROUS SULFATE 325(65) MG
325 TABLET ORAL 2 TIMES DAILY WITH MEALS
Status: DISCONTINUED | OUTPATIENT
Start: 2019-10-20 | End: 2019-10-24 | Stop reason: HOSPADM

## 2019-10-20 RX ORDER — FENTANYL CITRATE 50 UG/ML
50 INJECTION, SOLUTION INTRAMUSCULAR; INTRAVENOUS ONCE
Status: COMPLETED | OUTPATIENT
Start: 2019-10-20 | End: 2019-10-20

## 2019-10-20 RX ORDER — ACETAMINOPHEN 325 MG/1
650 TABLET ORAL EVERY 4 HOURS PRN
Status: DISCONTINUED | OUTPATIENT
Start: 2019-10-20 | End: 2019-10-24 | Stop reason: HOSPADM

## 2019-10-20 RX ORDER — DOCUSATE SODIUM 100 MG/1
100 CAPSULE, LIQUID FILLED ORAL 2 TIMES DAILY
Status: DISCONTINUED | OUTPATIENT
Start: 2019-10-20 | End: 2019-10-21

## 2019-10-20 RX ORDER — BISACODYL 10 MG
10 SUPPOSITORY, RECTAL RECTAL ONCE
Status: DISCONTINUED | OUTPATIENT
Start: 2019-10-20 | End: 2019-10-21

## 2019-10-20 RX ORDER — SODIUM CHLORIDE 0.9 % (FLUSH) 0.9 %
10 SYRINGE (ML) INJECTION PRN
Status: DISCONTINUED | OUTPATIENT
Start: 2019-10-20 | End: 2019-10-24 | Stop reason: HOSPADM

## 2019-10-20 RX ORDER — MIDODRINE HYDROCHLORIDE 5 MG/1
5 TABLET ORAL 4 TIMES DAILY PRN
Status: DISCONTINUED | OUTPATIENT
Start: 2019-10-20 | End: 2019-10-24 | Stop reason: HOSPADM

## 2019-10-20 RX ADMIN — CLONAZEPAM 0.25 MG: 0.5 TABLET ORAL at 20:08

## 2019-10-20 RX ADMIN — DOCUSATE SODIUM 100 MG: 100 CAPSULE, LIQUID FILLED ORAL at 20:08

## 2019-10-20 RX ADMIN — CIPROFLOXACIN 400 MG: 2 INJECTION, SOLUTION INTRAVENOUS at 17:32

## 2019-10-20 RX ADMIN — SODIUM CHLORIDE 1000 ML: 9 INJECTION, SOLUTION INTRAVENOUS at 15:38

## 2019-10-20 RX ADMIN — PANTOPRAZOLE SODIUM 40 MG: 40 TABLET, DELAYED RELEASE ORAL at 20:08

## 2019-10-20 RX ADMIN — SODIUM CHLORIDE: 9 INJECTION, SOLUTION INTRAVENOUS at 19:59

## 2019-10-20 RX ADMIN — FENTANYL CITRATE 50 MCG: 50 INJECTION, SOLUTION INTRAMUSCULAR; INTRAVENOUS at 15:30

## 2019-10-20 RX ADMIN — ONDANSETRON 4 MG: 4 TABLET, ORALLY DISINTEGRATING ORAL at 19:59

## 2019-10-20 RX ADMIN — Medication 10 ML: at 20:03

## 2019-10-20 RX ADMIN — OXYBUTYNIN CHLORIDE 5 MG: 5 TABLET, EXTENDED RELEASE ORAL at 20:08

## 2019-10-20 RX ADMIN — CEFEPIME HYDROCHLORIDE 2 G: 2 INJECTION, POWDER, FOR SOLUTION INTRAVENOUS at 16:51

## 2019-10-20 RX ADMIN — SERTRALINE HYDROCHLORIDE 25 MG: 50 TABLET ORAL at 20:08

## 2019-10-20 RX ADMIN — MIRTAZAPINE 15 MG: 15 TABLET, FILM COATED ORAL at 20:08

## 2019-10-20 RX ADMIN — ENOXAPARIN SODIUM 40 MG: 40 INJECTION SUBCUTANEOUS at 20:00

## 2019-10-20 RX ADMIN — FERROUS SULFATE TAB 325 MG (65 MG ELEMENTAL FE) 325 MG: 325 (65 FE) TAB at 20:09

## 2019-10-20 ASSESSMENT — PAIN DESCRIPTION - FREQUENCY: FREQUENCY: CONTINUOUS

## 2019-10-20 ASSESSMENT — PAIN DESCRIPTION - LOCATION
LOCATION: ABDOMEN
LOCATION: ABDOMEN

## 2019-10-20 ASSESSMENT — PAIN SCALES - GENERAL
PAINLEVEL_OUTOF10: 10

## 2019-10-20 ASSESSMENT — PAIN DESCRIPTION - PAIN TYPE
TYPE: ACUTE PAIN
TYPE: ACUTE PAIN

## 2019-10-20 ASSESSMENT — PAIN DESCRIPTION - DESCRIPTORS
DESCRIPTORS: ACHING
DESCRIPTORS: ACHING

## 2019-10-20 ASSESSMENT — PAIN DESCRIPTION - ONSET: ONSET: GRADUAL

## 2019-10-20 ASSESSMENT — PAIN DESCRIPTION - ORIENTATION: ORIENTATION: LEFT

## 2019-10-21 LAB
ALBUMIN SERPL-MCNC: 3.2 G/DL (ref 3.5–5.2)
ALP BLD-CCNC: 105 U/L (ref 35–104)
ALT SERPL-CCNC: 19 U/L (ref 0–32)
ANION GAP SERPL CALCULATED.3IONS-SCNC: 12 MMOL/L (ref 7–16)
ANISOCYTOSIS: ABNORMAL
AST SERPL-CCNC: 12 U/L (ref 0–31)
BASOPHILS ABSOLUTE: 0.1 E9/L (ref 0–0.2)
BASOPHILS RELATIVE PERCENT: 0.9 % (ref 0–2)
BILIRUB SERPL-MCNC: 0.4 MG/DL (ref 0–1.2)
BUN BLDV-MCNC: 20 MG/DL (ref 6–20)
BURR CELLS: ABNORMAL
CALCIUM SERPL-MCNC: 9.9 MG/DL (ref 8.6–10.2)
CHLORIDE BLD-SCNC: 103 MMOL/L (ref 98–107)
CHOLESTEROL, TOTAL: 123 MG/DL (ref 0–199)
CO2: 22 MMOL/L (ref 22–29)
CREAT SERPL-MCNC: 1.5 MG/DL (ref 0.5–1)
EOSINOPHILS ABSOLUTE: 0.1 E9/L (ref 0.05–0.5)
EOSINOPHILS RELATIVE PERCENT: 0.9 % (ref 0–6)
GFR AFRICAN AMERICAN: 43
GFR NON-AFRICAN AMERICAN: 36 ML/MIN/1.73
GLUCOSE BLD-MCNC: 126 MG/DL (ref 74–99)
HCT VFR BLD CALC: 28.8 % (ref 34–48)
HDLC SERPL-MCNC: 37 MG/DL
HEMOGLOBIN: 9.2 G/DL (ref 11.5–15.5)
LDL CHOLESTEROL CALCULATED: 57 MG/DL (ref 0–99)
LYMPHOCYTES ABSOLUTE: 0.42 E9/L (ref 1.5–4)
LYMPHOCYTES RELATIVE PERCENT: 3.5 % (ref 20–42)
MCH RBC QN AUTO: 31 PG (ref 26–35)
MCHC RBC AUTO-ENTMCNC: 31.9 % (ref 32–34.5)
MCV RBC AUTO: 97 FL (ref 80–99.9)
METAMYELOCYTES RELATIVE PERCENT: 0.9 % (ref 0–1)
MONOCYTES ABSOLUTE: 0.21 E9/L (ref 0.1–0.95)
MONOCYTES RELATIVE PERCENT: 1.7 % (ref 2–12)
MYELOCYTE PERCENT: 0.9 % (ref 0–0)
NEUTROPHILS ABSOLUTE: 9.86 E9/L (ref 1.8–7.3)
NEUTROPHILS RELATIVE PERCENT: 91.3 % (ref 43–80)
OVALOCYTES: ABNORMAL
PDW BLD-RTO: 13.8 FL (ref 11.5–15)
PLATELET # BLD: 277 E9/L (ref 130–450)
PMV BLD AUTO: 10.1 FL (ref 7–12)
POIKILOCYTES: ABNORMAL
POLYCHROMASIA: ABNORMAL
POTASSIUM SERPL-SCNC: 5.1 MMOL/L (ref 3.5–5)
RBC # BLD: 2.97 E12/L (ref 3.5–5.5)
SODIUM BLD-SCNC: 137 MMOL/L (ref 132–146)
TEAR DROP CELLS: ABNORMAL
TOTAL PROTEIN: 6.6 G/DL (ref 6.4–8.3)
TRIGL SERPL-MCNC: 144 MG/DL (ref 0–149)
TSH SERPL DL<=0.05 MIU/L-ACNC: 0.43 UIU/ML (ref 0.27–4.2)
VLDLC SERPL CALC-MCNC: 29 MG/DL
WBC # BLD: 10.6 E9/L (ref 4.5–11.5)

## 2019-10-21 PROCEDURE — 6360000002 HC RX W HCPCS: Performed by: SPECIALIST

## 2019-10-21 PROCEDURE — 85025 COMPLETE CBC W/AUTO DIFF WBC: CPT

## 2019-10-21 PROCEDURE — 1200000000 HC SEMI PRIVATE

## 2019-10-21 PROCEDURE — 2580000003 HC RX 258: Performed by: SPECIALIST

## 2019-10-21 PROCEDURE — 36591 DRAW BLOOD OFF VENOUS DEVICE: CPT

## 2019-10-21 PROCEDURE — 80061 LIPID PANEL: CPT

## 2019-10-21 PROCEDURE — 80053 COMPREHEN METABOLIC PANEL: CPT

## 2019-10-21 PROCEDURE — 2580000003 HC RX 258: Performed by: INTERNAL MEDICINE

## 2019-10-21 PROCEDURE — 6370000000 HC RX 637 (ALT 250 FOR IP): Performed by: INTERNAL MEDICINE

## 2019-10-21 PROCEDURE — 84443 ASSAY THYROID STIM HORMONE: CPT

## 2019-10-21 PROCEDURE — 36415 COLL VENOUS BLD VENIPUNCTURE: CPT

## 2019-10-21 RX ORDER — POLYETHYLENE GLYCOL 3350 17 G/17G
17 POWDER, FOR SOLUTION ORAL DAILY
Status: DISCONTINUED | OUTPATIENT
Start: 2019-10-21 | End: 2019-10-24 | Stop reason: HOSPADM

## 2019-10-21 RX ORDER — SENNA AND DOCUSATE SODIUM 50; 8.6 MG/1; MG/1
2 TABLET, FILM COATED ORAL 2 TIMES DAILY
Status: DISCONTINUED | OUTPATIENT
Start: 2019-10-21 | End: 2019-10-24 | Stop reason: HOSPADM

## 2019-10-21 RX ADMIN — CEFEPIME HYDROCHLORIDE 2 G: 2 INJECTION, POWDER, FOR SOLUTION INTRAVENOUS at 20:46

## 2019-10-21 RX ADMIN — VANCOMYCIN HYDROCHLORIDE 1000 MG: 1 INJECTION, POWDER, LYOPHILIZED, FOR SOLUTION INTRAVENOUS at 13:22

## 2019-10-21 RX ADMIN — Medication 10 ML: at 09:37

## 2019-10-21 RX ADMIN — HYDROCODONE BITARTRATE AND ACETAMINOPHEN 1 TABLET: 5; 325 TABLET ORAL at 20:45

## 2019-10-21 RX ADMIN — SENNOSIDES AND DOCUSATE SODIUM 2 TABLET: 8.6; 5 TABLET ORAL at 20:42

## 2019-10-21 RX ADMIN — FERROUS SULFATE TAB 325 MG (65 MG ELEMENTAL FE) 325 MG: 325 (65 FE) TAB at 09:20

## 2019-10-21 RX ADMIN — SODIUM CHLORIDE: 9 INJECTION, SOLUTION INTRAVENOUS at 09:37

## 2019-10-21 RX ADMIN — CLONAZEPAM 0.25 MG: 0.5 TABLET ORAL at 09:20

## 2019-10-21 RX ADMIN — MIRTAZAPINE 15 MG: 15 TABLET, FILM COATED ORAL at 20:45

## 2019-10-21 RX ADMIN — OXYBUTYNIN CHLORIDE 5 MG: 5 TABLET, EXTENDED RELEASE ORAL at 09:20

## 2019-10-21 RX ADMIN — PANTOPRAZOLE SODIUM 40 MG: 40 TABLET, DELAYED RELEASE ORAL at 09:20

## 2019-10-21 RX ADMIN — CLONAZEPAM 0.25 MG: 0.5 TABLET ORAL at 20:42

## 2019-10-21 RX ADMIN — CEFEPIME HYDROCHLORIDE 2 G: 2 INJECTION, POWDER, FOR SOLUTION INTRAVENOUS at 09:37

## 2019-10-21 RX ADMIN — FERROUS SULFATE TAB 325 MG (65 MG ELEMENTAL FE) 325 MG: 325 (65 FE) TAB at 17:32

## 2019-10-21 RX ADMIN — Medication 10 ML: at 20:46

## 2019-10-21 RX ADMIN — SERTRALINE HYDROCHLORIDE 25 MG: 50 TABLET ORAL at 09:20

## 2019-10-21 ASSESSMENT — PAIN SCALES - GENERAL
PAINLEVEL_OUTOF10: 0
PAINLEVEL_OUTOF10: 7
PAINLEVEL_OUTOF10: 0
PAINLEVEL_OUTOF10: 0

## 2019-10-21 ASSESSMENT — ENCOUNTER SYMPTOMS
VOMITING: 0
ABDOMINAL PAIN: 1
SHORTNESS OF BREATH: 0
NAUSEA: 1
COUGH: 0
SINUS PRESSURE: 0

## 2019-10-22 ENCOUNTER — APPOINTMENT (OUTPATIENT)
Dept: INTERVENTIONAL RADIOLOGY/VASCULAR | Age: 54
DRG: 466 | End: 2019-10-22
Payer: MEDICAID

## 2019-10-22 LAB
ABO/RH: NORMAL
ANION GAP SERPL CALCULATED.3IONS-SCNC: 11 MMOL/L (ref 7–16)
ANTIBODY SCREEN: NORMAL
BASOPHILS ABSOLUTE: 0.02 E9/L (ref 0–0.2)
BASOPHILS RELATIVE PERCENT: 0.3 % (ref 0–2)
BUN BLDV-MCNC: 24 MG/DL (ref 6–20)
CALCIUM SERPL-MCNC: 8.8 MG/DL (ref 8.6–10.2)
CHLORIDE BLD-SCNC: 104 MMOL/L (ref 98–107)
CO2: 24 MMOL/L (ref 22–29)
CREAT SERPL-MCNC: 1.6 MG/DL (ref 0.5–1)
EOSINOPHILS ABSOLUTE: 0.18 E9/L (ref 0.05–0.5)
EOSINOPHILS RELATIVE PERCENT: 2.3 % (ref 0–6)
GFR AFRICAN AMERICAN: 40
GFR NON-AFRICAN AMERICAN: 33 ML/MIN/1.73
GLUCOSE BLD-MCNC: 124 MG/DL (ref 74–99)
HCT VFR BLD CALC: 23.5 % (ref 34–48)
HCT VFR BLD CALC: 24.2 % (ref 34–48)
HCT VFR BLD CALC: 29.9 % (ref 34–48)
HEMOGLOBIN: 7.4 G/DL (ref 11.5–15.5)
HEMOGLOBIN: 9.3 G/DL (ref 11.5–15.5)
IMMATURE GRANULOCYTES #: 0.07 E9/L
IMMATURE GRANULOCYTES %: 0.9 % (ref 0–5)
IMMATURE RETIC FRACT: 18.3 % (ref 3–15.9)
LYMPHOCYTES ABSOLUTE: 0.44 E9/L (ref 1.5–4)
LYMPHOCYTES RELATIVE PERCENT: 5.6 % (ref 20–42)
MCH RBC QN AUTO: 31 PG (ref 26–35)
MCHC RBC AUTO-ENTMCNC: 31.5 % (ref 32–34.5)
MCV RBC AUTO: 98.3 FL (ref 80–99.9)
MONOCYTES ABSOLUTE: 0.35 E9/L (ref 0.1–0.95)
MONOCYTES RELATIVE PERCENT: 4.5 % (ref 2–12)
NEUTROPHILS ABSOLUTE: 6.75 E9/L (ref 1.8–7.3)
NEUTROPHILS RELATIVE PERCENT: 86.4 % (ref 43–80)
ORGANISM: ABNORMAL
PDW BLD-RTO: 14 FL (ref 11.5–15)
PLATELET # BLD: 230 E9/L (ref 130–450)
PMV BLD AUTO: 10.2 FL (ref 7–12)
POTASSIUM SERPL-SCNC: 3.9 MMOL/L (ref 3.5–5)
RBC # BLD: 2.39 E12/L (ref 3.5–5.5)
RETIC HGB EQUIVALENT: 31 PG (ref 28.2–36.6)
RETICULOCYTE ABSOLUTE COUNT: 0.04 E12/L
RETICULOCYTE COUNT PCT: 1.5 % (ref 0.4–1.9)
SODIUM BLD-SCNC: 139 MMOL/L (ref 132–146)
URINE CULTURE, ROUTINE: ABNORMAL
VANCOMYCIN RANDOM: 19.1 MCG/ML (ref 5–40)
WBC # BLD: 7.8 E9/L (ref 4.5–11.5)

## 2019-10-22 PROCEDURE — 85018 HEMOGLOBIN: CPT

## 2019-10-22 PROCEDURE — 0T25X0Z CHANGE DRAINAGE DEVICE IN KIDNEY, EXTERNAL APPROACH: ICD-10-PCS | Performed by: INTERNAL MEDICINE

## 2019-10-22 PROCEDURE — 85025 COMPLETE CBC W/AUTO DIFF WBC: CPT

## 2019-10-22 PROCEDURE — 6360000004 HC RX CONTRAST MEDICATION: Performed by: RADIOLOGY

## 2019-10-22 PROCEDURE — 36591 DRAW BLOOD OFF VENOUS DEVICE: CPT

## 2019-10-22 PROCEDURE — 85045 AUTOMATED RETICULOCYTE COUNT: CPT

## 2019-10-22 PROCEDURE — 6360000002 HC RX W HCPCS: Performed by: INTERNAL MEDICINE

## 2019-10-22 PROCEDURE — 1200000000 HC SEMI PRIVATE

## 2019-10-22 PROCEDURE — 80048 BASIC METABOLIC PNL TOTAL CA: CPT

## 2019-10-22 PROCEDURE — 6360000002 HC RX W HCPCS: Performed by: RADIOLOGY

## 2019-10-22 PROCEDURE — C1729 CATH, DRAINAGE: HCPCS

## 2019-10-22 PROCEDURE — 80202 ASSAY OF VANCOMYCIN: CPT

## 2019-10-22 PROCEDURE — 6370000000 HC RX 637 (ALT 250 FOR IP): Performed by: INTERNAL MEDICINE

## 2019-10-22 PROCEDURE — 6360000002 HC RX W HCPCS: Performed by: SPECIALIST

## 2019-10-22 PROCEDURE — 86850 RBC ANTIBODY SCREEN: CPT

## 2019-10-22 PROCEDURE — 50435 EXCHANGE NEPHROSTOMY CATH: CPT | Performed by: RADIOLOGY

## 2019-10-22 PROCEDURE — 86900 BLOOD TYPING SEROLOGIC ABO: CPT

## 2019-10-22 PROCEDURE — 2580000003 HC RX 258: Performed by: SPECIALIST

## 2019-10-22 PROCEDURE — 86901 BLOOD TYPING SEROLOGIC RH(D): CPT

## 2019-10-22 PROCEDURE — 85014 HEMATOCRIT: CPT

## 2019-10-22 PROCEDURE — 2580000003 HC RX 258: Performed by: INTERNAL MEDICINE

## 2019-10-22 PROCEDURE — 36415 COLL VENOUS BLD VENIPUNCTURE: CPT

## 2019-10-22 RX ORDER — FENTANYL CITRATE 50 UG/ML
50 INJECTION, SOLUTION INTRAMUSCULAR; INTRAVENOUS ONCE
Status: COMPLETED | OUTPATIENT
Start: 2019-10-22 | End: 2019-10-22

## 2019-10-22 RX ORDER — LIDOCAINE HYDROCHLORIDE 20 MG/ML
10 INJECTION, SOLUTION INFILTRATION; PERINEURAL ONCE
Status: DISCONTINUED | OUTPATIENT
Start: 2019-10-22 | End: 2019-10-24 | Stop reason: HOSPADM

## 2019-10-22 RX ORDER — MIDAZOLAM HYDROCHLORIDE 1 MG/ML
1 INJECTION INTRAMUSCULAR; INTRAVENOUS PRN
Status: COMPLETED | OUTPATIENT
Start: 2019-10-22 | End: 2019-10-22

## 2019-10-22 RX ORDER — SODIUM CHLORIDE AND POTASSIUM CHLORIDE .9; .15 G/100ML; G/100ML
SOLUTION INTRAVENOUS CONTINUOUS
Status: DISCONTINUED | OUTPATIENT
Start: 2019-10-22 | End: 2019-10-24 | Stop reason: HOSPADM

## 2019-10-22 RX ADMIN — CLONAZEPAM 0.25 MG: 0.5 TABLET ORAL at 21:52

## 2019-10-22 RX ADMIN — ACETAMINOPHEN 650 MG: 325 TABLET, FILM COATED ORAL at 21:59

## 2019-10-22 RX ADMIN — SODIUM CHLORIDE: 9 INJECTION, SOLUTION INTRAVENOUS at 06:32

## 2019-10-22 RX ADMIN — MIRTAZAPINE 15 MG: 15 TABLET, FILM COATED ORAL at 21:51

## 2019-10-22 RX ADMIN — IOPAMIDOL 10 ML: 612 INJECTION, SOLUTION INTRAVENOUS at 09:11

## 2019-10-22 RX ADMIN — CEFEPIME HYDROCHLORIDE 2 G: 2 INJECTION, POWDER, FOR SOLUTION INTRAVENOUS at 07:40

## 2019-10-22 RX ADMIN — FERROUS SULFATE TAB 325 MG (65 MG ELEMENTAL FE) 325 MG: 325 (65 FE) TAB at 16:19

## 2019-10-22 RX ADMIN — FENTANYL CITRATE 50 MCG: 50 INJECTION, SOLUTION INTRAMUSCULAR; INTRAVENOUS at 08:46

## 2019-10-22 RX ADMIN — Medication 10 ML: at 07:40

## 2019-10-22 RX ADMIN — POTASSIUM CHLORIDE AND SODIUM CHLORIDE: 900; 150 INJECTION, SOLUTION INTRAVENOUS at 13:15

## 2019-10-22 RX ADMIN — SENNOSIDES AND DOCUSATE SODIUM 2 TABLET: 8.6; 5 TABLET ORAL at 21:51

## 2019-10-22 RX ADMIN — MIDAZOLAM HYDROCHLORIDE 1 MG: 2 INJECTION, SOLUTION INTRAMUSCULAR; INTRAVENOUS at 08:45

## 2019-10-22 RX ADMIN — CEFEPIME HYDROCHLORIDE 2 G: 2 INJECTION, POWDER, FOR SOLUTION INTRAVENOUS at 19:27

## 2019-10-22 ASSESSMENT — PAIN DESCRIPTION - ORIENTATION: ORIENTATION: LEFT

## 2019-10-22 ASSESSMENT — PAIN DESCRIPTION - LOCATION
LOCATION: HEAD;BACK
LOCATION: FLANK

## 2019-10-22 ASSESSMENT — PAIN SCALES - GENERAL
PAINLEVEL_OUTOF10: 2
PAINLEVEL_OUTOF10: 0
PAINLEVEL_OUTOF10: 8

## 2019-10-22 ASSESSMENT — PAIN DESCRIPTION - PAIN TYPE
TYPE: ACUTE PAIN
TYPE: ACUTE PAIN

## 2019-10-22 ASSESSMENT — PAIN - FUNCTIONAL ASSESSMENT: PAIN_FUNCTIONAL_ASSESSMENT: PREVENTS OR INTERFERES SOME ACTIVE ACTIVITIES AND ADLS

## 2019-10-22 ASSESSMENT — PAIN DESCRIPTION - DESCRIPTORS
DESCRIPTORS: TENDER
DESCRIPTORS: HEADACHE;DISCOMFORT

## 2019-10-22 ASSESSMENT — PAIN DESCRIPTION - FREQUENCY: FREQUENCY: INTERMITTENT

## 2019-10-23 LAB
ANION GAP SERPL CALCULATED.3IONS-SCNC: 10 MMOL/L (ref 7–16)
BASOPHILS ABSOLUTE: 0.05 E9/L (ref 0–0.2)
BASOPHILS RELATIVE PERCENT: 0.9 % (ref 0–2)
BUN BLDV-MCNC: 27 MG/DL (ref 6–20)
CALCIUM SERPL-MCNC: 8.9 MG/DL (ref 8.6–10.2)
CHLORIDE BLD-SCNC: 108 MMOL/L (ref 98–107)
CO2: 21 MMOL/L (ref 22–29)
CREAT SERPL-MCNC: 1.4 MG/DL (ref 0.5–1)
EOSINOPHILS ABSOLUTE: 0.28 E9/L (ref 0.05–0.5)
EOSINOPHILS RELATIVE PERCENT: 5.4 % (ref 0–6)
GFR AFRICAN AMERICAN: 47
GFR NON-AFRICAN AMERICAN: 39 ML/MIN/1.73
GLUCOSE BLD-MCNC: 123 MG/DL (ref 74–99)
HCT VFR BLD CALC: 25.4 % (ref 34–48)
HEMOGLOBIN: 7.7 G/DL (ref 11.5–15.5)
LYMPHOCYTES ABSOLUTE: 0.41 E9/L (ref 1.5–4)
LYMPHOCYTES RELATIVE PERCENT: 8 % (ref 20–42)
MCH RBC QN AUTO: 30 PG (ref 26–35)
MCHC RBC AUTO-ENTMCNC: 30.3 % (ref 32–34.5)
MCV RBC AUTO: 98.8 FL (ref 80–99.9)
MONOCYTES ABSOLUTE: 0.2 E9/L (ref 0.1–0.95)
MONOCYTES RELATIVE PERCENT: 3.6 % (ref 2–12)
NEUTROPHILS ABSOLUTE: 4.18 E9/L (ref 1.8–7.3)
NEUTROPHILS RELATIVE PERCENT: 82.1 % (ref 43–80)
ORGANISM: ABNORMAL
PDW BLD-RTO: 13.9 FL (ref 11.5–15)
PLATELET # BLD: 260 E9/L (ref 130–450)
PMV BLD AUTO: 10.5 FL (ref 7–12)
POTASSIUM SERPL-SCNC: 4.2 MMOL/L (ref 3.5–5)
RBC # BLD: 2.57 E12/L (ref 3.5–5.5)
RBC # BLD: NORMAL 10*6/UL
SODIUM BLD-SCNC: 139 MMOL/L (ref 132–146)
URINE CULTURE, ROUTINE: ABNORMAL
URINE CULTURE, ROUTINE: NORMAL
WBC # BLD: 5.1 E9/L (ref 4.5–11.5)

## 2019-10-23 PROCEDURE — 6360000002 HC RX W HCPCS: Performed by: INTERNAL MEDICINE

## 2019-10-23 PROCEDURE — 1200000000 HC SEMI PRIVATE

## 2019-10-23 PROCEDURE — 80048 BASIC METABOLIC PNL TOTAL CA: CPT

## 2019-10-23 PROCEDURE — 7100000010 HC PHASE II RECOVERY - FIRST 15 MIN

## 2019-10-23 PROCEDURE — 2580000003 HC RX 258: Performed by: SPECIALIST

## 2019-10-23 PROCEDURE — 85025 COMPLETE CBC W/AUTO DIFF WBC: CPT

## 2019-10-23 PROCEDURE — 7100000011 HC PHASE II RECOVERY - ADDTL 15 MIN

## 2019-10-23 PROCEDURE — 6370000000 HC RX 637 (ALT 250 FOR IP): Performed by: SPECIALIST

## 2019-10-23 PROCEDURE — 6360000002 HC RX W HCPCS: Performed by: SPECIALIST

## 2019-10-23 PROCEDURE — 2580000003 HC RX 258: Performed by: INTERNAL MEDICINE

## 2019-10-23 PROCEDURE — 36415 COLL VENOUS BLD VENIPUNCTURE: CPT

## 2019-10-23 PROCEDURE — 6370000000 HC RX 637 (ALT 250 FOR IP): Performed by: INTERNAL MEDICINE

## 2019-10-23 RX ORDER — CIPROFLOXACIN 750 MG/1
750 TABLET, FILM COATED ORAL
Qty: 14 TABLET | Refills: 0 | Status: SHIPPED | OUTPATIENT
Start: 2019-10-23 | End: 2019-11-06

## 2019-10-23 RX ORDER — CIPROFLOXACIN 500 MG/1
750 TABLET, FILM COATED ORAL
Status: DISCONTINUED | OUTPATIENT
Start: 2019-10-23 | End: 2019-10-24 | Stop reason: HOSPADM

## 2019-10-23 RX ADMIN — SENNOSIDES AND DOCUSATE SODIUM 2 TABLET: 8.6; 5 TABLET ORAL at 08:45

## 2019-10-23 RX ADMIN — DARBEPOETIN ALFA 100 MCG: 100 INJECTION, SOLUTION INTRAVENOUS; SUBCUTANEOUS at 16:43

## 2019-10-23 RX ADMIN — CEFEPIME HYDROCHLORIDE 2 G: 2 INJECTION, POWDER, FOR SOLUTION INTRAVENOUS at 07:52

## 2019-10-23 RX ADMIN — CLONAZEPAM 0.25 MG: 0.5 TABLET ORAL at 08:44

## 2019-10-23 RX ADMIN — HYDROCODONE BITARTRATE AND ACETAMINOPHEN 1 TABLET: 5; 325 TABLET ORAL at 08:58

## 2019-10-23 RX ADMIN — MIRTAZAPINE 15 MG: 15 TABLET, FILM COATED ORAL at 20:06

## 2019-10-23 RX ADMIN — OXYBUTYNIN CHLORIDE 5 MG: 5 TABLET, EXTENDED RELEASE ORAL at 08:45

## 2019-10-23 RX ADMIN — SENNOSIDES AND DOCUSATE SODIUM 2 TABLET: 8.6; 5 TABLET ORAL at 20:05

## 2019-10-23 RX ADMIN — FERROUS SULFATE TAB 325 MG (65 MG ELEMENTAL FE) 325 MG: 325 (65 FE) TAB at 08:45

## 2019-10-23 RX ADMIN — LEVOTHYROXINE SODIUM 100 MCG: 100 TABLET ORAL at 05:38

## 2019-10-23 RX ADMIN — POTASSIUM CHLORIDE AND SODIUM CHLORIDE: 900; 150 INJECTION, SOLUTION INTRAVENOUS at 11:05

## 2019-10-23 RX ADMIN — CIPROFLOXACIN HYDROCHLORIDE 750 MG: 500 TABLET, FILM COATED ORAL at 08:59

## 2019-10-23 RX ADMIN — CLONAZEPAM 0.25 MG: 0.5 TABLET ORAL at 20:05

## 2019-10-23 RX ADMIN — FERROUS SULFATE TAB 325 MG (65 MG ELEMENTAL FE) 325 MG: 325 (65 FE) TAB at 16:42

## 2019-10-23 RX ADMIN — HYDROCODONE BITARTRATE AND ACETAMINOPHEN 1 TABLET: 5; 325 TABLET ORAL at 20:06

## 2019-10-23 RX ADMIN — WATER 1 MG: 1 INJECTION INTRAMUSCULAR; INTRAVENOUS; SUBCUTANEOUS at 05:40

## 2019-10-23 RX ADMIN — PANTOPRAZOLE SODIUM 40 MG: 40 TABLET, DELAYED RELEASE ORAL at 08:44

## 2019-10-23 RX ADMIN — SERTRALINE HYDROCHLORIDE 25 MG: 50 TABLET ORAL at 08:45

## 2019-10-23 ASSESSMENT — PAIN DESCRIPTION - LOCATION
LOCATION: BACK;FLANK
LOCATION: BACK;FLANK

## 2019-10-23 ASSESSMENT — PAIN DESCRIPTION - ORIENTATION
ORIENTATION: LEFT
ORIENTATION: LEFT

## 2019-10-23 ASSESSMENT — ENCOUNTER SYMPTOMS
CONSTIPATION: 1
ABDOMINAL PAIN: 1
VOMITING: 0
RHINORRHEA: 1
SHORTNESS OF BREATH: 0
CHEST TIGHTNESS: 0
NAUSEA: 0

## 2019-10-23 ASSESSMENT — PAIN DESCRIPTION - PAIN TYPE
TYPE: ACUTE PAIN
TYPE: ACUTE PAIN

## 2019-10-23 ASSESSMENT — PAIN SCALES - GENERAL
PAINLEVEL_OUTOF10: 8
PAINLEVEL_OUTOF10: 7
PAINLEVEL_OUTOF10: 8
PAINLEVEL_OUTOF10: 0
PAINLEVEL_OUTOF10: 5

## 2019-10-23 ASSESSMENT — PAIN DESCRIPTION - DESCRIPTORS
DESCRIPTORS: TENDER
DESCRIPTORS: DISCOMFORT;TENDER;ACHING

## 2019-10-24 VITALS
BODY MASS INDEX: 26.37 KG/M2 | HEIGHT: 59 IN | HEART RATE: 63 BPM | RESPIRATION RATE: 18 BRPM | DIASTOLIC BLOOD PRESSURE: 70 MMHG | SYSTOLIC BLOOD PRESSURE: 116 MMHG | TEMPERATURE: 99 F | OXYGEN SATURATION: 96 % | WEIGHT: 130.8 LBS

## 2019-10-24 LAB
ANION GAP SERPL CALCULATED.3IONS-SCNC: 10 MMOL/L (ref 7–16)
BASOPHILS ABSOLUTE: 0.02 E9/L (ref 0–0.2)
BASOPHILS RELATIVE PERCENT: 0.3 % (ref 0–2)
BUN BLDV-MCNC: 28 MG/DL (ref 6–20)
CALCIUM SERPL-MCNC: 9.1 MG/DL (ref 8.6–10.2)
CHLORIDE BLD-SCNC: 112 MMOL/L (ref 98–107)
CO2: 22 MMOL/L (ref 22–29)
CREAT SERPL-MCNC: 1.8 MG/DL (ref 0.5–1)
EOSINOPHILS ABSOLUTE: 0.27 E9/L (ref 0.05–0.5)
EOSINOPHILS RELATIVE PERCENT: 4.3 % (ref 0–6)
GFR AFRICAN AMERICAN: 35
GFR NON-AFRICAN AMERICAN: 29 ML/MIN/1.73
GLUCOSE BLD-MCNC: 89 MG/DL (ref 74–99)
HCT VFR BLD CALC: 27.7 % (ref 34–48)
HEMOGLOBIN: 8.5 G/DL (ref 11.5–15.5)
IMMATURE GRANULOCYTES #: 0.16 E9/L
IMMATURE GRANULOCYTES %: 2.6 % (ref 0–5)
LYMPHOCYTES ABSOLUTE: 0.61 E9/L (ref 1.5–4)
LYMPHOCYTES RELATIVE PERCENT: 9.8 % (ref 20–42)
MCH RBC QN AUTO: 30.8 PG (ref 26–35)
MCHC RBC AUTO-ENTMCNC: 30.7 % (ref 32–34.5)
MCV RBC AUTO: 100.4 FL (ref 80–99.9)
MONOCYTES ABSOLUTE: 0.41 E9/L (ref 0.1–0.95)
MONOCYTES RELATIVE PERCENT: 6.6 % (ref 2–12)
NEUTROPHILS ABSOLUTE: 4.76 E9/L (ref 1.8–7.3)
NEUTROPHILS RELATIVE PERCENT: 76.4 % (ref 43–80)
PDW BLD-RTO: 13.7 FL (ref 11.5–15)
PLATELET # BLD: 284 E9/L (ref 130–450)
PMV BLD AUTO: 10.3 FL (ref 7–12)
POTASSIUM SERPL-SCNC: 4.6 MMOL/L (ref 3.5–5)
RBC # BLD: 2.76 E12/L (ref 3.5–5.5)
SODIUM BLD-SCNC: 144 MMOL/L (ref 132–146)
WBC # BLD: 6.2 E9/L (ref 4.5–11.5)

## 2019-10-24 PROCEDURE — 85025 COMPLETE CBC W/AUTO DIFF WBC: CPT

## 2019-10-24 PROCEDURE — 97161 PT EVAL LOW COMPLEX 20 MIN: CPT | Performed by: PHYSICAL THERAPIST

## 2019-10-24 PROCEDURE — 6370000000 HC RX 637 (ALT 250 FOR IP): Performed by: INTERNAL MEDICINE

## 2019-10-24 PROCEDURE — 6370000000 HC RX 637 (ALT 250 FOR IP): Performed by: SPECIALIST

## 2019-10-24 PROCEDURE — 80048 BASIC METABOLIC PNL TOTAL CA: CPT

## 2019-10-24 PROCEDURE — 97530 THERAPEUTIC ACTIVITIES: CPT

## 2019-10-24 PROCEDURE — 97165 OT EVAL LOW COMPLEX 30 MIN: CPT

## 2019-10-24 PROCEDURE — 97530 THERAPEUTIC ACTIVITIES: CPT | Performed by: PHYSICAL THERAPIST

## 2019-10-24 PROCEDURE — 36415 COLL VENOUS BLD VENIPUNCTURE: CPT

## 2019-10-24 RX ADMIN — PANTOPRAZOLE SODIUM 40 MG: 40 TABLET, DELAYED RELEASE ORAL at 08:57

## 2019-10-24 RX ADMIN — OXYBUTYNIN CHLORIDE 5 MG: 5 TABLET, EXTENDED RELEASE ORAL at 08:57

## 2019-10-24 RX ADMIN — CLONAZEPAM 0.25 MG: 0.5 TABLET ORAL at 08:57

## 2019-10-24 RX ADMIN — BISACODYL 10 MG: 5 TABLET, COATED ORAL at 13:29

## 2019-10-24 RX ADMIN — HYDROCODONE BITARTRATE AND ACETAMINOPHEN 1 TABLET: 5; 325 TABLET ORAL at 08:57

## 2019-10-24 RX ADMIN — CIPROFLOXACIN HYDROCHLORIDE 750 MG: 500 TABLET, FILM COATED ORAL at 08:56

## 2019-10-24 RX ADMIN — SENNOSIDES AND DOCUSATE SODIUM 2 TABLET: 8.6; 5 TABLET ORAL at 08:57

## 2019-10-24 RX ADMIN — FERROUS SULFATE TAB 325 MG (65 MG ELEMENTAL FE) 325 MG: 325 (65 FE) TAB at 08:57

## 2019-10-24 ASSESSMENT — PAIN SCALES - GENERAL
PAINLEVEL_OUTOF10: 6
PAINLEVEL_OUTOF10: 5

## 2019-10-25 LAB
BLOOD CULTURE, ROUTINE: NORMAL
CULTURE, BLOOD 2: NORMAL

## 2019-10-28 ENCOUNTER — OFFICE VISIT (OUTPATIENT)
Dept: ONCOLOGY | Age: 54
End: 2019-10-28
Payer: MEDICAID

## 2019-10-28 ENCOUNTER — TELEPHONE (OUTPATIENT)
Dept: ONCOLOGY | Age: 54
End: 2019-10-28

## 2019-10-28 ENCOUNTER — HOSPITAL ENCOUNTER (OUTPATIENT)
Dept: INFUSION THERAPY | Age: 54
Discharge: HOME | End: 2019-10-28
Payer: MEDICAID

## 2019-10-28 VITALS
HEIGHT: 59 IN | SYSTOLIC BLOOD PRESSURE: 112 MMHG | HEART RATE: 73 BPM | OXYGEN SATURATION: 100 % | WEIGHT: 134 LBS | BODY MASS INDEX: 27.01 KG/M2 | DIASTOLIC BLOOD PRESSURE: 69 MMHG | TEMPERATURE: 97 F

## 2019-10-28 VITALS — DIASTOLIC BLOOD PRESSURE: 70 MMHG | SYSTOLIC BLOOD PRESSURE: 137 MMHG | RESPIRATION RATE: 12 BRPM | HEART RATE: 73 BPM

## 2019-10-28 DIAGNOSIS — N18.4 ANEMIA DUE TO STAGE 4 CHRONIC KIDNEY DISEASE (HCC): ICD-10-CM

## 2019-10-28 DIAGNOSIS — C68.9 UROTHELIAL CANCER (HCC): ICD-10-CM

## 2019-10-28 DIAGNOSIS — D63.1 ANEMIA DUE TO STAGE 4 CHRONIC KIDNEY DISEASE (HCC): ICD-10-CM

## 2019-10-28 DIAGNOSIS — C67.9: ICD-10-CM

## 2019-10-28 DIAGNOSIS — D50.8 OTHER IRON DEFICIENCY ANEMIA: ICD-10-CM

## 2019-10-28 DIAGNOSIS — C53.9 MALIGNANT NEOPLASM OF CERVIX, UNSPECIFIED SITE (HCC): Primary | ICD-10-CM

## 2019-10-28 DIAGNOSIS — C79.82: ICD-10-CM

## 2019-10-28 DIAGNOSIS — C53.9 MALIGNANT NEOPLASM OF CERVIX, UNSPECIFIED SITE (HCC): ICD-10-CM

## 2019-10-28 PROBLEM — D64.9 ANEMIA: Status: ACTIVE | Noted: 2019-10-28

## 2019-10-28 PROCEDURE — 96413 CHEMO IV INFUSION 1 HR: CPT

## 2019-10-28 PROCEDURE — 6360000002 HC RX W HCPCS

## 2019-10-28 PROCEDURE — 2580000003 HC RX 258: Performed by: INTERNAL MEDICINE

## 2019-10-28 PROCEDURE — 6360000002 HC RX W HCPCS: Performed by: INTERNAL MEDICINE

## 2019-10-28 PROCEDURE — 36593 DECLOT VASCULAR DEVICE: CPT

## 2019-10-28 RX ORDER — SODIUM CHLORIDE 0.9 % (FLUSH) 0.9 %
10 SYRINGE (ML) INJECTION PRN
Status: DISCONTINUED | OUTPATIENT
Start: 2019-10-28 | End: 2019-10-29 | Stop reason: HOSPADM

## 2019-10-28 RX ORDER — METHYLPREDNISOLONE SODIUM SUCCINATE 125 MG/2ML
125 INJECTION, POWDER, LYOPHILIZED, FOR SOLUTION INTRAMUSCULAR; INTRAVENOUS ONCE
Status: CANCELLED | OUTPATIENT
Start: 2019-10-28

## 2019-10-28 RX ORDER — MEPERIDINE HYDROCHLORIDE 25 MG/ML
12.5 INJECTION INTRAMUSCULAR; INTRAVENOUS; SUBCUTANEOUS ONCE
Status: CANCELLED | OUTPATIENT
Start: 2019-10-28

## 2019-10-28 RX ORDER — SODIUM CHLORIDE 0.9 % (FLUSH) 0.9 %
10 SYRINGE (ML) INJECTION PRN
Status: CANCELLED | OUTPATIENT
Start: 2019-10-28

## 2019-10-28 RX ORDER — EPINEPHRINE 1 MG/ML
0.3 INJECTION, SOLUTION, CONCENTRATE INTRAVENOUS PRN
Status: CANCELLED | OUTPATIENT
Start: 2019-10-28

## 2019-10-28 RX ORDER — SODIUM CHLORIDE 9 MG/ML
INJECTION, SOLUTION INTRAVENOUS CONTINUOUS
Status: CANCELLED | OUTPATIENT
Start: 2019-10-28

## 2019-10-28 RX ORDER — HEPARIN SODIUM (PORCINE) LOCK FLUSH IV SOLN 100 UNIT/ML 100 UNIT/ML
500 SOLUTION INTRAVENOUS PRN
Status: DISCONTINUED | OUTPATIENT
Start: 2019-10-28 | End: 2019-10-29 | Stop reason: HOSPADM

## 2019-10-28 RX ORDER — DIPHENHYDRAMINE HYDROCHLORIDE 50 MG/ML
50 INJECTION INTRAMUSCULAR; INTRAVENOUS ONCE
Status: CANCELLED | OUTPATIENT
Start: 2019-10-28

## 2019-10-28 RX ORDER — SODIUM CHLORIDE 9 MG/ML
20 INJECTION, SOLUTION INTRAVENOUS ONCE
Status: CANCELLED | OUTPATIENT
Start: 2019-10-28

## 2019-10-28 RX ORDER — HEPARIN SODIUM (PORCINE) LOCK FLUSH IV SOLN 100 UNIT/ML 100 UNIT/ML
500 SOLUTION INTRAVENOUS PRN
Status: CANCELLED | OUTPATIENT
Start: 2019-10-28

## 2019-10-28 RX ORDER — SODIUM CHLORIDE 9 MG/ML
20 INJECTION, SOLUTION INTRAVENOUS ONCE
Status: COMPLETED | OUTPATIENT
Start: 2019-10-28 | End: 2019-10-28

## 2019-10-28 RX ORDER — HEPARIN SODIUM (PORCINE) LOCK FLUSH IV SOLN 100 UNIT/ML 100 UNIT/ML
SOLUTION INTRAVENOUS
Status: COMPLETED
Start: 2019-10-28 | End: 2019-10-28

## 2019-10-28 RX ADMIN — Medication 500 UNITS: at 12:13

## 2019-10-28 RX ADMIN — SODIUM CHLORIDE 20 ML/HR: 9 INJECTION, SOLUTION INTRAVENOUS at 11:03

## 2019-10-28 RX ADMIN — SODIUM CHLORIDE 200 MG: 9 INJECTION, SOLUTION INTRAVENOUS at 11:34

## 2019-10-28 RX ADMIN — Medication 10 ML: at 12:13

## 2019-10-28 RX ADMIN — ALTEPLASE 2 MG: 2.2 INJECTION, POWDER, LYOPHILIZED, FOR SOLUTION INTRAVENOUS at 10:01

## 2019-10-28 RX ADMIN — HEPARIN SODIUM (PORCINE) LOCK FLUSH IV SOLN 100 UNIT/ML 500 UNITS: 100 SOLUTION at 12:13

## 2019-10-28 NOTE — PROGRESS NOTES
Unable to aspirate blood from implanted port, dr Jovon Cardozo notified, order received to give the Bernadene Le as long as the port flushes easily and doesn't leak or swell or cause the patient discomfort.

## 2019-10-28 NOTE — PROGRESS NOTES
Ok to use labs from 10/24 and to proceed with [facility] administration.

## 2019-10-30 ENCOUNTER — TELEPHONE (OUTPATIENT)
Dept: CASE MANAGEMENT | Age: 54
End: 2019-10-30

## 2019-11-04 ENCOUNTER — HOSPITAL ENCOUNTER (OUTPATIENT)
Dept: INFUSION THERAPY | Age: 54
Discharge: HOME | End: 2019-11-04
Payer: MEDICAID

## 2019-11-04 DIAGNOSIS — N18.4 ANEMIA DUE TO STAGE 4 CHRONIC KIDNEY DISEASE (HCC): Primary | ICD-10-CM

## 2019-11-04 DIAGNOSIS — N30.01 ACUTE CYSTITIS WITH HEMATURIA: ICD-10-CM

## 2019-11-04 DIAGNOSIS — D63.1 ANEMIA DUE TO STAGE 4 CHRONIC KIDNEY DISEASE (HCC): Primary | ICD-10-CM

## 2019-11-04 PROCEDURE — 6360000002 HC RX W HCPCS: Performed by: INTERNAL MEDICINE

## 2019-11-04 PROCEDURE — 96372 THER/PROPH/DIAG INJ SC/IM: CPT

## 2019-11-04 RX ADMIN — DARBEPOETIN ALFA 200 MCG: 200 INJECTION, SOLUTION INTRAVENOUS; SUBCUTANEOUS at 11:39

## 2019-11-14 ENCOUNTER — TELEPHONE (OUTPATIENT)
Dept: CASE MANAGEMENT | Age: 54
End: 2019-11-14

## 2019-11-18 ENCOUNTER — HOSPITAL ENCOUNTER (OUTPATIENT)
Dept: INFUSION THERAPY | Age: 54
Discharge: HOME | End: 2019-11-18
Payer: MEDICAID

## 2019-11-18 ENCOUNTER — OFFICE VISIT (OUTPATIENT)
Dept: ONCOLOGY | Age: 54
End: 2019-11-18
Payer: MEDICAID

## 2019-11-18 VITALS
BODY MASS INDEX: 27.06 KG/M2 | OXYGEN SATURATION: 98 % | HEIGHT: 59 IN | DIASTOLIC BLOOD PRESSURE: 63 MMHG | TEMPERATURE: 96.4 F | HEART RATE: 62 BPM | SYSTOLIC BLOOD PRESSURE: 107 MMHG

## 2019-11-18 DIAGNOSIS — N18.4 ANEMIA DUE TO STAGE 4 CHRONIC KIDNEY DISEASE (HCC): ICD-10-CM

## 2019-11-18 DIAGNOSIS — D63.1 ANEMIA DUE TO STAGE 4 CHRONIC KIDNEY DISEASE (HCC): ICD-10-CM

## 2019-11-18 DIAGNOSIS — C53.9 MALIGNANT NEOPLASM OF CERVIX, UNSPECIFIED SITE (HCC): ICD-10-CM

## 2019-11-18 DIAGNOSIS — C53.9 MALIGNANT NEOPLASM OF CERVIX, UNSPECIFIED SITE (HCC): Primary | ICD-10-CM

## 2019-11-18 PROCEDURE — 99214 OFFICE O/P EST MOD 30 MIN: CPT | Performed by: INTERNAL MEDICINE

## 2019-11-18 NOTE — PROGRESS NOTES
Patient presents to clinic for labs/office visit/and treatment today. Left chest single  SQ port accessed per policy using 12Q 6.17 Tineo needle for no blood return and patient complaining of severe pain upon flushing. Swelling around port and neck noted immediately after saline flush. Heat applied to swelling and Emaline Skeans needle removed, Myron Aguirre, Lead RN notified of issue with port. Attempted to draw peripheral labs but patient was fighting and combative with staff upon attempt. Dr Valerio Fried notified of port issue and that no labs were obtained.

## 2019-11-21 ENCOUNTER — TELEPHONE (OUTPATIENT)
Dept: ONCOLOGY | Age: 54
End: 2019-11-21

## 2019-11-25 ENCOUNTER — HOSPITAL ENCOUNTER (EMERGENCY)
Age: 54
Discharge: HOME | End: 2019-11-26
Attending: EMERGENCY MEDICINE
Payer: MEDICAID

## 2019-11-25 ENCOUNTER — APPOINTMENT (OUTPATIENT)
Dept: GENERAL RADIOLOGY | Age: 54
End: 2019-11-25
Payer: MEDICAID

## 2019-11-25 ENCOUNTER — APPOINTMENT (OUTPATIENT)
Dept: ULTRASOUND IMAGING | Age: 54
End: 2019-11-25
Payer: MEDICAID

## 2019-11-25 ENCOUNTER — APPOINTMENT (OUTPATIENT)
Dept: CT IMAGING | Age: 54
End: 2019-11-25
Payer: MEDICAID

## 2019-11-25 VITALS
RESPIRATION RATE: 16 BRPM | TEMPERATURE: 99.8 F | OXYGEN SATURATION: 99 % | DIASTOLIC BLOOD PRESSURE: 72 MMHG | WEIGHT: 130 LBS | HEART RATE: 71 BPM | BODY MASS INDEX: 26.26 KG/M2 | SYSTOLIC BLOOD PRESSURE: 123 MMHG

## 2019-11-25 DIAGNOSIS — R11.0 NAUSEA: ICD-10-CM

## 2019-11-25 DIAGNOSIS — K59.00 CONSTIPATION, UNSPECIFIED CONSTIPATION TYPE: ICD-10-CM

## 2019-11-25 DIAGNOSIS — N30.00 ACUTE CYSTITIS WITHOUT HEMATURIA: Primary | ICD-10-CM

## 2019-11-25 DIAGNOSIS — R10.84 GENERALIZED ABDOMINAL PAIN: ICD-10-CM

## 2019-11-25 LAB
ALBUMIN SERPL-MCNC: 3.5 G/DL (ref 3.5–5.2)
ALP BLD-CCNC: 717 U/L (ref 35–104)
ALT SERPL-CCNC: 362 U/L (ref 0–32)
ANION GAP SERPL CALCULATED.3IONS-SCNC: 16 MMOL/L (ref 7–16)
AST SERPL-CCNC: 300 U/L (ref 0–31)
BACTERIA: ABNORMAL /HPF
BASOPHILS ABSOLUTE: 0.05 E9/L (ref 0–0.2)
BASOPHILS RELATIVE PERCENT: 0.6 % (ref 0–2)
BILIRUB SERPL-MCNC: 1.2 MG/DL (ref 0–1.2)
BILIRUBIN URINE: NEGATIVE
BLOOD, URINE: ABNORMAL
BUN BLDV-MCNC: 27 MG/DL (ref 6–20)
CALCIUM SERPL-MCNC: 10.4 MG/DL (ref 8.6–10.2)
CHLORIDE BLD-SCNC: 102 MMOL/L (ref 98–107)
CLARITY: ABNORMAL
CO2: 24 MMOL/L (ref 22–29)
COLOR: YELLOW
CREAT SERPL-MCNC: 1.6 MG/DL (ref 0.5–1)
EOSINOPHILS ABSOLUTE: 0.2 E9/L (ref 0.05–0.5)
EOSINOPHILS RELATIVE PERCENT: 2.3 % (ref 0–6)
EPITHELIAL CELLS, UA: ABNORMAL /HPF
GFR AFRICAN AMERICAN: 40
GFR NON-AFRICAN AMERICAN: 33 ML/MIN/1.73
GLUCOSE BLD-MCNC: 106 MG/DL (ref 74–99)
GLUCOSE URINE: NEGATIVE MG/DL
HCT VFR BLD CALC: 40.4 % (ref 34–48)
HEMOGLOBIN: 12.3 G/DL (ref 11.5–15.5)
IMMATURE GRANULOCYTES #: 0.07 E9/L
IMMATURE GRANULOCYTES %: 0.8 % (ref 0–5)
KETONES, URINE: NEGATIVE MG/DL
LACTIC ACID: 1.3 MMOL/L (ref 0.5–2.2)
LEUKOCYTE ESTERASE, URINE: ABNORMAL
LIPASE: 45 U/L (ref 13–60)
LYMPHOCYTES ABSOLUTE: 0.84 E9/L (ref 1.5–4)
LYMPHOCYTES RELATIVE PERCENT: 9.5 % (ref 20–42)
MCH RBC QN AUTO: 28.5 PG (ref 26–35)
MCHC RBC AUTO-ENTMCNC: 30.4 % (ref 32–34.5)
MCV RBC AUTO: 93.7 FL (ref 80–99.9)
MONOCYTES ABSOLUTE: 0.63 E9/L (ref 0.1–0.95)
MONOCYTES RELATIVE PERCENT: 7.1 % (ref 2–12)
NEUTROPHILS ABSOLUTE: 7.04 E9/L (ref 1.8–7.3)
NEUTROPHILS RELATIVE PERCENT: 79.7 % (ref 43–80)
NITRITE, URINE: NEGATIVE
PDW BLD-RTO: 14.1 FL (ref 11.5–15)
PH UA: 6 (ref 5–9)
PLATELET # BLD: 394 E9/L (ref 130–450)
PMV BLD AUTO: 10.5 FL (ref 7–12)
POTASSIUM REFLEX MAGNESIUM: 4.5 MMOL/L (ref 3.5–5)
PROTEIN UA: 100 MG/DL
RBC # BLD: 4.31 E12/L (ref 3.5–5.5)
RBC UA: ABNORMAL /HPF (ref 0–2)
SODIUM BLD-SCNC: 142 MMOL/L (ref 132–146)
SPECIFIC GRAVITY UA: 1.02 (ref 1–1.03)
TOTAL PROTEIN: 8 G/DL (ref 6.4–8.3)
TROPONIN: <0.01 NG/ML (ref 0–0.03)
UROBILINOGEN, URINE: 0.2 E.U./DL
WBC # BLD: 8.8 E9/L (ref 4.5–11.5)
WBC UA: >20 /HPF (ref 0–5)
YEAST: ABNORMAL

## 2019-11-25 PROCEDURE — 83605 ASSAY OF LACTIC ACID: CPT

## 2019-11-25 PROCEDURE — 36415 COLL VENOUS BLD VENIPUNCTURE: CPT

## 2019-11-25 PROCEDURE — 83690 ASSAY OF LIPASE: CPT

## 2019-11-25 PROCEDURE — 71045 X-RAY EXAM CHEST 1 VIEW: CPT

## 2019-11-25 PROCEDURE — 87088 URINE BACTERIA CULTURE: CPT

## 2019-11-25 PROCEDURE — 6370000000 HC RX 637 (ALT 250 FOR IP): Performed by: EMERGENCY MEDICINE

## 2019-11-25 PROCEDURE — 6360000002 HC RX W HCPCS: Performed by: EMERGENCY MEDICINE

## 2019-11-25 PROCEDURE — 2580000003 HC RX 258: Performed by: EMERGENCY MEDICINE

## 2019-11-25 PROCEDURE — 74176 CT ABD & PELVIS W/O CONTRAST: CPT

## 2019-11-25 PROCEDURE — 85025 COMPLETE CBC W/AUTO DIFF WBC: CPT

## 2019-11-25 PROCEDURE — 81001 URINALYSIS AUTO W/SCOPE: CPT

## 2019-11-25 PROCEDURE — 99284 EMERGENCY DEPT VISIT MOD MDM: CPT

## 2019-11-25 PROCEDURE — 84484 ASSAY OF TROPONIN QUANT: CPT

## 2019-11-25 PROCEDURE — 96374 THER/PROPH/DIAG INJ IV PUSH: CPT

## 2019-11-25 PROCEDURE — 96375 TX/PRO/DX INJ NEW DRUG ADDON: CPT

## 2019-11-25 PROCEDURE — 80053 COMPREHEN METABOLIC PANEL: CPT

## 2019-11-25 PROCEDURE — 76705 ECHO EXAM OF ABDOMEN: CPT

## 2019-11-25 PROCEDURE — 93005 ELECTROCARDIOGRAM TRACING: CPT | Performed by: EMERGENCY MEDICINE

## 2019-11-25 PROCEDURE — 6360000004 HC RX CONTRAST MEDICATION: Performed by: RADIOLOGY

## 2019-11-25 RX ORDER — TRAMADOL HYDROCHLORIDE 50 MG/1
50 TABLET ORAL EVERY 6 HOURS PRN
COMMUNITY

## 2019-11-25 RX ORDER — ONDANSETRON 4 MG/1
4 TABLET, ORALLY DISINTEGRATING ORAL ONCE
Status: COMPLETED | OUTPATIENT
Start: 2019-11-25 | End: 2019-11-25

## 2019-11-25 RX ORDER — ONDANSETRON 2 MG/ML
4 INJECTION INTRAMUSCULAR; INTRAVENOUS ONCE
Status: DISCONTINUED | OUTPATIENT
Start: 2019-11-25 | End: 2019-11-25

## 2019-11-25 RX ORDER — 0.9 % SODIUM CHLORIDE 0.9 %
1000 INTRAVENOUS SOLUTION INTRAVENOUS ONCE
Status: COMPLETED | OUTPATIENT
Start: 2019-11-25 | End: 2019-11-25

## 2019-11-25 RX ORDER — CEFDINIR 300 MG/1
300 CAPSULE ORAL 2 TIMES DAILY
Qty: 14 CAPSULE | Refills: 0 | Status: SHIPPED | OUTPATIENT
Start: 2019-11-25 | End: 2019-12-02

## 2019-11-25 RX ORDER — FENTANYL CITRATE 50 UG/ML
50 INJECTION, SOLUTION INTRAMUSCULAR; INTRAVENOUS ONCE
Status: COMPLETED | OUTPATIENT
Start: 2019-11-25 | End: 2019-11-25

## 2019-11-25 RX ADMIN — IOHEXOL 50 ML: 240 INJECTION, SOLUTION INTRATHECAL; INTRAVASCULAR; INTRAVENOUS; ORAL at 19:20

## 2019-11-25 RX ADMIN — FENTANYL CITRATE 50 MCG: 50 INJECTION, SOLUTION INTRAMUSCULAR; INTRAVENOUS at 17:07

## 2019-11-25 RX ADMIN — SODIUM CHLORIDE 1000 ML: 900 INJECTION, SOLUTION INTRAVENOUS at 17:06

## 2019-11-25 RX ADMIN — ONDANSETRON 4 MG: 4 TABLET, ORALLY DISINTEGRATING ORAL at 17:07

## 2019-11-25 RX ADMIN — CEFTRIAXONE SODIUM 1 G: 1 INJECTION, POWDER, FOR SOLUTION INTRAMUSCULAR; INTRAVENOUS at 23:11

## 2019-11-25 ASSESSMENT — ENCOUNTER SYMPTOMS
COUGH: 0
CONSTIPATION: 0
DIARRHEA: 0
BLOOD IN STOOL: 0
ABDOMINAL PAIN: 1
SINUS PRESSURE: 0
NAUSEA: 1
EYE REDNESS: 0
VOMITING: 1
ABDOMINAL DISTENTION: 0
WHEEZING: 0
RHINORRHEA: 0
EYE PAIN: 0
EYE DISCHARGE: 0
SORE THROAT: 0
SHORTNESS OF BREATH: 0
BACK PAIN: 0

## 2019-11-25 ASSESSMENT — PAIN SCALES - GENERAL: PAINLEVEL_OUTOF10: 9

## 2019-11-26 LAB
EKG ATRIAL RATE: 64 BPM
EKG P AXIS: 42 DEGREES
EKG P-R INTERVAL: 158 MS
EKG Q-T INTERVAL: 426 MS
EKG QRS DURATION: 84 MS
EKG QTC CALCULATION (BAZETT): 439 MS
EKG R AXIS: 36 DEGREES
EKG T AXIS: 30 DEGREES
EKG VENTRICULAR RATE: 64 BPM

## 2019-11-27 LAB — URINE CULTURE, ROUTINE: NORMAL

## 2019-12-02 ENCOUNTER — OFFICE VISIT (OUTPATIENT)
Dept: ONCOLOGY | Age: 54
End: 2019-12-02
Payer: MEDICAID

## 2019-12-02 ENCOUNTER — HOSPITAL ENCOUNTER (OUTPATIENT)
Dept: INFUSION THERAPY | Age: 54
Discharge: HOME | End: 2019-12-02
Payer: MEDICAID

## 2019-12-02 ENCOUNTER — TELEPHONE (OUTPATIENT)
Dept: CASE MANAGEMENT | Age: 54
End: 2019-12-02

## 2019-12-02 VITALS — DIASTOLIC BLOOD PRESSURE: 77 MMHG | HEART RATE: 87 BPM | SYSTOLIC BLOOD PRESSURE: 130 MMHG | RESPIRATION RATE: 16 BRPM

## 2019-12-02 VITALS
BODY MASS INDEX: 26.26 KG/M2 | TEMPERATURE: 97 F | SYSTOLIC BLOOD PRESSURE: 99 MMHG | DIASTOLIC BLOOD PRESSURE: 59 MMHG | HEART RATE: 58 BPM | OXYGEN SATURATION: 94 % | HEIGHT: 59 IN

## 2019-12-02 DIAGNOSIS — D63.1 ANEMIA DUE TO STAGE 4 CHRONIC KIDNEY DISEASE (HCC): ICD-10-CM

## 2019-12-02 DIAGNOSIS — C53.9 MALIGNANT NEOPLASM OF CERVIX, UNSPECIFIED SITE (HCC): Primary | ICD-10-CM

## 2019-12-02 DIAGNOSIS — N18.4 ANEMIA DUE TO STAGE 4 CHRONIC KIDNEY DISEASE (HCC): ICD-10-CM

## 2019-12-02 DIAGNOSIS — D50.8 OTHER IRON DEFICIENCY ANEMIA: ICD-10-CM

## 2019-12-02 DIAGNOSIS — C68.9 UROTHELIAL CANCER (HCC): ICD-10-CM

## 2019-12-02 LAB
ALBUMIN SERPL-MCNC: 3.7 G/DL (ref 3.5–5.2)
ALP BLD-CCNC: 973 U/L (ref 35–104)
ALT SERPL-CCNC: 414 U/L (ref 0–32)
ANION GAP SERPL CALCULATED.3IONS-SCNC: 15 MMOL/L (ref 7–16)
AST SERPL-CCNC: 388 U/L (ref 0–31)
BASOPHILS ABSOLUTE: 0.06 E9/L (ref 0–0.2)
BASOPHILS RELATIVE PERCENT: 0.7 % (ref 0–2)
BILIRUB SERPL-MCNC: 1.7 MG/DL (ref 0–1.2)
BUN BLDV-MCNC: 50 MG/DL (ref 6–20)
CALCIUM SERPL-MCNC: 10.4 MG/DL (ref 8.6–10.2)
CHLORIDE BLD-SCNC: 99 MMOL/L (ref 98–107)
CO2: 22 MMOL/L (ref 22–29)
CREAT SERPL-MCNC: 3.6 MG/DL (ref 0.5–1)
EOSINOPHILS ABSOLUTE: 0.17 E9/L (ref 0.05–0.5)
EOSINOPHILS RELATIVE PERCENT: 1.9 % (ref 0–6)
GFR AFRICAN AMERICAN: 16
GFR NON-AFRICAN AMERICAN: 13 ML/MIN/1.73
GLUCOSE BLD-MCNC: 99 MG/DL (ref 74–99)
HCT VFR BLD CALC: 41.3 % (ref 34–48)
HEMOGLOBIN: 12.5 G/DL (ref 11.5–15.5)
IMMATURE GRANULOCYTES #: 0.07 E9/L
IMMATURE GRANULOCYTES %: 0.8 % (ref 0–5)
LYMPHOCYTES ABSOLUTE: 0.72 E9/L (ref 1.5–4)
LYMPHOCYTES RELATIVE PERCENT: 8.2 % (ref 20–42)
MCH RBC QN AUTO: 28.5 PG (ref 26–35)
MCHC RBC AUTO-ENTMCNC: 30.3 % (ref 32–34.5)
MCV RBC AUTO: 94.3 FL (ref 80–99.9)
MONOCYTES ABSOLUTE: 0.52 E9/L (ref 0.1–0.95)
MONOCYTES RELATIVE PERCENT: 5.9 % (ref 2–12)
NEUTROPHILS ABSOLUTE: 7.23 E9/L (ref 1.8–7.3)
NEUTROPHILS RELATIVE PERCENT: 82.5 % (ref 43–80)
PDW BLD-RTO: 14.6 FL (ref 11.5–15)
PLATELET # BLD: 368 E9/L (ref 130–450)
PMV BLD AUTO: 11.1 FL (ref 7–12)
POTASSIUM SERPL-SCNC: 4.6 MMOL/L (ref 3.5–5)
RBC # BLD: 4.38 E12/L (ref 3.5–5.5)
SODIUM BLD-SCNC: 136 MMOL/L (ref 132–146)
TOTAL PROTEIN: 7.9 G/DL (ref 6.4–8.3)
WBC # BLD: 8.8 E9/L (ref 4.5–11.5)

## 2019-12-02 PROCEDURE — 85025 COMPLETE CBC W/AUTO DIFF WBC: CPT

## 2019-12-02 PROCEDURE — 80053 COMPREHEN METABOLIC PANEL: CPT

## 2019-12-02 PROCEDURE — 6360000002 HC RX W HCPCS: Performed by: INTERNAL MEDICINE

## 2019-12-02 PROCEDURE — 2580000003 HC RX 258: Performed by: INTERNAL MEDICINE

## 2019-12-02 PROCEDURE — 96413 CHEMO IV INFUSION 1 HR: CPT

## 2019-12-02 RX ORDER — HEPARIN SODIUM (PORCINE) LOCK FLUSH IV SOLN 100 UNIT/ML 100 UNIT/ML
500 SOLUTION INTRAVENOUS PRN
Status: CANCELLED | OUTPATIENT
Start: 2019-12-02

## 2019-12-02 RX ORDER — SODIUM CHLORIDE 0.9 % (FLUSH) 0.9 %
10 SYRINGE (ML) INJECTION PRN
Status: CANCELLED | OUTPATIENT
Start: 2019-12-02

## 2019-12-02 RX ORDER — SODIUM CHLORIDE 9 MG/ML
20 INJECTION, SOLUTION INTRAVENOUS ONCE
Status: CANCELLED | OUTPATIENT
Start: 2019-12-02

## 2019-12-02 RX ORDER — SODIUM CHLORIDE 9 MG/ML
INJECTION, SOLUTION INTRAVENOUS CONTINUOUS
Status: CANCELLED | OUTPATIENT
Start: 2019-12-02

## 2019-12-02 RX ORDER — METHYLPREDNISOLONE SODIUM SUCCINATE 125 MG/2ML
125 INJECTION, POWDER, LYOPHILIZED, FOR SOLUTION INTRAMUSCULAR; INTRAVENOUS ONCE
Status: CANCELLED | OUTPATIENT
Start: 2019-12-02

## 2019-12-02 RX ORDER — EPINEPHRINE 1 MG/ML
0.3 INJECTION, SOLUTION, CONCENTRATE INTRAVENOUS PRN
Status: CANCELLED | OUTPATIENT
Start: 2019-12-02

## 2019-12-02 RX ORDER — DIPHENHYDRAMINE HYDROCHLORIDE 50 MG/ML
50 INJECTION INTRAMUSCULAR; INTRAVENOUS ONCE
Status: CANCELLED | OUTPATIENT
Start: 2019-12-02

## 2019-12-02 RX ORDER — SODIUM CHLORIDE 9 MG/ML
20 INJECTION, SOLUTION INTRAVENOUS ONCE
Status: COMPLETED | OUTPATIENT
Start: 2019-12-02 | End: 2019-12-02

## 2019-12-02 RX ORDER — MEPERIDINE HYDROCHLORIDE 25 MG/ML
12.5 INJECTION INTRAMUSCULAR; INTRAVENOUS; SUBCUTANEOUS ONCE
Status: CANCELLED | OUTPATIENT
Start: 2019-12-02

## 2019-12-02 RX ADMIN — SODIUM CHLORIDE 20 ML/HR: 9 INJECTION, SOLUTION INTRAVENOUS at 12:49

## 2019-12-02 RX ADMIN — SODIUM CHLORIDE 200 MG: 9 INJECTION, SOLUTION INTRAVENOUS at 13:23

## 2019-12-09 ENCOUNTER — TELEPHONE (OUTPATIENT)
Dept: ADMINISTRATIVE | Age: 54
End: 2019-12-09

## 2019-12-13 ENCOUNTER — HOSPITAL ENCOUNTER (INPATIENT)
Age: 54
LOS: 5 days | Discharge: NURSING, REHAB OR LONG-TERM CARE | DRG: 466 | End: 2019-12-18
Attending: EMERGENCY MEDICINE | Admitting: INTERNAL MEDICINE
Payer: MEDICAID

## 2019-12-13 ENCOUNTER — APPOINTMENT (OUTPATIENT)
Dept: CT IMAGING | Age: 54
DRG: 466 | End: 2019-12-13
Payer: MEDICAID

## 2019-12-13 DIAGNOSIS — C67.9 BLADDER CARCINOMA (HCC): ICD-10-CM

## 2019-12-13 DIAGNOSIS — N17.9 ACUTE KIDNEY INJURY SUPERIMPOSED ON CKD (HCC): Primary | ICD-10-CM

## 2019-12-13 DIAGNOSIS — T83.092A OBSTRUCTED NEPHROSTOMY TUBE (HCC): ICD-10-CM

## 2019-12-13 DIAGNOSIS — N18.9 ACUTE KIDNEY INJURY SUPERIMPOSED ON CKD (HCC): Primary | ICD-10-CM

## 2019-12-13 LAB
ALBUMIN SERPL-MCNC: 2.8 G/DL (ref 3.5–5.2)
ALP BLD-CCNC: 690 U/L (ref 35–104)
ALT SERPL-CCNC: 213 U/L (ref 0–32)
ANION GAP SERPL CALCULATED.3IONS-SCNC: 17 MMOL/L (ref 7–16)
AST SERPL-CCNC: 127 U/L (ref 0–31)
BASOPHILS ABSOLUTE: 0 E9/L (ref 0–0.2)
BASOPHILS RELATIVE PERCENT: 0 % (ref 0–2)
BILIRUB SERPL-MCNC: 0.9 MG/DL (ref 0–1.2)
BUN BLDV-MCNC: 87 MG/DL (ref 6–20)
CALCIUM SERPL-MCNC: 9.3 MG/DL (ref 8.6–10.2)
CHLORIDE BLD-SCNC: 101 MMOL/L (ref 98–107)
CO2: 16 MMOL/L (ref 22–29)
CREAT SERPL-MCNC: 4.1 MG/DL (ref 0.5–1)
EKG ATRIAL RATE: 69 BPM
EKG P AXIS: 43 DEGREES
EKG P-R INTERVAL: 162 MS
EKG Q-T INTERVAL: 428 MS
EKG QRS DURATION: 90 MS
EKG QTC CALCULATION (BAZETT): 458 MS
EKG R AXIS: 34 DEGREES
EKG T AXIS: 88 DEGREES
EKG VENTRICULAR RATE: 69 BPM
EOSINOPHILS ABSOLUTE: 0.09 E9/L (ref 0.05–0.5)
EOSINOPHILS RELATIVE PERCENT: 1 % (ref 0–6)
GFR AFRICAN AMERICAN: 14
GFR NON-AFRICAN AMERICAN: 11 ML/MIN/1.73
GLUCOSE BLD-MCNC: 230 MG/DL (ref 74–99)
HCT VFR BLD CALC: 34.9 % (ref 34–48)
HEMOGLOBIN: 10.4 G/DL (ref 11.5–15.5)
LACTIC ACID, SEPSIS: 1.9 MMOL/L (ref 0.5–1.9)
LIPASE: 293 U/L (ref 13–60)
LYMPHOCYTES ABSOLUTE: 0.37 E9/L (ref 1.5–4)
LYMPHOCYTES RELATIVE PERCENT: 4 % (ref 20–42)
MCH RBC QN AUTO: 27.7 PG (ref 26–35)
MCHC RBC AUTO-ENTMCNC: 29.8 % (ref 32–34.5)
MCV RBC AUTO: 93.1 FL (ref 80–99.9)
MONOCYTES ABSOLUTE: 0.09 E9/L (ref 0.1–0.95)
MONOCYTES RELATIVE PERCENT: 1 % (ref 2–12)
NEUTROPHILS ABSOLUTE: 8.65 E9/L (ref 1.8–7.3)
NEUTROPHILS RELATIVE PERCENT: 94 % (ref 43–80)
OVALOCYTES: ABNORMAL
PDW BLD-RTO: 15.4 FL (ref 11.5–15)
PLATELET # BLD: 140 E9/L (ref 130–450)
PMV BLD AUTO: 12 FL (ref 7–12)
POIKILOCYTES: ABNORMAL
POTASSIUM SERPL-SCNC: 4.3 MMOL/L (ref 3.5–5)
RBC # BLD: 3.75 E12/L (ref 3.5–5.5)
SODIUM BLD-SCNC: 134 MMOL/L (ref 132–146)
TARGET CELLS: ABNORMAL
TOTAL PROTEIN: 7.4 G/DL (ref 6.4–8.3)
TROPONIN: <0.01 NG/ML (ref 0–0.03)
WBC # BLD: 9.2 E9/L (ref 4.5–11.5)

## 2019-12-13 PROCEDURE — 99285 EMERGENCY DEPT VISIT HI MDM: CPT

## 2019-12-13 PROCEDURE — 6370000000 HC RX 637 (ALT 250 FOR IP): Performed by: INTERNAL MEDICINE

## 2019-12-13 PROCEDURE — 80053 COMPREHEN METABOLIC PANEL: CPT

## 2019-12-13 PROCEDURE — 83605 ASSAY OF LACTIC ACID: CPT

## 2019-12-13 PROCEDURE — 74176 CT ABD & PELVIS W/O CONTRAST: CPT

## 2019-12-13 PROCEDURE — 84484 ASSAY OF TROPONIN QUANT: CPT

## 2019-12-13 PROCEDURE — 83690 ASSAY OF LIPASE: CPT

## 2019-12-13 PROCEDURE — 93010 ELECTROCARDIOGRAM REPORT: CPT | Performed by: INTERNAL MEDICINE

## 2019-12-13 PROCEDURE — 2580000003 HC RX 258: Performed by: INTERNAL MEDICINE

## 2019-12-13 PROCEDURE — 85025 COMPLETE CBC W/AUTO DIFF WBC: CPT

## 2019-12-13 PROCEDURE — 2580000003 HC RX 258: Performed by: STUDENT IN AN ORGANIZED HEALTH CARE EDUCATION/TRAINING PROGRAM

## 2019-12-13 PROCEDURE — 36415 COLL VENOUS BLD VENIPUNCTURE: CPT

## 2019-12-13 PROCEDURE — 1200000000 HC SEMI PRIVATE

## 2019-12-13 PROCEDURE — 93005 ELECTROCARDIOGRAM TRACING: CPT | Performed by: STUDENT IN AN ORGANIZED HEALTH CARE EDUCATION/TRAINING PROGRAM

## 2019-12-13 RX ORDER — DOCUSATE SODIUM 100 MG/1
100 CAPSULE, LIQUID FILLED ORAL 2 TIMES DAILY
Status: DISCONTINUED | OUTPATIENT
Start: 2019-12-13 | End: 2019-12-18 | Stop reason: HOSPADM

## 2019-12-13 RX ORDER — 0.9 % SODIUM CHLORIDE 0.9 %
1000 INTRAVENOUS SOLUTION INTRAVENOUS ONCE
Status: COMPLETED | OUTPATIENT
Start: 2019-12-13 | End: 2019-12-13

## 2019-12-13 RX ORDER — NICOTINE POLACRILEX 4 MG
15 LOZENGE BUCCAL PRN
Status: DISCONTINUED | OUTPATIENT
Start: 2019-12-13 | End: 2019-12-18 | Stop reason: HOSPADM

## 2019-12-13 RX ORDER — SODIUM CHLORIDE 0.9 % (FLUSH) 0.9 %
10 SYRINGE (ML) INJECTION PRN
Status: DISCONTINUED | OUTPATIENT
Start: 2019-12-13 | End: 2019-12-18 | Stop reason: HOSPADM

## 2019-12-13 RX ORDER — MIDODRINE HYDROCHLORIDE 2.5 MG/1
2.5 TABLET ORAL
COMMUNITY

## 2019-12-13 RX ORDER — SODIUM CHLORIDE 0.9 % (FLUSH) 0.9 %
10 SYRINGE (ML) INJECTION EVERY 12 HOURS SCHEDULED
Status: DISCONTINUED | OUTPATIENT
Start: 2019-12-13 | End: 2019-12-18 | Stop reason: HOSPADM

## 2019-12-13 RX ORDER — HYDROCODONE BITARTRATE AND ACETAMINOPHEN 5; 325 MG/1; MG/1
1 TABLET ORAL EVERY 6 HOURS PRN
Status: DISCONTINUED | OUTPATIENT
Start: 2019-12-13 | End: 2019-12-18 | Stop reason: HOSPADM

## 2019-12-13 RX ORDER — DEXTROSE MONOHYDRATE 25 G/50ML
12.5 INJECTION, SOLUTION INTRAVENOUS PRN
Status: DISCONTINUED | OUTPATIENT
Start: 2019-12-13 | End: 2019-12-18 | Stop reason: HOSPADM

## 2019-12-13 RX ORDER — PANTOPRAZOLE SODIUM 40 MG/1
40 TABLET, DELAYED RELEASE ORAL DAILY
COMMUNITY

## 2019-12-13 RX ORDER — ACETAMINOPHEN 325 MG/1
650 TABLET ORAL EVERY 4 HOURS PRN
Status: DISCONTINUED | OUTPATIENT
Start: 2019-12-13 | End: 2019-12-18 | Stop reason: HOSPADM

## 2019-12-13 RX ORDER — LEVOTHYROXINE SODIUM 0.1 MG/1
100 TABLET ORAL DAILY
Status: DISCONTINUED | OUTPATIENT
Start: 2019-12-14 | End: 2019-12-18 | Stop reason: HOSPADM

## 2019-12-13 RX ORDER — DEXTROSE MONOHYDRATE 50 MG/ML
100 INJECTION, SOLUTION INTRAVENOUS PRN
Status: DISCONTINUED | OUTPATIENT
Start: 2019-12-13 | End: 2019-12-18 | Stop reason: HOSPADM

## 2019-12-13 RX ORDER — ONDANSETRON 4 MG/1
4 TABLET, FILM COATED ORAL EVERY 8 HOURS PRN
COMMUNITY

## 2019-12-13 RX ORDER — LEVOTHYROXINE SODIUM 0.1 MG/1
100 TABLET ORAL DAILY
COMMUNITY

## 2019-12-13 RX ADMIN — HYDROCODONE BITARTRATE AND ACETAMINOPHEN 1 TABLET: 5; 325 TABLET ORAL at 22:19

## 2019-12-13 RX ADMIN — Medication 10 ML: at 21:02

## 2019-12-13 RX ADMIN — ACETAMINOPHEN 650 MG: 325 TABLET, FILM COATED ORAL at 21:01

## 2019-12-13 RX ADMIN — DOCUSATE SODIUM 100 MG: 100 CAPSULE, LIQUID FILLED ORAL at 21:01

## 2019-12-13 RX ADMIN — SODIUM CHLORIDE 1000 ML: 9 INJECTION, SOLUTION INTRAVENOUS at 12:25

## 2019-12-13 ASSESSMENT — ENCOUNTER SYMPTOMS
BACK PAIN: 0
VOMITING: 0
WHEEZING: 0
SINUS PRESSURE: 0
ABDOMINAL PAIN: 1
NAUSEA: 1
DIARRHEA: 0
EYE DISCHARGE: 0
SORE THROAT: 0
ABDOMINAL DISTENTION: 0
SHORTNESS OF BREATH: 0
EYE PAIN: 0
COUGH: 0
EYE REDNESS: 0

## 2019-12-13 ASSESSMENT — PAIN SCALES - GENERAL
PAINLEVEL_OUTOF10: 8

## 2019-12-14 LAB
ANION GAP SERPL CALCULATED.3IONS-SCNC: 16 MMOL/L (ref 7–16)
BUN BLDV-MCNC: 75 MG/DL (ref 6–20)
CALCIUM SERPL-MCNC: 9.2 MG/DL (ref 8.6–10.2)
CHLORIDE BLD-SCNC: 105 MMOL/L (ref 98–107)
CO2: 17 MMOL/L (ref 22–29)
CREAT SERPL-MCNC: 3.4 MG/DL (ref 0.5–1)
GFR AFRICAN AMERICAN: 17
GFR NON-AFRICAN AMERICAN: 14 ML/MIN/1.73
GLUCOSE BLD-MCNC: 94 MG/DL (ref 74–99)
HBA1C MFR BLD: 5.9 % (ref 4–5.6)
HCT VFR BLD CALC: 34.7 % (ref 34–48)
HEMOGLOBIN: 10.4 G/DL (ref 11.5–15.5)
MAGNESIUM: 2.1 MG/DL (ref 1.6–2.6)
MCH RBC QN AUTO: 27.5 PG (ref 26–35)
MCHC RBC AUTO-ENTMCNC: 30 % (ref 32–34.5)
MCV RBC AUTO: 91.8 FL (ref 80–99.9)
METER GLUCOSE: 123 MG/DL (ref 74–99)
METER GLUCOSE: 206 MG/DL (ref 74–99)
METER GLUCOSE: 221 MG/DL (ref 74–99)
METER GLUCOSE: 73 MG/DL (ref 74–99)
PDW BLD-RTO: 15.5 FL (ref 11.5–15)
PHOSPHORUS: 3.8 MG/DL (ref 2.5–4.5)
PLATELET # BLD: 244 E9/L (ref 130–450)
PMV BLD AUTO: 11.2 FL (ref 7–12)
POTASSIUM SERPL-SCNC: 3.7 MMOL/L (ref 3.5–5)
RBC # BLD: 3.78 E12/L (ref 3.5–5.5)
SODIUM BLD-SCNC: 138 MMOL/L (ref 132–146)
WBC # BLD: 9.1 E9/L (ref 4.5–11.5)

## 2019-12-14 PROCEDURE — 2500000003 HC RX 250 WO HCPCS: Performed by: INTERNAL MEDICINE

## 2019-12-14 PROCEDURE — 85027 COMPLETE CBC AUTOMATED: CPT

## 2019-12-14 PROCEDURE — 80074 ACUTE HEPATITIS PANEL: CPT

## 2019-12-14 PROCEDURE — 2580000003 HC RX 258: Performed by: INTERNAL MEDICINE

## 2019-12-14 PROCEDURE — 6370000000 HC RX 637 (ALT 250 FOR IP): Performed by: INTERNAL MEDICINE

## 2019-12-14 PROCEDURE — 80048 BASIC METABOLIC PNL TOTAL CA: CPT

## 2019-12-14 PROCEDURE — 83735 ASSAY OF MAGNESIUM: CPT

## 2019-12-14 PROCEDURE — 84100 ASSAY OF PHOSPHORUS: CPT

## 2019-12-14 PROCEDURE — 1200000000 HC SEMI PRIVATE

## 2019-12-14 PROCEDURE — 82962 GLUCOSE BLOOD TEST: CPT

## 2019-12-14 PROCEDURE — 83036 HEMOGLOBIN GLYCOSYLATED A1C: CPT

## 2019-12-14 PROCEDURE — 36415 COLL VENOUS BLD VENIPUNCTURE: CPT

## 2019-12-14 RX ORDER — DEXTROSE AND SODIUM CHLORIDE 5; .45 G/100ML; G/100ML
INJECTION, SOLUTION INTRAVENOUS CONTINUOUS
Status: DISCONTINUED | OUTPATIENT
Start: 2019-12-14 | End: 2019-12-14

## 2019-12-14 RX ADMIN — DOCUSATE SODIUM 100 MG: 100 CAPSULE, LIQUID FILLED ORAL at 08:55

## 2019-12-14 RX ADMIN — HYDROCODONE BITARTRATE AND ACETAMINOPHEN 1 TABLET: 5; 325 TABLET ORAL at 08:55

## 2019-12-14 RX ADMIN — SODIUM BICARBONATE: 84 INJECTION, SOLUTION INTRAVENOUS at 14:28

## 2019-12-14 RX ADMIN — Medication 10 ML: at 08:55

## 2019-12-14 RX ADMIN — LEVOTHYROXINE SODIUM 100 MCG: 100 TABLET ORAL at 06:49

## 2019-12-14 RX ADMIN — DEXTROSE AND SODIUM CHLORIDE: 5; 450 INJECTION, SOLUTION INTRAVENOUS at 12:08

## 2019-12-14 RX ADMIN — DOCUSATE SODIUM 100 MG: 100 CAPSULE, LIQUID FILLED ORAL at 21:03

## 2019-12-14 RX ADMIN — HYDROCODONE BITARTRATE AND ACETAMINOPHEN 1 TABLET: 5; 325 TABLET ORAL at 18:07

## 2019-12-14 ASSESSMENT — PAIN SCALES - GENERAL
PAINLEVEL_OUTOF10: 7
PAINLEVEL_OUTOF10: 10
PAINLEVEL_OUTOF10: 10
PAINLEVEL_OUTOF10: 8
PAINLEVEL_OUTOF10: 10
PAINLEVEL_OUTOF10: 0

## 2019-12-15 LAB
ANION GAP SERPL CALCULATED.3IONS-SCNC: 15 MMOL/L (ref 7–16)
BUN BLDV-MCNC: 63 MG/DL (ref 6–20)
CALCIUM SERPL-MCNC: 8.8 MG/DL (ref 8.6–10.2)
CHLORIDE BLD-SCNC: 98 MMOL/L (ref 98–107)
CO2: 23 MMOL/L (ref 22–29)
CREAT SERPL-MCNC: 2.9 MG/DL (ref 0.5–1)
GFR AFRICAN AMERICAN: 20
GFR NON-AFRICAN AMERICAN: 17 ML/MIN/1.73
GLUCOSE BLD-MCNC: 132 MG/DL (ref 74–99)
HCT VFR BLD CALC: 30.4 % (ref 34–48)
HEMOGLOBIN: 9.3 G/DL (ref 11.5–15.5)
MCH RBC QN AUTO: 27.6 PG (ref 26–35)
MCHC RBC AUTO-ENTMCNC: 30.6 % (ref 32–34.5)
MCV RBC AUTO: 90.2 FL (ref 80–99.9)
METER GLUCOSE: 96 MG/DL (ref 74–99)
PDW BLD-RTO: 15.3 FL (ref 11.5–15)
PLATELET # BLD: 199 E9/L (ref 130–450)
PMV BLD AUTO: 11.3 FL (ref 7–12)
POTASSIUM SERPL-SCNC: 3.8 MMOL/L (ref 3.5–5)
RBC # BLD: 3.37 E12/L (ref 3.5–5.5)
SODIUM BLD-SCNC: 136 MMOL/L (ref 132–146)
WBC # BLD: 7.7 E9/L (ref 4.5–11.5)

## 2019-12-15 PROCEDURE — 6370000000 HC RX 637 (ALT 250 FOR IP): Performed by: INTERNAL MEDICINE

## 2019-12-15 PROCEDURE — 2500000003 HC RX 250 WO HCPCS: Performed by: INTERNAL MEDICINE

## 2019-12-15 PROCEDURE — 2580000003 HC RX 258: Performed by: INTERNAL MEDICINE

## 2019-12-15 PROCEDURE — 85027 COMPLETE CBC AUTOMATED: CPT

## 2019-12-15 PROCEDURE — 82962 GLUCOSE BLOOD TEST: CPT

## 2019-12-15 PROCEDURE — 36415 COLL VENOUS BLD VENIPUNCTURE: CPT

## 2019-12-15 PROCEDURE — 80048 BASIC METABOLIC PNL TOTAL CA: CPT

## 2019-12-15 PROCEDURE — 1200000000 HC SEMI PRIVATE

## 2019-12-15 RX ADMIN — SODIUM BICARBONATE: 84 INJECTION, SOLUTION INTRAVENOUS at 05:57

## 2019-12-15 RX ADMIN — DOCUSATE SODIUM 100 MG: 100 CAPSULE, LIQUID FILLED ORAL at 21:05

## 2019-12-15 RX ADMIN — LEVOTHYROXINE SODIUM 100 MCG: 100 TABLET ORAL at 05:57

## 2019-12-15 RX ADMIN — HYDROCODONE BITARTRATE AND ACETAMINOPHEN 1 TABLET: 5; 325 TABLET ORAL at 09:39

## 2019-12-15 RX ADMIN — SODIUM BICARBONATE: 84 INJECTION, SOLUTION INTRAVENOUS at 19:53

## 2019-12-15 RX ADMIN — ACETAMINOPHEN 650 MG: 325 TABLET, FILM COATED ORAL at 01:55

## 2019-12-15 RX ADMIN — DOCUSATE SODIUM 100 MG: 100 CAPSULE, LIQUID FILLED ORAL at 09:39

## 2019-12-15 RX ADMIN — HYDROCODONE BITARTRATE AND ACETAMINOPHEN 1 TABLET: 5; 325 TABLET ORAL at 22:29

## 2019-12-15 ASSESSMENT — PAIN SCALES - GENERAL
PAINLEVEL_OUTOF10: 6
PAINLEVEL_OUTOF10: 10
PAINLEVEL_OUTOF10: 7
PAINLEVEL_OUTOF10: 10
PAINLEVEL_OUTOF10: 8
PAINLEVEL_OUTOF10: 5
PAINLEVEL_OUTOF10: 10

## 2019-12-15 ASSESSMENT — PAIN DESCRIPTION - PAIN TYPE: TYPE: ACUTE PAIN

## 2019-12-15 ASSESSMENT — PAIN DESCRIPTION - ORIENTATION: ORIENTATION: LOWER

## 2019-12-15 ASSESSMENT — PAIN DESCRIPTION - LOCATION: LOCATION: ABDOMEN

## 2019-12-15 ASSESSMENT — PAIN DESCRIPTION - DESCRIPTORS: DESCRIPTORS: CRAMPING

## 2019-12-16 ENCOUNTER — APPOINTMENT (OUTPATIENT)
Dept: INTERVENTIONAL RADIOLOGY/VASCULAR | Age: 54
DRG: 466 | End: 2019-12-16
Payer: MEDICAID

## 2019-12-16 PROCEDURE — 2580000003 HC RX 258: Performed by: INTERNAL MEDICINE

## 2019-12-16 PROCEDURE — C1729 CATH, DRAINAGE: HCPCS

## 2019-12-16 PROCEDURE — 1200000000 HC SEMI PRIVATE

## 2019-12-16 PROCEDURE — 6360000002 HC RX W HCPCS: Performed by: RADIOLOGY

## 2019-12-16 PROCEDURE — 2500000003 HC RX 250 WO HCPCS: Performed by: INTERNAL MEDICINE

## 2019-12-16 PROCEDURE — 50435 EXCHANGE NEPHROSTOMY CATH: CPT | Performed by: RADIOLOGY

## 2019-12-16 PROCEDURE — 6360000004 HC RX CONTRAST MEDICATION: Performed by: RADIOLOGY

## 2019-12-16 PROCEDURE — 6370000000 HC RX 637 (ALT 250 FOR IP): Performed by: INTERNAL MEDICINE

## 2019-12-16 RX ORDER — MIDAZOLAM HYDROCHLORIDE 1 MG/ML
1 INJECTION INTRAMUSCULAR; INTRAVENOUS ONCE
Status: DISCONTINUED | OUTPATIENT
Start: 2019-12-16 | End: 2019-12-16 | Stop reason: ALTCHOICE

## 2019-12-16 RX ORDER — FENTANYL CITRATE 50 UG/ML
50 INJECTION, SOLUTION INTRAMUSCULAR; INTRAVENOUS ONCE
Status: DISCONTINUED | OUTPATIENT
Start: 2019-12-16 | End: 2019-12-16 | Stop reason: ALTCHOICE

## 2019-12-16 RX ADMIN — FENTANYL CITRATE 50 MCG: 50 INJECTION, SOLUTION INTRAMUSCULAR; INTRAVENOUS at 13:20

## 2019-12-16 RX ADMIN — ACETAMINOPHEN 650 MG: 325 TABLET, FILM COATED ORAL at 08:24

## 2019-12-16 RX ADMIN — IOPAMIDOL 10 ML: 612 INJECTION, SOLUTION INTRAVENOUS at 13:32

## 2019-12-16 RX ADMIN — SODIUM BICARBONATE: 84 INJECTION, SOLUTION INTRAVENOUS at 10:46

## 2019-12-16 RX ADMIN — Medication 10 ML: at 21:00

## 2019-12-16 RX ADMIN — ACETAMINOPHEN 650 MG: 325 TABLET, FILM COATED ORAL at 14:43

## 2019-12-16 RX ADMIN — MIDAZOLAM 1 MG: 1 INJECTION INTRAMUSCULAR; INTRAVENOUS at 13:49

## 2019-12-16 RX ADMIN — DOCUSATE SODIUM 100 MG: 100 CAPSULE, LIQUID FILLED ORAL at 20:17

## 2019-12-16 RX ADMIN — HYDROCODONE BITARTRATE AND ACETAMINOPHEN 1 TABLET: 5; 325 TABLET ORAL at 20:17

## 2019-12-16 ASSESSMENT — PAIN SCALES - GENERAL
PAINLEVEL_OUTOF10: 0
PAINLEVEL_OUTOF10: 6
PAINLEVEL_OUTOF10: 10
PAINLEVEL_OUTOF10: 8

## 2019-12-16 ASSESSMENT — PAIN DESCRIPTION - PAIN TYPE
TYPE: ACUTE PAIN
TYPE: ACUTE PAIN

## 2019-12-16 ASSESSMENT — PAIN DESCRIPTION - ORIENTATION
ORIENTATION: LOWER
ORIENTATION: LEFT;LOWER

## 2019-12-16 ASSESSMENT — PAIN DESCRIPTION - DESCRIPTORS
DESCRIPTORS: ACHING
DESCRIPTORS: CRAMPING

## 2019-12-16 ASSESSMENT — PAIN DESCRIPTION - LOCATION
LOCATION: ABDOMEN
LOCATION: ABDOMEN;BACK;SHOULDER

## 2019-12-17 LAB
ANION GAP SERPL CALCULATED.3IONS-SCNC: 12 MMOL/L (ref 7–16)
BUN BLDV-MCNC: 47 MG/DL (ref 6–20)
CALCIUM SERPL-MCNC: 8.9 MG/DL (ref 8.6–10.2)
CHLORIDE BLD-SCNC: 93 MMOL/L (ref 98–107)
CO2: 29 MMOL/L (ref 22–29)
CREAT SERPL-MCNC: 2.5 MG/DL (ref 0.5–1)
GFR AFRICAN AMERICAN: 24
GFR NON-AFRICAN AMERICAN: 20 ML/MIN/1.73
GLUCOSE BLD-MCNC: 170 MG/DL (ref 74–99)
HCT VFR BLD CALC: 30.9 % (ref 34–48)
HEMOGLOBIN: 9.7 G/DL (ref 11.5–15.5)
MCH RBC QN AUTO: 28 PG (ref 26–35)
MCHC RBC AUTO-ENTMCNC: 31.4 % (ref 32–34.5)
MCV RBC AUTO: 89 FL (ref 80–99.9)
METER GLUCOSE: 119 MG/DL (ref 74–99)
PDW BLD-RTO: 15.5 FL (ref 11.5–15)
PLATELET # BLD: 215 E9/L (ref 130–450)
PMV BLD AUTO: 11.4 FL (ref 7–12)
POTASSIUM SERPL-SCNC: 3.2 MMOL/L (ref 3.5–5)
RBC # BLD: 3.47 E12/L (ref 3.5–5.5)
SODIUM BLD-SCNC: 134 MMOL/L (ref 132–146)
WBC # BLD: 7.3 E9/L (ref 4.5–11.5)

## 2019-12-17 PROCEDURE — 2500000003 HC RX 250 WO HCPCS: Performed by: INTERNAL MEDICINE

## 2019-12-17 PROCEDURE — 6370000000 HC RX 637 (ALT 250 FOR IP): Performed by: INTERNAL MEDICINE

## 2019-12-17 PROCEDURE — 36415 COLL VENOUS BLD VENIPUNCTURE: CPT

## 2019-12-17 PROCEDURE — 2580000003 HC RX 258: Performed by: INTERNAL MEDICINE

## 2019-12-17 PROCEDURE — 80048 BASIC METABOLIC PNL TOTAL CA: CPT

## 2019-12-17 PROCEDURE — 82962 GLUCOSE BLOOD TEST: CPT

## 2019-12-17 PROCEDURE — 97161 PT EVAL LOW COMPLEX 20 MIN: CPT | Performed by: PHYSICAL THERAPIST

## 2019-12-17 PROCEDURE — 1200000000 HC SEMI PRIVATE

## 2019-12-17 PROCEDURE — 85027 COMPLETE CBC AUTOMATED: CPT

## 2019-12-17 RX ADMIN — ACETAMINOPHEN 650 MG: 325 TABLET, FILM COATED ORAL at 13:23

## 2019-12-17 RX ADMIN — SODIUM BICARBONATE: 84 INJECTION, SOLUTION INTRAVENOUS at 03:22

## 2019-12-17 RX ADMIN — HYDROCODONE BITARTRATE AND ACETAMINOPHEN 1 TABLET: 5; 325 TABLET ORAL at 05:06

## 2019-12-17 RX ADMIN — LEVOTHYROXINE SODIUM 100 MCG: 100 TABLET ORAL at 05:06

## 2019-12-17 RX ADMIN — SODIUM BICARBONATE: 84 INJECTION, SOLUTION INTRAVENOUS at 16:17

## 2019-12-17 RX ADMIN — HYDROCODONE BITARTRATE AND ACETAMINOPHEN 1 TABLET: 5; 325 TABLET ORAL at 19:42

## 2019-12-17 ASSESSMENT — PAIN DESCRIPTION - ORIENTATION
ORIENTATION: LOWER
ORIENTATION: LOWER

## 2019-12-17 ASSESSMENT — PAIN DESCRIPTION - DESCRIPTORS
DESCRIPTORS: ACHING;CRAMPING
DESCRIPTORS: ACHING;CRUSHING

## 2019-12-17 ASSESSMENT — PAIN DESCRIPTION - PAIN TYPE
TYPE: ACUTE PAIN
TYPE: ACUTE PAIN

## 2019-12-17 ASSESSMENT — PAIN DESCRIPTION - LOCATION
LOCATION: ABDOMEN
LOCATION: ABDOMEN

## 2019-12-17 ASSESSMENT — PAIN SCALES - GENERAL
PAINLEVEL_OUTOF10: 10
PAINLEVEL_OUTOF10: 10
PAINLEVEL_OUTOF10: 0
PAINLEVEL_OUTOF10: 10

## 2019-12-18 VITALS
HEART RATE: 66 BPM | BODY MASS INDEX: 25.4 KG/M2 | DIASTOLIC BLOOD PRESSURE: 68 MMHG | TEMPERATURE: 98.8 F | OXYGEN SATURATION: 99 % | SYSTOLIC BLOOD PRESSURE: 130 MMHG | RESPIRATION RATE: 18 BRPM | HEIGHT: 59 IN | WEIGHT: 126 LBS

## 2019-12-18 LAB
ANION GAP SERPL CALCULATED.3IONS-SCNC: 19 MMOL/L (ref 7–16)
BUN BLDV-MCNC: 33 MG/DL (ref 6–20)
CALCIUM SERPL-MCNC: 9.4 MG/DL (ref 8.6–10.2)
CHLORIDE BLD-SCNC: 86 MMOL/L (ref 98–107)
CO2: 24 MMOL/L (ref 22–29)
CREAT SERPL-MCNC: 1.9 MG/DL (ref 0.5–1)
GFR AFRICAN AMERICAN: 33
GFR NON-AFRICAN AMERICAN: 27 ML/MIN/1.73
GLUCOSE BLD-MCNC: 149 MG/DL (ref 74–99)
POTASSIUM SERPL-SCNC: 3.7 MMOL/L (ref 3.5–5)
SODIUM BLD-SCNC: 129 MMOL/L (ref 132–146)

## 2019-12-18 PROCEDURE — 2500000003 HC RX 250 WO HCPCS: Performed by: INTERNAL MEDICINE

## 2019-12-18 PROCEDURE — 2580000003 HC RX 258: Performed by: INTERNAL MEDICINE

## 2019-12-18 PROCEDURE — 80048 BASIC METABOLIC PNL TOTAL CA: CPT

## 2019-12-18 PROCEDURE — 6370000000 HC RX 637 (ALT 250 FOR IP): Performed by: INTERNAL MEDICINE

## 2019-12-18 PROCEDURE — 36415 COLL VENOUS BLD VENIPUNCTURE: CPT

## 2019-12-18 RX ORDER — POTASSIUM CHLORIDE 20 MEQ/1
40 TABLET, EXTENDED RELEASE ORAL ONCE
Status: DISCONTINUED | OUTPATIENT
Start: 2019-12-18 | End: 2019-12-18 | Stop reason: HOSPADM

## 2019-12-18 RX ADMIN — SODIUM BICARBONATE: 84 INJECTION, SOLUTION INTRAVENOUS at 07:26

## 2019-12-18 RX ADMIN — ACETAMINOPHEN 650 MG: 325 TABLET, FILM COATED ORAL at 09:05

## 2019-12-18 RX ADMIN — LEVOTHYROXINE SODIUM 100 MCG: 100 TABLET ORAL at 06:29

## 2019-12-18 RX ADMIN — HYDROCODONE BITARTRATE AND ACETAMINOPHEN 1 TABLET: 5; 325 TABLET ORAL at 02:21

## 2019-12-18 ASSESSMENT — PAIN DESCRIPTION - LOCATION: LOCATION: ABDOMEN

## 2019-12-18 ASSESSMENT — PAIN SCALES - GENERAL
PAINLEVEL_OUTOF10: 0
PAINLEVEL_OUTOF10: 10
PAINLEVEL_OUTOF10: 10

## 2019-12-18 ASSESSMENT — PAIN - FUNCTIONAL ASSESSMENT: PAIN_FUNCTIONAL_ASSESSMENT: PREVENTS OR INTERFERES SOME ACTIVE ACTIVITIES AND ADLS

## 2019-12-18 ASSESSMENT — PAIN DESCRIPTION - DESCRIPTORS: DESCRIPTORS: ACHING;DISCOMFORT

## 2019-12-18 ASSESSMENT — PAIN DESCRIPTION - PAIN TYPE: TYPE: ACUTE PAIN

## 2019-12-18 ASSESSMENT — PAIN DESCRIPTION - ORIENTATION: ORIENTATION: LOWER

## 2019-12-23 ENCOUNTER — TELEPHONE (OUTPATIENT)
Dept: SURGERY | Age: 54
End: 2019-12-23

## 2019-12-23 ENCOUNTER — INITIAL CONSULT (OUTPATIENT)
Dept: SURGERY | Age: 54
End: 2019-12-23
Payer: MEDICAID

## 2019-12-23 VITALS — TEMPERATURE: 98.1 F | RESPIRATION RATE: 17 BRPM | WEIGHT: 126 LBS | BODY MASS INDEX: 25.4 KG/M2 | HEIGHT: 59 IN

## 2019-12-23 DIAGNOSIS — I87.8 POOR VENOUS ACCESS: Primary | ICD-10-CM

## 2019-12-23 DIAGNOSIS — C68.9 UROTHELIAL CANCER (HCC): ICD-10-CM

## 2019-12-23 PROCEDURE — G8484 FLU IMMUNIZE NO ADMIN: HCPCS | Performed by: SURGERY

## 2019-12-23 PROCEDURE — G8417 CALC BMI ABV UP PARAM F/U: HCPCS | Performed by: SURGERY

## 2019-12-23 PROCEDURE — 3017F COLORECTAL CA SCREEN DOC REV: CPT | Performed by: SURGERY

## 2019-12-23 PROCEDURE — 99214 OFFICE O/P EST MOD 30 MIN: CPT | Performed by: SURGERY

## 2019-12-23 PROCEDURE — 1111F DSCHRG MED/CURRENT MED MERGE: CPT | Performed by: SURGERY

## 2019-12-23 PROCEDURE — G8427 DOCREV CUR MEDS BY ELIG CLIN: HCPCS | Performed by: SURGERY

## 2019-12-23 PROCEDURE — 1036F TOBACCO NON-USER: CPT | Performed by: SURGERY

## 2019-12-27 ENCOUNTER — ANESTHESIA (OUTPATIENT)
Dept: OPERATING ROOM | Age: 54
End: 2019-12-27
Payer: MEDICAID

## 2019-12-27 ENCOUNTER — HOSPITAL ENCOUNTER (OUTPATIENT)
Age: 54
Setting detail: OUTPATIENT SURGERY
Discharge: NURSING, REHAB OR LONG-TERM CARE | End: 2019-12-27
Attending: SURGERY | Admitting: SURGERY
Payer: MEDICAID

## 2019-12-27 ENCOUNTER — ANESTHESIA EVENT (OUTPATIENT)
Dept: OPERATING ROOM | Age: 54
End: 2019-12-27
Payer: MEDICAID

## 2019-12-27 VITALS
SYSTOLIC BLOOD PRESSURE: 115 MMHG | TEMPERATURE: 97.7 F | OXYGEN SATURATION: 100 % | DIASTOLIC BLOOD PRESSURE: 61 MMHG | RESPIRATION RATE: 19 BRPM

## 2019-12-27 VITALS
SYSTOLIC BLOOD PRESSURE: 110 MMHG | RESPIRATION RATE: 16 BRPM | BODY MASS INDEX: 25.4 KG/M2 | OXYGEN SATURATION: 100 % | DIASTOLIC BLOOD PRESSURE: 60 MMHG | HEART RATE: 70 BPM | TEMPERATURE: 98.2 F | WEIGHT: 126 LBS | HEIGHT: 59 IN

## 2019-12-27 PROCEDURE — 2500000003 HC RX 250 WO HCPCS: Performed by: SURGERY

## 2019-12-27 PROCEDURE — 3700000000 HC ANESTHESIA ATTENDED CARE: Performed by: SURGERY

## 2019-12-27 PROCEDURE — 2709999900 HC NON-CHARGEABLE SUPPLY: Performed by: SURGERY

## 2019-12-27 PROCEDURE — 2580000003 HC RX 258: Performed by: ANESTHESIOLOGY

## 2019-12-27 PROCEDURE — 36589 REMOVAL TUNNELED CV CATH: CPT | Performed by: SURGERY

## 2019-12-27 PROCEDURE — 3700000001 HC ADD 15 MINUTES (ANESTHESIA): Performed by: SURGERY

## 2019-12-27 PROCEDURE — 7100000010 HC PHASE II RECOVERY - FIRST 15 MIN: Performed by: SURGERY

## 2019-12-27 PROCEDURE — 3600000012 HC SURGERY LEVEL 2 ADDTL 15MIN: Performed by: SURGERY

## 2019-12-27 PROCEDURE — 3600000002 HC SURGERY LEVEL 2 BASE: Performed by: SURGERY

## 2019-12-27 PROCEDURE — 6360000002 HC RX W HCPCS: Performed by: NURSE ANESTHETIST, CERTIFIED REGISTERED

## 2019-12-27 PROCEDURE — 7100000011 HC PHASE II RECOVERY - ADDTL 15 MIN: Performed by: SURGERY

## 2019-12-27 PROCEDURE — 6360000002 HC RX W HCPCS: Performed by: SURGERY

## 2019-12-27 RX ORDER — CEFAZOLIN SODIUM 2 G/50ML
2 SOLUTION INTRAVENOUS
Status: COMPLETED | OUTPATIENT
Start: 2019-12-27 | End: 2019-12-27

## 2019-12-27 RX ORDER — SODIUM CHLORIDE 9 MG/ML
INJECTION, SOLUTION INTRAVENOUS CONTINUOUS
Status: DISCONTINUED | OUTPATIENT
Start: 2019-12-27 | End: 2019-12-27 | Stop reason: HOSPADM

## 2019-12-27 RX ORDER — SODIUM CHLORIDE, SODIUM LACTATE, POTASSIUM CHLORIDE, CALCIUM CHLORIDE 600; 310; 30; 20 MG/100ML; MG/100ML; MG/100ML; MG/100ML
INJECTION, SOLUTION INTRAVENOUS CONTINUOUS
Status: DISCONTINUED | OUTPATIENT
Start: 2019-12-27 | End: 2019-12-27 | Stop reason: HOSPADM

## 2019-12-27 RX ORDER — SODIUM CHLORIDE 0.9 % (FLUSH) 0.9 %
10 SYRINGE (ML) INJECTION PRN
Status: DISCONTINUED | OUTPATIENT
Start: 2019-12-27 | End: 2019-12-27 | Stop reason: HOSPADM

## 2019-12-27 RX ORDER — PROPOFOL 10 MG/ML
INJECTION, EMULSION INTRAVENOUS CONTINUOUS PRN
Status: DISCONTINUED | OUTPATIENT
Start: 2019-12-27 | End: 2019-12-27 | Stop reason: SDUPTHER

## 2019-12-27 RX ORDER — LIDOCAINE HYDROCHLORIDE 20 MG/ML
INJECTION, SOLUTION INTRAVENOUS PRN
Status: DISCONTINUED | OUTPATIENT
Start: 2019-12-27 | End: 2019-12-27 | Stop reason: SDUPTHER

## 2019-12-27 RX ORDER — SODIUM CHLORIDE 0.9 % (FLUSH) 0.9 %
10 SYRINGE (ML) INJECTION EVERY 12 HOURS SCHEDULED
Status: DISCONTINUED | OUTPATIENT
Start: 2019-12-27 | End: 2019-12-27 | Stop reason: HOSPADM

## 2019-12-27 RX ORDER — PROPOFOL 10 MG/ML
INJECTION, EMULSION INTRAVENOUS PRN
Status: DISCONTINUED | OUTPATIENT
Start: 2019-12-27 | End: 2019-12-27 | Stop reason: SDUPTHER

## 2019-12-27 RX ADMIN — PROPOFOL 20 MG: 10 INJECTION, EMULSION INTRAVENOUS at 12:42

## 2019-12-27 RX ADMIN — CEFAZOLIN SODIUM 2 G: 2 SOLUTION INTRAVENOUS at 12:35

## 2019-12-27 RX ADMIN — LIDOCAINE HYDROCHLORIDE 70 MG: 20 INJECTION, SOLUTION INTRAVENOUS at 12:40

## 2019-12-27 RX ADMIN — SODIUM CHLORIDE, POTASSIUM CHLORIDE, SODIUM LACTATE AND CALCIUM CHLORIDE: 600; 310; 30; 20 INJECTION, SOLUTION INTRAVENOUS at 11:34

## 2019-12-27 RX ADMIN — PROPOFOL 40 MG: 10 INJECTION, EMULSION INTRAVENOUS at 12:40

## 2019-12-27 RX ADMIN — SODIUM CHLORIDE, POTASSIUM CHLORIDE, SODIUM LACTATE AND CALCIUM CHLORIDE: 600; 310; 30; 20 INJECTION, SOLUTION INTRAVENOUS at 12:32

## 2019-12-27 RX ADMIN — PROPOFOL 140 MCG/KG/MIN: 10 INJECTION, EMULSION INTRAVENOUS at 12:40

## 2019-12-27 RX ADMIN — PROPOFOL 20 MG: 10 INJECTION, EMULSION INTRAVENOUS at 12:41

## 2019-12-27 ASSESSMENT — PAIN DESCRIPTION - DESCRIPTORS: DESCRIPTORS: SORE

## 2019-12-27 ASSESSMENT — PULMONARY FUNCTION TESTS
PIF_VALUE: 1
PIF_VALUE: 1
PIF_VALUE: 0
PIF_VALUE: 1
PIF_VALUE: 0
PIF_VALUE: 0
PIF_VALUE: 1
PIF_VALUE: 0
PIF_VALUE: 1
PIF_VALUE: 0
PIF_VALUE: 1

## 2019-12-27 ASSESSMENT — PAIN DESCRIPTION - PROGRESSION
CLINICAL_PROGRESSION: NOT CHANGED
CLINICAL_PROGRESSION: NOT CHANGED

## 2019-12-27 ASSESSMENT — PAIN DESCRIPTION - PAIN TYPE: TYPE: SURGICAL PAIN

## 2019-12-27 ASSESSMENT — PAIN SCALES - GENERAL
PAINLEVEL_OUTOF10: 3
PAINLEVEL_OUTOF10: 3

## 2019-12-27 ASSESSMENT — LIFESTYLE VARIABLES: SMOKING_STATUS: 0

## 2019-12-27 ASSESSMENT — PAIN DESCRIPTION - FREQUENCY: FREQUENCY: INTERMITTENT

## 2019-12-27 ASSESSMENT — PAIN DESCRIPTION - LOCATION: LOCATION: CHEST

## 2019-12-27 ASSESSMENT — PAIN - FUNCTIONAL ASSESSMENT
PAIN_FUNCTIONAL_ASSESSMENT: ACTIVITIES ARE NOT PREVENTED
PAIN_FUNCTIONAL_ASSESSMENT: 0-10

## 2019-12-27 ASSESSMENT — PAIN DESCRIPTION - ONSET: ONSET: GRADUAL

## 2019-12-27 ASSESSMENT — PAIN DESCRIPTION - ORIENTATION: ORIENTATION: LEFT

## 2020-01-03 NOTE — PROGRESS NOTES
[address] [facility]. [facility]        Pt Name: Susanna Closs: 1965  Date of evaluation: 1/3/2020  Primary Care Physician: Bridger Palacios DO  Reason for evaluation:   Chief Complaint   Patient presents with    Cancer     Malignant neoplasm of cervix  Bladder ca    Follow-up    Chemotherapy    Chronic Kidney Disease     Stage IV        Subjective:  Here for chemotherapy and  follow-up. Was recently hospitalized at [address] [place] [address] December 13, 2019 with  acute kidney injury felt to be secondary to obstructive uropathy. Patient has chronic ureteral obstruction secondary to pelvic cancer and has indwelling nephrostomy tubes. She was discharged on December 18, 2019. S/P  Removal Central Venous Catheter and Subcutaneous Port on 12/27/19 by Dr. Ge Palomares, per patient's request.    OBJECTIVE:  VITALS:  vitals were not taken for this visit. Physical Exam:  Performance Status: 1  Well developed, well nourished female  General: Alert no acute distress,  appears slow in cognition  Head and neck : PERRL, Sclera non icteric. Poor dentition.   Oropharynx : Clear  Neck: no JVD,  no adenopathy,  Lungs: Clear to auscultation   Heart: Regular rate and rhythm.   No murmur. Abdomen: Soft, non-tender;no masses, no organomegaly. Bilateral nephrostomy tubes in place  Extremities: No edema. Neurologic:Cranial nerves grossly intact. No focal motor or sensory deficits.  Mentally challenged. Skin:  No rash.  Multiple moles.         Medications  Prior to Admission medications    Medication Sig Start Date End Date Taking?  Authorizing Provider   midodrine (PROAMATINE) 2.5 MG tablet Take 2.5 mg by mouth 3 times daily (before meals)    Historical Provider, MD   ondansetron (ZOFRAN) 4 MG tablet Take 4 mg by mouth every 8 hours as needed for Nausea or Vomiting    Historical Provider, MD   pantoprazole (PROTONIX) 40 MG tablet Take 40 mg by mouth daily    Historical (L) 12/17/2019    MCH 28.0 12/17/2019    MCHC 31.4 (L) 12/17/2019    RDW 15.5 (H) 12/17/2019    NEUTOPHILPCT 94.0 (H) 12/13/2019    MONOPCT 1.0 (L) 12/13/2019    BASOPCT 0.0 12/13/2019    NEUTROABS 8.65 (H) 12/13/2019    LYMPHSABS 0.37 (L) 12/13/2019    MONOSABS 0.09 (L) 12/13/2019    EOSABS 0.09 12/13/2019    BASOSABS 0.00 12/13/2019       Lab Results   Component Value Date     (L) 12/18/2019    K 3.7 12/18/2019    CL 86 (L) 12/18/2019    CO2 24 12/18/2019    BUN 33 (H) 12/18/2019    CREATININE 1.9 (H) 12/18/2019    GLUCOSE 149 (H) 12/18/2019    CALCIUM 9.4 12/18/2019    PROT 7.4 12/13/2019    LABALBU 2.8 (L) 12/13/2019    BILITOT 0.9 12/13/2019    ALKPHOS 690 (H) 12/13/2019     (H) 12/13/2019     (H) 12/13/2019    LABGLOM 27 12/18/2019    GFRAA 33 12/18/2019         Lab Results   Component Value Date    IRON 51 08/07/2019    TIBC 226 (L) 08/07/2019    FERRITIN 996 08/07/2019             Radiology-  CT Abdomen Pelvis Wo Contrast:  12/13/19. LOCATION:200 EXAM: CT ABDOMEN PELVIS WO CONTRAST COMPARISON: 11/25/2019 HISTORY: nephrostomy tube, decreasing drainage. History of bladder cancer. TECHNIQUE:Noncontrast helical abdomen and pelvis CT was performed. Coronal and sagittal reconstructions also obtained. Automated dose control was used for this exam. CONTRAST: No IV contrast administered. FINDINGS: SUPPORT DEVICES: None LOWER THORAX: Limited evaluation of the lower chest demonstrates clear lung bases bilaterally. SOLID ORGANS: The liver, spleen, pancreas, and adrenal glands are normal. BILIARY SYSTEM: No evidence of biliary ductal dilatation. GENITOURINARY: Worsening right hydroureteronephrosis noted with severe obstructive changes. The nephrostomy catheter is stable in position. The ureters dilated down to level of the bladder which shows diffuse wall thickening. No stone is seen in. Left nephrostomy catheter pigtail seen in the UPJ without hydronephrosis.  GASTROINTESTINAL: The stomach, small and

## 2020-01-06 ENCOUNTER — HOSPITAL ENCOUNTER (OUTPATIENT)
Dept: INFUSION THERAPY | Age: 55
Discharge: HOME | End: 2020-01-06
Payer: MEDICAID

## 2020-01-06 ENCOUNTER — OFFICE VISIT (OUTPATIENT)
Dept: ONCOLOGY | Age: 55
End: 2020-01-06

## 2020-01-06 DIAGNOSIS — N18.4 ANEMIA DUE TO STAGE 4 CHRONIC KIDNEY DISEASE (HCC): ICD-10-CM

## 2020-01-06 DIAGNOSIS — D63.1 ANEMIA DUE TO STAGE 4 CHRONIC KIDNEY DISEASE (HCC): ICD-10-CM

## 2020-01-06 DIAGNOSIS — C53.9 MALIGNANT NEOPLASM OF CERVIX, UNSPECIFIED SITE (HCC): ICD-10-CM

## 2020-01-06 DIAGNOSIS — D50.8 OTHER IRON DEFICIENCY ANEMIA: ICD-10-CM

## 2020-01-20 ENCOUNTER — HOSPITAL ENCOUNTER (EMERGENCY)
Age: 55
Discharge: HOME | End: 2020-01-21
Attending: EMERGENCY MEDICINE
Payer: MEDICAID

## 2020-01-20 LAB
ALBUMIN SERPL-MCNC: 2.8 G/DL (ref 3.5–5.2)
ALP BLD-CCNC: 539 U/L (ref 35–104)
ALT SERPL-CCNC: 44 U/L (ref 0–32)
ANION GAP SERPL CALCULATED.3IONS-SCNC: 14 MMOL/L (ref 7–16)
AST SERPL-CCNC: 37 U/L (ref 0–31)
BASOPHILS ABSOLUTE: 0.09 E9/L (ref 0–0.2)
BASOPHILS RELATIVE PERCENT: 0.5 % (ref 0–2)
BILIRUB SERPL-MCNC: 0.6 MG/DL (ref 0–1.2)
BUN BLDV-MCNC: 38 MG/DL (ref 6–20)
CALCIUM SERPL-MCNC: 10.7 MG/DL (ref 8.6–10.2)
CHLORIDE BLD-SCNC: 104 MMOL/L (ref 98–107)
CO2: 22 MMOL/L (ref 22–29)
CREAT SERPL-MCNC: 1.7 MG/DL (ref 0.5–1)
EOSINOPHILS ABSOLUTE: 0.16 E9/L (ref 0.05–0.5)
EOSINOPHILS RELATIVE PERCENT: 1 % (ref 0–6)
GFR AFRICAN AMERICAN: 38
GFR NON-AFRICAN AMERICAN: 31 ML/MIN/1.73
GLUCOSE BLD-MCNC: 114 MG/DL (ref 74–99)
HCT VFR BLD CALC: 31.4 % (ref 34–48)
HEMOGLOBIN: 9.2 G/DL (ref 11.5–15.5)
IMMATURE GRANULOCYTES #: 0.47 E9/L
IMMATURE GRANULOCYTES %: 2.8 % (ref 0–5)
LACTIC ACID: 2 MMOL/L (ref 0.5–2.2)
LYMPHOCYTES ABSOLUTE: 1.22 E9/L (ref 1.5–4)
LYMPHOCYTES RELATIVE PERCENT: 7.3 % (ref 20–42)
MCH RBC QN AUTO: 27.8 PG (ref 26–35)
MCHC RBC AUTO-ENTMCNC: 29.3 % (ref 32–34.5)
MCV RBC AUTO: 94.9 FL (ref 80–99.9)
MONOCYTES ABSOLUTE: 0.59 E9/L (ref 0.1–0.95)
MONOCYTES RELATIVE PERCENT: 3.5 % (ref 2–12)
NEUTROPHILS ABSOLUTE: 14.15 E9/L (ref 1.8–7.3)
NEUTROPHILS RELATIVE PERCENT: 84.9 % (ref 43–80)
PDW BLD-RTO: 18.4 FL (ref 11.5–15)
PLATELET # BLD: 501 E9/L (ref 130–450)
PMV BLD AUTO: 11 FL (ref 7–12)
POTASSIUM SERPL-SCNC: 5.1 MMOL/L (ref 3.5–5)
RBC # BLD: 3.31 E12/L (ref 3.5–5.5)
SODIUM BLD-SCNC: 140 MMOL/L (ref 132–146)
TOTAL PROTEIN: 7.6 G/DL (ref 6.4–8.3)
WBC # BLD: 16.7 E9/L (ref 4.5–11.5)

## 2020-01-20 PROCEDURE — 96374 THER/PROPH/DIAG INJ IV PUSH: CPT

## 2020-01-20 PROCEDURE — 96372 THER/PROPH/DIAG INJ SC/IM: CPT

## 2020-01-20 PROCEDURE — 6360000002 HC RX W HCPCS: Performed by: EMERGENCY MEDICINE

## 2020-01-20 PROCEDURE — 80053 COMPREHEN METABOLIC PANEL: CPT

## 2020-01-20 PROCEDURE — 36415 COLL VENOUS BLD VENIPUNCTURE: CPT

## 2020-01-20 PROCEDURE — 99284 EMERGENCY DEPT VISIT MOD MDM: CPT

## 2020-01-20 PROCEDURE — 83605 ASSAY OF LACTIC ACID: CPT

## 2020-01-20 PROCEDURE — 85025 COMPLETE CBC W/AUTO DIFF WBC: CPT

## 2020-01-20 RX ORDER — FENTANYL CITRATE 50 UG/ML
25 INJECTION, SOLUTION INTRAMUSCULAR; INTRAVENOUS ONCE
Status: COMPLETED | OUTPATIENT
Start: 2020-01-20 | End: 2020-01-20

## 2020-01-20 RX ORDER — LORAZEPAM 2 MG/ML
0.5 INJECTION INTRAMUSCULAR ONCE
Status: COMPLETED | OUTPATIENT
Start: 2020-01-20 | End: 2020-01-20

## 2020-01-20 RX ORDER — LORAZEPAM 2 MG/ML
0.5 INJECTION INTRAMUSCULAR ONCE
Status: DISCONTINUED | OUTPATIENT
Start: 2020-01-20 | End: 2020-01-20

## 2020-01-20 RX ORDER — FENTANYL CITRATE 50 UG/ML
25 INJECTION, SOLUTION INTRAMUSCULAR; INTRAVENOUS ONCE
Status: DISCONTINUED | OUTPATIENT
Start: 2020-01-20 | End: 2020-01-20

## 2020-01-20 RX ADMIN — FENTANYL CITRATE 25 MCG: 50 INJECTION, SOLUTION INTRAMUSCULAR; INTRAVENOUS at 22:45

## 2020-01-20 RX ADMIN — LORAZEPAM 0.5 MG: 2 INJECTION INTRAMUSCULAR; INTRAVENOUS at 22:45

## 2020-01-20 ASSESSMENT — PAIN DESCRIPTION - DESCRIPTORS: DESCRIPTORS: TENDER

## 2020-01-20 ASSESSMENT — PAIN SCALES - GENERAL
PAINLEVEL_OUTOF10: 8
PAINLEVEL_OUTOF10: 8

## 2020-01-20 ASSESSMENT — PAIN DESCRIPTION - ORIENTATION: ORIENTATION: MID

## 2020-01-20 ASSESSMENT — PAIN SCALES - WONG BAKER: WONGBAKER_NUMERICALRESPONSE: 2

## 2020-01-20 ASSESSMENT — PAIN DESCRIPTION - LOCATION: LOCATION: BACK

## 2020-01-20 ASSESSMENT — PAIN DESCRIPTION - FREQUENCY: FREQUENCY: CONTINUOUS

## 2020-01-21 ENCOUNTER — APPOINTMENT (OUTPATIENT)
Dept: CT IMAGING | Age: 55
End: 2020-01-21
Payer: MEDICAID

## 2020-01-21 VITALS
BODY MASS INDEX: 26.21 KG/M2 | OXYGEN SATURATION: 96 % | HEIGHT: 59 IN | WEIGHT: 130 LBS | DIASTOLIC BLOOD PRESSURE: 50 MMHG | HEART RATE: 76 BPM | RESPIRATION RATE: 20 BRPM | TEMPERATURE: 98 F | SYSTOLIC BLOOD PRESSURE: 110 MMHG

## 2020-01-21 LAB
AMORPHOUS: ABNORMAL
BACTERIA: ABNORMAL /HPF
BILIRUBIN URINE: ABNORMAL
BLOOD, URINE: ABNORMAL
CLARITY: ABNORMAL
COLOR: YELLOW
CRYSTALS, UA: ABNORMAL
GLUCOSE URINE: NEGATIVE MG/DL
KETONES, URINE: NEGATIVE MG/DL
LEUKOCYTE ESTERASE, URINE: ABNORMAL
NITRITE, URINE: NEGATIVE
PH UA: 6 (ref 5–9)
PROTEIN UA: 100 MG/DL
RBC UA: ABNORMAL /HPF (ref 0–2)
SPECIFIC GRAVITY UA: 1.02 (ref 1–1.03)
UROBILINOGEN, URINE: 1 E.U./DL
WBC UA: ABNORMAL /HPF (ref 0–5)
YEAST: ABNORMAL

## 2020-01-21 PROCEDURE — 87077 CULTURE AEROBIC IDENTIFY: CPT

## 2020-01-21 PROCEDURE — 87186 SC STD MICRODIL/AGAR DIL: CPT

## 2020-01-21 PROCEDURE — 6370000000 HC RX 637 (ALT 250 FOR IP): Performed by: EMERGENCY MEDICINE

## 2020-01-21 PROCEDURE — 74176 CT ABD & PELVIS W/O CONTRAST: CPT

## 2020-01-21 PROCEDURE — 87088 URINE BACTERIA CULTURE: CPT

## 2020-01-21 PROCEDURE — 81001 URINALYSIS AUTO W/SCOPE: CPT

## 2020-01-21 RX ORDER — LEVOFLOXACIN 500 MG/1
500 TABLET, FILM COATED ORAL DAILY
Qty: 7 TABLET | Refills: 0 | Status: SHIPPED | OUTPATIENT
Start: 2020-01-21 | End: 2020-01-28

## 2020-01-21 RX ORDER — OXYCODONE HYDROCHLORIDE AND ACETAMINOPHEN 5; 325 MG/1; MG/1
TABLET ORAL
Status: DISCONTINUED
Start: 2020-01-21 | End: 2020-01-21 | Stop reason: HOSPADM

## 2020-01-21 RX ORDER — FLUCONAZOLE 100 MG/1
200 TABLET ORAL ONCE
Status: COMPLETED | OUTPATIENT
Start: 2020-01-21 | End: 2020-01-21

## 2020-01-21 RX ORDER — OXYCODONE HYDROCHLORIDE AND ACETAMINOPHEN 5; 325 MG/1; MG/1
1 TABLET ORAL ONCE
Status: COMPLETED | OUTPATIENT
Start: 2020-01-21 | End: 2020-01-21

## 2020-01-21 RX ORDER — LEVOFLOXACIN 500 MG/1
500 TABLET, FILM COATED ORAL ONCE
Status: DISCONTINUED | OUTPATIENT
Start: 2020-01-21 | End: 2020-01-21

## 2020-01-21 RX ORDER — FLUCONAZOLE 100 MG/1
TABLET ORAL
Status: DISCONTINUED
Start: 2020-01-21 | End: 2020-01-21 | Stop reason: HOSPADM

## 2020-01-21 RX ORDER — LEVOFLOXACIN 750 MG/1
TABLET ORAL
Status: DISCONTINUED
Start: 2020-01-21 | End: 2020-01-21 | Stop reason: HOSPADM

## 2020-01-21 RX ORDER — LEVOFLOXACIN 750 MG/1
750 TABLET ORAL ONCE
Status: COMPLETED | OUTPATIENT
Start: 2020-01-21 | End: 2020-01-21

## 2020-01-21 RX ADMIN — OXYCODONE AND ACETAMINOPHEN 1 TABLET: 5; 325 TABLET ORAL at 02:05

## 2020-01-21 RX ADMIN — LEVOFLOXACIN 750 MG: 750 TABLET, FILM COATED ORAL at 01:28

## 2020-01-21 RX ADMIN — FLUCONAZOLE 200 MG: 100 TABLET ORAL at 02:05

## 2020-01-21 ASSESSMENT — PAIN SCALES - GENERAL: PAINLEVEL_OUTOF10: 10

## 2020-01-21 NOTE — ED PROVIDER NOTES
Department of Emergency Medicine   ED  Provider Note  Admit Date/RoomTime: 1/20/2020  9:18 PM  ED Room: 19/19  Chief Complaint:       Other (pain around right nephrostomy tube w brown drainage. Hospice patient)    History of Present Illness   Source of history provided by:  patient. History/Exam Limitations: none. Keturah Shelton is a 62 y.o. 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 ([facility] 6/29/2019    Cellulitis     LUE    Chronic kidney disease     Chronic major depressive disorder, recurrent episode (HCC)     Constipation, unspecified     Depression     Difficulty walking     Dysmenorrhea, unspecified     Dysphagia, oropharyngeal     Hematuria     Hydronephrosis     Hyperlipidemia     Hypertension     Hypothyroidism     Intellectual disability     Leg pain, bilateral     Mild intermittent asthma, uncomplicated     Mild protein-calorie malnutrition (HCC)     Mixed incontinence     Muscle weakness (generalized)     Neuromuscular dysfunction of bladder     Noncompliance with medications     Noncompliance with treatment     Obstructive and reflux uropathy     Osteoarthritis     Other fatigue     Other symbolic dysfunctions     Personal care impairment     Personal history of noncompliance with medical treatment, presenting hazards to health     Presence of urogenital implants     Unspecified intellectual disabilities     Urinary tract infection, site not specified     Vitamin D deficiency, unspecified      Patient presents from hospice for evaluation of her right nephrostomy tube.   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 She states that she was sleeping tonight when she rolled over and felt a tug in her back. Her blankets were then covered in brown foul-smelling urine. Her nurse believes the catheter was dislodged and she was sent in for further evaluation. Patient complains of severe right-sided back pain. .  ROS   Pertinent positives and negatives are stated within HPI, all other systems reviewed and are negative. Past Surgical History:   Procedure Laterality Date    CATHETER REMOVAL N/A 12/27/2019    MEDIPORT CATHETER REMOVAL (DO NOT CHANGE TIME-TRANSPORTATION) performed by Brandon Romano MD at [facility] Left 1/21/2019    CYSTOSCOPY, BILATERAL RETROGRADE PYELOGRAMS, BILATERAL STENT INSERTION performed by Glen Guillory MD at [facility] Bilateral 6/1/2019    CYSTOSCOPY, RETROGRADE PYELOGRAMS, BILATERAL URETERAL STENT EXCHANGE performed by Julieth Aviles MD at [facility] / STONE Bilateral 9/16/2019    CYSTOSCOPY RETROGRADE PYELOGRAM BILATERAL STENT REMOVAL performed by Yuliet Fishman MD at [address] [place] [address] / REMOVAL / REPLACEMENT VENOUS ACCESS CATHETER N/A 8/19/2019    MEDIPORT CATHETER INSERTION (DO NOT MOVE TIME) [facility]  N[facility]A 6/6/2019    EXAM UNDEDR ANESTHESIA VAGINAL BIOPSIES performed by Luis E Gaston MD at [address] History:  reports that she quit smoking about 31 years ago. She has a 5.00 pack-year smoking history. She has never used smokeless tobacco. She reports that she does not drink alcohol or use drugs. Family History: family history includes Cancer in her maternal grandmother; Dementia in her mother; Diabetes in her brother; Heart Attack in her maternal grandfather; Other in her daughter; Thyroid Disease in her mother. Allergies: Patient has no known allergies.     Physical Exam            ED Triage Vitals [01/20/20 2122]   BP Temp Temp src Pulse Resp SpO2 Height Weight   112/67 97.2 °F (36.2 °C) -- 87 16 99 % 4' 11\" (1.499 m) 130 lb (59 kg)      Oxygen Saturation Interpretation: Normal.    · General Appearance/Constitutional:  Alert, development consistent with age  · HEENT:  NC/NT. PERRLA. Airway patent. · Neck:  Supple. No lymphadenopathy. · Respiratory:  No retractions. Lungs Clear to auscultation and breath sounds equal.  · CV:  Regular rate and rhythm. · GI:  General Appearance: normal.         Bowel sounds: normal bowel sounds. Distension:  None. Tenderness: No abdominal tenderness. Liver: non-tender. Spleen:  non-tender. Pulsatile Mass: absent. Hernia:  no inguinal or femoral hernias noted. · Back: CVA Tenderness: Yes, Right. Bilateral nephrostomy tubes. Brown urine soaked dressing on right side. Left dressing c/d/i  · Integument:  Normal turgor. Warm, dry, without visible rash, unless noted elsewhere. · Lymphatics: No edema, cap.refill <3sec. · Neurological:  Orientation age-appropriate. Motor functions intact.     Lab / Imaging Results   (All laboratory and radiology results have been personally reviewed by myself)  Labs:  Results for orders placed or performed during the hospital encounter of 01/20/20   Comprehensive Metabolic Panel   Result Value Ref Range    Sodium 140 132 - 146 mmol/L    Potassium 5.1 (H) 3.5 - 5.0 mmol/L    Chloride 104 98 - 107 mmol/L    CO2 22 22 - 29 mmol/L    Anion Gap 14 7 - 16 mmol/L    Glucose 114 (H) 74 - 99 mg/dL    BUN 38 (H) 6 - 20 mg/dL    CREATININE 1.7 (H) 0.5 - 1.0 mg/dL    GFR Non-African American 31 >=60 mL/min/1.73    GFR African American 38     Calcium 10.7 (H) 8.6 - 10.2 mg/dL    Total Protein 7.6 6.4 - 8.3 g/dL    Alb 2.8 (L) 3.5 - 5.2 g/dL    Total Bilirubin 0.6 0.0 - 1.2 mg/dL    Alkaline Phosphatase 539 (H) 35 - 104 U/L    ALT 44 (H) 0 - 32 U/L    AST 37 (H) 0 - 31 U/L   CBC Auto Differential   Result Value Ref Range    WBC 16.7 (H) 4.5 - 11.5 E9/L    RBC 3.31 (L) 3.50 - 5.50 E12/L    Hemoglobin 9.2 (L) 11.5 - 15.5 g/dL    Hematocrit 31.4 (L) 34.0 - 48.0 %    MCV 94.9 80.0 - 99.9 fL    MCH 27.8 26.0 - 35.0 pg    MCHC 29.3 (L) 32.0 - 34.5 %    RDW 18.4 (H) 11.5 - 15.0 fL    Platelets 971 (H) 257 - 450 E9/L    MPV 11.0 7.0 - 12.0 fL    Neutrophils % 84.9 (H) 43.0 - 80.0 %    Immature Granulocytes % 2.8 0.0 - 5.0 %    Lymphocytes % 7.3 (L) 20.0 - 42.0 %    Monocytes % 3.5 2.0 - 12.0 %    Eosinophils % 1.0 0.0 - 6.0 %    Basophils % 0.5 0.0 - 2.0 %    Neutrophils Absolute 14.15 (H) 1.80 - 7.30 E9/L    Immature Granulocytes # 0.47 E9/L    Lymphocytes Absolute 1.22 (L) 1.50 - 4.00 E9/L    Monocytes Absolute 0.59 0.10 - 0.95 E9/L    Eosinophils Absolute 0.16 0.05 - 0.50 E9/L    Basophils Absolute 0.09 0.00 - 0.20 E9/L   Lactic Acid, Plasma   Result Value Ref Range    Lactic Acid 2.0 0.5 - 2.2 mmol/L     Imaging: All Radiology results interpreted by Radiologist unless otherwise noted. CT ABDOMEN PELVIS WO CONTRAST Additional Contrast? None   Final Result   1. Small hiatal hernia. 2. Stable severe right hydronephrosis. Stable percutaneous nephrostomy tubes. 3. Stable diffuse wall thickening of the bladder. There is nonspecific gas    within the bladder. 4. Wall thickening of the lower rectum, stable. 5. Interval increased size of blastic L2 metastasis. Otherwise no significant    interval change. 6. Mild constipation. This report has been electronically signed by Estella Perea MD.          ED Course / Medical Decision Making     Medications   levofloxacin [facility]) tablet 750 mg (has no administration in time range)   fentaNYL (SUBLIMAZE) injection 25 mcg (25 mcg Intramuscular Given 1/20/20 7024)   LORazepam (ATIVAN) injection 0.5 mg (0.5 mg Intramuscular Given 1/20/20 2245)        Re-Evaluations:  1/20/20      Time: 1:00 AM  Patients symptoms are improving. Patient resting comfortably and sleeping. Consultations:             None    Procedures:   none    MDM: Patient presents from hospice for evaluation of her right nephrostomy tube. Her pain was significantly improved after fentanyl. Dressings were changed. Site was reevaluated multiple times and remained clean and dry. CT imaging shows nephrostomy tubes in correct location. Renal function at baseline with no new kidney injury. Urine did appear infected. Prior culture results were reviewed and patient placed on Levaquin. She was discharged back to hospice in stable condition. Return precautions discussed. Counseling:   I have spoken with the patient and discussed todays results, in addition to providing specific details for the plan of care and counseling regarding the diagnosis and prognosis and are agreeable with the plan. Assessment      1. Leakage of nephrostomy catheter, initial encounter [facility])      This patient's ED course included: a personal history and physicial examination  This patient has remained hemodynamically stable during their ED course. Plan   Discharge to nursing home. Patient condition is stable. New Medications     New Prescriptions    LEVOFLOXACIN (LEVAQUIN) 500 MG TABLET    Take 1 tablet by mouth daily for 7 days     Electronically signed by Barrington Hassan DO   DD: 1/20/20  **This report was transcribed using voice recognition software. Every effort was made to ensure accuracy; however, inadvertent computerized transcription errors may be present.   END OF PROVIDER NOTE        Barrington Hassan DO  01/23/20 1056

## 2020-01-21 NOTE — ED NOTES
Bed: HE  Expected date:   Expected time:   Means of arrival:   Comments:  ems     Guadalupe Seen, RN  01/20/20 8150

## 2020-01-23 LAB — URINE CULTURE, ROUTINE: NORMAL

## 2020-02-04 ENCOUNTER — HOSPITAL ENCOUNTER (OUTPATIENT)
Dept: INTERVENTIONAL RADIOLOGY/VASCULAR | Age: 55
Discharge: HOME | End: 2020-02-04
Payer: MEDICAID

## 2020-02-04 PROCEDURE — 50435 EXCHANGE NEPHROSTOMY CATH: CPT | Performed by: RADIOLOGY

## 2020-02-04 PROCEDURE — 2500000003 HC RX 250 WO HCPCS: Performed by: RADIOLOGY

## 2020-02-04 PROCEDURE — 6360000004 HC RX CONTRAST MEDICATION: Performed by: RADIOLOGY

## 2020-02-04 PROCEDURE — C1729 CATH, DRAINAGE: HCPCS

## 2020-02-04 RX ORDER — LIDOCAINE HYDROCHLORIDE 10 MG/ML
20 INJECTION, SOLUTION INFILTRATION; PERINEURAL ONCE
Status: COMPLETED | OUTPATIENT
Start: 2020-02-04 | End: 2020-02-04

## 2020-02-04 RX ADMIN — IOPAMIDOL 15 ML: 612 INJECTION, SOLUTION INTRAVENOUS at 11:15

## 2020-02-04 RX ADMIN — LIDOCAINE HYDROCHLORIDE 20 ML: 10 INJECTION, SOLUTION INFILTRATION; PERINEURAL at 11:16

## 2020-02-04 ASSESSMENT — PAIN SCALES - GENERAL: PAINLEVEL_OUTOF10: 5

## 2020-02-04 NOTE — PROGRESS NOTES
Report called to nurse of wing 2 at [facility] at [facility].     11:25 AM  2/4/2020  Emy Jalloh RN

## 2020-03-13 ENCOUNTER — HOSPITAL ENCOUNTER (INPATIENT)
Age: 55
LOS: 4 days | Discharge: NURSING, REHAB OR LONG-TERM CARE | DRG: 466 | End: 2020-03-17
Attending: EMERGENCY MEDICINE | Admitting: HOSPITALIST
Payer: MEDICAID

## 2020-03-13 ENCOUNTER — APPOINTMENT (OUTPATIENT)
Dept: CT IMAGING | Age: 55
DRG: 466 | End: 2020-03-13
Payer: MEDICAID

## 2020-03-13 PROBLEM — T83.098A MALFUNCTION OF NEPHROSTOMY TUBE (HCC): Status: ACTIVE | Noted: 2020-03-13

## 2020-03-13 LAB
ALBUMIN SERPL-MCNC: 2.2 G/DL (ref 3.5–5.2)
ALP BLD-CCNC: 1470 U/L (ref 35–104)
ALT SERPL-CCNC: 65 U/L (ref 0–32)
ANION GAP SERPL CALCULATED.3IONS-SCNC: 12 MMOL/L (ref 7–16)
AST SERPL-CCNC: 62 U/L (ref 0–31)
BASOPHILS ABSOLUTE: 0.05 E9/L (ref 0–0.2)
BASOPHILS RELATIVE PERCENT: 0.3 % (ref 0–2)
BILIRUB SERPL-MCNC: 8.6 MG/DL (ref 0–1.2)
BUN BLDV-MCNC: 27 MG/DL (ref 6–20)
CALCIUM SERPL-MCNC: 10.8 MG/DL (ref 8.6–10.2)
CHLORIDE BLD-SCNC: 102 MMOL/L (ref 98–107)
CO2: 26 MMOL/L (ref 22–29)
CREAT SERPL-MCNC: 1.1 MG/DL (ref 0.5–1)
EOSINOPHILS ABSOLUTE: 0.18 E9/L (ref 0.05–0.5)
EOSINOPHILS RELATIVE PERCENT: 0.9 % (ref 0–6)
GFR AFRICAN AMERICAN: >60
GFR NON-AFRICAN AMERICAN: 51 ML/MIN/1.73
GLUCOSE BLD-MCNC: 124 MG/DL (ref 74–99)
HCT VFR BLD CALC: 33 % (ref 34–48)
HEMOGLOBIN: 9.6 G/DL (ref 11.5–15.5)
IMMATURE GRANULOCYTES #: 0.39 E9/L
IMMATURE GRANULOCYTES %: 2 % (ref 0–5)
LACTIC ACID, SEPSIS: 1.6 MMOL/L (ref 0.5–1.9)
LIPASE: 7 U/L (ref 13–60)
LYMPHOCYTES ABSOLUTE: 1 E9/L (ref 1.5–4)
LYMPHOCYTES RELATIVE PERCENT: 5 % (ref 20–42)
MCH RBC QN AUTO: 29.9 PG (ref 26–35)
MCHC RBC AUTO-ENTMCNC: 29.1 % (ref 32–34.5)
MCV RBC AUTO: 102.8 FL (ref 80–99.9)
MONOCYTES ABSOLUTE: 0.68 E9/L (ref 0.1–0.95)
MONOCYTES RELATIVE PERCENT: 3.4 % (ref 2–12)
NEUTROPHILS ABSOLUTE: 17.51 E9/L (ref 1.8–7.3)
NEUTROPHILS RELATIVE PERCENT: 88.4 % (ref 43–80)
PDW BLD-RTO: 16.6 FL (ref 11.5–15)
PLATELET # BLD: 414 E9/L (ref 130–450)
PMV BLD AUTO: 11.3 FL (ref 7–12)
POTASSIUM SERPL-SCNC: 3.3 MMOL/L (ref 3.5–5)
RBC # BLD: 3.21 E12/L (ref 3.5–5.5)
SODIUM BLD-SCNC: 140 MMOL/L (ref 132–146)
TOTAL PROTEIN: 5.8 G/DL (ref 6.4–8.3)
WBC # BLD: 19.8 E9/L (ref 4.5–11.5)

## 2020-03-13 PROCEDURE — 85025 COMPLETE CBC W/AUTO DIFF WBC: CPT

## 2020-03-13 PROCEDURE — 6360000002 HC RX W HCPCS: Performed by: HOSPITALIST

## 2020-03-13 PROCEDURE — 99285 EMERGENCY DEPT VISIT HI MDM: CPT

## 2020-03-13 PROCEDURE — 74176 CT ABD & PELVIS W/O CONTRAST: CPT

## 2020-03-13 PROCEDURE — 83605 ASSAY OF LACTIC ACID: CPT

## 2020-03-13 PROCEDURE — 80053 COMPREHEN METABOLIC PANEL: CPT

## 2020-03-13 PROCEDURE — 83690 ASSAY OF LIPASE: CPT

## 2020-03-13 PROCEDURE — 6370000000 HC RX 637 (ALT 250 FOR IP): Performed by: HOSPITALIST

## 2020-03-13 PROCEDURE — 6360000002 HC RX W HCPCS: Performed by: STUDENT IN AN ORGANIZED HEALTH CARE EDUCATION/TRAINING PROGRAM

## 2020-03-13 PROCEDURE — 2580000003 HC RX 258: Performed by: STUDENT IN AN ORGANIZED HEALTH CARE EDUCATION/TRAINING PROGRAM

## 2020-03-13 PROCEDURE — 96374 THER/PROPH/DIAG INJ IV PUSH: CPT

## 2020-03-13 PROCEDURE — 94761 N-INVAS EAR/PLS OXIMETRY MLT: CPT

## 2020-03-13 PROCEDURE — 1200000000 HC SEMI PRIVATE

## 2020-03-13 RX ORDER — DOCUSATE SODIUM 100 MG/1
100 CAPSULE, LIQUID FILLED ORAL 2 TIMES DAILY
Status: DISCONTINUED | OUTPATIENT
Start: 2020-03-13 | End: 2020-03-17 | Stop reason: HOSPADM

## 2020-03-13 RX ORDER — OXYBUTYNIN CHLORIDE 5 MG/1
5 TABLET, EXTENDED RELEASE ORAL DAILY
Status: DISCONTINUED | OUTPATIENT
Start: 2020-03-14 | End: 2020-03-14

## 2020-03-13 RX ORDER — MIDODRINE HYDROCHLORIDE 5 MG/1
5 TABLET ORAL EVERY 4 HOURS PRN
Status: DISCONTINUED | OUTPATIENT
Start: 2020-03-13 | End: 2020-03-17 | Stop reason: HOSPADM

## 2020-03-13 RX ORDER — FENTANYL CITRATE 50 UG/ML
25 INJECTION, SOLUTION INTRAMUSCULAR; INTRAVENOUS ONCE
Status: COMPLETED | OUTPATIENT
Start: 2020-03-13 | End: 2020-03-13

## 2020-03-13 RX ORDER — 0.9 % SODIUM CHLORIDE 0.9 %
1000 INTRAVENOUS SOLUTION INTRAVENOUS ONCE
Status: COMPLETED | OUTPATIENT
Start: 2020-03-13 | End: 2020-03-13

## 2020-03-13 RX ORDER — LEVOTHYROXINE SODIUM 0.1 MG/1
100 TABLET ORAL DAILY
Status: DISCONTINUED | OUTPATIENT
Start: 2020-03-14 | End: 2020-03-17 | Stop reason: HOSPADM

## 2020-03-13 RX ORDER — ONDANSETRON 2 MG/ML
4 INJECTION INTRAMUSCULAR; INTRAVENOUS EVERY 6 HOURS PRN
Status: DISCONTINUED | OUTPATIENT
Start: 2020-03-13 | End: 2020-03-17 | Stop reason: HOSPADM

## 2020-03-13 RX ORDER — ACETAMINOPHEN 325 MG/1
650 TABLET ORAL EVERY 4 HOURS PRN
Status: DISCONTINUED | OUTPATIENT
Start: 2020-03-13 | End: 2020-03-13 | Stop reason: ALTCHOICE

## 2020-03-13 RX ORDER — PAROXETINE HYDROCHLORIDE 20 MG/1
20 TABLET, FILM COATED ORAL EVERY MORNING
COMMUNITY

## 2020-03-13 RX ORDER — SODIUM CHLORIDE 0.9 % (FLUSH) 0.9 %
10 SYRINGE (ML) INJECTION PRN
Status: DISCONTINUED | OUTPATIENT
Start: 2020-03-13 | End: 2020-03-17 | Stop reason: HOSPADM

## 2020-03-13 RX ORDER — ACETAMINOPHEN 325 MG/1
650 TABLET ORAL EVERY 6 HOURS PRN
Status: DISCONTINUED | OUTPATIENT
Start: 2020-03-13 | End: 2020-03-16

## 2020-03-13 RX ORDER — HYDROCODONE BITARTRATE AND ACETAMINOPHEN 5; 325 MG/1; MG/1
1 TABLET ORAL EVERY 6 HOURS PRN
Status: DISCONTINUED | OUTPATIENT
Start: 2020-03-13 | End: 2020-03-16

## 2020-03-13 RX ORDER — TRAMADOL HYDROCHLORIDE 50 MG/1
50 TABLET ORAL EVERY 6 HOURS PRN
Status: DISCONTINUED | OUTPATIENT
Start: 2020-03-13 | End: 2020-03-17 | Stop reason: HOSPADM

## 2020-03-13 RX ORDER — SODIUM CHLORIDE AND POTASSIUM CHLORIDE .9; .15 G/100ML; G/100ML
SOLUTION INTRAVENOUS CONTINUOUS
Status: DISCONTINUED | OUTPATIENT
Start: 2020-03-13 | End: 2020-03-15

## 2020-03-13 RX ORDER — FERROUS SULFATE 325(65) MG
325 TABLET ORAL 2 TIMES DAILY WITH MEALS
Status: DISCONTINUED | OUTPATIENT
Start: 2020-03-14 | End: 2020-03-17 | Stop reason: HOSPADM

## 2020-03-13 RX ORDER — POLYETHYLENE GLYCOL 3350 17 G/17G
17 POWDER, FOR SOLUTION ORAL DAILY PRN
Status: DISCONTINUED | OUTPATIENT
Start: 2020-03-13 | End: 2020-03-17 | Stop reason: HOSPADM

## 2020-03-13 RX ORDER — POTASSIUM CHLORIDE 20 MEQ/1
40 TABLET, EXTENDED RELEASE ORAL ONCE
Status: COMPLETED | OUTPATIENT
Start: 2020-03-13 | End: 2020-03-13

## 2020-03-13 RX ORDER — MIDODRINE HYDROCHLORIDE 5 MG/1
2.5 TABLET ORAL
Status: DISCONTINUED | OUTPATIENT
Start: 2020-03-13 | End: 2020-03-13

## 2020-03-13 RX ORDER — MIDODRINE HYDROCHLORIDE 5 MG/1
2.5 TABLET ORAL
Status: DISCONTINUED | OUTPATIENT
Start: 2020-03-14 | End: 2020-03-17 | Stop reason: HOSPADM

## 2020-03-13 RX ORDER — PROMETHAZINE HYDROCHLORIDE 25 MG/1
12.5 TABLET ORAL EVERY 6 HOURS PRN
Status: DISCONTINUED | OUTPATIENT
Start: 2020-03-13 | End: 2020-03-17 | Stop reason: HOSPADM

## 2020-03-13 RX ORDER — ACETAMINOPHEN 650 MG/1
650 SUPPOSITORY RECTAL EVERY 6 HOURS PRN
Status: DISCONTINUED | OUTPATIENT
Start: 2020-03-13 | End: 2020-03-16

## 2020-03-13 RX ORDER — CLONAZEPAM 0.5 MG/1
0.25 TABLET ORAL 2 TIMES DAILY
Status: DISCONTINUED | OUTPATIENT
Start: 2020-03-13 | End: 2020-03-17 | Stop reason: HOSPADM

## 2020-03-13 RX ORDER — ONDANSETRON 4 MG/1
4 TABLET, FILM COATED ORAL EVERY 8 HOURS PRN
Status: DISCONTINUED | OUTPATIENT
Start: 2020-03-13 | End: 2020-03-17 | Stop reason: HOSPADM

## 2020-03-13 RX ORDER — PANTOPRAZOLE SODIUM 40 MG/1
40 TABLET, DELAYED RELEASE ORAL DAILY
Status: DISCONTINUED | OUTPATIENT
Start: 2020-03-14 | End: 2020-03-17 | Stop reason: HOSPADM

## 2020-03-13 RX ORDER — MIRTAZAPINE 15 MG/1
15 TABLET, FILM COATED ORAL NIGHTLY
Status: DISCONTINUED | OUTPATIENT
Start: 2020-03-13 | End: 2020-03-17 | Stop reason: HOSPADM

## 2020-03-13 RX ORDER — SODIUM CHLORIDE 0.9 % (FLUSH) 0.9 %
10 SYRINGE (ML) INJECTION EVERY 12 HOURS SCHEDULED
Status: DISCONTINUED | OUTPATIENT
Start: 2020-03-13 | End: 2020-03-17 | Stop reason: HOSPADM

## 2020-03-13 RX ORDER — PAROXETINE HYDROCHLORIDE 20 MG/1
20 TABLET, FILM COATED ORAL EVERY MORNING
Status: DISCONTINUED | OUTPATIENT
Start: 2020-03-14 | End: 2020-03-17 | Stop reason: HOSPADM

## 2020-03-13 RX ADMIN — TRAMADOL HYDROCHLORIDE 50 MG: 50 TABLET, FILM COATED ORAL at 22:04

## 2020-03-13 RX ADMIN — MIRTAZAPINE 15 MG: 15 TABLET, FILM COATED ORAL at 22:00

## 2020-03-13 RX ADMIN — DOCUSATE SODIUM 100 MG: 100 CAPSULE, LIQUID FILLED ORAL at 22:06

## 2020-03-13 RX ADMIN — CLONAZEPAM 0.25 MG: 0.5 TABLET ORAL at 22:03

## 2020-03-13 RX ADMIN — POTASSIUM CHLORIDE AND SODIUM CHLORIDE: 900; 150 INJECTION, SOLUTION INTRAVENOUS at 22:00

## 2020-03-13 RX ADMIN — SODIUM CHLORIDE 1000 ML: 9 INJECTION, SOLUTION INTRAVENOUS at 17:32

## 2020-03-13 RX ADMIN — POTASSIUM CHLORIDE 20 MEQ: 1500 TABLET, EXTENDED RELEASE ORAL at 22:03

## 2020-03-13 RX ADMIN — FENTANYL CITRATE 25 MCG: 50 INJECTION, SOLUTION INTRAMUSCULAR; INTRAVENOUS at 17:30

## 2020-03-13 ASSESSMENT — ENCOUNTER SYMPTOMS
RHINORRHEA: 0
CHEST TIGHTNESS: 0
DIARRHEA: 0
ABDOMINAL PAIN: 1
NAUSEA: 0
VOMITING: 0
SORE THROAT: 0
COLOR CHANGE: 1
PHOTOPHOBIA: 0
HEMATOCHEZIA: 0
CONSTIPATION: 0
HEMATEMESIS: 0
SHORTNESS OF BREATH: 0

## 2020-03-13 ASSESSMENT — PAIN DESCRIPTION - ORIENTATION: ORIENTATION: LEFT

## 2020-03-13 ASSESSMENT — PAIN DESCRIPTION - PROGRESSION: CLINICAL_PROGRESSION: GRADUALLY WORSENING

## 2020-03-13 ASSESSMENT — PAIN SCALES - GENERAL
PAINLEVEL_OUTOF10: 2
PAINLEVEL_OUTOF10: 7
PAINLEVEL_OUTOF10: 3
PAINLEVEL_OUTOF10: 6
PAINLEVEL_OUTOF10: 0

## 2020-03-13 ASSESSMENT — PAIN DESCRIPTION - FREQUENCY: FREQUENCY: CONTINUOUS

## 2020-03-13 ASSESSMENT — PAIN DESCRIPTION - PAIN TYPE: TYPE: ACUTE PAIN

## 2020-03-13 ASSESSMENT — PAIN DESCRIPTION - DESCRIPTORS: DESCRIPTORS: ACHING;SORE

## 2020-03-13 ASSESSMENT — PAIN DESCRIPTION - LOCATION: LOCATION: FLANK

## 2020-03-13 ASSESSMENT — PAIN DESCRIPTION - ONSET: ONSET: AWAKENED FROM SLEEP

## 2020-03-13 ASSESSMENT — PAIN - FUNCTIONAL ASSESSMENT: PAIN_FUNCTIONAL_ASSESSMENT: PREVENTS OR INTERFERES SOME ACTIVE ACTIVITIES AND ADLS

## 2020-03-13 NOTE — CONSULTS
INPATIENT CONSULTATION RECORD FOR  3/13/2020      [facility]  [address]. [facility], [address] [facility]  [phone]        REASON FOR CONSULTATION:  Chronic bilateral hydronephrosis/Chronic renal insufficiency/Gross hematuria/UTI's/Right nephrostomy tube malfunction/History of nephrolithiasis/Invasive high-grade carcinoma of uncertain etiology      HISTORY OF PRESENT ILLNESS:      The patient is a 62 y.o. female patient who is known to our practice. She was admitted from nursing facility with jaundice and abdominal pain. She has chronic bilateral ureteral obstruction from a pelvic mass of uncertain etiology. This is been determined to be high-grade carcinoma and she is followed by medical oncology. She has received Rajan Resides in the past.  She has back and abdominal pain. Her indwelling nephrostomy tubes were last exchanged on 2/4/2020 by interventional radiology. The right nephrostomy tube recently fell out. She had a CT scan today which I reviewed. This demonstrates worsening right hydronephrosis. The left nephrostomy tube is well-positioned and there is no hydronephrosis. There is bladder thickening and a stable mass within the pelvis. She does not void per urethra any further. She has not had recent UTIs or hematuria.       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 She reports that she does not drink alcohol or use drugs. She is single and resides at [facility]. Her brother is her legal guardian    Family History:   Non-contributory to this Urological problem  family history includes Cancer in her maternal grandmother; Dementia in her mother; Diabetes in her brother; Heart Attack in her maternal grandfather; Other in her daughter; Thyroid Disease in her mother. REVIEW OF SYSTEMS:  Respiratory: denies any symptoms of a productive cough, shortness of breath, or hemoptysis  Cardiovascular: denies chest pain, dyspnea, or cardiac murmur  Gastrointestinal: negative for diarrhea, or constipation. She has midepigastric abdominal pain  Dermatologic: denies any rashes, skin lesion(s), or pruritis  Neurological: negative for headaches, seizures, and tremors  Endocrine: negative for diabetic symptoms including polydipsia and polyuria or thyroid dysfunction  Ocular: denies retinopathy or glaucoma  ENT: denies sinusitis or epistaxis  Constitutional: denies nausea, vomiting, fever, or chills  Psychiatric: denies anxiety   : denies incontinence or gross hematuria. Her right nephrostomy tube fell out recently    PHYSICAL EXAM:    Vitals:  /75   Pulse 83   Temp 96.1 °F (35.6 °C)   Resp 18   LMP 05/21/2015 (Approximate)   SpO2 96%     General:  Awake, alert, oriented X 3. Well developed, well nourished, well groomed. No apparent distress. She is jaundiced  HEENT:  Normocephalic, atraumatic. Pupils equal, round. No conjunctival injection. Normal lips, teeth, and gums. No nasal discharge. Neck:  Supple, no masses. Heart:  RRR. Lungs:  No audible wheezing. Respirations symmetric and non-labored. Clear bilaterally. Abdomen:  Soft, nontender, no masses, no organomegaly, no peritoneal signs. Active bowel sounds. No rebound or guarding. No flank or CVA tenderness. She has a dressing over her right flank without infection.   She has a left nephrostomy tube draining dark aury-colored urine into a gravity bag  Extremities:  No clubbing, cyanosis, or edema. Brisk peripheral pulses. Skin:  Warm and dry, no open lesions or rashes. Neuro:  Cranial nerves 2-12 intact, no focal motor or sensory deficits. Alert and oriented. Rectal: Deferred  Genitalia: Deferred    LABS:    Lab Results   Component Value Date    WBC 19.8 (H) 03/13/2020    HGB 9.6 (L) 03/13/2020    HCT 33.0 (L) 03/13/2020    .8 (H) 03/13/2020     03/13/2020       Lab Results   Component Value Date    BUN 27 (H) 03/13/2020    CREATININE 1.1 (H) 03/13/2020           ASSESSMENT / PLAN:    Chronic bilateral hydronephrosis/Chronic renal insufficiency/Gross hematuria/UTI's/Right nephrostomy tube malfunction/History of nephrolithiasis/Invasive high-grade carcinoma of uncertain etiology  It is unclear as to when the right nephrostomy tube fell out. She has redemonstrated hydronephrosis on the right side. The left nephrostomy tube is well-positioned and appears of last been changed on 2/4/2020 by interventional radiology. She is to be admitted. Interventional radiology will be consulted again for exchange of her left nephrostomy tube and replacement of a right nephrostomy tube. She has tried indwelling ureteral stents previously which become obstructed and do not drain her kidneys appropriately. She appears to have very poor prognosis. It still remains indeterminate as to the etiology of her high-grade carcinoma. I suspect it is gynecologic in origin. She follows with medical oncology (Dr. Tacho Burns). We have discussed with her transferr to the [facility] for further gynecologic oncologic care in the past.  Palliative medicine may be a more appropriate consult at this time, however. Her white blood cell count is elevated. Cultures are pending. She will begin broad-spectrum antibiotics. No urologic intervention is planned at this time.   We will follow her during her

## 2020-03-13 NOTE — ED PROVIDER NOTES
Patient is a 75-year-old female that presents to the emergency department for admission for nephrostomy tube replacement. Patient has a history of cervical cancer and previously had a nephrostomy tube placed. She has had a exchange on multiple occasions. She was sent in by her urologist for an exchange of the nephrostomy tube. Patient admits to mild abdominal pain, jaundice and fatigue. Abdominal pain is an aching sensation throughout the entire abdomen. Nothing significant better or worse. It is been gradually worsening over the last several weeks. She denies headache, lightheadedness, dizziness, chest pain, shortness of breath, nausea, vomiting. The history is provided by the patient and medical records. Abdominal Pain   Pain location:  Generalized  Pain quality: aching    Pain radiates to:  Does not radiate  Pain severity:  Moderate  Onset quality:  Gradual  Timing:  Constant  Progression:  Worsening  Chronicity:  New  Relieved by:  Nothing  Worsened by:  Nothing  Ineffective treatments:  None tried  Associated symptoms: fatigue    Associated symptoms: no anorexia, no chest pain, no chills, no constipation, no diarrhea, no dysuria, no fever, no hematemesis, no hematochezia, no hematuria, no melena, no nausea, no shortness of breath, no sore throat and no vomiting         Review of Systems   Constitutional: Positive for fatigue. Negative for chills, diaphoresis and fever. HENT: Negative for congestion, rhinorrhea and sore throat. Eyes: Negative for photophobia and visual disturbance. Respiratory: Negative for chest tightness and shortness of breath. Cardiovascular: Negative for chest pain and palpitations. Gastrointestinal: Positive for abdominal pain. Negative for anorexia, constipation, diarrhea, hematemesis, hematochezia, melena, nausea and vomiting. Genitourinary: Negative for dysuria, flank pain, frequency and hematuria.    Musculoskeletal: Negative for myalgias, neck pain and neck elevated bilirubin of 8.6 as well as elevated liver enzymes. CT of scan of the abdomen and pelvis showed nephrostomy tube in place as well as concern for metastases to the liver. Further review of patient's paperwork and charting reveals that she was previously discharged to hospice. Consult placed to Dr. Savita Jiménez for further clarification of the nephrostomy tube replacement. 1935: After discussion with patient's urologist it was determined that the nephrostomy tube is for palliative care and is dysfunctional at this time. Plan to admit to medicine for IR exchange of nephrostomy tube.        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 ([address] Cellulitis, Chronic kidney disease, Chronic major depressive disorder, recurrent episode ([address] Constipation, unspecified, Depression, Difficulty walking, Dysmenorrhea, unspecified, Dysphagia, oropharyngeal, Hematuria, Hydronephrosis, Hyperlipidemia, Hypertension, Hypothyroidism, Intellectual disability, Leg pain, bilateral, Mild intermittent asthma, uncomplicated, Mild protein-calorie malnutrition ([address] Mixed incontinence, Muscle weakness (generalized), Neuromuscular dysfunction of bladder, Noncompliance with medications, Noncompliance with treatment, Obstructive and reflux uropathy, Osteoarthritis, Other fatigue, Other symbolic dysfunctions, Personal care impairment, Personal history of noncompliance with medical treatment, presenting hazards to health, Presence of urogenital 3.50 - 5.50 E12/L    Hemoglobin 9.6 (L) 11.5 - 15.5 g/dL    Hematocrit 33.0 (L) 34.0 - 48.0 %    .8 (H) 80.0 - 99.9 fL    MCH 29.9 26.0 - 35.0 pg    MCHC 29.1 (L) 32.0 - 34.5 %    RDW 16.6 (H) 11.5 - 15.0 fL    Platelets 424 551 - 468 E9/L    MPV 11.3 7.0 - 12.0 fL    Neutrophils % 88.4 (H) 43.0 - 80.0 %    Immature Granulocytes % 2.0 0.0 - 5.0 %    Lymphocytes % 5.0 (L) 20.0 - 42.0 %    Monocytes % 3.4 2.0 - 12.0 %    Eosinophils % 0.9 0.0 - 6.0 %    Basophils % 0.3 0.0 - 2.0 %    Neutrophils Absolute 17.51 (H) 1.80 - 7.30 E9/L    Immature Granulocytes # 0.39 E9/L    Lymphocytes Absolute 1.00 (L) 1.50 - 4.00 E9/L    Monocytes Absolute 0.68 0.10 - 0.95 E9/L    Eosinophils Absolute 0.18 0.05 - 0.50 E9/L    Basophils Absolute 0.05 0.00 - 0.20 E9/L   Lipase   Result Value Ref Range    Lipase 7 (L) 13 - 60 U/L   Lactate, Sepsis   Result Value Ref Range    Lactic Acid, Sepsis 1.6 0.5 - 1.9 mmol/L       RADIOLOGY:  CT ABDOMEN PELVIS WO CONTRAST Additional Contrast? None   Final Result      1. Redemonstration of left nephrostomy tube. No hydronephrosis on the   left. 2. Status post removal of right nephrostomy tube. There is persistent   right hydronephrosis with right hydroureter. 3. Redemonstration of generalized thickened appearance of urinary   bladder wall. Clinical correlation recommended. 4. Thickened appearance of wall of the rectum suggestive of   nonspecific proctitis. This finding appears similar compared to   previous examination. 5. Irregular areas of attenuation are present in the pelvis. This   finding also appears similar since previous examination which could   indicate areas of scar tissue or inflammatory changes. 6. Multiple focal sclerotic lesions are associated with pelvis and   lumbar spine. Some of these lesions have increased in size since   prior. Findings suggest osseous metastases. Bone scan could be helpful   for further evaluation.       7. Redemonstration of pathologic fractures associated with right   superior and inferior pubic rami.          ------------------------- NURSING NOTES AND VITALS REVIEWED ---------------------------  Date / Time Roomed:  3/13/2020  3:56 PM  ED Bed Assignment:  [place] D/    The nursing notes within the ED encounter and vital signs as below have been reviewed. Patient Vitals for the past 24 hrs:   BP Temp Pulse Resp SpO2   03/13/20 1859 115/75 -- 83 18 96 %   03/13/20 1742 122/72 -- 81 17 97 %   03/13/20 1603 115/69 96.1 °F (35.6 °C) 84 17 99 %       Oxygen Saturation Interpretation: Normal    ------------------------------------------ PROGRESS NOTES ------------------------------------------  Re-evaluation(s):  Time: 3618  Patients symptoms show no change  Repeat physical examination is not changed    Counseling:  I have spoken with the patient and discussed todays results, in addition to providing specific details for the plan of care and counseling regarding the diagnosis and prognosis. Their questions are answered at this time and they are agreeable with the plan of admission.    --------------------------------- ADDITIONAL PROVIDER NOTES ---------------------------------  Consultations:  Time: 1945. Spoke with Dr. Gay Bernstein. Discussed case. They will admit the patient. This patient's ED course included: a personal history and physicial examination, re-evaluation prior to disposition, cardiac monitoring and continuous pulse oximetry    This patient has remained hemodynamically stable during their ED course. Diagnosis:  1. Malfunction of nephrostomy tube ([facility]    2. Urothelial carcinoma ([address]        Disposition:  Patient's disposition: Admit to telemetry  Patient's condition is poor.            Ysabel Linares DO  Resident  03/13/20 1950

## 2020-03-14 LAB
BACTERIA: ABNORMAL /HPF
BILIRUBIN URINE: ABNORMAL
BLOOD, URINE: ABNORMAL
CLARITY: ABNORMAL
COLOR: YELLOW
GLUCOSE URINE: NEGATIVE MG/DL
HCT VFR BLD CALC: 31 % (ref 34–48)
HEMOGLOBIN: 8.8 G/DL (ref 11.5–15.5)
KETONES, URINE: NEGATIVE MG/DL
LEUKOCYTE ESTERASE, URINE: ABNORMAL
MAGNESIUM: 2.1 MG/DL (ref 1.6–2.6)
MCH RBC QN AUTO: 29.9 PG (ref 26–35)
MCHC RBC AUTO-ENTMCNC: 28.4 % (ref 32–34.5)
MCV RBC AUTO: 105.4 FL (ref 80–99.9)
NITRITE, URINE: NEGATIVE
PDW BLD-RTO: 16.7 FL (ref 11.5–15)
PH UA: 5 (ref 5–9)
PLATELET # BLD: 367 E9/L (ref 130–450)
PMV BLD AUTO: 11.6 FL (ref 7–12)
PROTEIN UA: 30 MG/DL
RBC # BLD: 2.94 E12/L (ref 3.5–5.5)
RBC UA: >20 /HPF (ref 0–2)
REASON FOR REJECTION: NORMAL
REJECTED TEST: NORMAL
SPECIFIC GRAVITY UA: 1.02 (ref 1–1.03)
UROBILINOGEN, URINE: 0.2 E.U./DL
WBC # BLD: 17.6 E9/L (ref 4.5–11.5)
WBC UA: >20 /HPF (ref 0–5)

## 2020-03-14 PROCEDURE — 6370000000 HC RX 637 (ALT 250 FOR IP): Performed by: HOSPITALIST

## 2020-03-14 PROCEDURE — 83735 ASSAY OF MAGNESIUM: CPT

## 2020-03-14 PROCEDURE — 36415 COLL VENOUS BLD VENIPUNCTURE: CPT

## 2020-03-14 PROCEDURE — 81001 URINALYSIS AUTO W/SCOPE: CPT

## 2020-03-14 PROCEDURE — 2580000003 HC RX 258: Performed by: HOSPITALIST

## 2020-03-14 PROCEDURE — 1200000000 HC SEMI PRIVATE

## 2020-03-14 PROCEDURE — 85027 COMPLETE CBC AUTOMATED: CPT

## 2020-03-14 PROCEDURE — 6360000002 HC RX W HCPCS: Performed by: HOSPITALIST

## 2020-03-14 RX ADMIN — HYDROCODONE BITARTRATE AND ACETAMINOPHEN 1 TABLET: 5; 325 TABLET ORAL at 17:10

## 2020-03-14 RX ADMIN — CLONAZEPAM 0.25 MG: 0.5 TABLET ORAL at 20:22

## 2020-03-14 RX ADMIN — DOCUSATE SODIUM 100 MG: 100 CAPSULE, LIQUID FILLED ORAL at 20:23

## 2020-03-14 RX ADMIN — POTASSIUM CHLORIDE AND SODIUM CHLORIDE: 900; 150 INJECTION, SOLUTION INTRAVENOUS at 23:59

## 2020-03-14 RX ADMIN — LEVOTHYROXINE SODIUM 100 MCG: 0.1 TABLET ORAL at 06:46

## 2020-03-14 RX ADMIN — CLONAZEPAM 0.25 MG: 0.5 TABLET ORAL at 10:30

## 2020-03-14 RX ADMIN — POTASSIUM CHLORIDE AND SODIUM CHLORIDE: 900; 150 INJECTION, SOLUTION INTRAVENOUS at 11:24

## 2020-03-14 RX ADMIN — MIDODRINE HYDROCHLORIDE 2.5 MG: 5 TABLET ORAL at 17:10

## 2020-03-14 RX ADMIN — Medication 10 ML: at 20:23

## 2020-03-14 RX ADMIN — MIRTAZAPINE 15 MG: 15 TABLET, FILM COATED ORAL at 20:23

## 2020-03-14 RX ADMIN — MIDODRINE HYDROCHLORIDE 2.5 MG: 5 TABLET ORAL at 10:29

## 2020-03-14 RX ADMIN — MIDODRINE HYDROCHLORIDE 2.5 MG: 5 TABLET ORAL at 11:25

## 2020-03-14 RX ADMIN — FERROUS SULFATE TAB 325 MG (65 MG ELEMENTAL FE) 325 MG: 325 (65 FE) TAB at 17:10

## 2020-03-14 ASSESSMENT — PAIN DESCRIPTION - DESCRIPTORS: DESCRIPTORS: ACHING;DULL;DISCOMFORT

## 2020-03-14 ASSESSMENT — PAIN DESCRIPTION - PROGRESSION: CLINICAL_PROGRESSION: RAPIDLY WORSENING

## 2020-03-14 ASSESSMENT — PAIN SCALES - GENERAL
PAINLEVEL_OUTOF10: 8
PAINLEVEL_OUTOF10: 0

## 2020-03-14 ASSESSMENT — PAIN DESCRIPTION - LOCATION: LOCATION: FLANK

## 2020-03-14 ASSESSMENT — PAIN DESCRIPTION - FREQUENCY: FREQUENCY: CONTINUOUS

## 2020-03-14 ASSESSMENT — PAIN - FUNCTIONAL ASSESSMENT: PAIN_FUNCTIONAL_ASSESSMENT: PREVENTS OR INTERFERES WITH MANY ACTIVE NOT PASSIVE ACTIVITIES

## 2020-03-14 ASSESSMENT — PAIN DESCRIPTION - PAIN TYPE: TYPE: ACUTE PAIN

## 2020-03-14 ASSESSMENT — PAIN DESCRIPTION - ONSET: ONSET: GRADUAL

## 2020-03-14 NOTE — CONSULTS
Palliative Medicine  Progress Note    Patient is an established hospice patient with [address], currently admitted to address malfunctioning nephrostomy tube. Plan will be for discharge home with hospice. She has symptomatic medications ordered per attending. There is no need for PM at this time. Natty Olmos APRN-CNP  Palliative Medicine    Note: This report was completed using computerFIXO voiced recognition software. Every effort has been made to ensure accuracy; however, inadvertent computerized transcription errors may be present.

## 2020-03-14 NOTE — H&P
fatigue     Other symbolic dysfunctions     Personal care impairment     Personal history of noncompliance with medical treatment, presenting hazards to health     Presence of urogenital implants     Unspecified intellectual disabilities     Urinary tract infection, site not specified     Vitamin D deficiency, unspecified        Past Surgical History:          Procedure Laterality Date    CATHETER REMOVAL N/A 12/27/2019    MEDIPORT CATHETER REMOVAL (DO NOT CHANGE TIME-TRANSPORTATION) performed by Cb Antoine MD at [address] [facility] Left 1/21/2019    CYSTOSCOPY, BILATERAL RETROGRADE PYELOGRAMS, BILATERAL STENT INSERTION [facility] Bilateral 6/1/2019    CYSTOSCOPY, RETROGRADE PYELOGRAMS, BILATERAL URETERAL STENT EXCHANGE performed by Siva Rob MD at [facility] / Giovanni Avalos / [place] Bilateral 9/16/2019    CYSTOSCOPY RETROGRADE PYELOGRAM BILATERAL STENT REMOVAL performed by Conrado Salinas MD at [address] [place] [address] / [facility] / [address] N/A 8/19/2019    MEDIPORT CATHETER INSERTION (DO NOT MOVE TIME) performed by Cb Antoine MD at [address] [facility] N/A 6/6/2019    EXAM [address] [facility] performed by Jessica Mead MD at [address]       Medications Prior to Admission:      Prior to Admission medications    Medication Sig Start Date End Date Taking?  Authorizing Provider   PARoxetine (PAXIL) 20 MG tablet Take 20 mg by mouth every morning   Yes Historical Provider, MD   midodrine (PROAMATINE) 2.5 MG tablet Take 2.5 mg by mouth 3 times daily (before meals)   Yes Historical Provider, MD   ondansetron (ZOFRAN) 4 MG tablet Take 4 mg by mouth every 8 hours as needed for Nausea or Vomiting   Yes Historical Provider, MD   pantoprazole (PROTONIX) 40 MG tablet Take 40 mg by mouth daily   Yes Historical Provider, MD   levothyroxine (SYNTHROID) 100 MCG tablet Take 100 mcg by mouth Daily   Yes Historical Provider, MD   darbepoetin lucio-polysorbate (ARANESP) 200 MCG/ML injection Inject 200 mcg into the skin once Last Dose - 11/04/19  Next Dose - 12/13/19   Yes Historical Provider, MD   traMADol (ULTRAM) 50 MG tablet Take 50 mg by mouth every 6 hours as needed for Pain. Yes Historical Provider, MD   midodrine (PROAMATINE) 5 MG tablet Take 5 mg by mouth every 4-6 hours as needed (hypotension)    Yes Historical Provider, MD   HYDROcodone-acetaminophen (NORCO) 5-325 MG per tablet Take 1 tablet by mouth every 6 hours as needed for Pain (moderate pain malignant neoplasm of uterus). Yes Historical Provider, MD   acetaminophen (TYLENOL) 325 MG tablet Take 650 mg by mouth every 4 hours as needed for Pain or Fever    Yes Historical Provider, MD   mirtazapine (REMERON) 15 MG tablet Take 1 tablet by mouth nightly 5/24/19  Yes Akbar Ernst MD   docusate sodium (COLACE, DULCOLAX) 100 MG CAPS Take 100 mg by mouth 2 times daily 4/23/19  Yes Jonnathan Alvarenga DO   oxybutynin (DITROPAN-XL) 5 MG extended release tablet Take 1 tablet by mouth daily 4/9/19  Yes Celestine Trujillo DO   vitamin D (CHOLECALCIFEROL) 14229 UNIT CAPS Take 50,000 Units by mouth daily    Historical Provider, MD   atorvastatin (LIPITOR) 40 MG tablet Take 40 mg by mouth nightly    Historical Provider, MD   clonazePAM (KLONOPIN) 0.5 MG tablet Take 0.25 mg by mouth 2 times daily. Historical Provider, MD   sertraline (ZOLOFT) 25 MG tablet Take 25 mg by mouth daily    Historical Provider, MD   ferrous sulfate 325 (65 Fe) MG tablet Take 1 tablet by mouth 2 times daily (with meals) 4/23/19   Jonnathan Alvarenga DO       Allergies:  Patient has no known allergies. Social History:      The patient currently lives at [address] [place] [address]:   reports that she quit smoking about 31 years ago. She has a 5.00 pack-year smoking history. She has never used smokeless tobacco.  ETOH:   reports no history of alcohol use.   E-Cigarettes Vaping or Juuling Questions Responses    Vaping Use Never User    Start Date     Does device contain nicotine? Quit Date     Vaping Type             Family History:       Reviewed in detail and negative for DM, CAD, Cancer, CVA. Positive as follows:        Problem Relation Age of Onset    Diabetes Brother     Thyroid Disease Mother     Dementia Mother     Other Daughter         Autism    Cancer Maternal Grandmother         patient doesn't know type.  Heart Attack Maternal Grandfather        REVIEW OF SYSTEMS:   Pertinent positives as noted in the HPI. All other systems reviewed and negative. PHYSICAL EXAM PERFORMED:    BP (!) 123/91   Pulse 89   Temp 96.1 °F (35.6 °C)   Resp 20   LMP 05/21/2015 (Approximate)   SpO2 97%     General appearance:  No apparent distress, appears stated age and cooperative. cachectic  HEENT:  Normal cephalic, atraumatic without obvious deformity. Pupils equal, round, and reactive to light. Extra ocular muscles intact. Conjunctivae/corneas clear. Neck: Supple, with full range of motion. No jugular venous distention. Trachea midline. Respiratory:  Normal respiratory effort. Clear to auscultation, bilaterally without Rales/Wheezes/Rhonchi. Cardiovascular:  Regular rate and rhythm with normal S1/S2 without murmurs, rubs or gallops. Abdomen: Soft, non-tender, non-distended with normal bowel sounds. She has a left nephrostomy tube draining dark aury-colored urine into a gravity bag  Musculoskeletal:  No clubbing, cyanosis or edema bilaterally. Full range of motion without deformity. Skin: Skin color, texture, turgor normal.  No rashes or lesions. Neurologic:  Neurovascularly intact without any focal sensory/motor deficits.  Cranial nerves: II-XII intact, grossly non-focal.  Psychiatric:  Alert and oriented, thought content appropriate, normal insight        Labs:     Recent Labs     03/13/20  1716   WBC 19.8*   HGB 9.6*   HCT 33.0*        Recent Labs     03/13/20  1716   NA being f/u oncology ,sent to ER by urology for NT change due to malfunction of it, rt NT fell out recently,   She is aox3, very cachectic, mild abdominal pain, appetite fair, she is for admission for nephrostomy tube replacement ,  Blood work revealed a markedly elevated bilirubin of 8.6 as well as elevated liver enzymes. CT of scan of the abdomen and pelvis showed nephrostomy tube in place as well as concern for metastases to the liver. it was determined that the nephrostomy tube is for palliative care and is dysfunctional at this time. Plan to admit to medicine for IR exchange of nephrostomy tube. MALFUNCTION OF NT   Pelvic mass/mets cancer  CKD  CHRONIC BL HYDRO S/P NT  Leukocytosis  OBST jaundice  Hypokalemia  DEPRESSION  HLP  HYPOTHYROIDISM   GERD        Plan  IR c/s for left NT exchange and replacement of rt NT for palliative reason  Pall cs/  Iv abx, ucx, bcx  Urology c/s  Oncology c/s,  DNR/DNI  HOME MEDS/  GRAVE PROGNOSIS  Hospice at [facility]  IVF  Replace low K      DVT Prophylaxis: lovenox  Diet: No diet orders on file  Code Status: Prior    PT/OT Eval Status: NA    Dispo - ip       Dangelo Gupta MD    Thank you Jesus Rooney DO for the opportunity to be involved in this patient's care. If you have any questions or concerns please feel free to contact me at [phone].

## 2020-03-14 NOTE — PROGRESS NOTES
have increased in size since   prior. Findings suggest osseous metastases. Bone scan could be helpful   for further evaluation. 7. Redemonstration of pathologic fractures associated with right   superior and inferior pubic rami. IR Interventional Radiology Procedure Request    (Results Pending)   IR GUIDED NEPHROSTOMY CATH EXCHANGE    (Results Pending)           Assessment/Plan:    Active Hospital Problems    Diagnosis    Malfunction of nephrostomy tube [facility]) [T83.416W]     63 yo female hospice patient, hx BL NT from pelvic mass, due to high grade cancer , being f/u oncology ,sent to ER by urology for NT change due to malfunction of it, rt NT fell out recently,   She is aox3, very cachectic, mild abdominal pain, appetite fair, she is for admission for nephrostomy tube replacement ,  Blood work revealed a markedly elevated bilirubin of 8.6 as well as elevated liver enzymes.  CT of scan of the abdomen and pelvis showed nephrostomy tube in place as well as concern for metastases to the liver.  it was determined that the nephrostomy tube is for palliative care and is dysfunctional at this time.  Plan to admit to medicine for IR exchange of nephrostomy tube.     MALFUNCTION OF NT   Pelvic mass/mets cancer  CKD  CHRONIC BL HYDRO S/P NT  Leukocytosis  OBST jaundice  Hypokalemia  DEPRESSION  HLP  HYPOTHYROIDISM   GERD           Plan  IR c/s for left NT exchange and replacement of rt NT for palliative reason  Pall cs/  Iv abx, ucx, bcx  Urology c/sed  Oncology c/sed,  DNR/DNI  HOME MEDS/  GRAVE PROGNOSIS  [facility] at [facility] [place]  IVF  Replace low K  Hospice called, pt here only for tube exchange. . will dc after safe/procedured, cont iv abx for now    DVT Prophylaxis: lovenox  Diet: DIET FULL LIQUID;  Code Status: DNR-CCA    PT/OT Eval Status: na    Dispo - ip    Colette Gilliland MD

## 2020-03-14 NOTE — PROGRESS NOTES
[facility] UROLOGY  PROGRESS NOTE    Chief Complaint:   Chronic bilateral hydronephrosis/Chronic renal insufficiency/Gross hematuria/UTI's/Right nephrostomy tube malfunction/History of nephrolithiasis/Invasive high-grade carcinoma of uncertain etiology    HPI: She is currently comfortable. She is still having abdominal pain but does not wish to take any oral pain medication at this time. She is not voiding per urethra. Vitals:    03/14/20 0745   BP: (!) 99/55   Pulse: 81   Resp: 17   Temp: 97.8 °F (36.6 °C)   SpO2:        Allergies: Patient has no known allergies.     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 ([facility] 6/29/2019    Cellulitis     LUE    Chronic kidney disease     Chronic major depressive disorder, recurrent episode (HCC)     Constipation, unspecified     Depression     Difficulty walking     Dysmenorrhea, unspecified     Dysphagia, oropharyngeal     Hematuria     Hydronephrosis     Hyperlipidemia     Hypertension     Hypothyroidism     Intellectual disability     Leg pain, bilateral     Mild intermittent asthma, uncomplicated     Mild protein-calorie malnutrition (HCC)     Mixed incontinence     Muscle weakness (generalized)     Neuromuscular dysfunction of bladder     Noncompliance with medications     Noncompliance with treatment     Obstructive and reflux uropathy     Osteoarthritis     Other fatigue     Other symbolic dysfunctions     Personal care impairment     Personal history of noncompliance with medical treatment, presenting hazards to health     Presence of urogenital implants     Unspecified intellectual disabilities     Urinary tract infection, site not specified     Vitamin D pain  Genitourinary: Chronic nephrostomy tubes  Musculoskeletal: negative  Neurological: negative  Behavioral/Psych: Depression  Endocrine: negative    Physical Exam:  Skin dry, without rashes. She is jaundiced  Respirations non-labored, intact  Abdomen soft, non-tender, non-distended. Active bowel sounds. Alert and oriented x 3. Left nephrostomy tube is draining dark, aury-colored urine    Assessment and Plan:  Chronic bilateral hydronephrosis/Chronic renal insufficiency/Gross hematuria/UTI's/Right nephrostomy tube malfunction/History of nephrolithiasis/Invasive high-grade carcinoma of uncertain etiology  -She no longer has a urethral catheter.   Oxybutynin will be discontinued.  -We are awaiting replacement of the right nephrostomy tube and exchange of the left nephrostomy tube by interventional radiology  -She will continue with hospice care upon discharge  -No urologic intervention is planned at this time.  -Stable for discharge      Lubna Hernandes MD  3/14/2020  1:21 PM

## 2020-03-15 PROCEDURE — 1200000000 HC SEMI PRIVATE

## 2020-03-15 PROCEDURE — 6370000000 HC RX 637 (ALT 250 FOR IP): Performed by: HOSPITALIST

## 2020-03-15 PROCEDURE — 2580000003 HC RX 258: Performed by: HOSPITALIST

## 2020-03-15 RX ADMIN — MIRTAZAPINE 15 MG: 15 TABLET, FILM COATED ORAL at 21:22

## 2020-03-15 RX ADMIN — MIDODRINE HYDROCHLORIDE 2.5 MG: 5 TABLET ORAL at 16:34

## 2020-03-15 RX ADMIN — LEVOTHYROXINE SODIUM 100 MCG: 0.1 TABLET ORAL at 06:07

## 2020-03-15 RX ADMIN — Medication 10 ML: at 20:47

## 2020-03-15 RX ADMIN — CLONAZEPAM 0.25 MG: 0.5 TABLET ORAL at 20:47

## 2020-03-15 RX ADMIN — PAROXETINE 20 MG: 20 TABLET, FILM COATED ORAL at 08:52

## 2020-03-15 RX ADMIN — HYDROCODONE BITARTRATE AND ACETAMINOPHEN 1 TABLET: 5; 325 TABLET ORAL at 16:39

## 2020-03-15 RX ADMIN — FERROUS SULFATE TAB 325 MG (65 MG ELEMENTAL FE) 325 MG: 325 (65 FE) TAB at 16:34

## 2020-03-15 RX ADMIN — MIDODRINE HYDROCHLORIDE 2.5 MG: 5 TABLET ORAL at 06:07

## 2020-03-15 RX ADMIN — CLONAZEPAM 0.25 MG: 0.5 TABLET ORAL at 08:51

## 2020-03-15 RX ADMIN — MIDODRINE HYDROCHLORIDE 2.5 MG: 5 TABLET ORAL at 12:29

## 2020-03-15 ASSESSMENT — PAIN SCALES - GENERAL
PAINLEVEL_OUTOF10: 0
PAINLEVEL_OUTOF10: 0
PAINLEVEL_OUTOF10: 6
PAINLEVEL_OUTOF10: 3

## 2020-03-15 NOTE — PROGRESS NOTES
left nephrostomy tube draining dark aury-colored urine into a gravity bag  Musculoskeletal:  No clubbing, cyanosis or edema bilaterally. Full range of motion without deformity. Skin: Skin color, texture, turgor normal.  No rashes or lesions. Neurologic:  Neurovascularly intact without any focal sensory/motor deficits. Cranial nerves: II-XII intact, grossly non-focal.  Psychiatric:  Alert and oriented, thought content appropriate, normal insight      Labs:   Recent Labs     03/13/20  1716 03/14/20  1255   WBC 19.8* 17.6*   HGB 9.6* 8.8*   HCT 33.0* 31.0*    367     Recent Labs     03/13/20  1716      K 3.3*      CO2 26   BUN 27*   CREATININE 1.1*   CALCIUM 10.8*     Recent Labs     03/13/20  1716   AST 62*   ALT 65*   BILITOT 8.6*   ALKPHOS 1,470*     No results for input(s): INR in the last 72 hours. No results for input(s): Kristina Rojas in the last 72 hours. Urinalysis:      Lab Results   Component Value Date    NITRU Negative 03/14/2020    WBCUA >20 03/14/2020    BACTERIA MODERATE 03/14/2020    RBCUA >20 03/14/2020    BLOODU LARGE 03/14/2020    SPECGRAV 1.025 03/14/2020    GLUCOSEU Negative 03/14/2020       Radiology:  CT ABDOMEN PELVIS WO CONTRAST Additional Contrast? None   Final Result      1. Redemonstration of left nephrostomy tube. No hydronephrosis on the   left. 2. Status post removal of right nephrostomy tube. There is persistent   right hydronephrosis with right hydroureter. 3. Redemonstration of generalized thickened appearance of urinary   bladder wall. Clinical correlation recommended. 4. Thickened appearance of wall of the rectum suggestive of   nonspecific proctitis. This finding appears similar compared to   previous examination. 5. Irregular areas of attenuation are present in the pelvis. This   finding also appears similar since previous examination which could   indicate areas of scar tissue or inflammatory changes.       6. Multiple focal sclerotic lesions are associated with pelvis and   lumbar spine. Some of these lesions have increased in size since   prior. Findings suggest osseous metastases. Bone scan could be helpful   for further evaluation. 7. Redemonstration of pathologic fractures associated with right   superior and inferior pubic rami. IR Interventional Radiology Procedure Request    (Results Pending)   IR GUIDED NEPHROSTOMY CATH EXCHANGE    (Results Pending)           Assessment/Plan:    Active Hospital Problems    Diagnosis    Malfunction of nephrostomy tube [facility]) [T83.893H]     63 yo female hospice patient, hx BL NT from pelvic mass, due to high grade cancer , being f/u oncology ,sent to ER by urology for NT change due to malfunction of it, rt NT fell out recently,   She is aox3, very cachectic, mild abdominal pain, appetite fair, she is for admission for nephrostomy tube replacement ,  Blood work revealed a markedly elevated bilirubin of 8.6 as well as elevated liver enzymes.  CT of scan of the abdomen and pelvis showed nephrostomy tube in place as well as concern for metastases to the liver.  it was determined that the nephrostomy tube is for palliative care and is dysfunctional at this time.  Plan to admit to medicine for IR exchange of nephrostomy tube.     MALFUNCTION OF NT   Pelvic mass/mets cancer  CKD  CHRONIC BL HYDRO S/P NT  Leukocytosis  OBST jaundice  Hypokalemia  DEPRESSION  HLP  HYPOTHYROIDISM   GERD           Plan  IR c/s for left NT exchange and replacement of rt NT for palliative reason  Urology c/sed-- s/oed, defer to IR for changes tube-- then dc to hospice back  No needs labs  No need onco/pall input since on hospice/pall care already  DNR/DNI  HOME MEDS/  [address] at [facility]  IVF  Replace low K  Hospice called, pt here only for tube exchange. . will dc after safe/procedured, cont iv abx for now    DVT Prophylaxis: lovenox  Diet: DIET FULL LIQUID;  Code Status: DNR-CCA    PT/OT Eval Status: na    Dispo - ip    Jessica Gomez MD

## 2020-03-16 ENCOUNTER — APPOINTMENT (OUTPATIENT)
Dept: INTERVENTIONAL RADIOLOGY/VASCULAR | Age: 55
DRG: 466 | End: 2020-03-16
Payer: MEDICAID

## 2020-03-16 ENCOUNTER — ANESTHESIA EVENT (OUTPATIENT)
Dept: INTERVENTIONAL RADIOLOGY/VASCULAR | Age: 55
DRG: 466 | End: 2020-03-16
Payer: MEDICAID

## 2020-03-16 ENCOUNTER — ANESTHESIA (OUTPATIENT)
Dept: INTERVENTIONAL RADIOLOGY/VASCULAR | Age: 55
DRG: 466 | End: 2020-03-16
Payer: MEDICAID

## 2020-03-16 VITALS
RESPIRATION RATE: 21 BRPM | SYSTOLIC BLOOD PRESSURE: 142 MMHG | OXYGEN SATURATION: 100 % | DIASTOLIC BLOOD PRESSURE: 83 MMHG

## 2020-03-16 LAB
INR BLD: 3
PROTHROMBIN TIME: 34.7 SEC (ref 9.3–12.4)

## 2020-03-16 PROCEDURE — 6370000000 HC RX 637 (ALT 250 FOR IP): Performed by: HOSPITALIST

## 2020-03-16 PROCEDURE — 6360000002 HC RX W HCPCS

## 2020-03-16 PROCEDURE — 36415 COLL VENOUS BLD VENIPUNCTURE: CPT

## 2020-03-16 PROCEDURE — 2580000003 HC RX 258

## 2020-03-16 PROCEDURE — 6370000000 HC RX 637 (ALT 250 FOR IP): Performed by: INTERNAL MEDICINE

## 2020-03-16 PROCEDURE — 3700000001 HC ADD 15 MINUTES (ANESTHESIA)

## 2020-03-16 PROCEDURE — 3700000000 HC ANESTHESIA ATTENDED CARE

## 2020-03-16 PROCEDURE — 85610 PROTHROMBIN TIME: CPT

## 2020-03-16 PROCEDURE — 0TP5X0Z REMOVAL OF DRAINAGE DEVICE FROM KIDNEY, EXTERNAL APPROACH: ICD-10-PCS | Performed by: INTERNAL MEDICINE

## 2020-03-16 PROCEDURE — 1200000000 HC SEMI PRIVATE

## 2020-03-16 PROCEDURE — 2500000003 HC RX 250 WO HCPCS: Performed by: RADIOLOGY

## 2020-03-16 PROCEDURE — 0T9130Z DRAINAGE OF LEFT KIDNEY WITH DRAINAGE DEVICE, PERCUTANEOUS APPROACH: ICD-10-PCS | Performed by: INTERNAL MEDICINE

## 2020-03-16 PROCEDURE — 50432 PLMT NEPHROSTOMY CATHETER: CPT

## 2020-03-16 PROCEDURE — 2709999900 IR GUIDED NEPHROSTOMY CATH PLACEMENT LEFT

## 2020-03-16 PROCEDURE — 50389 REMOVE RENAL TUBE W/FLUORO: CPT

## 2020-03-16 PROCEDURE — 50435 EXCHANGE NEPHROSTOMY CATH: CPT

## 2020-03-16 PROCEDURE — 2580000003 HC RX 258: Performed by: HOSPITALIST

## 2020-03-16 RX ORDER — PROPOFOL 10 MG/ML
INJECTION, EMULSION INTRAVENOUS CONTINUOUS PRN
Status: DISCONTINUED | OUTPATIENT
Start: 2020-03-16 | End: 2020-03-16 | Stop reason: SDUPTHER

## 2020-03-16 RX ORDER — SODIUM CHLORIDE 9 MG/ML
INJECTION, SOLUTION INTRAVENOUS CONTINUOUS PRN
Status: DISCONTINUED | OUTPATIENT
Start: 2020-03-16 | End: 2020-03-16 | Stop reason: SDUPTHER

## 2020-03-16 RX ORDER — ACETAMINOPHEN 325 MG/1
650 TABLET ORAL EVERY 4 HOURS PRN
Status: CANCELLED | OUTPATIENT
Start: 2020-03-16

## 2020-03-16 RX ORDER — PHYTONADIONE 5 MG/1
5 TABLET ORAL ONCE
Status: COMPLETED | OUTPATIENT
Start: 2020-03-16 | End: 2020-03-16

## 2020-03-16 RX ORDER — LIDOCAINE HYDROCHLORIDE 20 MG/ML
INJECTION, SOLUTION INFILTRATION; PERINEURAL
Status: COMPLETED | OUTPATIENT
Start: 2020-03-16 | End: 2020-03-16

## 2020-03-16 RX ADMIN — MIDODRINE HYDROCHLORIDE 2.5 MG: 5 TABLET ORAL at 16:46

## 2020-03-16 RX ADMIN — FERROUS SULFATE TAB 325 MG (65 MG ELEMENTAL FE) 325 MG: 325 (65 FE) TAB at 16:43

## 2020-03-16 RX ADMIN — SODIUM CHLORIDE: 9 INJECTION, SOLUTION INTRAVENOUS at 14:50

## 2020-03-16 RX ADMIN — TRAMADOL HYDROCHLORIDE 50 MG: 50 TABLET, FILM COATED ORAL at 16:44

## 2020-03-16 RX ADMIN — Medication 10 ML: at 20:31

## 2020-03-16 RX ADMIN — PROPOFOL 75 MCG/KG/MIN: 10 INJECTION, EMULSION INTRAVENOUS at 14:50

## 2020-03-16 RX ADMIN — Medication 10 ML: at 10:34

## 2020-03-16 RX ADMIN — MIRTAZAPINE 15 MG: 15 TABLET, FILM COATED ORAL at 20:30

## 2020-03-16 RX ADMIN — MIDODRINE HYDROCHLORIDE 2.5 MG: 5 TABLET ORAL at 06:46

## 2020-03-16 RX ADMIN — PHYTONADIONE 5 MG: 5 TABLET ORAL at 16:44

## 2020-03-16 RX ADMIN — CLONAZEPAM 0.25 MG: 0.5 TABLET ORAL at 20:30

## 2020-03-16 RX ADMIN — LIDOCAINE HYDROCHLORIDE 5 ML: 20 INJECTION, SOLUTION INFILTRATION; PERINEURAL at 15:11

## 2020-03-16 RX ADMIN — LEVOTHYROXINE SODIUM 100 MCG: 0.1 TABLET ORAL at 06:46

## 2020-03-16 ASSESSMENT — PULMONARY FUNCTION TESTS
PIF_VALUE: 0
PIF_VALUE: 1
PIF_VALUE: 0
PIF_VALUE: 1

## 2020-03-16 ASSESSMENT — PAIN - FUNCTIONAL ASSESSMENT
PAIN_FUNCTIONAL_ASSESSMENT: FLACC
PAIN_FUNCTIONAL_ASSESSMENT: 0-10
PAIN_FUNCTIONAL_ASSESSMENT: PREVENTS OR INTERFERES SOME ACTIVE ACTIVITIES AND ADLS
PAIN_FUNCTIONAL_ASSESSMENT: 0-10

## 2020-03-16 ASSESSMENT — PAIN DESCRIPTION - DESCRIPTORS: DESCRIPTORS: ACHING

## 2020-03-16 ASSESSMENT — PAIN SCALES - GENERAL
PAINLEVEL_OUTOF10: 6
PAINLEVEL_OUTOF10: 0
PAINLEVEL_OUTOF10: 6
PAINLEVEL_OUTOF10: 0

## 2020-03-16 ASSESSMENT — LIFESTYLE VARIABLES: SMOKING_STATUS: 0

## 2020-03-16 NOTE — PROGRESS NOTES
I spoke telephonically with the patient's brother Pancho Durán Re: planned Image Guided Left Nephrostomy Tube Replacement. He states that she can only have the exchange with moderate sedation from anesthesia services because Kisha Cortez does not tolerate the procedure otherwise. Informed consent obtained and witnessed by Kurtis Collins RN.

## 2020-03-16 NOTE — PROGRESS NOTES
I spoke with the patient's night nurse, \"Paige\", MARTIN Re: Planned Left Nephrostomy tube exchange and new Right Nephrostomy Tube Re-Insertion. The Patient has been NPO except for medications. Blood thinners have been held. The patient's mental status is delayed. I instructed the nurse to continue to hold blood thinners and maintain NPO status. She verbalized that she will obtain order for PT/ INR and will determine who the consenting authority.

## 2020-03-16 NOTE — PROGRESS NOTES
Patient arrived to our unit incontinent of bowel. While cleaning up the patient and attending to her needs, she was found to have impacted stool that was disimpacted and the patient was thoroughly cleaned, both in the rectal as well as perineal area. Kisha Cortez is found to have many reddened areas in these sensitive spots and these should be monitored closely by the floor.

## 2020-03-16 NOTE — PROGRESS NOTES
I spoke telephonically with the patient's hospice nurse, Yaritza Holt RN. She verbalized to me that Cameron Hendrix does not tolerate nephrostomy tube placements or exchanges without moderate sedation with anesthesia service present.

## 2020-03-16 NOTE — PROGRESS NOTES
Hospitalist Progress Note      PCP: Cholo Moran DO    Date of Admission: 3/13/2020    Chief Complaint: nephrostomy tube replacement    Hospital Course: one nephrostomy tube replaced today    Subjective: pelvic pain      Medications:  Reviewed    Infusion Medications   Scheduled Medications    sodium chloride flush  10 mL Intravenous 2 times per day    clonazePAM  0.25 mg Oral BID    [START ON 3/20/2020] darbepoetin lucio-polysorbate  200 mcg Subcutaneous Once    docusate sodium  100 mg Oral BID    ferrous sulfate  325 mg Oral BID WC    levothyroxine  100 mcg Oral Daily    mirtazapine  15 mg Oral Nightly    pantoprazole  40 mg Oral Daily    PARoxetine  20 mg Oral QAM    midodrine  2.5 mg Oral TID AC     PRN Meds: sodium chloride flush, polyethylene glycol, promethazine **OR** ondansetron, midodrine, traMADol, ondansetron      Intake/Output Summary (Last 24 hours) at 3/16/2020 1727  Last data filed at 3/16/2020 1513  Gross per 24 hour   Intake 640 ml   Output 625 ml   Net 15 ml       Exam:    BP (!) 115/58   Pulse 83   Temp 97.6 °F (36.4 °C) (Temporal)   Resp 16   Ht 4' 11\" (1.499 m)   Wt 105 lb 4.8 oz (47.8 kg)   LMP 05/21/2015 (Approximate)   SpO2 98%   BMI 21.27 kg/m²     General appearance: No apparent distress, appears stated age and cooperative. HEENT: Pupils equal, round, and reactive to light. Conjunctivae/corneas clear. Neck: Supple, with full range of motion. No jugular venous distention. Trachea midline. Respiratory:  Normal respiratory effort. Clear to auscultation, bilaterally without Rales/Wheezes/Rhonchi. Cardiovascular: Regular rate and rhythm with normal S1/S2 without murmurs, rubs or gallops. Abdomen: Soft, non-tender, non-distended with normal bowel sounds. Musculoskeletal: No clubbing, cyanosis or edema bilaterally. Full range of motion without deformity. Skin: Skin color, texture, turgor normal.  No rashes or lesions.   Neurologic:  Neurovascularly intact without any focal sensory/motor deficits. Cranial nerves: II-XII intact, grossly non-focal.  Psychiatric: Alert and oriented, thought content appropriate, normal insight  Capillary Refill: Brisk,< 3 seconds   Peripheral Pulses: +2 palpable, equal bilaterally       Labs:   Recent Labs     03/14/20  1255   WBC 17.6*   HGB 8.8*   HCT 31.0*        No results for input(s): NA, K, CL, CO2, BUN, CREATININE, CALCIUM, PHOS in the last 72 hours. Invalid input(s): MARIA DEL ROSARIO  No results for input(s): AST, ALT, BILIDIR, BILITOT, ALKPHOS in the last 72 hours. Recent Labs     03/16/20  0950   INR 3.0     No results for input(s): León Segundo in the last 72 hours.     Assessment/Plan:    Active Hospital Problems    Diagnosis Date Noted    Malfunction of nephrostomy tube [facility]) [W61.421O] 03/13/2020     p  One nephrostomy tube replaced  inr am    DVT Prophylaxis: auto anticoagulated  Diet: DIET GENERAL;  Diet NPO, After Midnight  Code Status: DNR-CCA    PT/OT Eval Status: n/a    Dispo - goal home    Alana Rose DO

## 2020-03-16 NOTE — PROGRESS NOTES
Patient brought to the radiology suite and assisted to the procedure table . Vital signs and sedation as per Anesthesia note. Consent obtained prior to the procedure by DPKRYSTA. Patient has NKDA.

## 2020-03-16 NOTE — PROGRESS NOTES
Dr. Temple Aw notified of INR and radiologist stating they cant do the R nephrostomy tube insertion unless her INR is 1.5 and seeing if he wanted to order anything for that

## 2020-03-16 NOTE — PROGRESS NOTES
8273- Called report to the patient's nurse, Atilio Perdomo. Patient is tolerating recovery well, VSS, left nephrostomy tube draining orange-tinted fluid successfully, dressing CDI on the left flank. No questions at this time. Order for transport back to the floor was placed and followed up by a call to transport.

## 2020-03-16 NOTE — ANESTHESIA PRE PROCEDURE
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    Hypotension I95.9    Chronic anemia D64.9    Generalized weakness R53.1    Pyelonephritis N12    Anemia D64.9    JIAN (acute kidney injury) (HCC) N17.9    Malfunction of nephrostomy tube ([address] T83.098A       Past Medical History:        Diagnosis Date    Acute cystitis with hematuria     Acute kidney failure (HCC)     Acute seasonal allergic rhinitis     Anemia, unspecified     Anxiety     Bradycardia, unspecified     Cancer ([facility] 06/12/2019    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 ([facility] 6/29/2019    Cellulitis     LUE    Chronic kidney disease     Chronic major depressive disorder, recurrent episode (HCC)     Constipation, unspecified     Depression     Difficulty walking     Dysmenorrhea, unspecified     Dysphagia, oropharyngeal     Hematuria     Hydronephrosis     Hyperlipidemia     Hypertension     Hypothyroidism     Intellectual disability     Leg pain, bilateral     Mild intermittent asthma, uncomplicated     Mild protein-calorie malnutrition (HCC)     Mixed incontinence     Muscle weakness (generalized)     Neuromuscular dysfunction of bladder     Noncompliance with medications     Noncompliance with treatment     Obstructive and reflux uropathy     Osteoarthritis     Other fatigue     Other symbolic dysfunctions     Personal care impairment     Personal history of noncompliance with medical treatment, presenting hazards to health     Presence of urogenital implants     Unspecified intellectual disabilities     Urinary tract infection, site not specified     Vitamin D deficiency, unspecified        Past Surgical History: Procedure Laterality Date    CATHETER REMOVAL N/A 2019    MEDIPORT CATHETER REMOVAL (DO NOT CHANGE TIME-TRANSPORTATION) [facility] [address] [facility]    CYSTOSCOPY, BILATERAL RETROGRADE PYELOGRAMS, BILATERAL STENT INSERTION performed by Roger Mendez MD at [address] Bilateral 2019    CYSTOSCOPY, RETROGRADE PYELOGRAMS, BILATERAL URETERAL STENT EXCHANGE performed by Reuben Abernathy MD at [facility] Bilateral 2019    CYSTOSCOPY RETROGRADE PYELOGRAM BILATERAL STENT REMOVAL performed by Lolis Tavarez MD at [address] [place] [address] / REMOVAL / REPLACEMENT VENOUS ACCESS CATHETER N/A 2019    MEDIPORT CATHETER INSERTION (DO NOT MOVE TIME) performed by Iris Goodrich MD at [address] [place] [address] N/A 2019    EXAM UNDEDR ANESTHESIA VAGINAL BIOPSIES performed by Guanakito Goins MD at [facility] History:    Social History     Tobacco Use    Smoking status: Former Smoker     Packs/day: 1.00     Years: 5.00     Pack years: 5.00     Last attempt to quit: 1989     Years since quittin.2    Smokeless tobacco: Never Used   Substance Use Topics    Alcohol use: No                                Counseling given: Not Answered      Vital Signs (Current): There were no vitals filed for this visit.                                            BP Readings from Last 3 Encounters:   20 138/64   20 (!) 110/50   19 110/60       NPO Status:                                                                                 BMI:   Wt Readings from Last 3 Encounters:   20 105 lb 4.8 oz (47.8 kg)   20 130 lb (59 kg)   19 126 lb (57.2 kg)     There is no height or weight on file to calculate BMI.    CBC:   Lab Results   Component Value Date    WBC 17.6 2020    RBC 2.94 2020    HGB 8.8 2020    HCT 31.0 2020    .4 patient.                 Erma Mckinley MD   3/16/2020

## 2020-03-17 ENCOUNTER — ANESTHESIA EVENT (OUTPATIENT)
Dept: INTERVENTIONAL RADIOLOGY/VASCULAR | Age: 55
DRG: 466 | End: 2020-03-17
Payer: MEDICAID

## 2020-03-17 ENCOUNTER — ANESTHESIA (OUTPATIENT)
Dept: INTERVENTIONAL RADIOLOGY/VASCULAR | Age: 55
DRG: 466 | End: 2020-03-17
Payer: MEDICAID

## 2020-03-17 ENCOUNTER — APPOINTMENT (OUTPATIENT)
Dept: CT IMAGING | Age: 55
DRG: 466 | End: 2020-03-17
Payer: MEDICAID

## 2020-03-17 VITALS
TEMPERATURE: 98 F | HEART RATE: 84 BPM | OXYGEN SATURATION: 98 % | RESPIRATION RATE: 16 BRPM | BODY MASS INDEX: 21.17 KG/M2 | WEIGHT: 105 LBS | HEIGHT: 59 IN | SYSTOLIC BLOOD PRESSURE: 123 MMHG | DIASTOLIC BLOOD PRESSURE: 66 MMHG

## 2020-03-17 VITALS — SYSTOLIC BLOOD PRESSURE: 139 MMHG | DIASTOLIC BLOOD PRESSURE: 88 MMHG | OXYGEN SATURATION: 100 %

## 2020-03-17 LAB
INR BLD: 1.5
PROTHROMBIN TIME: 17.1 SEC (ref 9.3–12.4)

## 2020-03-17 PROCEDURE — 0T9030Z DRAINAGE OF RIGHT KIDNEY WITH DRAINAGE DEVICE, PERCUTANEOUS APPROACH: ICD-10-PCS | Performed by: RADIOLOGY

## 2020-03-17 PROCEDURE — 2709999900 CT NEPHROSTOMY CATHETER PLACEMENT

## 2020-03-17 PROCEDURE — 36415 COLL VENOUS BLD VENIPUNCTURE: CPT

## 2020-03-17 PROCEDURE — 85610 PROTHROMBIN TIME: CPT

## 2020-03-17 PROCEDURE — 6360000002 HC RX W HCPCS

## 2020-03-17 PROCEDURE — 2580000003 HC RX 258: Performed by: HOSPITALIST

## 2020-03-17 PROCEDURE — 6370000000 HC RX 637 (ALT 250 FOR IP): Performed by: HOSPITALIST

## 2020-03-17 PROCEDURE — 2500000003 HC RX 250 WO HCPCS: Performed by: RADIOLOGY

## 2020-03-17 PROCEDURE — 2580000003 HC RX 258: Performed by: NURSE ANESTHETIST, CERTIFIED REGISTERED

## 2020-03-17 RX ORDER — PROPOFOL 10 MG/ML
INJECTION, EMULSION INTRAVENOUS CONTINUOUS PRN
Status: DISCONTINUED | OUTPATIENT
Start: 2020-03-17 | End: 2020-03-17 | Stop reason: SDUPTHER

## 2020-03-17 RX ORDER — SODIUM CHLORIDE 9 MG/ML
INJECTION, SOLUTION INTRAVENOUS CONTINUOUS PRN
Status: DISCONTINUED | OUTPATIENT
Start: 2020-03-17 | End: 2020-03-17 | Stop reason: SDUPTHER

## 2020-03-17 RX ORDER — LIDOCAINE HYDROCHLORIDE 20 MG/ML
INJECTION, SOLUTION INFILTRATION; PERINEURAL
Status: COMPLETED | OUTPATIENT
Start: 2020-03-17 | End: 2020-03-17

## 2020-03-17 RX ORDER — ACETAMINOPHEN 325 MG/1
650 TABLET ORAL EVERY 4 HOURS PRN
Status: DISCONTINUED | OUTPATIENT
Start: 2020-03-17 | End: 2020-03-17 | Stop reason: HOSPADM

## 2020-03-17 RX ADMIN — PROPOFOL 75 MCG/KG/MIN: 10 INJECTION, EMULSION INTRAVENOUS at 14:45

## 2020-03-17 RX ADMIN — TRAMADOL HYDROCHLORIDE 50 MG: 50 TABLET, FILM COATED ORAL at 01:14

## 2020-03-17 RX ADMIN — LEVOTHYROXINE SODIUM 100 MCG: 0.1 TABLET ORAL at 16:57

## 2020-03-17 RX ADMIN — Medication 10 ML: at 09:57

## 2020-03-17 RX ADMIN — FERROUS SULFATE TAB 325 MG (65 MG ELEMENTAL FE) 325 MG: 325 (65 FE) TAB at 16:57

## 2020-03-17 RX ADMIN — LIDOCAINE HYDROCHLORIDE 7 ML: 20 INJECTION, SOLUTION INFILTRATION; PERINEURAL at 15:25

## 2020-03-17 RX ADMIN — SODIUM CHLORIDE: 9 INJECTION, SOLUTION INTRAVENOUS at 14:38

## 2020-03-17 RX ADMIN — MIDODRINE HYDROCHLORIDE 2.5 MG: 5 TABLET ORAL at 16:57

## 2020-03-17 ASSESSMENT — LIFESTYLE VARIABLES: SMOKING_STATUS: 0

## 2020-03-17 ASSESSMENT — PAIN DESCRIPTION - PAIN TYPE: TYPE: CHRONIC PAIN

## 2020-03-17 ASSESSMENT — PAIN DESCRIPTION - DESCRIPTORS
DESCRIPTORS: ACHING;DISCOMFORT
DESCRIPTORS: ACHING;DISCOMFORT

## 2020-03-17 ASSESSMENT — PAIN DESCRIPTION - ONSET: ONSET: ON-GOING

## 2020-03-17 ASSESSMENT — PAIN - FUNCTIONAL ASSESSMENT
PAIN_FUNCTIONAL_ASSESSMENT: 0-10
PAIN_FUNCTIONAL_ASSESSMENT: PREVENTS OR INTERFERES SOME ACTIVE ACTIVITIES AND ADLS

## 2020-03-17 ASSESSMENT — PAIN SCALES - GENERAL
PAINLEVEL_OUTOF10: 0
PAINLEVEL_OUTOF10: 0
PAINLEVEL_OUTOF10: 10
PAINLEVEL_OUTOF10: 6

## 2020-03-17 ASSESSMENT — PAIN DESCRIPTION - LOCATION: LOCATION: ABDOMEN

## 2020-03-17 ASSESSMENT — PAIN DESCRIPTION - ORIENTATION: ORIENTATION: RIGHT;UPPER

## 2020-03-17 ASSESSMENT — PAIN DESCRIPTION - FREQUENCY: FREQUENCY: CONTINUOUS

## 2020-03-17 NOTE — DISCHARGE SUMMARY
Hospital Medicine Discharge Summary    Patient ID: Dorenda Ok      Patient's PCP: Jordana Jones DO    Admit Date: 3/13/2020     Discharge Date:   3/17    Admitting Physician: Yolie Marx MD     Discharge Physician: Alana Rose DO     Discharge Diagnoses: Active Hospital Problems    Diagnosis Date Noted    Malfunction of nephrostomy tube [facility]) [T83.098A] 03/13/2020       The patient was seen and examined on day of discharge and this discharge summary is in conjunction with any daily progress note from day of discharge. Hospital Course: From history and physical  63 yo female hospice patient, hx BL NT from pelvic mass, due to high grade cancer , being f/u oncology ,sent to ER by urology for NT change due to malfunction of it, rt NT fell out recently,   She is aox3, very cachectic, mild abdominal pain, appetite fair, she is for admission for nephrostomy tube replacement ,  Blood work revealed a markedly elevated bilirubin of 8.6 as well as elevated liver enzymes.  CT of scan of the abdomen and pelvis showed nephrostomy tube in place as well as concern for metastases to the liver.  it was determined that the nephrostomy tube is for palliative care and is dysfunctional at this time.  Plan to admit to medicine for IR exchange of nephrostomy tube    Nephrostomy tubes were placed without incident. Exam:     BP (!) 144/70   Pulse 75   Temp 97 °F (36.1 °C) (Temporal)   Resp 18   Ht 4' 11\" (1.499 m)   Wt 105 lb (47.6 kg)   LMP 05/21/2015 (Approximate)   SpO2 100%   BMI 21.21 kg/m²     General appearance: No apparent distress, appears stated age and cooperative. HEENT: Pupils equal, round, and reactive to light. Conjunctivae/corneas clear. Neck: Supple, with full range of motion. No jugular venous distention. Trachea midline. Respiratory:  Normal respiratory effort. Clear to auscultation, bilaterally without Rales/Wheezes/Rhonchi.   Cardiovascular: Regular rate and rhythm with normal S1/S2 without murmurs, rubs or gallops. Abdomen: Soft, non-tender, non-distended with normal bowel sounds. Musculoskeletal: No clubbing, cyanosis or edema bilaterally. Full range of motion without deformity. Skin: Skin color, texture, turgor normal.  No rashes or lesions. Neurologic:  Neurovascularly intact without any focal sensory/motor deficits. Cranial nerves: II-XII intact, grossly non-focal.  Psychiatric: Alert and oriented, thought content appropriate, normal insight      Consults:     IP CONSULT TO UROLOGY  IP CONSULT TO INTERNAL MEDICINE  IP CONSULT TO PALLIATIVE CARE    Significant Diagnostic Studies:   CT scan please see results  Narrative   Patient MRN:  [id number]   : 1965   Age: 62 years   Gender: Female       Order Date:  3/17/2020 3:30 PM       EXAM: CT NEPHROSTOMY CATHETER PLACEMENT       NUMBER OF IMAGES:  82       INDICATION:  Right nephrostomy tube replacement and left nephrostomy   tube exchange    Right nephrostomy tube replacement and left nephrostomy tube exchange       COMPARISON: None       CT and fluoroscopic guided right nephrostomy tube placement       After obtaining informed consent, following the routine sterile prep   and drape and after the administration of local anesthesia a needle   was inserted into the renal pelvis and fluid was aspirated. Subsequently a guidewire was passed through the needle. Subsequently   the needle was removed and over the guidewire the tract was dilated. Following dilatation a locking loop catheter was placed. The patient   tolerated the procedure well. There were no complications.       Post procedure CT scan reveals the tube to be in good position.       Prior to the procedure a timeout occurred at 1514. The patient   received 36.7 second of fluoroscopy. The patient received [facility]   anesthesia.  The patient was monitored for 60 minutes by a registered   nurse.           Impression   Successful uncomplicated right nephrostomy tube placement.             Disposition:  Stable at time of discharge to home  Discharge Instructions/Follow-up: Follow-up with urology    Code Status:  DNR-CCA     Activity: activity as tolerated    Diet: regular diet    Labs: For convenience and continuity at follow-up the following most recent labs are provided:      CBC:    Lab Results   Component Value Date    WBC 17.6 03/14/2020    HGB 8.8 03/14/2020    HCT 31.0 03/14/2020     03/14/2020       Renal:    Lab Results   Component Value Date     03/13/2020    K 3.3 03/13/2020    K 4.5 11/25/2019     03/13/2020    CO2 26 03/13/2020    BUN 27 03/13/2020    CREATININE 1.1 03/13/2020    CALCIUM 10.8 03/13/2020    PHOS 3.8 12/14/2019       Discharge Medications:     Current Discharge Medication List           Details   PARoxetine (PAXIL) 20 MG tablet Take 20 mg by mouth every morning      !! midodrine (PROAMATINE) 2.5 MG tablet Take 2.5 mg by mouth 3 times daily (before meals)      ondansetron (ZOFRAN) 4 MG tablet Take 4 mg by mouth every 8 hours as needed for Nausea or Vomiting      pantoprazole (PROTONIX) 40 MG tablet Take 40 mg by mouth daily      levothyroxine (SYNTHROID) 100 MCG tablet Take 100 mcg by mouth Daily      darbepoetin lucio-polysorbate (ARANESP) 200 MCG/ML injection Inject 200 mcg into the skin once Last Dose - 11/04/19  Next Dose - 12/13/19      traMADol (ULTRAM) 50 MG tablet Take 50 mg by mouth every 6 hours as needed for Pain. !! midodrine (PROAMATINE) 5 MG tablet Take 5 mg by mouth every 4-6 hours as needed (hypotension)       HYDROcodone-acetaminophen (NORCO) 5-325 MG per tablet Take 1 tablet by mouth every 6 hours as needed for Pain (moderate pain malignant neoplasm of uterus).        acetaminophen (TYLENOL) 325 MG tablet Take 650 mg by mouth every 4 hours as needed for Pain or Fever       mirtazapine (REMERON) 15 MG tablet Take 1 tablet by mouth nightly  Qty: 30 tablet, Refills: 0      docusate sodium (COLACE, DULCOLAX) 100 MG CAPS Take 100 mg by mouth 2 times daily  Qty: 60 capsule, Refills: 0      oxybutynin (DITROPAN-XL) 5 MG extended release tablet Take 1 tablet by mouth daily  Qty: 30 tablet, Refills: 3    Associated Diagnoses: Mixed stress and urge urinary incontinence      vitamin D (CHOLECALCIFEROL) 58338 UNIT CAPS Take 50,000 Units by mouth daily      atorvastatin (LIPITOR) 40 MG tablet Take 40 mg by mouth nightly      clonazePAM (KLONOPIN) 0.5 MG tablet Take 0.25 mg by mouth 2 times daily. sertraline (ZOLOFT) 25 MG tablet Take 25 mg by mouth daily      ferrous sulfate 325 (65 Fe) MG tablet Take 1 tablet by mouth 2 times daily (with meals)  Qty: 30 tablet, Refills: 3       !! - Potential duplicate medications found. Please discuss with provider. Time Spent on discharge is more than 1 hour in the examination, evaluation, counseling and review of medications and discharge plan. Signed:    Orion Randolph DO   3/17/2020      Thank you Genesis Carty DO for the opportunity to be involved in this patient's care. If you have any questions or concerns please feel free to contact me at [phone].

## 2020-03-17 NOTE — INTERVAL H&P NOTE
H&P Update    Patient's History and Physical  was reviewed. The patient appears likely to able to tolerate the procedure. Risk and benefits discussed including ultimate complications, possibly death and consent obtained.     Albertina Endo, II

## 2020-03-17 NOTE — DISCHARGE INSTR - COC
Continuity of Care Form    Patient Name: Roseann Bhatia   :  1965  MRN:  [id number]    Admit date:  3/13/2020  Discharge date:  3/17/20    Code Status Order: DNR-CCA   Advance Directives:   [facility] Documentation     Date/Time Healthcare Directive Type of Healthcare Directive Copy in [address] Agent's Name Healthcare Agent's Phone Number    20  --  --  --  --  --  --    20  Yes, patient has an advance directive for healthcare treatment  Living will  No, copy requested from family  --  --  --          Admitting Physician:  Paloma Yu MD  PCP: Ynes Kauffman DO    Discharging Nurse: [address] [place] [address] Unit/Room#: 3266/6352-V  Discharging Unit Phone Number:     Emergency Contact:   Extended Emergency Contact Information  Primary Emergency Contact: [address] Phone: [phone]  Mobile Phone: [phone]  Relation: Brother/Sister  Hearing or visual needs: None  Other needs: None  Preferred language: English   needed?  No  Secondary Emergency Contact: Mine Willard, [address] [facility] Phone: [phone]  Relation: Brother/Sister    Past Surgical History:  Past Surgical History:   Procedure Laterality Date    CATHETER REMOVAL N/A 2019    MEDIPORT CATHETER REMOVAL (DO NOT CHANGE TIME-TRANSPORTATION) performed by Alicia Estrada MD at [facility] Left 2019    CYSTOSCOPY, BILATERAL RETROGRADE PYELOGRAMS, BILATERAL STENT INSERTION performed by Chelo Lange MD at [facility] Bilateral 2019    CYSTOSCOPY, RETROGRADE PYELOGRAMS, BILATERAL URETERAL STENT EXCHANGE performed by Josiane Floyd MD at [facility] / REMOVAL STENT / STONE Bilateral 2019    CYSTOSCOPY RETROGRADE PYELOGRAM BILATERAL STENT REMOVAL performed by Mira Moreno MD at [address] [place] [address] / REMOVAL / [address] [place] [address] Generalized weakness R53.1    Pyelonephritis N12    Anemia D64.9    JIAN (acute kidney injury) ([facility] [address] N17.9    Malfunction of nephrostomy tube ([facility]) T83.098A       Isolation/Infection:   Isolation          No Isolation        Patient Infection Status     Infection Onset Added Last Indicated Last Indicated By Review Planned Expiration Resolved Resolved By    None active    Resolved    MRSA 09/14/19 09/15/19 09/14/19 MRSA SCREENING CULTURE ONLY   09/17/19 Kailey Gallardo RN          Nurse Assessment:  Last Vital Signs: /66   Pulse 84   Temp 98 °F (36.7 °C) (Temporal)   Resp 16   Ht 4' 11\" (1.499 m)   Wt 105 lb (47.6 kg)   LMP 05/21/2015 (Approximate)   SpO2 98%   BMI 21.21 kg/m²     Last documented pain score (0-10 scale): Pain Level: 0  Last Weight:   Wt Readings from Last 1 Encounters:   03/17/20 105 lb (47.6 kg)     Mental Status:  {IP PT MENTAL STATUS:16473}    IV Access:  - None    Nursing Mobility/ADLs:  Walking   Assisted  Transfer  Assisted  Bathing  Assisted  Dressing  Assisted  Toileting  Assisted  Feeding  Assisted  Med Admin  Assisted  Med Delivery   whole    Wound Care Documentation and Therapy:        Elimination:  Continence:   · Bowel: No  · Bladder: No  Urinary Catheter: None   Colostomy/Ileostomy/Ileal Conduit: No       Date of Last BM: 3/17/20    Intake/Output Summary (Last 24 hours) at 3/17/2020 1714  Last data filed at 3/17/2020 1659  Gross per 24 hour   Intake 150 ml   Output 875 ml   Net -725 ml     I/O last 3 completed shifts: In: 200 [I.V.:200]  Out: 475 [Urine:475]    Safety Concerns: At Risk for Falls    Impairments/Disabilities:      None    Nutrition Therapy:  Current Nutrition Therapy:   - Oral Diet:  Carb Control 4 carbs/meal (1800kcals/day)    Routes of Feeding: Oral  Liquids:  Thin Liquids  Daily Fluid Restriction: no  Last Modified Barium Swallow with Video (Video Swallowing Test): not done    Treatments at the Time of Hospital Discharge:   Respiratory Treatments: none  Oxygen Therapy:  is not on home oxygen therapy. Ventilator:    - No ventilator support    Rehab Therapies: Physical Therapy and Occupational Therapy  Weight Bearing Status/Restrictions: No weight bearing restirctions  Other Medical Equipment (for information only, NOT a DME order):  walker, bedside commode and hospital bed  Other Treatments: nephrostomy tubes: Irrigate with 5 cc sterile saline every 24 hours . 2) Call if unable to irrigate drain. Patient's personal belongings (please select all that are sent with patient):  None    RN SIGNATURE:  Electronically signed by Albertina Dodson RN on 3/17/20 at 5:18 PM EDT    CASE MANAGEMENT/SOCIAL WORK SECTION    Inpatient Status Date: ***    Readmission Risk Assessment Score:  Readmission Risk              Risk of Unplanned Readmission:        37           Discharging to Facility/ Agency   · Name:   · Address:  · Phone:  · Fax:    Dialysis Facility (if applicable)   · Name:  · Address:  · Dialysis Schedule:  · Phone:  · Fax:    / signature: {Esignature:[id number]}    PHYSICIAN SECTION    Prognosis: Poor    Condition at Discharge: Stable    Rehab Potential (if transferring to Rehab): Poor    Recommended Labs or Other Treatments After Discharge: follow up with urology    Physician Certification: I certify the above information and transfer of Roseann Bhatia  is necessary for the continuing treatment of the diagnosis listed and that she requires [facility] for greater 30 days.      Update Admission H&P: No change in H&P    PHYSICIAN SIGNATURE:  Electronically signed by Cecil Stanley DO on 3/17/20 at 5:27 PM EDT

## 2020-03-17 NOTE — PROGRESS NOTES
1533   Patient arrived to Radiology recovery room for observation until discharged. Vital signs, dressing and pain assessment will be done every 15 minutes or as needed. Presently patient is resting. Vitals stable, right nephro tube clean, dry and intact. Yellow drainage present and patent in bag.

## 2020-03-17 NOTE — ANESTHESIA PRE PROCEDURE
Department of Anesthesiology  Preprocedure Note       Name:  Alicia Sanches   Age:  62 y.o.  :  1965                                          MRN:  [id number]         Date:  3/17/2020      Surgeon: * Surgery not found *    Procedure: IR NEPHROSTOMY EXCHANGE CATHETER    Medications prior to admission:   Prior to Admission medications    Medication Sig Start Date End Date Taking? Authorizing Provider   PARoxetine (PAXIL) 20 MG tablet Take 20 mg by mouth every morning    Historical Provider, MD   midodrine (PROAMATINE) 2.5 MG tablet Take 2.5 mg by mouth 3 times daily (before meals)    Historical Provider, MD   ondansetron (ZOFRAN) 4 MG tablet Take 4 mg by mouth every 8 hours as needed for Nausea or Vomiting    Historical Provider, MD   pantoprazole (PROTONIX) 40 MG tablet Take 40 mg by mouth daily    Historical Provider, MD   levothyroxine (SYNTHROID) 100 MCG tablet Take 100 mcg by mouth Daily    Historical Provider, MD   vitamin D (CHOLECALCIFEROL) 47596 UNIT CAPS Take 50,000 Units by mouth daily    Historical Provider, MD   darbepoetin lucio-polysorbate (ARANESP) 200 MCG/ML injection Inject 200 mcg into the skin once Last Dose - 19  Next Dose - 19    Historical Provider, MD   traMADol (ULTRAM) 50 MG tablet Take 50 mg by mouth every 6 hours as needed for Pain. Historical Provider, MD   midodrine (PROAMATINE) 5 MG tablet Take 5 mg by mouth every 4-6 hours as needed (hypotension)     Historical Provider, MD   atorvastatin (LIPITOR) 40 MG tablet Take 40 mg by mouth nightly    Historical Provider, MD   clonazePAM (KLONOPIN) 0.5 MG tablet Take 0.25 mg by mouth 2 times daily. Historical Provider, MD   HYDROcodone-acetaminophen (NORCO) 5-325 MG per tablet Take 1 tablet by mouth every 6 hours as needed for Pain (moderate pain malignant neoplasm of uterus).      Historical Provider, MD   sertraline (ZOLOFT) 25 MG tablet Take 25 mg by mouth daily    Historical Provider, MD   acetaminophen (TYLENOL) 325 MG tablet Take 650 mg by mouth every 4 hours as needed for Pain or Fever     Historical Provider, MD   mirtazapine (REMERON) 15 MG tablet Take 1 tablet by mouth nightly 5/24/19   Jessica Orlando MD   ferrous sulfate 325 (65 Fe) MG tablet Take 1 tablet by mouth 2 times daily (with meals) 4/23/19   Magaly Delgadillo DO   docusate sodium (COLACE, DULCOLAX) 100 MG CAPS Take 100 mg by mouth 2 times daily 4/23/19   Magaly Delgadillo DO   oxybutynin (DITROPAN-XL) 5 MG extended release tablet Take 1 tablet by mouth daily 4/9/19   Yumiko Mcnally DO       Current medications:    No current facility-administered medications for this visit. No current outpatient medications on file.      Facility-Administered Medications Ordered in Other Visits   Medication Dose Route Frequency Provider Last Rate Last Dose    sodium chloride flush 0.9 % injection 10 mL  10 mL Intravenous 2 times per day Dangelo Gupta MD   10 mL at 03/17/20 0957    sodium chloride flush 0.9 % injection 10 mL  10 mL Intravenous PRN Dangelo Gupta MD        polyethylene glycol (GLYCOLAX) packet 17 g  17 g Oral Daily PRN Dangelo Gupta MD        promethazine (PHENERGAN) tablet 12.5 mg  12.5 mg Oral Q6H PRN Dangelo Gupta MD        Or    ondansetron (ZOFRAN) injection 4 mg  4 mg Intravenous Q6H PRN Dangelo Gupta MD        clonazePAM (KLONOPIN) tablet 0.25 mg  0.25 mg Oral BID Dangelo Gupta MD   0.25 mg at 03/16/20 2030    [START ON 3/20/2020] darbepoetin lucio-polysorbate (ARANESP) injection 200 mcg  200 mcg Subcutaneous Once Dangelo Gupta MD        docusate sodium (COLACE) capsule 100 mg  100 mg Oral BID Dangelo Gupta MD   100 mg at 03/14/20 2023    ferrous sulfate (IRON 325) tablet 325 mg  325 mg Oral BID WC Dangelo Gupta MD   325 mg at 03/16/20 1643    levothyroxine (SYNTHROID) tablet 100 mcg  100 mcg Oral Daily Dangelo Gupta MD   100 mcg at 03/16/20 0646    midodrine (PROAMATINE) tablet 5 mg  5 mg Oral Q4H PRN Jessie Lopez MD        mirtazapine BILATERAL URETERAL STENT EXCHANGE performed by Jessica Goodrich MD at [address] [place] [address] / [address] / Juma Chimes Bilateral 2019    CYSTOSCOPY RETROGRADE PYELOGRAM BILATERAL STENT REMOVAL performed by Mauro Desouza MD at [address] / REMOVAL / REPLACEMENT VENOUS ACCESS CATHETER N/A 2019    MEDIPORT CATHETER INSERTION (DO NOT MOVE TIME) performed by Marivel Patrick MD at [address] N/A 2019    EXAM [facility] performed by Jazmyne Laboy MD at [facility] History:    Social History     Tobacco Use    Smoking status: Former Smoker     Packs/day: 1.00     Years: 5.00     Pack years: 5.00     Last attempt to quit: 1989     Years since quittin.2    Smokeless tobacco: Never Used   Substance Use Topics    Alcohol use: No                                Counseling given: Not Answered      Vital Signs (Current): There were no vitals filed for this visit.                                            BP Readings from Last 3 Encounters:   20 (!) 145/69   20 (!) 142/83   20 (!) 110/50       NPO Status:                                                    >8 hours                             BMI:   Wt Readings from Last 3 Encounters:   20 105 lb (47.6 kg)   20 130 lb (59 kg)   19 126 lb (57.2 kg)     There is no height or weight on file to calculate BMI.    CBC:   Lab Results   Component Value Date    WBC 17.6 2020    RBC 2.94 2020    HGB 8.8 2020    HCT 31.0 2020    .4 2020    RDW 16.7 2020     2020       CMP:   Lab Results   Component Value Date     2020    K 3.3 2020    K 4.5 2019     2020    CO2 26 2020    BUN 27 2020    CREATININE 1.1 2020    GFRAA >60 2020    LABGLOM 51 2020    GLUCOSE 124 2020    GLUCOSE 97 2011    PROT 5.8 2020    CALCIUM

## 2020-03-25 PROBLEM — N17.9 ACUTE RENAL FAILURE (ARF) (HCC): Status: RESOLVED | Noted: 2019-01-18 | Resolved: 2020-03-24

## 2020-04-03 NOTE — ANESTHESIA POSTPROCEDURE EVALUATION
Department of Anesthesiology  Postprocedure Note    Patient: Shady Gill  MRN: [id number]  YOB: 1965  Date of evaluation: 4/3/2020  Time:  7:15 AM     Procedure Summary     Date:  03/16/20 Room / Location:  [address] [place] [address] Procedures; [address] [place] [address] Radiology    Anesthesia Start:  1450 Anesthesia Stop:  7831    Procedures:       IR NEPHROSTOMY PERCUTANEOUS LEFT      IR REMOVAL NEPHROSTOMY TUBE W FLUORO Diagnosis:       (for left NT exchange and replacement of rt NT for palliative reason)      (neph excahnge )    Scheduled Providers:  [facility] General Radiologist; JUSTYN Smith - CRNA Responsible Provider:  Kamila Pérez MD    Anesthesia Type:  MAC ASA Status:  3          Anesthesia Type: No value filed. Tevin Phase I:      Tevin Phase II:      Last vitals: Reviewed and per EMR flowsheets.        Anesthesia Post Evaluation    Patient location during evaluation: PACU  Patient participation: complete - patient participated  Level of consciousness: awake and alert  Airway patency: patent  Nausea & Vomiting: no nausea and no vomiting  Complications: no  Cardiovascular status: hemodynamically stable  Respiratory status: acceptable  Hydration status: euvolemic

## 2022-06-21 NOTE — PROGRESS NOTES
[facility]                                                                                                                    PRE OP INSTRUCTIONS FOR  Charlette Quach        Date: 8/16/2019    Date of surgery: 8/19/19  1300   Arrival Time:  1100 in ambulatory care center      1. Do not eat or drink anything after Midnight prior to surgery. This includes no water, chewing gum, mints or ice chips. 2. Take the following medications with a small sip of water on the morning of Surgery: Klonopin if needed. 3. Diabetics may take evening dose of insulin but none after midnight. If you feel symptomatic or low blood sugar morning of surgery drink 1-2 ounces of apple juice only. 4. Aspirin, Ibuprofen, Advil, Naproxen, Vitamin E and other Anti-inflammatory products should be stopped  before surgery  as directed by your physician. Take Tylenol only unless instructed otherwise by your surgeon. 5. Check with your Doctor regarding stopping Plavix, Coumadin, Lovenox, Eliquis, Effient, or other blood thinners. 6. Do not smoke,use illicit drugs and do not drink any alcoholic beverages 24 hours prior to surgery. 7. You may brush your teeth the morning of surgery. DO NOT SWALLOW WATER    8. You MUST make arrangements for a responsible adult to take you home after your surgery. You will not be allowed to leave alone or drive yourself home. It is strongly suggested someone stay with you the first 24 hrs. Your surgery will be cancelled if you do not have a ride home. 9. PEDIATRIC PATIENTS ONLY:  A parent/legal guardian must accompany a child scheduled for surgery and plan to stay at the hospital until the child is discharged. Please do not bring other children with you.     10. Please wear simple, loose fitting clothing to the hospital.  Abbey Sifuentes not bring valuables (money, credit cards, checkbooks, etc.) Do not wear any makeup (including no eye makeup) or nail polish on your fingers or Statement Selected

## 2022-09-21 NOTE — ED NOTES
Bed: 08  Expected date:   Expected time:   Means of arrival:   Comments:     Eugenia Yoder RN  04/18/19 2864 Bisi states she would like to confirm the patient is safe to receive physical therapy. Bisi states the patient seems confused. Bisi states if the patient has any physical restrictions they will need to know ASAP. Bisi states the patient has a appointment today at 1200.     Please try to call the number listed below if the clinic is having issues getting through.     [phone] Please ask for Bisi Gomez

## (undated) DEVICE — GARMENT,MEDLINE,DVT,INT,CALF,MED, GEN2: Brand: MEDLINE

## (undated) DEVICE — BLADE ES ELASTOMERIC COAT INSUL DURABLE BEND UPTO 90DEG

## (undated) DEVICE — GAUZE,SPONGE,2"X2",8PLY,STERILE,LF,2'S: Brand: MEDLINE

## (undated) DEVICE — GOWN,SIRUS,NONRNF,SETINSLV,XL,20/CS: Brand: MEDLINE

## (undated) DEVICE — NDL CNTR 40CT FM MAG: Brand: MEDLINE INDUSTRIES, INC.

## (undated) DEVICE — COVER,LIGHT HANDLE,FLX,1/PK: Brand: MEDLINE INDUSTRIES, INC.

## (undated) DEVICE — GLOVE SURG SZ 75 STD WHT LTX SYN POLYMER BEAD REINF ANTI RL

## (undated) DEVICE — Z INACTIVE USE 2635503 SOLUTION IRRIG 3000ML ST H2O USP UROMATIC PLAS CONT

## (undated) DEVICE — CHLORAPREP 26ML ORANGE

## (undated) DEVICE — INTENDED FOR TISSUE SEPARATION, AND OTHER PROCEDURES THAT REQUIRE A SHARP SURGICAL BLADE TO PUNCTURE OR CUT.: Brand: BARD-PARKER ® STAINLESS STEEL BLADES

## (undated) DEVICE — CAMERA STRYKER 1488 HD GEN

## (undated) DEVICE — SYRINGE, LUER LOCK, 10ML: Brand: MEDLINE

## (undated) DEVICE — ELECTROSURGICAL PENCIL BUTTON SWITCH E-Z CLEAN COATED BLADE ELECTRODE 10 FT (3 M) CORD HOLSTER: Brand: MEGADYNE

## (undated) DEVICE — GLOVE ORANGE PI 7 1/2   MSG9075

## (undated) DEVICE — PATIENT RETURN ELECTRODE, SINGLE-USE, CONTACT QUALITY MONITORING, ADULT, WITH 9FT CORD, FOR PATIENTS WEIGING OVER 33LBS. (15KG): Brand: MEGADYNE

## (undated) DEVICE — BASIC SINGLE BASIN 1-LF: Brand: MEDLINE INDUSTRIES, INC.

## (undated) DEVICE — CIRCON 21F CYSTO TRAY

## (undated) DEVICE — SET SURG INSTR DISSECT

## (undated) DEVICE — DOUBLE BASIN SET: Brand: MEDLINE INDUSTRIES, INC.

## (undated) DEVICE — DRESSING TRNSPAR W5XL4.5IN FLM SHT SEMIPERMEABLE WIND

## (undated) DEVICE — SMARTGOWN BREATHABLE SURGICAL GOWN: Brand: CONVERTORS

## (undated) DEVICE — STRIP,CLOSURE,WOUND,MEDI-STRIP,1/4X3: Brand: MEDLINE

## (undated) DEVICE — CONTROL SYRINGE LUER-LOCK TIP: Brand: MONOJECT

## (undated) DEVICE — SCANLAN® SUTURE BOOT™ INSTRUMENT JAW COVERS - ORIGINAL YELLOW, MINI PKG (3 PAIR/CARTRIDGE, 1 CARTRIDGE/PKG): Brand: SCANLAN® SUTURE BOOT™ INSTRUMENT JAW COVERS

## (undated) DEVICE — TRAY,VAG PREP,2PR VNYL GLV,4 C: Brand: MEDLINE INDUSTRIES, INC.

## (undated) DEVICE — MARKER,SKIN,WI/RULER AND LABELS: Brand: MEDLINE

## (undated) DEVICE — DRAINBAG,ANTI-REFLUX TOWER,L/F,2000ML,LL: Brand: MEDLINE

## (undated) DEVICE — SOLUTION IV IRRIG WATER 1000ML POUR BRL 2F7114

## (undated) DEVICE — Z INACTIVE USE 2660664 SOLUTION IRRIG 3000ML 0.9% SOD CHL USP UROMATIC PLAS CONT

## (undated) DEVICE — CYSTO PACK: Brand: MEDLINE INDUSTRIES, INC.

## (undated) DEVICE — BAG DRAINAGE CONTAINER 15 LT FLUID COLLCTN

## (undated) DEVICE — GAUZE,SPONGE,4"X4",16PLY,XRAY,STRL,LF: Brand: MEDLINE

## (undated) DEVICE — CANNULA IV 18GA L15IN BLNT FILL LUERLOCK HUB MJCT

## (undated) DEVICE — STANDARD HYPODERMIC NEEDLE,POLYPROPYLENE HUB: Brand: MONOJECT

## (undated) DEVICE — GUIDEWIRE URO L150CM DIA0.035IN TAPR 3CM NIT HYDRPHLC ANG

## (undated) DEVICE — PAD MATERNITY CURITY ADH STRIP DISP

## (undated) DEVICE — BAG DRNGE COMB PK

## (undated) DEVICE — PACK,LITHOTOMY,PK I: Brand: MEDLINE

## (undated) DEVICE — PENCIL ES L3M BTTN SWCH HOLSTER W/ BLDE ELECTRD EDGE

## (undated) DEVICE — MAGNETIC DRAPE: Brand: DEVON

## (undated) DEVICE — READY WET SKIN SCRUB TRAY-LF: Brand: MEDLINE INDUSTRIES, INC.

## (undated) DEVICE — CIRCON 30/70 URO LENS

## (undated) DEVICE — SOLUTION IV IRRIG 1000ML POUR BTL 2F7114

## (undated) DEVICE — TRAY PROCED DILATATION CURETTAGE

## (undated) DEVICE — PACK,LAPAROTOMY,NO GOWNS: Brand: MEDLINE

## (undated) DEVICE — TOWEL,OR,DSP,ST,BLUE,STD,6/PK,12PK/CS: Brand: MEDLINE

## (undated) DEVICE — YANKAUER,BULB TIP,W/O VENT,RIGID,STERILE: Brand: MEDLINE

## (undated) DEVICE — GOWN,SIRUS,FABRNF,XL,20/CS: Brand: MEDLINE

## (undated) DEVICE — GUIDEWIRE ENDOSCP L150CM DIA0.035IN TIP 3CM PTFE NIT

## (undated) DEVICE — URETEROSCOPE RIGID

## (undated) DEVICE — TUBING, SUCTION, 1/4" X 10', STRAIGHT: Brand: MEDLINE

## (undated) DEVICE — MEDIA CONTRAST RX ISOVUE-300 61% 30ML VIALS

## (undated) DEVICE — LIMB HOLDER, WRIST/ANKLE: Brand: DEROYAL